# Patient Record
Sex: MALE | Race: BLACK OR AFRICAN AMERICAN | NOT HISPANIC OR LATINO | Employment: UNEMPLOYED | URBAN - METROPOLITAN AREA
[De-identification: names, ages, dates, MRNs, and addresses within clinical notes are randomized per-mention and may not be internally consistent; named-entity substitution may affect disease eponyms.]

---

## 2017-01-16 PROBLEM — C95.00 ACUTE LEUKEMIA: Status: ACTIVE | Noted: 2017-01-16

## 2017-01-17 ENCOUNTER — HOSPITAL ENCOUNTER (INPATIENT)
Facility: HOSPITAL | Age: 37
LOS: 38 days | Discharge: LEFT BEFORE DISCHARGE | DRG: 840 | End: 2017-02-24
Attending: INTERNAL MEDICINE | Admitting: INTERNAL MEDICINE
Payer: MEDICAID

## 2017-01-17 DIAGNOSIS — R50.9 FEVER, UNSPECIFIED FEVER CAUSE: ICD-10-CM

## 2017-01-17 DIAGNOSIS — Z72.0 TOBACCO ABUSE: ICD-10-CM

## 2017-01-17 DIAGNOSIS — R78.81 BACTEREMIA DUE TO ENTEROCOCCUS: ICD-10-CM

## 2017-01-17 DIAGNOSIS — B95.2 BACTEREMIA DUE TO ENTEROCOCCUS: ICD-10-CM

## 2017-01-17 DIAGNOSIS — B20 HIV (HUMAN IMMUNODEFICIENCY VIRUS INFECTION): ICD-10-CM

## 2017-01-17 DIAGNOSIS — C92.00 ACUTE MYELOID LEUKEMIA NOT HAVING ACHIEVED REMISSION: Primary | ICD-10-CM

## 2017-01-17 DIAGNOSIS — C95.00 ACUTE LEUKEMIA: ICD-10-CM

## 2017-01-17 DIAGNOSIS — E86.1 HYPOVOLEMIA: ICD-10-CM

## 2017-01-17 LAB
ALBUMIN SERPL BCP-MCNC: 2 G/DL
ALP SERPL-CCNC: 128 U/L
ALT SERPL W/O P-5'-P-CCNC: 16 U/L
AMORPH CRY UR QL COMP ASSIST: ABNORMAL
ANION GAP SERPL CALC-SCNC: 6 MMOL/L
ANISOCYTOSIS BLD QL SMEAR: SLIGHT
ANISOCYTOSIS BLD QL SMEAR: SLIGHT
APTT BLDCRRT: 24.6 SEC
AST SERPL-CCNC: 18 U/L
BACTERIA #/AREA URNS AUTO: ABNORMAL /HPF
BASOPHILS # BLD AUTO: ABNORMAL K/UL
BASOPHILS NFR BLD: 0 %
BASOPHILS NFR BLD: 0 %
BILIRUB SERPL-MCNC: 0.3 MG/DL
BILIRUB UR QL STRIP: NEGATIVE
BONE MARROW WRIGHT STAIN COMMENT: NORMAL
BUN SERPL-MCNC: 14 MG/DL
BURR CELLS BLD QL SMEAR: ABNORMAL
BURR CELLS BLD QL SMEAR: ABNORMAL
CA-I BLDV-SCNC: 1.11 MMOL/L
CALCIUM SERPL-MCNC: 8.7 MG/DL
CHLORIDE SERPL-SCNC: 98 MMOL/L
CLARITY UR REFRACT.AUTO: ABNORMAL
CO2 SERPL-SCNC: 22 MMOL/L
COLOR UR AUTO: YELLOW
CREAT SERPL-MCNC: 0.8 MG/DL
CREAT UR-MCNC: 53 MG/DL
CREAT UR-MCNC: 53 MG/DL
D DIMER PPP IA.FEU-MCNC: 5.18 MG/L FEU
D DIMER PPP IA.FEU-MCNC: 5.24 MG/L FEU
DACRYOCYTES BLD QL SMEAR: ABNORMAL
DACRYOCYTES BLD QL SMEAR: ABNORMAL
DIASTOLIC DYSFUNCTION: NO
DIFFERENTIAL METHOD: ABNORMAL
DIFFERENTIAL METHOD: ABNORMAL
EOSINOPHIL # BLD AUTO: ABNORMAL K/UL
EOSINOPHIL NFR BLD: 0 %
EOSINOPHIL NFR BLD: 0 %
ERYTHROCYTE [DISTWIDTH] IN BLOOD BY AUTOMATED COUNT: 16.2 %
ERYTHROCYTE [DISTWIDTH] IN BLOOD BY AUTOMATED COUNT: 16.2 %
EST. GFR  (AFRICAN AMERICAN): >60 ML/MIN/1.73 M^2
EST. GFR  (NON AFRICAN AMERICAN): >60 ML/MIN/1.73 M^2
FIBRINOGEN PPP-MCNC: 609 MG/DL
GGT SERPL-CCNC: 38 U/L
GLUCOSE SERPL-MCNC: 112 MG/DL
GLUCOSE UR QL STRIP: NEGATIVE
GRAM STN SPEC: NORMAL
GRAM STN SPEC: NORMAL
HAPTOGLOB SERPL-MCNC: 315 MG/DL
HAV IGM SERPL QL IA: NEGATIVE
HBV CORE IGM SERPL QL IA: NEGATIVE
HBV SURFACE AG SERPL QL IA: NEGATIVE
HCT VFR BLD AUTO: 25.2 %
HCT VFR BLD AUTO: 27.8 %
HCV AB SERPL QL IA: NEGATIVE
HGB BLD-MCNC: 8.6 G/DL
HGB BLD-MCNC: 9.5 G/DL
HGB UR QL STRIP: NEGATIVE
HYALINE CASTS UR QL AUTO: 0 /LPF
INR PPP: 1.2
KETONES UR QL STRIP: NEGATIVE
LACTATE SERPL-SCNC: 1.3 MMOL/L
LDH SERPL L TO P-CCNC: 1235 U/L
LEUKOCYTE ESTERASE UR QL STRIP: NEGATIVE
LYMPHOCYTES # BLD AUTO: ABNORMAL K/UL
LYMPHOCYTES NFR BLD: 10 %
LYMPHOCYTES NFR BLD: 4 %
MAGNESIUM SERPL-MCNC: 2.1 MG/DL
MAGNESIUM SERPL-MCNC: 2.2 MG/DL
MCH RBC QN AUTO: 31.5 PG
MCH RBC QN AUTO: 31.9 PG
MCHC RBC AUTO-ENTMCNC: 34.1 %
MCHC RBC AUTO-ENTMCNC: 34.2 %
MCV RBC AUTO: 92 FL
MCV RBC AUTO: 93 FL
MICROSCOPIC COMMENT: ABNORMAL
MONOCYTES # BLD AUTO: ABNORMAL K/UL
MONOCYTES NFR BLD: 0 %
MONOCYTES NFR BLD: 0 %
NEUTROPHILS NFR BLD: 0.5 %
NEUTROPHILS NFR BLD: 1 %
NITRITE UR QL STRIP: NEGATIVE
OSMOLALITY SERPL: 272 MOSM/KG
OSMOLALITY UR: 576 MOSM/KG
PATH REV BLD -IMP: NORMAL
PATH REV BLD -IMP: NORMAL
PH UR STRIP: 7 [PH] (ref 5–8)
PHOSPHATE SERPL-MCNC: 3.1 MG/DL
PHOSPHATE SERPL-MCNC: 4.3 MG/DL
PLATELET # BLD AUTO: 91 K/UL
PLATELET # BLD AUTO: 96 K/UL
PLATELET BLD QL SMEAR: ABNORMAL
PLATELET BLD QL SMEAR: ABNORMAL
PMV BLD AUTO: 10.3 FL
PMV BLD AUTO: 10.7 FL
POIKILOCYTOSIS BLD QL SMEAR: ABNORMAL
POIKILOCYTOSIS BLD QL SMEAR: SLIGHT
POLYCHROMASIA BLD QL SMEAR: ABNORMAL
POLYCHROMASIA BLD QL SMEAR: ABNORMAL
POTASSIUM SERPL-SCNC: 4.7 MMOL/L
POTASSIUM UR-SCNC: 43 MMOL/L
PROT SERPL-MCNC: 8.7 G/DL
PROT UR QL STRIP: ABNORMAL
PROT UR-MCNC: 37 MG/DL
PROT/CREAT RATIO, UR: 0.7
PROTHROMBIN TIME: 12.2 SEC
RBC # BLD AUTO: 2.7 M/UL
RBC # BLD AUTO: 3.02 M/UL
RBC #/AREA URNS AUTO: 1 /HPF (ref 0–4)
RETIRED EF AND QEF - SEE NOTES: 53 (ref 55–65)
SCHISTOCYTES BLD QL SMEAR: ABNORMAL
SODIUM SERPL-SCNC: 126 MMOL/L
SODIUM UR-SCNC: 88 MMOL/L
SP GR UR STRIP: 1.01 (ref 1–1.03)
SQUAMOUS #/AREA URNS AUTO: 1 /HPF
TSH SERPL DL<=0.005 MIU/L-ACNC: 1.85 UIU/ML
URATE SERPL-MCNC: 2.9 MG/DL
URN SPEC COLLECT METH UR: ABNORMAL
UROBILINOGEN UR STRIP-ACNC: 1 EU/DL
WBC # BLD AUTO: 34.79 K/UL
WBC # BLD AUTO: 44.38 K/UL
WBC #/AREA URNS AUTO: 2 /HPF (ref 0–5)
WBC OTHER NFR BLD MANUAL: 89 %
WBC OTHER NFR BLD MANUAL: 96 %

## 2017-01-17 PROCEDURE — 83010 ASSAY OF HAPTOGLOBIN QUANT: CPT

## 2017-01-17 PROCEDURE — 83605 ASSAY OF LACTIC ACID: CPT

## 2017-01-17 PROCEDURE — 88271 CYTOGENETICS DNA PROBE: CPT | Mod: 91

## 2017-01-17 PROCEDURE — 25000003 PHARM REV CODE 250: Performed by: STUDENT IN AN ORGANIZED HEALTH CARE EDUCATION/TRAINING PROGRAM

## 2017-01-17 PROCEDURE — 85730 THROMBOPLASTIN TIME PARTIAL: CPT

## 2017-01-17 PROCEDURE — 97802 MEDICAL NUTRITION INDIV IN: CPT

## 2017-01-17 PROCEDURE — 88313 SPECIAL STAINS GROUP 2: CPT | Mod: 26,,, | Performed by: PATHOLOGY

## 2017-01-17 PROCEDURE — 87086 URINE CULTURE/COLONY COUNT: CPT

## 2017-01-17 PROCEDURE — 81479 UNLISTED MOLECULAR PATHOLOGY: CPT

## 2017-01-17 PROCEDURE — 84100 ASSAY OF PHOSPHORUS: CPT

## 2017-01-17 PROCEDURE — 84133 ASSAY OF URINE POTASSIUM: CPT

## 2017-01-17 PROCEDURE — 85007 BL SMEAR W/DIFF WBC COUNT: CPT

## 2017-01-17 PROCEDURE — 83615 LACTATE (LD) (LDH) ENZYME: CPT

## 2017-01-17 PROCEDURE — 88313 SPECIAL STAINS GROUP 2: CPT

## 2017-01-17 PROCEDURE — 83935 ASSAY OF URINE OSMOLALITY: CPT

## 2017-01-17 PROCEDURE — 80074 ACUTE HEPATITIS PANEL: CPT

## 2017-01-17 PROCEDURE — 85384 FIBRINOGEN ACTIVITY: CPT

## 2017-01-17 PROCEDURE — 82955 ASSAY OF G6PD ENZYME: CPT

## 2017-01-17 PROCEDURE — 63600175 PHARM REV CODE 636 W HCPCS: Performed by: INTERNAL MEDICINE

## 2017-01-17 PROCEDURE — 88237 TISSUE CULTURE BONE MARROW: CPT

## 2017-01-17 PROCEDURE — 20600001 HC STEP DOWN PRIVATE ROOM

## 2017-01-17 PROCEDURE — 83735 ASSAY OF MAGNESIUM: CPT

## 2017-01-17 PROCEDURE — 88305 TISSUE EXAM BY PATHOLOGIST: CPT | Performed by: PATHOLOGY

## 2017-01-17 PROCEDURE — 07DR3ZX EXTRACTION OF ILIAC BONE MARROW, PERCUTANEOUS APPROACH, DIAGNOSTIC: ICD-10-PCS | Performed by: INTERNAL MEDICINE

## 2017-01-17 PROCEDURE — 82977 ASSAY OF GGT: CPT

## 2017-01-17 PROCEDURE — 80053 COMPREHEN METABOLIC PANEL: CPT

## 2017-01-17 PROCEDURE — 85379 FIBRIN DEGRADATION QUANT: CPT

## 2017-01-17 PROCEDURE — 85097 BONE MARROW INTERPRETATION: CPT | Mod: ,,, | Performed by: PATHOLOGY

## 2017-01-17 PROCEDURE — 25000003 PHARM REV CODE 250: Performed by: NURSE PRACTITIONER

## 2017-01-17 PROCEDURE — 88184 FLOWCYTOMETRY/ TC 1 MARKER: CPT | Performed by: PATHOLOGY

## 2017-01-17 PROCEDURE — 38221 DX BONE MARROW BIOPSIES: CPT

## 2017-01-17 PROCEDURE — 36415 COLL VENOUS BLD VENIPUNCTURE: CPT

## 2017-01-17 PROCEDURE — 88185 FLOWCYTOMETRY/TC ADD-ON: CPT | Mod: 59 | Performed by: PATHOLOGY

## 2017-01-17 PROCEDURE — 84300 ASSAY OF URINE SODIUM: CPT

## 2017-01-17 PROCEDURE — 93306 TTE W/DOPPLER COMPLETE: CPT | Mod: 26,,, | Performed by: INTERNAL MEDICINE

## 2017-01-17 PROCEDURE — 85027 COMPLETE CBC AUTOMATED: CPT | Mod: 91

## 2017-01-17 PROCEDURE — 84100 ASSAY OF PHOSPHORUS: CPT | Mod: 91

## 2017-01-17 PROCEDURE — 88264 CHROMOSOME ANALYSIS 20-25: CPT

## 2017-01-17 PROCEDURE — 93306 TTE W/DOPPLER COMPLETE: CPT

## 2017-01-17 PROCEDURE — 85610 PROTHROMBIN TIME: CPT

## 2017-01-17 PROCEDURE — 81001 URINALYSIS AUTO W/SCOPE: CPT

## 2017-01-17 PROCEDURE — 88275 CYTOGENETICS 100-300: CPT | Mod: 91

## 2017-01-17 PROCEDURE — 84550 ASSAY OF BLOOD/URIC ACID: CPT

## 2017-01-17 PROCEDURE — 81310 NPM1 GENE: CPT

## 2017-01-17 PROCEDURE — 82330 ASSAY OF CALCIUM: CPT

## 2017-01-17 PROCEDURE — 84443 ASSAY THYROID STIM HORMONE: CPT

## 2017-01-17 PROCEDURE — 88271 CYTOGENETICS DNA PROBE: CPT

## 2017-01-17 PROCEDURE — 88189 FLOWCYTOMETRY/READ 16 & >: CPT | Mod: ,,, | Performed by: PATHOLOGY

## 2017-01-17 PROCEDURE — 82570 ASSAY OF URINE CREATININE: CPT

## 2017-01-17 PROCEDURE — 87205 SMEAR GRAM STAIN: CPT

## 2017-01-17 PROCEDURE — 88311 DECALCIFY TISSUE: CPT | Mod: 26,,, | Performed by: PATHOLOGY

## 2017-01-17 PROCEDURE — 83930 ASSAY OF BLOOD OSMOLALITY: CPT

## 2017-01-17 PROCEDURE — 88291 CYTO/MOLECULAR REPORT: CPT | Mod: 91

## 2017-01-17 PROCEDURE — 81218 CEBPA GENE FULL SEQUENCE: CPT

## 2017-01-17 PROCEDURE — 85060 BLOOD SMEAR INTERPRETATION: CPT | Mod: ,,, | Performed by: PATHOLOGY

## 2017-01-17 PROCEDURE — 88275 CYTOGENETICS 100-300: CPT

## 2017-01-17 PROCEDURE — 83735 ASSAY OF MAGNESIUM: CPT | Mod: 91

## 2017-01-17 PROCEDURE — 88299 UNLISTED CYTOGENETIC STUDY: CPT

## 2017-01-17 PROCEDURE — 25000003 PHARM REV CODE 250: Performed by: INTERNAL MEDICINE

## 2017-01-17 PROCEDURE — 63600175 PHARM REV CODE 636 W HCPCS: Performed by: STUDENT IN AN ORGANIZED HEALTH CARE EDUCATION/TRAINING PROGRAM

## 2017-01-17 PROCEDURE — 87040 BLOOD CULTURE FOR BACTERIA: CPT | Mod: 59

## 2017-01-17 PROCEDURE — 85379 FIBRIN DEGRADATION QUANT: CPT | Mod: 91

## 2017-01-17 RX ORDER — MORPHINE SULFATE 2 MG/ML
1 INJECTION, SOLUTION INTRAMUSCULAR; INTRAVENOUS ONCE AS NEEDED
Status: ACTIVE | OUTPATIENT
Start: 2017-01-17 | End: 2017-01-17

## 2017-01-17 RX ORDER — ACETAMINOPHEN 325 MG/1
650 TABLET ORAL EVERY 6 HOURS PRN
Status: DISCONTINUED | OUTPATIENT
Start: 2017-01-17 | End: 2017-02-24 | Stop reason: HOSPADM

## 2017-01-17 RX ORDER — IBUPROFEN 200 MG
24 TABLET ORAL
Status: DISCONTINUED | OUTPATIENT
Start: 2017-01-17 | End: 2017-02-24 | Stop reason: HOSPADM

## 2017-01-17 RX ORDER — ALLOPURINOL 100 MG/1
300 TABLET ORAL DAILY
Status: DISCONTINUED | OUTPATIENT
Start: 2017-01-17 | End: 2017-01-31

## 2017-01-17 RX ORDER — GLUCAGON 1 MG
1 KIT INJECTION
Status: DISCONTINUED | OUTPATIENT
Start: 2017-01-17 | End: 2017-02-24 | Stop reason: HOSPADM

## 2017-01-17 RX ORDER — ONDANSETRON 2 MG/ML
4 INJECTION INTRAMUSCULAR; INTRAVENOUS ONCE AS NEEDED
Status: ACTIVE | OUTPATIENT
Start: 2017-01-17 | End: 2017-01-17

## 2017-01-17 RX ORDER — CEFEPIME HYDROCHLORIDE 2 G/50ML
2 INJECTION, SOLUTION INTRAVENOUS
Status: DISCONTINUED | OUTPATIENT
Start: 2017-01-17 | End: 2017-01-18

## 2017-01-17 RX ORDER — SODIUM CHLORIDE 9 MG/ML
INJECTION, SOLUTION INTRAVENOUS CONTINUOUS
Status: DISCONTINUED | OUTPATIENT
Start: 2017-01-17 | End: 2017-01-20

## 2017-01-17 RX ORDER — SODIUM CHLORIDE 9 MG/ML
INJECTION, SOLUTION INTRAVENOUS CONTINUOUS
Status: DISCONTINUED | OUTPATIENT
Start: 2017-01-17 | End: 2017-01-17

## 2017-01-17 RX ORDER — LIDOCAINE HYDROCHLORIDE 20 MG/ML
10 INJECTION, SOLUTION EPIDURAL; INFILTRATION; INTRACAUDAL; PERINEURAL ONCE
Status: COMPLETED | OUTPATIENT
Start: 2017-01-17 | End: 2017-01-17

## 2017-01-17 RX ORDER — LORAZEPAM 0.5 MG/1
0.5 TABLET ORAL ONCE
Status: COMPLETED | OUTPATIENT
Start: 2017-01-17 | End: 2017-01-17

## 2017-01-17 RX ORDER — IBUPROFEN 200 MG
16 TABLET ORAL
Status: DISCONTINUED | OUTPATIENT
Start: 2017-01-17 | End: 2017-02-24 | Stop reason: HOSPADM

## 2017-01-17 RX ORDER — OXYCODONE HYDROCHLORIDE 5 MG/1
5 TABLET ORAL EVERY 4 HOURS PRN
Status: DISCONTINUED | OUTPATIENT
Start: 2017-01-17 | End: 2017-02-24 | Stop reason: HOSPADM

## 2017-01-17 RX ORDER — ONDANSETRON 8 MG/1
8 TABLET, ORALLY DISINTEGRATING ORAL EVERY 8 HOURS PRN
Status: DISCONTINUED | OUTPATIENT
Start: 2017-01-17 | End: 2017-02-24 | Stop reason: HOSPADM

## 2017-01-17 RX ORDER — LORAZEPAM 0.5 MG/1
0.5 TABLET ORAL ONCE
Status: DISCONTINUED | OUTPATIENT
Start: 2017-01-17 | End: 2017-01-17

## 2017-01-17 RX ORDER — CEFEPIME HYDROCHLORIDE 2 G/50ML
2 INJECTION, SOLUTION INTRAVENOUS
Status: DISCONTINUED | OUTPATIENT
Start: 2017-01-17 | End: 2017-01-17

## 2017-01-17 RX ADMIN — VANCOMYCIN HYDROCHLORIDE 500 MG: 500 INJECTION, POWDER, LYOPHILIZED, FOR SOLUTION INTRAVENOUS at 07:01

## 2017-01-17 RX ADMIN — CEFEPIME HYDROCHLORIDE 2 G: 2 INJECTION, POWDER, FOR SOLUTION INTRAVENOUS at 03:01

## 2017-01-17 RX ADMIN — CEFEPIME HYDROCHLORIDE 2 G: 2 INJECTION, POWDER, FOR SOLUTION INTRAVENOUS at 08:01

## 2017-01-17 RX ADMIN — CEFEPIME HYDROCHLORIDE 2 G: 2 INJECTION, POWDER, FOR SOLUTION INTRAVENOUS at 12:01

## 2017-01-17 RX ADMIN — OXYCODONE HYDROCHLORIDE 5 MG: 5 TABLET ORAL at 11:01

## 2017-01-17 RX ADMIN — LIDOCAINE HYDROCHLORIDE 200 MG: 20 INJECTION, SOLUTION EPIDURAL; INFILTRATION; INTRACAUDAL; PERINEURAL at 01:01

## 2017-01-17 RX ADMIN — LORAZEPAM 0.5 MG: 0.5 TABLET ORAL at 01:01

## 2017-01-17 RX ADMIN — OXYCODONE HYDROCHLORIDE 5 MG: 5 TABLET ORAL at 09:01

## 2017-01-17 RX ADMIN — ALLOPURINOL 300 MG: 100 TABLET ORAL at 09:01

## 2017-01-17 RX ADMIN — OXYCODONE HYDROCHLORIDE 5 MG: 5 TABLET ORAL at 01:01

## 2017-01-17 RX ADMIN — SODIUM CHLORIDE: 0.9 INJECTION, SOLUTION INTRAVENOUS at 08:01

## 2017-01-17 NOTE — PLAN OF CARE
Problem: Patient Care Overview  Goal: Plan of Care Review  Recommendations     Recommendation/Intervention:   1. Change diet to high calorie/high protein diet.   2. Order Boost Plus - TID in vanilla.   3. Encourage optimal po intake >% to meet EEN/EPN. RD to monitor     Goals: Pt will meet >85% EEN with po intake + ONS  Nutrition Goal Status: new     Continuum of Care Plan     Referral to Outpatient Services: other (see comments) (Nutrition D/C Planning: high tiago/protein + ONS)     Chela Lee MS,RD,LDN  [id number]

## 2017-01-17 NOTE — PROCEDURES
PROCEDURE NOTE:  Bone Aspirate and Bone Marrow Biopsy  Indication: Leukocytosis, Suspected ALL  Consent: Informed consent was obtained from patient.  Timeout: Done and documented.  Position: Prone  Site: Right posterior illiac crest.  Prep: Betadine.  Needle used: 11 gauge Jamshidi needle.  Anesthetic: 2% lidocaine 5cc.  Biopsy: The biopsy needle was introduced into the marrow cavity and an aspirate was obtained with spicules noted and without complications. Core biopsy obtained and sent for routine histologic examination.  Complications: None.  Disposition: The patient was advised to lie on his back for 20 minutes. Will continue in-patient care.    Dionte Rizvi MD (PGY-4)

## 2017-01-17 NOTE — SIGNIFICANT EVENT
Patient's noted to be HIV positive on serologic studies. Discussed results with patient. Patient tearful and upset with news. Offered supportive care with psychology +/-  but patient declined at this time. Discussed with patient the plan to ask Infectious Disease physicians to come see him and discuss treatment options for his new HIV diagnosis. All questions answered. ID consult placed in EPIC, and case discussed with ID. Will order HIV viral load, CD4 count, syphilis, toxo IgG, chlamydia/gonorrhea per discussion.    Dionte Rizvi MD (PGY-4)

## 2017-01-17 NOTE — MEDICAL/APP STUDENT
"HISTORY & PHYSICAL  Hospital Medicine    Team: Networked reference to record PCT     PRESENTING HISTORY     Chief Complaint/Reason for Admission:  Bilateral UE and LE myalgias, acute leukemia/lymphoma work-up    History of Present Illness:  Mr. Marck Howard is a 36 y.o. male with no significant past medical or surgical history who was transferred from [facility] overnight for further work-up of suspected acute leukemia.  He presented to the OSH yesterday with a 4-week history of pain in his lower UEs and lower LEs bilaterally.  He reports the pain is constant 10/10 pain that is somewhat relived by advil.  He has been unable to work during this time due to the pain.  He has experienced significant weight loss over the past few weeks despite having a normal appetite.  His most recent weight to his knowledge was 120 lbs (currently 91 lbs), which he reported as being low for him.  A few days ago he noticed two painless lumps under his chin that have increased in size.  Patient denies any fevers, chills, night sweats, easy bruising or bleeding, cough, SOB, chest pain, abdominal pain, stool changes, urine changes.    At the OSH he was found to be febrile to 100.8 F and WBC count 49.95 with 69% lymphs with "many atypical lymphs present".  He was started on vancomycin and cefepime and transferred here for further work-up.     Review of Systems:  Constitutional: positive for weight loss, negative for fevers, malaise and night sweats  Eyes: no visual changes  ENT: no nasal congestion or sore throat  Respiratory: no cough or shortness of breath  Cardiovascular: no chest pain or palpitations  Gastrointestinal: no nausea or vomiting, no abdominal pain or change in bowel habits  Genitourinary: no hematuria or dysuria  Integument/Breast: no rash or pruritis  Hematologic/Lymphatic: positive for cervical lymphadenopathy, negative for bleeding and easy bruising  Musculoskeletal: positive for myalgias of " bilat UE from wrist to elbow and bilat LE from knees to ankles    PAST HISTORY:     History reviewed. No pertinent past medical history.    History reviewed. No pertinent past surgical history.    History reviewed. No pertinent family history of which patient is aware.  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, moving, working on cars, etc.)    MEDICATIONS & ALLERGIES:     Current Facility-Administered Medications on File Prior to Encounter   Medication Dose Route Frequency Provider Last Rate Last Dose    [COMPLETED] acetaminophen tablet 650 mg  650 mg Oral ED 1 Time Yousif Hameed MD   650 mg at 17 2143    [COMPLETED] piperacillin-tazobactam 4.5 g in dextrose 5 % 100 mL IVPB (ready to mix system)  4.5 g Intravenous ED 1 Time Yousif Hameed MD   Stopped at 17 2348    [COMPLETED] sodium chloride 0.9% bolus 1,000 mL  1,000 mL Intravenous ED 1 Time Yousif Hameed MD   Stopped at 17 2333    [COMPLETED] sodium chloride 0.9% bolus 1,000 mL  1,000 mL Intravenous ED 1 Time Yousif Hameed MD 1,000 mL/hr at 17 2349 1,000 mL at 17 2349    [COMPLETED] vancomycin 750 mg in dextrose 5 % 250 mL IVPB (ready to mix system)  15 mg/kg Intravenous ED 1 Time Yousif Hameed MD   750 mg at 17 2348     No current outpatient prescriptions on file prior to encounter.        Review of  patient's allergies indicates:  No Known Allergies    OBJECTIVE:     Vital Signs:  Temp:  [98.3 °F (36.8 °C)-100.8 °F (38.2 °C)] 98.3 °F (36.8 °C)  Pulse:  [113-150] 126  Resp:  [18] 18  SpO2:  [96 %-99 %] 98 %  BP: (109-127)/(70-84) 115/70  Body mass index is 15.74 kg/(m^2).     Physical Exam:  General: appears stated age, cachectic, no distress  HENT: Head:normocephalic, atraumatic. Ears:not examined. Nose: Nares normal. Septum midline. Mucosa normal. No drainage or sinus tenderness., no discharge. Throat: lips, mucosa, and tongue normal; teeth and gums normal and no throat erythema.  Eyes: conjunctivae/corneas clear. PERRL.   Neck: marked anterior cervical adenopathy  Lungs:  clear to auscultation bilaterally and normal respiratory effort  Cardiovascular: Heart: tachycardia, S1, S2 normal, no murmur, click, rub or gallop. Chest Wall: no tenderness. Extremities: warm to touch bilaterally, no cyanosis, edema, or clubbing. Pulses: 2+ and symmetric.  Abdomen/Rectal: Abdomen: soft, non-tender non-distented; bowel sounds normal; no masses,  no organomegaly.  Skin: Skin dry, skin color, texture normal. No rashes or lesions.  Lymph Nodes: Cervical adenopathy: two markedly enlarged R submandibular/submental LNs, one mildly enlarged L submandibular LN    Laboratory  Lab Results   Component Value Date    WBC 44.38 (H) 01/17/2017    HGB 8.6 (L) 01/17/2017    HCT 25.2 (L) 01/17/2017    MCV 93 01/17/2017    PLT 96 (L) 01/17/2017       Recent Labs  Lab 01/17/17  0230 01/17/17  0729   *  --    *  --    K 4.7  --    CL 98  --    CO2 22*  --    BUN 14  --    CREATININE 0.8  --    CALCIUM 8.7  --    MG 2.2 2.1     Lab Results   Component Value Date    INR 1.2 01/17/2017    INR 1.8 (H) 01/16/2017     No results found for: HGBA1C  No results for input(s): POCTGLUCOSE in the last 72 hours.    Diagnostic Results:    CXR (1/16/2017, 1/17/2017):  -- No acute radiographic findings.    Pathologist Review Peripheral Smear  (1/17/2017):  --Marked leukocytosis shows a predominant population of atypical   blastoid cells with a minimal amount of lightly basophilic cytoplasm   and large nuclei showing occasional nucleoli.  There are rare chunky   granules in the cytoplasm.  An acute leukemia is high in the   differential.  Recommend correlation with bone marrow studies with   flow cytometry and other ancillary studies for further   characterization of this process.   --Normochromic normocytic anemia shows mild anisocytosis and   scattered teardrop cells as well as rare red cell fragments and mild   polychromasia.   --Platelets are moderately decreased in number with no significant   platelet clumping seen on scanning.    ASSESSMENT & PLAN:     Current Problems List:  Active Hospital Problems    Diagnosis  POA    Acute leukemia [C95.00]  Yes      Resolved Hospital Problems    Diagnosis Date Resolved POA   No resolved problems to display.       Mr. Marck Howard is a 36 y.o. male with no significant past medical or surgical history who was transferred from [facility] for further work-up of suspected ALL.    Problem Assessment & Treatment Plan:  ALL/Lymphoma (suspected):  - today WBC 44.38 with Gran 0.5% and Lymph 4.0% (yesterday WBC 49.95 with Gran 22% and Lymph 69%)  - repeat CBC to establish consistency of lab values  - peripheral smear places acute leukemia high on differential  - bone marrow biopsy today  - will get peripheral flow cytometry  - consult IR for line placement    3/4 SIRS:  - patient febrile to 100.8 F and tachycardic to 150 with elevated WBC count  - started on vancomycin and cefepime  - CXR x 2 clear  - blood cx x 2 and urine cx in process, f/u results  - f/u HIV result from OSH  - Hepatitis panel  - discontinue vancomycin 2/2 negative ROS    Anemia, Thrombocytopenia:  - Hb 8.6, Plt 96  - likely due to suspected hematological malignancy  - currently no signs of bleeding  - consider transfusing if  Hb < 7.0 or patient becomes symptomatic  - monitor with daily CBC    Hyponatremia:  - likely due to volume depletion  - continue normal saline infusion  - monitor with daily BMP      Medical Student:  Claudine Morfin

## 2017-01-17 NOTE — H&P
"H&P  Heme/onc       Patient Name: Marck Howard MRN: [id number]  Date 1980 Age 36 y.o.  Location: [address] [place] [address]    Admit Date: 1/17/2017                     LOS: 0    SUBJECTIVE:       HPI  Marck Hwoard is a 36 y.o. years old male with no PMH who presented to [facility] ED complaining of 1 month long " muscle pain" in bilateral lower arms and bilateral lower legs. 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 His muscle pain is so severe that he usually tries not to change body position, denies any exacerbating or alleviating factors . He endorses lack of weight gain (report usually gains weight when he doesn't work ~ 1 month, however this was not the case this time) Pt otherwise denies any trauma, fever, chills, night sweats, lymphadenopathy, sore throate, cough, dysphagia, SOB, CP, bruising , rashes, skin changes, blood in urin, stool, melena, dysuria, frequency or urgency. Denies any sick person exposure. He smokes a pack/day of cigarettes and drinks alcohol. Denies any family history of any malignancies.    In OSH he was noted to have temp of 100.8, tachy in 150 with lactate of 2.5 and WBC of 49.95. He was started on vanc (23:48 pm) and zosyn (22:41 pm)  And 2 L IVF and [place] [facility] [place]    REVIEW OF SYSTEMS:  General: +, fatigue, no chills, nausea, or vomiting  HEENT: No change in vision  sore throat, dysphagia  Cardiac: No chest pain,  Pulmonary: No shortness of breath, dyspnea, cough,  GI: No abdominal distention, pain, constipation, or diarrhea  Urinary: No dysuria, frequency, difficult urination  Hematologic/Lymphatic: No night sweats, easy bruising, or LAD  Extremities: + arthralgias, or myalgias  Derm: No rash, ecchymosis   Neuro: No focal  numbness  Psych: No confusion,     No past medical history on file.    No past surgical history on " "file.    Current Facility-Administered Medications   Medication    acetaminophen tablet 650 mg    ceFEPIme in dextrose 5% 2 gram/50 mL IVPB 2 g    dextrose 50% injection 12.5 g    dextrose 50% injection 25 g    glucagon (human recombinant) injection 1 mg    glucose chewable tablet 16 g    glucose chewable tablet 24 g    ondansetron disintegrating tablet 8 mg    vancomycin (VANCOCIN) 15 mg/kg in dextrose 5 % 500 mL IVPB       Review of patient's allergies indicates:  No Known Allergies    No family history on file.    Social History   Substance Use Topics    Smoking status: Current Every Day Smoker     Packs/day: 1.00     Types: Cigarettes    Smokeless tobacco: Not on file    Alcohol use Yes       OBJECTIVE:     VITALS  Most Recent Range (Last 24H)   Visit Vitals    /78 (BP Location: Left arm, Patient Position: Lying, BP Method: Automatic)    Pulse (!) 113    Temp 98.8 °F (37.1 °C) (Oral)    Resp 18    Ht (P) 5' 4" (1.626 m)    SpO2 99%    BMI (P) 18.71 kg/m2      Temp:  [98.8 °F (37.1 °C)-100.8 °F (38.2 °C)]   Pulse:  [113-150]   Resp:  [18]   BP: (109-127)/(76-84)   SpO2:  [96 %-99 %]       PHYSICAL EXAM  General: cachetic male, oriented . Not in distress  Head: Normocephalic and atraumatic.    Eyes:  No scleral icterus.   Neck: Normal range of motion. Neck supple. No tracheal deviation present.  No JVD;  + anterior cervical and supraclavicular lymphadenopathy ;   Cardiac: Normal rate, regular rhythm, normal heart sounds and intact distal pulses. No murmur heard  Pulses: Regular; , DP/PT +2  Pulmonary: Effort normal and breath sounds normal. No respiratory distress. no wheezes. no rales no rhonchi  Abdominal: Bowel sounds are normal. exhibits no distension. There is no tenderness. There is no rebound and no guarding. No bruits or pulsatile masses  Extremities: No  edema  Skin: Warm and dry, No bruising, rash, ulceration, jaundice or ecchymoses   Neurological: No focal motor  defects, no " cranial nerve deficit, ROM upper and lower extremity limited due to pain,bilateral lower extremity tenderness and bilateral forearm  tenderness  Psychiatric: Normal mood and affect. behavior is normal. Thought content normal.      LABS  CBC BMP     Recent Labs  Lab 01/16/17 2129   WBC 49.95*   HGB 10.8*   HCT 32.1*   MCV 95   *   RDW 15.7*      Recent Labs  Lab 01/16/17 2129   *   K 4.8   CL 88*   CO2 27   BUN 16   CREATININE 0.83   *   CALCIUM 10.0          LFTs    Recent Labs  Lab 01/16/17 2129   PROT 9.8*   BILITOT 0.6   ALKPHOS 188*   AST 31   ALT 27      Urine    Recent Labs  Lab 01/16/17 2228   COLORU Yellow   SPECGRAV 1.025   PHUR 7.0   PROTEINUA 2+*   BACTERIA Occasional   NITRITE Negative   LEUKOCYTESUR Negative   UROBILINOGEN 2.0-3.0*   HYALINECASTS 0        Coag Labs    Recent Labs  Lab 01/16/17 2129   INR 1.8*    Mag & Phos  No results for input(s): MG, PHOS in the last 168 hours.     Lactic Acid    Recent Labs  Lab 01/16/17 2129   LACTATE 2.5*       Cardiac Enzyme    Recent Labs  Lab 01/16/17 2129   CPK 25*     Microbiology:  Microbiology Results (last 7 days)     Procedure Component Value Units Date/Time    Blood culture [[id number]] Collected:  01/17/17 0230    Order Status:  Sent Specimen:  Blood Updated:  01/17/17 0230    Narrative:       Blood cultures from 2 different sites. 4 bottles total.  Please draw before starting antibiotics.    Blood culture [[id number]] Collected:  01/17/17 0230    Order Status:  Sent Specimen:  Blood Updated:  01/17/17 0230    Narrative:       Blood cultures x 2 different sites. 4 bottles total. Please  draw cultures before administering antibiotics.    Urine culture [[id number]]     Order Status:  No result Specimen:  Urine from Urine     Gram stain [[id number]]     Order Status:  No result Specimen:  Urine     Culture, Respiratory [[id number]]     Order Status:  No result Specimen:  Respiratory            IMAGING  CXR- pending        ASSESSMENT/PLAN:     Current Problems List:  Active Hospital Problems    Diagnosis  POA    Acute leukemia [C95.00]  Yes      Resolved Hospital Problems    Diagnosis Date Resolved POA   No resolved problems to display.         ASSESSMENT: Marck Howard is a 36 y.o. years old male with no PMH presented to OSH complaining of one month all 4 extremity myalgia with generalized weakness and lack of weight gain    PLAN:  Leukocytosis  -AML vs infectious   -lymph 69% , total prot 9.8, and cervical and non tender supraclavicular lymphadenopathy noted on exam   - OSH 3/4 SIRS criteria, pt received 2 L IVF , vanc and zosyn  - influenza screen was negative, pt pancultured  -start vanc and cefepime  And adjust pending cx results  - HIC and CD4 T helper cell in process  - consider further imaging including CT     hyponatremia  - OSH Na 125  - likely hypovolemic hyponatremic per history and exam  - pt received 2 L IVF in OSH  - repeat Na pending   -  FU Na and correct accordingly, goal not correct more than 6-8 units in 24 hrs    Coagulopathy  - elevated INR and PTT  - FU fibrinogen, D dimer, LDH  - no petechia or ecchymoses on physical exam   - FU inr daily      Staff attestation/recommendation to follow    Signing Physician:  Betsy Graves MD     Internal Medicine, PGY2

## 2017-01-17 NOTE — PLAN OF CARE
MDR's with Dr Jones.  Patient transferred from [facility] for work-up and treatment of suspected acute leukemia.  Febrile and started on IV cefepime. Cultures are in progress.  BMB planned for today along with an echo and PICC placement.  Patient is private pay.  Medicaid office has been notified that the patient will need a screening.  Will continue to follow.

## 2017-01-17 NOTE — PLAN OF CARE
Problem: Patient Care Overview  Goal: Plan of Care Review  Outcome: Ongoing (interventions implemented as appropriate)    01/17/17 6946   Coping/Psychosocial   Plan Of Care Reviewed With patient   side rails up x2; call bell in place; bed in lowest, locked position; skid proof socks on; no evidence of skin breakdown; care plan explained to patient; no additional complaints at this time.

## 2017-01-17 NOTE — PROGRESS NOTES
[facility]  Adult Nutrition  Consult Note    SUMMARY     Recommendations    Recommendation/Intervention:   1. Change diet to high calorie/high protein diet.   2. Order Boost  Plus - TID in vanilla.   3. Encourage optimal po intake >% to meet EEN/EPN. RD to monitor    Goals: Pt will meet >85% EEN with po intake + ONS  Nutrition Goal Status: new     Continuum of Care Plan    Referral to Outpatient Services: other (see comments) (Nutrition D/C Planning: high tiago/protein + ONS)      Reason for Assessment    Reason for Assessment: other (see comments) (Per rounds discussion)  Diagnosis: cancer diagnosis/related complications (acute leukemia)  Relevent Medical History: No PMH   Interdisciplinary Rounds: attended     General Information Comments: Pt reports significant weight loss of 34 lb in ~2-3 months. Pt says his appetite is typically great and eats but lost weight regardless. No other complaints and denies N/V/D/C or abd pain. Will discuss smoking cessation and high calorie po intake. RD to monitor.    Nutrition Prescription Ordered    Current Diet Order: Regular       Nutrition Risk Screen     Nutrition Risk Screen: no indicators present    Nutrition/Diet History    Patient Reported Diet/Restrictions/Preferences: general  Typical Food/Fluid Intake: good po intake  Food Preferences:  (no cultural or Jewish needs identified)        Factors Affecting Nutritional Intake:  (-)     Labs/Tests/Procedures/Meds     Pertinent Labs Reviewed: reviewed  Pertinent Labs Comments: Na 126, K 4.7, Glu 112, plts 96, H&H 8.6/25.2  Pertinent Medications Reviewed: reviewed  Pertinent Medications Comments: allopurinol, cefepime, vancomycin     Physical Findings    Overall Physical Appearance: generalized wasting, loss of muscle mass, loss of subcutaneous fat, underweight  Tubes:  (-)  Oral/Mouth Cavity: WDL  Skin: intact    Anthropometrics     Height (inches): 64.02 in  Weight Method: Standard Scale  Weight  (kg): 41.6 kg  Ideal Body Weight (IBW), Male: 130.12 lb     % Ideal Body Weight, Male (lb): 70.48 lb     BMI (kg/m2): 15.73  BMI Grade: less than 16 protein-energy malnutrition grade III        % Weight Change: 15.4 kg (27% x2-3 months)  Weight Loss: unintentional     Estimated/Assessed Needs    Weight Used For Calorie Calculations: 41.6 kg (91 lb 11.4 oz)   Height (cm): 162.6 cm     Energy Need Method: Kcal/kg (9961-3207 kcal (45-50 kcal/kg))     RMR (Sherman-St. Jeor Equation): 1249.87        Weight Used For Protein Calculations: 41.6 kg (91 lb 11.4 oz)  Protein Requirements: 50-75 g (1.5-1.8 g/kg)    Fluid Need Method: RDA Method (or per MD)     Malnutrition (Undernutrition) Diagnosis    % Intake of Estimated Energy Needs: 75 - 100%  % Meal Intake: 100%  Malnutrition Reason: illness related, chronic      Severe Wt. Loss Chronic Illness: greater than 7.5% in 3 months    Nutrition Diagnosis    Nutrition Problem: Increased nutrient needs  Etiology/Related To: acute leukemia  Nutrition Diagnosis Signs/Symptoms As Evidenced By: increased EEN + EPN  Nutrition Diagnosis Status: New    Monitor and Evaluation    Food and Nutrient Intake: energy intake  Food and Nutrient Adminstration: diet order     Physical Activity and Function: nutrition-related ADLs and IADLs  Anthropometric Measurements: weight, weight change, body mass index  Biochemical Data, Medical Tests and Procedures: electrolyte and renal panel, glucose/endocrine profile, inflammatory profile  Nutrition-Focused Physical Findings: overall appearance    Nutrition Risk    Level of Risk: high    Nutrition Follow-Up    RD Follow-up?: Yes

## 2017-01-17 NOTE — PROGRESS NOTES
Pt arrived with EMS and was placed in room 811 without any complications. Pt is AAOx4. Pt oriented to nurse, room, and call light. Assessment performed. No skin breakdown noted. Vitals stable. All questions answered at this time. Will cont to samuel pt.

## 2017-01-17 NOTE — PROGRESS NOTES
Progress Note  Hematology / Bone Marrow Transplant                                                              Team: Networked reference to record PCT     Patient Name: Marck Howard  YOB: 1980    Admit Date: 1/17/2017                     LOS: 0    SUBJECTIVE:     Reason for Admission:  Acute leukemia  See H&P for detailed initial presentation history and ROS.      Interval history:   Patient seen and examined this AM. Patient continues with bilateral LE and UE pain. Patient denies chest pains, palpitations, SOB, n/v, abdo pain, constipation/diarrhea, dysuria. Tolerating diet. No other issues.    OBJECTIVE:     Vital Signs Range (Last 24H):  Temp:  [98.3 °F (36.8 °C)-100.8 °F (38.2 °C)]   Pulse:  [113-150]   Resp:  [18]   BP: (109-127)/(70-84)   SpO2:  [96 %-99 %] Body mass index is 15.74 kg/(m^2).    I & O (Last 24H):No intake or output data in the 24 hours ending 01/17/17 0947    Physical Exam:  General: well developed, cachectic, no distress  HENT: Head:normocephalic, atraumatic. Ears:right ear normal, left ear normal. Nose: Nares normal. Septum midline. Mucosa normal. No drainage or sinus tenderness., no discharge. Throat: lips, mucosa, and tongue normal; teeth and gums normal and no throat erythema.  Eyes: conjunctivae/corneas clear. PERRL.   Neck: supple, symmetrical, trachea midline, no JVD and thyroid not enlarged, symmetric, no tenderness/mass/nodules  Lungs:  clear to auscultation bilaterally and normal respiratory effort  Cardiovascular: Heart: regular rate and rhythm, S1, S2 normal, no murmur, click, rub or gallop. Chest Wall: no tenderness. Extremities: no cyanosis or edema, or clubbing. Pulses: 2+ and symmetric.  Abdomen/Rectal: Abdomen: soft, non-tender non-distented; bowel sounds normal. Rectal: not examined  Genitalia: deferred  Skin: Skin color, texture, turgor normal. No rashes or lesions  Musculoskeletal:no clubbing, cyanosis  Neurologic: Mental status: Alert, oriented,  thought content appropriate  Psych/Behavioral:  Normal.    Diagnostic Results:  Lab Results   Component Value Date    WBC 44.38 (H) 01/17/2017    HGB 8.6 (L) 01/17/2017    HCT 25.2 (L) 01/17/2017    MCV 93 01/17/2017    PLT 96 (L) 01/17/2017       Recent Labs  Lab 01/17/17  0230 01/17/17  0729   *  --    *  --    K 4.7  --    CL 98  --    CO2 22*  --    BUN 14  --    CREATININE 0.8  --    CALCIUM 8.7  --    MG 2.2 2.1     Lab Results   Component Value Date    INR 1.2 01/17/2017    INR 1.8 (H) 01/16/2017     No results found for: HGBA1C  Microbiology Results (last 7 days)     Procedure Component Value Units Date/Time    Blood culture [[id number]] Collected:  01/17/17 0230    Order Status:  Sent Specimen:  Blood Updated:  01/17/17 0243    Narrative:       Blood cultures from 2 different sites. 4 bottles total.  Please draw before starting antibiotics.    Blood culture [[id number]] Collected:  01/17/17 0230    Order Status:  Sent Specimen:  Blood Updated:  01/17/17 0243    Narrative:       Blood cultures x 2 different sites. 4 bottles total. Please  draw cultures before administering antibiotics.    Urine culture [[id number]]     Order Status:  No result Specimen:  Urine from Urine     Gram stain [[id number]]     Order Status:  No result Specimen:  Urine     Culture, Respiratory [[id number]]     Order Status:  No result Specimen:  Respiratory           Imaging Results:    CXR 1/17/17: Heart size normal.  Mild accentuation of interstitial markings.  No significant air space consolidation or pleural effusion    2D Echo (ordered)    ASSESSMENT/PLAN:     Current Problems List:  Active Hospital Problems    Diagnosis  POA    *Acute leukemia [C95.00]  Yes    Tobacco abuse [Z72.0]  Unknown      Resolved Hospital Problems    Diagnosis Date Resolved POA   No resolved problems to display.       Active Problems, Status & Treatment Plan Addressed Today:    # Leukocytosis  - suspected ALL vs infection  - currently no  evidence of DIC or tumor lysis  - infectious workup unrevealing thusfar  - cultures obtained, will follow  - check flow cytometry, HIV, hepatitis panel,  - continue broad spectrum antibiotics in the interim  - will obtain bone marrow biopsy today  - started on empiric allopurinol   - consider imaging pending workup    # Anemia  # Thrombocytopenia  - Hgb=8.6, PLT=96  - no over signs of bleeding  - likely 2/2 underlying hematologic malignancy  - continue to monitor and transfuse as indicated  - will transfuse for Hgb <7.0, PLT <10    # Tobacco Abuse  - 26 pack year history  - advise smoking cessation   - nicotine patch prn    # Alcohol Abuse  - drinks at least 2 beers per day  - no h/o DTs  - monitor for signs/symptoms withdrawal    # Hyponatremia  - likely 2/2 dehydration  - continue IVFs  - continue to monitor    DVT ppx: encourage ambulation  Diet: regular  Code Status: full    DISPO: pending work up for acute leukemia    Dionte Rizvi MD (PGY-4)  Hematology/Oncology Fellow  Will discuss with Dr. Jones (Hematology/Oncology Staff)

## 2017-01-18 PROBLEM — R50.9 FEVER: Status: ACTIVE | Noted: 2017-01-18

## 2017-01-18 LAB
ABO + RH BLD: NORMAL
ALBUMIN SERPL BCP-MCNC: 1.9 G/DL
ALP SERPL-CCNC: 141 U/L
ALT SERPL W/O P-5'-P-CCNC: 28 U/L
ANION GAP SERPL CALC-SCNC: 5 MMOL/L
ANISOCYTOSIS BLD QL SMEAR: SLIGHT
AST SERPL-CCNC: 37 U/L
BACTERIA UR CULT: NO GROWTH
BASOPHILS # BLD AUTO: ABNORMAL K/UL
BASOPHILS NFR BLD: 0 %
BILIRUB SERPL-MCNC: 0.2 MG/DL
BLD GP AB SCN CELLS X3 SERPL QL: NORMAL
BODY SITE - BONE MARROW: NORMAL
BONE MARROW IRON STAIN COMMENT: NORMAL
BUN SERPL-MCNC: 11 MG/DL
CALCIUM SERPL-MCNC: 8.5 MG/DL
CHLORIDE SERPL-SCNC: 98 MMOL/L
CLINICAL DIAGNOSIS - BONE MARROW: NORMAL
CO2 SERPL-SCNC: 25 MMOL/L
CREAT SERPL-MCNC: 0.7 MG/DL
DIFFERENTIAL METHOD: ABNORMAL
EOSINOPHIL # BLD AUTO: ABNORMAL K/UL
EOSINOPHIL NFR BLD: 0 %
ERYTHROCYTE [DISTWIDTH] IN BLOOD BY AUTOMATED COUNT: 16.4 %
EST. GFR  (AFRICAN AMERICAN): >60 ML/MIN/1.73 M^2
EST. GFR  (NON AFRICAN AMERICAN): >60 ML/MIN/1.73 M^2
FLOW CYTOMETRY ANTIBODIES ANALYZED - BONE MARROW: NORMAL
FLOW CYTOMETRY COMMENT - BONE MARROW: NORMAL
FLOW CYTOMETRY INTERPRETATION - BONE MARROW: NORMAL
G6PD RBC-CCNT: 7.2 U/G HGB (ref 7–20.5)
GLUCOSE SERPL-MCNC: 116 MG/DL
HCT VFR BLD AUTO: 21.6 %
HGB BLD-MCNC: 7.1 G/DL
LDH SERPL L TO P-CCNC: 913 U/L
LYMPHOCYTES # BLD AUTO: ABNORMAL K/UL
LYMPHOCYTES NFR BLD: 10 %
MAGNESIUM SERPL-MCNC: 1.8 MG/DL
MCH RBC QN AUTO: 31.7 PG
MCHC RBC AUTO-ENTMCNC: 32.9 %
MCV RBC AUTO: 96 FL
MONOCYTES # BLD AUTO: ABNORMAL K/UL
MONOCYTES NFR BLD: 1 %
NEUTROPHILS NFR BLD: 1 %
OVALOCYTES BLD QL SMEAR: ABNORMAL
PHOSPHATE SERPL-MCNC: 3 MG/DL
PLATELET # BLD AUTO: 94 K/UL
PMV BLD AUTO: 10.4 FL
POIKILOCYTOSIS BLD QL SMEAR: SLIGHT
POLYCHROMASIA BLD QL SMEAR: ABNORMAL
POTASSIUM SERPL-SCNC: 3.9 MMOL/L
PROT SERPL-MCNC: 8.1 G/DL
RBC # BLD AUTO: 2.24 M/UL
RPR SER QL: NORMAL
SMUDGE CELLS BLD QL SMEAR: PRESENT
SODIUM SERPL-SCNC: 128 MMOL/L
T GONDII IGG SER QL IA: NORMAL
T GONDII IGG SERPL IA-ACNC: <5 IU/ML
URATE SERPL-MCNC: 2.1 MG/DL
WBC # BLD AUTO: 43.89 K/UL
WBC OTHER NFR BLD MANUAL: 88 %

## 2017-01-18 PROCEDURE — 87040 BLOOD CULTURE FOR BACTERIA: CPT | Mod: 59

## 2017-01-18 PROCEDURE — 76937 US GUIDE VASCULAR ACCESS: CPT

## 2017-01-18 PROCEDURE — 63600175 PHARM REV CODE 636 W HCPCS: Performed by: STUDENT IN AN ORGANIZED HEALTH CARE EDUCATION/TRAINING PROGRAM

## 2017-01-18 PROCEDURE — 36415 COLL VENOUS BLD VENIPUNCTURE: CPT

## 2017-01-18 PROCEDURE — 87205 SMEAR GRAM STAIN: CPT

## 2017-01-18 PROCEDURE — 25000003 PHARM REV CODE 250: Performed by: INTERNAL MEDICINE

## 2017-01-18 PROCEDURE — 99232 SBSQ HOSP IP/OBS MODERATE 35: CPT | Mod: ,,, | Performed by: INTERNAL MEDICINE

## 2017-01-18 PROCEDURE — 63600175 PHARM REV CODE 636 W HCPCS: Performed by: INTERNAL MEDICINE

## 2017-01-18 PROCEDURE — 87591 N.GONORRHOEAE DNA AMP PROB: CPT

## 2017-01-18 PROCEDURE — 84550 ASSAY OF BLOOD/URIC ACID: CPT

## 2017-01-18 PROCEDURE — 86901 BLOOD TYPING SEROLOGIC RH(D): CPT

## 2017-01-18 PROCEDURE — 84100 ASSAY OF PHOSPHORUS: CPT

## 2017-01-18 PROCEDURE — 87086 URINE CULTURE/COLONY COUNT: CPT

## 2017-01-18 PROCEDURE — 83735 ASSAY OF MAGNESIUM: CPT

## 2017-01-18 PROCEDURE — 20600001 HC STEP DOWN PRIVATE ROOM

## 2017-01-18 PROCEDURE — 85007 BL SMEAR W/DIFF WBC COUNT: CPT

## 2017-01-18 PROCEDURE — 86777 TOXOPLASMA ANTIBODY: CPT

## 2017-01-18 PROCEDURE — 25000003 PHARM REV CODE 250: Performed by: NURSE PRACTITIONER

## 2017-01-18 PROCEDURE — 85027 COMPLETE CBC AUTOMATED: CPT

## 2017-01-18 PROCEDURE — 80053 COMPREHEN METABOLIC PANEL: CPT

## 2017-01-18 PROCEDURE — 99233 SBSQ HOSP IP/OBS HIGH 50: CPT | Mod: ,,, | Performed by: INTERNAL MEDICINE

## 2017-01-18 PROCEDURE — 86592 SYPHILIS TEST NON-TREP QUAL: CPT

## 2017-01-18 PROCEDURE — 86900 BLOOD TYPING SEROLOGIC ABO: CPT

## 2017-01-18 PROCEDURE — 83615 LACTATE (LD) (LDH) ENZYME: CPT

## 2017-01-18 PROCEDURE — 87536 HIV-1 QUANT&REVRSE TRNSCRPJ: CPT

## 2017-01-18 PROCEDURE — 25000003 PHARM REV CODE 250: Performed by: STUDENT IN AN ORGANIZED HEALTH CARE EDUCATION/TRAINING PROGRAM

## 2017-01-18 PROCEDURE — 86361 T CELL ABSOLUTE COUNT: CPT

## 2017-01-18 PROCEDURE — C1751 CATH, INF, PER/CENT/MIDLINE: HCPCS

## 2017-01-18 PROCEDURE — 02HV33Z INSERTION OF INFUSION DEVICE INTO SUPERIOR VENA CAVA, PERCUTANEOUS APPROACH: ICD-10-PCS | Performed by: INTERNAL MEDICINE

## 2017-01-18 PROCEDURE — 36569 INSJ PICC 5 YR+ W/O IMAGING: CPT

## 2017-01-18 PROCEDURE — 87901 NFCT AGT GNTYP ALYS HIV1 REV: CPT

## 2017-01-18 RX ORDER — CEFEPIME HYDROCHLORIDE 2 G/50ML
2 INJECTION, SOLUTION INTRAVENOUS
Status: DISCONTINUED | OUTPATIENT
Start: 2017-01-18 | End: 2017-01-23

## 2017-01-18 RX ORDER — ACYCLOVIR 200 MG/1
400 CAPSULE ORAL 2 TIMES DAILY
Status: DISCONTINUED | OUTPATIENT
Start: 2017-01-18 | End: 2017-02-24 | Stop reason: HOSPADM

## 2017-01-18 RX ORDER — SODIUM CHLORIDE 0.9 % (FLUSH) 0.9 %
10 SYRINGE (ML) INJECTION
Status: DISCONTINUED | OUTPATIENT
Start: 2017-01-18 | End: 2017-02-24 | Stop reason: HOSPADM

## 2017-01-18 RX ORDER — SODIUM CHLORIDE 0.9 % (FLUSH) 0.9 %
10 SYRINGE (ML) INJECTION EVERY 6 HOURS
Status: DISCONTINUED | OUTPATIENT
Start: 2017-01-18 | End: 2017-02-24 | Stop reason: HOSPADM

## 2017-01-18 RX ADMIN — OXYCODONE HYDROCHLORIDE 5 MG: 5 TABLET ORAL at 05:01

## 2017-01-18 RX ADMIN — CEFEPIME HYDROCHLORIDE 2 G: 2 INJECTION, POWDER, FOR SOLUTION INTRAVENOUS at 04:01

## 2017-01-18 RX ADMIN — OXYCODONE HYDROCHLORIDE 5 MG: 5 TABLET ORAL at 11:01

## 2017-01-18 RX ADMIN — ALLOPURINOL 300 MG: 100 TABLET ORAL at 08:01

## 2017-01-18 RX ADMIN — Medication 10 ML: at 12:01

## 2017-01-18 RX ADMIN — SODIUM CHLORIDE: 0.9 INJECTION, SOLUTION INTRAVENOUS at 05:01

## 2017-01-18 RX ADMIN — SODIUM CHLORIDE: 0.9 INJECTION, SOLUTION INTRAVENOUS at 12:01

## 2017-01-18 RX ADMIN — ACETAMINOPHEN 650 MG: 325 TABLET ORAL at 11:01

## 2017-01-18 RX ADMIN — ACYCLOVIR 400 MG: 200 CAPSULE ORAL at 09:01

## 2017-01-18 RX ADMIN — ACYCLOVIR 400 MG: 200 CAPSULE ORAL at 10:01

## 2017-01-18 RX ADMIN — DEXTROSE 15 MILLION UNITS: 5 SOLUTION INTRAVENOUS at 09:01

## 2017-01-18 RX ADMIN — CEFEPIME HYDROCHLORIDE 2 G: 2 INJECTION, SOLUTION INTRAVENOUS at 12:01

## 2017-01-18 RX ADMIN — CEFEPIME HYDROCHLORIDE 2 G: 2 INJECTION, SOLUTION INTRAVENOUS at 07:01

## 2017-01-18 RX ADMIN — OXYCODONE HYDROCHLORIDE 5 MG: 5 TABLET ORAL at 10:01

## 2017-01-18 NOTE — SUBJECTIVE & OBJECTIVE
History reviewed. No pertinent past medical history.    History reviewed. No pertinent past surgical history.    Review of patient's allergies indicates:  No Known Allergies    Medications:  No prescriptions prior to admission.     Antibiotics     Start     Stop Route Frequency Ordered    01/18/17 1145  ceFEPIme in dextrose 5% 2 gram/50 mL IVPB 2 g      -- IV Every 8 hours (non-standard times) 01/18/17 1107        Antifungals     None        Antivirals         Stop Route Frequency     acyclovir      -- Oral 2 times daily             There is no immunization history on file for this patient.    Family History     None        Social History     Social History    Marital status: Single     Spouse name: N/A    Number of children: N/A    Years of education: N/A     Social History Main Topics    Smoking status: Current Every Day Smoker     Packs/day: 1.00     Types: Cigarettes    Smokeless tobacco: None    Alcohol use Yes    Drug use: No    Sexual activity: Not Asked     Other Topics Concern    None     Social History Narrative     Travel History:   Has patient traveled outside of the [place]?  No  Has patient traveled outside of [place]? Yes      Review of Systems   Constitutional: Positive for unexpected weight change. Negative for chills and fever.   HENT: Negative for congestion, nosebleeds, rhinorrhea and sore throat.    Eyes: Negative for discharge and redness.   Respiratory: Negative for cough, shortness of breath and wheezing.    Cardiovascular: Negative for chest pain and palpitations.   Gastrointestinal: Negative for abdominal pain, diarrhea, nausea and vomiting.   Endocrine: Negative for polydipsia and polyuria.   Genitourinary: Negative for dysuria and hematuria.   Musculoskeletal: Negative for back pain and neck pain.   Skin: Negative for rash and wound.   Neurological: Negative for weakness and headaches.   Psychiatric/Behavioral: Negative for agitation. The patient is not nervous/anxious.       Objective:     Vital Signs (Most Recent):  Temp: 98.9 °F (37.2 °C) (01/18/17 1122)  Pulse: 108 (01/18/17 1122)  Resp: 18 (01/18/17 1122)  BP: 105/69 (01/18/17 1122)  SpO2: 97 % (01/18/17 1122) Vital Signs (24h Range):  Temp:  [97.9 °F (36.6 °C)-100.7 °F (38.2 °C)] 98.9 °F (37.2 °C)  Pulse:  [108-123] 108  Resp:  [16-19] 18  SpO2:  [95 %-98 %] 97 %  BP: (104-123)/(57-76) 105/69     Weight: 41.6 kg (91 lb 11.7 oz)  Body mass index is 15.75 kg/(m^2).    Estimated Creatinine Clearance: 84.2 mL/min (based on Cr of 0.7).    Physical Exam   Constitutional: He is cooperative. He is easily aroused.  Non-toxic appearance. He appears ill. No distress.   HENT:   Head: Normocephalic and atraumatic.   Eyes: EOM are normal. Pupils are equal, round, and reactive to light.   Neck: Normal range of motion. Neck supple.   Cardiovascular: Normal rate and regular rhythm.    No murmur heard.  Pulmonary/Chest: Effort normal. No respiratory distress. He has wheezes (trace end expiratory, diffuse in all lung fields). He has no rales.   Abdominal: Soft. He exhibits no distension. There is no tenderness.   Musculoskeletal: He exhibits no edema.   Lymphadenopathy:     He has cervical adenopathy.   Neurological: He is alert and easily aroused.   Skin: Skin is warm and dry. No rash noted.       Significant Labs:   CBC:   Recent Labs  Lab 01/17/17  0230 01/17/17  0729 01/18/17  0501   WBC 44.38* 34.79* 43.89*   HGB 9.5* 8.6* 7.1*   HCT 27.8* 25.2* 21.6*   PLT 91* 96* 94*     CMP:   Recent Labs  Lab 01/16/17  2129 01/17/17  0230 01/18/17  0500   * 126* 128*   K 4.8 4.7 3.9   CL 88* 98 98   CO2 27 22* 25   * 112* 116*   BUN 16 14 11   CREATININE 0.83 0.8 0.7   CALCIUM 10.0 8.7 8.5*   PROT 9.8* 8.7* 8.1   ALBUMIN 3.6 2.0* 1.9*   BILITOT 0.6 0.3 0.2   ALKPHOS 188* 128 141*   AST 31 18 37   ALT 27 16 28   ANIONGAP 10 6* 5*   EGFRNONAA >60.0 >60.0 >60.0       Significant Imaging: I have reviewed all pertinent imaging results/findings  within the past 24 hours.     01/17 CxR  No acute cardiopulmonary abnormality    Microbiology  BCx 01/16 NGTD  BCx 01/17 NGTD  UCx 01/17 NGTD  G/C Testing 01/18 pending

## 2017-01-18 NOTE — CONSULTS
[facility]  Infectious Disease  Consult Note    Patient Name: Marck Howard  MRN: [id number]  Admission Date: 1/17/2017  Hospital Length of Stay: 1 days  Attending Physician: Govind Jones MD  Primary Care Provider: Primary Doctor No     Isolation Status: No active isolations    Patient information was obtained from patient and past medical records.      Inpatient consult to Infectious Diseases  Consult performed by: MANUELITO QURESHI IV  Consult ordered by: REAGAN AMANDA        Assessment/Plan:     HIV (human immunodeficiency virus infection)  -New diagnosis, based on history most likely remote infection  -CD4, viral load pending  -Acute hep panel negative  -RPR, Toxo IgG, GC testing pending  -Recommend Hep B surface Ab testing  -Recommend Quant Gold  -Recommend AFB blood culture  -Recommend Histoplasma Ag, Crypto Ag  -Hold anti-retroviral therapy currently pending above    Fever  Unclear etiology, possibly malignancy related; however, is near neutropenic based on diff, hemodynamically stable  Continue cefepime currently  Followup cultures  Per [facility] as of 01/18 BCx and UCx are NGTD        Thank you for your consult. I will follow-up with patient. Please contact us if you have any additional questions.     Manuelito Qureshi MD  Internal Medicine, PGY2    Subjective:     Principal Problem: Acute leukemia    HPI: 35 y/o male no significant PMH who presents as a transfer for chief complaint of 1 month worsening diffuse myalgias and presumed acute leukemia. He reports 1 month diffuse myalgias in arms and legs RADHA which has been slowly progressive. States he presented when it was not getting better. Denies any fevers, chills, sweats, CP, cough, nausea, vomiting, diarrhea, new rashes. At OSH found to be febrile, tachycardic with a leukocytosis to about 50k, given 2L IVF Vanc/Zosyn and transferred. On initial workup noted to be HIV positive and ID consulted for recommendations.     Of  note he denies any history of IVDU or blood transfusions. Sexually active with women, intermittent condom use, last sexually active 12 years ago when his son was born per report. Has not traveled outside of [place]. Used to work as a villasenor.       History reviewed. No pertinent past medical history.    History reviewed. No pertinent past surgical history.    Review of patient's allergies indicates:  No Known Allergies    Medications:  No prescriptions prior to admission.     Antibiotics     Start     Stop Route Frequency Ordered    01/18/17 1145  ceFEPIme in dextrose 5% 2 gram/50 mL IVPB 2 g      -- IV Every 8 hours (non-standard times) 01/18/17 1107        Antifungals     None        Antivirals         Stop Route Frequency     acyclovir      -- Oral 2 times daily             There is no immunization history on file for this patient.    Family History     None        Social History     Social History    Marital status: Single     Spouse name: N/A    Number of children: N/A    Years of education: N/A     Social History Main Topics    Smoking status: Current Every Day Smoker     Packs/day: 1.00     Types: Cigarettes    Smokeless tobacco: None    Alcohol use Yes    Drug use: No    Sexual activity: Not Asked     Other Topics Concern    None     Social History Narrative     Travel History:   Has patient traveled outside of the [place]?  No  Has patient traveled outside of [place]? Yes      Review of Systems   Constitutional: Positive for unexpected weight change. Negative for chills and fever.   HENT: Negative for congestion, nosebleeds, rhinorrhea and sore throat.    Eyes: Negative for discharge and redness.   Respiratory: Negative for cough, shortness of breath and wheezing.    Cardiovascular: Negative for chest pain and palpitations.   Gastrointestinal: Negative for abdominal pain, diarrhea, nausea and vomiting.   Endocrine: Negative for polydipsia and polyuria.   Genitourinary: Negative for  dysuria and hematuria.   Musculoskeletal: Negative for back pain and neck pain.   Skin: Negative for rash and wound.   Neurological: Negative for weakness and headaches.   Psychiatric/Behavioral: Negative for agitation. The patient is not nervous/anxious.      Objective:     Vital Signs (Most Recent):  Temp: 98.9 °F (37.2 °C) (01/18/17 1122)  Pulse: 108 (01/18/17 1122)  Resp: 18 (01/18/17 1122)  BP: 105/69 (01/18/17 1122)  SpO2: 97 % (01/18/17 1122) Vital Signs (24h Range):  Temp:  [97.9 °F (36.6 °C)-100.7 °F (38.2 °C)] 98.9 °F (37.2 °C)  Pulse:  [108-123] 108  Resp:  [16-19] 18  SpO2:  [95 %-98 %] 97 %  BP: (104-123)/(57-76) 105/69     Weight: 41.6 kg (91 lb 11.7 oz)  Body mass index is 15.75 kg/(m^2).    Estimated Creatinine Clearance: 84.2 mL/min (based on Cr of 0.7).    Physical Exam   Constitutional: He is cooperative. He is easily aroused.  Non-toxic appearance. He appears ill. No distress.   HENT:   Head: Normocephalic and atraumatic.   Eyes: EOM are normal. Pupils are equal, round, and reactive to light.   Neck: Normal range of motion. Neck supple.   Cardiovascular: Normal rate and regular rhythm.    No murmur heard.  Pulmonary/Chest: Effort normal. No respiratory distress. He has wheezes (trace end expiratory, diffuse in all lung fields). He has no rales.   Abdominal: Soft. He exhibits no distension. There is no tenderness.   Musculoskeletal: He exhibits no edema.   Lymphadenopathy:     He has cervical adenopathy.   Neurological: He is alert and easily aroused.   Skin: Skin is warm and dry. No rash noted.       Significant Labs:   CBC:   Recent Labs  Lab 01/17/17  0230 01/17/17  0729 01/18/17  0501   WBC 44.38* 34.79* 43.89*   HGB 9.5* 8.6* 7.1*   HCT 27.8* 25.2* 21.6*   PLT 91* 96* 94*     CMP:   Recent Labs  Lab 01/16/17  2129 01/17/17  0230 01/18/17  0500   * 126* 128*   K 4.8 4.7 3.9   CL 88* 98 98   CO2 27 22* 25   * 112* 116*   BUN 16 14 11   CREATININE 0.83 0.8 0.7   CALCIUM 10.0 8.7  8.5*   PROT 9.8* 8.7* 8.1   ALBUMIN 3.6 2.0* 1.9*   BILITOT 0.6 0.3 0.2   ALKPHOS 188* 128 141*   AST 31 18 37   ALT 27 16 28   ANIONGAP 10 6* 5*   EGFRNONAA >60.0 >60.0 >60.0       Significant Imaging: I have reviewed all pertinent imaging results/findings within the past 24 hours.     01/17 CxR  No acute cardiopulmonary abnormality    Microbiology  BCx 01/16 NGTD  BCx 01/17 NGTD  UCx 01/17 NGTD  G/C Testing 01/18 pending        Kedar Qureshi IV, MD  Infectious Disease  [facility]

## 2017-01-18 NOTE — MEDICAL/APP STUDENT
Follow-up Consult Progress Note  Hospital Medicine    Patient Name: Marck Howard  YOB: 1980    Referring Physician: Govind Jones MD    Admit Date: 1/17/2017                     LOS: 1          SUBJECTIVE:     Reason for Admission:  Acute leukemia  See H&P for detailed initial presentation history and ROS.      Interval history: No acute events overnight.  Patient states he is doing fine.  He still has a good appetite and is ambulating.  Denies feeling feverish.  Denies any chest pain, SOB, abdo pain, bowel changes, urine changes.  Seems to be coping with new HIV diagnosis, but was reminded that if he'd like to speak to anyone to let us know.  He states his family is coming to visit today.    OBJECTIVE:     Vital Signs Range (Last 24H):  Temp:  [97.9 °F (36.6 °C)-100.7 °F (38.2 °C)]   Pulse:  [105-126]   Resp:  [16-19]   BP: (104-132)/(57-80)   SpO2:  [95 %-98 %] Body mass index is 15.75 kg/(m^2).    I & O (Last 24H):  Intake/Output Summary (Last 24 hours) at 01/18/17 0812  Last data filed at 01/18/17 0508   Gross per 24 hour   Intake          3226.67 ml   Output             1650 ml   Net          1576.67 ml       Physical Exam:  General: appears stated age, cachectic, no distress  HENT: Head:normocephalic, atraumatic. Ears:not examined. Nose: Nares normal. Septum midline. Mucosa normal. No drainage or sinus tenderness., no discharge. Throat: lips, mucosa, and tongue normal; teeth and gums normal and no throat erythema.  Eyes: conjunctivae/corneas clear. PERRL.   Neck: marked anterior cervical adenopathy  Lungs: clear to auscultation bilaterally and normal respiratory effort  Cardiovascular: Heart: tachycardia, S1, S2 normal, no murmur, click, rub or gallop. Chest Wall: no tenderness. Extremities: warm to touch bilaterally, no cyanosis, edema, or clubbing. Pulses: 2+ and symmetric.  Abdomen/Rectal: Abdomen: soft, non-tender non-distented; bowel sounds normal; no masses, no  organomegaly.  Skin: Skin dry, skin color, texture normal. No rashes or lesions.  Lymph Nodes: Cervical adenopathy: two markedly enlarged R submandibular/submental LNs, one mildly enlarged L submandibular LN    Diagnostic Results:  Lab Results   Component Value Date    WBC 43.89 (H) 01/18/2017    HGB 7.1 (L) 01/18/2017    HCT 21.6 (L) 01/18/2017    MCV 96 01/18/2017    PLT 94 (L) 01/18/2017       Recent Labs  Lab 01/18/17  0500   *   *   K 3.9   CL 98   CO2 25   BUN 11   CREATININE 0.7   CALCIUM 8.5*   MG 1.8     Lab Results   Component Value Date    INR 1.2 01/17/2017    INR 1.8 (H) 01/16/2017     No results found for: HGBA1C  No results for input(s): POCTGLUCOSE in the last 72 hours.    ASSESSMENT/PLAN:     List of  Current Problems:  Active Hospital Problems    Diagnosis  POA    *Acute leukemia [C95.00]  Yes    Tobacco abuse [Z72.0]  Yes    HIV (human immunodeficiency virus infection) [Z21]  Yes      Resolved Hospital Problems    Diagnosis Date Resolved POA   No resolved problems to display.       Mr. Marck Howard is a 36 y.o. male with no significant past medical or surgical history who was transferred from [facility] for further work-up of suspected acute leukemia.  Patient was diagnosed with HIV on 1/17/2017.     Problem Assessment & Treatment Plan:  ALL/Lymphoma (suspected):  - today WBC 44.38 with Gran 0.5% and Lymph 4.0% (yesterday WBC 49.95 with Gran 22% and Lymph 69%)  - repeat CBC to establish consistency of lab values  - peripheral smear places acute leukemia high on differential  - bone marrow biopsy today  - will get peripheral flow cytometry  - consult IR for line placement     3/4 SIRS:  - patient febrile to 100.8 F and tachycardic to 150 with elevated WBC count  - started on vancomycin and cefepime  - CXR x 2 clear  - blood cx x 2 and urine cx in process, f/u results  - f/u HIV result from OSH  - Hepatitis panel  - discontinue vancomycin 2/2 negative  ROS     HIV:  - new diagnosis of HIV on 1/17/2017  - ID consulted for recommendations    Anemia, Thrombocytopenia:  - Hb 7.1 today, down from 8.6 yesterday, Plt 94  - likely due to suspected hematological malignancy  - currently no signs of bleeding  - consider transfusing if Hb < 7.0 or patient becomes symptomatic  - monitor with daily CBC     Hyponatremia:  -   - likely due to volume depletion  - continue normal saline infusion  - monitor with daily BMP      Medical Student:  Claudine Morfin

## 2017-01-18 NOTE — CONSULTS
Double lumen PICC placed in brachial  vein. 33 cm in length with 0 cm exposed. Arm Circumference 17 cm.LOT#[id number]

## 2017-01-18 NOTE — PROGRESS NOTES
Progress Note  Hematology / Bone Marrow Transplant                                                              Team: Networked reference to record PCT     Patient Name: Marck Howard  YOB: 1980    Admit Date: 1/17/2017                     LOS: 1    SUBJECTIVE:     Reason for Admission:  Acute leukemia  See H&P for detailed initial presentation history and ROS.      Interval history:   Patient seen and examined this AM. Patient continues with bilateral LE and UE pain. States he tolerated the BMBx well.   T MAX of 100.7 overnight. Tolerating diet. No other issues.    Review of System:  Constitutional: Positive fever or chills, negative for anorexia, malaise, night sweats and weight loss  Eyes: no visual changes, negative for irritation and redness  ENT: no nasal congestion or sore throat, negative for epistaxis, sore mouth and sore throat  Respiratory: no cough or shortness of breath, negative for dyspnea on exertion and wheezing  Cardiovascular: no chest pain or palpitations, negative for dyspnea, fatigue and palpitations  Gastrointestinal: no nausea or vomiting, no abdominal pain or change in bowel habits, negative for constipation, diarrhea and melena  Hematologic/Lymphatic: no easy bruising or lymphadenopathy, negative for bleeding  Musculoskeletal: no arthralgias or myalgias  Neurological: no seizures or tremors, negative for headaches and weakness  Behavioral/Psych: negative for anxiety and depression    OBJECTIVE:     Vital Signs Range (Last 24H):  Temp:  [97.9 °F (36.6 °C)-100.7 °F (38.2 °C)]   Pulse:  [105-123]   Resp:  [16-19]   BP: (104-132)/(57-80)   SpO2:  [95 %-98 %] Body mass index is 15.75 kg/(m^2).    I & O (Last 24H):    Intake/Output Summary (Last 24 hours) at 01/18/17 1213  Last data filed at 01/18/17 1033   Gross per 24 hour   Intake             2510 ml   Output             1300 ml   Net             1210 ml       Physical Exam:  General: well developed, cachectic, no  distress  HENT: Head:normocephalic, atraumatic. Ears:right ear normal, left ear normal. Nose: Nares normal. Septum midline. Mucosa normal. No drainage or sinus tenderness., no discharge. Throat: lips, mucosa, and tongue normal; teeth and gums normal and no throat erythema.  Eyes: conjunctivae/corneas clear. PERRL.   Neck: supple, symmetrical, trachea midline  Lungs:  clear to auscultation bilaterally and normal respiratory effort  Cardiovascular: Heart: regular rate and rhythm, S1, S2 normal, no murmur, click, rub or gallop. Chest Wall: no tenderness. Extremities: no cyanosis or edema, or clubbing.   Abdomen: soft, non-tender non-distented; bowel sounds normal.   Skin: Skin color, texture, turgor normal. No rashes or lesions  Musculoskeletal:no clubbing, cyanosis  Neurologic: Mental status: Alert, oriented, thought content appropriate  Psych/Behavioral:  Normal.    Diagnostic Results:  Lab Results   Component Value Date    WBC 43.89 (H) 01/18/2017    HGB 7.1 (L) 01/18/2017    HCT 21.6 (L) 01/18/2017    MCV 96 01/18/2017    PLT 94 (L) 01/18/2017       Recent Labs  Lab 01/18/17  0500   *   *   K 3.9   CL 98   CO2 25   BUN 11   CREATININE 0.7   CALCIUM 8.5*   MG 1.8     Lab Results   Component Value Date    INR 1.2 01/17/2017    INR 1.8 (H) 01/16/2017     No results found for: HGBA1C  Microbiology Results (last 7 days)     Procedure Component Value Units Date/Time    C. trachomatis/N. gonorrhoeae by AMP DNA [[id number]] Collected:  01/18/17 0957    Order Status:  No result Updated:  01/18/17 0958    C. trachomatis/N. gonorrhoeae by AMP DNA Urine [[id number]]     Order Status:  Completed Specimen:  Genital     Blood culture [[id number]] Collected:  01/17/17 0230    Order Status:  Completed Specimen:  Blood Updated:  01/18/17 0613     Blood Culture, Routine No Growth to date     Blood Culture, Routine No Growth to date    Narrative:       Blood cultures from 2 different sites. 4 bottles total.  Please draw  before starting antibiotics.    Blood culture [[id number]] Collected:  01/17/17 0230    Order Status:  Completed Specimen:  Blood Updated:  01/18/17 0613     Blood Culture, Routine No Growth to date     Blood Culture, Routine No Growth to date    Narrative:       Blood cultures x 2 different sites. 4 bottles total. Please  draw cultures before administering antibiotics.    C. trachomatis/N. gonorrhoeae by AMP DNA Urine [[id number]]     Order Status:  Canceled Specimen:  Genital     Gram stain [[id number]] Collected:  01/17/17 1025    Order Status:  Completed Specimen:  Urine from Urine, Clean Catch Updated:  01/17/17 1221     Gram Stain Result No WBC's      No organisms seen    Urine culture [[id number]] Collected:  01/17/17 1025    Order Status:  Sent Specimen:  Urine from Urine, Clean Catch Updated:  01/17/17 1150    Narrative:       Before antibiotics    Culture, Respiratory [[id number]]     Order Status:  No result Specimen:  Respiratory           Imaging Results:    CXR 1/17/17: Heart size normal.  Mild accentuation of interstitial markings.  No significant air space consolidation or pleural effusion    2D Echo (ordered)    ASSESSMENT/PLAN:     Current Problems List:  Active Hospital Problems    Diagnosis  POA    *Acute leukemia [C95.00]  Yes    Tobacco abuse [Z72.0]  Yes    HIV (human immunodeficiency virus infection) [Z21]  Yes      Resolved Hospital Problems    Diagnosis Date Resolved POA   No resolved problems to display.       Active Problems, Status & Treatment Plan Addressed Today:    # Leukocytosis  - suspected ALL vs infection  - currently no evidence of DIC or tumor lysis  - infectious workup unrevealing thusfar  - cultures NGTD - continue on cefepime; vancomycin discontinued    - check flow cytometry, HIV positive (new diagnosis), hepatitis panel negative,  -  bone marrow biopsy done 1/17 - results pending   - continue on empiric allopurinol,     - FLU negative  - started on ppx acyclovir    HIV  positive  - new diagnosis on this admit  - toxoplasma gondii WNL  - HIV CD-4 pending; RPR, syphilis and gonorrhea pending   - ID consulted appreciate recs     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   # Thrombocytopenia due to leukemia   - Hgb=7.1 g/dL, PLT=94  - no over signs of bleeding  - likely 2/2 underlying hematologic malignancy  - continue to monitor and transfuse as indicated  - will transfuse for Hgb <7.0, PLT <10    # Tobacco Abuse  - 26 pack year history  - advise smoking cessation   - nicotine patch prn    # Alcohol Abuse  - drinks at least 2 beers per day  - no h/o DTs  - monitor for signs/symptoms withdrawal    # Hyponatremia  - likely 2/2 dehydration  - continue IVFs  - continue to monitor    DVT ppx: encourage ambulation  Diet: regular  Code Status: full    DISPO: pending work up for acute leukemia    Kell Subramanian NP  Hematology and BMT  Will discuss with Dr. Jones (Hematology/Oncology Staff)

## 2017-01-18 NOTE — PROGRESS NOTES
"Patient Consulted by CTTS:     The following was discussed by the Tobacco Treatment Specialist:  ? Relevance of Quitting  ? Risk to Health  ? Long Term Risk  ? Risk for Others  ? Rewards of Quitting  ? Motivation Intervention to Quit        Pt states that he has already "quit smoking for good"  "

## 2017-01-18 NOTE — ASSESSMENT & PLAN NOTE
Unclear etiology, possibly malignancy related; however, is near neutropenic based on diff, hemodynamically stable  Continue cefepime currently  Followup cultures  Per [facility] as of 01/18 BCx and UCx are NGTD

## 2017-01-18 NOTE — PROCEDURES
"Marck Howard is a 36 y.o. male patient.    Temp: 97.9 °F (36.6 °C) (01/18/17 0715)  Pulse: (!) 112 (01/18/17 0715)  Resp: 16 (01/18/17 0715)  BP: 109/76 (01/18/17 0715)  SpO2: 95 % (01/18/17 0715)  Weight: 41.6 kg (91 lb 11.7 oz) (01/18/17 0400)  Height: 5' 4" (162.6 cm) (01/17/17 0155)    PICC  Date/Time: 1/18/2017 9:32 AM  Performed by: TYRONE GIBSON  Consent Done: Yes  Time out: Immediately prior to procedure a time out was called to verify the correct patient, procedure, equipment, support staff and site/side marked as required  Indications: med administration and vascular access  Anesthesia: local infiltration  Local anesthetic: lidocaine 1% without epinephrine  Anesthetic Total (mL): 3  Preparation: skin prepped with ChloraPrep  Skin prep agent dried: skin prep agent completely dried prior to procedure  Sterile barriers: all five maximum sterile barriers used - cap, mask, sterile gown, sterile gloves, and large sterile sheet  Hand hygiene: hand hygiene performed prior to central venous catheter insertion  Location details: right brachial  Catheter type: double lumen  Catheter size: 5 Fr  Catheter Length: 33cm    Ultrasound guidance: yes  Vessel Caliber: medium and patent, compressibility normal  Vascular Doppler: not done  Needle advanced into vessel with real time Ultrasound guidance.  Guidewire confirmed in vessel.  Image recorded and saved.  Sterile sheath used.  no esophageal manometryNumber of attempts: 1  Post-procedure: blood return through all ports, chlorhexidine patch and sterile dressing applied  Technical procedures used: 3cg  Specimens: No  Implants: No  Assessment: placement verified by x-ray and tip termination  Complications: none        Cady Thompson  1/18/2017  "

## 2017-01-18 NOTE — PHYSICIAN QUERY
"PT Name: Marck Howard  MR #: [id number]  Physician Query Form - Nutrition Clarification   Reviewer  Ext   Cleopatra Hill RN - [email or web address]    This form is a permanent document in the medical record.     Query Date: January 18, 2017  By submitting this query, we are merely seeking further clarification of documentation.. Please utilize your independent clinical judgment when addressing the question(s) below.    The Medical record reflects the following:   Indicators     Supporting Clinical Findings Location in Medical Record   X Wt/ BMI/ Usual Body Weight BMI (P) 18.71     BMI (kg/m2): 15.73    Body mass index is 15.75  H & P    Nutrition Pn 01/17    Pn 01/18    Alb          TProt            Pre-Albumin     X Acute or Chronic illness new likely acute leukemia  Hyponatremia  Alcohol abuse  noted to be HIV positive on serologic studies.  H & P      Significant event 01/17    % of estimated energy intake over a time frame from p.o., TF or TPN     X Weight status over a time frame % Weight Change: 15.4 kg (27% x2-3 months)  Weight Loss: unintentional    Severe Wt. Loss Chronic Illness: greater than 7.5% in 3 months Nutrition Pn 01/17   X Subcutaneous fat and/or muscle loss Cachetic    generalized wasting, loss of muscle mass, loss of subcutaneous fat H  & P    Nutrition Pn 01/17    Reduced  strength      "Delayed wound healing:, "Failure to thrive"      "Fluid accumulation" or "Edema"      "Hypoalbuminemia"      Other:        AND / ASPEN Clinical Characteristics (October 2011)  A minimum of two characteristics is recommended for diagnosing either moderate or severe malnutrition    Mild Malnutrition Moderate Malnutrition Severe Malnutrition    Energy Intake from p.o., TF or TPN. < 75% intake of estimated energy needs for less than 7 days. < 75% intake of estimated energy needs for greater than 7 days. < 50% intake of estimated energy needs for > 5 days   Weight Loss 1-2% in 1 month  5% in 3 " months  7.5% in 6 months  10% in one year 1-2 % in 1 week  5% in 1 month  7.5% in 3 months  10% in 6 months  20% in 1 year > 2% in 1 week  > 5% in 1 month  >7.5% in 3 months  >10% in 6 months  >20% in 1 year   Physical Findings None *Mild subcutaneous fat and/or muscle loss  *Mild fluid accumulation  *Stage II decubitus  *Surgical wound or non-healing wound *Mod subcutaneous fat and/or muscle loss  *Mod/severe fluid accumulation  *Stage III or IV decubitus  *Non-healing surgical wound     Provider, please specify the diagnosis or diagnoses associated with above clinical findings.                                [ ] Mild Protein-Calorie Malnutrition  [ x] Moderate Protein-Calorie Malnutrition  [ ] Severe Protein-CalorieMalnutrition  [ ] Cachexia  [ ] Anorexia  [ ] Other Nutritional Diagnosis (Specify) ____________________________________  [ ] Clinically Undetermined    Please document in your progress notes daily for the duration of treatment, until resolved, and include in your discharge summary.

## 2017-01-18 NOTE — ASSESSMENT & PLAN NOTE
-New diagnosis, based on history most likely remote infection  -CD4, viral load pending  -Acute hep panel negative  -RPR, Toxo IgG, GC testing pending  -Recommend Hep B surface Ab testing  -Recommend Quant Gold  -Recommend AFB blood culture  -Recommend Histoplasma Ag, Crypto Ag  -Hold anti-retroviral therapy currently pending above

## 2017-01-19 PROBLEM — C92.00 ACUTE MYELOID LEUKEMIA NOT HAVING ACHIEVED REMISSION: Status: ACTIVE | Noted: 2017-01-19

## 2017-01-19 LAB
ALBUMIN SERPL BCP-MCNC: 1.7 G/DL
ALP SERPL-CCNC: 211 U/L
ALT SERPL W/O P-5'-P-CCNC: 74 U/L
ANION GAP SERPL CALC-SCNC: 5 MMOL/L
ANISOCYTOSIS BLD QL SMEAR: ABNORMAL
ANNOTATION COMMENT IMP: NORMAL
AST SERPL-CCNC: 84 U/L
BASOPHILS # BLD AUTO: ABNORMAL K/UL
BASOPHILS NFR BLD: 0 %
BILIRUB SERPL-MCNC: 0.2 MG/DL
BUN SERPL-MCNC: 8 MG/DL
C TRACH DNA SPEC QL NAA+PROBE: NEGATIVE
CALCIUM SERPL-MCNC: 8.4 MG/DL
CD3+CD4+ CELLS # BLD: 221 CELLS/UL (ref 300–1400)
CD3+CD4+ CELLS NFR BLD: 7.3 % (ref 28–57)
CHLORIDE SERPL-SCNC: 100 MMOL/L
CO2 SERPL-SCNC: 27 MMOL/L
CREAT SERPL-MCNC: 0.6 MG/DL
CRYPTOC AG SER QL LA: NEGATIVE
DIFFERENTIAL METHOD: ABNORMAL
EOSINOPHIL # BLD AUTO: ABNORMAL K/UL
EOSINOPHIL NFR BLD: 0 %
ERYTHROCYTE [DISTWIDTH] IN BLOOD BY AUTOMATED COUNT: 16.5 %
EST. GFR  (AFRICAN AMERICAN): >60 ML/MIN/1.73 M^2
EST. GFR  (NON AFRICAN AMERICAN): >60 ML/MIN/1.73 M^2
GLUCOSE SERPL-MCNC: 107 MG/DL
GRAM STN SPEC: NORMAL
GRAM STN SPEC: NORMAL
HCT VFR BLD AUTO: 22.4 %
HGB BLD-MCNC: 7.4 G/DL
HOLDF INTERPRETATION: NORMAL
HOLDF REASON FOR REFERRAL: NORMAL
HOLDF RELEASED BY: NORMAL
HOLDF REQUESTED FISH TEST: NORMAL
HOLDF SOURCE: NORMAL
LDH SERPL L TO P-CCNC: 794 U/L
LYMPHOCYTES # BLD AUTO: ABNORMAL K/UL
LYMPHOCYTES NFR BLD: 11 %
MAGNESIUM SERPL-MCNC: 1.7 MG/DL
MCH RBC QN AUTO: 32.3 PG
MCHC RBC AUTO-ENTMCNC: 33 %
MCV RBC AUTO: 98 FL
MOL DX INTERP BLD/T QL: NORMAL
MONOCYTES # BLD AUTO: ABNORMAL K/UL
MONOCYTES NFR BLD: 2 %
N GONORRHOEA DNA SPEC QL NAA+PROBE: NEGATIVE
NEUTROPHILS NFR BLD: 0 %
PHOSPHATE SERPL-MCNC: 3.4 MG/DL
PLATELET # BLD AUTO: 76 K/UL
PLATELET BLD QL SMEAR: ABNORMAL
PMV BLD AUTO: 10.2 FL
POIKILOCYTOSIS BLD QL SMEAR: SLIGHT
POTASSIUM SERPL-SCNC: 3.8 MMOL/L
PROT SERPL-MCNC: 7.5 G/DL
RBC # BLD AUTO: 2.29 M/UL
REF LAB TEST METHOD: NORMAL
SMUDGE CELLS BLD QL SMEAR: PRESENT
SODIUM SERPL-SCNC: 132 MMOL/L
SPECIMEN TYPE: NORMAL
URATE SERPL-MCNC: 1.9 MG/DL
WBC # BLD AUTO: 38.37 K/UL
WBC OTHER NFR BLD MANUAL: 87 %

## 2017-01-19 PROCEDURE — 83735 ASSAY OF MAGNESIUM: CPT

## 2017-01-19 PROCEDURE — 81376 HLA II TYPING 1 LOCUS LR: CPT | Mod: 91

## 2017-01-19 PROCEDURE — 25000003 PHARM REV CODE 250: Performed by: NURSE PRACTITIONER

## 2017-01-19 PROCEDURE — 81376 HLA II TYPING 1 LOCUS LR: CPT

## 2017-01-19 PROCEDURE — 87901 NFCT AGT GNTYP ALYS HIV1 REV: CPT

## 2017-01-19 PROCEDURE — 81373 HLA I TYPING 1 LOCUS LR: CPT

## 2017-01-19 PROCEDURE — 86403 PARTICLE AGGLUT ANTBDY SCRN: CPT

## 2017-01-19 PROCEDURE — 80053 COMPREHEN METABOLIC PANEL: CPT

## 2017-01-19 PROCEDURE — 85027 COMPLETE CBC AUTOMATED: CPT

## 2017-01-19 PROCEDURE — 63600175 PHARM REV CODE 636 W HCPCS: Performed by: STUDENT IN AN ORGANIZED HEALTH CARE EDUCATION/TRAINING PROGRAM

## 2017-01-19 PROCEDURE — 83615 LACTATE (LD) (LDH) ENZYME: CPT

## 2017-01-19 PROCEDURE — 20600001 HC STEP DOWN PRIVATE ROOM

## 2017-01-19 PROCEDURE — 85007 BL SMEAR W/DIFF WBC COUNT: CPT

## 2017-01-19 PROCEDURE — 81373 HLA I TYPING 1 LOCUS LR: CPT | Mod: 91

## 2017-01-19 PROCEDURE — 99232 SBSQ HOSP IP/OBS MODERATE 35: CPT | Mod: ,,, | Performed by: INTERNAL MEDICINE

## 2017-01-19 PROCEDURE — 81381 HLA I TYPING 1 ALLELE HR: CPT

## 2017-01-19 PROCEDURE — 84550 ASSAY OF BLOOD/URIC ACID: CPT

## 2017-01-19 PROCEDURE — 25500020 PHARM REV CODE 255: Performed by: STUDENT IN AN ORGANIZED HEALTH CARE EDUCATION/TRAINING PROGRAM

## 2017-01-19 PROCEDURE — 87385 HISTOPLASMA CAPSUL AG IA: CPT

## 2017-01-19 PROCEDURE — 25500020 PHARM REV CODE 255: Performed by: INTERNAL MEDICINE

## 2017-01-19 PROCEDURE — 25000003 PHARM REV CODE 250: Performed by: INTERNAL MEDICINE

## 2017-01-19 PROCEDURE — 99233 SBSQ HOSP IP/OBS HIGH 50: CPT | Mod: ,,, | Performed by: INTERNAL MEDICINE

## 2017-01-19 PROCEDURE — 36415 COLL VENOUS BLD VENIPUNCTURE: CPT

## 2017-01-19 PROCEDURE — 87116 MYCOBACTERIA CULTURE: CPT

## 2017-01-19 PROCEDURE — 25000003 PHARM REV CODE 250: Performed by: STUDENT IN AN ORGANIZED HEALTH CARE EDUCATION/TRAINING PROGRAM

## 2017-01-19 PROCEDURE — 84100 ASSAY OF PHOSPHORUS: CPT

## 2017-01-19 RX ADMIN — ACYCLOVIR 400 MG: 200 CAPSULE ORAL at 08:01

## 2017-01-19 RX ADMIN — CEFEPIME HYDROCHLORIDE 2 G: 2 INJECTION, SOLUTION INTRAVENOUS at 11:01

## 2017-01-19 RX ADMIN — Medication 10 ML: at 12:01

## 2017-01-19 RX ADMIN — Medication 10 ML: at 05:01

## 2017-01-19 RX ADMIN — ALLOPURINOL 300 MG: 100 TABLET ORAL at 08:01

## 2017-01-19 RX ADMIN — CEFEPIME HYDROCHLORIDE 2 G: 2 INJECTION, SOLUTION INTRAVENOUS at 07:01

## 2017-01-19 RX ADMIN — IOHEXOL 75 ML: 350 INJECTION, SOLUTION INTRAVENOUS at 06:01

## 2017-01-19 RX ADMIN — OXYCODONE HYDROCHLORIDE 5 MG: 5 TABLET ORAL at 05:01

## 2017-01-19 RX ADMIN — SODIUM CHLORIDE: 0.9 INJECTION, SOLUTION INTRAVENOUS at 09:01

## 2017-01-19 RX ADMIN — IOHEXOL 15 ML: 350 INJECTION, SOLUTION INTRAVENOUS at 05:01

## 2017-01-19 RX ADMIN — IOHEXOL 15 ML: 350 INJECTION, SOLUTION INTRAVENOUS at 04:01

## 2017-01-19 RX ADMIN — CEFEPIME HYDROCHLORIDE 2 G: 2 INJECTION, SOLUTION INTRAVENOUS at 05:01

## 2017-01-19 RX ADMIN — ACETAMINOPHEN 650 MG: 325 TABLET ORAL at 04:01

## 2017-01-19 NOTE — PROGRESS NOTES
Progress Note  Hematology / Bone Marrow Transplant                                                              Team: Networked reference to record PCT     Patient Name: Marck Howard  YOB: 1980    Admit Date: 1/17/2017                     LOS: 2    SUBJECTIVE:     Reason for Admission:  Acute leukemia  See H&P for detailed initial presentation history and ROS.      Interval history:   Patient seen and examined this AM. Patient continues with bilateral LE and UE pain. Patient with Vf=475.5 yesterday evening. Family upset and disruptive yesterday evening. Patient denies chest pains, palpitations, SOB, n/v, abdo pain, dysuria. Admits to constipation. No other issues.    Review of System:  Constitutional: Positive fever or chills, negative for anorexia, malaise, night sweats and weight loss  Eyes: no visual changes, negative for irritation and redness  ENT: no nasal congestion or sore throat, negative for epistaxis, sore mouth and sore throat  Respiratory: no cough or shortness of breath, negative for dyspnea on exertion and wheezing  Cardiovascular: no chest pain or palpitations, negative for dyspnea, fatigue and palpitations  Gastrointestinal: no nausea or vomiting, no abdominal pain or change in bowel habits, negative for constipation, diarrhea and melena  Hematologic/Lymphatic: no easy bruising or lymphadenopathy, negative for bleeding  Musculoskeletal: no arthralgias or myalgias  Neurological: no seizures or tremors, negative for headaches and weakness  Behavioral/Psych: negative for anxiety and depression    OBJECTIVE:     Vital Signs Range (Last 24H):  Temp:  [98.5 °F (36.9 °C)-102.5 °F (39.2 °C)]   Pulse:  [108-130]   Resp:  [18]   BP: (105-130)/(65-81)   SpO2:  [97 %-98 %] Body mass index is 15.75 kg/(m^2).    I & O (Last 24H):    Intake/Output Summary (Last 24 hours) at 01/19/17 0814  Last data filed at 01/19/17 0550   Gross per 24 hour   Intake           3446.5 ml   Output              3375 ml   Net             71.5 ml       Physical Exam:  General: well developed, cachectic, no distress  HENT: Head:normocephalic, atraumatic. Ears:right ear normal, left ear normal. Nose: Nares normal. Septum midline. Mucosa normal. No drainage or sinus tenderness., no discharge. Throat: lips, mucosa, and tongue normal; teeth and gums normal and no throat erythema.  Eyes: conjunctivae/corneas clear. PERRL.   Neck: supple, symmetrical, trachea midline  Lungs:  clear to auscultation bilaterally and normal respiratory effort  Cardiovascular: Heart: regular rate and rhythm, S1, S2 normal, no murmur, click, rub or gallop. Chest Wall: no tenderness. Extremities: no cyanosis or edema, or clubbing.   Abdomen: soft, non-tender non-distented; bowel sounds normal.   Skin: Skin color, texture, turgor normal. No rashes or lesions  Musculoskeletal:no clubbing, cyanosis  Neurologic: Mental status: Alert, oriented, thought content appropriate  Psych/Behavioral:  Normal.    Diagnostic Results:  Lab Results   Component Value Date    WBC 38.37 (H) 01/19/2017    HGB 7.4 (L) 01/19/2017    HCT 22.4 (L) 01/19/2017    MCV 98 01/19/2017    PLT 76 (L) 01/19/2017       Recent Labs  Lab 01/19/17 0538      *   K 3.8      CO2 27   BUN 8   CREATININE 0.6   CALCIUM 8.4*   MG 1.7     Lab Results   Component Value Date    INR 1.2 01/17/2017    INR 1.8 (H) 01/16/2017     No results found for: HGBA1C  Microbiology Results (last 7 days)     Procedure Component Value Units Date/Time    Cryptococcal antigen [[id number]] Collected:  01/19/17 0538    Order Status:  Sent Specimen:  Blood from Blood Updated:  01/19/17 0617    Blood culture [[id number]] Collected:  01/17/17 0230    Order Status:  Completed Specimen:  Blood Updated:  01/19/17 0613     Blood Culture, Routine No Growth to date     Blood Culture, Routine No Growth to date     Blood Culture, Routine No Growth to date    Narrative:       Blood cultures from 2 different sites. 4  bottles total.  Please draw before starting antibiotics.    Blood culture [[id number]] Collected:  01/17/17 0230    Order Status:  Completed Specimen:  Blood Updated:  01/19/17 0613     Blood Culture, Routine No Growth to date     Blood Culture, Routine No Growth to date     Blood Culture, Routine No Growth to date    Narrative:       Blood cultures x 2 different sites. 4 bottles total. Please  draw cultures before administering antibiotics.    AFB Culture & Smear [[id number]] Collected:  01/19/17 0538    Order Status:  Sent Specimen:  Blood from Blood Updated:  01/19/17 0605    Blood culture [[id number]] Collected:  01/18/17 2023    Order Status:  Completed Specimen:  Blood Updated:  01/19/17 0515     Blood Culture, Routine No Growth to date    Narrative:       Blood cultures from 2 different sites. 4 bottles total.  Please draw before starting antibiotics.    Blood culture [[id number]] Collected:  01/18/17 2023    Order Status:  Completed Specimen:  Blood Updated:  01/19/17 0515     Blood Culture, Routine No Growth to date    Narrative:       Blood cultures x 2 different sites. 4 bottles total. Please  draw cultures before administering antibiotics.    Gram stain [[id number]] Collected:  01/18/17 2115    Order Status:  Completed Specimen:  Urine from Urine, Clean Catch Updated:  01/19/17 0233     Gram Stain Result No WBC's      No organisms seen    Urine culture [[id number]] Collected:  01/18/17 2115    Order Status:  Sent Specimen:  Urine from Urine, Clean Catch Updated:  01/18/17 2306    Narrative:       Before antibiotics    Urine culture [[id number]] Collected:  01/18/17 2115    Order Status:  Sent Specimen:  Urine from Urine, Clean Catch Updated:  01/18/17 2116    Urine culture [[id number]] Collected:  01/17/17 1025    Order Status:  Completed Specimen:  Urine from Urine, Clean Catch Updated:  01/18/17 1350     Urine Culture, Routine No growth    Narrative:       Before antibiotics    C. trachomatis/N. gonorrhoeae by  AMP DNA [[id number]] Collected:  01/18/17 0957    Order Status:  No result Updated:  01/18/17 0958    C. trachomatis/N. gonorrhoeae by AMP DNA Urine [[id number]]     Order Status:  Completed Specimen:  Genital     C. trachomatis/N. gonorrhoeae by AMP DNA Urine [[id number]]     Order Status:  Canceled Specimen:  Genital     Gram stain [[id number]] Collected:  01/17/17 1025    Order Status:  Completed Specimen:  Urine from Urine, Clean Catch Updated:  01/17/17 1221     Gram Stain Result No WBC's      No organisms seen    Culture, Respiratory [[id number]]     Order Status:  No result Specimen:  Respiratory           Imaging Results:    CXR 1/17/17: Heart size normal.  Mild accentuation of interstitial markings.  No significant air space consolidation or pleural effusion    2D Echo 1/17/17:  CONCLUSIONS     1 - Low normal left ventricular systolic function (EF 50-55%).     2 - Normal right ventricular systolic function .     3 - Normal left ventricular diastolic function.     ASSESSMENT/PLAN:     Current Problems List:  Active Hospital Problems    Diagnosis  POA    *Acute leukemia [C95.00]  Yes    Fever [R50.9]  Yes    Tobacco abuse [Z72.0]  Yes    HIV (human immunodeficiency virus infection) [Z21]  Yes      Resolved Hospital Problems    Diagnosis Date Resolved POA   No resolved problems to display.       Active Problems, Status & Treatment Plan Addressed Today:    # AML  - leukocytosis likely 2/2 ALL and infection  - currently no evidence of DIC or tumor lysis  - initially started on vanc and cefepime; continued on cefepime  - noted to have new diagnosis of HIV (see below)  - hepatitis and flu negative  - s/p bone marrow biopsy (1/17/17) - results pending   - flow cytometry consistent with AML  - continue on empiric allopurinol,     - started on ppx acyclovir  - will discuss induction therapy    # Gram Positive Freddie Bacteremia  - BCx (1/16/17) shows Gram positive rods  - BCx (1/17/17) with ngtd  - likely  contaminant   - ID following appreciate recs  - continue cefepime  - follow up cultures    HIV positive  - new diagnosis on this admit  - toxoplasma gondii WNL  - CD-4 118  - HIV PCR, RPR, syphilis and gonorrhea pending   - ID following, appreciate recs     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   # Thrombocytopenia due to leukemia   - Hgb=7.4 g/dL, PLT=76  - no overt signs of bleeding  - likely 2/2 underlying hematologic malignancy  - continue to monitor and transfuse as indicated  - will transfuse for Hgb <7.0, PLT <10    # Tobacco Abuse  - 26 pack year history  - advise smoking cessation   - nicotine patch prn    # Alcohol Abuse  - drinks at least 2 beers per day  - no h/o DTs  - monitor for signs/symptoms withdrawal    # Hyponatremia, resolved  - likely 2/2 dehydration  - continue IVFs  - continue to monitor    DVT ppx: encourage ambulation  Diet: regular  Code Status: full    DISPO: pending work up for acute leukemia and infection    Dionte Rizvi MD (PGY-4)  Hematology/Oncology    Will discuss with Dr. Jones (Hematology/Oncology Staff)

## 2017-01-19 NOTE — SUBJECTIVE & OBJECTIVE
Interval History: No acute events. Mr. Howard had a fever and reported chills at the time but denies any new complaints and states he is feeling well overall.     HPI:  37 y/o male no significant PMH who presents as a transfer for chief complaint of 1 month worsening diffuse myalgias and presumed acute leukemia. He reports 1 month diffuse myalgias in arms and legs RADHA which has been slowly progressive. States he presented when it was not getting better. Denies any fevers, chills, sweats, CP, cough, nausea, vomiting, diarrhea, new rashes. At OSH found to be febrile, tachycardic with a leukocytosis to about 50k, given 2L IVF Vanc/Zosyn and transferred. On initial workup noted to be HIV positive and ID consulted for recommendations.     Of note he denies any history of IVDU or blood transfusions. Sexually active with women, intermittent condom use, last sexually active 12 years ago when his son was born per report. Has not traveled outside of [place]. Used to work as a villasenor.       Review of Systems   Constitutional: Positive for chills and fever.   HENT: Negative for congestion, nosebleeds, rhinorrhea and sore throat.    Eyes: Negative for discharge and redness.   Respiratory: Negative for cough, shortness of breath and wheezing.    Cardiovascular: Negative for chest pain and palpitations.   Gastrointestinal: Negative for abdominal pain, diarrhea, nausea and vomiting.   Genitourinary: Negative for dysuria and hematuria.   Musculoskeletal: Negative for back pain and neck pain.   Skin: Negative for rash and wound.   Neurological: Negative for weakness and headaches.   Psychiatric/Behavioral: Negative for agitation. The patient is not nervous/anxious.      Objective:     Vital Signs (Most Recent):  Temp: 98.3 °F (36.8 °C) (01/19/17 1136)  Pulse: (!) 124 (01/19/17 1136)  Resp: 20 (01/19/17 1136)  BP: 111/73 (01/19/17 1136)  SpO2: 97 % (01/19/17 1136) Vital Signs (24h Range):  Temp:  [98.3 °F (36.8 °C)-102.5  °F (39.2 °C)] 98.3 °F (36.8 °C)  Pulse:  [102-130] 124  Resp:  [16-20] 20  SpO2:  [97 %-99 %] 97 %  BP: (110-130)/(65-81) 111/73     Weight: 41.6 kg (91 lb 11.7 oz)  Body mass index is 15.75 kg/(m^2).    Estimated Creatinine Clearance: 98.2 mL/min (based on Cr of 0.6).    Physical Exam   Constitutional: He is cooperative. He is easily aroused.  Non-toxic appearance. He appears ill. No distress.   HENT:   Head: Normocephalic and atraumatic.   Eyes: EOM are normal. Pupils are equal, round, and reactive to light.   Neck: Normal range of motion. Neck supple.   Cardiovascular: Normal rate and regular rhythm.    No murmur heard.  Pulmonary/Chest: Effort normal. No respiratory distress. He has wheezes (trace end expiratory, diffuse in all lung fields). He has no rales.   Abdominal: Soft. He exhibits no distension. There is no tenderness.   Musculoskeletal: He exhibits no edema.   Neurological: He is alert and easily aroused.   Skin: Skin is warm and dry. No rash noted.     Significant Labs:   CBC:   Recent Labs  Lab 01/18/17  0501 01/19/17  0538   WBC 43.89* 38.37*   HGB 7.1* 7.4*   HCT 21.6* 22.4*   PLT 94* 76*     CMP:   Recent Labs  Lab 01/18/17  0500 01/19/17  0538   * 132*   K 3.9 3.8   CL 98 100   CO2 25 27   * 107   BUN 11 8   CREATININE 0.7 0.6   CALCIUM 8.5* 8.4*   PROT 8.1 7.5   ALBUMIN 1.9* 1.7*   BILITOT 0.2 0.2   ALKPHOS 141* 211*   AST 37 84*   ALT 28 74*   ANIONGAP 5* 5*   EGFRNONAA >60.0 >60.0       Significant Imaging: I have reviewed all pertinent imaging results/findings within the past 24 hours.     01/17 CxR  No acute cardiopulmonary abnormality     Microbiology  BCx 01/16 NGTD  BCx 01/17 NGTD  UCx 01/17 NGTD  G/C Testing 01/18 pending  Crypto Ag negative  BCx 01/18 NGTD

## 2017-01-19 NOTE — ASSESSMENT & PLAN NOTE
Unclear etiology, possibly malignancy related; however, is near neutropenic based on diff, hemodynamically stable  Continue cefepime currently  Followup cultures  Per [facility] as of 01/18 BCx and UCx are NGTD  -Recommend CT Chest, abdomen and pelvis with contrast if possible to evaluate for infectious source

## 2017-01-19 NOTE — PROGRESS NOTES
[facility]  Infectious Disease  Progress Note    Patient Name: Marck Howard  MRN: [id number]  Admission Date: 1/17/2017  Length of Stay: 2 days  Attending Physician: Govind oJnes MD  Primary Care Provider: Primary Doctor No    Isolation Status: No active isolations  Assessment/Plan:      HIV (human immunodeficiency virus infection)  -New diagnosis, based on history most likely remote infection  -CD4, viral load pending  -Acute hep panel negative  -Crypto Ag pending  -RPR, Toxo IgG, GC testing pending  -Recommend Hep B surface Ab testing  -Quant Gold pending  -AFB blood culture pending  -Histoplasma Ag pending  -Hold anti-retroviral therapy currently pending above      Fever  Unclear etiology, possibly malignancy related; however, is near neutropenic based on diff, hemodynamically stable  Continue cefepime currently  Followup cultures  Per [facility] as of 01/18 BCx and UCx are NGTD  -Recommend CT Chest, abdomen and pelvis with contrast if possible to evaluate for infectious source      Anticipated Disposition: pending    Thank you for your consult. I will follow-up with patient. Please contact us if you have any additional questions.     Kedar Qureshi MD  Internal Medicine, PGY2        Subjective:     Principal Problem:Acute leukemia    Interval History: No acute events. Mr. Howard had a fever and reported chills at the time but denies any new complaints and states he is feeling well overall.     HPI:  37 y/o male no significant PMH who presents as a transfer for chief complaint of 1 month worsening diffuse myalgias and presumed acute leukemia. He reports 1 month diffuse myalgias in arms and legs RADHA which has been slowly progressive. States he presented when it was not getting better. Denies any fevers, chills, sweats, CP, cough, nausea, vomiting, diarrhea, new rashes. At OSH found to be febrile, tachycardic with a leukocytosis to about 50k, given 2L IVF Vanc/Zosyn and  transferred. On initial workup noted to be HIV positive and ID consulted for recommendations.     Of note he denies any history of IVDU or blood transfusions. Sexually active with women, intermittent condom use, last sexually active 12 years ago when his son was born per report. Has not traveled outside of [place]. Used to work as a villasenor.       Review of Systems   Constitutional: Positive for chills and fever.   HENT: Negative for congestion, nosebleeds, rhinorrhea and sore throat.    Eyes: Negative for discharge and redness.   Respiratory: Negative for cough, shortness of breath and wheezing.    Cardiovascular: Negative for chest pain and palpitations.   Gastrointestinal: Negative for abdominal pain, diarrhea, nausea and vomiting.   Genitourinary: Negative for dysuria and hematuria.   Musculoskeletal: Negative for back pain and neck pain.   Skin: Negative for rash and wound.   Neurological: Negative for weakness and headaches.   Psychiatric/Behavioral: Negative for agitation. The patient is not nervous/anxious.      Objective:     Vital Signs (Most Recent):  Temp: 98.3 °F (36.8 °C) (01/19/17 1136)  Pulse: (!) 124 (01/19/17 1136)  Resp: 20 (01/19/17 1136)  BP: 111/73 (01/19/17 1136)  SpO2: 97 % (01/19/17 1136) Vital Signs (24h Range):  Temp:  [98.3 °F (36.8 °C)-102.5 °F (39.2 °C)] 98.3 °F (36.8 °C)  Pulse:  [102-130] 124  Resp:  [16-20] 20  SpO2:  [97 %-99 %] 97 %  BP: (110-130)/(65-81) 111/73     Weight: 41.6 kg (91 lb 11.7 oz)  Body mass index is 15.75 kg/(m^2).    Estimated Creatinine Clearance: 98.2 mL/min (based on Cr of 0.6).    Physical Exam   Constitutional: He is cooperative. He is easily aroused.  Non-toxic appearance. He appears ill. No distress.   HENT:   Head: Normocephalic and atraumatic.   Eyes: EOM are normal. Pupils are equal, round, and reactive to light.   Neck: Normal range of motion. Neck supple.   Cardiovascular: Normal rate and regular rhythm.    No murmur heard.  Pulmonary/Chest:  Effort normal. No respiratory distress. He has wheezes (trace end expiratory, diffuse in all lung fields). He has no rales.   Abdominal: Soft. He exhibits no distension. There is no tenderness.   Musculoskeletal: He exhibits no edema.   Neurological: He is alert and easily aroused.   Skin: Skin is warm and dry. No rash noted.     Significant Labs:   CBC:   Recent Labs  Lab 01/18/17  0501 01/19/17  0538   WBC 43.89* 38.37*   HGB 7.1* 7.4*   HCT 21.6* 22.4*   PLT 94* 76*     CMP:   Recent Labs  Lab 01/18/17  0500 01/19/17  0538   * 132*   K 3.9 3.8   CL 98 100   CO2 25 27   * 107   BUN 11 8   CREATININE 0.7 0.6   CALCIUM 8.5* 8.4*   PROT 8.1 7.5   ALBUMIN 1.9* 1.7*   BILITOT 0.2 0.2   ALKPHOS 141* 211*   AST 37 84*   ALT 28 74*   ANIONGAP 5* 5*   EGFRNONAA >60.0 >60.0       Significant Imaging: I have reviewed all pertinent imaging results/findings within the past 24 hours.     01/17 CxR  No acute cardiopulmonary abnormality     Microbiology  BCx 01/16 NGTD  BCx 01/17 NGTD  UCx 01/17 NGTD  G/C Testing 01/18 pending  Crypto Ag negative  BCx 01/18 NGTD        Kedar Qureshi IV, MD  Infectious Disease  [facility]

## 2017-01-19 NOTE — PROGRESS NOTES
01/19/17 1641   Vital Signs   Temp (!) 100.8 °F (38.2 °C)   Temp src Oral   Dr. Maddox notified of febrile episode. Will administer tylenol and continue to monitor.

## 2017-01-19 NOTE — PROGRESS NOTES
Received phone call from [facility], where patient had been seen prior to transfer here. ER charge nurse, Rodrick Piper, confirmed this patient by two patient identifiers and same MRN in EPIC system. He communicated that blood cultures they had drawn on patient at [facility] from 1/15/17 at approximately 22:43 came back positive for gram positive rods. Covering night resident called and notified.

## 2017-01-19 NOTE — PLAN OF CARE
Problem: Patient Care Overview  Goal: Plan of Care Review  Outcome: Ongoing (interventions implemented as appropriate)  Plan of care reviewed with pt at the beginning of shift. Verbalized understanding. Did not have any questions or concerns at this time.Pt remained free of falls today. Fall precautions maintained. Waiting on BmBx results prior to start of treatment. Acyclovir started today. PICC placed in preparation for potential chemotherapy. Good PO intake. Up with assistance out of bed. Pain remains 10/10 despite pain medication but patient refusing additional pain medicine or pain medicine change.

## 2017-01-19 NOTE — ASSESSMENT & PLAN NOTE
-New diagnosis, based on history most likely remote infection  -CD4, viral load pending  -Acute hep panel negative  -Crypto Ag pending  -RPR, Toxo IgG, GC testing pending  -Recommend Hep B surface Ab testing  -Quant Gold pending  -AFB blood culture pending  -Histoplasma Ag pending  -Hold anti-retroviral therapy currently pending above

## 2017-01-20 LAB
ALBUMIN SERPL BCP-MCNC: 1.7 G/DL
ALP SERPL-CCNC: 245 U/L
ALT SERPL W/O P-5'-P-CCNC: 89 U/L
ANION GAP SERPL CALC-SCNC: 7 MMOL/L
ANISOCYTOSIS BLD QL SMEAR: SLIGHT
AST SERPL-CCNC: 74 U/L
BACTERIA UR CULT: NO GROWTH
BASOPHILS # BLD AUTO: ABNORMAL K/UL
BASOPHILS NFR BLD: 0 %
BILIRUB SERPL-MCNC: 0.1 MG/DL
BUN SERPL-MCNC: 7 MG/DL
CALCIUM SERPL-MCNC: 8.3 MG/DL
CHLORIDE SERPL-SCNC: 99 MMOL/L
CO2 SERPL-SCNC: 25 MMOL/L
CREAT SERPL-MCNC: 0.6 MG/DL
DIFFERENTIAL METHOD: ABNORMAL
DNA/RNA EXTRACT AND HOLD RESULT: NORMAL
DNA/RNA EXTRACTION: NORMAL
EOSINOPHIL # BLD AUTO: ABNORMAL K/UL
EOSINOPHIL NFR BLD: 0 %
ERYTHROCYTE [DISTWIDTH] IN BLOOD BY AUTOMATED COUNT: 16.3 %
EST. GFR  (AFRICAN AMERICAN): >60 ML/MIN/1.73 M^2
EST. GFR  (NON AFRICAN AMERICAN): >60 ML/MIN/1.73 M^2
EXHR SPECIMEN TYPE: NORMAL
GLUCOSE SERPL-MCNC: 142 MG/DL
HAV IGM SERPL QL IA: NEGATIVE
HBV CORE IGM SERPL QL IA: NEGATIVE
HBV SURFACE AB SER-ACNC: NEGATIVE M[IU]/ML
HBV SURFACE AG SERPL QL IA: NEGATIVE
HCT VFR BLD AUTO: 21.6 %
HCV AB SERPL QL IA: NEGATIVE
HEPATITIS A ANTIBODY, IGG: POSITIVE
HGB BLD-MCNC: 7.2 G/DL
HIV UQ DATE RECEIVED: ABNORMAL
HIV UQ DATE REPORTED: ABNORMAL
HIV1 RNA # SERPL NAA+PROBE: ABNORMAL COPIES/ML
HIV1 RNA SERPL NAA+PROBE-LOG#: 4.34 LOG (10) COPIES/ML
HIV1 RNA SERPL QL NAA+PROBE: DETECTED
HYPOCHROMIA BLD QL SMEAR: ABNORMAL
LDH SERPL L TO P-CCNC: 717 U/L
LYMPHOCYTES # BLD AUTO: ABNORMAL K/UL
LYMPHOCYTES NFR BLD: 14 %
MAGNESIUM SERPL-MCNC: 1.7 MG/DL
MCH RBC QN AUTO: 31.7 PG
MCHC RBC AUTO-ENTMCNC: 33.3 %
MCV RBC AUTO: 95 FL
MONOCYTES # BLD AUTO: ABNORMAL K/UL
MONOCYTES NFR BLD: 0 %
NEUTROPHILS NFR BLD: 0 %
NRBC BLD-RTO: ABNORMAL /100 WBC
PHOSPHATE SERPL-MCNC: 2.9 MG/DL
PLATELET # BLD AUTO: 72 K/UL
PLATELET BLD QL SMEAR: ABNORMAL
PMV BLD AUTO: 10 FL
POLYCHROMASIA BLD QL SMEAR: ABNORMAL
POTASSIUM SERPL-SCNC: 3.7 MMOL/L
PROT SERPL-MCNC: 7.5 G/DL
RBC # BLD AUTO: 2.27 M/UL
SODIUM SERPL-SCNC: 131 MMOL/L
URATE SERPL-MCNC: 1.8 MG/DL
WBC # BLD AUTO: 34.75 K/UL
WBC OTHER NFR BLD MANUAL: 86 %

## 2017-01-20 PROCEDURE — 99233 SBSQ HOSP IP/OBS HIGH 50: CPT | Mod: ,,, | Performed by: INTERNAL MEDICINE

## 2017-01-20 PROCEDURE — 86790 VIRUS ANTIBODY NOS: CPT

## 2017-01-20 PROCEDURE — 81380 HLA I TYPING 1 LOCUS HR: CPT | Mod: 91

## 2017-01-20 PROCEDURE — 81382 HLA II TYPING 1 LOC HR: CPT | Mod: 91

## 2017-01-20 PROCEDURE — 83735 ASSAY OF MAGNESIUM: CPT

## 2017-01-20 PROCEDURE — 86706 HEP B SURFACE ANTIBODY: CPT

## 2017-01-20 PROCEDURE — 85007 BL SMEAR W/DIFF WBC COUNT: CPT

## 2017-01-20 PROCEDURE — 81380 HLA I TYPING 1 LOCUS HR: CPT

## 2017-01-20 PROCEDURE — 25000003 PHARM REV CODE 250: Performed by: NURSE PRACTITIONER

## 2017-01-20 PROCEDURE — 85027 COMPLETE CBC AUTOMATED: CPT

## 2017-01-20 PROCEDURE — 81382 HLA II TYPING 1 LOC HR: CPT

## 2017-01-20 PROCEDURE — 83615 LACTATE (LD) (LDH) ENZYME: CPT

## 2017-01-20 PROCEDURE — 84100 ASSAY OF PHOSPHORUS: CPT

## 2017-01-20 PROCEDURE — 63600175 PHARM REV CODE 636 W HCPCS: Performed by: INTERNAL MEDICINE

## 2017-01-20 PROCEDURE — 80074 ACUTE HEPATITIS PANEL: CPT

## 2017-01-20 PROCEDURE — 63600175 PHARM REV CODE 636 W HCPCS: Performed by: STUDENT IN AN ORGANIZED HEALTH CARE EDUCATION/TRAINING PROGRAM

## 2017-01-20 PROCEDURE — 80053 COMPREHEN METABOLIC PANEL: CPT

## 2017-01-20 PROCEDURE — 20600001 HC STEP DOWN PRIVATE ROOM

## 2017-01-20 PROCEDURE — 99232 SBSQ HOSP IP/OBS MODERATE 35: CPT | Mod: ,,, | Performed by: INTERNAL MEDICINE

## 2017-01-20 PROCEDURE — 84550 ASSAY OF BLOOD/URIC ACID: CPT

## 2017-01-20 PROCEDURE — 25000003 PHARM REV CODE 250: Performed by: INTERNAL MEDICINE

## 2017-01-20 PROCEDURE — 25000003 PHARM REV CODE 250: Performed by: STUDENT IN AN ORGANIZED HEALTH CARE EDUCATION/TRAINING PROGRAM

## 2017-01-20 RX ORDER — DEXAMETHASONE 0.5 MG/1
12 TABLET ORAL
Status: CANCELLED | OUTPATIENT
Start: 2017-01-20

## 2017-01-20 RX ORDER — SODIUM CHLORIDE 9 MG/ML
INJECTION, SOLUTION INTRAVENOUS CONTINUOUS
Status: CANCELLED | OUTPATIENT
Start: 2017-01-20

## 2017-01-20 RX ORDER — POLYETHYLENE GLYCOL 3350 17 G/17G
17 POWDER, FOR SOLUTION ORAL 2 TIMES DAILY
Status: DISCONTINUED | OUTPATIENT
Start: 2017-01-20 | End: 2017-01-27

## 2017-01-20 RX ORDER — DEXAMETHASONE 4 MG/1
12 TABLET ORAL
Status: COMPLETED | OUTPATIENT
Start: 2017-01-20 | End: 2017-01-22

## 2017-01-20 RX ORDER — HYDROMORPHONE HYDROCHLORIDE 1 MG/ML
0.5 INJECTION, SOLUTION INTRAMUSCULAR; INTRAVENOUS; SUBCUTANEOUS EVERY 6 HOURS PRN
Status: DISCONTINUED | OUTPATIENT
Start: 2017-01-20 | End: 2017-02-24 | Stop reason: HOSPADM

## 2017-01-20 RX ORDER — SODIUM CHLORIDE 9 MG/ML
INJECTION, SOLUTION INTRAVENOUS CONTINUOUS
Status: DISCONTINUED | OUTPATIENT
Start: 2017-01-20 | End: 2017-02-06

## 2017-01-20 RX ORDER — HEPARIN 100 UNIT/ML
300 SYRINGE INTRAVENOUS
Status: CANCELLED | OUTPATIENT
Start: 2017-01-20

## 2017-01-20 RX ORDER — SODIUM CHLORIDE 0.9 % (FLUSH) 0.9 %
10 SYRINGE (ML) INJECTION
Status: DISCONTINUED | OUTPATIENT
Start: 2017-01-20 | End: 2017-02-06

## 2017-01-20 RX ORDER — HEPARIN 100 UNIT/ML
300 SYRINGE INTRAVENOUS
Status: DISCONTINUED | OUTPATIENT
Start: 2017-01-20 | End: 2017-02-06

## 2017-01-20 RX ORDER — AMOXICILLIN 250 MG
1 CAPSULE ORAL 2 TIMES DAILY
Status: DISCONTINUED | OUTPATIENT
Start: 2017-01-20 | End: 2017-02-20

## 2017-01-20 RX ORDER — ONDANSETRON 8 MG/1
16 TABLET, ORALLY DISINTEGRATING ORAL
Status: COMPLETED | OUTPATIENT
Start: 2017-01-20 | End: 2017-01-22

## 2017-01-20 RX ORDER — SODIUM CHLORIDE 0.9 % (FLUSH) 0.9 %
10 SYRINGE (ML) INJECTION
Status: CANCELLED | OUTPATIENT
Start: 2017-01-20

## 2017-01-20 RX ADMIN — IDARUBICIN HYDROCHLORIDE 16 MG: 1 INJECTION, SOLUTION INTRAVENOUS at 05:01

## 2017-01-20 RX ADMIN — POLYETHYLENE GLYCOL 3350 17 G: 17 POWDER, FOR SOLUTION ORAL at 09:01

## 2017-01-20 RX ADMIN — CEFEPIME HYDROCHLORIDE 2 G: 2 INJECTION, SOLUTION INTRAVENOUS at 11:01

## 2017-01-20 RX ADMIN — ALLOPURINOL 300 MG: 100 TABLET ORAL at 09:01

## 2017-01-20 RX ADMIN — SODIUM CHLORIDE: 0.9 INJECTION, SOLUTION INTRAVENOUS at 04:01

## 2017-01-20 RX ADMIN — CEFEPIME HYDROCHLORIDE 2 G: 2 INJECTION, SOLUTION INTRAVENOUS at 07:01

## 2017-01-20 RX ADMIN — Medication 10 ML: at 06:01

## 2017-01-20 RX ADMIN — CYTARABINE 135 MG: 100 INJECTION, SOLUTION INTRATHECAL; INTRAVENOUS; SUBCUTANEOUS at 06:01

## 2017-01-20 RX ADMIN — ACYCLOVIR 400 MG: 200 CAPSULE ORAL at 09:01

## 2017-01-20 RX ADMIN — SODIUM CHLORIDE: 0.9 INJECTION, SOLUTION INTRAVENOUS at 09:01

## 2017-01-20 RX ADMIN — Medication 10 ML: at 12:01

## 2017-01-20 RX ADMIN — CEFEPIME HYDROCHLORIDE 2 G: 2 INJECTION, SOLUTION INTRAVENOUS at 03:01

## 2017-01-20 RX ADMIN — DEXAMETHASONE 12 MG: 4 TABLET ORAL at 04:01

## 2017-01-20 RX ADMIN — ONDANSETRON 16 MG: 8 TABLET, ORALLY DISINTEGRATING ORAL at 04:01

## 2017-01-20 RX ADMIN — STANDARDIZED SENNA CONCENTRATE AND DOCUSATE SODIUM 1 TABLET: 8.6; 5 TABLET, FILM COATED ORAL at 09:01

## 2017-01-20 NOTE — PROGRESS NOTES
Progress Note  Hematology / Bone Marrow Transplant                                                              Team: Networked reference to record PCT     Patient Name: Marck Howard  YOB: 1980    Admit Date: 1/17/2017                     LOS: 3    SUBJECTIVE:     Reason for Admission:  Acute leukemia  See H&P for detailed initial presentation history and ROS.      Interval history:   Patient seen and examined this AM. Patient continues with bilateral LE and UE pain. Patient with Aa=212.8 yesterday evening. Patient denies chest pains, palpitations, SOB, n/v, abdo pain, dysuria. Reports constipation. No other issues.    Review of System:  Constitutional: Positive fever or chills, negative for anorexia, malaise, night sweats and weight loss  Eyes: no visual changes, negative for irritation and redness  ENT: no nasal congestion or sore throat, negative for epistaxis, sore mouth and sore throat  Respiratory: no cough or shortness of breath, negative for dyspnea on exertion and wheezing  Cardiovascular: no chest pain or palpitations, negative for dyspnea, fatigue and palpitations  Gastrointestinal: no nausea or vomiting, no abdominal pain or change in bowel habits, negative for constipation, diarrhea and melena  Hematologic/Lymphatic: no easy bruising or lymphadenopathy, negative for bleeding  Musculoskeletal: no arthralgias or myalgias  Neurological: no seizures or tremors, negative for headaches and weakness  Behavioral/Psych: negative for anxiety and depression    OBJECTIVE:     Vital Signs Range (Last 24H):  Temp:  [98.3 °F (36.8 °C)-100.8 °F (38.2 °C)]   Pulse:  [102-124]   Resp:  [15-20]   BP: (105-127)/(63-83)   SpO2:  [94 %-100 %] Body mass index is 16.29 kg/(m^2).    I & O (Last 24H):    Intake/Output Summary (Last 24 hours) at 01/20/17 0716  Last data filed at 01/20/17 0400   Gross per 24 hour   Intake          3188.33 ml   Output             2475 ml   Net           713.33 ml        Physical Exam:  General: well developed, cachectic, no distress  HENT: Head:normocephalic, atraumatic. Ears:right ear normal, left ear normal. Nose: Nares normal. Septum midline. Mucosa normal. No drainage or sinus tenderness., no discharge. Throat: lips, mucosa, and tongue normal; teeth and gums normal and no throat erythema.  Eyes: conjunctivae/corneas clear. PERRL.   Neck: supple, symmetrical, trachea midline  Lungs:  clear to auscultation bilaterally and normal respiratory effort  Cardiovascular: Heart: regular rate and rhythm, S1, S2 normal, no murmur, click, rub or gallop. Chest Wall: no tenderness. Extremities: no cyanosis or edema, or clubbing.   Abdomen: soft, non-tender non-distented; bowel sounds normal.   Skin: Skin color, texture, turgor normal. No rashes or lesions  Musculoskeletal:no clubbing, cyanosis  Neurologic: Mental status: Alert, oriented, thought content appropriate  Psych/Behavioral:  Normal.    Diagnostic Results:  Lab Results   Component Value Date    WBC 34.75 (H) 01/20/2017    HGB 7.2 (L) 01/20/2017    HCT 21.6 (L) 01/20/2017    MCV 95 01/20/2017    PLT 72 (L) 01/20/2017       Recent Labs  Lab 01/20/17  0341   *   *   K 3.7   CL 99   CO2 25   BUN 7   CREATININE 0.6   CALCIUM 8.3*   MG 1.7     Lab Results   Component Value Date    INR 1.2 01/17/2017    INR 1.8 (H) 01/16/2017     No results found for: HGBA1C  Microbiology Results (last 7 days)     Procedure Component Value Units Date/Time    Blood culture [[id number]] Collected:  01/17/17 0230    Order Status:  Completed Specimen:  Blood Updated:  01/20/17 0612     Blood Culture, Routine No Growth to date     Blood Culture, Routine No Growth to date     Blood Culture, Routine No Growth to date     Blood Culture, Routine No Growth to date    Narrative:       Blood cultures x 2 different sites. 4 bottles total. Please  draw cultures before administering antibiotics.    Blood culture [[id number]] Collected:  01/17/17 0230     Order Status:  Completed Specimen:  Blood Updated:  01/20/17 0612     Blood Culture, Routine No Growth to date     Blood Culture, Routine No Growth to date     Blood Culture, Routine No Growth to date     Blood Culture, Routine No Growth to date    Narrative:       Blood cultures from 2 different sites. 4 bottles total.  Please draw before starting antibiotics.    Urine culture [[id number]] Collected:  01/18/17 2115    Order Status:  Completed Specimen:  Urine from Urine, Clean Catch Updated:  01/20/17 0237     Urine Culture, Routine No growth    Narrative:       Before antibiotics    Blood culture [[id number]] Collected:  01/18/17 2023    Order Status:  Completed Specimen:  Blood Updated:  01/19/17 2212     Blood Culture, Routine No Growth to date     Blood Culture, Routine No Growth to date    Narrative:       Blood cultures from 2 different sites. 4 bottles total.  Please draw before starting antibiotics.    Blood culture [[id number]] Collected:  01/18/17 2023    Order Status:  Completed Specimen:  Blood Updated:  01/19/17 2212     Blood Culture, Routine No Growth to date     Blood Culture, Routine No Growth to date    Narrative:       Blood cultures x 2 different sites. 4 bottles total. Please  draw cultures before administering antibiotics.    C. trachomatis/N. gonorrhoeae by AMP DNA [[id number]] Collected:  01/18/17 0957    Order Status:  Completed Updated:  01/19/17 1352     Chlamydia, Amplified DNA Negative     N gonorrhoeae, amplified DNA Negative    Cryptococcal antigen [[id number]] Collected:  01/19/17 0538    Order Status:  Completed Specimen:  Blood from Blood Updated:  01/19/17 1226     Cryptococcal Ag, Blood Negative    AFB Culture & Smear [[id number]] Collected:  01/19/17 0538    Order Status:  Sent Specimen:  Blood from Blood Updated:  01/19/17 0605    Gram stain [[id number]] Collected:  01/18/17 2115    Order Status:  Completed Specimen:  Urine from Urine, Clean Catch Updated:  01/19/17 0233     Gram  Stain Result No WBC's      No organisms seen    Urine culture [[id number]] Collected:  01/18/17 2115    Order Status:  Sent Specimen:  Urine from Urine, Clean Catch Updated:  01/18/17 2116    Urine culture [[id number]] Collected:  01/17/17 1025    Order Status:  Completed Specimen:  Urine from Urine, Clean Catch Updated:  01/18/17 1350     Urine Culture, Routine No growth    Narrative:       Before antibiotics    C. trachomatis/N. gonorrhoeae by AMP DNA Urine [[id number]]     Order Status:  Completed Specimen:  Genital     C. trachomatis/N. gonorrhoeae by AMP DNA Urine [[id number]]     Order Status:  Canceled Specimen:  Genital     Gram stain [[id number]] Collected:  01/17/17 1025    Order Status:  Completed Specimen:  Urine from Urine, Clean Catch Updated:  01/17/17 1221     Gram Stain Result No WBC's      No organisms seen    Culture, Respiratory [[id number]]     Order Status:  No result Specimen:  Respiratory           Imaging Results:    CT C/A/P 1/19/17:  1.  Focus of groundglass attenuation with bronchiectasis/developing air bronchograms within the right middle lobe, nonspecific, however could reflect developing infectious process or possibly sequela of aspiration, clinical correlation for pneumonia is advised.  2.  Anterior mediastinum mass, suggesting thymic hyperplasia, clinical correlation advised.  3.  Constipation.  4.  Several additional findings above.    CXR 1/17/17: Heart size normal.  Mild accentuation of interstitial markings.  No significant air space consolidation or pleural effusion    2D Echo 1/17/17:  CONCLUSIONS     1 - Low normal left ventricular systolic function (EF 50-55%).     2 - Normal right ventricular systolic function .     3 - Normal left ventricular diastolic function.     FINAL PATHOLOGIC DIAGNOSIS (1/17/17):  BONE MARROW, RIGHT ILIAC CREST, ASPIRATE, CLOT, AND CORE BIOPSY:  --Markedly hypercellular marrow, essentially 100%, nearly completely replaced by acute myeloid leukemia,  see  comment  --Rare background trilineage elements present  --No significant appreciable stainable iron  COMMENT: Concomitantly submitted flow cytometric analysis detects an increased population of blasts consistent  with acute myeloid leukemia, favor non-M3 subtype, although A CD34 positive/HLA-DR negative/equivocal  phenotype, and M3 APL subtype cannot be completely excluded. This increased population of myeloid blasts  shows expression of CD34, CD13, and CD33 (partial). This population also expresses CD 56 and CD7. It is  negative for  and other B and T lymphoid R crest tested including CD19 and cytoplasmic CD3, as well as  TdT. HLA-DR is equivocal.  The overall morphology of these blasts do not suggest acute promyelocytic leukemia. 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       Active Problems, Status & Treatment Plan Addressed Today:    # AML  - leukocytosis likely 2/2 ALL and infection  - currently no evidence of DIC or tumor lysis  - initially started on vanc and cefepime; continued on cefepime  - noted to have new diagnosis of HIV (see below)  - hepatitis and flu negative  - s/p bone marrow biopsy (1/17/17) showing AML  - flow cytometry consistent with AML  - continue on empiric allopurinol,   - continue ppx acyclovir  - plan for induction therapy with 7+3 today    # Gram Positive Freddie Bacteremia  - BCx (1/16/17) shows Gram positive rods  - BCx (1/17/17) with ngtd  - likely contaminant   - ID following appreciate recs  - continue cefepime  - follow up cultures    HIV positive  - new diagnosis on this admit  - toxoplasma  gondii WNL  - CD4: 118  - ID following, appreciate recs     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   # Thrombocytopenia due to leukemia   - Hgb=7.2 g/dL, PLT=72  - no overt signs of bleeding  - likely 2/2 underlying hematologic malignancy  - continue to monitor and transfuse as indicated  - will transfuse for Hgb <7.0, PLT <10    # Constipation  - likely 2/2 opiate use  - started on miralax and senna-docusate    # Tobacco Abuse  - 26 pack year history  - advise smoking cessation   - nicotine patch prn    # Alcohol Abuse  - drinks at least 2 beers per day  - no h/o DTs  - monitor for signs/symptoms withdrawal    # Hyponatremia, improved  - likely 2/2 dehydration  - continue IVFs  - continue to monitor    DVT ppx: encourage ambulation  Diet: regular  Code Status: full    DISPO: pending chemotherapy for AML    Dionte Rizvi MD (PGY-4)  Hematology/Oncology    Will discuss with Dr. Jones (Hematology/Oncology Staff)

## 2017-01-20 NOTE — PROGRESS NOTES
[facility]  Adult Nutrition  Consult Note    SUMMARY     Recommendations    Recommendation/Intervention:   1. Change diet to high calorie/high protein diet.   2. Order Boost  Plus - TID in vanilla.   3. Encourage optimal po intake >% to meet EEN/EPN. RD to monitor    Goals: Pt will meet >85% EEN with po intake + ONS  Nutrition Goal Status: progressing towards goal     Continuum of Care Plan    Referral to Outpatient Services: other (see comments) (Nutrition D/C Planning: high tiago/protein + ONS)    Reason for Assessment    Reason for Assessment: RD follow-up  Diagnosis: cancer diagnosis/related complications (acute leukemia)  Relevent Medical History: No PMH   Interdisciplinary Rounds: attended     General Information Comments: PICC placed yesterday. Pt continues to have an overall good appetite. Again denies any n/v.     Nutrition Prescription Ordered    Current Diet Order: Regular     Evaluation of Received Nutrients/Fluid Intake        IV Fluid (mL): 976       Nutrition Risk Screen     Nutrition Risk Screen: no indicators present    Nutrition/Diet History    Patient Reported Diet/Restrictions/Preferences: general  Typical Food/Fluid Intake: good po intake  Food Preferences:  (no cultural or Jehovah's witness needs identified)        Factors Affecting Nutritional Intake:  (-)     Labs/Tests/Procedures/Meds       Pertinent Labs Reviewed: reviewed  Pertinent Labs Comments: WBC 38.3, Plts 76, H&H 7.4/22.4, Na 132, Alk phos 211, AST/ALT 84/74  Pertinent Medications Reviewed: reviewed  Pertinent Medications Comments: allopurinol, cefepime, vancomycin     Physical Findings    Overall Physical Appearance: generalized wasting, loss of muscle mass, loss of subcutaneous fat, underweight  Tubes:  (-)  Oral/Mouth Cavity: WDL  Skin: intact    Anthropometrics       Height (inches): 64.02 in  Weight Method: Standard Scale  Weight (kg): 41.6 kg  Ideal Body Weight (IBW), Male: 130.12 lb     % Ideal Body Weight,  Male (lb): 70.48 lb     BMI (kg/m2): 15.73  BMI Grade: less than 16 protein-energy malnutrition grade III        % Weight Change: 15.4 kg (27% x2-3 months)  Weight Loss: unintentional     Estimated/Assessed Needs    Weight Used For Calorie Calculations: 41.6 kg (91 lb 11.4 oz)   Height (cm): 162.6 cm     Energy Need Method: Kcal/kg (6425-9400 kcal (45-50 kcal/kg))     RMR (Casey-St. Jeor Equation): 1249.87        Weight Used For Protein Calculations: 41.6 kg (91 lb 11.4 oz)  Protein Requirements: 50-75 g (1.5-1.8 g/kg)    Fluid Need Method: RDA Method (or per MD)      Malnutrition (Undernutrition) Diagnosis    % Intake of Estimated Energy Needs: 75 - 100%  % Meal Intake: 75%  Malnutrition Reason: illness related, chronic      Severe Wt. Loss Chronic Illness: greater than 7.5% in 3 months    Nutrition Diagnosis    Nutrition Problem: Increased nutrient needs  Etiology/Related To: acute leukemia  Nutrition Diagnosis Signs/Symptoms As Evidenced By: increased EEN + EPN  Nutrition Diagnosis Status: Continues    Monitor and Evaluation    Food and Nutrient Intake: energy intake  Food and Nutrient Adminstration: diet order     Physical Activity and Function: nutrition-related ADLs and IADLs  Anthropometric Measurements: weight, weight change, body mass index  Biochemical Data, Medical Tests and Procedures: electrolyte and renal panel, glucose/endocrine profile, inflammatory profile  Nutrition-Focused Physical Findings: overall appearance    Nutrition Risk    Level of Risk: moderate    Nutrition Follow-Up    RD Follow-up?: Yes

## 2017-01-20 NOTE — PLAN OF CARE
Spoke to patient regarding diagnosis of AML with recommendation to start induction chemotherapy with 7+3 (cytarabine and idarubicin). Offered to discuss with sister, but sister did not answer her phone. Patient said he would like to discuss his chemotherapy rather than wait for his sister. Patient was consented for chemotherapy today (1/20/17). An extensive discussion was had which included a thorough discussion of the risk and benefits of treatment and alternatives.  Risks, including but not limited to, possible hair loss, bone marrow damage (anemia, thrombocytopenia, immune suppression, neutropenia), damage to body organs (brain, heart, liver, kidney, lungs, nervous system, skin, and others), allergic reactions, sterility, nausea/vomiting, constipation/diarrhea, sores in the mouth, secondary cancers, local damage at possible injection sites, and rarely death were all discussed.  The patient agrees with the plan, and all questions have been answered to their satisfaction.  Consent form read verbatim to the patient as patient is illiterate. Consent was signed the patient, provider, and a third party witness.  Advised patient that his sister can reach out to us if she had any additional questions regarding his chemotherapy.       Dionte Rizvi MD (PGY-4)

## 2017-01-20 NOTE — PROGRESS NOTES
Received in hospital patient in  CT-Scan room, with double lumen power PICC-Line, right upper arm, assessed, blood return noted, flushed with 10ml normal saline, post CT-Scan procedure, flushed with 10ml normal saline, patient tolerated procedure well, patient transported back to unit via stretcher.

## 2017-01-20 NOTE — PLAN OF CARE
Problem: Patient Care Overview  Goal: Plan of Care Review  Outcome: Ongoing (interventions implemented as appropriate)  Patient is progressing and involved with plan of care. Frequent rounds made to assess pain and safety. Pt communicating needs throughout shift. Up with stand by assist. Tolerating diet, voiding without difficulty. No c/o pain. All vitals stable; no acute events this shift. Pt. NS @100ml/hr infusing. Cefepime given as ordered. HLA DNA typing collected and sent. Remaining free from falls or injury throughout shift; bed in lowest position; side rails up X2; call light within reach; pt instructed to call for assistance as needed - verbalized understanding. Q2h rounding on patient. Will continue to monitor.

## 2017-01-20 NOTE — PLAN OF CARE
MDR's with Dr Jones.  Patient has AML on BMB.  Plan to likely start 7+3 induction today.  ID for newly dx HIV and neutropenic fever.  Medicaid application has been submitted and is pending.  Will continue to follow for d/c needs.

## 2017-01-20 NOTE — PLAN OF CARE
Problem: HIV/AIDS (Adult)  Goal: Signs and Symptoms of Listed Potential Problems Will be Absent, Minimized or Managed (HIV/AIDS)  Signs and symptoms of listed potential problems will be absent, minimized or managed by discharge/transition of care (reference HIV/AIDS (Adult) CPG).   Outcome: Ongoing (interventions implemented as appropriate)  Plan of care reviewed with patient .  Fall precautions maintained, side rails up x2, call light in reach, bed in low position, nonskid socks on.  Iv fluids infusing without difficulty.  No complaints voiced.  Will start chemo today.

## 2017-01-20 NOTE — PLAN OF CARE
Problem: Patient Care Overview  Goal: Plan of Care Review  Outcome: Ongoing (interventions implemented as appropriate)  Fall and infection precautions continued. Patient encouraged with shower today. HLA lab work sent. Tmax 100.8 CT of the abdomen and pelvis ordered. Patient drinking contrast. Patient stable, will continue to monitor.

## 2017-01-21 LAB
ABO + RH BLD: NORMAL
ALBUMIN SERPL BCP-MCNC: 1.7 G/DL
ALP SERPL-CCNC: 214 U/L
ALT SERPL W/O P-5'-P-CCNC: 75 U/L
ANION GAP SERPL CALC-SCNC: 6 MMOL/L
ANISOCYTOSIS BLD QL SMEAR: SLIGHT
ANISOCYTOSIS BLD QL SMEAR: SLIGHT
AST SERPL-CCNC: 41 U/L
BASOPHILS # BLD AUTO: ABNORMAL K/UL
BASOPHILS # BLD AUTO: ABNORMAL K/UL
BASOPHILS NFR BLD: 0 %
BASOPHILS NFR BLD: 0 %
BILIRUB SERPL-MCNC: 0.1 MG/DL
BLASTS NFR BLD MANUAL: 96 %
BLASTS NFR BLD MANUAL: 97 %
BLD GP AB SCN CELLS X3 SERPL QL: NORMAL
BUN SERPL-MCNC: 14 MG/DL
CALCIUM SERPL-MCNC: 8.4 MG/DL
CHLORIDE SERPL-SCNC: 107 MMOL/L
CO2 SERPL-SCNC: 24 MMOL/L
CREAT SERPL-MCNC: 0.6 MG/DL
DACRYOCYTES BLD QL SMEAR: ABNORMAL
DIFFERENTIAL METHOD: ABNORMAL
DIFFERENTIAL METHOD: ABNORMAL
EOSINOPHIL # BLD AUTO: ABNORMAL K/UL
EOSINOPHIL # BLD AUTO: ABNORMAL K/UL
EOSINOPHIL NFR BLD: 0 %
EOSINOPHIL NFR BLD: 0 %
ERYTHROCYTE [DISTWIDTH] IN BLOOD BY AUTOMATED COUNT: 16.3 %
ERYTHROCYTE [DISTWIDTH] IN BLOOD BY AUTOMATED COUNT: 16.5 %
EST. GFR  (AFRICAN AMERICAN): >60 ML/MIN/1.73 M^2
EST. GFR  (NON AFRICAN AMERICAN): >60 ML/MIN/1.73 M^2
GLUCOSE SERPL-MCNC: 125 MG/DL
HCT VFR BLD AUTO: 20.6 %
HCT VFR BLD AUTO: 21.2 %
HGB BLD-MCNC: 6.8 G/DL
HGB BLD-MCNC: 7 G/DL
HISTOPLASMA AG VALUE: 0 NG/ML
HISTOPLASMA ANTIGEN URINE: NEGATIVE
HYPOCHROMIA BLD QL SMEAR: ABNORMAL
HYPOCHROMIA BLD QL SMEAR: ABNORMAL
LDH SERPL L TO P-CCNC: 644 U/L
LYMPHOCYTES # BLD AUTO: ABNORMAL K/UL
LYMPHOCYTES # BLD AUTO: ABNORMAL K/UL
LYMPHOCYTES NFR BLD: 3 %
LYMPHOCYTES NFR BLD: 4 %
MAGNESIUM SERPL-MCNC: 1.7 MG/DL
MAGNESIUM SERPL-MCNC: 1.8 MG/DL
MCH RBC QN AUTO: 31.5 PG
MCH RBC QN AUTO: 31.6 PG
MCHC RBC AUTO-ENTMCNC: 33 %
MCHC RBC AUTO-ENTMCNC: 33 %
MCV RBC AUTO: 96 FL
MCV RBC AUTO: 96 FL
MONOCYTES # BLD AUTO: ABNORMAL K/UL
MONOCYTES # BLD AUTO: ABNORMAL K/UL
MONOCYTES NFR BLD: 0 %
MONOCYTES NFR BLD: 0 %
NEUTROPHILS NFR BLD: 0 %
NEUTROPHILS NFR BLD: 0 %
NRBC BLD-RTO: ABNORMAL /100 WBC
NRBC BLD-RTO: ABNORMAL /100 WBC
PHOSPHATE SERPL-MCNC: 3.3 MG/DL
PHOSPHATE SERPL-MCNC: 3.5 MG/DL
PLATELET # BLD AUTO: 71 K/UL
PLATELET # BLD AUTO: 74 K/UL
PLATELET BLD QL SMEAR: ABNORMAL
PMV BLD AUTO: 10.2 FL
PMV BLD AUTO: 11 FL
POIKILOCYTOSIS BLD QL SMEAR: SLIGHT
POIKILOCYTOSIS BLD QL SMEAR: SLIGHT
POLYCHROMASIA BLD QL SMEAR: ABNORMAL
POLYCHROMASIA BLD QL SMEAR: ABNORMAL
POTASSIUM SERPL-SCNC: 4.1 MMOL/L
PROT SERPL-MCNC: 7.4 G/DL
RBC # BLD AUTO: 2.15 M/UL
RBC # BLD AUTO: 2.22 M/UL
SCHISTOCYTES BLD QL SMEAR: ABNORMAL
SODIUM SERPL-SCNC: 137 MMOL/L
URATE SERPL-MCNC: 2.2 MG/DL
WBC # BLD AUTO: 29.94 K/UL
WBC # BLD AUTO: 31.14 K/UL

## 2017-01-21 PROCEDURE — 63600175 PHARM REV CODE 636 W HCPCS: Performed by: STUDENT IN AN ORGANIZED HEALTH CARE EDUCATION/TRAINING PROGRAM

## 2017-01-21 PROCEDURE — 97803 MED NUTRITION INDIV SUBSEQ: CPT

## 2017-01-21 PROCEDURE — 63600175 PHARM REV CODE 636 W HCPCS: Performed by: INTERNAL MEDICINE

## 2017-01-21 PROCEDURE — 25000003 PHARM REV CODE 250: Performed by: NURSE PRACTITIONER

## 2017-01-21 PROCEDURE — 80053 COMPREHEN METABOLIC PANEL: CPT

## 2017-01-21 PROCEDURE — 83735 ASSAY OF MAGNESIUM: CPT | Mod: 91

## 2017-01-21 PROCEDURE — 25000003 PHARM REV CODE 250: Performed by: STUDENT IN AN ORGANIZED HEALTH CARE EDUCATION/TRAINING PROGRAM

## 2017-01-21 PROCEDURE — 86900 BLOOD TYPING SEROLOGIC ABO: CPT

## 2017-01-21 PROCEDURE — 85007 BL SMEAR W/DIFF WBC COUNT: CPT

## 2017-01-21 PROCEDURE — 86920 COMPATIBILITY TEST SPIN: CPT

## 2017-01-21 PROCEDURE — 84550 ASSAY OF BLOOD/URIC ACID: CPT

## 2017-01-21 PROCEDURE — 99233 SBSQ HOSP IP/OBS HIGH 50: CPT | Mod: ,,, | Performed by: INTERNAL MEDICINE

## 2017-01-21 PROCEDURE — 25000003 PHARM REV CODE 250: Performed by: INTERNAL MEDICINE

## 2017-01-21 PROCEDURE — 85027 COMPLETE CBC AUTOMATED: CPT

## 2017-01-21 PROCEDURE — 20600001 HC STEP DOWN PRIVATE ROOM

## 2017-01-21 PROCEDURE — 84100 ASSAY OF PHOSPHORUS: CPT | Mod: 91

## 2017-01-21 PROCEDURE — 84100 ASSAY OF PHOSPHORUS: CPT

## 2017-01-21 PROCEDURE — 86901 BLOOD TYPING SEROLOGIC RH(D): CPT

## 2017-01-21 PROCEDURE — 83735 ASSAY OF MAGNESIUM: CPT

## 2017-01-21 PROCEDURE — 36415 COLL VENOUS BLD VENIPUNCTURE: CPT

## 2017-01-21 PROCEDURE — 83615 LACTATE (LD) (LDH) ENZYME: CPT

## 2017-01-21 RX ADMIN — IDARUBICIN HYDROCHLORIDE 16 MG: 1 INJECTION, SOLUTION INTRAVENOUS at 05:01

## 2017-01-21 RX ADMIN — SODIUM CHLORIDE: 0.9 INJECTION, SOLUTION INTRAVENOUS at 11:01

## 2017-01-21 RX ADMIN — Medication 10 ML: at 12:01

## 2017-01-21 RX ADMIN — CEFEPIME HYDROCHLORIDE 2 G: 2 INJECTION, SOLUTION INTRAVENOUS at 08:01

## 2017-01-21 RX ADMIN — OXYCODONE HYDROCHLORIDE 5 MG: 5 TABLET ORAL at 04:01

## 2017-01-21 RX ADMIN — Medication 10 ML: at 05:01

## 2017-01-21 RX ADMIN — DEXAMETHASONE 12 MG: 4 TABLET ORAL at 05:01

## 2017-01-21 RX ADMIN — POLYETHYLENE GLYCOL 3350 17 G: 17 POWDER, FOR SOLUTION ORAL at 08:01

## 2017-01-21 RX ADMIN — ALLOPURINOL 300 MG: 100 TABLET ORAL at 08:01

## 2017-01-21 RX ADMIN — STANDARDIZED SENNA CONCENTRATE AND DOCUSATE SODIUM 1 TABLET: 8.6; 5 TABLET, FILM COATED ORAL at 08:01

## 2017-01-21 RX ADMIN — ACYCLOVIR 400 MG: 200 CAPSULE ORAL at 08:01

## 2017-01-21 RX ADMIN — ONDANSETRON 16 MG: 8 TABLET, ORALLY DISINTEGRATING ORAL at 05:01

## 2017-01-21 RX ADMIN — CYTARABINE 135 MG: 100 INJECTION, SOLUTION INTRATHECAL; INTRAVENOUS; SUBCUTANEOUS at 06:01

## 2017-01-21 RX ADMIN — CEFEPIME HYDROCHLORIDE 2 G: 2 INJECTION, SOLUTION INTRAVENOUS at 11:01

## 2017-01-21 RX ADMIN — CEFEPIME HYDROCHLORIDE 2 G: 2 INJECTION, SOLUTION INTRAVENOUS at 04:01

## 2017-01-21 NOTE — ASSESSMENT & PLAN NOTE
35yo previously healthy man who was admitted as a transfer from an OSH on 1/17/2017 with 3mos of weight loss, malaise, fevers, and more acute onset myalgias and was found to have new anemia, thrombocytopenia, and leukocytosis with circulating blasts consistent with AML and a new diagnosis of HIV. He is febrile without localizing symptoms but otherwise hemodynamically stable.      - OK to continue cefepime for now for neutropenic fevers while awaiting culture data and OI workup (will cover CAP/aspiration if present well given minimal CT chest findings)  - will start bactrim prophylaxis given CD4 percentage -- would recommend continuing this agent independent of his neutropenic prophylaxis regimens given a long-term risk of PCP due to likely poor immune reconstitution while on chemotherapy  - await ID of GPR (probable contaminant) - if declines, would start empiric vanc  - await pending remaining intake labs (viral load, genotype, [id number], and quantiferon gold)  - await remaining pending OI workup: urine histo antigen and AFB blood cx   - OK to proceed with induction chemotherapy since I think AML is the likely cause of his fevers

## 2017-01-21 NOTE — PROGRESS NOTES
[facility]  Infectious Disease  Progress Note    Patient Name: Marck Howard  MRN: [id number]  Admission Date: 1/17/2017  Length of Stay: 4 days  Attending Physician: Govind Jones MD  Primary Care Provider: Primary Doctor No    Isolation Status: No active isolations  Assessment/Plan:      Fever  35yo previously healthy man who was admitted as a transfer from an OSH on 1/17/2017 with 3mos of weight loss, malaise, fevers, and more acute onset myalgias and was found to have new anemia, thrombocytopenia, and leukocytosis with circulating blasts consistent with AML and a new diagnosis of HIV (NH9=219/7%, VL 22k, GT pending). He is improved since starting induction chemotherapy since AML was the likely cause of his fevers.      - if remains afebrile tomorrow, would transition cefepime to cipro standard neutropenic prophylaxis   - would continue bactrim prophylaxis given CD4 percentage -- would recommend continuing this agent independent of his neutropenic prophylaxis regimens given a long-term risk of PCP due to likely poor immune reconstitution while on chemotherapy  - timing for initiation of voriconazole and acyclovir neutropenic prophylaxis deferred to primary hematology team per protocol  - await ID of GPR (probable contaminant)  - await pending remaining intake labs (genotype, [id number], and quantiferon gold)  - await remaining pending OI workup: urine histo antigen and AFB blood cx         Anticipated Disposition: pending recovery from chemotherapy    Thank you for your consult. I will follow-up with patient. Please contact us if you have any additional questions.     Mae Mike MD  Transplant ID Attending  [id number]      Subjective:     Principal Problem:Acute leukemia    Interval History: States he feels much better since starting chemotherapy. Afebrile. No new culture data.    HPI:      Review of Systems   Constitutional: Negative for chills and fever.   HENT: Negative for  congestion, nosebleeds, rhinorrhea and sore throat.    Eyes: Negative for discharge and redness.   Respiratory: Negative for cough, shortness of breath and wheezing.    Cardiovascular: Negative for chest pain and palpitations.   Gastrointestinal: Negative for abdominal pain, diarrhea, nausea and vomiting.   Genitourinary: Negative for dysuria and hematuria.   Musculoskeletal: Negative for back pain and neck pain.   Skin: Negative for rash and wound.   Neurological: Negative for weakness and headaches.   Psychiatric/Behavioral: Negative for agitation. The patient is not nervous/anxious.      Objective:     Vital Signs (Most Recent):  Temp: 97.7 °F (36.5 °C) (01/21/17 0419)  Pulse: 92 (01/21/17 0419)  Resp: 16 (01/21/17 0419)  BP: 135/84 (01/21/17 0419)  SpO2: 99 % (01/21/17 0419) Vital Signs (24h Range):  Temp:  [97.4 °F (36.3 °C)-99.1 °F (37.3 °C)] 97.7 °F (36.5 °C)  Pulse:  [] 92  Resp:  [16-18] 16  SpO2:  [96 %-99 %] 99 %  BP: (102-135)/(66-84) 135/84     Weight: 42.9 kg (94 lb 11 oz)  Body mass index is 16.31 kg/(m^2).    Estimated Creatinine Clearance: 101.5 mL/min (based on Cr of 0.6).    Physical Exam   Constitutional: He appears cachectic. He is cooperative. He is easily aroused.  Non-toxic appearance. He does not appear ill. No distress.   HENT:   Head: Normocephalic and atraumatic.   Eyes: EOM are normal. Pupils are equal, round, and reactive to light.   Neck: Normal range of motion. Neck supple.   Cardiovascular: Normal rate and regular rhythm.    No murmur heard.  Pulmonary/Chest: Effort normal. No respiratory distress. He has no wheezes (trace end expiratory, diffuse in all lung fields). He has no rales.   Abdominal: Soft. He exhibits no distension. There is no tenderness.   Musculoskeletal: He exhibits no edema.   Neurological: He is alert and easily aroused.   Skin: Skin is warm and dry. No rash noted.     Significant Labs:   CBC:     Recent Labs  Lab 01/20/17  0341 01/21/17  0407 01/21/17  1215    WBC 34.75* 31.14* 29.94*   HGB 7.2* 7.0* 6.8*   HCT 21.6* 21.2* 20.6*   PLT 72* 74* 71*     CMP:     Recent Labs  Lab 01/20/17  0341 01/21/17  1215   * 137   K 3.7 4.1   CL 99 107   CO2 25 24   * 125*   BUN 7 14   CREATININE 0.6 0.6   CALCIUM 8.3* 8.4*   PROT 7.5 7.4   ALBUMIN 1.7* 1.7*   BILITOT 0.1 0.1   ALKPHOS 245* 214*   AST 74* 41*   ALT 89* 75*   ANIONGAP 7* 6*   EGFRNONAA >60.0 >60.0       Significant Imaging: I have reviewed all pertinent imaging results/findings within the past 24 hours.     01/17 CxR  No acute cardiopulmonary abnormality    CT CAP: scant GGO in RML, otherwise unremarkable     Microbiology  BCx 01/16 GPR 1/2  BCx 01/17 NGTD  UCx 01/17 NGTD  G/C Testing 01/18 negative  Crypto Ag negative  BCx 01/18 NGTD        Mae Mike MD  Infectious Disease  [facility]

## 2017-01-21 NOTE — PLAN OF CARE
Problem: Patient Care Overview  Goal: Plan of Care Review  Outcome: Ongoing (interventions implemented as appropriate)  Pt on continuous infusion of cytarabine. On day one of 7&3. ivfs infusing. Pt measuring urine in urinal. Discussed possible side effects from chemotherapy with pt. Denies pain. Afebrile.

## 2017-01-21 NOTE — SUBJECTIVE & OBJECTIVE
Interval History: No acute events. Low-grade temp. Induction to start today.    HPI:      Review of Systems   Constitutional: Positive for chills and fever.   HENT: Negative for congestion, nosebleeds, rhinorrhea and sore throat.    Eyes: Negative for discharge and redness.   Respiratory: Negative for cough, shortness of breath and wheezing.    Cardiovascular: Negative for chest pain and palpitations.   Gastrointestinal: Negative for abdominal pain, diarrhea, nausea and vomiting.   Genitourinary: Negative for dysuria and hematuria.   Musculoskeletal: Negative for back pain and neck pain.   Skin: Negative for rash and wound.   Neurological: Negative for weakness and headaches.   Psychiatric/Behavioral: Negative for agitation. The patient is not nervous/anxious.      Objective:     Vital Signs (Most Recent):  Temp: 99.1 °F (37.3 °C) (01/20/17 1509)  Pulse: (!) 116 (01/20/17 1509)  Resp: 18 (01/20/17 1509)  BP: 115/71 (01/20/17 1509)  SpO2: 96 % (01/20/17 1509) Vital Signs (24h Range):  Temp:  [98.1 °F (36.7 °C)-99.8 °F (37.7 °C)] 99.1 °F (37.3 °C)  Pulse:  [105-124] 116  Resp:  [16-18] 18  SpO2:  [96 %-100 %] 96 %  BP: (100-124)/(68-83) 115/71     Weight: 43.1 kg (95 lb 0.3 oz)  Body mass index is 16.36 kg/(m^2).    Estimated Creatinine Clearance: 101.8 mL/min (based on Cr of 0.6).    Physical Exam   Constitutional: He is cooperative. He is easily aroused.  Non-toxic appearance. He appears ill. No distress.   HENT:   Head: Normocephalic and atraumatic.   Eyes: EOM are normal. Pupils are equal, round, and reactive to light.   Neck: Normal range of motion. Neck supple.   Cardiovascular: Normal rate and regular rhythm.    No murmur heard.  Pulmonary/Chest: Effort normal. No respiratory distress. He has wheezes (trace end expiratory, diffuse in all lung fields). He has no rales.   Abdominal: Soft. He exhibits no distension. There is no tenderness.   Musculoskeletal: He exhibits no edema.   Neurological: He is alert and  easily aroused.   Skin: Skin is warm and dry. No rash noted.     Significant Labs:   CBC:     Recent Labs  Lab 01/19/17  0538 01/20/17  0341   WBC 38.37* 34.75*   HGB 7.4* 7.2*   HCT 22.4* 21.6*   PLT 76* 72*     CMP:     Recent Labs  Lab 01/19/17  0538 01/20/17  0341   * 131*   K 3.8 3.7    99   CO2 27 25    142*   BUN 8 7   CREATININE 0.6 0.6   CALCIUM 8.4* 8.3*   PROT 7.5 7.5   ALBUMIN 1.7* 1.7*   BILITOT 0.2 0.1   ALKPHOS 211* 245*   AST 84* 74*   ALT 74* 89*   ANIONGAP 5* 7*   EGFRNONAA >60.0 >60.0       Significant Imaging: I have reviewed all pertinent imaging results/findings within the past 24 hours.     01/17 CxR  No acute cardiopulmonary abnormality     Microbiology  BCx 01/16 GPR 1/2  BCx 01/17 NGTD  UCx 01/17 NGTD  G/C Testing 01/18 negative  Crypto Ag negative  BCx 01/18 NGTD

## 2017-01-21 NOTE — NURSING
Patient received idarubincin 16mg in sodium chloride 0.9% in 30ml ivpb over 15 minutes through a free flowing normal saline.  Premeds of zofran 16mg po and dexamethasone 12mg po prior to chemo.  No complaints voiced.

## 2017-01-21 NOTE — ASSESSMENT & PLAN NOTE
35yo previously healthy man who was admitted as a transfer from an OSH on 1/17/2017 with 3mos of weight loss, malaise, fevers, and more acute onset myalgias and was found to have new anemia, thrombocytopenia, and leukocytosis with circulating blasts consistent with AML and a new diagnosis of HIV (AS4=029/7%, VL 22k, GT pending). He is improved since starting induction chemotherapy since AML was the likely cause of his fevers.      - if remains afebrile tomorrow, would transition cefepime to cipro standard neutropenic prophylaxis   - would continue bactrim prophylaxis given CD4 percentage -- would recommend continuing this agent independent of his neutropenic prophylaxis regimens given a long-term risk of PCP due to likely poor immune reconstitution while on chemotherapy  - timing for initiation of voriconazole and acyclovir neutropenic prophylaxis deferred to primary hematology team per protocol  - await ID of GPR (probable contaminant)  - await pending remaining intake labs (genotype, [id number], and quantiferon gold)  - await remaining pending OI workup: urine histo antigen and AFB blood cx

## 2017-01-21 NOTE — CONSULTS
"RD received consult for "malnourished."  Please see RD note dated 1/19 for further details. Pt currently being followed by RD.    Recommendation/Intervention:  1. Change diet to high calorie/high protein diet.   2. Order Boost Plus - TID in vanilla.   3. Encourage optimal po intake >% to meet EEN/EPN. RD to monitor    Will f/u as previously scheduled.    Thanks,  ALYSA Langford, LDN  [id number]  "

## 2017-01-21 NOTE — PROGRESS NOTES
[facility]  Infectious Disease  Progress Note    Patient Name: Marck Howard  MRN: [id number]  Admission Date: 1/17/2017  Length of Stay: 3 days  Attending Physician: Govind Jones MD  Primary Care Provider: Primary Doctor No    Isolation Status: No active isolations  Assessment/Plan:      Fever  37yo previously healthy man who was admitted as a transfer from an OSH on 1/17/2017 with 3mos of weight loss, malaise, fevers, and more acute onset myalgias and was found to have new anemia, thrombocytopenia, and leukocytosis with circulating blasts consistent with AML and a new diagnosis of HIV. He is febrile without localizing symptoms but otherwise hemodynamically stable.      - OK to continue cefepime for now for neutropenic fevers while awaiting culture data and OI workup (will cover CAP/aspiration if present well given minimal CT chest findings)  - will start bactrim prophylaxis given CD4 percentage -- would recommend continuing this agent independent of his neutropenic prophylaxis regimens given a long-term risk of PCP due to likely poor immune reconstitution while on chemotherapy  - await ID of GPR (probable contaminant) - if declines, would start empiric vanc  - await pending remaining intake labs (viral load, genotype, [id number], and quantiferon gold)  - await remaining pending OI workup: urine histo antigen and AFB blood cx   - OK to proceed with induction chemotherapy since I think AML is the likely cause of his fevers        Anticipated Disposition: pending improvement    Thank you for your consult. I will follow-up with patient. Please contact us if you have any additional questions.     Mae Mike MD  Transplant ID Attending  [id number]      Subjective:     Principal Problem:Acute leukemia    Interval History: No acute events. Low-grade temp. Induction to start today.    HPI:      Review of Systems   Constitutional: Positive for chills and fever.   HENT: Negative for  congestion, nosebleeds, rhinorrhea and sore throat.    Eyes: Negative for discharge and redness.   Respiratory: Negative for cough, shortness of breath and wheezing.    Cardiovascular: Negative for chest pain and palpitations.   Gastrointestinal: Negative for abdominal pain, diarrhea, nausea and vomiting.   Genitourinary: Negative for dysuria and hematuria.   Musculoskeletal: Negative for back pain and neck pain.   Skin: Negative for rash and wound.   Neurological: Negative for weakness and headaches.   Psychiatric/Behavioral: Negative for agitation. The patient is not nervous/anxious.      Objective:     Vital Signs (Most Recent):  Temp: 99.1 °F (37.3 °C) (01/20/17 1509)  Pulse: (!) 116 (01/20/17 1509)  Resp: 18 (01/20/17 1509)  BP: 115/71 (01/20/17 1509)  SpO2: 96 % (01/20/17 1509) Vital Signs (24h Range):  Temp:  [98.1 °F (36.7 °C)-99.8 °F (37.7 °C)] 99.1 °F (37.3 °C)  Pulse:  [105-124] 116  Resp:  [16-18] 18  SpO2:  [96 %-100 %] 96 %  BP: (100-124)/(68-83) 115/71     Weight: 43.1 kg (95 lb 0.3 oz)  Body mass index is 16.36 kg/(m^2).    Estimated Creatinine Clearance: 101.8 mL/min (based on Cr of 0.6).    Physical Exam   Constitutional: He is cooperative. He is easily aroused.  Non-toxic appearance. He appears ill. No distress.   HENT:   Head: Normocephalic and atraumatic.   Eyes: EOM are normal. Pupils are equal, round, and reactive to light.   Neck: Normal range of motion. Neck supple.   Cardiovascular: Normal rate and regular rhythm.    No murmur heard.  Pulmonary/Chest: Effort normal. No respiratory distress. He has wheezes (trace end expiratory, diffuse in all lung fields). He has no rales.   Abdominal: Soft. He exhibits no distension. There is no tenderness.   Musculoskeletal: He exhibits no edema.   Neurological: He is alert and easily aroused.   Skin: Skin is warm and dry. No rash noted.     Significant Labs:   CBC:     Recent Labs  Lab 01/19/17  0538 01/20/17  0341   WBC 38.37* 34.75*   HGB 7.4* 7.2*    HCT 22.4* 21.6*   PLT 76* 72*     CMP:     Recent Labs  Lab 01/19/17  0538 01/20/17  0341   * 131*   K 3.8 3.7    99   CO2 27 25    142*   BUN 8 7   CREATININE 0.6 0.6   CALCIUM 8.4* 8.3*   PROT 7.5 7.5   ALBUMIN 1.7* 1.7*   BILITOT 0.2 0.1   ALKPHOS 211* 245*   AST 84* 74*   ALT 74* 89*   ANIONGAP 5* 7*   EGFRNONAA >60.0 >60.0       Significant Imaging: I have reviewed all pertinent imaging results/findings within the past 24 hours.     01/17 CxR  No acute cardiopulmonary abnormality     Microbiology  BCx 01/16 GPR 1/2  BCx 01/17 NGTD  UCx 01/17 NGTD  G/C Testing 01/18 negative  Crypto Ag negative  BCx 01/18 NGTD        Mae Mike MD  Infectious Disease  [facility]

## 2017-01-21 NOTE — PLAN OF CARE
Problem: Patient Care Overview  Goal: Plan of Care Review  Outcome: Ongoing (interventions implemented as appropriate)  Plan of care reviewed with patient.  Fall precautions maintained, side rails up x2, call light in reach, bed in low position, nonskid socks on.  Iv fluids infusing and cytarabine infusing without difficulty.  Requesting pain med during the shift , complaining of pain to arms and legs.  Voiding without difficulty.  Tolerating a regular diet without difficulty.

## 2017-01-21 NOTE — PROGRESS NOTES
Progress Note  Hematology / Bone Marrow Transplant                                                              Team: Networked reference to record PCT     Patient Name: Marck Howard  YOB: 1980    Admit Date: 1/17/2017                     LOS: 4    SUBJECTIVE:     Reason for Admission:  Acute leukemia  See H&P for detailed initial presentation history and ROS.      Interval history:   Patient seen and examined this AM. Patient denies chest pains, palpitations, SOB, n/v, abdo pain, dysuria. Reports constipation. No other issues.    Review of System:  Constitutional: Positive fever or chills, negative for anorexia, malaise, night sweats and weight loss  Eyes: no visual changes, negative for irritation and redness  ENT: no nasal congestion or sore throat, negative for epistaxis, sore mouth and sore throat  Respiratory: no cough or shortness of breath, negative for dyspnea on exertion and wheezing  Cardiovascular: no chest pain or palpitations, negative for dyspnea, fatigue and palpitations  Gastrointestinal: no nausea or vomiting, no abdominal pain or change in bowel habits, negative for constipation, diarrhea and melena  Hematologic/Lymphatic: no easy bruising or lymphadenopathy, negative for bleeding  Musculoskeletal: no arthralgias or myalgias  Neurological: no seizures or tremors, negative for headaches and weakness  Behavioral/Psych: negative for anxiety and depression    OBJECTIVE:     Vital Signs Range (Last 24H):  Temp:  [97.4 °F (36.3 °C)-99.1 °F (37.3 °C)]   Pulse:  []   Resp:  [16-18]   BP: (100-135)/(66-84)   SpO2:  [96 %-99 %] Body mass index is 16.31 kg/(m^2).    I & O (Last 24H):    Intake/Output Summary (Last 24 hours) at 01/21/17 1006  Last data filed at 01/21/17 0854   Gross per 24 hour   Intake           2028.7 ml   Output             2175 ml   Net           -146.3 ml       Physical Exam:  General: well developed, cachectic, no distress  HENT: Head:normocephalic,  atraumatic. Ears:right ear normal, left ear normal. Nose: Nares normal. Septum midline. Mucosa normal. No drainage or sinus tenderness., no discharge. Throat: lips, mucosa, and tongue normal; teeth and gums normal and no throat erythema.  Eyes: conjunctivae/corneas clear. PERRL.   Neck: supple, symmetrical, trachea midline  Lungs:  clear to auscultation bilaterally and normal respiratory effort  Cardiovascular: Heart: regular rate and rhythm, S1, S2 normal, no murmur, click, rub or gallop. Chest Wall: no tenderness. Extremities: no cyanosis or edema, or clubbing.   Abdomen: soft, non-tender non-distented; bowel sounds normal.   Skin: Skin color, texture, turgor normal. No rashes or lesions  Musculoskeletal:no clubbing, cyanosis  Neurologic: Mental status: Alert, oriented, thought content appropriate  Psych/Behavioral:  Normal.    Diagnostic Results:  Lab Results   Component Value Date    WBC 31.14 (H) 01/21/2017    HGB 7.0 (L) 01/21/2017    HCT 21.2 (L) 01/21/2017    MCV 96 01/21/2017    PLT 74 (L) 01/21/2017       Recent Labs  Lab 01/21/17  0407   MG 1.8     Lab Results   Component Value Date    INR 1.2 01/17/2017    INR 1.8 (H) 01/16/2017     No results found for: HGBA1C  Microbiology Results (last 7 days)     Procedure Component Value Units Date/Time    Blood culture [[id number]] Collected:  01/17/17 0230    Order Status:  Completed Specimen:  Blood Updated:  01/21/17 0612     Blood Culture, Routine No Growth to date     Blood Culture, Routine No Growth to date     Blood Culture, Routine No Growth to date     Blood Culture, Routine No Growth to date     Blood Culture, Routine No Growth to date    Narrative:       Blood cultures from 2 different sites. 4 bottles total.  Please draw before starting antibiotics.    Blood culture [[id number]] Collected:  01/17/17 0230    Order Status:  Completed Specimen:  Blood Updated:  01/21/17 0612     Blood Culture, Routine No Growth to date     Blood Culture, Routine No Growth to  date     Blood Culture, Routine No Growth to date     Blood Culture, Routine No Growth to date     Blood Culture, Routine No Growth to date    Narrative:       Blood cultures x 2 different sites. 4 bottles total. Please  draw cultures before administering antibiotics.    Blood culture [[id number]] Collected:  01/18/17 2023    Order Status:  Completed Specimen:  Blood Updated:  01/20/17 2212     Blood Culture, Routine No Growth to date     Blood Culture, Routine No Growth to date     Blood Culture, Routine No Growth to date    Narrative:       Blood cultures from 2 different sites. 4 bottles total.  Please draw before starting antibiotics.    Blood culture [[id number]] Collected:  01/18/17 2023    Order Status:  Completed Specimen:  Blood Updated:  01/20/17 2212     Blood Culture, Routine No Growth to date     Blood Culture, Routine No Growth to date     Blood Culture, Routine No Growth to date    Narrative:       Blood cultures x 2 different sites. 4 bottles total. Please  draw cultures before administering antibiotics.    AFB Culture & Smear [[id number]] Collected:  01/19/17 0538    Order Status:  Completed Specimen:  Blood from Blood Updated:  01/20/17 0927     AFB Culture & Smear Culture in progress    Urine culture [[id number]] Collected:  01/18/17 2115    Order Status:  Completed Specimen:  Urine from Urine, Clean Catch Updated:  01/20/17 0237     Urine Culture, Routine No growth    Narrative:       Before antibiotics    C. trachomatis/N. gonorrhoeae by AMP DNA [[id number]] Collected:  01/18/17 0957    Order Status:  Completed Updated:  01/19/17 1352     Chlamydia, Amplified DNA Negative     N gonorrhoeae, amplified DNA Negative    Cryptococcal antigen [[id number]] Collected:  01/19/17 0538    Order Status:  Completed Specimen:  Blood from Blood Updated:  01/19/17 1226     Cryptococcal Ag, Blood Negative    Gram stain [[id number]] Collected:  01/18/17 2115    Order Status:  Completed Specimen:  Urine from Urine, Clean  Catch Updated:  01/19/17 0233     Gram Stain Result No WBC's      No organisms seen    Urine culture [[id number]] Collected:  01/18/17 2115    Order Status:  Sent Specimen:  Urine from Urine, Clean Catch Updated:  01/18/17 2116    Urine culture [[id number]] Collected:  01/17/17 1025    Order Status:  Completed Specimen:  Urine from Urine, Clean Catch Updated:  01/18/17 1350     Urine Culture, Routine No growth    Narrative:       Before antibiotics    C. trachomatis/N. gonorrhoeae by AMP DNA Urine [[id number]]     Order Status:  Completed Specimen:  Genital     C. trachomatis/N. gonorrhoeae by AMP DNA Urine [[id number]]     Order Status:  Canceled Specimen:  Genital     Gram stain [[id number]] Collected:  01/17/17 1025    Order Status:  Completed Specimen:  Urine from Urine, Clean Catch Updated:  01/17/17 1221     Gram Stain Result No WBC's      No organisms seen    Culture, Respiratory [[id number]]     Order Status:  No result Specimen:  Respiratory           Imaging Results:    CT C/A/P 1/19/17:  1.  Focus of groundglass attenuation with bronchiectasis/developing air bronchograms within the right middle lobe, nonspecific, however could reflect developing infectious process or possibly sequela of aspiration, clinical correlation for pneumonia is advised.  2.  Anterior mediastinum mass, suggesting thymic hyperplasia, clinical correlation advised.  3.  Constipation.  4.  Several additional findings above.    CXR 1/17/17: Heart size normal.  Mild accentuation of interstitial markings.  No significant air space consolidation or pleural effusion    2D Echo 1/17/17:  CONCLUSIONS     1 - Low normal left ventricular systolic function (EF 50-55%).     2 - Normal right ventricular systolic function .     3 - Normal left ventricular diastolic function.     FINAL PATHOLOGIC DIAGNOSIS (1/17/17):  BONE MARROW, RIGHT ILIAC CREST, ASPIRATE, CLOT, AND CORE BIOPSY:  --Markedly hypercellular marrow, essentially 100%, nearly completely  replaced by acute myeloid leukemia, see  comment  --Rare background trilineage elements present  --No significant appreciable stainable iron  COMMENT: Concomitantly submitted flow cytometric analysis detects an increased population of blasts consistent  with acute myeloid leukemia, favor non-M3 subtype, although A CD34 positive/HLA-DR negative/equivocal  phenotype, and M3 APL subtype cannot be completely excluded. This increased population of myeloid blasts  shows expression of CD34, CD13, and CD33 (partial). This population also expresses CD 56 and CD7. It is  negative for  and other B and T lymphoid R crest tested including CD19 and cytoplasmic CD3, as well as  TdT. HLA-DR is equivocal.  The overall morphology of these blasts do not suggest acute promyelocytic leukemia. 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       Active Problems, Status & Treatment Plan Addressed Today:    # AML  - leukocytosis likely 2/2 AML and infection  - currently no evidence of DIC or tumor lysis  - initially started on vanc and cefepime; continued on cefepime  - noted to have new diagnosis of HIV (see below)  - hepatitis and flu negative  - s/p bone marrow biopsy (1/17/17) showing AML  - flow cytometry consistent with AML  - continue on empiric allopurinol  - continue ppx acyclovir  - 7+3 day 2    # Gram Positive Freddie Bacteremia  - BCx (1/16/17) shows Gram positive rods  - BCx (1/17/17) with ngtd  - likely contaminant   - ID following appreciate recs  - continue cefepime  - follow up cultures    HIV positive  - new diagnosis on this admit  - toxoplasma gondii  WNL  - CD4: 118  - ID following, appreciate recs     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   # Thrombocytopenia due to leukemia   - Hgb=7.0 g/dL, PLT=74  - no overt signs of bleeding  - likely 2/2 underlying hematologic malignancy  - continue to monitor and transfuse as indicated  - will transfuse for Hgb <7.0, PLT <10    # Constipation  - likely 2/2 opiate use  - started on miralax and senna-docusate    # Tobacco Abuse  - 26 pack year history  - advise smoking cessation   - nicotine patch prn    # Alcohol Abuse  - drinks at least 2 beers per day  - no h/o DTs  - monitor for signs/symptoms withdrawal    # Hyponatremia, improved  - likely 2/2 dehydration  - continue IVFs  - continue to monitor    # Hypoalbuminemia  - check 24 hour urine protein  -Dietary consulted  -encourage increased nutritional intake     DVT ppx: encourage ambulation  Diet: regular  Code Status: full    DISPO: pending chemotherapy for AML    Martine Gómez MD   Pager [phone]

## 2017-01-21 NOTE — SUBJECTIVE & OBJECTIVE
Interval History: States he feels much better since starting chemotherapy. Afebrile. No new culture data.    HPI:      Review of Systems   Constitutional: Negative for chills and fever.   HENT: Negative for congestion, nosebleeds, rhinorrhea and sore throat.    Eyes: Negative for discharge and redness.   Respiratory: Negative for cough, shortness of breath and wheezing.    Cardiovascular: Negative for chest pain and palpitations.   Gastrointestinal: Negative for abdominal pain, diarrhea, nausea and vomiting.   Genitourinary: Negative for dysuria and hematuria.   Musculoskeletal: Negative for back pain and neck pain.   Skin: Negative for rash and wound.   Neurological: Negative for weakness and headaches.   Psychiatric/Behavioral: Negative for agitation. The patient is not nervous/anxious.      Objective:     Vital Signs (Most Recent):  Temp: 97.7 °F (36.5 °C) (01/21/17 0419)  Pulse: 92 (01/21/17 0419)  Resp: 16 (01/21/17 0419)  BP: 135/84 (01/21/17 0419)  SpO2: 99 % (01/21/17 0419) Vital Signs (24h Range):  Temp:  [97.4 °F (36.3 °C)-99.1 °F (37.3 °C)] 97.7 °F (36.5 °C)  Pulse:  [] 92  Resp:  [16-18] 16  SpO2:  [96 %-99 %] 99 %  BP: (102-135)/(66-84) 135/84     Weight: 42.9 kg (94 lb 11 oz)  Body mass index is 16.31 kg/(m^2).    Estimated Creatinine Clearance: 101.5 mL/min (based on Cr of 0.6).    Physical Exam   Constitutional: He appears cachectic. He is cooperative. He is easily aroused.  Non-toxic appearance. He does not appear ill. No distress.   HENT:   Head: Normocephalic and atraumatic.   Eyes: EOM are normal. Pupils are equal, round, and reactive to light.   Neck: Normal range of motion. Neck supple.   Cardiovascular: Normal rate and regular rhythm.    No murmur heard.  Pulmonary/Chest: Effort normal. No respiratory distress. He has no wheezes (trace end expiratory, diffuse in all lung fields). He has no rales.   Abdominal: Soft. He exhibits no distension. There is no tenderness.   Musculoskeletal: He  exhibits no edema.   Neurological: He is alert and easily aroused.   Skin: Skin is warm and dry. No rash noted.     Significant Labs:   CBC:     Recent Labs  Lab 01/20/17  0341 01/21/17  0407 01/21/17  1215   WBC 34.75* 31.14* 29.94*   HGB 7.2* 7.0* 6.8*   HCT 21.6* 21.2* 20.6*   PLT 72* 74* 71*     CMP:     Recent Labs  Lab 01/20/17  0341 01/21/17  1215   * 137   K 3.7 4.1   CL 99 107   CO2 25 24   * 125*   BUN 7 14   CREATININE 0.6 0.6   CALCIUM 8.3* 8.4*   PROT 7.5 7.4   ALBUMIN 1.7* 1.7*   BILITOT 0.1 0.1   ALKPHOS 245* 214*   AST 74* 41*   ALT 89* 75*   ANIONGAP 7* 6*   EGFRNONAA >60.0 >60.0       Significant Imaging: I have reviewed all pertinent imaging results/findings within the past 24 hours.     01/17 CxR  No acute cardiopulmonary abnormality    CT CAP: scant GGO in RML, otherwise unremarkable     Microbiology  BCx 01/16 GPR 1/2  BCx 01/17 NGTD  UCx 01/17 NGTD  G/C Testing 01/18 negative  Crypto Ag negative  BCx 01/18 NGTD

## 2017-01-21 NOTE — NURSING
Patient received cytarabine 135mg in sodium chloride 0.9%in 1000ml to infuse over 24 hours at 41.7ccc/hr to rt arm picc.  Good blood return.  Chemo checked by two certified nurses.  Premeds given prior to idarubicin.  No complaints voiced. No signs/symptoms of nausea and vomiting.

## 2017-01-22 LAB
ALBUMIN SERPL BCP-MCNC: 1.7 G/DL
ALBUMIN SERPL BCP-MCNC: 1.7 G/DL
ALP SERPL-CCNC: 194 U/L
ALP SERPL-CCNC: 198 U/L
ALT SERPL W/O P-5'-P-CCNC: 72 U/L
ALT SERPL W/O P-5'-P-CCNC: 77 U/L
ANION GAP SERPL CALC-SCNC: 6 MMOL/L
ANION GAP SERPL CALC-SCNC: 7 MMOL/L
ANISOCYTOSIS BLD QL SMEAR: SLIGHT
ANISOCYTOSIS BLD QL SMEAR: SLIGHT
AST SERPL-CCNC: 33 U/L
AST SERPL-CCNC: 42 U/L
BACTERIA BLD CULT: NORMAL
BACTERIA BLD CULT: NORMAL
BASOPHILS # BLD AUTO: ABNORMAL K/UL
BASOPHILS NFR BLD: 0 %
BASOPHILS NFR BLD: 0 %
BILIRUB SERPL-MCNC: 0.1 MG/DL
BILIRUB SERPL-MCNC: 0.2 MG/DL
BLASTS NFR BLD MANUAL: 90 %
BLASTS NFR BLD MANUAL: 91 %
BUN SERPL-MCNC: 16 MG/DL
BUN SERPL-MCNC: 18 MG/DL
BURR CELLS BLD QL SMEAR: ABNORMAL
CALCIUM SERPL-MCNC: 8.4 MG/DL
CALCIUM SERPL-MCNC: 8.6 MG/DL
CHLORIDE SERPL-SCNC: 106 MMOL/L
CHLORIDE SERPL-SCNC: 109 MMOL/L
CO2 SERPL-SCNC: 22 MMOL/L
CO2 SERPL-SCNC: 26 MMOL/L
CREAT SERPL-MCNC: 0.6 MG/DL
CREAT SERPL-MCNC: 0.7 MG/DL
DACRYOCYTES BLD QL SMEAR: ABNORMAL
DIFFERENTIAL METHOD: ABNORMAL
DIFFERENTIAL METHOD: ABNORMAL
EOSINOPHIL # BLD AUTO: ABNORMAL K/UL
EOSINOPHIL NFR BLD: 0 %
EOSINOPHIL NFR BLD: 0 %
ERYTHROCYTE [DISTWIDTH] IN BLOOD BY AUTOMATED COUNT: 16.4 %
ERYTHROCYTE [DISTWIDTH] IN BLOOD BY AUTOMATED COUNT: 16.5 %
EST. GFR  (AFRICAN AMERICAN): >60 ML/MIN/1.73 M^2
EST. GFR  (AFRICAN AMERICAN): >60 ML/MIN/1.73 M^2
EST. GFR  (NON AFRICAN AMERICAN): >60 ML/MIN/1.73 M^2
EST. GFR  (NON AFRICAN AMERICAN): >60 ML/MIN/1.73 M^2
GLUCOSE SERPL-MCNC: 102 MG/DL
GLUCOSE SERPL-MCNC: 137 MG/DL
HCT VFR BLD AUTO: 21.8 %
HCT VFR BLD AUTO: 22.1 %
HGB BLD-MCNC: 7.2 G/DL
HGB BLD-MCNC: 7.3 G/DL
LDH SERPL L TO P-CCNC: 606 U/L
LYMPHOCYTES # BLD AUTO: ABNORMAL K/UL
LYMPHOCYTES NFR BLD: 6 %
LYMPHOCYTES NFR BLD: 9 %
MAGNESIUM SERPL-MCNC: 1.8 MG/DL
MAGNESIUM SERPL-MCNC: 1.9 MG/DL
MCH RBC QN AUTO: 32.4 PG
MCH RBC QN AUTO: 32.4 PG
MCHC RBC AUTO-ENTMCNC: 33 %
MCHC RBC AUTO-ENTMCNC: 33 %
MCV RBC AUTO: 98 FL
MCV RBC AUTO: 98 FL
METAMYELOCYTES NFR BLD MANUAL: 1 %
MONOCYTES # BLD AUTO: ABNORMAL K/UL
MONOCYTES NFR BLD: 0 %
MONOCYTES NFR BLD: 0 %
MYELOCYTES NFR BLD MANUAL: 1 %
NEUTROPHILS NFR BLD: 0 %
NEUTROPHILS NFR BLD: 2 %
NRBC BLD-RTO: ABNORMAL /100 WBC
PHOSPHATE SERPL-MCNC: 4.3 MG/DL
PHOSPHATE SERPL-MCNC: 4.4 MG/DL
PLATELET # BLD AUTO: 78 K/UL
PLATELET # BLD AUTO: 82 K/UL
PLATELET BLD QL SMEAR: ABNORMAL
PLATELET BLD QL SMEAR: ABNORMAL
PMV BLD AUTO: 10.1 FL
PMV BLD AUTO: 10.3 FL
POIKILOCYTOSIS BLD QL SMEAR: SLIGHT
POIKILOCYTOSIS BLD QL SMEAR: SLIGHT
POLYCHROMASIA BLD QL SMEAR: ABNORMAL
POLYCHROMASIA BLD QL SMEAR: ABNORMAL
POTASSIUM SERPL-SCNC: 4.2 MMOL/L
POTASSIUM SERPL-SCNC: 4.3 MMOL/L
PROT 24H UR-MRATE: 320 MG/SPEC
PROT SERPL-MCNC: 7.1 G/DL
PROT SERPL-MCNC: 7.2 G/DL
PROT UR-MCNC: 8 MG/DL
RBC # BLD AUTO: 2.22 M/UL
RBC # BLD AUTO: 2.25 M/UL
SODIUM SERPL-SCNC: 138 MMOL/L
SODIUM SERPL-SCNC: 138 MMOL/L
URATE SERPL-MCNC: 2.1 MG/DL
URINE COLLECTION DURATION: 24 HR
URINE VOLUME: 4000 ML
WBC # BLD AUTO: 23.87 K/UL
WBC # BLD AUTO: 29.09 K/UL

## 2017-01-22 PROCEDURE — 83735 ASSAY OF MAGNESIUM: CPT | Mod: 91

## 2017-01-22 PROCEDURE — 20600001 HC STEP DOWN PRIVATE ROOM

## 2017-01-22 PROCEDURE — 84550 ASSAY OF BLOOD/URIC ACID: CPT

## 2017-01-22 PROCEDURE — 80053 COMPREHEN METABOLIC PANEL: CPT

## 2017-01-22 PROCEDURE — 25000003 PHARM REV CODE 250: Performed by: NURSE PRACTITIONER

## 2017-01-22 PROCEDURE — 25000003 PHARM REV CODE 250: Performed by: INTERNAL MEDICINE

## 2017-01-22 PROCEDURE — 63600175 PHARM REV CODE 636 W HCPCS: Performed by: INTERNAL MEDICINE

## 2017-01-22 PROCEDURE — 85007 BL SMEAR W/DIFF WBC COUNT: CPT

## 2017-01-22 PROCEDURE — 84156 ASSAY OF PROTEIN URINE: CPT

## 2017-01-22 PROCEDURE — 99233 SBSQ HOSP IP/OBS HIGH 50: CPT | Mod: ,,, | Performed by: INTERNAL MEDICINE

## 2017-01-22 PROCEDURE — 85027 COMPLETE CBC AUTOMATED: CPT | Mod: 91

## 2017-01-22 PROCEDURE — 63600175 PHARM REV CODE 636 W HCPCS: Performed by: STUDENT IN AN ORGANIZED HEALTH CARE EDUCATION/TRAINING PROGRAM

## 2017-01-22 PROCEDURE — 80053 COMPREHEN METABOLIC PANEL: CPT | Mod: 91

## 2017-01-22 PROCEDURE — 25000003 PHARM REV CODE 250: Performed by: STUDENT IN AN ORGANIZED HEALTH CARE EDUCATION/TRAINING PROGRAM

## 2017-01-22 PROCEDURE — 83735 ASSAY OF MAGNESIUM: CPT

## 2017-01-22 PROCEDURE — 99232 SBSQ HOSP IP/OBS MODERATE 35: CPT | Mod: ,,, | Performed by: INTERNAL MEDICINE

## 2017-01-22 PROCEDURE — 84100 ASSAY OF PHOSPHORUS: CPT | Mod: 91

## 2017-01-22 PROCEDURE — 83615 LACTATE (LD) (LDH) ENZYME: CPT

## 2017-01-22 PROCEDURE — 84100 ASSAY OF PHOSPHORUS: CPT

## 2017-01-22 PROCEDURE — 27100132 HC NEBULIZER, FILTERED TREATMENT

## 2017-01-22 RX ORDER — VORICONAZOLE 200 MG/1
200 TABLET, FILM COATED ORAL 2 TIMES DAILY
Status: DISCONTINUED | OUTPATIENT
Start: 2017-01-22 | End: 2017-02-20

## 2017-01-22 RX ORDER — CIPROFLOXACIN 500 MG/1
500 TABLET ORAL EVERY 12 HOURS
Status: DISCONTINUED | OUTPATIENT
Start: 2017-01-22 | End: 2017-01-22

## 2017-01-22 RX ORDER — SULFAMETHOXAZOLE AND TRIMETHOPRIM 800; 160 MG/1; MG/1
1 TABLET ORAL
Status: DISCONTINUED | OUTPATIENT
Start: 2017-01-23 | End: 2017-01-22

## 2017-01-22 RX ORDER — PENTAMIDINE ISETHIONATE 300 MG/300MG
300 INHALANT RESPIRATORY (INHALATION) ONCE
Status: COMPLETED | OUTPATIENT
Start: 2017-01-22 | End: 2017-01-22

## 2017-01-22 RX ADMIN — CEFEPIME HYDROCHLORIDE 2 G: 2 INJECTION, SOLUTION INTRAVENOUS at 11:01

## 2017-01-22 RX ADMIN — CEFEPIME HYDROCHLORIDE 2 G: 2 INJECTION, SOLUTION INTRAVENOUS at 03:01

## 2017-01-22 RX ADMIN — Medication 10 ML: at 11:01

## 2017-01-22 RX ADMIN — POLYETHYLENE GLYCOL 3350 17 G: 17 POWDER, FOR SOLUTION ORAL at 08:01

## 2017-01-22 RX ADMIN — ACYCLOVIR 400 MG: 200 CAPSULE ORAL at 08:01

## 2017-01-22 RX ADMIN — ONDANSETRON 16 MG: 8 TABLET, ORALLY DISINTEGRATING ORAL at 05:01

## 2017-01-22 RX ADMIN — ALLOPURINOL 300 MG: 100 TABLET ORAL at 08:01

## 2017-01-22 RX ADMIN — CEFEPIME HYDROCHLORIDE 2 G: 2 INJECTION, SOLUTION INTRAVENOUS at 08:01

## 2017-01-22 RX ADMIN — DEXAMETHASONE 12 MG: 4 TABLET ORAL at 05:01

## 2017-01-22 RX ADMIN — PENTAMIDINE ISETHIONATE 300 MG: 300 INHALANT RESPIRATORY (INHALATION) at 06:01

## 2017-01-22 RX ADMIN — CYTARABINE 135 MG: 100 INJECTION, SOLUTION INTRATHECAL; INTRAVENOUS; SUBCUTANEOUS at 06:01

## 2017-01-22 RX ADMIN — VORICONAZOLE 200 MG: 200 TABLET, FILM COATED ORAL at 09:01

## 2017-01-22 RX ADMIN — STANDARDIZED SENNA CONCENTRATE AND DOCUSATE SODIUM 1 TABLET: 8.6; 5 TABLET, FILM COATED ORAL at 08:01

## 2017-01-22 RX ADMIN — VORICONAZOLE 200 MG: 200 TABLET, FILM COATED ORAL at 08:01

## 2017-01-22 RX ADMIN — ACYCLOVIR 400 MG: 200 CAPSULE ORAL at 09:01

## 2017-01-22 RX ADMIN — IDARUBICIN HYDROCHLORIDE 16 MG: 1 INJECTION, SOLUTION INTRAVENOUS at 05:01

## 2017-01-22 RX ADMIN — OXYCODONE HYDROCHLORIDE 5 MG: 5 TABLET ORAL at 04:01

## 2017-01-22 NOTE — ASSESSMENT & PLAN NOTE
35yo previously healthy man who was admitted as a transfer from an OSH on 1/17/2017 with 3mos of weight loss, malaise, fevers, and more acute onset myalgias and was found to have new anemia, thrombocytopenia, and leukocytosis with circulating blasts consistent with AML and a new diagnosis of HIV (OR8=395/7%, VL 22k, GT pending). He is improved since starting induction chemotherapy since AML was the likely cause of his fevers.      - would transition cefepime to cipro standard neutropenic prophylaxis   - would continue bactrim prophylaxis given CD4 percentage -- would recommend continuing this agent independent of his neutropenic prophylaxis regimens given a long-term risk of PCP due to likely poor immune reconstitution while on chemotherapy  - timing for initiation of voriconazole and acyclovir neutropenic prophylaxis deferred to primary hematology team per protocol  - await ID of GPR (probable contaminant)  - await pending remaining intake labs (genotype, [id number], and quantiferon gold) before initiation of ART  - await remaining pending OI workup: urine histo antigen and AFB blood cx

## 2017-01-22 NOTE — PROGRESS NOTES
[facility]  Infectious Disease  Progress Note    Patient Name: Marck Howard  MRN: [id number]  Admission Date: 1/17/2017  Length of Stay: 5 days  Attending Physician: Govind Jones MD  Primary Care Provider: Primary Doctor No    Isolation Status: No active isolations  Assessment/Plan:      Fever  37yo previously healthy man who was admitted as a transfer from an OSH on 1/17/2017 with 3mos of weight loss, malaise, fevers, and more acute onset myalgias and was found to have new anemia, thrombocytopenia, and leukocytosis with circulating blasts consistent with AML and a new diagnosis of HIV (KG4=993/7%, VL 22k, GT pending). He is improved since starting induction chemotherapy since AML was the likely cause of his fevers.      - would transition cefepime to cipro standard neutropenic prophylaxis   - OK to change bactrim prophylaxis for PCP to pantamidine given myelosuppressive effects while in house -- would recommend continuing prophylaxis independent of his neutropenic prophylaxis regimens given a long-term risk of PCP due to likely poor immune reconstitution while on chemotherapy; would prefer use of bactrim over pentamidine as an outpatient after count recovery  - timing for initiation of voriconazole and acyclovir neutropenic prophylaxis deferred to primary hematology team per protocol  - await ID of GPR (probable contaminant)  - await pending remaining intake labs (genotype, [id number], and quantiferon gold) before initiation of ART  - await remaining pending OI workup: urine histo antigen and AFB blood cx         Anticipated Disposition: per primary team after count recovery from chemo    Thank you for your consult. I will follow-up with patient. Please contact us if you have any additional questions.     Mae Mike MD  Transplant ID Attending  [phone]      Subjective:     Principal Problem:Acute leukemia    No new subjective & objective note has been filed under this  hospital service since the last note was generated.        aMe Mike MD  Infectious Disease  [facility]

## 2017-01-22 NOTE — PLAN OF CARE
Problem: Patient Care Overview  Goal: Plan of Care Review  Outcome: Ongoing (interventions implemented as appropriate)  Patient is progressing and involved with plan of care. Frequent rounds made to assess pain and safety. Pt communicating needs throughout shift. Up with stand by assist. Tolerating diet, voiding without difficulty. 24 hr urine collected, due to end at 0700. No c/o pain. All vitals stable; no acute events this shift. Pt. NS @50ml/hr and cytarabine @41.3 ml/hr infusing. Cefepime given as ordered. Labs Q12 collected and sent. Remaining free from falls or injury throughout shift; bed in lowest position; side rails up X2; call light within reach; pt instructed to call for assistance as needed - verbalized understanding. Q2h rounding on patient. Will continue to monitor.

## 2017-01-22 NOTE — PROGRESS NOTES
Progress Note  Hematology / Bone Marrow Transplant                                                              Team: Networked reference to record PCT     Patient Name: Marck Howard  YOB: 1980    Admit Date: 1/17/2017                     LOS: 5    SUBJECTIVE:     Reason for Admission:  Acute leukemia  See H&P for detailed initial presentation history and ROS.      Interval history:   Patient seen and examined this AM. Patient denies chest pains, palpitations, SOB, n/v, abdo pain, dysuria. Reports constipation. No other issues.    Review of System:  Constitutional: Positive fever or chills, negative for anorexia, malaise, night sweats and weight loss  Eyes: no visual changes, negative for irritation and redness  ENT: no nasal congestion or sore throat, negative for epistaxis, sore mouth and sore throat  Respiratory: no cough or shortness of breath, negative for dyspnea on exertion and wheezing  Cardiovascular: no chest pain or palpitations, negative for dyspnea, fatigue and palpitations  Gastrointestinal: no nausea or vomiting, no abdominal pain or change in bowel habits, negative for constipation, diarrhea and melena  Hematologic/Lymphatic: no easy bruising or lymphadenopathy, negative for bleeding  Musculoskeletal: no arthralgias or myalgias  Neurological: no seizures or tremors, negative for headaches and weakness  Behavioral/Psych: negative for anxiety and depression    OBJECTIVE:     Vital Signs Range (Last 24H):  Temp:  [97.7 °F (36.5 °C)-98.4 °F (36.9 °C)]   Pulse:  [73-96]   Resp:  [18]   BP: (101-114)/(55-74)   SpO2:  [95 %-99 %] Body mass index is 16.31 kg/(m^2).    I & O (Last 24H):    Intake/Output Summary (Last 24 hours) at 01/22/17 1204  Last data filed at 01/22/17 1147   Gross per 24 hour   Intake          1378.63 ml   Output             3420 ml   Net         -2041.37 ml       Physical Exam:  General: well developed, cachectic, no distress  HENT: Head:normocephalic,  atraumatic. Ears:right ear normal, left ear normal. Nose: Nares normal. Septum midline. Mucosa normal. No drainage or sinus tenderness., no discharge. Throat: lips, mucosa, and tongue normal; teeth and gums normal and no throat erythema.  Eyes: conjunctivae/corneas clear. PERRL.   Neck: supple, symmetrical, trachea midline  Lungs:  clear to auscultation bilaterally and normal respiratory effort  Cardiovascular: Heart: regular rate and rhythm, S1, S2 normal, no murmur, click, rub or gallop. Chest Wall: no tenderness. Extremities: no cyanosis or edema, or clubbing.   Abdomen: soft, non-tender non-distented; bowel sounds normal.   Skin: Skin color, texture, turgor normal. No rashes or lesions  Musculoskeletal:no clubbing, cyanosis  Neurologic: Mental status: Alert, oriented, thought content appropriate  Psych/Behavioral:  Normal.    Diagnostic Results:  Lab Results   Component Value Date    WBC 23.87 (H) 01/22/2017    HGB 7.2 (L) 01/22/2017    HCT 21.8 (L) 01/22/2017    MCV 98 01/22/2017    PLT 78 (L) 01/22/2017       Recent Labs  Lab 01/22/17  0750         K 4.2      CO2 22*   BUN 18   CREATININE 0.6   CALCIUM 8.4*   MG 1.9     Lab Results   Component Value Date    INR 1.2 01/17/2017    INR 1.8 (H) 01/16/2017     No results found for: HGBA1C  Microbiology Results (last 7 days)     Procedure Component Value Units Date/Time    Blood culture [[id number]] Collected:  01/17/17 0230    Order Status:  Completed Specimen:  Blood Updated:  01/22/17 0612     Blood Culture, Routine No growth after 5 days.    Narrative:       Blood cultures from 2 different sites. 4 bottles total.  Please draw before starting antibiotics.    Blood culture [[id number]] Collected:  01/17/17 0230    Order Status:  Completed Specimen:  Blood Updated:  01/22/17 0612     Blood Culture, Routine No growth after 5 days.    Narrative:       Blood cultures x 2 different sites. 4 bottles total. Please  draw cultures before administering  antibiotics.    Blood culture [[id number]] Collected:  01/18/17 2023    Order Status:  Completed Specimen:  Blood Updated:  01/21/17 2212     Blood Culture, Routine No Growth to date     Blood Culture, Routine No Growth to date     Blood Culture, Routine No Growth to date     Blood Culture, Routine No Growth to date    Narrative:       Blood cultures x 2 different sites. 4 bottles total. Please  draw cultures before administering antibiotics.    Blood culture [[id number]] Collected:  01/18/17 2023    Order Status:  Completed Specimen:  Blood Updated:  01/21/17 2212     Blood Culture, Routine No Growth to date     Blood Culture, Routine No Growth to date     Blood Culture, Routine No Growth to date     Blood Culture, Routine No Growth to date    Narrative:       Blood cultures from 2 different sites. 4 bottles total.  Please draw before starting antibiotics.    AFB Culture & Smear [[id number]] Collected:  01/19/17 0538    Order Status:  Completed Specimen:  Blood from Blood Updated:  01/20/17 0927     AFB Culture & Smear Culture in progress    Urine culture [[id number]] Collected:  01/18/17 2115    Order Status:  Completed Specimen:  Urine from Urine, Clean Catch Updated:  01/20/17 0237     Urine Culture, Routine No growth    Narrative:       Before antibiotics    C. trachomatis/N. gonorrhoeae by AMP DNA [[id number]] Collected:  01/18/17 0957    Order Status:  Completed Updated:  01/19/17 1352     Chlamydia, Amplified DNA Negative     N gonorrhoeae, amplified DNA Negative    Cryptococcal antigen [[id number]] Collected:  01/19/17 0538    Order Status:  Completed Specimen:  Blood from Blood Updated:  01/19/17 1226     Cryptococcal Ag, Blood Negative    Gram stain [[id number]] Collected:  01/18/17 2115    Order Status:  Completed Specimen:  Urine from Urine, Clean Catch Updated:  01/19/17 0233     Gram Stain Result No WBC's      No organisms seen    Urine culture [[id number]] Collected:  01/18/17 2115    Order Status:  Sent  Specimen:  Urine from Urine, Clean Catch Updated:  01/18/17 2116    Urine culture [[id number]] Collected:  01/17/17 1025    Order Status:  Completed Specimen:  Urine from Urine, Clean Catch Updated:  01/18/17 1350     Urine Culture, Routine No growth    Narrative:       Before antibiotics    C. trachomatis/N. gonorrhoeae by AMP DNA Urine [[id number]]     Order Status:  Completed Specimen:  Genital     C. trachomatis/N. gonorrhoeae by AMP DNA Urine [[id number]]     Order Status:  Canceled Specimen:  Genital     Gram stain [[id number]] Collected:  01/17/17 1025    Order Status:  Completed Specimen:  Urine from Urine, Clean Catch Updated:  01/17/17 1221     Gram Stain Result No WBC's      No organisms seen    Culture, Respiratory [[id number]]     Order Status:  No result Specimen:  Respiratory           Imaging Results:    CT C/A/P 1/19/17:  1.  Focus of groundglass attenuation with bronchiectasis/developing air bronchograms within the right middle lobe, nonspecific, however could reflect developing infectious process or possibly sequela of aspiration, clinical correlation for pneumonia is advised.  2.  Anterior mediastinum mass, suggesting thymic hyperplasia, clinical correlation advised.  3.  Constipation.  4.  Several additional findings above.    CXR 1/17/17: Heart size normal.  Mild accentuation of interstitial markings.  No significant air space consolidation or pleural effusion    2D Echo 1/17/17:  CONCLUSIONS     1 - Low normal left ventricular systolic function (EF 50-55%).     2 - Normal right ventricular systolic function .     3 - Normal left ventricular diastolic function.     FINAL PATHOLOGIC DIAGNOSIS (1/17/17):  BONE MARROW, RIGHT ILIAC CREST, ASPIRATE, CLOT, AND CORE BIOPSY:  --Markedly hypercellular marrow, essentially 100%, nearly completely replaced by acute myeloid leukemia, see  comment  --Rare background trilineage elements present  --No significant appreciable stainable iron  COMMENT: Concomitantly  submitted flow cytometric analysis detects an increased population of blasts consistent  with acute myeloid leukemia, favor non-M3 subtype, although A CD34 positive/HLA-DR negative/equivocal  phenotype, and M3 APL subtype cannot be completely excluded. This increased population of myeloid blasts  shows expression of CD34, CD13, and CD33 (partial). This population also expresses CD 56 and CD7. It is  negative for  and other B and T lymphoid R crest tested including CD19 and cytoplasmic CD3, as well as  TdT. HLA-DR is equivocal.  The overall morphology of these blasts do not suggest acute promyelocytic leukemia. 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       Active Problems, Status & Treatment Plan Addressed Today:    # AML  - leukocytosis likely 2/2 AML and infection  - currently no evidence of DIC or tumor lysis  - initially started on vanc and cefepime; continued on cefepime  - noted to have new diagnosis of HIV (see below)  - hepatitis and flu negative  - s/p bone marrow biopsy (1/17/17) showing AML  - flow cytometry consistent with AML  - continue on empiric allopurinol  - continue ppx acyclovir add voriconazole  - 7+3 day 3    # Gram Positive Freddie Bacteremia  - BCx (1/16/17) shows Gram positive rods  - BCx (1/17/17) with ngtd  - likely contaminant   - ID following appreciate recs  - continue cefepime if afebrile tomorrow change to cipro  - add ppx voriconazole and acyclovir    HIV positive  - new diagnosis on this admit  - toxoplasma gondii WNL  - CD4: 118  - ID following, appreciate recs   -ppx bactrim MWF    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    # Thrombocytopenia due to leukemia   - Hgb=7.2 g/dL, PLT=78  - no overt signs of bleeding  - likely 2/2 underlying hematologic malignancy  - continue to monitor and transfuse as indicated  - will transfuse for Hgb <7.0, PLT <10    # Constipation  - likely 2/2 opiate use  - started on miralax and senna-docusate    # Tobacco Abuse  - 26 pack year history  - advise smoking cessation   - nicotine patch prn    # Alcohol Abuse  - drinks at least 2 beers per day  - no h/o DTs  - monitor for signs/symptoms withdrawal    # Hyponatremia, improved  - likely 2/2 dehydration  - continue IVFs  - continue to monitor    # Hypoalbuminemia  - check 24 hour urine protein  -Dietary consulted  -encourage increased nutritional intake     DVT ppx: encourage ambulation  Diet: regular  Code Status: full    DISPO: pending chemotherapy for AML    Martine Gómez MD   Pager [phone]

## 2017-01-22 NOTE — PLAN OF CARE
Problem: Patient Care Overview  Goal: Plan of Care Review  Remained safe. Ambulated in hallway. Gait steady. Tolerated meals well. Continued to state right leg pain, but pain medication not requested by time of this documentation. 24 hour urine collection in progress. Cytarabine infused throughout shift. Idarubicin and cytarabine to be administered later this shift. Denied nausea. Afebrile. Vitals stable. Pentamidine inhalation treatment ordered. Fall precautions maintained and callbell and personal items kept within reach. No apparent distress.

## 2017-01-22 NOTE — SUBJECTIVE & OBJECTIVE
Interval History: Doing well today. Afebrile. No new culture data.    HPI:      Review of Systems   Constitutional: Negative for chills and fever.   HENT: Negative for congestion, nosebleeds, rhinorrhea and sore throat.    Eyes: Negative for discharge and redness.   Respiratory: Negative for cough, shortness of breath and wheezing.    Cardiovascular: Negative for chest pain and palpitations.   Gastrointestinal: Negative for abdominal pain, diarrhea, nausea and vomiting.   Genitourinary: Negative for dysuria and hematuria.   Musculoskeletal: Negative for back pain and neck pain.   Skin: Negative for rash and wound.   Neurological: Negative for weakness and headaches.   Psychiatric/Behavioral: Negative for agitation. The patient is not nervous/anxious.      Objective:     Vital Signs (Most Recent):  Temp: 97.8 °F (36.6 °C) (01/22/17 1135)  Pulse: 84 (01/22/17 1135)  Resp: 18 (01/22/17 1135)  BP: (!) 101/55 (01/22/17 1135)  SpO2: 99 % (01/22/17 1135) Vital Signs (24h Range):  Temp:  [97.7 °F (36.5 °C)-98.4 °F (36.9 °C)] 97.8 °F (36.6 °C)  Pulse:  [73-96] 84  Resp:  [18] 18  SpO2:  [95 %-99 %] 99 %  BP: (101-114)/(55-74) 101/55     Weight: 42.9 kg (94 lb 11 oz)  Body mass index is 16.31 kg/(m^2).    Estimated Creatinine Clearance: 101.5 mL/min (based on Cr of 0.6).    Physical Exam   Constitutional: He appears cachectic. He is cooperative. He is easily aroused.  Non-toxic appearance. He does not appear ill. No distress.   HENT:   Head: Normocephalic and atraumatic.   Eyes: EOM are normal. Pupils are equal, round, and reactive to light.   Neck: Normal range of motion. Neck supple.   Cardiovascular: Normal rate and regular rhythm.    No murmur heard.  Pulmonary/Chest: Effort normal. No respiratory distress. He has no wheezes (trace end expiratory, diffuse in all lung fields). He has no rales.   Abdominal: Soft. He exhibits no distension. There is no tenderness.   Musculoskeletal: He exhibits no edema.   Neurological: He  is alert and easily aroused.   Skin: Skin is warm and dry. No rash noted.     Significant Labs:   CBC:     Recent Labs  Lab 01/21/17  1215 01/22/17  0107 01/22/17  0750   WBC 29.94* 29.09* 23.87*   HGB 6.8* 7.3* 7.2*   HCT 20.6* 22.1* 21.8*   PLT 71* 82* 78*     CMP:     Recent Labs  Lab 01/21/17  1215 01/22/17  0107 01/22/17  0750    138 138   K 4.1 4.3 4.2    106 109   CO2 24 26 22*   * 137* 102   BUN 14 16 18   CREATININE 0.6 0.7 0.6   CALCIUM 8.4* 8.6* 8.4*   PROT 7.4 7.2 7.1   ALBUMIN 1.7* 1.7* 1.7*   BILITOT 0.1 0.1 0.2   ALKPHOS 214* 198* 194*   AST 41* 42* 33   ALT 75* 77* 72*   ANIONGAP 6* 6* 7*   EGFRNONAA >60.0 >60.0 >60.0       Significant Imaging: I have reviewed all pertinent imaging results/findings within the past 24 hours.     01/17 CxR  No acute cardiopulmonary abnormality    CT CAP: scant GGO in RML, otherwise unremarkable     Microbiology  BCx 01/16 GPR 1/2  BCx 01/17 NGTD  UCx 01/17 NGTD  G/C Testing 01/18 negative  Crypto Ag negative  BCx 01/18 NGTD

## 2017-01-22 NOTE — NURSING
Cytarabine 135mg in sodium chloride 0.9ml in 1000ml infusing at 41.7cc/hr without difficulty to rt picc.  Premeds given prior to idarubicin .  Chemo precautions maintained.  Chemo checked by two certified nurses.  Good blood return from the picc.  No complaints voiced.

## 2017-01-23 LAB
ABACAVIR ISLT GENOTYP: NORMAL
ALBUMIN SERPL BCP-MCNC: 1.9 G/DL
ALBUMIN SERPL BCP-MCNC: 1.9 G/DL
ALP SERPL-CCNC: 186 U/L
ALP SERPL-CCNC: 199 U/L
ALT SERPL W/O P-5'-P-CCNC: 60 U/L
ALT SERPL W/O P-5'-P-CCNC: 67 U/L
AML FISH ADDITIONAL INFORMATION (BM): NORMAL
AML FISH DISCLAIMER (BM): NORMAL
AML FISH REASON FOR REFERRAL (BM): NORMAL
AML FISH RELEASED BY (BM): NORMAL
AML FISH RESULT (BM): NORMAL
AML FISH RESULT SUMMARY (BM): NORMAL
AML FISH RESULT TABLE (BM): NORMAL
ANION GAP SERPL CALC-SCNC: 5 MMOL/L
ANION GAP SERPL CALC-SCNC: 6 MMOL/L
ANISOCYTOSIS BLD QL SMEAR: SLIGHT
ANISOCYTOSIS BLD QL SMEAR: SLIGHT
AST SERPL-CCNC: 21 U/L
AST SERPL-CCNC: 27 U/L
ATAZANAVIR+RITONAVIR ISLT GENOTYP: NORMAL
BACTERIA BLD CULT: NORMAL
BACTERIA BLD CULT: NORMAL
BASOPHILS # BLD AUTO: ABNORMAL K/UL
BASOPHILS # BLD AUTO: ABNORMAL K/UL
BASOPHILS NFR BLD: 0 %
BASOPHILS NFR BLD: 0 %
BILIRUB SERPL-MCNC: 0.1 MG/DL
BILIRUB SERPL-MCNC: 0.1 MG/DL
BLASTS NFR BLD MANUAL: 83 %
BLASTS NFR BLD MANUAL: 92 %
BLD PROD TYP BPU: NORMAL
BLOOD UNIT EXPIRATION DATE: NORMAL
BLOOD UNIT TYPE CODE: 5100
BLOOD UNIT TYPE: NORMAL
BUN SERPL-MCNC: 16 MG/DL
BUN SERPL-MCNC: 17 MG/DL
BURR CELLS BLD QL SMEAR: ABNORMAL
CALCIUM SERPL-MCNC: 8.4 MG/DL
CALCIUM SERPL-MCNC: 8.8 MG/DL
CHLORIDE SERPL-SCNC: 106 MMOL/L
CHLORIDE SERPL-SCNC: 106 MMOL/L
CHROM BANDING METHOD: NORMAL
CHROMOSOME ANALYSIS BM ADDITIONAL INFORMATION: NORMAL
CHROMOSOME ANALYSIS BM RELEASED BY: NORMAL
CHROMOSOME ANALYSIS BM RESULT SUMMARY: NORMAL
CLINICAL CYTOGENETICIST REVIEW: NORMAL
CLINICAL CYTOGENETICIST REVIEW: NORMAL
CO2 SERPL-SCNC: 27 MMOL/L
CO2 SERPL-SCNC: 29 MMOL/L
CODING SYSTEM: NORMAL
CREAT SERPL-MCNC: 0.6 MG/DL
CREAT SERPL-MCNC: 0.7 MG/DL
DACRYOCYTES BLD QL SMEAR: ABNORMAL
DARUNAVIR+RITONAVIR ISLT GENOTYP: NORMAL
DIDANOSINE ISLT GENOTYP: NORMAL
DIFFERENTIAL METHOD: ABNORMAL
DIFFERENTIAL METHOD: ABNORMAL
DISPENSE STATUS: NORMAL
EFAVIRENZ ISLT GENOTYP: NORMAL
EMTRICITABINE: NORMAL
EOSINOPHIL # BLD AUTO: ABNORMAL K/UL
EOSINOPHIL # BLD AUTO: ABNORMAL K/UL
EOSINOPHIL NFR BLD: 0 %
EOSINOPHIL NFR BLD: 0 %
ERYTHROCYTE [DISTWIDTH] IN BLOOD BY AUTOMATED COUNT: 16.6 %
ERYTHROCYTE [DISTWIDTH] IN BLOOD BY AUTOMATED COUNT: 18.3 %
EST. GFR  (AFRICAN AMERICAN): >60 ML/MIN/1.73 M^2
EST. GFR  (AFRICAN AMERICAN): >60 ML/MIN/1.73 M^2
EST. GFR  (NON AFRICAN AMERICAN): >60 ML/MIN/1.73 M^2
EST. GFR  (NON AFRICAN AMERICAN): >60 ML/MIN/1.73 M^2
ETRAVIRINE ISLT GENOTYP: NORMAL
FAMLB SPECIMEN: NORMAL
FLT3 RESULT: NORMAL
FOSAMPRENAVIR+RITONAVIR ISLT GENOTYP: NORMAL
GENETICIST REVIEW: NORMAL
GENETICIST REVIEW: NORMAL
GLUCOSE SERPL-MCNC: 129 MG/DL
GLUCOSE SERPL-MCNC: 135 MG/DL
HCT VFR BLD AUTO: 21.1 %
HCT VFR BLD AUTO: 25.6 %
HGB BLD-MCNC: 6.9 G/DL
HGB BLD-MCNC: 8.5 G/DL
HIV NNRTI GENE MUT DET ISLT: NORMAL
HIV PI GENE MUT DET ISLT: NORMAL
HIV RT GENE MUT DET ISLT: NORMAL
HIV RT GENE MUT DET ISLT: NORMAL
HLA-B*57:01 SPEC QL: NEGATIVE
HYPOCHROMIA BLD QL SMEAR: ABNORMAL
INDINAVIR+RITONAVIR ISLT GENOTYP: NORMAL
KARYOTYP MAR: NORMAL
LAMIVUDINE: NORMAL
LDH SERPL L TO P-CCNC: 630 U/L
LOPINAVIR+RITONAVIR ISLT GENOTYP: NORMAL
LYMPHOCYTES # BLD AUTO: ABNORMAL K/UL
LYMPHOCYTES # BLD AUTO: ABNORMAL K/UL
LYMPHOCYTES NFR BLD: 12 %
LYMPHOCYTES NFR BLD: 3 %
MAGNESIUM SERPL-MCNC: 1.9 MG/DL
MAGNESIUM SERPL-MCNC: 2 MG/DL
MCH RBC QN AUTO: 31.5 PG
MCH RBC QN AUTO: 31.6 PG
MCHC RBC AUTO-ENTMCNC: 32.7 %
MCHC RBC AUTO-ENTMCNC: 33.2 %
MCV RBC AUTO: 95 FL
MCV RBC AUTO: 96 FL
MONOCYTES # BLD AUTO: ABNORMAL K/UL
MONOCYTES # BLD AUTO: ABNORMAL K/UL
MONOCYTES NFR BLD: 0 %
MONOCYTES NFR BLD: 0 %
NELFINAVIR ISLT GENOTYP: NORMAL
NEUTROPHILS NFR BLD: 5 %
NEUTROPHILS NFR BLD: 5 %
NEVIRAPINE ISLT GENOTYP: NORMAL
NPM1 FINAL DIAGNOSIS (BONE MARROW): NORMAL
NPM1 SPECIMEN TYPE (BONE MARROW): NORMAL
NRBC BLD-RTO: ABNORMAL /100 WBC
PATH REPORT.FINAL DX SPEC: NORMAL
PHOSPHATE SERPL-MCNC: 3.7 MG/DL
PHOSPHATE SERPL-MCNC: 4.1 MG/DL
PLATELET # BLD AUTO: 74 K/UL
PLATELET # BLD AUTO: 76 K/UL
PLATELET BLD QL SMEAR: ABNORMAL
PLATELET BLD QL SMEAR: ABNORMAL
PMV BLD AUTO: 10.7 FL
PMV BLD AUTO: 10.7 FL
POIKILOCYTOSIS BLD QL SMEAR: SLIGHT
POIKILOCYTOSIS BLD QL SMEAR: SLIGHT
POLYCHROMASIA BLD QL SMEAR: ABNORMAL
POTASSIUM SERPL-SCNC: 4.2 MMOL/L
POTASSIUM SERPL-SCNC: 4.3 MMOL/L
PROT SERPL-MCNC: 7.2 G/DL
PROT SERPL-MCNC: 7.3 G/DL
RBC # BLD AUTO: 2.19 M/UL
RBC # BLD AUTO: 2.69 M/UL
REASON FOR REFERRAL (NARRATIVE): NORMAL
REF LAB TEST METHOD: NORMAL
REF LAB TEST METHOD: NORMAL
RILPIVIRINE ISLT GENOTYP: NORMAL
SAQUINAVIR+RITONAVIR ISLT GENOTYP: NORMAL
SODIUM SERPL-SCNC: 139 MMOL/L
SODIUM SERPL-SCNC: 140 MMOL/L
SPECIMEN SOURCE: NORMAL
SPECIMEN SOURCE: NORMAL
SPECIMEN TYPE: NORMAL
SPECIMEN: NORMAL
STAVUDINE ISLT GENOTYP: NORMAL
TENOFOVIR ISLT GENOTYP: NORMAL
TIPRANAVIR+RITONAVIR ISLT GENOTYP: NORMAL
TRANS ERYTHROCYTES VOL PATIENT: NORMAL ML
URATE SERPL-MCNC: 2.5 MG/DL
WBC # BLD AUTO: 10.33 K/UL
WBC # BLD AUTO: 18.03 K/UL
ZIDOVUDINE ISLT GENOTYP: NORMAL

## 2017-01-23 PROCEDURE — 99233 SBSQ HOSP IP/OBS HIGH 50: CPT | Mod: ,,, | Performed by: INTERNAL MEDICINE

## 2017-01-23 PROCEDURE — 25000003 PHARM REV CODE 250: Performed by: NURSE PRACTITIONER

## 2017-01-23 PROCEDURE — 36430 TRANSFUSION BLD/BLD COMPNT: CPT

## 2017-01-23 PROCEDURE — 85007 BL SMEAR W/DIFF WBC COUNT: CPT

## 2017-01-23 PROCEDURE — 83735 ASSAY OF MAGNESIUM: CPT

## 2017-01-23 PROCEDURE — 63600175 PHARM REV CODE 636 W HCPCS: Performed by: STUDENT IN AN ORGANIZED HEALTH CARE EDUCATION/TRAINING PROGRAM

## 2017-01-23 PROCEDURE — 63600175 PHARM REV CODE 636 W HCPCS: Performed by: INTERNAL MEDICINE

## 2017-01-23 PROCEDURE — 25000003 PHARM REV CODE 250: Performed by: INTERNAL MEDICINE

## 2017-01-23 PROCEDURE — 85027 COMPLETE CBC AUTOMATED: CPT

## 2017-01-23 PROCEDURE — P9021 RED BLOOD CELLS UNIT: HCPCS

## 2017-01-23 PROCEDURE — 20600001 HC STEP DOWN PRIVATE ROOM

## 2017-01-23 PROCEDURE — 99232 SBSQ HOSP IP/OBS MODERATE 35: CPT | Mod: ,,, | Performed by: INTERNAL MEDICINE

## 2017-01-23 PROCEDURE — 84550 ASSAY OF BLOOD/URIC ACID: CPT

## 2017-01-23 PROCEDURE — 80053 COMPREHEN METABOLIC PANEL: CPT | Mod: 91

## 2017-01-23 PROCEDURE — 25000003 PHARM REV CODE 250: Performed by: STUDENT IN AN ORGANIZED HEALTH CARE EDUCATION/TRAINING PROGRAM

## 2017-01-23 PROCEDURE — 83615 LACTATE (LD) (LDH) ENZYME: CPT

## 2017-01-23 PROCEDURE — 84100 ASSAY OF PHOSPHORUS: CPT | Mod: 91

## 2017-01-23 RX ORDER — CIPROFLOXACIN 500 MG/1
500 TABLET ORAL EVERY 12 HOURS
Status: DISCONTINUED | OUTPATIENT
Start: 2017-01-23 | End: 2017-01-31

## 2017-01-23 RX ORDER — DIPHENHYDRAMINE HCL 25 MG
25 CAPSULE ORAL
Status: COMPLETED | OUTPATIENT
Start: 2017-01-23 | End: 2017-01-23

## 2017-01-23 RX ORDER — ACETAMINOPHEN 325 MG/1
650 TABLET ORAL
Status: COMPLETED | OUTPATIENT
Start: 2017-01-23 | End: 2017-01-30

## 2017-01-23 RX ORDER — HYDROCODONE BITARTRATE AND ACETAMINOPHEN 500; 5 MG/1; MG/1
TABLET ORAL
Status: DISCONTINUED | OUTPATIENT
Start: 2017-01-23 | End: 2017-01-30

## 2017-01-23 RX ADMIN — DIPHENHYDRAMINE HYDROCHLORIDE 25 MG: 25 CAPSULE ORAL at 10:01

## 2017-01-23 RX ADMIN — VORICONAZOLE 200 MG: 200 TABLET, FILM COATED ORAL at 09:01

## 2017-01-23 RX ADMIN — ACYCLOVIR 400 MG: 200 CAPSULE ORAL at 08:01

## 2017-01-23 RX ADMIN — ACYCLOVIR 400 MG: 200 CAPSULE ORAL at 09:01

## 2017-01-23 RX ADMIN — CIPROFLOXACIN HYDROCHLORIDE 500 MG: 500 TABLET, FILM COATED ORAL at 10:01

## 2017-01-23 RX ADMIN — VORICONAZOLE 200 MG: 200 TABLET, FILM COATED ORAL at 08:01

## 2017-01-23 RX ADMIN — ALLOPURINOL 300 MG: 100 TABLET ORAL at 08:01

## 2017-01-23 RX ADMIN — ACETAMINOPHEN 650 MG: 325 TABLET ORAL at 10:01

## 2017-01-23 RX ADMIN — OXYCODONE HYDROCHLORIDE 5 MG: 5 TABLET ORAL at 07:01

## 2017-01-23 RX ADMIN — CIPROFLOXACIN HYDROCHLORIDE 500 MG: 500 TABLET, FILM COATED ORAL at 09:01

## 2017-01-23 RX ADMIN — CEFEPIME HYDROCHLORIDE 2 G: 2 INJECTION, SOLUTION INTRAVENOUS at 04:01

## 2017-01-23 RX ADMIN — OXYCODONE HYDROCHLORIDE 5 MG: 5 TABLET ORAL at 03:01

## 2017-01-23 RX ADMIN — CYTARABINE 135 MG: 100 INJECTION, SOLUTION INTRATHECAL; INTRAVENOUS; SUBCUTANEOUS at 06:01

## 2017-01-23 RX ADMIN — OXYCODONE HYDROCHLORIDE 5 MG: 5 TABLET ORAL at 11:01

## 2017-01-23 NOTE — PHYSICIAN QUERY
PT Name: Marck Howard  MR #: [id number]    Physician Query Form - HIV Clarification     Reviewer  Ext Cleopatra Hill RN - [email or web address]   This form is a permanent document in the medical record.     Query Date: January 23, 2017    Dear Provider,  By submitting this query, we are merely seeking further clarification of documentation. Please utilize your independent clinical judgment when addressing the question(s) below.     Indicators     Supporting Clinical Findings Location in Medical Record   X HIV documented Patient's noted to be HIV positive on serologic studies.    HIV (human immunodeficiency virus infection) Significant event 01/17    Pn 01/19   X CD4 count CD-4 118    a new diagnosis of HIV (DR4=989/7%, VL 22k, GT pending).  Pn 01/19    Infectious Diseases Pn 01/23    Medications     X Other HIV positive  - new diagnosis on this admit  - toxoplasma gondii WNL    - awaiting HIV genotype and quantiferon gold prior to starting ART thearpy  - ppx bactrim held starting yesterday given immunosuppression, received Pentamidine 1/22 Pn 01/19        Pn 01/23     Please clarify the patients HIV status    [  ] AIDS- Acquired immune deficiency syndrome/acquired immunodeficiency syndrome  [  ] AIDS-related complex (ARC)  [x  ] Human immunodeficiency virus (HIV) disease  [  ] HIV positive asymptomatic HIV infection status  [  ] Other (please specify) ________________________  [  ] Clinically undetermined

## 2017-01-23 NOTE — PROGRESS NOTES
[facility]  Infectious Disease  Progress Note    Patient Name: Marck Howard  MRN: [id number]  Admission Date: 1/17/2017  Length of Stay: 6 days  Attending Physician: Govind Jones MD  Primary Care Provider: Primary Doctor No    Isolation Status: No active isolations  Assessment/Plan:      Fever  37yo previously healthy man who was admitted as a transfer from an OSH on 1/17/2017 with 3mos of weight loss, malaise, and fevers and was found to have a new diagnosis of AML and a new diagnosis of HIV (ND5=152/7%, VL 22k, GT pending). He is improved since starting induction chemotherapy.      - would continue cipro as standard neutropenic bacterial prophylaxis  - timing for initiation of voriconazole and acyclovir neutropenic prophylaxis deferred to primary hematology team per protocol  - patient is s/p pentamidine PCP prophylaxis (chosen over bactrim given myelosuppressive effects while in house) -- would recommend continuing prophylaxis independent of his neutropenic prophylaxis regimens given a long-term risk of PCP due to likely poor immune reconstitution while on chemotherapy; would prefer use of bactrim over pentamidine as an outpatient after count recovery  - await ID of GPR (probable contaminant)  - await pending remaining intake labs (genotype, [id number], and quantiferon gold) before initiation of ART  - await remaining pending OI workup: urine histo antigen and AFB blood cx         Anticipated Disposition: pending improvement    Thank you for your consult. I will follow-up with patient. Please contact us if you have any additional questions.     Mae Mike MD  Transplant ID Attending  [phone]      Subjective:     Principal Problem:Acute leukemia    Interval History: Doing well today. Afebrile. No new culture data.    HPI:      Review of Systems   Constitutional: Negative for chills and fever.   HENT: Negative for congestion, nosebleeds, rhinorrhea and sore throat.    Eyes:  Negative for discharge and redness.   Respiratory: Negative for cough, shortness of breath and wheezing.    Cardiovascular: Negative for chest pain and palpitations.   Gastrointestinal: Negative for abdominal pain, diarrhea, nausea and vomiting.   Genitourinary: Negative for dysuria and hematuria.   Musculoskeletal: Negative for back pain and neck pain.   Skin: Negative for rash and wound.   Neurological: Negative for weakness and headaches.   Psychiatric/Behavioral: Negative for agitation. The patient is not nervous/anxious.      Objective:     Vital Signs (Most Recent):  Temp: 98.2 °F (36.8 °C) (01/23/17 1524)  Pulse: 77 (01/23/17 1524)  Resp: 16 (01/23/17 1524)  BP: 113/70 (01/23/17 1524)  SpO2: 99 % (01/23/17 1524) Vital Signs (24h Range):  Temp:  [97.8 °F (36.6 °C)-98.3 °F (36.8 °C)] 98.2 °F (36.8 °C)  Pulse:  [] 77  Resp:  [16] 16  SpO2:  [97 %-100 %] 99 %  BP: (103-116)/(57-73) 113/70     Weight: 44 kg (97 lb)  Body mass index is 16.7 kg/(m^2).    Estimated Creatinine Clearance: 103.9 mL/min (based on Cr of 0.6).    Physical Exam   Constitutional: He appears cachectic. He is cooperative. He is easily aroused.  Non-toxic appearance. He does not appear ill. No distress.   HENT:   Head: Normocephalic and atraumatic.   Eyes: EOM are normal. Pupils are equal, round, and reactive to light.   Neck: Normal range of motion. Neck supple.   Cardiovascular: Normal rate and regular rhythm.    No murmur heard.  Pulmonary/Chest: Effort normal. No respiratory distress. He has no wheezes (trace end expiratory, diffuse in all lung fields). He has no rales.   Abdominal: Soft. He exhibits no distension. There is no tenderness.   Musculoskeletal: He exhibits no edema.   Neurological: He is alert and easily aroused.   Skin: Skin is warm and dry. No rash noted.     Significant Labs:   CBC:     Recent Labs  Lab 01/22/17  0107 01/22/17  0750 01/23/17  0400 01/23/17  1538   WBC 29.09* 23.87* 18.03*  --    HGB 7.3* 7.2* 6.9* 8.5*    HCT 22.1* 21.8* 21.1* 25.6*   PLT 82* 78* 76* 74*     CMP:     Recent Labs  Lab 01/22/17  0107 01/22/17  0750 01/23/17  0400    138 139   K 4.3 4.2 4.3    109 106   CO2 26 22* 27   * 102 129*   BUN 16 18 17   CREATININE 0.7 0.6 0.6   CALCIUM 8.6* 8.4* 8.8   PROT 7.2 7.1 7.2   ALBUMIN 1.7* 1.7* 1.9*   BILITOT 0.1 0.2 0.1   ALKPHOS 198* 194* 199*   AST 42* 33 27   ALT 77* 72* 67*   ANIONGAP 6* 7* 6*   EGFRNONAA >60.0 >60.0 >60.0       Significant Imaging: I have reviewed all pertinent imaging results/findings within the past 24 hours.     01/17 CxR  No acute cardiopulmonary abnormality    CT CAP: scant GGO in RML, otherwise unremarkable     Microbiology  BCx 01/16 GPR 1/2  BCx 01/17 NGTD  UCx 01/17 NGTD  G/C Testing 01/18 negative  Crypto Ag negative  BCx 01/18 NGTD        Mae Mike MD  Infectious Disease  [facility]

## 2017-01-23 NOTE — PLAN OF CARE
Problem: Patient Care Overview  Goal: Plan of Care Review  Outcome: Ongoing (interventions implemented as appropriate)    01/23/17 0310   Coping/Psychosocial   Plan Of Care Reviewed With patient   side rails up x2; call bell in place; bed in lowest, locked position; skid proof socks on; no evidence of skin breakdown; care plan explained to patient; no additional complaints at this time.   Goal: Individualization & Mutuality  Outcome: Ongoing (interventions implemented as appropriate)    01/17/17 0200 01/18/17 1927   Right Care Assessment   Number of comorbid conditions (as recorded on the chart) --  AIDS: Acquired immune deficiency syndrome I   Individualization   Patient Specific Preferences --  Needs assistance with ordering meals   Patient Specific Goals --  Will able to perform ADLs independently   Mutuality/Individual Preferences   What Anxieties, Fears, Concerns, or Questions Do You Have About Your Care? none --    What Information Would Help Us Give You More Personalized Care? none --

## 2017-01-23 NOTE — PROGRESS NOTES
Idarubicin pushed and cytarabine 24 hour infusion administered this shift. Blood return, chemo consent, and chemo calculations confirmed. Checked by second chemo-certified RNAaron Tolerated well.

## 2017-01-23 NOTE — SUBJECTIVE & OBJECTIVE
Interval History: Doing well today. Afebrile. No new culture data.    HPI:      Review of Systems   Constitutional: Negative for chills and fever.   HENT: Negative for congestion, nosebleeds, rhinorrhea and sore throat.    Eyes: Negative for discharge and redness.   Respiratory: Negative for cough, shortness of breath and wheezing.    Cardiovascular: Negative for chest pain and palpitations.   Gastrointestinal: Negative for abdominal pain, diarrhea, nausea and vomiting.   Genitourinary: Negative for dysuria and hematuria.   Musculoskeletal: Negative for back pain and neck pain.   Skin: Negative for rash and wound.   Neurological: Negative for weakness and headaches.   Psychiatric/Behavioral: Negative for agitation. The patient is not nervous/anxious.      Objective:     Vital Signs (Most Recent):  Temp: 98.2 °F (36.8 °C) (01/23/17 1524)  Pulse: 77 (01/23/17 1524)  Resp: 16 (01/23/17 1524)  BP: 113/70 (01/23/17 1524)  SpO2: 99 % (01/23/17 1524) Vital Signs (24h Range):  Temp:  [97.8 °F (36.6 °C)-98.3 °F (36.8 °C)] 98.2 °F (36.8 °C)  Pulse:  [] 77  Resp:  [16] 16  SpO2:  [97 %-100 %] 99 %  BP: (103-116)/(57-73) 113/70     Weight: 44 kg (97 lb)  Body mass index is 16.7 kg/(m^2).    Estimated Creatinine Clearance: 103.9 mL/min (based on Cr of 0.6).    Physical Exam   Constitutional: He appears cachectic. He is cooperative. He is easily aroused.  Non-toxic appearance. He does not appear ill. No distress.   HENT:   Head: Normocephalic and atraumatic.   Eyes: EOM are normal. Pupils are equal, round, and reactive to light.   Neck: Normal range of motion. Neck supple.   Cardiovascular: Normal rate and regular rhythm.    No murmur heard.  Pulmonary/Chest: Effort normal. No respiratory distress. He has no wheezes (trace end expiratory, diffuse in all lung fields). He has no rales.   Abdominal: Soft. He exhibits no distension. There is no tenderness.   Musculoskeletal: He exhibits no edema.   Neurological: He is alert and  easily aroused.   Skin: Skin is warm and dry. No rash noted.     Significant Labs:   CBC:     Recent Labs  Lab 01/22/17  0107 01/22/17  0750 01/23/17  0400 01/23/17  1538   WBC 29.09* 23.87* 18.03*  --    HGB 7.3* 7.2* 6.9* 8.5*   HCT 22.1* 21.8* 21.1* 25.6*   PLT 82* 78* 76* 74*     CMP:     Recent Labs  Lab 01/22/17  0107 01/22/17  0750 01/23/17  0400    138 139   K 4.3 4.2 4.3    109 106   CO2 26 22* 27   * 102 129*   BUN 16 18 17   CREATININE 0.7 0.6 0.6   CALCIUM 8.6* 8.4* 8.8   PROT 7.2 7.1 7.2   ALBUMIN 1.7* 1.7* 1.9*   BILITOT 0.1 0.2 0.1   ALKPHOS 198* 194* 199*   AST 42* 33 27   ALT 77* 72* 67*   ANIONGAP 6* 7* 6*   EGFRNONAA >60.0 >60.0 >60.0       Significant Imaging: I have reviewed all pertinent imaging results/findings within the past 24 hours.     01/17 CxR  No acute cardiopulmonary abnormality    CT CAP: scant GGO in RML, otherwise unremarkable     Microbiology  BCx 01/16 GPR 1/2  BCx 01/17 NGTD  UCx 01/17 NGTD  G/C Testing 01/18 negative  Crypto Ag negative  BCx 01/18 NGTD

## 2017-01-23 NOTE — PROGRESS NOTES
Progress Note  Hematology / Bone Marrow Transplant                                                              Team: Networked reference to record PCT     Patient Name: Marck Howard  YOB: 1980    Admit Date: 1/17/2017                     LOS: 6    SUBJECTIVE:     Reason for Admission:  Acute leukemia  See H&P for detailed initial presentation history and ROS.      Interval history:   -day 4 of 7+3  -afebrile, VSS  -patient has no complaints this am  -NEON     Review of System:  Constitutional: Positive fever or chills, negative for anorexia, malaise, night sweats and weight loss  Eyes: no visual changes, negative for irritation and redness  ENT: no nasal congestion or sore throat, negative for epistaxis, sore mouth and sore throat  Respiratory: no cough or shortness of breath, negative for dyspnea on exertion and wheezing  Cardiovascular: no chest pain or palpitations, negative for dyspnea, fatigue and palpitations  Gastrointestinal: no nausea or vomiting, no abdominal pain or change in bowel habits, negative for constipation, diarrhea and melena  Hematologic/Lymphatic: no easy bruising or lymphadenopathy, negative for bleeding  Musculoskeletal: no arthralgias or myalgias  Neurological: no seizures or tremors, negative for headaches and weakness  Behavioral/Psych: negative for anxiety and depression    OBJECTIVE:     Vital Signs Range (Last 24H):  Temp:  [97.8 °F (36.6 °C)-98.3 °F (36.8 °C)]   Pulse:  []   Resp:  [16-18]   BP: (101-120)/(55-74)   SpO2:  [96 %-100 %] Body mass index is 16.7 kg/(m^2).    I & O (Last 24H):    Intake/Output Summary (Last 24 hours) at 01/23/17 0918  Last data filed at 01/23/17 0804   Gross per 24 hour   Intake          4080.48 ml   Output             3000 ml   Net          1080.48 ml       Physical Exam:  General: well developed, cachectic, no distress  HENT: Head:normocephalic, atraumatic. Ears:right ear normal, left ear normal. Nose: Nares normal.  Septum midline. Mucosa normal. No drainage or sinus tenderness., no discharge. Throat: lips, mucosa, and tongue normal; teeth and gums normal and no throat erythema.  Eyes: conjunctivae/corneas clear. PERRL.   Neck: supple, symmetrical, trachea midline  Lungs:  clear to auscultation bilaterally and normal respiratory effort  Cardiovascular: Heart: regular rate and rhythm, S1, S2 normal, no murmur, click, rub or gallop. Chest Wall: no tenderness. Extremities: no cyanosis or edema, or clubbing.   Abdomen: soft, non-tender non-distented; bowel sounds normal.   Skin: Skin color, texture, turgor normal. No rashes or lesions  Musculoskeletal:no clubbing, cyanosis  Neurologic: Mental status: Alert, oriented, thought content appropriate  Psych/Behavioral:  Normal.    Diagnostic Results:  Lab Results   Component Value Date    WBC 18.03 (H) 01/23/2017    HGB 6.9 (L) 01/23/2017    HCT 21.1 (L) 01/23/2017    MCV 96 01/23/2017    PLT 76 (L) 01/23/2017       Recent Labs  Lab 01/23/17  0400   *      K 4.3      CO2 27   BUN 17   CREATININE 0.6   CALCIUM 8.8   MG 2.0     Lab Results   Component Value Date    INR 1.2 01/17/2017    INR 1.8 (H) 01/16/2017     No results found for: HGBA1C  Microbiology Results (last 7 days)     Procedure Component Value Units Date/Time    Blood culture [[id number]] Collected:  01/18/17 2023    Order Status:  Completed Specimen:  Blood Updated:  01/22/17 2212     Blood Culture, Routine No Growth to date     Blood Culture, Routine No Growth to date     Blood Culture, Routine No Growth to date     Blood Culture, Routine No Growth to date     Blood Culture, Routine No Growth to date    Narrative:       Blood cultures from 2 different sites. 4 bottles total.  Please draw before starting antibiotics.    Blood culture [[id number]] Collected:  01/18/17 2023    Order Status:  Completed Specimen:  Blood Updated:  01/22/17 2212     Blood Culture, Routine No Growth to date     Blood Culture,  Routine No Growth to date     Blood Culture, Routine No Growth to date     Blood Culture, Routine No Growth to date     Blood Culture, Routine No Growth to date    Narrative:       Blood cultures x 2 different sites. 4 bottles total. Please  draw cultures before administering antibiotics.    Blood culture [[id number]] Collected:  01/17/17 0230    Order Status:  Completed Specimen:  Blood Updated:  01/22/17 0612     Blood Culture, Routine No growth after 5 days.    Narrative:       Blood cultures from 2 different sites. 4 bottles total.  Please draw before starting antibiotics.    Blood culture [[id number]] Collected:  01/17/17 0230    Order Status:  Completed Specimen:  Blood Updated:  01/22/17 0612     Blood Culture, Routine No growth after 5 days.    Narrative:       Blood cultures x 2 different sites. 4 bottles total. Please  draw cultures before administering antibiotics.    AFB Culture & Smear [[id number]] Collected:  01/19/17 0538    Order Status:  Completed Specimen:  Blood from Blood Updated:  01/20/17 0927     AFB Culture & Smear Culture in progress    Urine culture [[id number]] Collected:  01/18/17 2115    Order Status:  Completed Specimen:  Urine from Urine, Clean Catch Updated:  01/20/17 0237     Urine Culture, Routine No growth    Narrative:       Before antibiotics    C. trachomatis/N. gonorrhoeae by AMP DNA [[id number]] Collected:  01/18/17 0957    Order Status:  Completed Updated:  01/19/17 1352     Chlamydia, Amplified DNA Negative     N gonorrhoeae, amplified DNA Negative    Cryptococcal antigen [[id number]] Collected:  01/19/17 0538    Order Status:  Completed Specimen:  Blood from Blood Updated:  01/19/17 1226     Cryptococcal Ag, Blood Negative    Gram stain [[id number]] Collected:  01/18/17 2115    Order Status:  Completed Specimen:  Urine from Urine, Clean Catch Updated:  01/19/17 0233     Gram Stain Result No WBC's      No organisms seen    Urine culture [[id number]] Collected:  01/18/17 2115     Order Status:  Sent Specimen:  Urine from Urine, Clean Catch Updated:  01/18/17 2116    Urine culture [[id number]] Collected:  01/17/17 1025    Order Status:  Completed Specimen:  Urine from Urine, Clean Catch Updated:  01/18/17 1350     Urine Culture, Routine No growth    Narrative:       Before antibiotics    C. trachomatis/N. gonorrhoeae by AMP DNA Urine [[id number]]     Order Status:  Completed Specimen:  Genital     C. trachomatis/N. gonorrhoeae by AMP DNA Urine [[id number]]     Order Status:  Canceled Specimen:  Genital     Gram stain [[id number]] Collected:  01/17/17 1025    Order Status:  Completed Specimen:  Urine from Urine, Clean Catch Updated:  01/17/17 1221     Gram Stain Result No WBC's      No organisms seen    Culture, Respiratory [[id number]]     Order Status:  No result Specimen:  Respiratory         Imaging Results:    CT C/A/P 1/19/17:  1.  Focus of groundglass attenuation with bronchiectasis/developing air bronchograms within the right middle lobe, nonspecific, however could reflect developing infectious process or possibly sequela of aspiration, clinical correlation for pneumonia is advised.  2.  Anterior mediastinum mass, suggesting thymic hyperplasia, clinical correlation advised.  3.  Constipation.  4.  Several additional findings above.    CXR 1/17/17: Heart size normal.  Mild accentuation of interstitial markings.  No significant air space consolidation or pleural effusion    2D Echo 1/17/17:  CONCLUSIONS     1 - Low normal left ventricular systolic function (EF 50-55%).     2 - Normal right ventricular systolic function .     3 - Normal left ventricular diastolic function.     FINAL PATHOLOGIC DIAGNOSIS (1/17/17):  BONE MARROW, RIGHT ILIAC CREST, ASPIRATE, CLOT, AND CORE BIOPSY:  --Markedly hypercellular marrow, essentially 100%, nearly completely replaced by acute myeloid leukemia, see  comment  --Rare background trilineage elements present  --No significant appreciable stainable  iron  COMMENT: Concomitantly submitted flow cytometric analysis detects an increased population of blasts consistent  with acute myeloid leukemia, favor non-M3 subtype, although A CD34 positive/HLA-DR negative/equivocal  phenotype, and M3 APL subtype cannot be completely excluded. This increased population of myeloid blasts  shows expression of CD34, CD13, and CD33 (partial). This population also expresses CD 56 and CD7. It is  negative for  and other B and T lymphoid R crest tested including CD19 and cytoplasmic CD3, as well as  TdT. HLA-DR is equivocal.  The overall morphology of these blasts do not suggest acute promyelocytic leukemia. 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       Active Problems, Status & Treatment Plan Addressed Today:    # AML  - leukocytosis likely 2/2 AML and infection  - currently no evidence of DIC or tumor lysis  - noted to have new diagnosis of HIV (see below)  - hepatitis and flu negative  - s/p bone marrow biopsy (1/17/17) showing AML, cytogenetics and molecular markers done  - continue on empiric allopurinol  - 7+3 day 4    # Gram Positive Freddie Bacteremia  - BCx (1/16/17) shows Gram positive rods  - BCx (1/17/17) with ngtd  - likely contaminant   - ID following appreciate recs  - Cefepime stopped 1/22 and changed to cipro  - add ppx voriconazole and acyclovir    #HIV positive  - new diagnosis on this admit  - toxoplasma gondii WNL  - CD4: 118  - ID following, appreciate recs  - awaiting HIV genotype and quantiferon gold prior to starting ART thearpy  - ppx bactrim held starting yesterday given  immunosuppression, received Pentamidine 1/22    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   # Thrombocytopenia due to leukemia   - Hgb=6.9 g/dL, PLT=76  - will transfuse 1 unit of PRBC today  - no overt signs of bleeding  - likely 2/2 underlying hematologic malignancy  - continue to monitor and transfuse as indicated  - will transfuse for Hgb <7.0, PLT <10    # Constipation  - likely 2/2 opiate use  - started on miralax and senna-docusate    # Tobacco Abuse  - 26 pack year history  - advise smoking cessation   - nicotine patch prn    # Alcohol Abuse  - drinks at least 2 beers per day  - no h/o DTs  - monitor for signs/symptoms withdrawal    # Hyponatremia, resolved  - likely 2/2 dehydration  - continue IVFs  - continue to monitor    # Hypoalbuminemia  - check 24 hour urine protein  -Dietary consulted  -encourage increased nutritional intake     DVT ppx: encourage ambulation  Diet: regular  Code Status: full    DISPO: pending chemotherapy for AML    Marcella Neumann NP  Hematology/BMT

## 2017-01-24 LAB
ABO + RH BLD: NORMAL
ALBUMIN SERPL BCP-MCNC: 1.9 G/DL
ALBUMIN SERPL BCP-MCNC: 2 G/DL
ALP SERPL-CCNC: 166 U/L
ALP SERPL-CCNC: 178 U/L
ALT SERPL W/O P-5'-P-CCNC: 47 U/L
ALT SERPL W/O P-5'-P-CCNC: 50 U/L
ANION GAP SERPL CALC-SCNC: 4 MMOL/L
ANION GAP SERPL CALC-SCNC: 5 MMOL/L
ANISOCYTOSIS BLD QL SMEAR: SLIGHT
ANISOCYTOSIS BLD QL SMEAR: SLIGHT
AST SERPL-CCNC: 18 U/L
AST SERPL-CCNC: 20 U/L
BASOPHILS # BLD AUTO: ABNORMAL K/UL
BASOPHILS # BLD AUTO: ABNORMAL K/UL
BASOPHILS NFR BLD: 0 %
BASOPHILS NFR BLD: 0 %
BILIRUB SERPL-MCNC: 0.2 MG/DL
BILIRUB SERPL-MCNC: 0.2 MG/DL
BLASTS NFR BLD MANUAL: 91 %
BLASTS NFR BLD MANUAL: 93 %
BLD GP AB SCN CELLS X3 SERPL QL: NORMAL
BUN SERPL-MCNC: 13 MG/DL
BUN SERPL-MCNC: 16 MG/DL
CALCIUM SERPL-MCNC: 8 MG/DL
CALCIUM SERPL-MCNC: 8.4 MG/DL
CHLORIDE SERPL-SCNC: 104 MMOL/L
CHLORIDE SERPL-SCNC: 106 MMOL/L
CO2 SERPL-SCNC: 27 MMOL/L
CO2 SERPL-SCNC: 29 MMOL/L
CREAT SERPL-MCNC: 0.6 MG/DL
CREAT SERPL-MCNC: 0.7 MG/DL
DACRYOCYTES BLD QL SMEAR: ABNORMAL
DACRYOCYTES BLD QL SMEAR: ABNORMAL
DIFFERENTIAL METHOD: ABNORMAL
DIFFERENTIAL METHOD: ABNORMAL
EOSINOPHIL # BLD AUTO: ABNORMAL K/UL
EOSINOPHIL # BLD AUTO: ABNORMAL K/UL
EOSINOPHIL NFR BLD: 0 %
EOSINOPHIL NFR BLD: 0 %
ERYTHROCYTE [DISTWIDTH] IN BLOOD BY AUTOMATED COUNT: 17.9 %
ERYTHROCYTE [DISTWIDTH] IN BLOOD BY AUTOMATED COUNT: 18.2 %
EST. GFR  (AFRICAN AMERICAN): >60 ML/MIN/1.73 M^2
EST. GFR  (AFRICAN AMERICAN): >60 ML/MIN/1.73 M^2
EST. GFR  (NON AFRICAN AMERICAN): >60 ML/MIN/1.73 M^2
EST. GFR  (NON AFRICAN AMERICAN): >60 ML/MIN/1.73 M^2
GLUCOSE SERPL-MCNC: 105 MG/DL
GLUCOSE SERPL-MCNC: 99 MG/DL
HCT VFR BLD AUTO: 25.8 %
HCT VFR BLD AUTO: 26.5 %
HGB BLD-MCNC: 8.4 G/DL
HGB BLD-MCNC: 8.7 G/DL
HYPOCHROMIA BLD QL SMEAR: ABNORMAL
HYPOCHROMIA BLD QL SMEAR: ABNORMAL
LDH SERPL L TO P-CCNC: 561 U/L
LYMPHOCYTES # BLD AUTO: ABNORMAL K/UL
LYMPHOCYTES # BLD AUTO: ABNORMAL K/UL
LYMPHOCYTES NFR BLD: 6 %
LYMPHOCYTES NFR BLD: 7 %
MAGNESIUM SERPL-MCNC: 1.6 MG/DL
MAGNESIUM SERPL-MCNC: 1.9 MG/DL
MCH RBC QN AUTO: 31.1 PG
MCH RBC QN AUTO: 31.4 PG
MCHC RBC AUTO-ENTMCNC: 32.6 %
MCHC RBC AUTO-ENTMCNC: 32.8 %
MCV RBC AUTO: 96 FL
MCV RBC AUTO: 96 FL
MONOCYTES # BLD AUTO: ABNORMAL K/UL
MONOCYTES # BLD AUTO: ABNORMAL K/UL
MONOCYTES NFR BLD: 0 %
MONOCYTES NFR BLD: 0 %
NEUTROPHILS NFR BLD: 1 %
NEUTROPHILS NFR BLD: 2 %
NRBC BLD-RTO: ABNORMAL /100 WBC
OVALOCYTES BLD QL SMEAR: ABNORMAL
PHOSPHATE SERPL-MCNC: 3.7 MG/DL
PHOSPHATE SERPL-MCNC: 4 MG/DL
PLATELET # BLD AUTO: 68 K/UL
PLATELET # BLD AUTO: 71 K/UL
PLATELET BLD QL SMEAR: ABNORMAL
PMV BLD AUTO: 10.1 FL
PMV BLD AUTO: 9.7 FL
POIKILOCYTOSIS BLD QL SMEAR: SLIGHT
POIKILOCYTOSIS BLD QL SMEAR: SLIGHT
POLYCHROMASIA BLD QL SMEAR: ABNORMAL
POTASSIUM SERPL-SCNC: 4.1 MMOL/L
POTASSIUM SERPL-SCNC: 4.1 MMOL/L
PROT SERPL-MCNC: 7 G/DL
PROT SERPL-MCNC: 7.1 G/DL
RBC # BLD AUTO: 2.7 M/UL
RBC # BLD AUTO: 2.77 M/UL
SODIUM SERPL-SCNC: 137 MMOL/L
SODIUM SERPL-SCNC: 138 MMOL/L
URATE SERPL-MCNC: 2.4 MG/DL
WBC # BLD AUTO: 10.68 K/UL
WBC # BLD AUTO: 9.5 K/UL

## 2017-01-24 PROCEDURE — 20600001 HC STEP DOWN PRIVATE ROOM

## 2017-01-24 PROCEDURE — 25000003 PHARM REV CODE 250: Performed by: INTERNAL MEDICINE

## 2017-01-24 PROCEDURE — 86644 CMV ANTIBODY: CPT

## 2017-01-24 PROCEDURE — 86901 BLOOD TYPING SEROLOGIC RH(D): CPT

## 2017-01-24 PROCEDURE — 83615 LACTATE (LD) (LDH) ENZYME: CPT

## 2017-01-24 PROCEDURE — 84550 ASSAY OF BLOOD/URIC ACID: CPT

## 2017-01-24 PROCEDURE — 85007 BL SMEAR W/DIFF WBC COUNT: CPT

## 2017-01-24 PROCEDURE — 25000003 PHARM REV CODE 250: Performed by: NURSE PRACTITIONER

## 2017-01-24 PROCEDURE — 84100 ASSAY OF PHOSPHORUS: CPT

## 2017-01-24 PROCEDURE — 85027 COMPLETE CBC AUTOMATED: CPT

## 2017-01-24 PROCEDURE — 99232 SBSQ HOSP IP/OBS MODERATE 35: CPT | Mod: ,,, | Performed by: INTERNAL MEDICINE

## 2017-01-24 PROCEDURE — 80053 COMPREHEN METABOLIC PANEL: CPT

## 2017-01-24 PROCEDURE — 99233 SBSQ HOSP IP/OBS HIGH 50: CPT | Mod: ,,, | Performed by: INTERNAL MEDICINE

## 2017-01-24 PROCEDURE — 25000003 PHARM REV CODE 250: Performed by: STUDENT IN AN ORGANIZED HEALTH CARE EDUCATION/TRAINING PROGRAM

## 2017-01-24 PROCEDURE — 83735 ASSAY OF MAGNESIUM: CPT | Mod: 91

## 2017-01-24 PROCEDURE — 86900 BLOOD TYPING SEROLOGIC ABO: CPT

## 2017-01-24 PROCEDURE — 63600175 PHARM REV CODE 636 W HCPCS: Performed by: INTERNAL MEDICINE

## 2017-01-24 RX ADMIN — SODIUM CHLORIDE: 0.9 INJECTION, SOLUTION INTRAVENOUS at 04:01

## 2017-01-24 RX ADMIN — ACYCLOVIR 400 MG: 200 CAPSULE ORAL at 08:01

## 2017-01-24 RX ADMIN — ACYCLOVIR 400 MG: 200 CAPSULE ORAL at 09:01

## 2017-01-24 RX ADMIN — CIPROFLOXACIN HYDROCHLORIDE 500 MG: 500 TABLET, FILM COATED ORAL at 09:01

## 2017-01-24 RX ADMIN — OXYCODONE HYDROCHLORIDE 5 MG: 5 TABLET ORAL at 03:01

## 2017-01-24 RX ADMIN — ALLOPURINOL 300 MG: 100 TABLET ORAL at 08:01

## 2017-01-24 RX ADMIN — HYDROMORPHONE HYDROCHLORIDE 0.5 MG: 1 INJECTION, SOLUTION INTRAMUSCULAR; INTRAVENOUS; SUBCUTANEOUS at 03:01

## 2017-01-24 RX ADMIN — CIPROFLOXACIN HYDROCHLORIDE 500 MG: 500 TABLET, FILM COATED ORAL at 08:01

## 2017-01-24 RX ADMIN — OXYCODONE HYDROCHLORIDE 5 MG: 5 TABLET ORAL at 07:01

## 2017-01-24 RX ADMIN — HYDROMORPHONE HYDROCHLORIDE 0.5 MG: 1 INJECTION, SOLUTION INTRAMUSCULAR; INTRAVENOUS; SUBCUTANEOUS at 08:01

## 2017-01-24 RX ADMIN — OXYCODONE HYDROCHLORIDE 5 MG: 5 TABLET ORAL at 12:01

## 2017-01-24 RX ADMIN — OXYCODONE HYDROCHLORIDE 5 MG: 5 TABLET ORAL at 11:01

## 2017-01-24 RX ADMIN — VORICONAZOLE 200 MG: 200 TABLET, FILM COATED ORAL at 09:01

## 2017-01-24 RX ADMIN — POLYETHYLENE GLYCOL 3350 17 G: 17 POWDER, FOR SOLUTION ORAL at 09:01

## 2017-01-24 RX ADMIN — VORICONAZOLE 200 MG: 200 TABLET, FILM COATED ORAL at 08:01

## 2017-01-24 RX ADMIN — CYTARABINE 135 MG: 100 INJECTION, SOLUTION INTRATHECAL; INTRAVENOUS; SUBCUTANEOUS at 07:01

## 2017-01-24 RX ADMIN — STANDARDIZED SENNA CONCENTRATE AND DOCUSATE SODIUM 1 TABLET: 8.6; 5 TABLET, FILM COATED ORAL at 09:01

## 2017-01-24 RX ADMIN — Medication 10 ML: at 12:01

## 2017-01-24 RX ADMIN — Medication 10 ML: at 06:01

## 2017-01-24 RX ADMIN — HYDROMORPHONE HYDROCHLORIDE 0.5 MG: 1 INJECTION, SOLUTION INTRAMUSCULAR; INTRAVENOUS; SUBCUTANEOUS at 09:01

## 2017-01-24 NOTE — PLAN OF CARE
Problem: Patient Care Overview  Goal: Plan of Care Review  Outcome: Ongoing (interventions implemented as appropriate)  VSS.  Afebrile.  PRN Oxy IR given for c/o pain.  Pt in no acute distress at this time.  24 hour cytarabine infusing as ordered.  IVF infusing as ordered.  Pt encouraged to increase PO intake. Pt remains free from falls and injury during shift.  Bed locked in lowest position, side rails up x2, call light within reach.  Will continue to monitor pt.

## 2017-01-24 NOTE — PROGRESS NOTES
Admit Assessment    Patient Identification  Marck Howard   :  1980  Admit Date:  2017  Attending Provider:  Govind Jones MD              Referral:   Pt was admitted to  with a diagnosis of Acute leukemia, and was admitted this hospital stay due to Acute leukemia [C95.00]  Acute leukemia [C95.00].   is involved was referred to the Social Work Department via routine referral.  Patient presents as a 36 y.o. year old single male.    Persons interviewed: patient    Living Situation: pt. Currently resides at home with his sister (Colette [phone]). He states that he also lives with his 2 children (ages 12 &13) and 2 other brothers and a brother in law. Pt. States that he has full custody of his 2 children.        Resides at [address] [place] [address] [place] [address], phone: [phone] (home).           Current or Past Agencies and Description of Services/Supplies    DME  Agency Name: none  Agency Phone Number: n/a       Home Health  Agency Name: none  Agency Phone Number: n/a  Services: n/a    IV Infusion  Agency Name: none  Agency Phone Number: n/a    Nutrition: oral    Outpatient Pharmacy:   No Pharmacies Listed    Patient Preference of agencies include: none noted    Patient/Caregiver informed of right to choose providers or agencies.  Patient provides permission to release any necessary information to [facility] and to Non[facility] agencies as needed to facilitate patient care, treatment planning, and patient discharge planning.  Written and verbal resources provided.      Coping: pt. States that he is doing well. He seems to have a limited understanding of his illness. He reports not being able to read and states that he will usually have someone read to him so that he can understand what is going on. Pt. Reports limited support with family and appears to have a chaotic home situation.           Adjustment to Diagnosis and Treatment:  appropriate      Emotional/Behavioral/Cognitive Issues: none noted            History/Current Symptoms of Anxiety/Depression: No:   History/Current Substance Use:   Social History     Social History Main Topics    Smoking status: Current Every Day Smoker     Packs/day: 1.00     Years: 20.00     Types: Cigarettes    Smokeless tobacco: Not on file    Alcohol use Yes    Drug use: Yes     Special: Marijuana    Sexual activity: Not Currently     Partners: Female     Birth control/ protection: Condom      Comment: per report last in        Indications of Abuse/Neglect: No:   Abuse Screen  Do You Feel Unsafe at Home, Work or School?: no    Financial:  Payor/Plan Subscr  Sex Relation Sub. Ins. ID Effective Group Num   1. [place]* JULIA AKERS* 1980 Male  [id number]* 17                                    [address] [address]                            Other identified concerns/needs: pt. Will need to apply for social security as he is unable to work and has no income at this time.     Plan: to return home with help from family as they are able.     Interventions/Referrals: referral to [facility] for assistance with SS disability.   Patient/caregiver engaged in treatment planning process.     providing psychosocial and supportive counseling, resources, education, assistance and discharge planning as appropriate.  Patient/caregiver state understanding of  available resources,  following, remains available. Provided pt. With 'er phone # and encouraged him to call if needed. Will follow.

## 2017-01-24 NOTE — PROGRESS NOTES
[facility]  Adult Nutrition  Consult Note    SUMMARY     Recommendations    Recommendation/Intervention:   1. Continue diet high calorie/high protein with double portions.  2. Continue Boost  Plus - TID in vanilla.   3. Encourage optimal po intake >% to meet EEN/EPN. RD to monitor    Goals: Pt will meet >85% EEN with po intake + ONS  Nutrition Goal Status: goal met       Continuum of Care Plan    Referral to Outpatient Services: other (see comments) (Nutrition D/C Planning: high tiago/protein + ONS)    Reason for Assessment    Reason for Assessment: RD follow-up  Diagnosis: cancer diagnosis/related complications (acute leukemia)  Relevent Medical History: No PMH   Interdisciplinary Rounds: attended     General Information Comments: Chemo started. Appetite much improved and eating 100% double portions. No complaints today. Will monitor.    Nutrition Prescription Ordered    Current Diet Order: High protein - high calorie   Nutrition Order Comments: double portions           Oral Nutrition Supplement: Boost Plus     Evaluation of Received Nutrients/Fluid Intake        Oral Fluid (mL): 460            IV Fluid (mL): 676          Nutrition Risk Screen     Nutrition Risk Screen: no indicators present    Nutrition/Diet History    Patient Reported Diet/Restrictions/Preferences: general  Typical Food/Fluid Intake: good po intake  Food Preferences:  (no cultural or Tenriism needs identified)        Factors Affecting Nutritional Intake:  (-)                Labs/Tests/Procedures/Meds       Pertinent Labs Reviewed: reviewed  Pertinent Labs Comments: Na 126, K 4.7, Glu 112, Plts 96  Pertinent Medications Reviewed: reviewed  Pertinent Medications Comments: allopurinol, cefepime, vancomycin     Physical Findings    Overall Physical Appearance: generalized wasting, loss of muscle mass, loss of subcutaneous fat, underweight  Tubes:  (-)  Oral/Mouth Cavity: WDL  Skin: intact    Anthropometrics       Height  (inches): 64.02 in  Weight Method: Standard Scale  Weight (kg): 44.6 kg  Ideal Body Weight (IBW), Male: 130.12 lb     % Ideal Body Weight, Male (lb): 75.57 lb     BMI (kg/m2): 16.87  BMI Grade: 16 - 16.9 protein-energy malnutrition grade II        % Weight Change: 15.4 kg (27% x2-3 months)  Weight Loss: unintentional          Anthropometrics (Special Considerations)         Estimated/Assessed Needs    Weight Used For Calorie Calculations: 44.6 kg (98 lb 5.2 oz)   Height (cm): 162.6 cm     Energy Need Method: Kcal/kg (5436-9947 kcal (45-50 kcal/kg))       RMR ([place]-St. Jeor Equation): 1279.84        Weight Used For Protein Calculations: 44.6 kg (98 lb 5.2 oz)  Protein Requirements: 67-80 g  1.5 gm Protein (gm): 67.04 and 1.8 gm Protein (gm): 80.45  Fluid Need Method: RDA Method (or per MD)     Malnutrition (Undernutrition) Diagnosis    % Intake of Estimated Energy Needs: 75 - 100%  % Meal Intake: 100%  Malnutrition Reason: illness related, chronic      Severe Wt. Loss Chronic Illness: greater than 7.5% in 3 months    Nutrition Diagnosis    Nutrition Problem: Increased nutrient needs  Etiology/Related To: acute leukemia  Nutrition Diagnosis Signs/Symptoms As Evidenced By: increased EEN + EPN  Nutrition Diagnosis Status: Continues    Monitor and Evaluation    Food and Nutrient Intake: energy intake  Food and Nutrient Adminstration: diet order     Physical Activity and Function: nutrition-related ADLs and IADLs  Anthropometric Measurements: weight, weight change, body mass index  Biochemical Data, Medical Tests and Procedures: electrolyte and renal panel, glucose/endocrine profile, inflammatory profile  Nutrition-Focused Physical Findings: overall appearance    Nutrition Risk    Level of Risk: moderate    Nutrition Follow-Up    RD Follow-up?: Yes

## 2017-01-24 NOTE — PLAN OF CARE
MDR's with Dr Jones.  Patient is day 5 of 7+3 induction.  Patient is tolerating chemo without complication.  Next BMB planned for day 14.  Will continue to follow for d/c needs.

## 2017-01-24 NOTE — ASSESSMENT & PLAN NOTE
37yo previously healthy man who was admitted as a transfer from an OSH on 1/17/2017 with 3mos of weight loss, malaise, and fevers and was found to have a new diagnosis of AML and a new diagnosis of HIV (IT3=093/7%, VL 22k, GT pending). He is improved since starting induction chemotherapy.      - would continue cipro as standard neutropenic bacterial prophylaxis  - timing for initiation of voriconazole and acyclovir neutropenic prophylaxis deferred to primary hematology team per protocol  - patient is s/p pentamidine PCP prophylaxis (chosen over bactrim given myelosuppressive effects while in house) -- would recommend continuing prophylaxis independent of his neutropenic prophylaxis regimens given a long-term risk of PCP due to likely poor immune reconstitution while on chemotherapy; would prefer use of bactrim over pentamidine as an outpatient after count recovery  - GPR is a diphtheroid as expected -- contaminant, no further workup required  - await pending remaining intake labs (genotype, [id number], and quantiferon gold) before initiation of ART -- will likely defer initiation until after mucositis has resolved from chemotherapy  - await remaining pending OI workup: urine histo antigen and AFB blood cx

## 2017-01-24 NOTE — PROGRESS NOTES
[facility]  Infectious Disease  Progress Note    Patient Name: Marck Howard  MRN: [id number]  Admission Date: 1/17/2017  Length of Stay: 7 days  Attending Physician: Govind Jones MD  Primary Care Provider: Primary Doctor No    Isolation Status: No active isolations  Assessment/Plan:      HIV (human immunodeficiency virus infection)        Fever  35yo previously healthy man who was admitted as a transfer from an OSH on 1/17/2017 with 3mos of weight loss, malaise, and fevers and was found to have a new diagnosis of AML and a new diagnosis of HIV (BQ5=089/7%, VL 22k, GT pending). He is improved since starting induction chemotherapy.      - would continue cipro as standard neutropenic bacterial prophylaxis  - timing for initiation of voriconazole and acyclovir neutropenic prophylaxis deferred to primary hematology team per protocol  - patient is s/p pentamidine PCP prophylaxis (chosen over bactrim given myelosuppressive effects while in house) -- would recommend continuing prophylaxis independent of his neutropenic prophylaxis regimens given a long-term risk of PCP due to likely poor immune reconstitution while on chemotherapy; would prefer use of bactrim over pentamidine as an outpatient after count recovery  - GPR is a diphtheroid as expected -- contaminant, no further workup required  - await pending remaining intake labs (genotype, [id number], and quantiferon gold) before initiation of ART -- will likely defer initiation until after mucositis has resolved from chemotherapy  - await remaining pending OI workup: urine histo antigen and AFB blood cx         Anticipated Disposition: not in near future    Thank you for your consult. I will follow-up with patient. Please contact us if you have any additional questions.     Mae Mike MD  Transplant ID Attending  [phone]      Subjective:     Principal Problem:Acute leukemia    Interval History: Doing well today. Afebrile. Genotype  still pending.    HPI:      Review of Systems   Constitutional: Negative for chills and fever.   HENT: Negative for congestion, nosebleeds, rhinorrhea and sore throat.    Eyes: Negative for discharge and redness.   Respiratory: Negative for cough, shortness of breath and wheezing.    Cardiovascular: Negative for chest pain and palpitations.   Gastrointestinal: Negative for abdominal pain, diarrhea, nausea and vomiting.   Genitourinary: Negative for dysuria and hematuria.   Musculoskeletal: Negative for back pain and neck pain.   Skin: Negative for rash and wound.   Neurological: Negative for weakness and headaches.   Psychiatric/Behavioral: Negative for agitation. The patient is not nervous/anxious.      Objective:     Vital Signs (Most Recent):  Temp: 98.4 °F (36.9 °C) (01/24/17 1626)  Pulse: 105 (01/24/17 1626)  Resp: 16 (01/24/17 1626)  BP: 109/76 (01/24/17 1626)  SpO2: 98 % (01/24/17 1626) Vital Signs (24h Range):  Temp:  [97.6 °F (36.4 °C)-98.4 °F (36.9 °C)] 98.4 °F (36.9 °C)  Pulse:  [] 105  Resp:  [16-20] 16  SpO2:  [97 %-100 %] 98 %  BP: (107-123)/(61-81) 109/76     Weight: 44.6 kg (98 lb 3.4 oz)  Body mass index is 16.91 kg/(m^2).    Estimated Creatinine Clearance: 90.3 mL/min (based on Cr of 0.7).    Physical Exam   Constitutional: He appears cachectic. He is cooperative. He is easily aroused.  Non-toxic appearance. He does not appear ill. No distress.   HENT:   Head: Normocephalic and atraumatic.   Eyes: EOM are normal. Pupils are equal, round, and reactive to light.   Neck: Normal range of motion. Neck supple.   Cardiovascular: Normal rate and regular rhythm.    No murmur heard.  Pulmonary/Chest: Effort normal. No respiratory distress. He has no wheezes (trace end expiratory, diffuse in all lung fields). He has no rales.   Abdominal: Soft. He exhibits no distension. There is no tenderness.   Musculoskeletal: He exhibits no edema.   Neurological: He is alert and easily aroused.   Skin: Skin is warm  and dry. No rash noted.     Significant Labs:   CBC:     Recent Labs  Lab 01/23/17  1538 01/24/17  0417 01/24/17  1550   WBC 10.33 10.68 9.50   HGB 8.5* 8.4* 8.7*   HCT 25.6* 25.8* 26.5*   PLT 74* 68* 71*     CMP:     Recent Labs  Lab 01/23/17  1538 01/24/17  0417 01/24/17  1550    137 138   K 4.2 4.1 4.1    104 106   CO2 29 29 27   * 105 99   BUN 16 16 13   CREATININE 0.7 0.6 0.7   CALCIUM 8.4* 8.4* 8.0*   PROT 7.3 7.0 7.1   ALBUMIN 1.9* 1.9* 2.0*   BILITOT 0.1 0.2 0.2   ALKPHOS 186* 178* 166*   AST 21 20 18   ALT 60* 50* 47*   ANIONGAP 5* 4* 5*   EGFRNONAA >60.0 >60.0 >60.0       Significant Imaging: I have reviewed all pertinent imaging results/findings within the past 24 hours.     01/17 CxR  No acute cardiopulmonary abnormality    CT CAP: scant GGO in RML, otherwise unremarkable     Microbiology  BCx 01/16 GPR 1/2  BCx 01/17 NGTD  UCx 01/17 NGTD  G/C Testing 01/18 negative  Crypto Ag negative  BCx 01/18 NGTD        Mae Mike MD  Infectious Disease  [facility]

## 2017-01-24 NOTE — PROGRESS NOTES
Progress Note  Hematology / Bone Marrow Transplant                                                              Team: Networked reference to record PCT     Patient Name: Marck Howard  YOB: 1980    Admit Date: 1/17/2017                     LOS: 7    SUBJECTIVE:     Reason for Admission:  Acute leukemia  See H&P for detailed initial presentation history and ROS.      Interval history:   -day 5 of 7+3  -afebrile, VSS  -patient denies chest pains, palpitations, SOB, n/v, abdo pain, constipation/diarrhea, dysuria  -tolerating diet  -NAEON   -no other issues.    Review of System:  Constitutional: Positive fever or chills, negative for anorexia, malaise, night sweats and weight loss  Eyes: no visual changes, negative for irritation and redness  ENT: no nasal congestion or sore throat, negative for epistaxis, sore mouth and sore throat  Respiratory: no cough or shortness of breath, negative for dyspnea on exertion and wheezing  Cardiovascular: no chest pain or palpitations, negative for dyspnea, fatigue and palpitations  Gastrointestinal: no nausea or vomiting, no abdominal pain or change in bowel habits, negative for constipation, diarrhea and melena  Hematologic/Lymphatic: no easy bruising or lymphadenopathy, negative for bleeding  Musculoskeletal: no arthralgias or myalgias  Neurological: no seizures or tremors, negative for headaches and weakness  Behavioral/Psych: negative for anxiety and depression    OBJECTIVE:     Vital Signs Range (Last 24H):  Temp:  [97.8 °F (36.6 °C)-98.3 °F (36.8 °C)]   Pulse:  []   Resp:  [16-20]   BP: (105-116)/(61-75)   SpO2:  [98 %-100 %] Body mass index is 16.91 kg/(m^2).    I & O (Last 24H):    Intake/Output Summary (Last 24 hours) at 01/24/17 0777  Last data filed at 01/24/17 0416   Gross per 24 hour   Intake          4564.87 ml   Output             3600 ml   Net           964.87 ml       Physical Exam:  General: well developed, cachectic, no  distress  HENT: Head:normocephalic, atraumatic. Ears:right ear normal, left ear normal. Nose: Nares normal. Septum midline. Mucosa normal. No drainage or sinus tenderness., no discharge. Throat: lips, mucosa, and tongue normal; teeth and gums normal and no throat erythema.  Eyes: conjunctivae/corneas clear. PERRL.   Neck: supple, symmetrical, trachea midline  Lungs:  clear to auscultation bilaterally and normal respiratory effort  Cardiovascular: Heart: regular rate and rhythm, S1, S2 normal, no murmur, click, rub or gallop. Chest Wall: no tenderness. Extremities: no cyanosis or edema, or clubbing.   Abdomen: soft, non-tender non-distented; bowel sounds normal.   Skin: Skin color, texture, turgor normal. No rashes or lesions  Musculoskeletal:no clubbing, cyanosis  Neurologic: Mental status: Alert, oriented, thought content appropriate  Psych/Behavioral:  Normal.    Diagnostic Results:  Lab Results   Component Value Date    WBC 10.33 01/23/2017    HGB 8.4 (L) 01/24/2017    HCT 25.8 (L) 01/24/2017    MCV 96 01/24/2017    PLT 68 (L) 01/24/2017       Recent Labs  Lab 01/24/17  0417         K 4.1      CO2 29   BUN 16   CREATININE 0.6   CALCIUM 8.4*   MG 1.9     Lab Results   Component Value Date    INR 1.2 01/17/2017    INR 1.8 (H) 01/16/2017     No results found for: HGBA1C  Microbiology Results (last 7 days)     Procedure Component Value Units Date/Time    Blood culture [[id number]] Collected:  01/18/17 2023    Order Status:  Completed Specimen:  Blood Updated:  01/23/17 2212     Blood Culture, Routine No growth after 5 days.    Narrative:       Blood cultures x 2 different sites. 4 bottles total. Please  draw cultures before administering antibiotics.    Blood culture [[id number]] Collected:  01/18/17 2023    Order Status:  Completed Specimen:  Blood Updated:  01/23/17 2212     Blood Culture, Routine No growth after 5 days.    Narrative:       Blood cultures from 2 different sites. 4 bottles  total.  Please draw before starting antibiotics.    Blood culture [[id number]] Collected:  01/17/17 0230    Order Status:  Completed Specimen:  Blood Updated:  01/22/17 0612     Blood Culture, Routine No growth after 5 days.    Narrative:       Blood cultures from 2 different sites. 4 bottles total.  Please draw before starting antibiotics.    Blood culture [[id number]] Collected:  01/17/17 0230    Order Status:  Completed Specimen:  Blood Updated:  01/22/17 0612     Blood Culture, Routine No growth after 5 days.    Narrative:       Blood cultures x 2 different sites. 4 bottles total. Please  draw cultures before administering antibiotics.    AFB Culture & Smear [[id number]] Collected:  01/19/17 0538    Order Status:  Completed Specimen:  Blood from Blood Updated:  01/20/17 0927     AFB Culture & Smear Culture in progress    Urine culture [[id number]] Collected:  01/18/17 2115    Order Status:  Completed Specimen:  Urine from Urine, Clean Catch Updated:  01/20/17 0237     Urine Culture, Routine No growth    Narrative:       Before antibiotics    C. trachomatis/N. gonorrhoeae by AMP DNA [[id number]] Collected:  01/18/17 0957    Order Status:  Completed Updated:  01/19/17 1352     Chlamydia, Amplified DNA Negative     N gonorrhoeae, amplified DNA Negative    Cryptococcal antigen [[id number]] Collected:  01/19/17 0538    Order Status:  Completed Specimen:  Blood from Blood Updated:  01/19/17 1226     Cryptococcal Ag, Blood Negative    Gram stain [[id number]] Collected:  01/18/17 2115    Order Status:  Completed Specimen:  Urine from Urine, Clean Catch Updated:  01/19/17 0233     Gram Stain Result No WBC's      No organisms seen    Urine culture [[id number]] Collected:  01/18/17 2115    Order Status:  Sent Specimen:  Urine from Urine, Clean Catch Updated:  01/18/17 2116    Urine culture [[id number]] Collected:  01/17/17 1025    Order Status:  Completed Specimen:  Urine from Urine, Clean Catch Updated:  01/18/17 1350     Urine  Culture, Routine No growth    Narrative:       Before antibiotics    C. trachomatis/N. gonorrhoeae by AMP DNA Urine [[id number]]     Order Status:  Completed Specimen:  Genital     C. trachomatis/N. gonorrhoeae by AMP DNA Urine [[id number]]     Order Status:  Canceled Specimen:  Genital     Gram stain [[id number]] Collected:  01/17/17 1025    Order Status:  Completed Specimen:  Urine from Urine, Clean Catch Updated:  01/17/17 1221     Gram Stain Result No WBC's      No organisms seen        Imaging Results:    CT C/A/P 1/19/17:  1.  Focus of groundglass attenuation with bronchiectasis/developing air bronchograms within the right middle lobe, nonspecific, however could reflect developing infectious process or possibly sequela of aspiration, clinical correlation for pneumonia is advised.  2.  Anterior mediastinum mass, suggesting thymic hyperplasia, clinical correlation advised.  3.  Constipation.  4.  Several additional findings above.    CXR 1/17/17: Heart size normal.  Mild accentuation of interstitial markings.  No significant air space consolidation or pleural effusion    2D Echo 1/17/17:  CONCLUSIONS     1 - Low normal left ventricular systolic function (EF 50-55%).     2 - Normal right ventricular systolic function .     3 - Normal left ventricular diastolic function.     FINAL PATHOLOGIC DIAGNOSIS (1/17/17):  BONE MARROW, RIGHT ILIAC CREST, ASPIRATE, CLOT, AND CORE BIOPSY:  --Markedly hypercellular marrow, essentially 100%, nearly completely replaced by acute myeloid leukemia, see  comment  --Rare background trilineage elements present  --No significant appreciable stainable iron  COMMENT: Concomitantly submitted flow cytometric analysis detects an increased population of blasts consistent  with acute myeloid leukemia, favor non-M3 subtype, although A CD34 positive/HLA-DR negative/equivocal  phenotype, and M3 APL subtype cannot be completely excluded. This increased population of myeloid blasts  shows expression  of CD34, CD13, and CD33 (partial). This population also expresses CD 56 and CD7. It is  negative for  and other B and T lymphoid R crest tested including CD19 and cytoplasmic CD3, as well as  TdT. HLA-DR is equivocal.  The overall morphology of these blasts do not suggest acute promyelocytic leukemia. 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       Active Problems, Status & Treatment Plan Addressed Today:    # AML  - leukocytosis likely 2/2 AML and infection  - currently no evidence of DIC or tumor lysis  - noted to have new diagnosis of HIV (see below)  - hepatitis and flu negative  - s/p bone marrow biopsy (1/17/17) showing AML, cytogenetics and molecular markers done  - NPM1 and FLT3 negative  - continue on empiric allopurinol  - 7+3 started 1/20/17, day 5    # Gram Positive Freddie Bacteremia  - BCx (1/16/17) shows diptheroids, probable contaminant   - BCx (1/17/17) with ngtd  - ID consulted, appreciate recs  - Cefepime stopped 1/22/17 and changed to cipro   - continue ppx voriconazole and acyclovir    #HIV positive  - new diagnosis on this admit  - toxoplasma gondii WNL  - CD4: 118  - ID following, appreciate recs  - awaiting HIV genotype and quantiferon gold prior to starting ART thearpy  - ppx bactrim held starting yesterday given immunosuppression, received Pentamidine 1/22    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   # Thrombocytopenia due to leukemia   - Hgb=8.4 g/dL, PLT=68  - transfused 1 unit of PRBC 1/23/17  - no overt signs of bleeding  - likely 2/2 underlying hematologic malignancy  - continue to monitor and transfuse as indicated  - will  transfuse for Hgb <7.0, PLT <10    # Constipation  - likely 2/2 opiate use  - started on miralax and senna-docusate  - resolved    # Tobacco Abuse  - 26 pack year history  - advise smoking cessation   - nicotine patch prn    # Alcohol Abuse  - drinks at least 2 beers per day  - no h/o DTs  - monitor for signs/symptoms withdrawal    # Hyponatremia, resolved  - likely 2/2 dehydration  - continue IVFs  - continue to monitor    # Hypoalbuminemia  - check 24 hour urine protein  - Dietary consulted  - encourage increased nutritional intake     DVT ppx: encourage ambulation  Diet: regular  Code Status: full    DISPO: pending chemotherapy for AML    Dionte Rizvi MD (PGY-4)  Hematology/Oncology Fellow  Will discuss with Dr. Jones (Hematology/Oncology Staff)

## 2017-01-24 NOTE — SUBJECTIVE & OBJECTIVE
Interval History: Doing well today. Afebrile. Genotype still pending.    HPI:      Review of Systems   Constitutional: Negative for chills and fever.   HENT: Negative for congestion, nosebleeds, rhinorrhea and sore throat.    Eyes: Negative for discharge and redness.   Respiratory: Negative for cough, shortness of breath and wheezing.    Cardiovascular: Negative for chest pain and palpitations.   Gastrointestinal: Negative for abdominal pain, diarrhea, nausea and vomiting.   Genitourinary: Negative for dysuria and hematuria.   Musculoskeletal: Negative for back pain and neck pain.   Skin: Negative for rash and wound.   Neurological: Negative for weakness and headaches.   Psychiatric/Behavioral: Negative for agitation. The patient is not nervous/anxious.      Objective:     Vital Signs (Most Recent):  Temp: 98.4 °F (36.9 °C) (01/24/17 1626)  Pulse: 105 (01/24/17 1626)  Resp: 16 (01/24/17 1626)  BP: 109/76 (01/24/17 1626)  SpO2: 98 % (01/24/17 1626) Vital Signs (24h Range):  Temp:  [97.6 °F (36.4 °C)-98.4 °F (36.9 °C)] 98.4 °F (36.9 °C)  Pulse:  [] 105  Resp:  [16-20] 16  SpO2:  [97 %-100 %] 98 %  BP: (107-123)/(61-81) 109/76     Weight: 44.6 kg (98 lb 3.4 oz)  Body mass index is 16.91 kg/(m^2).    Estimated Creatinine Clearance: 90.3 mL/min (based on Cr of 0.7).    Physical Exam   Constitutional: He appears cachectic. He is cooperative. He is easily aroused.  Non-toxic appearance. He does not appear ill. No distress.   HENT:   Head: Normocephalic and atraumatic.   Eyes: EOM are normal. Pupils are equal, round, and reactive to light.   Neck: Normal range of motion. Neck supple.   Cardiovascular: Normal rate and regular rhythm.    No murmur heard.  Pulmonary/Chest: Effort normal. No respiratory distress. He has no wheezes (trace end expiratory, diffuse in all lung fields). He has no rales.   Abdominal: Soft. He exhibits no distension. There is no tenderness.   Musculoskeletal: He exhibits no edema.    Neurological: He is alert and easily aroused.   Skin: Skin is warm and dry. No rash noted.     Significant Labs:   CBC:     Recent Labs  Lab 01/23/17  1538 01/24/17  0417 01/24/17  1550   WBC 10.33 10.68 9.50   HGB 8.5* 8.4* 8.7*   HCT 25.6* 25.8* 26.5*   PLT 74* 68* 71*     CMP:     Recent Labs  Lab 01/23/17  1538 01/24/17  0417 01/24/17  1550    137 138   K 4.2 4.1 4.1    104 106   CO2 29 29 27   * 105 99   BUN 16 16 13   CREATININE 0.7 0.6 0.7   CALCIUM 8.4* 8.4* 8.0*   PROT 7.3 7.0 7.1   ALBUMIN 1.9* 1.9* 2.0*   BILITOT 0.1 0.2 0.2   ALKPHOS 186* 178* 166*   AST 21 20 18   ALT 60* 50* 47*   ANIONGAP 5* 4* 5*   EGFRNONAA >60.0 >60.0 >60.0       Significant Imaging: I have reviewed all pertinent imaging results/findings within the past 24 hours.     01/17 CxR  No acute cardiopulmonary abnormality    CT CAP: scant GGO in RML, otherwise unremarkable     Microbiology  BCx 01/16 GPR 1/2  BCx 01/17 NGTD  UCx 01/17 NGTD  G/C Testing 01/18 negative  Crypto Ag negative  BCx 01/18 NGTD

## 2017-01-25 LAB
ALBUMIN SERPL BCP-MCNC: 2 G/DL
ALBUMIN SERPL BCP-MCNC: 2.1 G/DL
ALP SERPL-CCNC: 163 U/L
ALP SERPL-CCNC: 169 U/L
ALT SERPL W/O P-5'-P-CCNC: 48 U/L
ALT SERPL W/O P-5'-P-CCNC: 51 U/L
ANION GAP SERPL CALC-SCNC: 6 MMOL/L
ANION GAP SERPL CALC-SCNC: 7 MMOL/L
ANISOCYTOSIS BLD QL SMEAR: SLIGHT
ANISOCYTOSIS BLD QL SMEAR: SLIGHT
AST SERPL-CCNC: 16 U/L
AST SERPL-CCNC: 24 U/L
BASOPHILS # BLD AUTO: ABNORMAL K/UL
BASOPHILS # BLD AUTO: ABNORMAL K/UL
BASOPHILS NFR BLD: 0 %
BASOPHILS NFR BLD: 0 %
BILIRUB SERPL-MCNC: 0.2 MG/DL
BILIRUB SERPL-MCNC: 0.2 MG/DL
BLASTS NFR BLD MANUAL: 90 %
BLASTS NFR BLD MANUAL: 94 %
BUN SERPL-MCNC: 18 MG/DL
BUN SERPL-MCNC: 19 MG/DL
CALCIUM SERPL-MCNC: 8.4 MG/DL
CALCIUM SERPL-MCNC: 8.7 MG/DL
CEBPA SEQUENCING (BM): NORMAL
CHLORIDE SERPL-SCNC: 101 MMOL/L
CHLORIDE SERPL-SCNC: 101 MMOL/L
CO2 SERPL-SCNC: 28 MMOL/L
CO2 SERPL-SCNC: 28 MMOL/L
CREAT SERPL-MCNC: 0.6 MG/DL
CREAT SERPL-MCNC: 0.7 MG/DL
DACRYOCYTES BLD QL SMEAR: ABNORMAL
DIFFERENTIAL METHOD: ABNORMAL
DIFFERENTIAL METHOD: ABNORMAL
EOSINOPHIL # BLD AUTO: ABNORMAL K/UL
EOSINOPHIL # BLD AUTO: ABNORMAL K/UL
EOSINOPHIL NFR BLD: 0 %
EOSINOPHIL NFR BLD: 0 %
ERYTHROCYTE [DISTWIDTH] IN BLOOD BY AUTOMATED COUNT: 17.7 %
ERYTHROCYTE [DISTWIDTH] IN BLOOD BY AUTOMATED COUNT: 17.7 %
EST. GFR  (AFRICAN AMERICAN): >60 ML/MIN/1.73 M^2
EST. GFR  (AFRICAN AMERICAN): >60 ML/MIN/1.73 M^2
EST. GFR  (NON AFRICAN AMERICAN): >60 ML/MIN/1.73 M^2
EST. GFR  (NON AFRICAN AMERICAN): >60 ML/MIN/1.73 M^2
GLUCOSE SERPL-MCNC: 116 MG/DL
GLUCOSE SERPL-MCNC: 87 MG/DL
HCT VFR BLD AUTO: 27.7 %
HCT VFR BLD AUTO: 27.7 %
HGB BLD-MCNC: 9.1 G/DL
HGB BLD-MCNC: 9.2 G/DL
HYPOCHROMIA BLD QL SMEAR: ABNORMAL
HYPOCHROMIA BLD QL SMEAR: ABNORMAL
LDH SERPL L TO P-CCNC: 492 U/L
LYMPHOCYTES # BLD AUTO: ABNORMAL K/UL
LYMPHOCYTES # BLD AUTO: ABNORMAL K/UL
LYMPHOCYTES NFR BLD: 6 %
LYMPHOCYTES NFR BLD: 8 %
MAGNESIUM SERPL-MCNC: 1.8 MG/DL
MAGNESIUM SERPL-MCNC: 1.8 MG/DL
MCH RBC QN AUTO: 31.3 PG
MCH RBC QN AUTO: 31.5 PG
MCHC RBC AUTO-ENTMCNC: 32.9 %
MCHC RBC AUTO-ENTMCNC: 33.2 %
MCV RBC AUTO: 94 FL
MCV RBC AUTO: 96 FL
MONOCYTES # BLD AUTO: ABNORMAL K/UL
MONOCYTES # BLD AUTO: ABNORMAL K/UL
MONOCYTES NFR BLD: 0 %
MONOCYTES NFR BLD: 0 %
NEUTROPHILS NFR BLD: 0 %
NEUTROPHILS NFR BLD: 2 %
OVALOCYTES BLD QL SMEAR: ABNORMAL
PHOSPHATE SERPL-MCNC: 4.3 MG/DL
PHOSPHATE SERPL-MCNC: 5 MG/DL
PLATELET # BLD AUTO: 63 K/UL
PLATELET # BLD AUTO: 66 K/UL
PLATELET BLD QL SMEAR: ABNORMAL
PMV BLD AUTO: 10.2 FL
PMV BLD AUTO: 10.5 FL
POIKILOCYTOSIS BLD QL SMEAR: SLIGHT
POLYCHROMASIA BLD QL SMEAR: ABNORMAL
POTASSIUM SERPL-SCNC: 4.1 MMOL/L
POTASSIUM SERPL-SCNC: 4.1 MMOL/L
PROT SERPL-MCNC: 7.2 G/DL
PROT SERPL-MCNC: 7.4 G/DL
RBC # BLD AUTO: 2.89 M/UL
RBC # BLD AUTO: 2.94 M/UL
SODIUM SERPL-SCNC: 135 MMOL/L
SODIUM SERPL-SCNC: 136 MMOL/L
SPECIMEN TYPE: NORMAL
SPHEROCYTES BLD QL SMEAR: ABNORMAL
URATE SERPL-MCNC: 2.7 MG/DL
WBC # BLD AUTO: 7.25 K/UL
WBC # BLD AUTO: 7.94 K/UL

## 2017-01-25 PROCEDURE — 25000003 PHARM REV CODE 250: Performed by: STUDENT IN AN ORGANIZED HEALTH CARE EDUCATION/TRAINING PROGRAM

## 2017-01-25 PROCEDURE — 25000003 PHARM REV CODE 250: Performed by: NURSE PRACTITIONER

## 2017-01-25 PROCEDURE — 84100 ASSAY OF PHOSPHORUS: CPT

## 2017-01-25 PROCEDURE — 25000003 PHARM REV CODE 250: Performed by: INTERNAL MEDICINE

## 2017-01-25 PROCEDURE — 99233 SBSQ HOSP IP/OBS HIGH 50: CPT | Mod: ,,, | Performed by: INTERNAL MEDICINE

## 2017-01-25 PROCEDURE — 83735 ASSAY OF MAGNESIUM: CPT | Mod: 91

## 2017-01-25 PROCEDURE — 85027 COMPLETE CBC AUTOMATED: CPT | Mod: 91

## 2017-01-25 PROCEDURE — 80053 COMPREHEN METABOLIC PANEL: CPT | Mod: 91

## 2017-01-25 PROCEDURE — 84550 ASSAY OF BLOOD/URIC ACID: CPT

## 2017-01-25 PROCEDURE — 83615 LACTATE (LD) (LDH) ENZYME: CPT

## 2017-01-25 PROCEDURE — 85007 BL SMEAR W/DIFF WBC COUNT: CPT | Mod: 91

## 2017-01-25 PROCEDURE — 20600001 HC STEP DOWN PRIVATE ROOM

## 2017-01-25 PROCEDURE — 63600175 PHARM REV CODE 636 W HCPCS: Performed by: INTERNAL MEDICINE

## 2017-01-25 PROCEDURE — 99232 SBSQ HOSP IP/OBS MODERATE 35: CPT | Mod: ,,, | Performed by: INTERNAL MEDICINE

## 2017-01-25 RX ADMIN — CYTARABINE 135 MG: 100 INJECTION, SOLUTION INTRATHECAL; INTRAVENOUS; SUBCUTANEOUS at 08:01

## 2017-01-25 RX ADMIN — OXYCODONE HYDROCHLORIDE 5 MG: 5 TABLET ORAL at 03:01

## 2017-01-25 RX ADMIN — ALLOPURINOL 300 MG: 100 TABLET ORAL at 09:01

## 2017-01-25 RX ADMIN — Medication 10 ML: at 12:01

## 2017-01-25 RX ADMIN — HYDROMORPHONE HYDROCHLORIDE 0.5 MG: 1 INJECTION, SOLUTION INTRAMUSCULAR; INTRAVENOUS; SUBCUTANEOUS at 05:01

## 2017-01-25 RX ADMIN — HYDROMORPHONE HYDROCHLORIDE 0.5 MG: 1 INJECTION, SOLUTION INTRAMUSCULAR; INTRAVENOUS; SUBCUTANEOUS at 04:01

## 2017-01-25 RX ADMIN — ACYCLOVIR 400 MG: 200 CAPSULE ORAL at 09:01

## 2017-01-25 RX ADMIN — Medication 10 ML: at 05:01

## 2017-01-25 RX ADMIN — SODIUM CHLORIDE 1000 ML: 0.9 INJECTION, SOLUTION INTRAVENOUS at 08:01

## 2017-01-25 RX ADMIN — OXYCODONE HYDROCHLORIDE 5 MG: 5 TABLET ORAL at 09:01

## 2017-01-25 RX ADMIN — CIPROFLOXACIN HYDROCHLORIDE 500 MG: 500 TABLET, FILM COATED ORAL at 09:01

## 2017-01-25 RX ADMIN — OXYCODONE HYDROCHLORIDE 5 MG: 5 TABLET ORAL at 08:01

## 2017-01-25 RX ADMIN — OXYCODONE HYDROCHLORIDE 5 MG: 5 TABLET ORAL at 02:01

## 2017-01-25 RX ADMIN — VORICONAZOLE 200 MG: 200 TABLET, FILM COATED ORAL at 09:01

## 2017-01-25 NOTE — PLAN OF CARE
Problem: Patient Care Overview  Goal: Plan of Care Review  Outcome: Ongoing (interventions implemented as appropriate)  Patient remained free from falls throughout shift, bed in lowest position, wheels locked and call bell within reach. Day 5 7+3 started last night. Patient tolerating well. Cyatarbine infusing to MASSIEL PICC line with excellent blood return. CAR and consent in chart. Pt dose and BSA checked by 2 chemo certified nurses. Education provided. Complained of pain to BUE and BLE, prn oxy and dilaudid given. Vitals stable, will continue to monitor.

## 2017-01-25 NOTE — PROGRESS NOTES
[facility]  Infectious Disease  Progress Note    Patient Name: Marck Howard  MRN: [id number]  Admission Date: 1/17/2017  Length of Stay: 8 days  Attending Physician: Govind Jones MD  Primary Care Provider: Primary Doctor No    Isolation Status: No active isolations  Assessment/Plan:      Fever  37yo previously healthy man who was admitted as a transfer from an OSH on 1/17/2017 with 3mos of weight loss, malaise, and fevers and was found to have a new diagnosis of AML and a new diagnosis of HIV (UI1=695/7%, VL 22k, GT pending). He is improved since starting induction chemotherapy.      - would continue cipro, voriconazole and acyclovir neutropenic prophylaxis per protocol  - patient is s/p pentamidine PCP prophylaxis (chosen over bactrim given myelosuppressive effects while in house) -- would recommend continuing prophylaxis independent of his neutropenic prophylaxis regimens given a long-term risk of PCP due to likely poor immune reconstitution while on chemotherapy; would prefer use of bactrim over pentamidine as an outpatient after count recovery  - await pending genotype before determining ART regimen (likely triumeq if wildtype) -- will likely defer initiation until after mucositis has resolved from chemotherapy to avoid resistance due to inadequate absorption of full dose daily  - await remaining pending OI workup: AFB blood cx (NGTD)        Anticipated Disposition: pending improvement    Thank you for your consult. I will follow-up with patient. Please contact us if you have any additional questions.     Mae Mike MD  Transplant ID Attending  [phone]      Subjective:     Principal Problem:Acute leukemia    Interval History: Doing well today. Afebrile. HLA- negative. Genotype still pending.    HPI:      Review of Systems   Constitutional: Negative for chills and fever.   HENT: Negative for congestion, nosebleeds, rhinorrhea and sore throat.    Eyes: Negative for  discharge and redness.   Respiratory: Negative for cough, shortness of breath and wheezing.    Cardiovascular: Negative for chest pain and palpitations.   Gastrointestinal: Negative for abdominal pain, diarrhea, nausea and vomiting.   Genitourinary: Negative for dysuria and hematuria.   Musculoskeletal: Negative for back pain and neck pain.   Skin: Negative for rash and wound.   Neurological: Negative for weakness and headaches.   Psychiatric/Behavioral: Negative for agitation. The patient is not nervous/anxious.      Objective:     Vital Signs (Most Recent):  Temp: 98.5 °F (36.9 °C) (01/25/17 1215)  Pulse: 110 (01/25/17 1215)  Resp: 18 (01/25/17 1215)  BP: 115/64 (01/25/17 1215)  SpO2: 96 % (01/25/17 1215) Vital Signs (24h Range):  Temp:  [98 °F (36.7 °C)-98.5 °F (36.9 °C)] 98.5 °F (36.9 °C)  Pulse:  [104-118] 110  Resp:  [16-18] 18  SpO2:  [96 %-98 %] 96 %  BP: (106-168)/(64-79) 115/64     Weight: 43.8 kg (96 lb 9 oz)  Body mass index is 16.63 kg/(m^2).    Estimated Creatinine Clearance: 103.4 mL/min (based on Cr of 0.6).    Physical Exam   Constitutional: He appears cachectic. He is cooperative. He is easily aroused.  Non-toxic appearance. He does not appear ill. No distress.   HENT:   Head: Normocephalic and atraumatic.   Eyes: EOM are normal. Pupils are equal, round, and reactive to light.   Neck: Normal range of motion. Neck supple.   Cardiovascular: Normal rate and regular rhythm.    No murmur heard.  Pulmonary/Chest: Effort normal and breath sounds normal. No respiratory distress. He has no decreased breath sounds. He has no wheezes (trace end expiratory, diffuse in all lung fields). He has no rales.   Abdominal: Soft. He exhibits no distension. There is no tenderness.   Musculoskeletal: He exhibits no edema.   Neurological: He is alert and easily aroused.   Skin: Skin is warm and dry. No rash noted.     Significant Labs:   CBC:     Recent Labs  Lab 01/24/17  0417 01/24/17  1550 01/25/17  0406   WBC 10.68  9.50 7.94   HGB 8.4* 8.7* 9.1*   HCT 25.8* 26.5* 27.7*   PLT 68* 71* 66*     CMP:     Recent Labs  Lab 01/24/17  0417 01/24/17  1550 01/25/17  0406    138 135*   K 4.1 4.1 4.1    106 101   CO2 29 27 28    99 87   BUN 16 13 18   CREATININE 0.6 0.7 0.6   CALCIUM 8.4* 8.0* 8.4*   PROT 7.0 7.1 7.2   ALBUMIN 1.9* 2.0* 2.0*   BILITOT 0.2 0.2 0.2   ALKPHOS 178* 166* 169*   AST 20 18 24   ALT 50* 47* 51*   ANIONGAP 4* 5* 6*   EGFRNONAA >60.0 >60.0 >60.0       Significant Imaging: I have reviewed all pertinent imaging results/findings within the past 24 hours.     01/17 CXR  No acute cardiopulmonary abnormality    CT CAP: scant GGO in RML, otherwise unremarkable     Microbiology  BCx 01/16 diphtheroids 1/2 (contaminant)  BCx 01/17 NGTD  UCx 01/17 NGTD  RPR and uGC Testing negative  HAV immune  HBV nonimmune   HCV negative  Crypto Ag negative  Urine histo ag negative  Quantiferon negative  AFB BCx NGTD  BCx 01/18 NGTD  HLA- negative  HIV GT pending        Mae Mike MD  Infectious Disease  [facility]

## 2017-01-25 NOTE — ASSESSMENT & PLAN NOTE
35yo previously healthy man who was admitted as a transfer from an OSH on 1/17/2017 with 3mos of weight loss, malaise, and fevers and was found to have a new diagnosis of AML and a new diagnosis of HIV (VY9=529/7%, VL 22k, GT pending). He is improved since starting induction chemotherapy.      - would continue cipro, voriconazole and acyclovir neutropenic prophylaxis per protocol  - patient is s/p pentamidine PCP prophylaxis (chosen over bactrim given myelosuppressive effects while in house) -- would recommend continuing prophylaxis independent of his neutropenic prophylaxis regimens given a long-term risk of PCP due to likely poor immune reconstitution while on chemotherapy; would prefer use of bactrim over pentamidine as an outpatient after count recovery  - await pending genotype before determining ART regimen (likely triumeq if wildtype) -- will likely defer initiation until after mucositis has resolved from chemotherapy to avoid resistance due to inadequate absorption of full dose daily  - await remaining pending OI workup: AFB blood cx (NGTD)

## 2017-01-25 NOTE — SUBJECTIVE & OBJECTIVE
Interval History: Doing well today. Afebrile. HLA- negative. Genotype still pending.    HPI:      Review of Systems   Constitutional: Negative for chills and fever.   HENT: Negative for congestion, nosebleeds, rhinorrhea and sore throat.    Eyes: Negative for discharge and redness.   Respiratory: Negative for cough, shortness of breath and wheezing.    Cardiovascular: Negative for chest pain and palpitations.   Gastrointestinal: Negative for abdominal pain, diarrhea, nausea and vomiting.   Genitourinary: Negative for dysuria and hematuria.   Musculoskeletal: Negative for back pain and neck pain.   Skin: Negative for rash and wound.   Neurological: Negative for weakness and headaches.   Psychiatric/Behavioral: Negative for agitation. The patient is not nervous/anxious.      Objective:     Vital Signs (Most Recent):  Temp: 98.5 °F (36.9 °C) (01/25/17 1215)  Pulse: 110 (01/25/17 1215)  Resp: 18 (01/25/17 1215)  BP: 115/64 (01/25/17 1215)  SpO2: 96 % (01/25/17 1215) Vital Signs (24h Range):  Temp:  [98 °F (36.7 °C)-98.5 °F (36.9 °C)] 98.5 °F (36.9 °C)  Pulse:  [104-118] 110  Resp:  [16-18] 18  SpO2:  [96 %-98 %] 96 %  BP: (106-168)/(64-79) 115/64     Weight: 43.8 kg (96 lb 9 oz)  Body mass index is 16.63 kg/(m^2).    Estimated Creatinine Clearance: 103.4 mL/min (based on Cr of 0.6).    Physical Exam   Constitutional: He appears cachectic. He is cooperative. He is easily aroused.  Non-toxic appearance. He does not appear ill. No distress.   HENT:   Head: Normocephalic and atraumatic.   Eyes: EOM are normal. Pupils are equal, round, and reactive to light.   Neck: Normal range of motion. Neck supple.   Cardiovascular: Normal rate and regular rhythm.    No murmur heard.  Pulmonary/Chest: Effort normal and breath sounds normal. No respiratory distress. He has no decreased breath sounds. He has no wheezes (trace end expiratory, diffuse in all lung fields). He has no rales.   Abdominal: Soft. He exhibits no distension.  There is no tenderness.   Musculoskeletal: He exhibits no edema.   Neurological: He is alert and easily aroused.   Skin: Skin is warm and dry. No rash noted.     Significant Labs:   CBC:     Recent Labs  Lab 01/24/17  0417 01/24/17  1550 01/25/17  0406   WBC 10.68 9.50 7.94   HGB 8.4* 8.7* 9.1*   HCT 25.8* 26.5* 27.7*   PLT 68* 71* 66*     CMP:     Recent Labs  Lab 01/24/17  0417 01/24/17  1550 01/25/17  0406    138 135*   K 4.1 4.1 4.1    106 101   CO2 29 27 28    99 87   BUN 16 13 18   CREATININE 0.6 0.7 0.6   CALCIUM 8.4* 8.0* 8.4*   PROT 7.0 7.1 7.2   ALBUMIN 1.9* 2.0* 2.0*   BILITOT 0.2 0.2 0.2   ALKPHOS 178* 166* 169*   AST 20 18 24   ALT 50* 47* 51*   ANIONGAP 4* 5* 6*   EGFRNONAA >60.0 >60.0 >60.0       Significant Imaging: I have reviewed all pertinent imaging results/findings within the past 24 hours.     01/17 CXR  No acute cardiopulmonary abnormality    CT CAP: scant GGO in RML, otherwise unremarkable     Microbiology  BCx 01/16 diphtheroids 1/2 (contaminant)  BCx 01/17 NGTD  UCx 01/17 NGTD  RPR and uGC Testing negative  HAV immune  HBV nonimmune   HCV negative  Crypto Ag negative  Urine histo ag negative  Quantiferon negative  AFB BCx NGTD  BCx 01/18 NGTD  HLA- negative  HIV GT pending

## 2017-01-25 NOTE — PROGRESS NOTES
Progress Note  Hematology / Bone Marrow Transplant                                                              Team: Networked reference to record PCT     Patient Name: Marck Howard  YOB: 1980    Admit Date: 1/17/2017                     LOS: 8    SUBJECTIVE:     Reason for Admission:  Acute leukemia  See H&P for detailed initial presentation history and ROS.      Interval history:   -day 6 of 7+3  -afebrile, VSS  -patient denies chest pains, palpitations, SOB, n/v, abdo pain, constipation/diarrhea, dysuria  -tolerating diet  -NAEON   -no other issues.    Review of System:  Constitutional: Positive fever or chills, negative for anorexia, malaise, night sweats and weight loss  Eyes: no visual changes, negative for irritation and redness  ENT: no nasal congestion or sore throat, negative for epistaxis, sore mouth and sore throat  Respiratory: no cough or shortness of breath, negative for dyspnea on exertion and wheezing  Cardiovascular: no chest pain or palpitations, negative for dyspnea, fatigue and palpitations  Gastrointestinal: no nausea or vomiting, no abdominal pain or change in bowel habits, negative for constipation, diarrhea and melena  Hematologic/Lymphatic: no easy bruising or lymphadenopathy, negative for bleeding  Musculoskeletal: no arthralgias or myalgias  Neurological: no seizures or tremors, negative for headaches and weakness  Behavioral/Psych: negative for anxiety and depression    OBJECTIVE:     Vital Signs Range (Last 24H):  Temp:  [97.6 °F (36.4 °C)-98.4 °F (36.9 °C)]   Pulse:  []   Resp:  [16-18]   BP: (109-168)/(66-81)   SpO2:  [96 %-99 %] Body mass index is 16.63 kg/(m^2).    I & O (Last 24H):    Intake/Output Summary (Last 24 hours) at 01/25/17 0814  Last data filed at 01/25/17 0751   Gross per 24 hour   Intake          4911.26 ml   Output             4800 ml   Net           111.26 ml       Physical Exam:  General: well developed, cachectic, no  distress  HENT: Head:normocephalic, atraumatic. Ears:right ear normal, left ear normal. Nose: Nares normal. Septum midline. Mucosa normal. No drainage or sinus tenderness., no discharge. Throat: lips, mucosa, and tongue normal; teeth and gums normal and no throat erythema.  Eyes: conjunctivae/corneas clear. PERRL.   Neck: supple, symmetrical, trachea midline  Lungs:  clear to auscultation bilaterally and normal respiratory effort  Cardiovascular: Heart: regular rate and rhythm, S1, S2 normal, no murmur, click, rub or gallop. Chest Wall: no tenderness. Extremities: no cyanosis or edema, or clubbing.   Abdomen: soft, non-tender non-distented; bowel sounds normal.   Skin: Skin color, texture, turgor normal. No rashes or lesions  Musculoskeletal:no clubbing, cyanosis  Neurologic: Mental status: Alert, oriented, thought content appropriate  Psych/Behavioral:  Normal.    Diagnostic Results:  Lab Results   Component Value Date    WBC 7.94 01/25/2017    HGB 9.1 (L) 01/25/2017    HCT 27.7 (L) 01/25/2017    MCV 96 01/25/2017    PLT 66 (L) 01/25/2017       Recent Labs  Lab 01/25/17  0406   GLU 87   *   K 4.1      CO2 28   BUN 18   CREATININE 0.6   CALCIUM 8.4*   MG 1.8     Lab Results   Component Value Date    INR 1.2 01/17/2017    INR 1.8 (H) 01/16/2017     No results found for: HGBA1C  Microbiology Results (last 7 days)     Procedure Component Value Units Date/Time    Blood culture [[id number]] Collected:  01/18/17 2023    Order Status:  Completed Specimen:  Blood Updated:  01/23/17 2212     Blood Culture, Routine No growth after 5 days.    Narrative:       Blood cultures x 2 different sites. 4 bottles total. Please  draw cultures before administering antibiotics.    Blood culture [[id number]] Collected:  01/18/17 2023    Order Status:  Completed Specimen:  Blood Updated:  01/23/17 2212     Blood Culture, Routine No growth after 5 days.    Narrative:       Blood cultures from 2 different sites. 4 bottles  total.  Please draw before starting antibiotics.    Blood culture [[id number]] Collected:  01/17/17 0230    Order Status:  Completed Specimen:  Blood Updated:  01/22/17 0612     Blood Culture, Routine No growth after 5 days.    Narrative:       Blood cultures from 2 different sites. 4 bottles total.  Please draw before starting antibiotics.    Blood culture [[id number]] Collected:  01/17/17 0230    Order Status:  Completed Specimen:  Blood Updated:  01/22/17 0612     Blood Culture, Routine No growth after 5 days.    Narrative:       Blood cultures x 2 different sites. 4 bottles total. Please  draw cultures before administering antibiotics.    AFB Culture & Smear [[id number]] Collected:  01/19/17 0538    Order Status:  Completed Specimen:  Blood from Blood Updated:  01/20/17 0927     AFB Culture & Smear Culture in progress    Urine culture [[id number]] Collected:  01/18/17 2115    Order Status:  Completed Specimen:  Urine from Urine, Clean Catch Updated:  01/20/17 0237     Urine Culture, Routine No growth    Narrative:       Before antibiotics    C. trachomatis/N. gonorrhoeae by AMP DNA [[id number]] Collected:  01/18/17 0957    Order Status:  Completed Updated:  01/19/17 1352     Chlamydia, Amplified DNA Negative     N gonorrhoeae, amplified DNA Negative    Cryptococcal antigen [[id number]] Collected:  01/19/17 0538    Order Status:  Completed Specimen:  Blood from Blood Updated:  01/19/17 1226     Cryptococcal Ag, Blood Negative    Gram stain [[id number]] Collected:  01/18/17 2115    Order Status:  Completed Specimen:  Urine from Urine, Clean Catch Updated:  01/19/17 0233     Gram Stain Result No WBC's      No organisms seen    Urine culture [[id number]] Collected:  01/18/17 2115    Order Status:  Sent Specimen:  Urine from Urine, Clean Catch Updated:  01/18/17 2116    Urine culture [[id number]] Collected:  01/17/17 1025    Order Status:  Completed Specimen:  Urine from Urine, Clean Catch Updated:  01/18/17 1350     Urine  Culture, Routine No growth    Narrative:       Before antibiotics        Imaging Results:    CT C/A/P 1/19/17:  1.  Focus of groundglass attenuation with bronchiectasis/developing air bronchograms within the right middle lobe, nonspecific, however could reflect developing infectious process or possibly sequela of aspiration, clinical correlation for pneumonia is advised.  2.  Anterior mediastinum mass, suggesting thymic hyperplasia, clinical correlation advised.  3.  Constipation.  4.  Several additional findings above.    CXR 1/17/17: Heart size normal.  Mild accentuation of interstitial markings.  No significant air space consolidation or pleural effusion    2D Echo 1/17/17:  CONCLUSIONS     1 - Low normal left ventricular systolic function (EF 50-55%).     2 - Normal right ventricular systolic function .     3 - Normal left ventricular diastolic function.     FINAL PATHOLOGIC DIAGNOSIS (1/17/17):  BONE MARROW, RIGHT ILIAC CREST, ASPIRATE, CLOT, AND CORE BIOPSY:  --Markedly hypercellular marrow, essentially 100%, nearly completely replaced by acute myeloid leukemia, see  comment  --Rare background trilineage elements present  --No significant appreciable stainable iron  COMMENT: Concomitantly submitted flow cytometric analysis detects an increased population of blasts consistent  with acute myeloid leukemia, favor non-M3 subtype, although A CD34 positive/HLA-DR negative/equivocal  phenotype, and M3 APL subtype cannot be completely excluded. This increased population of myeloid blasts  shows expression of CD34, CD13, and CD33 (partial). This population also expresses CD 56 and CD7. It is  negative for  and other B and T lymphoid R crest tested including CD19 and cytoplasmic CD3, as well as  TdT. HLA-DR is equivocal.  The overall morphology of these blasts do not suggest acute promyelocytic leukemia. Correlation    ASSESSMENT/PLAN:     Current Problems List:  Active Hospital Problems    Diagnosis  POA     *Acute leukemia [C95.00]  Yes    Acute myeloid leukemia not having achieved remission [C92.00]  Yes    Fever [R50.9]  Yes    Tobacco abuse [Z72.0]  Yes    HIV (human immunodeficiency virus infection) [Z21]  Yes      Resolved Hospital Problems    Diagnosis Date Resolved POA   No resolved problems to display.       Active Problems, Status & Treatment Plan Addressed Today:    # AML  - leukocytosis likely 2/2 AML and infection (resolving)  - currently no evidence of DIC or tumor lysis  - noted to have new diagnosis of HIV (see below)  - hepatitis and flu negative   - s/p bone marrow biopsy (1/17/17) showing AML, cytogenetics and molecular markers done  - NPM1 and FLT3 negative  - continue on empiric allopurinol  - 7+3 started 1/20/17, day 6    # Gram Positive Freddie Bacteremia  - BCx (1/16/17) shows diptheroids, probable contaminant   - BCx (1/17/17) with ngtd  - ID consulted, appreciate recs. Agree that diptheroids are contaminant; no treatment needed.   - Cefepime stopped 1/22/17 and changed to cipro   - continue ppx voriconazole and acyclovir    #HIV positive  - new diagnosis on this admit  - toxoplasma gondii WNL  - CD4: 118  - ID following, appreciate recs  - awaiting HIV genotype and quantiferon gold prior to starting ART thearpy  - ppx bactrim held starting yesterday given immunosuppression, received Pentamidine 1/22    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   # Thrombocytopenia due to leukemia   - Hgb=9.1 g/dL, PLT=66  - transfused 1 unit of PRBC 1/23/17  - no overt signs of bleeding  - likely 2/2 underlying hematologic malignancy  - continue to monitor and transfuse as indicated  - will transfuse for Hgb <7.0, PLT <10    # Constipation  - likely 2/2 opiate use  - started on miralax and senna-docusate  - resolved    # Tobacco Abuse  - 26 pack year history  - advise smoking cessation   - nicotine patch prn    # Alcohol Abuse  - drinks at least 2 beers per day  - no h/o DTs  - monitor for signs/symptoms withdrawal    #  Hyponatremia, resolved  - likely 2/2 dehydration  - continue IVFs  - continue to monitor    # Hypoalbuminemia  - check 24 hour urine protein  - Dietary consulted  - encourage increased nutritional intake     DVT ppx: encourage ambulation  Diet: regular  Code Status: full    DISPO: pending chemotherapy for AML

## 2017-01-26 LAB
ALBUMIN SERPL BCP-MCNC: 2 G/DL
ALBUMIN SERPL BCP-MCNC: 2.1 G/DL
ALP SERPL-CCNC: 167 U/L
ALP SERPL-CCNC: 170 U/L
ALT SERPL W/O P-5'-P-CCNC: 35 U/L
ALT SERPL W/O P-5'-P-CCNC: 42 U/L
ANION GAP SERPL CALC-SCNC: 5 MMOL/L
ANION GAP SERPL CALC-SCNC: 7 MMOL/L
ANISOCYTOSIS BLD QL SMEAR: SLIGHT
ANISOCYTOSIS BLD QL SMEAR: SLIGHT
AST SERPL-CCNC: 12 U/L
AST SERPL-CCNC: 15 U/L
BASOPHILS # BLD AUTO: ABNORMAL K/UL
BASOPHILS NFR BLD: 0 %
BASOPHILS NFR BLD: 0 %
BILIRUB SERPL-MCNC: 0.2 MG/DL
BILIRUB SERPL-MCNC: 0.2 MG/DL
BLASTS NFR BLD MANUAL: 77 %
BLASTS NFR BLD MANUAL: 82 %
BUN SERPL-MCNC: 13 MG/DL
BUN SERPL-MCNC: 19 MG/DL
CALCIUM SERPL-MCNC: 8.3 MG/DL
CALCIUM SERPL-MCNC: 9 MG/DL
CHLORIDE SERPL-SCNC: 101 MMOL/L
CHLORIDE SERPL-SCNC: 98 MMOL/L
CO2 SERPL-SCNC: 25 MMOL/L
CO2 SERPL-SCNC: 30 MMOL/L
CREAT SERPL-MCNC: 0.6 MG/DL
CREAT SERPL-MCNC: 0.7 MG/DL
DIFFERENTIAL METHOD: ABNORMAL
DIFFERENTIAL METHOD: ABNORMAL
EOSINOPHIL # BLD AUTO: ABNORMAL K/UL
EOSINOPHIL NFR BLD: 0 %
EOSINOPHIL NFR BLD: 0 %
ERYTHROCYTE [DISTWIDTH] IN BLOOD BY AUTOMATED COUNT: 17.5 %
ERYTHROCYTE [DISTWIDTH] IN BLOOD BY AUTOMATED COUNT: 17.5 %
EST. GFR  (AFRICAN AMERICAN): >60 ML/MIN/1.73 M^2
EST. GFR  (AFRICAN AMERICAN): >60 ML/MIN/1.73 M^2
EST. GFR  (NON AFRICAN AMERICAN): >60 ML/MIN/1.73 M^2
EST. GFR  (NON AFRICAN AMERICAN): >60 ML/MIN/1.73 M^2
GLUCOSE SERPL-MCNC: 136 MG/DL
GLUCOSE SERPL-MCNC: 93 MG/DL
HCT VFR BLD AUTO: 25.5 %
HCT VFR BLD AUTO: 28.8 %
HGB BLD-MCNC: 8.8 G/DL
HGB BLD-MCNC: 9.4 G/DL
HYPOCHROMIA BLD QL SMEAR: ABNORMAL
HYPOCHROMIA BLD QL SMEAR: ABNORMAL
LDH SERPL L TO P-CCNC: 450 U/L
LYMPHOCYTES # BLD AUTO: ABNORMAL K/UL
LYMPHOCYTES NFR BLD: 18 %
LYMPHOCYTES NFR BLD: 21 %
MAGNESIUM SERPL-MCNC: 1.5 MG/DL
MAGNESIUM SERPL-MCNC: 1.7 MG/DL
MCH RBC QN AUTO: 30.9 PG
MCH RBC QN AUTO: 31.9 PG
MCHC RBC AUTO-ENTMCNC: 32.6 %
MCHC RBC AUTO-ENTMCNC: 34.5 %
MCV RBC AUTO: 92 FL
MCV RBC AUTO: 95 FL
MONOCYTES # BLD AUTO: ABNORMAL K/UL
MONOCYTES NFR BLD: 0 %
MONOCYTES NFR BLD: 0 %
NEUTROPHILS NFR BLD: 0 %
NEUTROPHILS NFR BLD: 2 %
OVALOCYTES BLD QL SMEAR: ABNORMAL
PHOSPHATE SERPL-MCNC: 4.2 MG/DL
PHOSPHATE SERPL-MCNC: 4.9 MG/DL
PLATELET # BLD AUTO: 52 K/UL
PLATELET # BLD AUTO: 58 K/UL
PLATELET BLD QL SMEAR: ABNORMAL
PMV BLD AUTO: 11.4 FL
PMV BLD AUTO: 9.9 FL
POIKILOCYTOSIS BLD QL SMEAR: SLIGHT
POLYCHROMASIA BLD QL SMEAR: ABNORMAL
POTASSIUM SERPL-SCNC: 4 MMOL/L
POTASSIUM SERPL-SCNC: 4.6 MMOL/L
PROT SERPL-MCNC: 7.1 G/DL
PROT SERPL-MCNC: 7.6 G/DL
RBC # BLD AUTO: 2.76 M/UL
RBC # BLD AUTO: 3.04 M/UL
SCHISTOCYTES BLD QL SMEAR: ABNORMAL
SMUDGE CELLS BLD QL SMEAR: PRESENT
SODIUM SERPL-SCNC: 133 MMOL/L
SODIUM SERPL-SCNC: 133 MMOL/L
URATE SERPL-MCNC: 2.8 MG/DL
WBC # BLD AUTO: 4.69 K/UL
WBC # BLD AUTO: 5.44 K/UL

## 2017-01-26 PROCEDURE — 84100 ASSAY OF PHOSPHORUS: CPT

## 2017-01-26 PROCEDURE — 93010 ELECTROCARDIOGRAM REPORT: CPT | Mod: ,,, | Performed by: INTERNAL MEDICINE

## 2017-01-26 PROCEDURE — 25000003 PHARM REV CODE 250: Performed by: INTERNAL MEDICINE

## 2017-01-26 PROCEDURE — 85007 BL SMEAR W/DIFF WBC COUNT: CPT

## 2017-01-26 PROCEDURE — 80053 COMPREHEN METABOLIC PANEL: CPT

## 2017-01-26 PROCEDURE — 25000003 PHARM REV CODE 250: Performed by: NURSE PRACTITIONER

## 2017-01-26 PROCEDURE — 99233 SBSQ HOSP IP/OBS HIGH 50: CPT | Mod: ,,, | Performed by: INTERNAL MEDICINE

## 2017-01-26 PROCEDURE — 25000003 PHARM REV CODE 250: Performed by: HOSPITALIST

## 2017-01-26 PROCEDURE — 83735 ASSAY OF MAGNESIUM: CPT

## 2017-01-26 PROCEDURE — 85025 COMPLETE CBC W/AUTO DIFF WBC: CPT

## 2017-01-26 PROCEDURE — 25000003 PHARM REV CODE 250: Performed by: STUDENT IN AN ORGANIZED HEALTH CARE EDUCATION/TRAINING PROGRAM

## 2017-01-26 PROCEDURE — 83615 LACTATE (LD) (LDH) ENZYME: CPT

## 2017-01-26 PROCEDURE — 63600175 PHARM REV CODE 636 W HCPCS: Performed by: INTERNAL MEDICINE

## 2017-01-26 PROCEDURE — 85027 COMPLETE CBC AUTOMATED: CPT

## 2017-01-26 PROCEDURE — 20600001 HC STEP DOWN PRIVATE ROOM

## 2017-01-26 PROCEDURE — 84550 ASSAY OF BLOOD/URIC ACID: CPT

## 2017-01-26 PROCEDURE — 99232 SBSQ HOSP IP/OBS MODERATE 35: CPT | Mod: ,,, | Performed by: INTERNAL MEDICINE

## 2017-01-26 PROCEDURE — 93005 ELECTROCARDIOGRAM TRACING: CPT

## 2017-01-26 RX ADMIN — CYTARABINE 135 MG: 100 INJECTION, SOLUTION INTRATHECAL; INTRAVENOUS; SUBCUTANEOUS at 09:01

## 2017-01-26 RX ADMIN — VORICONAZOLE 200 MG: 200 TABLET, FILM COATED ORAL at 08:01

## 2017-01-26 RX ADMIN — ACYCLOVIR 400 MG: 200 CAPSULE ORAL at 08:01

## 2017-01-26 RX ADMIN — OXYCODONE HYDROCHLORIDE 5 MG: 5 TABLET ORAL at 12:01

## 2017-01-26 RX ADMIN — SODIUM CHLORIDE 1000 ML: 0.9 INJECTION, SOLUTION INTRAVENOUS at 05:01

## 2017-01-26 RX ADMIN — OXYCODONE HYDROCHLORIDE 5 MG: 5 TABLET ORAL at 03:01

## 2017-01-26 RX ADMIN — SODIUM CHLORIDE 500 ML: 0.9 INJECTION, SOLUTION INTRAVENOUS at 09:01

## 2017-01-26 RX ADMIN — CIPROFLOXACIN HYDROCHLORIDE 500 MG: 500 TABLET, FILM COATED ORAL at 08:01

## 2017-01-26 RX ADMIN — STANDARDIZED SENNA CONCENTRATE AND DOCUSATE SODIUM 1 TABLET: 8.6; 5 TABLET, FILM COATED ORAL at 08:01

## 2017-01-26 RX ADMIN — Medication 10 ML: at 05:01

## 2017-01-26 RX ADMIN — HYDROMORPHONE HYDROCHLORIDE 0.5 MG: 1 INJECTION, SOLUTION INTRAMUSCULAR; INTRAVENOUS; SUBCUTANEOUS at 03:01

## 2017-01-26 RX ADMIN — SODIUM CHLORIDE 500 ML: 0.9 INJECTION, SOLUTION INTRAVENOUS at 12:01

## 2017-01-26 RX ADMIN — ALLOPURINOL 300 MG: 100 TABLET ORAL at 08:01

## 2017-01-26 RX ADMIN — HYDROMORPHONE HYDROCHLORIDE 0.5 MG: 1 INJECTION, SOLUTION INTRAMUSCULAR; INTRAVENOUS; SUBCUTANEOUS at 01:01

## 2017-01-26 RX ADMIN — OXYCODONE HYDROCHLORIDE 5 MG: 5 TABLET ORAL at 08:01

## 2017-01-26 RX ADMIN — Medication 10 ML: at 12:01

## 2017-01-26 RX ADMIN — HYDROMORPHONE HYDROCHLORIDE 0.5 MG: 1 INJECTION, SOLUTION INTRAMUSCULAR; INTRAVENOUS; SUBCUTANEOUS at 11:01

## 2017-01-26 RX ADMIN — OXYCODONE HYDROCHLORIDE 5 MG: 5 TABLET ORAL at 07:01

## 2017-01-26 NOTE — PROGRESS NOTES
Dr Pollack notified pt with -125 , BP 97/66, pt is asymptomatic, tolerating po and voiding adequate amount. New orders for  cc bolus.

## 2017-01-26 NOTE — PROGRESS NOTES
Dr Pollack notified pt with elevated -136, pt with c/o muscle ache pain. Denies CP or other discomfort. New order for 12 lead EKG. Charge nurse Diana also aware.

## 2017-01-26 NOTE — PLAN OF CARE
Problem: Patient Care Overview  Goal: Plan of Care Review  Outcome: Ongoing (interventions implemented as appropriate)  Patient remained from injury throughout shift, call bell within reach. Day 6 7+3 started last night. Tolerating well. Complains of BUE and BLE, prn IV dilaudid and PO oxy given. Patient's 's in beginning of shift, 1000cc bolus given, HR decreased to 112-121 (which patient has been running.) vitals stable, will continue to monitor.

## 2017-01-26 NOTE — PROGRESS NOTES
[facility]  Infectious Disease  Progress Note    Patient Name: Marck Howard  MRN: [id number]  Admission Date: 1/17/2017  Length of Stay: 9 days  Attending Physician: Govind Jones MD  Primary Care Provider: Primary Doctor No    Isolation Status: No active isolations  Assessment/Plan:      Fever  35yo previously healthy man who was admitted as a transfer from an OSH on 1/17/2017 with 3mos of weight loss, malaise, and fevers and was found to have a new diagnosis of AML and a new diagnosis of HIV (IC2=913/7%, VL 22k, GT pending). He is improved clinically since starting induction chemotherapy.      - would continue cipro, voriconazole and acyclovir neutropenic prophylaxis per protocol  - patient is s/p pentamidine PCP prophylaxis (chosen over bactrim given myelosuppressive effects while in house) -- would recommend continuing prophylaxis independent of his neutropenic prophylaxis regimens given a long-term risk of PCP due to likely poor immune reconstitution while on chemotherapy; would prefer use of bactrim over pentamidine as an outpatient after count recovery  - await pending genotype before determining ART regimen (likely triumeq if wildtype) -- will likely defer initiation until after mucositis has resolved from chemotherapy to avoid resistance due to inadequate absorption of full dose daily  - await remaining pending OI workup: AFB blood cx (NGTD)        Anticipated Disposition: pending count recovery after chemo    Thank you for your consult. I will follow-up with patient. Please contact us if you have any additional questions.     Mae Mike MD  Transplant ID Attending  [id number]      Subjective:     Principal Problem:Acute leukemia    Interval History: Doing well. Awaiting HIV genotype still.    HPI:      Review of Systems   Constitutional: Negative for chills and fever.   HENT: Negative for congestion, nosebleeds, rhinorrhea and sore throat.    Eyes: Negative for  discharge and redness.   Respiratory: Negative for cough, shortness of breath and wheezing.    Cardiovascular: Negative for chest pain and palpitations.   Gastrointestinal: Negative for abdominal pain, diarrhea, nausea and vomiting.   Genitourinary: Negative for dysuria and hematuria.   Musculoskeletal: Negative for back pain and neck pain.   Skin: Negative for rash and wound.   Neurological: Negative for weakness and headaches.   Psychiatric/Behavioral: Negative for agitation. The patient is not nervous/anxious.      Objective:     Vital Signs (Most Recent):  Temp: 99.3 °F (37.4 °C) (01/26/17 1542)  Pulse: (!) 128 (01/26/17 1546)  Resp: 18 (01/26/17 1542)  BP: 106/68 (01/26/17 1542)  SpO2: 98 % (01/26/17 1542) Vital Signs (24h Range):  Temp:  [97.6 °F (36.4 °C)-99.3 °F (37.4 °C)] 99.3 °F (37.4 °C)  Pulse:  [112-136] 128  Resp:  [18] 18  SpO2:  [96 %-99 %] 98 %  BP: ()/(58-78) 106/68     Weight: 42.8 kg (94 lb 5.7 oz)  Body mass index is 16.25 kg/(m^2).    Estimated Creatinine Clearance: 101.1 mL/min (based on Cr of 0.6).    Physical Exam   Constitutional: He appears cachectic. He is cooperative. He is easily aroused.  Non-toxic appearance. He does not appear ill. No distress.   HENT:   Head: Normocephalic and atraumatic.   Eyes: EOM are normal. Pupils are equal, round, and reactive to light.   Neck: Normal range of motion. Neck supple.   Cardiovascular: Normal rate and regular rhythm.    No murmur heard.  Pulmonary/Chest: Effort normal and breath sounds normal. No respiratory distress. He has no decreased breath sounds. He has no wheezes (trace end expiratory, diffuse in all lung fields). He has no rales.   Abdominal: Soft. He exhibits no distension. There is no tenderness.   Musculoskeletal: He exhibits no edema.   Neurological: He is alert and easily aroused.   Skin: Skin is warm and dry. No rash noted.     Significant Labs:   CBC:     Recent Labs  Lab 01/25/17  0406 01/25/17  1545 01/26/17  0352   WBC 7.94  7.25 5.44   HGB 9.1* 9.2* 9.4*   HCT 27.7* 27.7* 28.8*   PLT 66* 63* 58*     CMP:     Recent Labs  Lab 01/25/17  0406 01/25/17  1545 01/26/17  0352   * 136 133*   K 4.1 4.1 4.6    101 98   CO2 28 28 30*   GLU 87 116* 93   BUN 18 19 13   CREATININE 0.6 0.7 0.6   CALCIUM 8.4* 8.7 9.0   PROT 7.2 7.4 7.6   ALBUMIN 2.0* 2.1* 2.1*   BILITOT 0.2 0.2 0.2   ALKPHOS 169* 163* 167*   AST 24 16 15   ALT 51* 48* 42   ANIONGAP 6* 7* 5*   EGFRNONAA >60.0 >60.0 >60.0       Significant Imaging: I have reviewed all pertinent imaging results/findings within the past 24 hours.     01/17 CXR  No acute cardiopulmonary abnormality    CT CAP: scant GGO in RML, otherwise unremarkable     Microbiology  BCx 01/16 diphtheroids 1/2 (contaminant)  BCx 01/17 NGTD  UCx 01/17 NGTD  RPR and uGC Testing negative  HAV immune  HBV nonimmune   HCV negative  Crypto Ag negative  Urine histo ag negative  Quantiferon negative  AFB BCx NGTD  BCx 01/18 NGTD  HLA- negative  HIV GT pending        Mae Mike MD  Infectious Disease  [facility]

## 2017-01-26 NOTE — PLAN OF CARE
Problem: Patient Care Overview  Goal: Plan of Care Review  Outcome: Ongoing (interventions implemented as appropriate)  Side rails up x2; call bell in place; bed in lowest, locked position; skid proof socks on; no evidence of skin breakdown; care plan explained to patient; pt remains free of injury. Pt tolerated po, voids, ambulates in room, oxy IR given x 2 and dilaudid iv x 1. Pt stated he had relief and denied any pain. Cyatabine day 5 of 7 infusing at 43ml/hr, site with good blood return.

## 2017-01-26 NOTE — PROGRESS NOTES
Pt with elevated HR pt denies pain or any other complaint of pain or discomfort. Three unsuccess attempts to reach BMT MD. On coming nurse aware.

## 2017-01-26 NOTE — PROGRESS NOTES
01/25/17 1955   Vital Signs   Temp 99.2 °F (37.3 °C)   Temp src Oral   Pulse (!) 130   Heart Rate Source Monitor   Resp 18   SpO2 99 %   O2 Device (Oxygen Therapy) room air   /72     Dr. Alonzo notified of increased HR, 1000 L bolus ordered. Will continue to monitor.

## 2017-01-26 NOTE — PROGRESS NOTES
Progress Note  Hematology / Bone Marrow Transplant                                                              Team: Networked reference to record PCT     Patient Name: Marck Howard  YOB: 1980    Admit Date: 1/17/2017                     LOS: 9    SUBJECTIVE:     Reason for Admission:  Acute leukemia  See H&P for detailed initial presentation history and ROS.      Interval history:   -day 7 of 7+3  -afebrile, VSS  -patient denies chest pains, palpitations, SOB, n/v, abdo pain, constipation/diarrhea, dysuria  -tolerating diet  -NAEON   -no other issues.    Review of System:  Constitutional: Positive fever or chills, negative for anorexia, malaise, night sweats and weight loss  Eyes: no visual changes, negative for irritation and redness  ENT: no nasal congestion or sore throat, negative for epistaxis, sore mouth and sore throat  Respiratory: no cough or shortness of breath, negative for dyspnea on exertion and wheezing  Cardiovascular: no chest pain or palpitations, negative for dyspnea, fatigue and palpitations  Gastrointestinal: no nausea or vomiting, no abdominal pain or change in bowel habits, negative for constipation, diarrhea and melena  Hematologic/Lymphatic: no easy bruising or lymphadenopathy, negative for bleeding  Musculoskeletal: no arthralgias or myalgias  Neurological: no seizures or tremors, negative for headaches and weakness  Behavioral/Psych: negative for anxiety and depression    OBJECTIVE:     Vital Signs Range (Last 24H):  Temp:  [98.1 °F (36.7 °C)-99.2 °F (37.3 °C)]   Pulse:  [108-130]   Resp:  [18]   BP: (103-119)/(58-78)   SpO2:  [96 %-99 %] Body mass index is 16.25 kg/(m^2).    I & O (Last 24H):    Intake/Output Summary (Last 24 hours) at 01/26/17 0732  Last data filed at 01/26/17 0600   Gross per 24 hour   Intake           5720.8 ml   Output             5950 ml   Net           -229.2 ml       Physical Exam:  General: well developed, cachectic, no distress  HENT:  Head:normocephalic, atraumatic. Ears:right ear normal, left ear normal. Nose: Nares normal. Septum midline. Mucosa normal. No drainage or sinus tenderness., no discharge. Throat: lips, mucosa, and tongue normal; teeth and gums normal and no throat erythema.  Eyes: conjunctivae/corneas clear. PERRL.   Neck: supple, symmetrical, trachea midline  Lungs:  clear to auscultation bilaterally and normal respiratory effort  Cardiovascular: Heart: regular rate and rhythm, S1, S2 normal, no murmur, click, rub or gallop. Chest Wall: no tenderness. Extremities: no cyanosis or edema, or clubbing.   Abdomen: soft, non-tender non-distented; bowel sounds normal.   Skin: Skin color, texture, turgor normal. No rashes or lesions  Musculoskeletal:no clubbing, cyanosis  Neurologic: Mental status: Alert, oriented, thought content appropriate  Psych/Behavioral:  Normal.    Diagnostic Results:  Lab Results   Component Value Date    WBC 5.44 01/26/2017    HGB 9.4 (L) 01/26/2017    HCT 28.8 (L) 01/26/2017    MCV 95 01/26/2017    PLT 58 (L) 01/26/2017       Recent Labs  Lab 01/26/17  0352   GLU 93   *   K 4.6   CL 98   CO2 30*   BUN 13   CREATININE 0.6   CALCIUM 9.0   MG 1.7     Lab Results   Component Value Date    INR 1.2 01/17/2017    INR 1.8 (H) 01/16/2017     No results found for: HGBA1C  Microbiology Results (last 7 days)     Procedure Component Value Units Date/Time    Blood culture [[id number]] Collected:  01/18/17 2023    Order Status:  Completed Specimen:  Blood Updated:  01/23/17 2212     Blood Culture, Routine No growth after 5 days.    Narrative:       Blood cultures x 2 different sites. 4 bottles total. Please  draw cultures before administering antibiotics.    Blood culture [[id number]] Collected:  01/18/17 2023    Order Status:  Completed Specimen:  Blood Updated:  01/23/17 2212     Blood Culture, Routine No growth after 5 days.    Narrative:       Blood cultures from 2 different sites. 4 bottles total.  Please draw before  starting antibiotics.    Blood culture [[id number]] Collected:  01/17/17 0230    Order Status:  Completed Specimen:  Blood Updated:  01/22/17 0612     Blood Culture, Routine No growth after 5 days.    Narrative:       Blood cultures from 2 different sites. 4 bottles total.  Please draw before starting antibiotics.    Blood culture [[id number]] Collected:  01/17/17 0230    Order Status:  Completed Specimen:  Blood Updated:  01/22/17 0612     Blood Culture, Routine No growth after 5 days.    Narrative:       Blood cultures x 2 different sites. 4 bottles total. Please  draw cultures before administering antibiotics.    AFB Culture & Smear [[id number]] Collected:  01/19/17 0538    Order Status:  Completed Specimen:  Blood from Blood Updated:  01/20/17 0927     AFB Culture & Smear Culture in progress    Urine culture [[id number]] Collected:  01/18/17 2115    Order Status:  Completed Specimen:  Urine from Urine, Clean Catch Updated:  01/20/17 0237     Urine Culture, Routine No growth    Narrative:       Before antibiotics    C. trachomatis/N. gonorrhoeae by AMP DNA [[id number]] Collected:  01/18/17 0957    Order Status:  Completed Updated:  01/19/17 1352     Chlamydia, Amplified DNA Negative     N gonorrhoeae, amplified DNA Negative    Cryptococcal antigen [[id number]] Collected:  01/19/17 0538    Order Status:  Completed Specimen:  Blood from Blood Updated:  01/19/17 1226     Cryptococcal Ag, Blood Negative        Imaging Results:    CT C/A/P 1/19/17:  1.  Focus of groundglass attenuation with bronchiectasis/developing air bronchograms within the right middle lobe, nonspecific, however could reflect developing infectious process or possibly sequela of aspiration, clinical correlation for pneumonia is advised.  2.  Anterior mediastinum mass, suggesting thymic hyperplasia, clinical correlation advised.  3.  Constipation.  4.  Several additional findings above.    CXR 1/17/17: Heart size normal.  Mild accentuation of interstitial  markings.  No significant air space consolidation or pleural effusion    2D Echo 1/17/17:  CONCLUSIONS     1 - Low normal left ventricular systolic function (EF 50-55%).     2 - Normal right ventricular systolic function .     3 - Normal left ventricular diastolic function.     FINAL PATHOLOGIC DIAGNOSIS (1/17/17):  BONE MARROW, RIGHT ILIAC CREST, ASPIRATE, CLOT, AND CORE BIOPSY:  --Markedly hypercellular marrow, essentially 100%, nearly completely replaced by acute myeloid leukemia, see  comment  --Rare background trilineage elements present  --No significant appreciable stainable iron  COMMENT: Concomitantly submitted flow cytometric analysis detects an increased population of blasts consistent  with acute myeloid leukemia, favor non-M3 subtype, although A CD34 positive/HLA-DR negative/equivocal  phenotype, and M3 APL subtype cannot be completely excluded. This increased population of myeloid blasts  shows expression of CD34, CD13, and CD33 (partial). This population also expresses CD 56 and CD7. It is  negative for  and other B and T lymphoid R crest tested including CD19 and cytoplasmic CD3, as well as  TdT. HLA-DR is equivocal.  The overall morphology of these blasts do not suggest acute promyelocytic leukemia. 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       Active Problems, Status & Treatment Plan Addressed Today:    # AML  - leukocytosis likely 2/2 AML and infection (resolving)  - currently no evidence of DIC or tumor lysis  - noted to have new diagnosis of HIV (see below)  - hepatitis and flu negative   - s/p bone marrow biopsy  (1/17/17) showing AML, cytogenetics and molecular markers done  - NPM1 and FLT3 negative  - will need transplant given high risk disease  - continue on empiric allopurinol  - 7+3 started 1/20/17, day 7 today    # Gram Positive Freddie Bacteremia  - BCx (1/16/17) shows diptheroids, probable contaminant   - BCx (1/17/17) with ngtd  - ID consulted, appreciate recs. Agree that diptheroids are contaminant; no treatment needed.   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   # Thrombocytopenia due to leukemia   - Hgb=9.4 g/dL, PLT=58  - transfused 1 unit of PRBC 1/23/17  - no overt signs of bleeding  - likely 2/2 underlying hematologic malignancy  - continue to monitor and transfuse as indicated  - will transfuse for Hgb <7.0, PLT <10    # Constipation  - likely 2/2 opiate use  - started on miralax and senna-docusate  - resolved, continue to monitor    # Tobacco Abuse  - 26 pack year history  - advise smoking cessation   - nicotine patch prn    # Alcohol Abuse  - drinks at least 2 beers per day  - no h/o DTs  - monitor for signs/symptoms withdrawal    # Hyponatremia, resolved  - likely 2/2 dehydration  - continue IVFs  - continue to monitor    # Hypoalbuminemia  - check 24 hour urine protein  - Dietary consulted  - encourage increased nutritional intake     DVT ppx: encourage ambulation  Diet: regular  Code Status: full    DISPO: pending chemotherapy for AML and bone marrow    Dionte Rizvi MD (PGY-4)  Hematology/Oncology Fellow  Will discuss with Dr. Jones (Hematology/Oncology Staff)

## 2017-01-26 NOTE — ASSESSMENT & PLAN NOTE
35yo previously healthy man who was admitted as a transfer from an OSH on 1/17/2017 with 3mos of weight loss, malaise, and fevers and was found to have a new diagnosis of AML and a new diagnosis of HIV (IX6=404/7%, VL 22k, GT pending). He is improved clinically since starting induction chemotherapy.      - would continue cipro, voriconazole and acyclovir neutropenic prophylaxis per protocol  - patient is s/p pentamidine PCP prophylaxis (chosen over bactrim given myelosuppressive effects while in house) -- would recommend continuing prophylaxis independent of his neutropenic prophylaxis regimens given a long-term risk of PCP due to likely poor immune reconstitution while on chemotherapy; would prefer use of bactrim over pentamidine as an outpatient after count recovery  - await pending genotype before determining ART regimen (likely triumeq if wildtype) -- will likely defer initiation until after mucositis has resolved from chemotherapy to avoid resistance due to inadequate absorption of full dose daily  - await remaining pending OI workup: AFB blood cx (NGTD)

## 2017-01-26 NOTE — SUBJECTIVE & OBJECTIVE
Interval History: Doing well. Awaiting HIV genotype still.    HPI:      Review of Systems   Constitutional: Negative for chills and fever.   HENT: Negative for congestion, nosebleeds, rhinorrhea and sore throat.    Eyes: Negative for discharge and redness.   Respiratory: Negative for cough, shortness of breath and wheezing.    Cardiovascular: Negative for chest pain and palpitations.   Gastrointestinal: Negative for abdominal pain, diarrhea, nausea and vomiting.   Genitourinary: Negative for dysuria and hematuria.   Musculoskeletal: Negative for back pain and neck pain.   Skin: Negative for rash and wound.   Neurological: Negative for weakness and headaches.   Psychiatric/Behavioral: Negative for agitation. The patient is not nervous/anxious.      Objective:     Vital Signs (Most Recent):  Temp: 99.3 °F (37.4 °C) (01/26/17 1542)  Pulse: (!) 128 (01/26/17 1546)  Resp: 18 (01/26/17 1542)  BP: 106/68 (01/26/17 1542)  SpO2: 98 % (01/26/17 1542) Vital Signs (24h Range):  Temp:  [97.6 °F (36.4 °C)-99.3 °F (37.4 °C)] 99.3 °F (37.4 °C)  Pulse:  [112-136] 128  Resp:  [18] 18  SpO2:  [96 %-99 %] 98 %  BP: ()/(58-78) 106/68     Weight: 42.8 kg (94 lb 5.7 oz)  Body mass index is 16.25 kg/(m^2).    Estimated Creatinine Clearance: 101.1 mL/min (based on Cr of 0.6).    Physical Exam   Constitutional: He appears cachectic. He is cooperative. He is easily aroused.  Non-toxic appearance. He does not appear ill. No distress.   HENT:   Head: Normocephalic and atraumatic.   Eyes: EOM are normal. Pupils are equal, round, and reactive to light.   Neck: Normal range of motion. Neck supple.   Cardiovascular: Normal rate and regular rhythm.    No murmur heard.  Pulmonary/Chest: Effort normal and breath sounds normal. No respiratory distress. He has no decreased breath sounds. He has no wheezes (trace end expiratory, diffuse in all lung fields). He has no rales.   Abdominal: Soft. He exhibits no distension. There is no tenderness.    Musculoskeletal: He exhibits no edema.   Neurological: He is alert and easily aroused.   Skin: Skin is warm and dry. No rash noted.     Significant Labs:   CBC:     Recent Labs  Lab 01/25/17  0406 01/25/17  1545 01/26/17  0352   WBC 7.94 7.25 5.44   HGB 9.1* 9.2* 9.4*   HCT 27.7* 27.7* 28.8*   PLT 66* 63* 58*     CMP:     Recent Labs  Lab 01/25/17  0406 01/25/17  1545 01/26/17  0352   * 136 133*   K 4.1 4.1 4.6    101 98   CO2 28 28 30*   GLU 87 116* 93   BUN 18 19 13   CREATININE 0.6 0.7 0.6   CALCIUM 8.4* 8.7 9.0   PROT 7.2 7.4 7.6   ALBUMIN 2.0* 2.1* 2.1*   BILITOT 0.2 0.2 0.2   ALKPHOS 169* 163* 167*   AST 24 16 15   ALT 51* 48* 42   ANIONGAP 6* 7* 5*   EGFRNONAA >60.0 >60.0 >60.0       Significant Imaging: I have reviewed all pertinent imaging results/findings within the past 24 hours.     01/17 CXR  No acute cardiopulmonary abnormality    CT CAP: scant GGO in RML, otherwise unremarkable     Microbiology  BCx 01/16 diphtheroids 1/2 (contaminant)  BCx 01/17 NGTD  UCx 01/17 NGTD  RPR and uGC Testing negative  HAV immune  HBV nonimmune   HCV negative  Crypto Ag negative  Urine histo ag negative  Quantiferon negative  AFB BCx NGTD  BCx 01/18 NGTD  HLA- negative  HIV GT pending

## 2017-01-27 LAB
ABO + RH BLD: NORMAL
ALBUMIN SERPL BCP-MCNC: 2.2 G/DL
ALP SERPL-CCNC: 178 U/L
ALT SERPL W/O P-5'-P-CCNC: 33 U/L
ANION GAP SERPL CALC-SCNC: 8 MMOL/L
ANISOCYTOSIS BLD QL SMEAR: SLIGHT
AST SERPL-CCNC: 13 U/L
BASOPHILS # BLD AUTO: ABNORMAL K/UL
BASOPHILS NFR BLD: 0 %
BILIRUB SERPL-MCNC: 0.2 MG/DL
BLASTS NFR BLD MANUAL: 73 %
BLD GP AB SCN CELLS X3 SERPL QL: NORMAL
BUN SERPL-MCNC: 13 MG/DL
CALCIUM SERPL-MCNC: 9.3 MG/DL
CHLORIDE SERPL-SCNC: 99 MMOL/L
CO2 SERPL-SCNC: 26 MMOL/L
CREAT SERPL-MCNC: 0.6 MG/DL
DIFFERENTIAL METHOD: ABNORMAL
EOSINOPHIL # BLD AUTO: ABNORMAL K/UL
EOSINOPHIL NFR BLD: 0 %
ERYTHROCYTE [DISTWIDTH] IN BLOOD BY AUTOMATED COUNT: 17.1 %
EST. GFR  (AFRICAN AMERICAN): >60 ML/MIN/1.73 M^2
EST. GFR  (NON AFRICAN AMERICAN): >60 ML/MIN/1.73 M^2
GLUCOSE SERPL-MCNC: 109 MG/DL
HCT VFR BLD AUTO: 27.9 %
HGB BLD-MCNC: 9.2 G/DL
HYPOCHROMIA BLD QL SMEAR: ABNORMAL
LDH SERPL L TO P-CCNC: 405 U/L
LYMPHOCYTES # BLD AUTO: ABNORMAL K/UL
LYMPHOCYTES NFR BLD: 26 %
MAGNESIUM SERPL-MCNC: 1.8 MG/DL
MCH RBC QN AUTO: 31.2 PG
MCHC RBC AUTO-ENTMCNC: 33 %
MCV RBC AUTO: 95 FL
MONOCYTES # BLD AUTO: ABNORMAL K/UL
MONOCYTES NFR BLD: 1 %
NEUTROPHILS NFR BLD: 0 %
OVALOCYTES BLD QL SMEAR: ABNORMAL
PHOSPHATE SERPL-MCNC: 4.4 MG/DL
PLATELET # BLD AUTO: 45 K/UL
PMV BLD AUTO: 10.2 FL
POIKILOCYTOSIS BLD QL SMEAR: SLIGHT
POLYCHROMASIA BLD QL SMEAR: ABNORMAL
POTASSIUM SERPL-SCNC: 4.5 MMOL/L
PROT SERPL-MCNC: 7.4 G/DL
RBC # BLD AUTO: 2.95 M/UL
SODIUM SERPL-SCNC: 133 MMOL/L
URATE SERPL-MCNC: 2.6 MG/DL
WBC # BLD AUTO: 3.86 K/UL

## 2017-01-27 PROCEDURE — 85027 COMPLETE CBC AUTOMATED: CPT

## 2017-01-27 PROCEDURE — 99233 SBSQ HOSP IP/OBS HIGH 50: CPT | Mod: ,,, | Performed by: INTERNAL MEDICINE

## 2017-01-27 PROCEDURE — 85007 BL SMEAR W/DIFF WBC COUNT: CPT

## 2017-01-27 PROCEDURE — 25000003 PHARM REV CODE 250: Performed by: NURSE PRACTITIONER

## 2017-01-27 PROCEDURE — 84550 ASSAY OF BLOOD/URIC ACID: CPT

## 2017-01-27 PROCEDURE — 25000003 PHARM REV CODE 250: Performed by: INTERNAL MEDICINE

## 2017-01-27 PROCEDURE — 86901 BLOOD TYPING SEROLOGIC RH(D): CPT

## 2017-01-27 PROCEDURE — 83735 ASSAY OF MAGNESIUM: CPT

## 2017-01-27 PROCEDURE — 80053 COMPREHEN METABOLIC PANEL: CPT

## 2017-01-27 PROCEDURE — 86900 BLOOD TYPING SEROLOGIC ABO: CPT

## 2017-01-27 PROCEDURE — 84100 ASSAY OF PHOSPHORUS: CPT

## 2017-01-27 PROCEDURE — 20600001 HC STEP DOWN PRIVATE ROOM

## 2017-01-27 PROCEDURE — 25000003 PHARM REV CODE 250: Performed by: STUDENT IN AN ORGANIZED HEALTH CARE EDUCATION/TRAINING PROGRAM

## 2017-01-27 PROCEDURE — 83615 LACTATE (LD) (LDH) ENZYME: CPT

## 2017-01-27 RX ORDER — POLYETHYLENE GLYCOL 3350 17 G/17G
17 POWDER, FOR SOLUTION ORAL DAILY
Status: DISCONTINUED | OUTPATIENT
Start: 2017-01-27 | End: 2017-01-27

## 2017-01-27 RX ORDER — POLYETHYLENE GLYCOL 3350 17 G/17G
17 POWDER, FOR SOLUTION ORAL DAILY
Status: DISCONTINUED | OUTPATIENT
Start: 2017-01-28 | End: 2017-02-20

## 2017-01-27 RX ADMIN — ACYCLOVIR 400 MG: 200 CAPSULE ORAL at 08:01

## 2017-01-27 RX ADMIN — CIPROFLOXACIN HYDROCHLORIDE 500 MG: 500 TABLET, FILM COATED ORAL at 09:01

## 2017-01-27 RX ADMIN — OXYCODONE HYDROCHLORIDE 5 MG: 5 TABLET ORAL at 03:01

## 2017-01-27 RX ADMIN — Medication 10 ML: at 02:01

## 2017-01-27 RX ADMIN — Medication 10 ML: at 05:01

## 2017-01-27 RX ADMIN — ACYCLOVIR 400 MG: 200 CAPSULE ORAL at 09:01

## 2017-01-27 RX ADMIN — ALLOPURINOL 300 MG: 100 TABLET ORAL at 09:01

## 2017-01-27 RX ADMIN — CIPROFLOXACIN HYDROCHLORIDE 500 MG: 500 TABLET, FILM COATED ORAL at 08:01

## 2017-01-27 RX ADMIN — HYDROMORPHONE HYDROCHLORIDE 0.5 MG: 1 INJECTION, SOLUTION INTRAMUSCULAR; INTRAVENOUS; SUBCUTANEOUS at 05:01

## 2017-01-27 RX ADMIN — OXYCODONE HYDROCHLORIDE 5 MG: 5 TABLET ORAL at 02:01

## 2017-01-27 RX ADMIN — OXYCODONE HYDROCHLORIDE 5 MG: 5 TABLET ORAL at 08:01

## 2017-01-27 RX ADMIN — VORICONAZOLE 200 MG: 200 TABLET, FILM COATED ORAL at 09:01

## 2017-01-27 RX ADMIN — VORICONAZOLE 200 MG: 200 TABLET, FILM COATED ORAL at 08:01

## 2017-01-27 RX ADMIN — STANDARDIZED SENNA CONCENTRATE AND DOCUSATE SODIUM 1 TABLET: 8.6; 5 TABLET, FILM COATED ORAL at 08:01

## 2017-01-27 NOTE — PROGRESS NOTES
[facility]  Infectious Disease  Progress Note    Patient Name: Marck Howard  MRN: [id number]  Admission Date: 1/17/2017  Length of Stay: 10 days  Attending Physician: Nohemy Boes MD  Primary Care Provider: Primary Doctor No    Isolation Status: No active isolations  Assessment/Plan:      Fever  35yo previously healthy man who was admitted as a transfer from an OSH on 1/17/2017 with 3mos of weight loss, malaise, and fevers and was found to have a new diagnosis of AML and a new diagnosis of HIV (LV8=785/7%, VL 22k, GT pending). He is improved clinically since starting induction chemotherapy.      - would continue cipro, voriconazole and acyclovir neutropenic prophylaxis per protocol  - patient is s/p pentamidine PCP prophylaxis (chosen over bactrim given myelosuppressive effects while in house) -- would recommend continuing after neutropenia for AIDS.  Would also consider atovaquone or dapsone if not using bactrim when ppx is again started  - await pending genotype before determining ART regimen (likely triumeq if wildtype) -- will likely defer initiation until after mucositis has resolved from chemotherapy to avoid resistance due to inadequate absorption of full dose daily  - await remaining pending OI workup: AFB blood cx (NGTD)        Anticipated Disposition: pending    Thank you for your consult. I will follow-up with patient. Please contact us if you have any additional questions.    Subjective:     Principal Problem:Acute leukemia    Interval History: feels well, afebrile, tolerating PO well    HPI:  35yo previously healthy man who was admitted as a transfer from an OSH on 1/17/2017 with 3mos of weight loss, malaise, and fevers and was found to have a new diagnosis of AML and a new diagnosis of HIV (ZA5=907/7%, VL 22k, GT pending). He is improved clinically since starting induction chemotherapy    Review of Systems   Constitutional: Positive for fatigue. Negative for activity change,  appetite change, chills and fever.   HENT: Negative for congestion, dental problem, mouth sores and sinus pressure.    Eyes: Negative for pain, redness and visual disturbance.   Respiratory: Negative for cough, shortness of breath and wheezing.    Cardiovascular: Negative for chest pain and leg swelling.   Gastrointestinal: Negative for abdominal distention, abdominal pain, diarrhea, nausea and vomiting.   Endocrine: Negative for polyuria.   Genitourinary: Negative for decreased urine volume, dysuria and frequency.   Musculoskeletal: Negative for joint swelling.   Skin: Negative for color change.   Allergic/Immunologic: Negative for food allergies.   Neurological: Negative for dizziness, weakness and headaches.   Hematological: Negative for adenopathy.   Psychiatric/Behavioral: Negative for agitation and confusion. The patient is not nervous/anxious.      Objective:     Vital Signs (Most Recent):  Temp: 97.8 °F (36.6 °C) (01/27/17 1640)  Pulse: (!) 120 (01/27/17 1640)  Resp: 18 (01/27/17 1640)  BP: 111/69 (01/27/17 1640)  SpO2: 95 % (01/27/17 1640) Vital Signs (24h Range):  Temp:  [97.7 °F (36.5 °C)-99.7 °F (37.6 °C)] 97.8 °F (36.6 °C)  Pulse:  [100-130] 120  Resp:  [18] 18  SpO2:  [95 %-100 %] 95 %  BP: ()/(66-73) 111/69     Weight: 42.7 kg (94 lb 0.4 oz)  Body mass index is 16.19 kg/(m^2).    Estimated Creatinine Clearance: 100.8 mL/min (based on Cr of 0.6).    Physical Exam   Constitutional: He is oriented to person, place, and time. He appears well-developed.   cachectic   HENT:   Head: Normocephalic and atraumatic.   Mouth/Throat: Oropharynx is clear and moist.   Eyes: Conjunctivae are normal.   Neck: Neck supple.   Cardiovascular: Normal rate, regular rhythm and normal heart sounds.    No murmur heard.  Pulmonary/Chest: Effort normal and breath sounds normal. No respiratory distress. He has no wheezes.   Abdominal: Soft. Bowel sounds are normal. He exhibits no distension. There is no tenderness.    Musculoskeletal: Normal range of motion. He exhibits no edema or tenderness.   Lymphadenopathy:     He has no cervical adenopathy.   Neurological: He is alert and oriented to person, place, and time. Coordination normal.   Skin: Skin is warm and dry. No rash noted.   Psychiatric: He has a normal mood and affect. His behavior is normal.       Significant Labs:   CBC:   Recent Labs  Lab 01/26/17  0352 01/26/17  1806 01/27/17  0346   WBC 5.44 4.69 3.86*   HGB 9.4* 8.8* 9.2*   HCT 28.8* 25.5* 27.9*   PLT 58* 52* 45*     CMP:   Recent Labs  Lab 01/26/17  0352 01/26/17  1806 01/27/17  0346   * 133* 133*   K 4.6 4.0 4.5   CL 98 101 99   CO2 30* 25 26   GLU 93 136* 109   BUN 13 19 13   CREATININE 0.6 0.7 0.6   CALCIUM 9.0 8.3* 9.3   PROT 7.6 7.1 7.4   ALBUMIN 2.1* 2.0* 2.2*   BILITOT 0.2 0.2 0.2   ALKPHOS 167* 170* 178*   AST 15 12 13   ALT 42 35 33   ANIONGAP 5* 7* 8   EGFRNONAA >60.0 >60.0 >60.0       Significant Imaging: I have reviewed all pertinent imaging results/findings within the past 24 hours.     01/17 CXR  No acute cardiopulmonary abnormality     CT CAP: scant GGO in RML, otherwise unremarkable      Microbiology  BCx 01/16 diphtheroids 1/2 (contaminant)  BCx 01/17 NGTD  UCx 01/17 NGTD  RPR and uGC Testing negative  HAV immune  HBV nonimmune   HCV negative  Crypto Ag negative  Urine histo ag negative  Quantiferon negative  AFB BCx NGTD  BCx 01/18 NGTD  HLA- negative  HIV GT pending  toxo IGG negative        Dionte Medrano MD  Infectious Disease  [facility]

## 2017-01-27 NOTE — PLAN OF CARE
Problem: Patient Care Overview  Goal: Plan of Care Review  Outcome: Ongoing (interventions implemented as appropriate)  Side rails up x2; call bell in place; bed in lowest, locked position; skid proof socks on; no evidence of skin breakdown; care plan explained to patient; pt remains free of injury. Pt tolerated po, voids, ambulates in room, oxy IR given x 2 and dilaudid iv x 1. Pt stated he had relief and denied any pain. Cyatabine day 6 of 7 infusing at 43 ml/hr, site with good blood return. Pt with elevated HR a 12 lead EKG done,  a 500 ml bolus and 1000 ml bolus given. HR decreased to 117-120.

## 2017-01-27 NOTE — PLAN OF CARE
Problem: Patient Care Overview  Goal: Plan of Care Review  Outcome: Ongoing (interventions implemented as appropriate)  Patient is AAOx4. Pt is calm and cooperative. Teaching and education performed. Patient remains free from falls and injury this shift. Bed in low, locked position, environment is free of clutter, with call bell in reach. Patient encouraged to call for assistance. Patient verbalized understanding. All belongings within reach. High protein / High calorie diet - great appetite. Pt had HR of 120-130. Total of 500 cc bolus given throughout shift. No N/V throughout shift. Afebrile.Pt C/O pain to upper and lower extremities. Administered Oxy IR x2 and Dilaudid x1 throughout shift. Will continue to monitor.

## 2017-01-27 NOTE — ASSESSMENT & PLAN NOTE
35yo previously healthy man who was admitted as a transfer from an OSH on 1/17/2017 with 3mos of weight loss, malaise, and fevers and was found to have a new diagnosis of AML and a new diagnosis of HIV (OR3=256/7%, VL 22k, GT pending). He is improved clinically since starting induction chemotherapy.      - would continue cipro, voriconazole and acyclovir neutropenic prophylaxis per protocol  - patient is s/p pentamidine PCP prophylaxis (chosen over bactrim given myelosuppressive effects while in house) -- would recommend continuing after neutropenia for AIDS.  Would also consider atovaquone or dapsone if not using bactrim when ppx is again started  - await pending genotype before determining ART regimen (likely triumeq if wildtype) -- will likely defer initiation until after mucositis has resolved from chemotherapy to avoid resistance due to inadequate absorption of full dose daily  - await remaining pending OI workup: AFB blood cx (NGTD)

## 2017-01-27 NOTE — SUBJECTIVE & OBJECTIVE
Interval History: feels well, afebrile, tolerating PO well    HPI:  35yo previously healthy man who was admitted as a transfer from an OSH on 1/17/2017 with 3mos of weight loss, malaise, and fevers and was found to have a new diagnosis of AML and a new diagnosis of HIV (XG6=943/7%, VL 22k, GT pending). He is improved clinically since starting induction chemotherapy    Review of Systems   Constitutional: Positive for fatigue. Negative for activity change, appetite change, chills and fever.   HENT: Negative for congestion, dental problem, mouth sores and sinus pressure.    Eyes: Negative for pain, redness and visual disturbance.   Respiratory: Negative for cough, shortness of breath and wheezing.    Cardiovascular: Negative for chest pain and leg swelling.   Gastrointestinal: Negative for abdominal distention, abdominal pain, diarrhea, nausea and vomiting.   Endocrine: Negative for polyuria.   Genitourinary: Negative for decreased urine volume, dysuria and frequency.   Musculoskeletal: Negative for joint swelling.   Skin: Negative for color change.   Allergic/Immunologic: Negative for food allergies.   Neurological: Negative for dizziness, weakness and headaches.   Hematological: Negative for adenopathy.   Psychiatric/Behavioral: Negative for agitation and confusion. The patient is not nervous/anxious.      Objective:     Vital Signs (Most Recent):  Temp: 97.8 °F (36.6 °C) (01/27/17 1640)  Pulse: (!) 120 (01/27/17 1640)  Resp: 18 (01/27/17 1640)  BP: 111/69 (01/27/17 1640)  SpO2: 95 % (01/27/17 1640) Vital Signs (24h Range):  Temp:  [97.7 °F (36.5 °C)-99.7 °F (37.6 °C)] 97.8 °F (36.6 °C)  Pulse:  [100-130] 120  Resp:  [18] 18  SpO2:  [95 %-100 %] 95 %  BP: ()/(66-73) 111/69     Weight: 42.7 kg (94 lb 0.4 oz)  Body mass index is 16.19 kg/(m^2).    Estimated Creatinine Clearance: 100.8 mL/min (based on Cr of 0.6).    Physical Exam   Constitutional: He is oriented to person, place, and time. He appears  well-developed.   cachectic   HENT:   Head: Normocephalic and atraumatic.   Mouth/Throat: Oropharynx is clear and moist.   Eyes: Conjunctivae are normal.   Neck: Neck supple.   Cardiovascular: Normal rate, regular rhythm and normal heart sounds.    No murmur heard.  Pulmonary/Chest: Effort normal and breath sounds normal. No respiratory distress. He has no wheezes.   Abdominal: Soft. Bowel sounds are normal. He exhibits no distension. There is no tenderness.   Musculoskeletal: Normal range of motion. He exhibits no edema or tenderness.   Lymphadenopathy:     He has no cervical adenopathy.   Neurological: He is alert and oriented to person, place, and time. Coordination normal.   Skin: Skin is warm and dry. No rash noted.   Psychiatric: He has a normal mood and affect. His behavior is normal.       Significant Labs:   CBC:   Recent Labs  Lab 01/26/17  0352 01/26/17  1806 01/27/17  0346   WBC 5.44 4.69 3.86*   HGB 9.4* 8.8* 9.2*   HCT 28.8* 25.5* 27.9*   PLT 58* 52* 45*     CMP:   Recent Labs  Lab 01/26/17  0352 01/26/17  1806 01/27/17  0346   * 133* 133*   K 4.6 4.0 4.5   CL 98 101 99   CO2 30* 25 26   GLU 93 136* 109   BUN 13 19 13   CREATININE 0.6 0.7 0.6   CALCIUM 9.0 8.3* 9.3   PROT 7.6 7.1 7.4   ALBUMIN 2.1* 2.0* 2.2*   BILITOT 0.2 0.2 0.2   ALKPHOS 167* 170* 178*   AST 15 12 13   ALT 42 35 33   ANIONGAP 5* 7* 8   EGFRNONAA >60.0 >60.0 >60.0       Significant Imaging: I have reviewed all pertinent imaging results/findings within the past 24 hours.     01/17 CXR  No acute cardiopulmonary abnormality     CT CAP: scant GGO in RML, otherwise unremarkable      Microbiology  BCx 01/16 diphtheroids 1/2 (contaminant)  BCx 01/17 NGTD  UCx 01/17 NGTD  RPR and uGC Testing negative  HAV immune  HBV nonimmune   HCV negative  Crypto Ag negative  Urine histo ag negative  Quantiferon negative  AFB BCx NGTD  BCx 01/18 NGTD  HLA- negative  HIV GT pending  toxo IGG negative

## 2017-01-27 NOTE — PROGRESS NOTES
Progress Note  Hematology / Bone Marrow Transplant                                                              Team: Networked reference to record PCT     Patient Name: Marck Howard  YOB: 1980    Admit Date: 1/17/2017                     LOS: 11    SUBJECTIVE:     Reason for Admission:  Acute leukemia  See H&P for detailed initial presentation history and ROS.      Interval history:   -day 9 of 7+3    -afebrile  -continues with sinus tach   -patient denies chest pains, palpitations, SOB, n/v, abdo pain, constipation/diarrhea, dysuria  -tolerating diet  -NAEON   -no other issues.    Review of System:  Constitutional: Positive fever or chills, negative for anorexia, malaise, night sweats and weight loss  Eyes: no visual changes, negative for irritation and redness  ENT: no nasal congestion or sore throat, negative for epistaxis, sore mouth and sore throat  Respiratory: no cough or shortness of breath, negative for dyspnea on exertion and wheezing  Cardiovascular: no chest pain or palpitations, negative for dyspnea, fatigue and palpitations  Gastrointestinal: no nausea or vomiting, no abdominal pain or change in bowel habits, negative for constipation, diarrhea and melena  Hematologic/Lymphatic: no easy bruising or lymphadenopathy, negative for bleeding  Musculoskeletal: no arthralgias or myalgias  Neurological: no seizures or tremors, negative for headaches and weakness  Behavioral/Psych: negative for anxiety and depression    OBJECTIVE:     Vital Signs Range (Last 24H):  Temp:  [97.7 °F (36.5 °C)-99.9 °F (37.7 °C)]   Pulse:  [100-121]   Resp:  [18]   BP: ()/(59-83)   SpO2:  [95 %-99 %] Body mass index is 15.72 kg/(m^2).    I & O (Last 24H):    Intake/Output Summary (Last 24 hours) at 01/28/17 0708  Last data filed at 01/28/17 0447   Gross per 24 hour   Intake          4979.29 ml   Output             5751 ml   Net          -771.71 ml       Physical Exam:  General: well developed,  cachectic, no distress  HENT: Head:normocephalic, atraumatic. Ears:right ear normal, left ear normal. Nose: Nares normal. Septum midline. Mucosa normal. No drainage or sinus tenderness., no discharge. Throat: lips, mucosa, and tongue normal; teeth and gums normal and no throat erythema.  Eyes: conjunctivae/corneas clear. PERRL.   Neck: supple, symmetrical, trachea midline  Lungs:  clear to auscultation bilaterally and normal respiratory effort  Cardiovascular: Heart: tachycardic but otherwise regular rhythm, S1, S2 normal, no murmur, click, rub or gallop. Chest Wall: no tenderness. Extremities: no cyanosis or edema, or clubbing.   Abdomen: soft, non-tender non-distented; bowel sounds normal.   Skin: Skin color, texture, turgor normal. No rashes or lesions  Musculoskeletal:no clubbing, cyanosis  Neurologic: Mental status: Alert, oriented, thought content appropriate  Psych/Behavioral:  Normal.    Diagnostic Results:  Lab Results   Component Value Date    WBC 3.24 (L) 01/28/2017    HGB 9.5 (L) 01/28/2017    HCT 28.3 (L) 01/28/2017    MCV 95 01/28/2017    PLT 45 (L) 01/27/2017       Recent Labs  Lab 01/28/17  0448      *   K 4.5   CL 98   CO2 27   BUN 16   CREATININE 0.7   CALCIUM 9.4   MG 1.7     Lab Results   Component Value Date    INR 1.2 01/17/2017    INR 1.8 (H) 01/16/2017     No results found for: HGBA1C  Microbiology Results (last 7 days)     Procedure Component Value Units Date/Time    Blood culture [[id number]] Collected:  01/18/17 2023    Order Status:  Completed Specimen:  Blood Updated:  01/23/17 2212     Blood Culture, Routine No growth after 5 days.    Narrative:       Blood cultures x 2 different sites. 4 bottles total. Please  draw cultures before administering antibiotics.    Blood culture [[id number]] Collected:  01/18/17 2023    Order Status:  Completed Specimen:  Blood Updated:  01/23/17 2212     Blood Culture, Routine No growth after 5 days.    Narrative:       Blood cultures from 2  different sites. 4 bottles total.  Please draw before starting antibiotics.    Blood culture [[id number]] Collected:  01/17/17 0230    Order Status:  Completed Specimen:  Blood Updated:  01/22/17 0612     Blood Culture, Routine No growth after 5 days.    Narrative:       Blood cultures from 2 different sites. 4 bottles total.  Please draw before starting antibiotics.    Blood culture [[id number]] Collected:  01/17/17 0230    Order Status:  Completed Specimen:  Blood Updated:  01/22/17 0612     Blood Culture, Routine No growth after 5 days.    Narrative:       Blood cultures x 2 different sites. 4 bottles total. Please  draw cultures before administering antibiotics.        Imaging Results:    CT C/A/P 1/19/17:  1.  Focus of groundglass attenuation with bronchiectasis/developing air bronchograms within the right middle lobe, nonspecific, however could reflect developing infectious process or possibly sequela of aspiration, clinical correlation for pneumonia is advised.  2.  Anterior mediastinum mass, suggesting thymic hyperplasia, clinical correlation advised.  3.  Constipation.  4.  Several additional findings above.    CXR 1/17/17: Heart size normal.  Mild accentuation of interstitial markings.  No significant air space consolidation or pleural effusion    2D Echo 1/17/17:  CONCLUSIONS     1 - Low normal left ventricular systolic function (EF 50-55%).     2 - Normal right ventricular systolic function .     3 - Normal left ventricular diastolic function.     FINAL PATHOLOGIC DIAGNOSIS (1/17/17):  BONE MARROW, RIGHT ILIAC CREST, ASPIRATE, CLOT, AND CORE BIOPSY:  --Markedly hypercellular marrow, essentially 100%, nearly completely replaced by acute myeloid leukemia, see  comment  --Rare background trilineage elements present  --No significant appreciable stainable iron  COMMENT: Concomitantly submitted flow cytometric analysis detects an increased population of blasts consistent  with acute myeloid leukemia, favor  non-M3 subtype, although A CD34 positive/HLA-DR negative/equivocal  phenotype, and M3 APL subtype cannot be completely excluded. This increased population of myeloid blasts  shows expression of CD34, CD13, and CD33 (partial). This population also expresses CD 56 and CD7. It is  negative for  and other B and T lymphoid R crest tested including CD19 and cytoplasmic CD3, as well as  TdT. HLA-DR is equivocal.  The overall morphology of these blasts do not suggest acute promyelocytic leukemia. 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       Active Problems, Status & Treatment Plan Addressed Today:    # AML  - leukocytosis likely 2/2 AML and infection (resolving)  - currently no evidence of DIC or tumor lysis  - noted to have new diagnosis of HIV (see below)  - hepatitis and flu negative   - s/p bone marrow biopsy (1/17/17) showing AML, cytogenetics and molecular markers done  - NPM1 and FLT3 negative  - will need transplant given high risk disease  - continue on empiric allopurinol  - 7+3 started 1/20/17, day 9 today  - will need repeat bone marrow next week    # Gram Positive Freddie Bacteremia  - BCx (1/16/17) shows diptheroids, probable contaminant   - BCx (1/17/17) with ngtd  - ID consulted, appreciate recs. Agree that diptheroids are contaminant; no treatment needed.   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   # Thrombocytopenia due to leukemia   - Hgb= 9.5  g/dL, PLT= 30   - transfused 1 unit of PRBC 1/23/17  - no overt signs of bleeding  - likely 2/2 underlying hematologic malignancy  - continue to monitor and transfuse as indicated  - will transfuse for Hgb <7.0, PLT <10    # Constipation, resolved  - likely 2/2 opiate use  - continue on miralax and senna-docusate  - resolved, continue to monitor    # Tobacco Abuse  - 26 pack year history  - advise smoking cessation   - nicotine patch prn    # H/O Alcohol Abuse  - drank at least 2 beers per day prior to admission  - no h/o DTs and now out of period for withdrawal    # Hyponatremia, resolved  - likely 2/2 dehydration  - continue IVFs  - continue to monitor    # Hypoalbuminemia  - check 24 hour urine protein  - Dietary consulted  - encourage increased nutritional intake     DVT ppx: encourage ambulation  Diet: regular  Code Status: full    DISPO: pending chemotherapy for AML and repeat bone marrow     Dionte Rizvi MD (PGY-4)  Hematology/Oncology Fellow  Will discuss with Dr. Bose (Hematology/Oncology Staff)

## 2017-01-27 NOTE — PROGRESS NOTES
Pt. Seen for follow up visit. Pt. States that he is doing well. He will complete chemo this evening. Pt. In good spirits and express no concerns at this time. Pt. States that he has met with social  and has begun the application for disability. Support provided. Will follow.

## 2017-01-27 NOTE — PROGRESS NOTES
Progress Note  Hematology / Bone Marrow Transplant                                                              Team: Networked reference to record PCT     Patient Name: Marck Howard  YOB: 1980    Admit Date: 1/17/2017                     LOS: 10    SUBJECTIVE:     Reason for Admission:  Acute leukemia  See H&P for detailed initial presentation history and ROS.      Interval history:   -day 8 of 7+3 (completed yesterday)  -afebrile  -sinus tachycardia noted and given 500cc NS  -patient denies chest pains, palpitations, SOB, n/v, abdo pain, constipation/diarrhea, dysuria  -tolerating diet  -NAEON   -no other issues.    Review of System:  Constitutional: Positive fever or chills, negative for anorexia, malaise, night sweats and weight loss  Eyes: no visual changes, negative for irritation and redness  ENT: no nasal congestion or sore throat, negative for epistaxis, sore mouth and sore throat  Respiratory: no cough or shortness of breath, negative for dyspnea on exertion and wheezing  Cardiovascular: no chest pain or palpitations, negative for dyspnea, fatigue and palpitations  Gastrointestinal: no nausea or vomiting, no abdominal pain or change in bowel habits, negative for constipation, diarrhea and melena  Hematologic/Lymphatic: no easy bruising or lymphadenopathy, negative for bleeding  Musculoskeletal: no arthralgias or myalgias  Neurological: no seizures or tremors, negative for headaches and weakness  Behavioral/Psych: negative for anxiety and depression    OBJECTIVE:     Vital Signs Range (Last 24H):  Temp:  [97.6 °F (36.4 °C)-99.7 °F (37.6 °C)]   Pulse:  [112-136]   Resp:  [18]   BP: ()/(66-73)   SpO2:  [96 %-100 %] Body mass index is 16.19 kg/(m^2).    I & O (Last 24H):    Intake/Output Summary (Last 24 hours) at 01/27/17 0712  Last data filed at 01/27/17 0337   Gross per 24 hour   Intake          7142.26 ml   Output             5825 ml   Net          1317.26 ml       Physical  Exam:  General: well developed, cachectic, no distress  HENT: Head:normocephalic, atraumatic. Ears:right ear normal, left ear normal. Nose: Nares normal. Septum midline. Mucosa normal. No drainage or sinus tenderness., no discharge. Throat: lips, mucosa, and tongue normal; teeth and gums normal and no throat erythema.  Eyes: conjunctivae/corneas clear. PERRL.   Neck: supple, symmetrical, trachea midline  Lungs:  clear to auscultation bilaterally and normal respiratory effort  Cardiovascular: Heart: regular rate and rhythm, S1, S2 normal, no murmur, click, rub or gallop. Chest Wall: no tenderness. Extremities: no cyanosis or edema, or clubbing.   Abdomen: soft, non-tender non-distented; bowel sounds normal.   Skin: Skin color, texture, turgor normal. No rashes or lesions  Musculoskeletal:no clubbing, cyanosis  Neurologic: Mental status: Alert, oriented, thought content appropriate  Psych/Behavioral:  Normal.    Diagnostic Results:  Lab Results   Component Value Date    WBC 3.86 (L) 01/27/2017    HGB 9.2 (L) 01/27/2017    HCT 27.9 (L) 01/27/2017    MCV 95 01/27/2017    PLT 45 (L) 01/27/2017       Recent Labs  Lab 01/27/17  0346      *   K 4.5   CL 99   CO2 26   BUN 13   CREATININE 0.6   CALCIUM 9.3   MG 1.8     Lab Results   Component Value Date    INR 1.2 01/17/2017    INR 1.8 (H) 01/16/2017     No results found for: HGBA1C  Microbiology Results (last 7 days)     Procedure Component Value Units Date/Time    Blood culture [[id number]] Collected:  01/18/17 2023    Order Status:  Completed Specimen:  Blood Updated:  01/23/17 2212     Blood Culture, Routine No growth after 5 days.    Narrative:       Blood cultures x 2 different sites. 4 bottles total. Please  draw cultures before administering antibiotics.    Blood culture [[id number]] Collected:  01/18/17 2023    Order Status:  Completed Specimen:  Blood Updated:  01/23/17 2212     Blood Culture, Routine No growth after 5 days.    Narrative:       Blood  cultures from 2 different sites. 4 bottles total.  Please draw before starting antibiotics.    Blood culture [[id number]] Collected:  01/17/17 0230    Order Status:  Completed Specimen:  Blood Updated:  01/22/17 0612     Blood Culture, Routine No growth after 5 days.    Narrative:       Blood cultures from 2 different sites. 4 bottles total.  Please draw before starting antibiotics.    Blood culture [[id number]] Collected:  01/17/17 0230    Order Status:  Completed Specimen:  Blood Updated:  01/22/17 0612     Blood Culture, Routine No growth after 5 days.    Narrative:       Blood cultures x 2 different sites. 4 bottles total. Please  draw cultures before administering antibiotics.    AFB Culture & Smear [[id number]] Collected:  01/19/17 0538    Order Status:  Completed Specimen:  Blood from Blood Updated:  01/20/17 0927     AFB Culture & Smear Culture in progress        Imaging Results:    CT C/A/P 1/19/17:  1.  Focus of groundglass attenuation with bronchiectasis/developing air bronchograms within the right middle lobe, nonspecific, however could reflect developing infectious process or possibly sequela of aspiration, clinical correlation for pneumonia is advised.  2.  Anterior mediastinum mass, suggesting thymic hyperplasia, clinical correlation advised.  3.  Constipation.  4.  Several additional findings above.    CXR 1/17/17: Heart size normal.  Mild accentuation of interstitial markings.  No significant air space consolidation or pleural effusion    2D Echo 1/17/17:  CONCLUSIONS     1 - Low normal left ventricular systolic function (EF 50-55%).     2 - Normal right ventricular systolic function .     3 - Normal left ventricular diastolic function.     FINAL PATHOLOGIC DIAGNOSIS (1/17/17):  BONE MARROW, RIGHT ILIAC CREST, ASPIRATE, CLOT, AND CORE BIOPSY:  --Markedly hypercellular marrow, essentially 100%, nearly completely replaced by acute myeloid leukemia, see  comment  --Rare background trilineage elements  present  --No significant appreciable stainable iron  COMMENT: Concomitantly submitted flow cytometric analysis detects an increased population of blasts consistent  with acute myeloid leukemia, favor non-M3 subtype, although A CD34 positive/HLA-DR negative/equivocal  phenotype, and M3 APL subtype cannot be completely excluded. This increased population of myeloid blasts  shows expression of CD34, CD13, and CD33 (partial). This population also expresses CD 56 and CD7. It is  negative for  and other B and T lymphoid R crest tested including CD19 and cytoplasmic CD3, as well as  TdT. HLA-DR is equivocal.  The overall morphology of these blasts do not suggest acute promyelocytic leukemia. 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       Active Problems, Status & Treatment Plan Addressed Today:    # AML  - leukocytosis likely 2/2 AML and infection (resolving)  - currently no evidence of DIC or tumor lysis  - noted to have new diagnosis of HIV (see below)  - hepatitis and flu negative   - s/p bone marrow biopsy (1/17/17) showing AML, cytogenetics and molecular markers done  - NPM1 and FLT3 negative  - will need transplant given high risk disease  - continue on empiric allopurinol  - 7+3 started 1/20/17, day 8 today  - will need repeat bone marrow next week    # Gram Positive Freddie Bacteremia  - BCx (1/16/17) shows diptheroids, probable contaminant   - BCx (1/17/17) with ngtd  - ID consulted, appreciate recs. Agree that diptheroids are contaminant; no treatment needed.   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   # Thrombocytopenia due to leukemia   - Hgb=9.2 g/dL, PLT=45  - transfused 1 unit of PRBC 1/23/17  - no overt signs of bleeding  - likely 2/2 underlying hematologic malignancy  - continue to monitor and transfuse as indicated  - will transfuse for Hgb <7.0, PLT <10    # Constipation, resolved  - likely 2/2 opiate use  - started on miralax and senna-docusate  - resolved, continue to monitor    # Tobacco Abuse  - 26 pack year history  - advise smoking cessation   - nicotine patch prn    # Alcohol Abuse  - drinks at least 2 beers per day  - no h/o DTs  - monitor for signs/symptoms withdrawal    # Hyponatremia, resolved  - likely 2/2 dehydration  - continue IVFs  - continue to monitor    # Hypoalbuminemia  - check 24 hour urine protein  - Dietary consulted  - encourage increased nutritional intake     DVT ppx: encourage ambulation  Diet: regular  Code Status: full    DISPO: pending chemotherapy for AML and repeat bone marrow     Dionte Rizvi MD (PGY-4)  Hematology/Oncology Fellow  Will discuss with Dr. Bose (Hematology/Oncology Staff)

## 2017-01-27 NOTE — PROGRESS NOTES
01/26/17 2025   Vital Signs   Temp 99.7 °F (37.6 °C)   Temp src Oral   Pulse (!) 130   Heart Rate Source Monitor   Resp 18   SpO2 98 %   Pulse Oximetry Type Intermittent   O2 Device (Oxygen Therapy) room air   /69   MAP (mmHg) 88   BP Location Left arm   BP Method Automatic   Patient Position Sitting     Notified Dr. Alonzo. Pt is asymptomatic. No complaints of chest pain. Doctor ordered 500 cc bolus of NS. Auscultated lung sounds. Bilateral lungs have some coarseness. Will continue to monitor.

## 2017-01-28 LAB
ALBUMIN SERPL BCP-MCNC: 2.2 G/DL
ALP SERPL-CCNC: 184 U/L
ALT SERPL W/O P-5'-P-CCNC: 28 U/L
ANION GAP SERPL CALC-SCNC: 8 MMOL/L
AST SERPL-CCNC: 13 U/L
BASOPHILS # BLD AUTO: 0.01 K/UL
BASOPHILS NFR BLD: 0.3 %
BILIRUB SERPL-MCNC: 0.2 MG/DL
BUN SERPL-MCNC: 16 MG/DL
CALCIUM SERPL-MCNC: 9.4 MG/DL
CHLORIDE SERPL-SCNC: 98 MMOL/L
CO2 SERPL-SCNC: 27 MMOL/L
CREAT SERPL-MCNC: 0.7 MG/DL
DIFFERENTIAL METHOD: ABNORMAL
EOSINOPHIL # BLD AUTO: 0 K/UL
EOSINOPHIL NFR BLD: 0 %
ERYTHROCYTE [DISTWIDTH] IN BLOOD BY AUTOMATED COUNT: 17.1 %
EST. GFR  (AFRICAN AMERICAN): >60 ML/MIN/1.73 M^2
EST. GFR  (NON AFRICAN AMERICAN): >60 ML/MIN/1.73 M^2
GLUCOSE SERPL-MCNC: 102 MG/DL
HCT VFR BLD AUTO: 28.3 %
HGB BLD-MCNC: 9.5 G/DL
LYMPHOCYTES # BLD AUTO: 3.1 K/UL
LYMPHOCYTES NFR BLD: 95.4 %
MAGNESIUM SERPL-MCNC: 1.7 MG/DL
MCH RBC QN AUTO: 31.8 PG
MCHC RBC AUTO-ENTMCNC: 33.6 %
MCV RBC AUTO: 95 FL
MONOCYTES # BLD AUTO: 0.1 K/UL
MONOCYTES NFR BLD: 2.8 %
NEUTROPHILS # BLD AUTO: 0.1 K/UL
NEUTROPHILS NFR BLD: 1.5 %
PHOSPHATE SERPL-MCNC: 4.6 MG/DL
PLATELET # BLD AUTO: 30 K/UL
PLATELET BLD QL SMEAR: ABNORMAL
PMV BLD AUTO: 10.2 FL
POTASSIUM SERPL-SCNC: 4.5 MMOL/L
PROT SERPL-MCNC: 7.8 G/DL
RBC # BLD AUTO: 2.99 M/UL
SODIUM SERPL-SCNC: 133 MMOL/L
WBC # BLD AUTO: 3.24 K/UL

## 2017-01-28 PROCEDURE — 84100 ASSAY OF PHOSPHORUS: CPT

## 2017-01-28 PROCEDURE — 25000003 PHARM REV CODE 250: Performed by: INTERNAL MEDICINE

## 2017-01-28 PROCEDURE — 85025 COMPLETE CBC W/AUTO DIFF WBC: CPT

## 2017-01-28 PROCEDURE — 99232 SBSQ HOSP IP/OBS MODERATE 35: CPT | Mod: ,,, | Performed by: INTERNAL MEDICINE

## 2017-01-28 PROCEDURE — 25000003 PHARM REV CODE 250: Performed by: HOSPITALIST

## 2017-01-28 PROCEDURE — 25000003 PHARM REV CODE 250: Performed by: NURSE PRACTITIONER

## 2017-01-28 PROCEDURE — 25000003 PHARM REV CODE 250: Performed by: STUDENT IN AN ORGANIZED HEALTH CARE EDUCATION/TRAINING PROGRAM

## 2017-01-28 PROCEDURE — 80053 COMPREHEN METABOLIC PANEL: CPT

## 2017-01-28 PROCEDURE — 83735 ASSAY OF MAGNESIUM: CPT

## 2017-01-28 PROCEDURE — 20600001 HC STEP DOWN PRIVATE ROOM

## 2017-01-28 PROCEDURE — 93005 ELECTROCARDIOGRAM TRACING: CPT

## 2017-01-28 PROCEDURE — 93010 ELECTROCARDIOGRAM REPORT: CPT | Mod: ,,, | Performed by: INTERNAL MEDICINE

## 2017-01-28 RX ADMIN — STANDARDIZED SENNA CONCENTRATE AND DOCUSATE SODIUM 1 TABLET: 8.6; 5 TABLET, FILM COATED ORAL at 09:01

## 2017-01-28 RX ADMIN — SODIUM CHLORIDE 500 ML: 0.9 INJECTION, SOLUTION INTRAVENOUS at 04:01

## 2017-01-28 RX ADMIN — ALLOPURINOL 300 MG: 100 TABLET ORAL at 09:01

## 2017-01-28 RX ADMIN — CIPROFLOXACIN HYDROCHLORIDE 500 MG: 500 TABLET, FILM COATED ORAL at 09:01

## 2017-01-28 RX ADMIN — OXYCODONE HYDROCHLORIDE 5 MG: 5 TABLET ORAL at 09:01

## 2017-01-28 RX ADMIN — ACYCLOVIR 400 MG: 200 CAPSULE ORAL at 09:01

## 2017-01-28 RX ADMIN — Medication 10 ML: at 12:01

## 2017-01-28 RX ADMIN — SODIUM CHLORIDE 500 ML: 0.9 INJECTION, SOLUTION INTRAVENOUS at 11:01

## 2017-01-28 RX ADMIN — SODIUM CHLORIDE: 0.9 INJECTION, SOLUTION INTRAVENOUS at 11:01

## 2017-01-28 RX ADMIN — VORICONAZOLE 200 MG: 200 TABLET, FILM COATED ORAL at 09:01

## 2017-01-28 RX ADMIN — OXYCODONE HYDROCHLORIDE 5 MG: 5 TABLET ORAL at 04:01

## 2017-01-28 RX ADMIN — Medication 10 ML: at 05:01

## 2017-01-28 NOTE — PROGRESS NOTES
Pt with -140, pt sleeping easy to arouse oriented x 4, PS 0/10. Dr Pollack notified state she would order a bolus.

## 2017-01-28 NOTE — PROGRESS NOTES
Dr Rizvi and chg nurse Juan M notified of pt's vs after bolus given. Dr Rizvi and g aware pt is not on telemetry for monitoring, no new orders received.

## 2017-01-28 NOTE — PROGRESS NOTES
Spoke with Dr Pollack concerning pt's elevated HR,I suggested to put pt on telemetry. MD stated no, just to continue to monitor.

## 2017-01-28 NOTE — PROGRESS NOTES
Apical , 500 cc bolus completed. Dr Pollack notified no new orders at this time, will continue to monitor.

## 2017-01-28 NOTE — PROGRESS NOTES
Pt with elevated , pt with no complaint of CP, pt is with c/o muscle ache and oxy IR given. BP, 02 sats, RR and temp wnl. Pt with good appetite, and ambulates. Dr Rizvi notified stated he would write for NS bolus and continue to monitor.

## 2017-01-28 NOTE — PLAN OF CARE
Problem: Patient Care Overview  Goal: Plan of Care Review  Outcome: Ongoing (interventions implemented as appropriate)  Side rails up x2; call bell in place; bed in lowest, locked position; skid proof socks on; no evidence of skin breakdown; care plan explained to patient; pt remains free of injury. Pt ambulates without difficulty, tolerating po and with good appetite, voids, BM x 3, pt stated stool is formed, pt with c/o muscle ache pain, oxy IR x2 given pt stated he had relief.  Pt with -140 Dr Pollack and Dr Rizvi notified, 500 ml bolus x 2 given pt remains with elevated HR. Pt with no complaint of CP or any other discomfort. Will continue to monitor. Pt instructed to call with any complaint of pain or discomfort or any other needs.

## 2017-01-28 NOTE — PROGRESS NOTES
Progress Note  Hematology / Bone Marrow Transplant                                                              Team: Networked reference to record PCT     Patient Name: Marck Howard  YOB: 1980    Admit Date: 1/17/2017                     LOS: 12    SUBJECTIVE:     Reason for Admission:  Acute leukemia  See H&P for detailed initial presentation history and ROS.      Interval history:   -day 10 of 7+3    -afebrile  -continues with sinus tach, given additional 500cc NS over the past 24h  -patient denies chest pains, palpitations, SOB, n/v, abdo pain, constipation/diarrhea, dysuria  -tolerating diet  -NAEON   -no other issues.    Review of System:  Constitutional: Positive fever or chills, negative for anorexia, malaise, night sweats and weight loss  Eyes: no visual changes, negative for irritation and redness  ENT: no nasal congestion or sore throat, negative for epistaxis, sore mouth and sore throat  Respiratory: no cough or shortness of breath, negative for dyspnea on exertion and wheezing  Cardiovascular: no chest pain or palpitations, negative for dyspnea, fatigue and palpitations  Gastrointestinal: no nausea or vomiting, no abdominal pain or change in bowel habits, negative for constipation, diarrhea and melena  Hematologic/Lymphatic: no easy bruising or lymphadenopathy, negative for bleeding  Musculoskeletal: no arthralgias or myalgias  Neurological: no seizures or tremors, negative for headaches and weakness  Behavioral/Psych: negative for anxiety and depression    OBJECTIVE:     Vital Signs Range (Last 24H):  Temp:  [98 °F (36.7 °C)-99.3 °F (37.4 °C)]   Pulse:  [118-136]   Resp:  [15-18]   BP: (100-114)/(66-77)   SpO2:  [95 %-98 %] Body mass index is 15.72 kg/(m^2).    I & O (Last 24H):    Intake/Output Summary (Last 24 hours) at 01/29/17 1028  Last data filed at 01/29/17 0800   Gross per 24 hour   Intake          4985.83 ml   Output             4040 ml   Net           945.83 ml        Physical Exam:  General: well developed, cachectic, no distress  HENT: Head:normocephalic, atraumatic. Ears:right ear normal, left ear normal. Nose: Nares normal. Septum midline. Mucosa normal. No drainage or sinus tenderness., no discharge. Throat: lips, mucosa, and tongue normal; teeth and gums normal and no throat erythema.  Eyes: conjunctivae/corneas clear. PERRL.   Neck: supple, symmetrical, trachea midline  Lungs:  clear to auscultation bilaterally and normal respiratory effort  Cardiovascular: Heart: tachycardic but otherwise regular rhythm, S1, S2 normal, no murmur, click, rub or gallop. Chest Wall: no tenderness. Extremities: no cyanosis or edema, or clubbing.   Abdomen: soft, non-tender non-distented; bowel sounds normal.   Skin: Skin color, texture, turgor normal. No rashes or lesions  Musculoskeletal:no clubbing, cyanosis  Neurologic: Mental status: Alert, oriented, thought content appropriate  Psych/Behavioral:  Normal.    Diagnostic Results:  Lab Results   Component Value Date    WBC 2.77 (L) 01/29/2017    HGB 9.0 (L) 01/29/2017    HCT 26.9 (L) 01/29/2017    MCV 95 01/29/2017    PLT 19 (LL) 01/29/2017       Recent Labs  Lab 01/29/17  0459   GLU 94   *   K 4.0   CL 99   CO2 22*   BUN 17   CREATININE 0.6   CALCIUM 9.0   MG 1.6     Lab Results   Component Value Date    INR 1.2 01/17/2017    INR 1.8 (H) 01/16/2017     No results found for: HGBA1C  Microbiology Results (last 7 days)     Procedure Component Value Units Date/Time    Blood culture [[id number]] Collected:  01/18/17 2023    Order Status:  Completed Specimen:  Blood Updated:  01/23/17 2212     Blood Culture, Routine No growth after 5 days.    Narrative:       Blood cultures x 2 different sites. 4 bottles total. Please  draw cultures before administering antibiotics.    Blood culture [[id number]] Collected:  01/18/17 2023    Order Status:  Completed Specimen:  Blood Updated:  01/23/17 2212     Blood Culture, Routine No growth after 5  days.    Narrative:       Blood cultures from 2 different sites. 4 bottles total.  Please draw before starting antibiotics.        Imaging Results:    CT C/A/P 1/19/17:  1.  Focus of groundglass attenuation with bronchiectasis/developing air bronchograms within the right middle lobe, nonspecific, however could reflect developing infectious process or possibly sequela of aspiration, clinical correlation for pneumonia is advised.  2.  Anterior mediastinum mass, suggesting thymic hyperplasia, clinical correlation advised.  3.  Constipation.  4.  Several additional findings above.    CXR 1/17/17: Heart size normal.  Mild accentuation of interstitial markings.  No significant air space consolidation or pleural effusion    2D Echo 1/17/17:  CONCLUSIONS     1 - Low normal left ventricular systolic function (EF 50-55%).     2 - Normal right ventricular systolic function .     3 - Normal left ventricular diastolic function.     FINAL PATHOLOGIC DIAGNOSIS (1/17/17):  BONE MARROW, RIGHT ILIAC CREST, ASPIRATE, CLOT, AND CORE BIOPSY:  --Markedly hypercellular marrow, essentially 100%, nearly completely replaced by acute myeloid leukemia, see  comment  --Rare background trilineage elements present  --No significant appreciable stainable iron  COMMENT: Concomitantly submitted flow cytometric analysis detects an increased population of blasts consistent  with acute myeloid leukemia, favor non-M3 subtype, although A CD34 positive/HLA-DR negative/equivocal  phenotype, and M3 APL subtype cannot be completely excluded. This increased population of myeloid blasts  shows expression of CD34, CD13, and CD33 (partial). This population also expresses CD 56 and CD7. It is  negative for  and other B and T lymphoid R crest tested including CD19 and cytoplasmic CD3, as well as  TdT. HLA-DR is equivocal.  The overall morphology of these blasts do not suggest acute promyelocytic leukemia. Correlation    ASSESSMENT/PLAN:     Current Problems  List:  Active Hospital Problems    Diagnosis  POA    *Acute leukemia [C95.00]  Yes    Acute myeloid leukemia not having achieved remission [C92.00]  Yes    Fever [R50.9]  Yes    Tobacco abuse [Z72.0]  Yes    HIV (human immunodeficiency virus infection) [Z21]  Yes      Resolved Hospital Problems    Diagnosis Date Resolved POA   No resolved problems to display.       Active Problems, Status & Treatment Plan Addressed Today:    # AML  - leukocytosis likely 2/2 AML and infection (resolving)  - currently no evidence of DIC or tumor lysis  - noted to have new diagnosis of HIV (see below)  - hepatitis and flu negative   - s/p bone marrow biopsy (1/17/17) showing AML, cytogenetics and molecular markers done  - NPM1 and FLT3 negative  - will need transplant given high risk disease  - continue on empiric allopurinol  - 7+3 started 1/20/17, day 10 today  - will need repeat bone marrow next week    # Gram Positive Freddie Bacteremia  - BCx (1/16/17) shows diptheroids, probable contaminant   - BCx (1/17/17) with ngtd  - ID consulted, appreciate recs. Agree that diptheroids are contaminant; no treatment needed.   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   # Thrombocytopenia due to leukemia   - Hgb= 9.0 g/dL, PLT=  19  - transfused 1 unit of PRBC 1/23/17  - no overt signs of bleeding  - likely 2/2 underlying hematologic malignancy  - continue to monitor and transfuse as indicated  - will transfuse for Hgb <7.0, PLT <10    # Constipation, resolved  - likely 2/2 opiate use  - continue on miralax and  senna-docusate  - resolved, continue to monitor    # Tobacco Abuse  - 26 pack year history  - advise smoking cessation   - nicotine patch prn    # H/O Alcohol Abuse  - drank at least 2 beers per day prior to admission  - no h/o DTs and now out of period for withdrawal    # Hyponatremia, resolved  - likely 2/2 dehydration  - continue IVFs  - continue to monitor    # Hypoalbuminemia  - check 24 hour urine protein  - Dietary consulted  - encourage increased nutritional intake     DVT ppx: encourage ambulation  Diet: regular  Code Status: full    DISPO: pending chemotherapy for AML and repeat bone marrow     Dionte Rizvi MD (PGY-4)  Hematology/Oncology Fellow  Will discuss with Dr. Bose (Hematology/Oncology Staff)

## 2017-01-28 NOTE — PROGRESS NOTES
Charge nurse and Dr Rizvi notified of vs, new order for NS 500cc bolus, will recheck when complete. Pt remains asymptomatic.

## 2017-01-28 NOTE — PLAN OF CARE
Problem: Patient Care Overview  Goal: Plan of Care Review  Outcome: Ongoing (interventions implemented as appropriate)  Side rails up x2; call bell in place; bed in lowest, locked position; skid proof socks on; no evidence of skin breakdown; care plan explained to patient; pt remains free of injury. Pt tolerating po, voids, BM x 2, pt with c/o muscle ache pain,  oxy IR and dilaudid iv given pt stated he had relief. Cytarabine and ivf infusing without incident. Pt with -128 Dr Pollack no new orders received. Pt with no complaint of CP or any other discomfort.

## 2017-01-28 NOTE — PLAN OF CARE
Problem: Patient Care Overview  Goal: Plan of Care Review  Outcome: Ongoing (interventions implemented as appropriate)  Patient is AAOx4. Pt is calm and cooperative. Teaching and education performed. Patient remains free from falls and injury this shift. Bed in low, locked position, environment is free of clutter, with call bell in reach. Patient encouraged to call for assistance. Patient verbalized understanding. All belongings within reach. High protein / High calorie diet - great appetite. Pt had HR of 115-120. No N/V throughout shift. Afebrile. Pt C/O pain to upper and lower extremities. Administered Oxy IR once throughout shift. Will continue to monitor.

## 2017-01-29 LAB
ALBUMIN SERPL BCP-MCNC: 2.1 G/DL
ALP SERPL-CCNC: 179 U/L
ALT SERPL W/O P-5'-P-CCNC: 25 U/L
ANION GAP SERPL CALC-SCNC: 9 MMOL/L
ANISOCYTOSIS BLD QL SMEAR: SLIGHT
AST SERPL-CCNC: 13 U/L
BASOPHILS # BLD AUTO: ABNORMAL K/UL
BASOPHILS NFR BLD: 0 %
BILIRUB SERPL-MCNC: 0.3 MG/DL
BLASTS NFR BLD MANUAL: 54 %
BUN SERPL-MCNC: 17 MG/DL
CALCIUM SERPL-MCNC: 9 MG/DL
CHLORIDE SERPL-SCNC: 99 MMOL/L
CO2 SERPL-SCNC: 22 MMOL/L
CREAT SERPL-MCNC: 0.6 MG/DL
DIFFERENTIAL METHOD: ABNORMAL
EOSINOPHIL # BLD AUTO: ABNORMAL K/UL
EOSINOPHIL NFR BLD: 0 %
ERYTHROCYTE [DISTWIDTH] IN BLOOD BY AUTOMATED COUNT: 16.9 %
EST. GFR  (AFRICAN AMERICAN): >60 ML/MIN/1.73 M^2
EST. GFR  (NON AFRICAN AMERICAN): >60 ML/MIN/1.73 M^2
GLUCOSE SERPL-MCNC: 94 MG/DL
HCT VFR BLD AUTO: 26.9 %
HGB BLD-MCNC: 9 G/DL
LYMPHOCYTES # BLD AUTO: ABNORMAL K/UL
LYMPHOCYTES NFR BLD: 43 %
MAGNESIUM SERPL-MCNC: 1.6 MG/DL
MCH RBC QN AUTO: 31.7 PG
MCHC RBC AUTO-ENTMCNC: 33.5 %
MCV RBC AUTO: 95 FL
MONOCYTES # BLD AUTO: ABNORMAL K/UL
MONOCYTES NFR BLD: 3 %
NEUTROPHILS NFR BLD: 0 %
PHOSPHATE SERPL-MCNC: 4.3 MG/DL
PLATELET # BLD AUTO: 19 K/UL
PLATELET BLD QL SMEAR: ABNORMAL
PMV BLD AUTO: ABNORMAL FL
POTASSIUM SERPL-SCNC: 4 MMOL/L
PROT SERPL-MCNC: 7.3 G/DL
RBC # BLD AUTO: 2.84 M/UL
SODIUM SERPL-SCNC: 130 MMOL/L
WBC # BLD AUTO: 2.77 K/UL

## 2017-01-29 PROCEDURE — 25000003 PHARM REV CODE 250: Performed by: STUDENT IN AN ORGANIZED HEALTH CARE EDUCATION/TRAINING PROGRAM

## 2017-01-29 PROCEDURE — 85007 BL SMEAR W/DIFF WBC COUNT: CPT

## 2017-01-29 PROCEDURE — 25000003 PHARM REV CODE 250: Performed by: NURSE PRACTITIONER

## 2017-01-29 PROCEDURE — 80053 COMPREHEN METABOLIC PANEL: CPT

## 2017-01-29 PROCEDURE — 25000003 PHARM REV CODE 250: Performed by: INTERNAL MEDICINE

## 2017-01-29 PROCEDURE — 84100 ASSAY OF PHOSPHORUS: CPT

## 2017-01-29 PROCEDURE — 20600001 HC STEP DOWN PRIVATE ROOM

## 2017-01-29 PROCEDURE — 83735 ASSAY OF MAGNESIUM: CPT

## 2017-01-29 PROCEDURE — 99232 SBSQ HOSP IP/OBS MODERATE 35: CPT | Mod: ,,, | Performed by: INTERNAL MEDICINE

## 2017-01-29 PROCEDURE — 85027 COMPLETE CBC AUTOMATED: CPT

## 2017-01-29 PROCEDURE — 63600175 PHARM REV CODE 636 W HCPCS: Performed by: STUDENT IN AN ORGANIZED HEALTH CARE EDUCATION/TRAINING PROGRAM

## 2017-01-29 RX ORDER — MAGNESIUM SULFATE HEPTAHYDRATE 40 MG/ML
2 INJECTION, SOLUTION INTRAVENOUS ONCE
Status: COMPLETED | OUTPATIENT
Start: 2017-01-29 | End: 2017-01-29

## 2017-01-29 RX ADMIN — CIPROFLOXACIN HYDROCHLORIDE 500 MG: 500 TABLET, FILM COATED ORAL at 08:01

## 2017-01-29 RX ADMIN — MAGNESIUM SULFATE IN WATER 2 G: 40 INJECTION, SOLUTION INTRAVENOUS at 11:01

## 2017-01-29 RX ADMIN — ACYCLOVIR 400 MG: 200 CAPSULE ORAL at 08:01

## 2017-01-29 RX ADMIN — OXYCODONE HYDROCHLORIDE 5 MG: 5 TABLET ORAL at 07:01

## 2017-01-29 RX ADMIN — SODIUM CHLORIDE: 0.9 INJECTION, SOLUTION INTRAVENOUS at 07:01

## 2017-01-29 RX ADMIN — VORICONAZOLE 200 MG: 200 TABLET, FILM COATED ORAL at 08:01

## 2017-01-29 RX ADMIN — ALLOPURINOL 300 MG: 100 TABLET ORAL at 08:01

## 2017-01-29 NOTE — PLAN OF CARE
Pt is AAOx4; afebrile; vital signs stable. Heart rate has been in the 120's-130's. Pt denied any pain today. NS continues at 50/hr. He is eating % of meals. He is up independently. Mg replaced IV.

## 2017-01-29 NOTE — PROGRESS NOTES
01/28/17 1924   Vital Signs   Temp 99.2 °F (37.3 °C)   Temp src Oral   Pulse (!) 135   Heart Rate Source Monitor   Resp 18   SpO2 96 %   Pulse Oximetry Type Intermittent   O2 Device (Oxygen Therapy) room air   /77   MAP (mmHg) 82   BP Location Left arm   BP Method Automatic   Patient Position Lying     Performed manual radial pulse of 131. Notified Dr. Dao. Doctor has no further orders at this time. Pt is not complaining of any chest pain. Will continue to monitor.

## 2017-01-29 NOTE — PROGRESS NOTES
BMT on call Dr Dao notified 12 lead EKG was completed. MD stated he would be up to assess pt and review EKG.

## 2017-01-29 NOTE — PLAN OF CARE
Problem: Patient Care Overview  Goal: Plan of Care Review  Outcome: Ongoing (interventions implemented as appropriate)  Patient is AAOx4. Pt is calm and cooperative. Teaching and education performed. Patient remains free from falls and injury this shift. Bed in low, locked position, environment is free of clutter, with call bell in reach. Patient encouraged to call for assistance. Patient verbalized understanding. All belongings within reach. High protein / High calorie diet - great appetite. Elevated HR overnight of 135. Physician notified. No N/V throughout shift. Afebrile. Pt C/O muscular pain to upper and lower extremities. No pain medications given throughout shift. Will continue to monitor.

## 2017-01-30 LAB
ABO + RH BLD: NORMAL
ALBUMIN SERPL BCP-MCNC: 2 G/DL
ALP SERPL-CCNC: 165 U/L
ALT SERPL W/O P-5'-P-CCNC: 23 U/L
ANION GAP SERPL CALC-SCNC: 7 MMOL/L
ANISOCYTOSIS BLD QL SMEAR: SLIGHT
AST SERPL-CCNC: 12 U/L
BASOPHILS # BLD AUTO: ABNORMAL K/UL
BASOPHILS NFR BLD: 0 %
BILIRUB SERPL-MCNC: 0.2 MG/DL
BLASTS NFR BLD MANUAL: 30 %
BLD GP AB SCN CELLS X3 SERPL QL: NORMAL
BLD PROD TYP BPU: NORMAL
BLOOD UNIT EXPIRATION DATE: NORMAL
BLOOD UNIT TYPE CODE: 5100
BLOOD UNIT TYPE: NORMAL
BUN SERPL-MCNC: 23 MG/DL
CALCIUM SERPL-MCNC: 8.6 MG/DL
CHLORIDE SERPL-SCNC: 104 MMOL/L
CO2 SERPL-SCNC: 23 MMOL/L
CODING SYSTEM: NORMAL
CREAT SERPL-MCNC: 0.7 MG/DL
DIFFERENTIAL METHOD: ABNORMAL
DISPENSE STATUS: NORMAL
EOSINOPHIL # BLD AUTO: ABNORMAL K/UL
EOSINOPHIL NFR BLD: 0 %
ERYTHROCYTE [DISTWIDTH] IN BLOOD BY AUTOMATED COUNT: 16.8 %
EST. GFR  (AFRICAN AMERICAN): >60 ML/MIN/1.73 M^2
EST. GFR  (NON AFRICAN AMERICAN): >60 ML/MIN/1.73 M^2
GLUCOSE SERPL-MCNC: 97 MG/DL
HCT VFR BLD AUTO: 23.7 %
HGB BLD-MCNC: 8.1 G/DL
LDH SERPL L TO P-CCNC: 301 U/L
LYMPHOCYTES # BLD AUTO: ABNORMAL K/UL
LYMPHOCYTES NFR BLD: 69 %
MAGNESIUM SERPL-MCNC: 1.7 MG/DL
MCH RBC QN AUTO: 30.8 PG
MCHC RBC AUTO-ENTMCNC: 34.2 %
MCV RBC AUTO: 94 FL
MONOCYTES # BLD AUTO: ABNORMAL K/UL
MONOCYTES NFR BLD: 0 %
NEUTROPHILS NFR BLD: 1 %
NUM UNITS TRANS WBC-POOR PLATPHERESIS: NORMAL
PHOSPHATE SERPL-MCNC: 4.1 MG/DL
PLATELET # BLD AUTO: 7 K/UL
PLATELET BLD QL SMEAR: ABNORMAL
PMV BLD AUTO: ABNORMAL FL
POTASSIUM SERPL-SCNC: 4.1 MMOL/L
PROT SERPL-MCNC: 6.9 G/DL
RBC # BLD AUTO: 2.63 M/UL
SODIUM SERPL-SCNC: 134 MMOL/L
URATE SERPL-MCNC: 2.4 MG/DL
WBC # BLD AUTO: 2.14 K/UL

## 2017-01-30 PROCEDURE — 86900 BLOOD TYPING SEROLOGIC ABO: CPT

## 2017-01-30 PROCEDURE — 99232 SBSQ HOSP IP/OBS MODERATE 35: CPT | Mod: ,,, | Performed by: INTERNAL MEDICINE

## 2017-01-30 PROCEDURE — 86920 COMPATIBILITY TEST SPIN: CPT

## 2017-01-30 PROCEDURE — 20600001 HC STEP DOWN PRIVATE ROOM

## 2017-01-30 PROCEDURE — 36430 TRANSFUSION BLD/BLD COMPNT: CPT

## 2017-01-30 PROCEDURE — P9037 PLATE PHERES LEUKOREDU IRRAD: HCPCS

## 2017-01-30 PROCEDURE — 86901 BLOOD TYPING SEROLOGIC RH(D): CPT

## 2017-01-30 PROCEDURE — 85027 COMPLETE CBC AUTOMATED: CPT

## 2017-01-30 PROCEDURE — 80053 COMPREHEN METABOLIC PANEL: CPT

## 2017-01-30 PROCEDURE — 84100 ASSAY OF PHOSPHORUS: CPT

## 2017-01-30 PROCEDURE — 25000003 PHARM REV CODE 250: Performed by: INTERNAL MEDICINE

## 2017-01-30 PROCEDURE — 83615 LACTATE (LD) (LDH) ENZYME: CPT

## 2017-01-30 PROCEDURE — 63600175 PHARM REV CODE 636 W HCPCS: Performed by: HOSPITALIST

## 2017-01-30 PROCEDURE — 85007 BL SMEAR W/DIFF WBC COUNT: CPT

## 2017-01-30 PROCEDURE — 83735 ASSAY OF MAGNESIUM: CPT

## 2017-01-30 PROCEDURE — 25000003 PHARM REV CODE 250: Performed by: NURSE PRACTITIONER

## 2017-01-30 PROCEDURE — 25000003 PHARM REV CODE 250: Performed by: STUDENT IN AN ORGANIZED HEALTH CARE EDUCATION/TRAINING PROGRAM

## 2017-01-30 PROCEDURE — 84550 ASSAY OF BLOOD/URIC ACID: CPT

## 2017-01-30 RX ORDER — HYDROCODONE BITARTRATE AND ACETAMINOPHEN 500; 5 MG/1; MG/1
TABLET ORAL
Status: DISCONTINUED | OUTPATIENT
Start: 2017-01-30 | End: 2017-01-31

## 2017-01-30 RX ORDER — DIPHENHYDRAMINE HYDROCHLORIDE 50 MG/ML
25 INJECTION INTRAMUSCULAR; INTRAVENOUS
Status: DISCONTINUED | OUTPATIENT
Start: 2017-01-30 | End: 2017-01-30

## 2017-01-30 RX ORDER — DIPHENHYDRAMINE HYDROCHLORIDE 50 MG/ML
25 INJECTION INTRAMUSCULAR; INTRAVENOUS
Status: COMPLETED | OUTPATIENT
Start: 2017-01-30 | End: 2017-01-30

## 2017-01-30 RX ADMIN — VORICONAZOLE 200 MG: 200 TABLET, FILM COATED ORAL at 08:01

## 2017-01-30 RX ADMIN — CIPROFLOXACIN HYDROCHLORIDE 500 MG: 500 TABLET, FILM COATED ORAL at 08:01

## 2017-01-30 RX ADMIN — POLYETHYLENE GLYCOL 3350 17 G: 17 POWDER, FOR SOLUTION ORAL at 08:01

## 2017-01-30 RX ADMIN — VORICONAZOLE 200 MG: 200 TABLET, FILM COATED ORAL at 09:01

## 2017-01-30 RX ADMIN — OXYCODONE HYDROCHLORIDE 5 MG: 5 TABLET ORAL at 03:01

## 2017-01-30 RX ADMIN — STANDARDIZED SENNA CONCENTRATE AND DOCUSATE SODIUM 1 TABLET: 8.6; 5 TABLET, FILM COATED ORAL at 08:01

## 2017-01-30 RX ADMIN — SODIUM CHLORIDE: 0.9 INJECTION, SOLUTION INTRAVENOUS at 03:01

## 2017-01-30 RX ADMIN — ACYCLOVIR 400 MG: 200 CAPSULE ORAL at 09:01

## 2017-01-30 RX ADMIN — DIPHENHYDRAMINE HYDROCHLORIDE 25 MG: 50 INJECTION, SOLUTION INTRAMUSCULAR; INTRAVENOUS at 09:01

## 2017-01-30 RX ADMIN — STANDARDIZED SENNA CONCENTRATE AND DOCUSATE SODIUM 1 TABLET: 8.6; 5 TABLET, FILM COATED ORAL at 09:01

## 2017-01-30 RX ADMIN — OXYCODONE HYDROCHLORIDE 5 MG: 5 TABLET ORAL at 09:01

## 2017-01-30 RX ADMIN — ACETAMINOPHEN 650 MG: 325 TABLET ORAL at 09:01

## 2017-01-30 RX ADMIN — ALLOPURINOL 300 MG: 100 TABLET ORAL at 08:01

## 2017-01-30 RX ADMIN — CIPROFLOXACIN HYDROCHLORIDE 500 MG: 500 TABLET, FILM COATED ORAL at 09:01

## 2017-01-30 RX ADMIN — ACYCLOVIR 400 MG: 200 CAPSULE ORAL at 08:01

## 2017-01-30 RX ADMIN — Medication 10 ML: at 11:01

## 2017-01-30 NOTE — NURSING
Patient received 1 unit of platelets at this time, premeds of tylenol 650mg po and benadryl 25 mg iv given prior to platelets.  No signs/symptoms of reactions noted.

## 2017-01-30 NOTE — PLAN OF CARE
Problem: Patient Care Overview  Goal: Plan of Care Review  Outcome: Ongoing (interventions implemented as appropriate)  Plan of care reviewed with patient .  FAll precautions maintained, side rails up x2, call light in reach, bed in low position, nonkid socks on.  Iv fluids infusing without difficulty.  Afebrile.  Ambulating , voiding , and tolerating a diet without difficulty.  Complains of pain in his legs and requesting pain medication.  Patient enjoy eating out with his kids and watching movies.

## 2017-01-30 NOTE — PROGRESS NOTES
[facility]  Adult Nutrition  Consult Note    SUMMARY     Recommendations    Recommendation/Intervention: 1. Continue high protein/high calorie diet with double portions. Continue ONS and promite po intake >75%. RD will continue to monitor.   Goals: Pt will meet >85% EEN with po intake + ONS  Nutrition Goal Status: goal met       Continuum of Care Plan    Referral to Outpatient Services: other (see comments) (Nutrition D/C Planning: high tiago/protein + ONS)    Reason for Assessment    Reason for Assessment: RD follow-up  Diagnosis: cancer diagnosis/related complications (acute leukemia)  Relevent Medical History: No PMH   Interdisciplinary Rounds: attended     General Information Comments: Pt continues with a good appetite and no nutrition related complaints. Denies N/V.    Nutrition Prescription Ordered    Current Diet Order: High protein - high calorie   Nutrition Order Comments: double portions           Oral Nutrition Supplement: Boost Plus     Evaluation of Received Nutrients/Fluid Intake     Oral Fluid (mL): 1097   IV Fluid (mL): 1500       Nutrition Risk Screen     Nutrition Risk Screen: no indicators present    Nutrition/Diet History    Patient Reported Diet/Restrictions/Preferences: general  Typical Food/Fluid Intake: good po intake  Food Preferences:  (no cultural or Druze needs identified)        Factors Affecting Nutritional Intake:  (-)     Labs/Tests/Procedures/Meds       Pertinent Labs Reviewed: reviewed  Pertinent Labs Comments: Na 134, K 4.1, BUN 23, Cr 0.7  Pertinent Medications Reviewed: reviewed  Pertinent Medications Comments: allopurinol, cefepime, vancomycin     Physical Findings    Overall Physical Appearance: generalized wasting, loss of muscle mass, loss of subcutaneous fat, underweight  Tubes:  (-)  Oral/Mouth Cavity: WDL  Skin: intact    Anthropometrics       Height (inches): 64.02 in  Weight Method: Bed Scale  Weight (kg): 41.9 kg  Ideal Body Weight (IBW), Male:  130.12 lb     % Ideal Body Weight, Male (lb): 70.99 lb     BMI (kg/m2): 15.85  BMI Grade: less than 16 protein-energy malnutrition grade III        % Weight Change: 15.4 kg (27% x2-3 months)  Weight Loss: unintentional        Estimated/Assessed Needs    Weight Used For Calorie Calculations: 44.6 kg (98 lb 5.2 oz)   Height (cm): 162.6 cm     Energy Need Method: Kcal/kg (1989-0221 kcal (45-50 kcal/kg))     RMR ([place]-St. Jeor Equation): 1252.87        Weight Used For Protein Calculations: 44.6 kg (98 lb 5.2 oz)  Protein Requirements: 67-80 g    Fluid Need Method: RDA Method (or per MD)     Malnutrition (Undernutrition) Diagnosis    % Intake of Estimated Energy Needs: 75 - 100%  % Meal Intake: 100%  Malnutrition Reason: illness related, chronic      Severe Wt. Loss Chronic Illness: greater than 7.5% in 3 months    Nutrition Diagnosis    Nutrition Problem: Increased nutrient needs  Etiology/Related To: acute leukemia  Nutrition Diagnosis Signs/Symptoms As Evidenced By: increased EEN + EPN  Nutrition Diagnosis Status: Improving    Monitor and Evaluation    Food and Nutrient Intake: energy intake  Food and Nutrient Adminstration: diet order     Physical Activity and Function: nutrition-related ADLs and IADLs  Anthropometric Measurements: weight, weight change, body mass index  Biochemical Data, Medical Tests and Procedures: electrolyte and renal panel, glucose/endocrine profile, inflammatory profile  Nutrition-Focused Physical Findings: overall appearance    Nutrition Risk    Level of Risk: moderate    Nutrition Follow-Up    RD Follow-up?: Yes

## 2017-01-30 NOTE — PROGRESS NOTES
Progress Note  Hematology / Bone Marrow Transplant                                                              Team: Networked reference to record PCT     Patient Name: Marck Howard  YOB: 1980    Admit Date: 1/17/2017                     LOS: 13    SUBJECTIVE:     Reason for Admission:  Acute leukemia  See H&P for detailed initial presentation history and ROS.      Interval history:   -day 11 of 7+3    -afebrile  -continues with sinus tach, -130s  -patient denies chest pains, palpitations, SOB, n/v, abdo pain, constipation/diarrhea, dysuria  -tolerating diet  -NAEON   -no other issues.    Review of System:  Constitutional: Positive fever or chills, negative for anorexia, malaise, night sweats and weight loss  Eyes: no visual changes, negative for irritation and redness  ENT: no nasal congestion or sore throat, negative for epistaxis, sore mouth and sore throat  Respiratory: no cough or shortness of breath, negative for dyspnea on exertion and wheezing  Cardiovascular: no chest pain or palpitations, negative for dyspnea, fatigue and palpitations  Gastrointestinal: no nausea or vomiting, no abdominal pain or change in bowel habits, negative for constipation, diarrhea and melena  Hematologic/Lymphatic: no easy bruising or lymphadenopathy, negative for bleeding  Musculoskeletal: no arthralgias or myalgias  Neurological: no seizures or tremors, negative for headaches and weakness  Behavioral/Psych: negative for anxiety and depression    OBJECTIVE:     Vital Signs Range (Last 24H):  Temp:  [98 °F (36.7 °C)-99.6 °F (37.6 °C)]   Pulse:  [125-138]   Resp:  [16-18]   BP: (101-108)/(59-75)   SpO2:  [97 %-99 %] Body mass index is 15.91 kg/(m^2).    I & O (Last 24H):    Intake/Output Summary (Last 24 hours) at 01/30/17 0633  Last data filed at 01/30/17 0329   Gross per 24 hour   Intake          2709.16 ml   Output             2800 ml   Net           -90.84 ml       Physical Exam:  General: well  developed, cachectic, no distress  HENT: Head:normocephalic, atraumatic. Ears:right ear normal, left ear normal. Nose: Nares normal. Septum midline. Mucosa normal. No drainage or sinus tenderness., no discharge. Throat: lips, mucosa, and tongue normal; teeth and gums normal and no throat erythema.  Eyes: conjunctivae/corneas clear. PERRL.   Neck: supple, symmetrical, trachea midline  Lungs:  clear to auscultation bilaterally and normal respiratory effort  Cardiovascular: Heart: tachycardic but otherwise regular rhythm, S1, S2 normal, no murmur, click, rub or gallop. Chest Wall: no tenderness. Extremities: no cyanosis or edema, or clubbing.   Abdomen: soft, non-tender non-distented; bowel sounds normal.   Skin: Skin color, texture, turgor normal. No rashes or lesions  Musculoskeletal:no clubbing, cyanosis  Neurologic: Mental status: Alert, oriented, thought content appropriate  Psych/Behavioral:  Normal.    Diagnostic Results:  Lab Results   Component Value Date    WBC 2.14 (L) 01/30/2017    HGB 8.1 (L) 01/30/2017    HCT 23.7 (L) 01/30/2017    MCV 94 01/30/2017    PLT 7 (LL) 01/30/2017       Recent Labs  Lab 01/30/17  0336   GLU 97   *   K 4.1      CO2 23   BUN 23*   CREATININE 0.7   CALCIUM 8.6*   MG 1.7     Lab Results   Component Value Date    INR 1.2 01/17/2017    INR 1.8 (H) 01/16/2017     No results found for: HGBA1C  Microbiology Results (last 7 days)     Procedure Component Value Units Date/Time    Blood culture [[id number]] Collected:  01/18/17 2023    Order Status:  Completed Specimen:  Blood Updated:  01/23/17 2212     Blood Culture, Routine No growth after 5 days.    Narrative:       Blood cultures x 2 different sites. 4 bottles total. Please  draw cultures before administering antibiotics.    Blood culture [[id number]] Collected:  01/18/17 2023    Order Status:  Completed Specimen:  Blood Updated:  01/23/17 2212     Blood Culture, Routine No growth after 5 days.    Narrative:       Blood  cultures from 2 different sites. 4 bottles total.  Please draw before starting antibiotics.        Imaging Results:    CT C/A/P 1/19/17:  1.  Focus of groundglass attenuation with bronchiectasis/developing air bronchograms within the right middle lobe, nonspecific, however could reflect developing infectious process or possibly sequela of aspiration, clinical correlation for pneumonia is advised.  2.  Anterior mediastinum mass, suggesting thymic hyperplasia, clinical correlation advised.  3.  Constipation.  4.  Several additional findings above.    CXR 1/17/17: Heart size normal.  Mild accentuation of interstitial markings.  No significant air space consolidation or pleural effusion    2D Echo 1/17/17:  CONCLUSIONS     1 - Low normal left ventricular systolic function (EF 50-55%).     2 - Normal right ventricular systolic function .     3 - Normal left ventricular diastolic function.     FINAL PATHOLOGIC DIAGNOSIS (1/17/17):  BONE MARROW, RIGHT ILIAC CREST, ASPIRATE, CLOT, AND CORE BIOPSY:  --Markedly hypercellular marrow, essentially 100%, nearly completely replaced by acute myeloid leukemia, see  comment  --Rare background trilineage elements present  --No significant appreciable stainable iron  COMMENT: Concomitantly submitted flow cytometric analysis detects an increased population of blasts consistent  with acute myeloid leukemia, favor non-M3 subtype, although A CD34 positive/HLA-DR negative/equivocal  phenotype, and M3 APL subtype cannot be completely excluded. This increased population of myeloid blasts  shows expression of CD34, CD13, and CD33 (partial). This population also expresses CD 56 and CD7. It is  negative for  and other B and T lymphoid R crest tested including CD19 and cytoplasmic CD3, as well as  TdT. HLA-DR is equivocal.  The overall morphology of these blasts do not suggest acute promyelocytic leukemia. Correlation    ASSESSMENT/PLAN:     Current Problems List:  Active Hospital Problems     Diagnosis  POA    *Acute leukemia [C95.00]  Yes    Acute myeloid leukemia not having achieved remission [C92.00]  Yes    Fever [R50.9]  Yes    Tobacco abuse [Z72.0]  Yes    HIV (human immunodeficiency virus infection) [Z21]  Yes      Resolved Hospital Problems    Diagnosis Date Resolved POA   No resolved problems to display.       Active Problems, Status & Treatment Plan Addressed Today:    # AML  - leukocytosis likely 2/2 AML and infection (resolving)  - currently no evidence of DIC or tumor lysis  - noted to have new diagnosis of HIV (see below)  - hepatitis and flu negative   - s/p bone marrow biopsy (1/17/17) showing AML, cytogenetics and molecular markers done  - NPM1 and FLT3 negative  - will need transplant given high risk disease  - continue on empiric allopurinol  - 7+3 started 1/20/17, day 11 today  - will need repeat bone marrow day 14 (2/2/2017)    #Tachycardia  - side effect of chemo?  - not floridly septic (?source, afebrile, Cx -ve)  - EKG x2 showed NSR.  - Patient is asymptomatic   - CTM     # Gram Positive Freddie Bacteremia  - BCx (1/16/17) shows diptheroids, probable contaminant   - BCx (1/17/17) with ngtd  - ID consulted, appreciate recs. Agree that diptheroids are contaminant; no treatment needed.   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   # Thrombocytopenia due to leukemia   - Hgb= 8.1 g/dL, PLT=  7  - transfused 1 unit of PRBC 1/23/17, 1 unit of PLTs 1/30  - no overt signs of bleeding  - likely 2/2 underlying hematologic malignancy  - continue to monitor and transfuse as indicated for  Hgb <7.0, PLT <10    # Constipation (resolved)  - likely 2/2 opiate use  - continue on miralax and senna-docusate  - resolved, continue to monitor     # Tobacco Abuse  - 26 pack year history  - advise smoking cessation   - nicotine patch prn    # H/O Alcohol Abuse  - drank at least 2 beers per day prior to admission  - no h/o DTs and now out of period for withdrawal    # Hyponatremia (resolving)  - likely 2/2 dehydration  - continue IVFs  - continue to monitor    # Hypoalbuminemia  - check 24 hour urine protein  - Dietary consulted  - encourage increased nutritional intake     DVT ppx: encourage ambulation  Diet: regular  Code Status: full    DISPO: pending chemotherapy for AML and repeat bone marrow     Criss Pollack MD  PGY-3  [phone]

## 2017-01-31 LAB
ALBUMIN SERPL BCP-MCNC: 2.1 G/DL
ALBUMIN SERPL BCP-MCNC: 2.2 G/DL
ALP SERPL-CCNC: 164 U/L
ALP SERPL-CCNC: 180 U/L
ALT SERPL W/O P-5'-P-CCNC: 22 U/L
ALT SERPL W/O P-5'-P-CCNC: 25 U/L
ANION GAP SERPL CALC-SCNC: 5 MMOL/L
ANION GAP SERPL CALC-SCNC: 9 MMOL/L
ANISOCYTOSIS BLD QL SMEAR: SLIGHT
ANISOCYTOSIS BLD QL SMEAR: SLIGHT
AST SERPL-CCNC: 12 U/L
AST SERPL-CCNC: 18 U/L
BASOPHILS # BLD AUTO: ABNORMAL K/UL
BASOPHILS # BLD AUTO: ABNORMAL K/UL
BASOPHILS NFR BLD: 0 %
BASOPHILS NFR BLD: 0 %
BILIRUB SERPL-MCNC: 0.2 MG/DL
BILIRUB SERPL-MCNC: 0.2 MG/DL
BLASTS NFR BLD MANUAL: 40.7 %
BLASTS NFR BLD MANUAL: 47.4 %
BLD PROD TYP BPU: NORMAL
BLOOD UNIT EXPIRATION DATE: NORMAL
BLOOD UNIT TYPE CODE: 5100
BLOOD UNIT TYPE: NORMAL
BUN SERPL-MCNC: 14 MG/DL
BUN SERPL-MCNC: 16 MG/DL
CALCIUM SERPL-MCNC: 8.6 MG/DL
CALCIUM SERPL-MCNC: 8.8 MG/DL
CHLORIDE SERPL-SCNC: 98 MMOL/L
CHLORIDE SERPL-SCNC: 99 MMOL/L
CO2 SERPL-SCNC: 23 MMOL/L
CO2 SERPL-SCNC: 26 MMOL/L
CODING SYSTEM: NORMAL
CREAT SERPL-MCNC: 0.6 MG/DL
CREAT SERPL-MCNC: 0.7 MG/DL
DACRYOCYTES BLD QL SMEAR: ABNORMAL
DIFFERENTIAL METHOD: ABNORMAL
DIFFERENTIAL METHOD: ABNORMAL
DISPENSE STATUS: NORMAL
EOSINOPHIL # BLD AUTO: ABNORMAL K/UL
EOSINOPHIL # BLD AUTO: ABNORMAL K/UL
EOSINOPHIL NFR BLD: 0 %
EOSINOPHIL NFR BLD: 0 %
ERYTHROCYTE [DISTWIDTH] IN BLOOD BY AUTOMATED COUNT: 16.5 %
ERYTHROCYTE [DISTWIDTH] IN BLOOD BY AUTOMATED COUNT: 16.7 %
EST. GFR  (AFRICAN AMERICAN): >60 ML/MIN/1.73 M^2
EST. GFR  (AFRICAN AMERICAN): >60 ML/MIN/1.73 M^2
EST. GFR  (NON AFRICAN AMERICAN): >60 ML/MIN/1.73 M^2
EST. GFR  (NON AFRICAN AMERICAN): >60 ML/MIN/1.73 M^2
GLUCOSE SERPL-MCNC: 97 MG/DL
GLUCOSE SERPL-MCNC: 98 MG/DL
HCT VFR BLD AUTO: 19.6 %
HCT VFR BLD AUTO: 23.1 %
HGB BLD-MCNC: 6.7 G/DL
HGB BLD-MCNC: 7.9 G/DL
HIV RT+PR MUT TESTED ISLT: NORMAL
HYPOCHROMIA BLD QL SMEAR: ABNORMAL
HYPOCHROMIA BLD QL SMEAR: ABNORMAL
LYMPHOCYTES # BLD AUTO: ABNORMAL K/UL
LYMPHOCYTES # BLD AUTO: ABNORMAL K/UL
LYMPHOCYTES NFR BLD: 52.6 %
LYMPHOCYTES NFR BLD: 57.6 %
MAGNESIUM SERPL-MCNC: 1.4 MG/DL
MCH RBC QN AUTO: 31.6 PG
MCH RBC QN AUTO: 31.6 PG
MCHC RBC AUTO-ENTMCNC: 34.2 %
MCHC RBC AUTO-ENTMCNC: 34.2 %
MCV RBC AUTO: 92 FL
MCV RBC AUTO: 93 FL
MONOCYTES # BLD AUTO: ABNORMAL K/UL
MONOCYTES # BLD AUTO: ABNORMAL K/UL
MONOCYTES NFR BLD: 0 %
MONOCYTES NFR BLD: 1.7 %
NEUTROPHILS NFR BLD: 0 %
NEUTROPHILS NFR BLD: 0 %
NUM UNITS TRANS PACKED RBC: NORMAL
OVALOCYTES BLD QL SMEAR: ABNORMAL
PHOSPHATE SERPL-MCNC: 4 MG/DL
PLATELET # BLD AUTO: 41 K/UL
PLATELET # BLD AUTO: 48 K/UL
PLATELET BLD QL SMEAR: ABNORMAL
PMV BLD AUTO: 11 FL
PMV BLD AUTO: 11.4 FL
POIKILOCYTOSIS BLD QL SMEAR: SLIGHT
POLYCHROMASIA BLD QL SMEAR: ABNORMAL
POLYCHROMASIA BLD QL SMEAR: ABNORMAL
POTASSIUM SERPL-SCNC: 3.9 MMOL/L
POTASSIUM SERPL-SCNC: 4 MMOL/L
PROT SERPL-MCNC: 7 G/DL
PROT SERPL-MCNC: 7.1 G/DL
RBC # BLD AUTO: 2.12 M/UL
RBC # BLD AUTO: 2.5 M/UL
SODIUM SERPL-SCNC: 130 MMOL/L
SODIUM SERPL-SCNC: 130 MMOL/L
WBC # BLD AUTO: 1.47 K/UL
WBC # BLD AUTO: 1.52 K/UL

## 2017-01-31 PROCEDURE — 84100 ASSAY OF PHOSPHORUS: CPT

## 2017-01-31 PROCEDURE — 87205 SMEAR GRAM STAIN: CPT

## 2017-01-31 PROCEDURE — 25000003 PHARM REV CODE 250: Performed by: INTERNAL MEDICINE

## 2017-01-31 PROCEDURE — 25000003 PHARM REV CODE 250: Performed by: NURSE PRACTITIONER

## 2017-01-31 PROCEDURE — 85025 COMPLETE CBC W/AUTO DIFF WBC: CPT

## 2017-01-31 PROCEDURE — 83735 ASSAY OF MAGNESIUM: CPT

## 2017-01-31 PROCEDURE — 81003 URINALYSIS AUTO W/O SCOPE: CPT

## 2017-01-31 PROCEDURE — 85027 COMPLETE CBC AUTOMATED: CPT

## 2017-01-31 PROCEDURE — 93005 ELECTROCARDIOGRAM TRACING: CPT

## 2017-01-31 PROCEDURE — 80053 COMPREHEN METABOLIC PANEL: CPT

## 2017-01-31 PROCEDURE — 63600175 PHARM REV CODE 636 W HCPCS: Performed by: STUDENT IN AN ORGANIZED HEALTH CARE EDUCATION/TRAINING PROGRAM

## 2017-01-31 PROCEDURE — 93010 ELECTROCARDIOGRAM REPORT: CPT | Mod: ,,, | Performed by: INTERNAL MEDICINE

## 2017-01-31 PROCEDURE — 99232 SBSQ HOSP IP/OBS MODERATE 35: CPT | Mod: ,,, | Performed by: INTERNAL MEDICINE

## 2017-01-31 PROCEDURE — 85007 BL SMEAR W/DIFF WBC COUNT: CPT

## 2017-01-31 PROCEDURE — 36415 COLL VENOUS BLD VENIPUNCTURE: CPT

## 2017-01-31 PROCEDURE — 87040 BLOOD CULTURE FOR BACTERIA: CPT

## 2017-01-31 PROCEDURE — P9040 RBC LEUKOREDUCED IRRADIATED: HCPCS

## 2017-01-31 PROCEDURE — 80053 COMPREHEN METABOLIC PANEL: CPT | Mod: 91

## 2017-01-31 PROCEDURE — 20600001 HC STEP DOWN PRIVATE ROOM

## 2017-01-31 PROCEDURE — 25000003 PHARM REV CODE 250: Performed by: STUDENT IN AN ORGANIZED HEALTH CARE EDUCATION/TRAINING PROGRAM

## 2017-01-31 PROCEDURE — 87086 URINE CULTURE/COLONY COUNT: CPT

## 2017-01-31 PROCEDURE — 63600175 PHARM REV CODE 636 W HCPCS: Performed by: INTERNAL MEDICINE

## 2017-01-31 RX ORDER — SODIUM,POTASSIUM PHOSPHATES 280-250MG
1 POWDER IN PACKET (EA) ORAL EVERY 4 HOURS PRN
Status: DISCONTINUED | OUTPATIENT
Start: 2017-01-31 | End: 2017-02-24

## 2017-01-31 RX ORDER — SODIUM,POTASSIUM PHOSPHATES 280-250MG
2 POWDER IN PACKET (EA) ORAL EVERY 4 HOURS PRN
Status: DISCONTINUED | OUTPATIENT
Start: 2017-01-31 | End: 2017-02-24

## 2017-01-31 RX ORDER — LANOLIN ALCOHOL/MO/W.PET/CERES
400 CREAM (GRAM) TOPICAL EVERY 4 HOURS PRN
Status: DISCONTINUED | OUTPATIENT
Start: 2017-01-31 | End: 2017-02-23

## 2017-01-31 RX ORDER — CEFEPIME HYDROCHLORIDE 2 G/50ML
2 INJECTION, SOLUTION INTRAVENOUS
Status: DISCONTINUED | OUTPATIENT
Start: 2017-01-31 | End: 2017-02-06

## 2017-01-31 RX ORDER — LANOLIN ALCOHOL/MO/W.PET/CERES
400 CREAM (GRAM) TOPICAL ONCE
Status: COMPLETED | OUTPATIENT
Start: 2017-01-31 | End: 2017-01-31

## 2017-01-31 RX ORDER — POTASSIUM CHLORIDE 750 MG/1
20 CAPSULE, EXTENDED RELEASE ORAL
Status: DISCONTINUED | OUTPATIENT
Start: 2017-01-31 | End: 2017-02-24

## 2017-01-31 RX ORDER — DIPHENHYDRAMINE HCL 25 MG
25 CAPSULE ORAL
Status: COMPLETED | OUTPATIENT
Start: 2017-01-31 | End: 2017-01-31

## 2017-01-31 RX ORDER — HYDROCODONE BITARTRATE AND ACETAMINOPHEN 500; 5 MG/1; MG/1
TABLET ORAL
Status: DISCONTINUED | OUTPATIENT
Start: 2017-01-31 | End: 2017-02-06

## 2017-01-31 RX ORDER — ACETAMINOPHEN 325 MG/1
650 TABLET ORAL
Status: COMPLETED | OUTPATIENT
Start: 2017-01-31 | End: 2017-01-31

## 2017-01-31 RX ORDER — LANOLIN ALCOHOL/MO/W.PET/CERES
800 CREAM (GRAM) TOPICAL EVERY 4 HOURS PRN
Status: DISCONTINUED | OUTPATIENT
Start: 2017-01-31 | End: 2017-02-23

## 2017-01-31 RX ORDER — MAGNESIUM SULFATE HEPTAHYDRATE 40 MG/ML
2 INJECTION, SOLUTION INTRAVENOUS ONCE
Status: COMPLETED | OUTPATIENT
Start: 2017-01-31 | End: 2017-01-31

## 2017-01-31 RX ADMIN — SODIUM CHLORIDE 1250 ML: 0.9 INJECTION, SOLUTION INTRAVENOUS at 09:01

## 2017-01-31 RX ADMIN — OXYCODONE HYDROCHLORIDE 5 MG: 5 TABLET ORAL at 03:01

## 2017-01-31 RX ADMIN — VORICONAZOLE 200 MG: 200 TABLET, FILM COATED ORAL at 08:01

## 2017-01-31 RX ADMIN — ACETAMINOPHEN 650 MG: 325 TABLET ORAL at 11:01

## 2017-01-31 RX ADMIN — CIPROFLOXACIN HYDROCHLORIDE 500 MG: 500 TABLET, FILM COATED ORAL at 08:01

## 2017-01-31 RX ADMIN — MAGNESIUM OXIDE TAB 400 MG (241.3 MG ELEMENTAL MG) 400 MG: 400 (241.3 MG) TAB at 08:01

## 2017-01-31 RX ADMIN — STANDARDIZED SENNA CONCENTRATE AND DOCUSATE SODIUM 1 TABLET: 8.6; 5 TABLET, FILM COATED ORAL at 08:01

## 2017-01-31 RX ADMIN — Medication 10 ML: at 11:01

## 2017-01-31 RX ADMIN — CEFEPIME HYDROCHLORIDE 2 G: 2 INJECTION, SOLUTION INTRAVENOUS at 10:01

## 2017-01-31 RX ADMIN — MAGNESIUM SULFATE IN WATER 2 G: 40 INJECTION, SOLUTION INTRAVENOUS at 07:01

## 2017-01-31 RX ADMIN — Medication 10 ML: at 06:01

## 2017-01-31 RX ADMIN — VANCOMYCIN HYDROCHLORIDE 750 MG: 1 INJECTION, POWDER, LYOPHILIZED, FOR SOLUTION INTRAVENOUS at 11:01

## 2017-01-31 RX ADMIN — ACYCLOVIR 400 MG: 200 CAPSULE ORAL at 08:01

## 2017-01-31 RX ADMIN — DIPHENHYDRAMINE HYDROCHLORIDE 25 MG: 25 CAPSULE ORAL at 11:01

## 2017-01-31 RX ADMIN — SODIUM CHLORIDE: 0.9 INJECTION, SOLUTION INTRAVENOUS at 08:01

## 2017-01-31 RX ADMIN — OXYCODONE HYDROCHLORIDE 5 MG: 5 TABLET ORAL at 08:01

## 2017-01-31 NOTE — PLAN OF CARE
MDR's with Dr Bose.  Patient is day 12 of 7+3 induction.  Next BMB planned for Friday.  D/c pending bx results and count recovery.  Will continue to follow.

## 2017-01-31 NOTE — PROGRESS NOTES
Patient's PICC line dressing changed using sterile technique, antimicrobial disk in place and dressing signed and dated.

## 2017-01-31 NOTE — PROGRESS NOTES
Progress Note  Hematology / Bone Marrow Transplant                                                              Team: Networked reference to record PCT     Patient Name: Marck Howard  YOB: 1980    Admit Date: 1/17/2017                     LOS: 14    SUBJECTIVE:     Reason for Admission:  Acute leukemia  See H&P for detailed initial presentation history and ROS.      Interval history:   -day 12 of 7+3    -afebrile  -continues with sinus tach, -142  -patient denies chest pains, palpitations, SOB, n/v, abdo pain, constipation/diarrhea, dysuria  -tolerating diet  -NAEON   -no other issues.    Review of System:  Constitutional: Positive fever or chills, negative for anorexia, malaise, night sweats and weight loss  Eyes: no visual changes, negative for irritation and redness  ENT: no nasal congestion or sore throat, negative for epistaxis, sore mouth and sore throat  Respiratory: no cough or shortness of breath, negative for dyspnea on exertion and wheezing  Cardiovascular: no chest pain or palpitations, negative for dyspnea, fatigue and palpitations  Gastrointestinal: no nausea or vomiting, no abdominal pain or change in bowel habits, negative for constipation, diarrhea and melena  Hematologic/Lymphatic: no easy bruising or lymphadenopathy, negative for bleeding  Musculoskeletal: no arthralgias or myalgias  Neurological: no seizures or tremors, negative for headaches and weakness  Behavioral/Psych: negative for anxiety and depression    OBJECTIVE:     Vital Signs Range (Last 24H):  Temp:  [97.3 °F (36.3 °C)-99.8 °F (37.7 °C)]   Pulse:  [115-142]   Resp:  [16-18]   BP: ()/(64-79)   SpO2:  [97 %-100 %] Body mass index is 15.91 kg/(m^2).    I & O (Last 24H):    Intake/Output Summary (Last 24 hours) at 01/31/17 0650  Last data filed at 01/31/17 0600   Gross per 24 hour   Intake             1472 ml   Output             3500 ml   Net            -2028 ml       Physical Exam:  General: well  developed, cachectic, no distress  HENT: Head:normocephalic, atraumatic. Ears:right ear normal, left ear normal. Nose: Nares normal. Septum midline. Mucosa normal. No drainage or sinus tenderness., no discharge. Throat: lips, mucosa, and tongue normal; teeth and gums normal and no throat erythema.  Eyes: conjunctivae/corneas clear. PERRL.   Neck: supple, symmetrical, trachea midline  Lungs:  clear to auscultation bilaterally and normal respiratory effort  Cardiovascular: Heart: tachycardic but otherwise regular rhythm, S1, S2 normal, no murmur, click, rub or gallop. Chest Wall: no tenderness. Extremities: no cyanosis or edema, or clubbing.   Abdomen: soft, non-tender non-distented; bowel sounds normal.   Skin: Skin color, texture, turgor normal. No rashes or lesions  Musculoskeletal:no clubbing, cyanosis  Neurologic: Mental status: Alert, oriented, thought content appropriate  Psych/Behavioral:  Normal.    Diagnostic Results:  Lab Results   Component Value Date    WBC 1.47 (LL) 01/31/2017    HGB 7.9 (L) 01/31/2017    HCT 23.1 (L) 01/31/2017    MCV 92 01/31/2017    PLT 48 (L) 01/31/2017       Recent Labs  Lab 01/31/17  0426   GLU 97   *   K 4.0   CL 99   CO2 26   BUN 14   CREATININE 0.6   CALCIUM 8.8   MG 1.4*     Lab Results   Component Value Date    INR 1.2 01/17/2017    INR 1.8 (H) 01/16/2017     No results found for: HGBA1C  Microbiology Results (last 7 days)     ** No results found for the last 168 hours. **        Imaging Results:    CT C/A/P 1/19/17:  1.  Focus of groundglass attenuation with bronchiectasis/developing air bronchograms within the right middle lobe, nonspecific, however could reflect developing infectious process or possibly sequela of aspiration, clinical correlation for pneumonia is advised.  2.  Anterior mediastinum mass, suggesting thymic hyperplasia, clinical correlation advised.  3.  Constipation.  4.  Several additional findings above.    CXR 1/17/17: Heart size normal.  Mild  accentuation of interstitial markings.  No significant air space consolidation or pleural effusion    2D Echo 1/17/17:  CONCLUSIONS     1 - Low normal left ventricular systolic function (EF 50-55%).     2 - Normal right ventricular systolic function .     3 - Normal left ventricular diastolic function.     FINAL PATHOLOGIC DIAGNOSIS (1/17/17):  BONE MARROW, RIGHT ILIAC CREST, ASPIRATE, CLOT, AND CORE BIOPSY:  --Markedly hypercellular marrow, essentially 100%, nearly completely replaced by acute myeloid leukemia, see  comment  --Rare background trilineage elements present  --No significant appreciable stainable iron  COMMENT: Concomitantly submitted flow cytometric analysis detects an increased population of blasts consistent  with acute myeloid leukemia, favor non-M3 subtype, although A CD34 positive/HLA-DR negative/equivocal  phenotype, and M3 APL subtype cannot be completely excluded. This increased population of myeloid blasts  shows expression of CD34, CD13, and CD33 (partial). This population also expresses CD 56 and CD7. It is  negative for  and other B and T lymphoid R crest tested including CD19 and cytoplasmic CD3, as well as  TdT. HLA-DR is equivocal.  The overall morphology of these blasts do not suggest acute promyelocytic leukemia. 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       Active Problems, Status & Treatment Plan Addressed Today:    # AML  - leukocytosis likely 2/2 AML and infection (resolving)  - currently no evidence of DIC or tumor lysis  - noted to have new diagnosis of HIV (see below)  - hepatitis and flu negative    - s/p bone marrow biopsy (1/17/17) showing AML, cytogenetics and molecular markers done  - NPM1 and FLT3 negative  - will need transplant given high risk disease  - continue on empiric allopurinol  - 7+3 started 1/20/17, day 12 today  - will need repeat bone marrow day 14 (2/2/2017 but will plan for 2/3/17)    # Tachycardia  - side effect of chemo?  - not floridly septic (?source, afebrile, Cx -ve)  - EKG x2 showed NSR.  - Patient is asymptomatic   - continue to monitor    # Gram Positive Freddie Bacteremia  - BCx (1/16/17) shows diptheroids, probable contaminant   - BCx (1/17/17) with ngtd  - ID consulted, appreciate recs. Agree that diptheroids are contaminant; no treatment needed.   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   # Thrombocytopenia due to leukemia   - Hgb= 7.9 g/dL, PLT= 48  - transfused 1 unit of PRBC 1/23/17, 1 unit of PLTs 1/30/17  - no overt signs of bleeding  - likely 2/2 underlying hematologic malignancy  - continue to monitor and transfuse as indicated for Hgb <7.0, PLT <10    # Constipation (resolved)  - likely 2/2 opiate use  - continue on miralax and senna-docusate  - resolved, continue to monitor     # Tobacco Abuse  - 26 pack year history  - advise smoking cessation   - nicotine patch prn    # H/O Alcohol Abuse  - drank at least 2 beers per day prior to admission  - no h/o DTs and now out of period for withdrawal    # Hyponatremia (resolving)  - likely 2/2 dehydration  - continue IVFs  - continue to monitor    # Hypoalbuminemia  - check 24 hour urine protein  - Dietary consulted  -  encourage increased nutritional intake     DVT ppx: encourage ambulation  Diet: regular  Code Status: full    DISPO: pending chemotherapy for AML and repeat bone marrow     Dionte Rizvi MD (PGY-4)  Hematology/Oncology Fellow  Will discuss with Dr. Bose (Hematology/Oncology Staff)

## 2017-01-31 NOTE — PLAN OF CARE
Problem: Patient Care Overview  Goal: Plan of Care Review  Outcome: Ongoing (interventions implemented as appropriate)  Patient's pain scale was assessed throughout shift and any complaints of pain were treated with pain meds per orders. Patient remained free of injury for entire shift, no fall occurences.Patient's skin  warm, dry, and intact with no new tears or abrasions. Patient remained afebrile throughout shift, vital signs stable.

## 2017-01-31 NOTE — PLAN OF CARE
Problem: Patient Care Overview  Goal: Plan of Care Review  Outcome: Ongoing (interventions implemented as appropriate)  Patient has remained free of fall and injury. Patient educated about fall precautions and verbalized understanding. Bed is low and locked with side rails up x 3. Call light is in reach and patient has non-skid socks on. Patient remains tachycardic at rest, vital signs otherwise stable. Patient complains of mild to moderate aching pain to bilateral lower extremities, requiring oxycodone 5m PO x 1 today for relief. Patient waiting for day 14 marrow biopsy and count recovery.

## 2017-02-01 LAB
ALBUMIN SERPL BCP-MCNC: 2 G/DL
ALLENS TEST: ABNORMAL
ALP SERPL-CCNC: 156 U/L
ALT SERPL W/O P-5'-P-CCNC: 20 U/L
ANION GAP SERPL CALC-SCNC: 6 MMOL/L
ANISOCYTOSIS BLD QL SMEAR: SLIGHT
AST SERPL-CCNC: 12 U/L
BASOPHILS # BLD AUTO: ABNORMAL K/UL
BASOPHILS NFR BLD: 0 %
BILIRUB SERPL-MCNC: 0.5 MG/DL
BILIRUB UR QL STRIP: NEGATIVE
BLASTS NFR BLD MANUAL: 63 %
BUN SERPL-MCNC: 14 MG/DL
CALCIUM SERPL-MCNC: 8.5 MG/DL
CHLORIDE SERPL-SCNC: 105 MMOL/L
CLARITY UR REFRACT.AUTO: CLEAR
CO2 SERPL-SCNC: 24 MMOL/L
COLOR UR AUTO: YELLOW
CREAT SERPL-MCNC: 0.6 MG/DL
DIFFERENTIAL METHOD: ABNORMAL
EOSINOPHIL # BLD AUTO: ABNORMAL K/UL
EOSINOPHIL NFR BLD: 0 %
ERYTHROCYTE [DISTWIDTH] IN BLOOD BY AUTOMATED COUNT: 17.3 %
EST. GFR  (AFRICAN AMERICAN): >60 ML/MIN/1.73 M^2
EST. GFR  (NON AFRICAN AMERICAN): >60 ML/MIN/1.73 M^2
GLUCOSE SERPL-MCNC: 97 MG/DL
GLUCOSE UR QL STRIP: NEGATIVE
GRAM STN SPEC: NORMAL
GRAM STN SPEC: NORMAL
HCO3 UR-SCNC: 25.3 MMOL/L (ref 24–28)
HCT VFR BLD AUTO: 24.9 %
HGB BLD-MCNC: 8.5 G/DL
HGB UR QL STRIP: NEGATIVE
HLATY INTERPRETATION: NORMAL
KETONES UR QL STRIP: NEGATIVE
LACTATE SERPL-SCNC: 0.8 MMOL/L
LEUKOCYTE ESTERASE UR QL STRIP: NEGATIVE
LYMPHOCYTES # BLD AUTO: ABNORMAL K/UL
LYMPHOCYTES NFR BLD: 37 %
MAGNESIUM SERPL-MCNC: 1.5 MG/DL
MCH RBC QN AUTO: 30.4 PG
MCHC RBC AUTO-ENTMCNC: 34.1 %
MCV RBC AUTO: 89 FL
MONOCYTES # BLD AUTO: ABNORMAL K/UL
MONOCYTES NFR BLD: 0 %
NEUTROPHILS NFR BLD: 0 %
NITRITE UR QL STRIP: NEGATIVE
OVALOCYTES BLD QL SMEAR: ABNORMAL
PCO2 BLDA: 40.5 MMHG (ref 35–45)
PH SMN: 7.4 [PH] (ref 7.35–7.45)
PH UR STRIP: 7 [PH] (ref 5–8)
PHOSPHATE SERPL-MCNC: 4 MG/DL
PLATELET # BLD AUTO: 31 K/UL
PLATELET BLD QL SMEAR: ABNORMAL
PMV BLD AUTO: 12 FL
PO2 BLDA: 38 MMHG (ref 40–60)
POC BE: 1 MMOL/L
POC SATURATED O2: 72 % (ref 95–100)
POC TCO2: 27 MMOL/L (ref 24–29)
POIKILOCYTOSIS BLD QL SMEAR: SLIGHT
POLYCHROMASIA BLD QL SMEAR: ABNORMAL
POTASSIUM SERPL-SCNC: 3.7 MMOL/L
PROT SERPL-MCNC: 6.5 G/DL
PROT UR QL STRIP: NEGATIVE
RBC # BLD AUTO: 2.8 M/UL
SAMPLE: ABNORMAL
SITE: ABNORMAL
SMUDGE CELLS BLD QL SMEAR: PRESENT
SODIUM SERPL-SCNC: 135 MMOL/L
SP GR UR STRIP: 1.01 (ref 1–1.03)
URN SPEC COLLECT METH UR: NORMAL
UROBILINOGEN UR STRIP-ACNC: NEGATIVE EU/DL
WBC # BLD AUTO: 1.58 K/UL

## 2017-02-01 PROCEDURE — 82803 BLOOD GASES ANY COMBINATION: CPT

## 2017-02-01 PROCEDURE — 83735 ASSAY OF MAGNESIUM: CPT

## 2017-02-01 PROCEDURE — 85007 BL SMEAR W/DIFF WBC COUNT: CPT

## 2017-02-01 PROCEDURE — 25000003 PHARM REV CODE 250: Performed by: INTERNAL MEDICINE

## 2017-02-01 PROCEDURE — 99233 SBSQ HOSP IP/OBS HIGH 50: CPT | Mod: ,,, | Performed by: INTERNAL MEDICINE

## 2017-02-01 PROCEDURE — 93005 ELECTROCARDIOGRAM TRACING: CPT

## 2017-02-01 PROCEDURE — 83605 ASSAY OF LACTIC ACID: CPT

## 2017-02-01 PROCEDURE — 80053 COMPREHEN METABOLIC PANEL: CPT

## 2017-02-01 PROCEDURE — 25500020 PHARM REV CODE 255: Performed by: INTERNAL MEDICINE

## 2017-02-01 PROCEDURE — 84100 ASSAY OF PHOSPHORUS: CPT

## 2017-02-01 PROCEDURE — 63600175 PHARM REV CODE 636 W HCPCS: Performed by: INTERNAL MEDICINE

## 2017-02-01 PROCEDURE — 20600001 HC STEP DOWN PRIVATE ROOM

## 2017-02-01 PROCEDURE — 25000003 PHARM REV CODE 250: Performed by: STUDENT IN AN ORGANIZED HEALTH CARE EDUCATION/TRAINING PROGRAM

## 2017-02-01 PROCEDURE — 25000003 PHARM REV CODE 250: Performed by: NURSE PRACTITIONER

## 2017-02-01 PROCEDURE — 36430 TRANSFUSION BLD/BLD COMPNT: CPT

## 2017-02-01 PROCEDURE — 85027 COMPLETE CBC AUTOMATED: CPT

## 2017-02-01 RX ORDER — LAMIVUDINE 100 MG/1
300 TABLET, FILM COATED ORAL DAILY
Status: DISCONTINUED | OUTPATIENT
Start: 2017-02-02 | End: 2017-02-24 | Stop reason: HOSPADM

## 2017-02-01 RX ORDER — ABACAVIR 300 MG/1
600 TABLET ORAL DAILY
Status: DISCONTINUED | OUTPATIENT
Start: 2017-02-02 | End: 2017-02-24 | Stop reason: HOSPADM

## 2017-02-01 RX ADMIN — IOHEXOL 75 ML: 350 INJECTION, SOLUTION INTRAVENOUS at 01:02

## 2017-02-01 RX ADMIN — STANDARDIZED SENNA CONCENTRATE AND DOCUSATE SODIUM 1 TABLET: 8.6; 5 TABLET, FILM COATED ORAL at 09:02

## 2017-02-01 RX ADMIN — SODIUM CHLORIDE 500 ML: 0.9 INJECTION, SOLUTION INTRAVENOUS at 01:02

## 2017-02-01 RX ADMIN — OXYCODONE HYDROCHLORIDE 5 MG: 5 TABLET ORAL at 03:02

## 2017-02-01 RX ADMIN — MAGNESIUM OXIDE TAB 400 MG (241.3 MG ELEMENTAL MG) 400 MG: 400 (241.3 MG) TAB at 09:02

## 2017-02-01 RX ADMIN — VORICONAZOLE 200 MG: 200 TABLET, FILM COATED ORAL at 09:02

## 2017-02-01 RX ADMIN — VANCOMYCIN HYDROCHLORIDE 750 MG: 1 INJECTION, POWDER, LYOPHILIZED, FOR SOLUTION INTRAVENOUS at 11:02

## 2017-02-01 RX ADMIN — Medication 10 ML: at 11:02

## 2017-02-01 RX ADMIN — ACYCLOVIR 400 MG: 200 CAPSULE ORAL at 09:02

## 2017-02-01 RX ADMIN — POTASSIUM CHLORIDE 20 MEQ: 750 CAPSULE, EXTENDED RELEASE ORAL at 05:02

## 2017-02-01 RX ADMIN — CEFEPIME HYDROCHLORIDE 2 G: 2 INJECTION, SOLUTION INTRAVENOUS at 02:02

## 2017-02-01 RX ADMIN — SODIUM CHLORIDE: 0.9 INJECTION, SOLUTION INTRAVENOUS at 04:02

## 2017-02-01 RX ADMIN — CEFEPIME HYDROCHLORIDE 2 G: 2 INJECTION, SOLUTION INTRAVENOUS at 10:02

## 2017-02-01 RX ADMIN — MAGNESIUM OXIDE TAB 400 MG (241.3 MG ELEMENTAL MG) 400 MG: 400 (241.3 MG) TAB at 02:02

## 2017-02-01 RX ADMIN — ACETAMINOPHEN 650 MG: 325 TABLET ORAL at 05:02

## 2017-02-01 RX ADMIN — CEFEPIME HYDROCHLORIDE 2 G: 2 INJECTION, SOLUTION INTRAVENOUS at 05:02

## 2017-02-01 RX ADMIN — MAGNESIUM OXIDE TAB 400 MG (241.3 MG ELEMENTAL MG) 400 MG: 400 (241.3 MG) TAB at 05:02

## 2017-02-01 RX ADMIN — Medication 10 ML: at 06:02

## 2017-02-01 RX ADMIN — OXYCODONE HYDROCHLORIDE 5 MG: 5 TABLET ORAL at 07:02

## 2017-02-01 NOTE — PLAN OF CARE
Problem: Patient Care Overview  Goal: Plan of Care Review  Outcome: Ongoing (interventions implemented as appropriate)  Patient reports no discomfort. CT of head completed this afternoon.  Afebrile, no falls, no skin breakdown at this time. Will cont to monitor. Day 14 of 7&3.

## 2017-02-01 NOTE — ASSESSMENT & PLAN NOTE
35yo previously healthy man who was admitted as a transfer from an OSH on 1/17/2017 with 3mos of weight loss, malaise, and fevers and was found to have a new diagnosis of AML and a new diagnosis of HIV (LQ9=775/7%, VL 22k, GT pan sensitive). He is improved clinically since starting induction chemotherapy now with neutropenic fevers      - would continue cefepime, voriconazole and acyclovir neutropenic prophylaxis per protocol, stop vancomycin as no indication at this time  - patient is s/p pentamidine PCP prophylaxis (chosen over bactrim given myelosuppressive effects while in house) -- would recommend continuing ppx after neutropenia for AIDS.  Would also consider atovaquone or dapsone if not using bactrim when ppx is again started  - start triumeq (components in AM)

## 2017-02-01 NOTE — PROGRESS NOTES
"Progress Note  Hematology / Bone Marrow Transplant                                                              Team: [facility] HEMATOLOGY BMT    Patient Name: Marck Howard  YOB: 1980    Admit Date: 1/17/2017                     LOS: 15    SUBJECTIVE:     Reason for Admission:  Acute leukemia  See H&P for detailed initial presentation history and ROS.      Interval history:  Day 13 of 7+3. Febrile o/n w/ Tm 102°. -140s. Subjectively, "I feel fine". Denies dysuria, cough, SOB, abdo pain, N/V, diarrhea.     Review of System:  Constitutional: Positive fever, -ve chills, negative for anorexia, malaise, night sweats and weight loss  Eyes: no visual changes, negative for irritation and redness  ENT: no nasal congestion or sore throat, negative for epistaxis, sore mouth and sore throat  Respiratory: no cough or shortness of breath, negative for dyspnea on exertion and wheezing  Cardiovascular: no chest pain or palpitations, negative for dyspnea, fatigue and palpitations  Gastrointestinal: no nausea or vomiting, no abdominal pain or change in bowel habits, negative for constipation, diarrhea and melena  Hematologic/Lymphatic: no easy bruising or lymphadenopathy, negative for bleeding  Musculoskeletal: no arthralgias or myalgias  Neurological: no seizures or tremors, negative for headaches and weakness  Behavioral/Psych: negative for anxiety and depression    OBJECTIVE:     Vital Signs Range (Last 24H):  Temp:  [98.5 °F (36.9 °C)-102 °F (38.9 °C)]   Pulse:  [117-146]   Resp:  [16-18]   BP: ()/(52-73)   SpO2:  [94 %-99 %] Body mass index is 16.27 kg/(m^2).    I & O (Last 24H):    Intake/Output Summary (Last 24 hours) at 02/01/17 0715  Last data filed at 02/01/17 0522   Gross per 24 hour   Intake          4674.17 ml   Output             3680 ml   Net           994.17 ml       Physical Exam:  General: well developed, cachectic, no distress  HENT: Head:normocephalic, atraumatic. Ears:right ear " normal, left ear normal. Nose: Nares normal. Septum midline. Mucosa normal. No drainage or sinus tenderness., no discharge. Throat: lips, mucosa, and tongue normal; teeth and gums normal and no throat erythema.  Eyes: conjunctivae/corneas clear. PERRL.   Neck: supple, symmetrical, trachea midline  Lungs:  clear to auscultation bilaterally and normal respiratory effort  Cardiovascular: Heart: tachycardic but otherwise regular rhythm, S1, S2 normal, no murmur, click, rub or gallop. Chest Wall: no tenderness. Extremities: no cyanosis or edema, or clubbing.   Abdomen: soft, non-tender non-distented; bowel sounds normal.   Skin: Skin color, texture, turgor normal. No rashes or lesions  Musculoskeletal:no clubbing, cyanosis  Neurologic: Mental status: Alert, oriented, thought content appropriate  Psych/Behavioral:  Normal.    Diagnostic Results:  Lab Results   Component Value Date    WBC 1.58 (LL) 02/01/2017    HGB 8.5 (L) 02/01/2017    HCT 24.9 (L) 02/01/2017    MCV 89 02/01/2017    PLT 41 (L) 01/31/2017       Recent Labs  Lab 02/01/17  0354   GLU 97   *   K 3.7      CO2 24   BUN 14   CREATININE 0.6   CALCIUM 8.5*   MG 1.5*     Lab Results   Component Value Date    INR 1.2 01/17/2017    INR 1.8 (H) 01/16/2017     No results found for: HGBA1C  Microbiology Results (last 7 days)     Procedure Component Value Units Date/Time    Blood culture [[id number]] Collected:  01/31/17 2132    Order Status:  Completed Specimen:  Blood Updated:  02/01/17 0515     Blood Culture, Routine No Growth to date    Blood culture [[id number]] Collected:  01/31/17 2133    Order Status:  Completed Specimen:  Blood Updated:  02/01/17 0515     Blood Culture, Routine No Growth to date    Gram stain [[id number]] Collected:  01/31/17 2255    Order Status:  Completed Specimen:  Urine from Urine, Clean Catch Updated:  02/01/17 0442     Gram Stain Result No WBC's      No organisms seen    Urine culture [[id number]] Collected:  01/31/17 2255     Order Status:  Sent Specimen:  Urine from Urine, Clean Catch Updated:  02/01/17 0023    Blood culture [[id number]]     Order Status:  Canceled Specimen:  Blood     Blood culture [[id number]]     Order Status:  Canceled Specimen:  Blood         Imaging Results:    CT C/A/P 1/19/17:  1.  Focus of groundglass attenuation with bronchiectasis/developing air bronchograms within the right middle lobe, nonspecific, however could reflect developing infectious process or possibly sequela of aspiration, clinical correlation for pneumonia is advised.  2.  Anterior mediastinum mass, suggesting thymic hyperplasia, clinical correlation advised.  3.  Constipation.  4.  Several additional findings above.    CXR 1/17/17: Heart size normal.  Mild accentuation of interstitial markings.  No significant air space consolidation or pleural effusion    2D Echo 1/17/17:  CONCLUSIONS     1 - Low normal left ventricular systolic function (EF 50-55%).     2 - Normal right ventricular systolic function .     3 - Normal left ventricular diastolic function.     FINAL PATHOLOGIC DIAGNOSIS (1/17/17):  BONE MARROW, RIGHT ILIAC CREST, ASPIRATE, CLOT, AND CORE BIOPSY:  --Markedly hypercellular marrow, essentially 100%, nearly completely replaced by acute myeloid leukemia, see  comment  --Rare background trilineage elements present  --No significant appreciable stainable iron  COMMENT: Concomitantly submitted flow cytometric analysis detects an increased population of blasts consistent  with acute myeloid leukemia, favor non-M3 subtype, although A CD34 positive/HLA-DR negative/equivocal  phenotype, and M3 APL subtype cannot be completely excluded. This increased population of myeloid blasts  shows expression of CD34, CD13, and CD33 (partial). This population also expresses CD 56 and CD7. It is  negative for  and other B and T lymphoid R crest tested including CD19 and cytoplasmic CD3, as well as  TdT. HLA-DR is equivocal.  The overall morphology of  these blasts do not suggest acute promyelocytic leukemia. 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       Active Problems, Status & Treatment Plan Addressed Today:    # AML  - leukocytosis likely 2/2 AML and infection (resolving)  - currently no evidence of DIC or tumor lysis  - noted to have new diagnosis of HIV (see below)  - hepatitis and flu negative   - s/p bone marrow biopsy (1/17/17) showing AML, cytogenetics and molecular markers done  - NPM1 and FLT3 negative  - will need transplant given high risk disease  - continue on empiric allopurinol  - 7+3 started 1/20/17, day 13 today  - will need repeat bone marrow day 14 (2/2/2017 but will plan for 2/3/17)    # Tachycardia/Sepsis  - side effect of chemo?  - not initially floridly septic, but now with fever o/n. Pan-cultured. LA -ve. UA clean. CXR unchanged. On cefepime and vancomycin.   - EKG x2 showed NSR.  - Patient is asymptomatic   - continue to monitor    # Gram Positive Freddie Bacteremia  - BCx (1/16/17) shows diptheroids, probable contaminant   - BCx (1/17/17) with ngtd  - ID consulted, appreciate recs. Agree that diptheroids are contaminant; no treatment needed.   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 1 unit of PLTs 1/30/17  - no overt signs of bleeding  - likely 2/2 underlying hematologic malignancy  - continue to monitor and transfuse as indicated for Hgb <7.0, PLT <10    # Constipation (resolved)  - likely 2/2 opiate use  - continue on miralax and senna-docusate  - resolved, continue to monitor     # Tobacco Abuse  - 26 pack year history  - advise smoking cessation   - nicotine patch prn    # H/O Alcohol Abuse  - drank at least 2 beers per day prior to admission  - no h/o DTs and now out of period for withdrawal    # Hyponatremia (resolving)  - likely 2/2 dehydration  - continue IVFs  - continue to monitor    # Hypoalbuminemia  - checked 24 hour urine protein  - Dietary consulted  - encourage increased nutritional intake     DVT ppx: encourage ambulation  Diet: regular  Code Status: full    DISPO: pending chemotherapy for AML and repeat bone marrow

## 2017-02-01 NOTE — PROGRESS NOTES
02/01/17 0146   Vital Signs   Temp 98.5 °F (36.9 °C)   Temp src Oral   Pulse (!) 117   Resp 18   SpO2 99 %   BP 90/68   BP Location Left arm   BP Method Manual   Dr. Salmeron notified of pt's bp and HR. Inquired if increased pulse was due to PICC migration, MD stated that was not the cause of tachycardia.

## 2017-02-01 NOTE — PROGRESS NOTES
[facility]  Infectious Disease  Progress Note    Patient Name: Marck Howard  MRN: [id number]  Admission Date: 1/17/2017  Length of Stay: 15 days  Attending Physician: Nohemy Bose MD  Primary Care Provider: Primary Doctor No    Isolation Status: No active isolations  Assessment/Plan:      Fever  35yo previously healthy man who was admitted as a transfer from an OSH on 1/17/2017 with 3mos of weight loss, malaise, and fevers and was found to have a new diagnosis of AML and a new diagnosis of HIV (SR4=764/7%, VL 22k, GT pan sensitive). He is improved clinically since starting induction chemotherapy now with neutropenic fevers      - would continue cefepime, voriconazole and acyclovir neutropenic prophylaxis per protocol, stop vancomycin as no indication at this time  - patient is s/p pentamidine PCP prophylaxis (chosen over bactrim given myelosuppressive effects while in house) -- would recommend continuing ppx after neutropenia for AIDS.  Would also consider atovaquone or dapsone if not using bactrim when ppx is again started  - start triumeq (components in AM)        Anticipated Disposition: pending    Thank you for your consult. I will follow-up with patient. Please contact us if you have any additional questions.    Subjective:     Principal Problem:Acute leukemia    Interval History: feels well, with fevers    HPI:  35yo previously healthy man who was admitted as a transfer from an OSH on 1/17/2017 with 3mos of weight loss, malaise, and fevers and was found to have a new diagnosis of AML and a new diagnosis of HIV (TE8=892/7%, VL 22k, GT pending). He is improved clinically since starting induction chemotherapy now with neutropenic fevers    Review of Systems   Constitutional: Negative for activity change, appetite change, chills, fatigue and fever.   HENT: Negative for congestion, dental problem, mouth sores and sinus pressure.    Eyes: Negative for pain, redness and visual disturbance.    Respiratory: Negative for cough, shortness of breath and wheezing.    Cardiovascular: Negative for chest pain and leg swelling.   Gastrointestinal: Negative for abdominal distention, abdominal pain, diarrhea, nausea and vomiting.   Endocrine: Negative for polyuria.   Genitourinary: Negative for decreased urine volume, dysuria and frequency.   Musculoskeletal: Negative for joint swelling.   Skin: Negative for color change.   Allergic/Immunologic: Negative for food allergies.   Neurological: Negative for dizziness, weakness and headaches.   Hematological: Negative for adenopathy.   Psychiatric/Behavioral: Negative for agitation and confusion. The patient is not nervous/anxious.      Objective:     Vital Signs (Most Recent):  Temp: 99.1 °F (37.3 °C) (02/01/17 1128)  Pulse: (!) 125 (02/01/17 1128)  Resp: 18 (02/01/17 1128)  BP: 111/74 (02/01/17 1128)  SpO2: 100 % (02/01/17 1128) Vital Signs (24h Range):  Temp:  [98.5 °F (36.9 °C)-102 °F (38.9 °C)] 99.1 °F (37.3 °C)  Pulse:  [117-146] 125  Resp:  [16-18] 18  SpO2:  [94 %-100 %] 100 %  BP: ()/(52-74) 111/74     Weight: 42.8 kg (94 lb 7.5 oz)  Body mass index is 16.27 kg/(m^2).    Estimated Creatinine Clearance: 101.3 mL/min (based on Cr of 0.6).    Physical Exam   Constitutional: He is oriented to person, place, and time. He appears well-developed and well-nourished.   HENT:   Head: Normocephalic and atraumatic.   Mouth/Throat: Oropharynx is clear and moist.   Eyes: Conjunctivae are normal.   Neck: Neck supple.   Cardiovascular: Normal rate, regular rhythm and normal heart sounds.    No murmur heard.  Pulmonary/Chest: Effort normal and breath sounds normal. No respiratory distress. He has no wheezes.   Abdominal: Soft. Bowel sounds are normal. He exhibits no distension. There is no tenderness.   Musculoskeletal: Normal range of motion. He exhibits no edema or tenderness.   Lymphadenopathy:     He has no cervical adenopathy.   Neurological: He is alert and oriented  to person, place, and time. Coordination normal.   Skin: Skin is warm and dry. No rash noted.   Psychiatric: He has a normal mood and affect. His behavior is normal.       Significant Labs:   CBC:   Recent Labs  Lab 01/31/17 0426 01/31/17 2133 02/01/17  0354   WBC 1.47* 1.52* 1.58*   HGB 7.9* 6.7* 8.5*   HCT 23.1* 19.6* 24.9*   PLT 48* 41* 31*     CMP:   Recent Labs  Lab 01/31/17 0426 01/31/17 2133 02/01/17  0354   * 130* 135*   K 4.0 3.9 3.7   CL 99 98 105   CO2 26 23 24   GLU 97 98 97   BUN 14 16 14   CREATININE 0.6 0.7 0.6   CALCIUM 8.8 8.6* 8.5*   PROT 7.0 7.1 6.5   ALBUMIN 2.1* 2.2* 2.0*   BILITOT 0.2 0.2 0.5   ALKPHOS 180* 164* 156*   AST 18 12 12   ALT 25 22 20   ANIONGAP 5* 9 6*   EGFRNONAA >60.0 >60.0 >60.0     Microbiology  BCx 01/16 diphtheroids 1/2 (contaminant)  BCx 01/17 NGTD  UCx 01/17 NGTD  RPR and uGC Testing negative  HAV immune  HBV nonimmune   HCV negative  Crypto Ag negative  Urine histo ag negative  Quantiferon negative  AFB BCx NGTD  BCx 01/18 NGTD  HLA- negative  HIV GT pending  toxo IGG negative  GT and phenotype with pan sensitive virus    Significant Imaging: I have reviewed all pertinent imaging results/findings within the past 24 hours.     CT head with no mass lesions        Dionte Medrano MD  Infectious Disease  [facility]

## 2017-02-01 NOTE — PROGRESS NOTES
01/31/17 2333   Vitals   Transfusion Vitals Start   BP (!) 92/52   Temp (!) 100.6 °F (38.1 °C)   Temp src Oral   Pulse (!) 126   Resp 18   SpO2 (!) 94 %   Dr. Salmeron ok's to start blood with tep of 100.6. Will continue to monitor

## 2017-02-01 NOTE — PROGRESS NOTES
02/01/17 0354   Vital Signs   Temp (!) 100.5 °F (38.1 °C)   Pulse (!) 128   Resp 18   SpO2 99 %   BP (!) 91/55   BP Location Left arm   BP Method Automatic   Patient Position Lying   Dr. Salmeron notified of vitals. MD to add lactic to morning labs

## 2017-02-01 NOTE — PROGRESS NOTES
01/31/17 2348   Vital Signs   Temp 99.7 °F (37.6 °C)   Temp src Oral   Pulse (!) 128   Resp 18   SpO2 (!) 94 %   BP (!) 91/58   BP Location Left arm   BP Method Manual   Dr. Salmeron notified of vitals. MD to place. New orders will continue to monitor

## 2017-02-01 NOTE — PROGRESS NOTES
02/01/17 0556   Vital Signs   Temp (!) 102 °F (38.9 °C)   Height and Weight   Weight 42.8 kg (94 lb 7.5 oz)   Weight Method Standard Scale   Dr. Salmeron notified of temp. PRN tylenol provided. Will continue to monitor

## 2017-02-01 NOTE — PROGRESS NOTES
Dr. Salmeron notified that xray showed PICC line coiled right jugular/subclavian confluence. PICC was positive for blood return at the beginning of shift. MD ok'd to using PICC line

## 2017-02-01 NOTE — PROGRESS NOTES
01/31/17 2103   Vital Signs   Temp (!) 100.5 °F (38.1 °C)   Md notified of increased temp for one hour and increased heart rate. MD to place new order. Will continue to monitor

## 2017-02-01 NOTE — PROGRESS NOTES
Received in hospital patient in  CT-Scan room, with double lumen power PICC-Line, right upper arm, assessed,  flushed with 10ml normal saline, post CT-Scan procedure, flushed with 10ml normal saline, patient tolerated procedure well, patient transported back to unit via stretcher.

## 2017-02-01 NOTE — SUBJECTIVE & OBJECTIVE
Interval History: feels well, with fevers    HPI:  37yo previously healthy man who was admitted as a transfer from an OSH on 1/17/2017 with 3mos of weight loss, malaise, and fevers and was found to have a new diagnosis of AML and a new diagnosis of HIV (NX0=165/7%, VL 22k, GT pending). He is improved clinically since starting induction chemotherapy now with neutropenic fevers    Review of Systems   Constitutional: Negative for activity change, appetite change, chills, fatigue and fever.   HENT: Negative for congestion, dental problem, mouth sores and sinus pressure.    Eyes: Negative for pain, redness and visual disturbance.   Respiratory: Negative for cough, shortness of breath and wheezing.    Cardiovascular: Negative for chest pain and leg swelling.   Gastrointestinal: Negative for abdominal distention, abdominal pain, diarrhea, nausea and vomiting.   Endocrine: Negative for polyuria.   Genitourinary: Negative for decreased urine volume, dysuria and frequency.   Musculoskeletal: Negative for joint swelling.   Skin: Negative for color change.   Allergic/Immunologic: Negative for food allergies.   Neurological: Negative for dizziness, weakness and headaches.   Hematological: Negative for adenopathy.   Psychiatric/Behavioral: Negative for agitation and confusion. The patient is not nervous/anxious.      Objective:     Vital Signs (Most Recent):  Temp: 99.1 °F (37.3 °C) (02/01/17 1128)  Pulse: (!) 125 (02/01/17 1128)  Resp: 18 (02/01/17 1128)  BP: 111/74 (02/01/17 1128)  SpO2: 100 % (02/01/17 1128) Vital Signs (24h Range):  Temp:  [98.5 °F (36.9 °C)-102 °F (38.9 °C)] 99.1 °F (37.3 °C)  Pulse:  [117-146] 125  Resp:  [16-18] 18  SpO2:  [94 %-100 %] 100 %  BP: ()/(52-74) 111/74     Weight: 42.8 kg (94 lb 7.5 oz)  Body mass index is 16.27 kg/(m^2).    Estimated Creatinine Clearance: 101.3 mL/min (based on Cr of 0.6).    Physical Exam   Constitutional: He is oriented to person, place, and time. He appears  well-developed and well-nourished.   HENT:   Head: Normocephalic and atraumatic.   Mouth/Throat: Oropharynx is clear and moist.   Eyes: Conjunctivae are normal.   Neck: Neck supple.   Cardiovascular: Normal rate, regular rhythm and normal heart sounds.    No murmur heard.  Pulmonary/Chest: Effort normal and breath sounds normal. No respiratory distress. He has no wheezes.   Abdominal: Soft. Bowel sounds are normal. He exhibits no distension. There is no tenderness.   Musculoskeletal: Normal range of motion. He exhibits no edema or tenderness.   Lymphadenopathy:     He has no cervical adenopathy.   Neurological: He is alert and oriented to person, place, and time. Coordination normal.   Skin: Skin is warm and dry. No rash noted.   Psychiatric: He has a normal mood and affect. His behavior is normal.       Significant Labs:   CBC:   Recent Labs  Lab 01/31/17 0426 01/31/17 2133 02/01/17  0354   WBC 1.47* 1.52* 1.58*   HGB 7.9* 6.7* 8.5*   HCT 23.1* 19.6* 24.9*   PLT 48* 41* 31*     CMP:   Recent Labs  Lab 01/31/17 0426 01/31/17 2133 02/01/17  0354   * 130* 135*   K 4.0 3.9 3.7   CL 99 98 105   CO2 26 23 24   GLU 97 98 97   BUN 14 16 14   CREATININE 0.6 0.7 0.6   CALCIUM 8.8 8.6* 8.5*   PROT 7.0 7.1 6.5   ALBUMIN 2.1* 2.2* 2.0*   BILITOT 0.2 0.2 0.5   ALKPHOS 180* 164* 156*   AST 18 12 12   ALT 25 22 20   ANIONGAP 5* 9 6*   EGFRNONAA >60.0 >60.0 >60.0     Microbiology  BCx 01/16 diphtheroids 1/2 (contaminant)  BCx 01/17 NGTD  UCx 01/17 NGTD  RPR and uGC Testing negative  HAV immune  HBV nonimmune   HCV negative  Crypto Ag negative  Urine histo ag negative  Quantiferon negative  AFB BCx NGTD  BCx 01/18 NGTD  HLA- negative  HIV GT pending  toxo IGG negative  GT and phenotype with pan sensitive virus    Significant Imaging: I have reviewed all pertinent imaging results/findings within the past 24 hours.     CT head with no mass lesions

## 2017-02-01 NOTE — PLAN OF CARE
Problem: Patient Care Overview  Goal: Plan of Care Review  Outcome: Ongoing (interventions implemented as appropriate)  Pt remains free from injury. Pt up with assistance. Pt febrile; Tmax 100.8; pt was pan cultures and EKG completed. Pt outstanding for head CT. Pt febrile, and tachycardiac throughout night, 1.75L bolus given;lactic drawn in AM. Pain relieved with PRN pain medication; no nausea reported. Will continue to monitor

## 2017-02-02 LAB
ABO + RH BLD: NORMAL
ALBUMIN SERPL BCP-MCNC: 2.2 G/DL
ALP SERPL-CCNC: 182 U/L
ALT SERPL W/O P-5'-P-CCNC: 23 U/L
ANION GAP SERPL CALC-SCNC: 10 MMOL/L
ANISOCYTOSIS BLD QL SMEAR: SLIGHT
AST SERPL-CCNC: 15 U/L
BACTERIA UR CULT: NO GROWTH
BASOPHILS # BLD AUTO: ABNORMAL K/UL
BASOPHILS NFR BLD: 0 %
BILIRUB SERPL-MCNC: 0.2 MG/DL
BLASTS NFR BLD MANUAL: 62 %
BLD GP AB SCN CELLS X3 SERPL QL: NORMAL
BUN SERPL-MCNC: 14 MG/DL
CALCIUM SERPL-MCNC: 8.8 MG/DL
CHLORIDE SERPL-SCNC: 101 MMOL/L
CO2 SERPL-SCNC: 22 MMOL/L
CREAT SERPL-MCNC: 0.6 MG/DL
DIFFERENTIAL METHOD: ABNORMAL
EOSINOPHIL # BLD AUTO: ABNORMAL K/UL
EOSINOPHIL NFR BLD: 0 %
ERYTHROCYTE [DISTWIDTH] IN BLOOD BY AUTOMATED COUNT: 17.9 %
EST. GFR  (AFRICAN AMERICAN): >60 ML/MIN/1.73 M^2
EST. GFR  (NON AFRICAN AMERICAN): >60 ML/MIN/1.73 M^2
GLUCOSE SERPL-MCNC: 104 MG/DL
HCT VFR BLD AUTO: 25.2 %
HGB BLD-MCNC: 8.6 G/DL
HYPOCHROMIA BLD QL SMEAR: ABNORMAL
LDH SERPL L TO P-CCNC: 215 U/L
LYMPHOCYTES # BLD AUTO: ABNORMAL K/UL
LYMPHOCYTES NFR BLD: 38 %
MAGNESIUM SERPL-MCNC: 1.7 MG/DL
MCH RBC QN AUTO: 30.4 PG
MCHC RBC AUTO-ENTMCNC: 34.1 %
MCV RBC AUTO: 89 FL
MONOCYTES # BLD AUTO: ABNORMAL K/UL
MONOCYTES NFR BLD: 0 %
NEUTROPHILS NFR BLD: 0 %
OVALOCYTES BLD QL SMEAR: ABNORMAL
PHOSPHATE SERPL-MCNC: 3.5 MG/DL
PLATELET # BLD AUTO: 25 K/UL
PLATELET BLD QL SMEAR: ABNORMAL
PMV BLD AUTO: ABNORMAL FL
POIKILOCYTOSIS BLD QL SMEAR: SLIGHT
POLYCHROMASIA BLD QL SMEAR: ABNORMAL
POTASSIUM SERPL-SCNC: 3.6 MMOL/L
PROT SERPL-MCNC: 6.9 G/DL
RBC # BLD AUTO: 2.83 M/UL
SODIUM SERPL-SCNC: 133 MMOL/L
URATE SERPL-MCNC: 2.9 MG/DL
VANCOMYCIN TROUGH SERPL-MCNC: 2.2 UG/ML
WBC # BLD AUTO: 1.7 K/UL

## 2017-02-02 PROCEDURE — 25000003 PHARM REV CODE 250: Performed by: NURSE PRACTITIONER

## 2017-02-02 PROCEDURE — 83735 ASSAY OF MAGNESIUM: CPT

## 2017-02-02 PROCEDURE — 80053 COMPREHEN METABOLIC PANEL: CPT

## 2017-02-02 PROCEDURE — 83615 LACTATE (LD) (LDH) ENZYME: CPT

## 2017-02-02 PROCEDURE — 84100 ASSAY OF PHOSPHORUS: CPT

## 2017-02-02 PROCEDURE — 86850 RBC ANTIBODY SCREEN: CPT

## 2017-02-02 PROCEDURE — 20600001 HC STEP DOWN PRIVATE ROOM

## 2017-02-02 PROCEDURE — 25000003 PHARM REV CODE 250: Performed by: INTERNAL MEDICINE

## 2017-02-02 PROCEDURE — 63600175 PHARM REV CODE 636 W HCPCS: Performed by: INTERNAL MEDICINE

## 2017-02-02 PROCEDURE — 84550 ASSAY OF BLOOD/URIC ACID: CPT

## 2017-02-02 PROCEDURE — 85007 BL SMEAR W/DIFF WBC COUNT: CPT

## 2017-02-02 PROCEDURE — 80202 ASSAY OF VANCOMYCIN: CPT

## 2017-02-02 PROCEDURE — 99233 SBSQ HOSP IP/OBS HIGH 50: CPT | Mod: ,,, | Performed by: INTERNAL MEDICINE

## 2017-02-02 PROCEDURE — 99900031 HC PATIENT EDUCATION (STAT)

## 2017-02-02 PROCEDURE — 86900 BLOOD TYPING SEROLOGIC ABO: CPT

## 2017-02-02 PROCEDURE — 25000003 PHARM REV CODE 250: Performed by: STUDENT IN AN ORGANIZED HEALTH CARE EDUCATION/TRAINING PROGRAM

## 2017-02-02 PROCEDURE — 85027 COMPLETE CBC AUTOMATED: CPT

## 2017-02-02 RX ADMIN — OXYCODONE HYDROCHLORIDE 5 MG: 5 TABLET ORAL at 09:02

## 2017-02-02 RX ADMIN — DOLUTEGRAVIR SODIUM 50 MG: 50 TABLET, FILM COATED ORAL at 08:02

## 2017-02-02 RX ADMIN — VANCOMYCIN HYDROCHLORIDE 750 MG: 1 INJECTION, POWDER, LYOPHILIZED, FOR SOLUTION INTRAVENOUS at 11:02

## 2017-02-02 RX ADMIN — STANDARDIZED SENNA CONCENTRATE AND DOCUSATE SODIUM 1 TABLET: 8.6; 5 TABLET, FILM COATED ORAL at 08:02

## 2017-02-02 RX ADMIN — CEFEPIME HYDROCHLORIDE 2 G: 2 INJECTION, SOLUTION INTRAVENOUS at 06:02

## 2017-02-02 RX ADMIN — Medication 10 ML: at 06:02

## 2017-02-02 RX ADMIN — ABACAVIR 600 MG: 300 TABLET, FILM COATED ORAL at 08:02

## 2017-02-02 RX ADMIN — LAMIVUDINE 300 MG: 100 TABLET, FILM COATED ORAL at 08:02

## 2017-02-02 RX ADMIN — VORICONAZOLE 200 MG: 200 TABLET, FILM COATED ORAL at 09:02

## 2017-02-02 RX ADMIN — OXYCODONE HYDROCHLORIDE 5 MG: 5 TABLET ORAL at 03:02

## 2017-02-02 RX ADMIN — CEFEPIME HYDROCHLORIDE 2 G: 2 INJECTION, SOLUTION INTRAVENOUS at 09:02

## 2017-02-02 RX ADMIN — ACYCLOVIR 400 MG: 200 CAPSULE ORAL at 09:02

## 2017-02-02 RX ADMIN — STANDARDIZED SENNA CONCENTRATE AND DOCUSATE SODIUM 1 TABLET: 8.6; 5 TABLET, FILM COATED ORAL at 09:02

## 2017-02-02 RX ADMIN — CEFEPIME HYDROCHLORIDE 2 G: 2 INJECTION, SOLUTION INTRAVENOUS at 02:02

## 2017-02-02 RX ADMIN — ACYCLOVIR 400 MG: 200 CAPSULE ORAL at 08:02

## 2017-02-02 RX ADMIN — VANCOMYCIN HYDROCHLORIDE 750 MG: 1 INJECTION, POWDER, LYOPHILIZED, FOR SOLUTION INTRAVENOUS at 12:02

## 2017-02-02 RX ADMIN — VORICONAZOLE 200 MG: 200 TABLET, FILM COATED ORAL at 08:02

## 2017-02-02 RX ADMIN — SODIUM CHLORIDE: 0.9 INJECTION, SOLUTION INTRAVENOUS at 02:02

## 2017-02-02 RX ADMIN — Medication 10 ML: at 12:02

## 2017-02-02 NOTE — PROGRESS NOTES
Patient Consulted by CTTS:     The following was discussed by the Tobacco Treatment Specialist:  ? Relevance of Quitting  ? Risk to Health  ? Long Term Risk  ? Risk for Others  ? Rewards of Quitting  ? Motivation Intervention to Quit          Pt was given pamphlets on the program. He stated he's not interested at this time.

## 2017-02-02 NOTE — PROGRESS NOTES
"Progress Note  Hematology / Bone Marrow Transplant                                                              Team: [facility] [place]    Patient Name: Marck Howard  YOB: 1980    Admit Date: 1/17/2017                     LOS: 16    SUBJECTIVE:     Reason for Admission:  Acute leukemia  See H&P for detailed initial presentation history and ROS.      Interval history:  Day 14 of 7+3. Febrile o/n w/ Tm 100.7°. -120s. Subjectively, "I've been fine the whole time I've been here". Denies dysuria, cough, SOB, abdo pain, N/V, diarrhea. HAART therapy starting this AM.     Review of System:  Constitutional: Positive fever, -ve chills, negative for anorexia, malaise, night sweats and weight loss  Eyes: no visual changes, negative for irritation and redness  ENT: no nasal congestion or sore throat, negative for epistaxis, sore mouth and sore throat  Respiratory: no cough or shortness of breath, negative for dyspnea on exertion and wheezing  Cardiovascular: no chest pain or palpitations, negative for dyspnea, fatigue and palpitations  Gastrointestinal: no nausea or vomiting, no abdominal pain or change in bowel habits, negative for constipation, diarrhea and melena  Hematologic/Lymphatic: no easy bruising or lymphadenopathy, negative for bleeding  Musculoskeletal: no arthralgias or myalgias  Neurological: no seizures or tremors, negative for headaches and weakness  Behavioral/Psych: negative for anxiety and depression    OBJECTIVE:     Vital Signs Range (Last 24H):  Temp:  [98.5 °F (36.9 °C)-102 °F (38.9 °C)]   Pulse:  [115-133]   Resp:  [12-18]   BP: ()/(54-74)   SpO2:  [98 %-100 %] Body mass index is 16.4 kg/(m^2).    I & O (Last 24H):    Intake/Output Summary (Last 24 hours) at 02/02/17 0506  Last data filed at 02/02/17 0450   Gross per 24 hour   Intake          4171.66 ml   Output             3200 ml   Net           971.66 ml       Physical Exam:  General: well developed, cachectic, " no distress  HENT: Head:normocephalic, atraumatic. Ears:right ear normal, left ear normal. Nose: Nares normal. Septum midline. Mucosa normal. No drainage or sinus tenderness., no discharge. Throat: lips, mucosa, and tongue normal; teeth and gums normal and no throat erythema.  Eyes: conjunctivae/corneas clear. PERRL.   Neck: supple, symmetrical, trachea midline  Lungs:  clear to auscultation bilaterally and normal respiratory effort  Cardiovascular: Heart: tachycardic but otherwise regular rhythm, S1, S2 normal, no murmur, click, rub or gallop. Chest Wall: no tenderness. Extremities: no cyanosis or edema, or clubbing.   Abdomen: soft, non-tender non-distented; bowel sounds normal.   Skin: Skin color, texture, turgor normal. No rashes or lesions  Musculoskeletal:no clubbing, cyanosis  Neurologic: Mental status: Alert, oriented, thought content appropriate  Psych/Behavioral:  Normal.    Diagnostic Results:  Lab Results   Component Value Date    WBC 1.58 (LL) 02/01/2017    HGB 8.5 (L) 02/01/2017    HCT 24.9 (L) 02/01/2017    MCV 89 02/01/2017    PLT 31 (LL) 02/01/2017     No results for input(s): GLU, NA, K, CL, CO2, BUN, CREATININE, CALCIUM, MG in the last 24 hours.  Lab Results   Component Value Date    INR 1.2 01/17/2017    INR 1.8 (H) 01/16/2017     No results found for: HGBA1C  Microbiology Results (last 7 days)     Procedure Component Value Units Date/Time    Urine culture [[id number]] Collected:  01/31/17 2255    Order Status:  Completed Specimen:  Urine from Urine, Clean Catch Updated:  02/02/17 0548     Urine Culture, Routine No growth    Blood culture [[id number]] Collected:  01/31/17 2132    Order Status:  Completed Specimen:  Blood Updated:  02/01/17 2312     Blood Culture, Routine No Growth to date     Blood Culture, Routine No Growth to date    Blood culture [[id number]] Collected:  01/31/17 2133    Order Status:  Completed Specimen:  Blood Updated:  02/01/17 2312     Blood Culture, Routine No Growth to date      Blood Culture, Routine No Growth to date    Gram stain [[id number]] Collected:  01/31/17 4928    Order Status:  Completed Specimen:  Urine from Urine, Clean Catch Updated:  02/01/17 0442     Gram Stain Result No WBC's      No organisms seen    Blood culture [[id number]]     Order Status:  Canceled Specimen:  Blood     Blood culture [[id number]]     Order Status:  Canceled Specimen:  Blood         Imaging Results:    CT C/A/P 1/19/17:  1.  Focus of groundglass attenuation with bronchiectasis/developing air bronchograms within the right middle lobe, nonspecific, however could reflect developing infectious process or possibly sequela of aspiration, clinical correlation for pneumonia is advised.  2.  Anterior mediastinum mass, suggesting thymic hyperplasia, clinical correlation advised.  3.  Constipation.  4.  Several additional findings above.    CXR 1/17/17: Heart size normal.  Mild accentuation of interstitial markings.  No significant air space consolidation or pleural effusion    2D Echo 1/17/17:  CONCLUSIONS     1 - Low normal left ventricular systolic function (EF 50-55%).     2 - Normal right ventricular systolic function .     3 - Normal left ventricular diastolic function.     FINAL PATHOLOGIC DIAGNOSIS (1/17/17):  BONE MARROW, RIGHT ILIAC CREST, ASPIRATE, CLOT, AND CORE BIOPSY:  --Markedly hypercellular marrow, essentially 100%, nearly completely replaced by acute myeloid leukemia, see  comment  --Rare background trilineage elements present  --No significant appreciable stainable iron  COMMENT: Concomitantly submitted flow cytometric analysis detects an increased population of blasts consistent  with acute myeloid leukemia, favor non-M3 subtype, although A CD34 positive/HLA-DR negative/equivocal  phenotype, and M3 APL subtype cannot be completely excluded. This increased population of myeloid blasts  shows expression of CD34, CD13, and CD33 (partial). This population also expresses CD 56 and CD7. It  is  negative for  and other B and T lymphoid R crest tested including CD19 and cytoplasmic CD3, as well as  TdT. HLA-DR is equivocal.  The overall morphology of these blasts do not suggest acute promyelocytic leukemia. 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       Active Problems, Status & Treatment Plan Addressed Today:    # AML  - leukocytosis likely 2/2 AML and infection (resolving)  - currently no evidence of DIC or tumor lysis  - noted to have new diagnosis of HIV (see below)  - hepatitis and flu negative   - s/p bone marrow biopsy (1/17/17) showing AML, cytogenetics and molecular markers done  - NPM1 and FLT3 negative  - will need transplant given high risk disease  - continue on empiric allopurinol  - 7+3 started 1/20/17, day 14 today  - rpt bone marrow biopsy tomorrow.     # Tachycardia/Sepsis  - side effect of chemo?  - not initially floridly septic, but now with fever x48h. Pan-cultured. LA -ve. UA clean. CXR unchanged. Initially on cefepime and vancomycin; ID following. BCx -ve. Will discontinue Vanc and continue Cef.   - EKG x2 showed NSR.  - Patient is asymptomatic   - continue to monitor    #HIV positive  - new diagnosis on this admit  - toxoplasma gondii WNL  - CD4: 118  - ID following, appreciate recs  - Starting Triumeq today.  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 1 unit of PLTs 1/30/17  - no overt signs of bleeding  - likely 2/2 underlying  hematologic malignancy  - continue to monitor and transfuse as indicated for Hgb <7.0, PLT <10    # Constipation (resolved)  - likely 2/2 opiate use  - continue on miralax and senna-docusate  - resolved, continue to monitor     # Tobacco Abuse  - 26 pack year history  - advise smoking cessation   - nicotine patch prn    # H/O Alcohol Abuse  - drank at least 2 beers per day prior to admission  - no h/o DTs and now out of period for withdrawal    # Hyponatremia (resolving)  - likely 2/2 dehydration  - continue IVFs  - continue to monitor    # Hypoalbuminemia  - checked 24 hour urine protein  - Dietary consulted  - encourage increased nutritional intake     DVT ppx: encourage ambulation  Diet: regular  Code Status: full    DISPO: pending chemotherapy for AML and repeat bone marrow

## 2017-02-02 NOTE — PLAN OF CARE
Problem: Patient Care Overview  Goal: Plan of Care Review  Outcome: Ongoing (interventions implemented as appropriate)  Patient is AAOx4. Pt is calm and cooperative. Teaching and education performed. Patient remains free from falls and injury this shift. Bed in low, locked position, environment is free of clutter, with call bell in reach. Patient encouraged to call for assistance. Patient verbalized understanding. All belongings within reach. High protein / High calorie diet - solely ate protein for dinner (25%). Max HR of 120 over night. PICC line still able to provide adequate blood return. No N/V throughout shift. T max 100.7 - pt able to resolve fever on their own. Pt C/O muscular pain to upper and lower extremities. Oxycodone IR given once throughout shift. Will continue to monitor.

## 2017-02-02 NOTE — PLAN OF CARE
Problem: Patient Care Overview  Goal: Plan of Care Review  Outcome: Ongoing (interventions implemented as appropriate)  Patient reports no discomfort. States he wants to go home. Explained to patient that he is neutropenic and at very high risk of getting an infection. Patient is scheduled to have a bone marrow biopsy on Friday. Patient states understanding. Will cont to monitor. No falls this shift.

## 2017-02-03 LAB
ALBUMIN SERPL BCP-MCNC: 1.8 G/DL
ALP SERPL-CCNC: 155 U/L
ALT SERPL W/O P-5'-P-CCNC: 23 U/L
ANION GAP SERPL CALC-SCNC: 8 MMOL/L
ANISOCYTOSIS BLD QL SMEAR: SLIGHT
AST SERPL-CCNC: 14 U/L
BASOPHILS # BLD AUTO: ABNORMAL K/UL
BASOPHILS NFR BLD: 0 %
BILIRUB SERPL-MCNC: 0.1 MG/DL
BLASTS NFR BLD MANUAL: 59 %
BONE MARROW WRIGHT STAIN COMMENT: NORMAL
BUN SERPL-MCNC: 11 MG/DL
BURR CELLS BLD QL SMEAR: ABNORMAL
CALCIUM SERPL-MCNC: 7.8 MG/DL
CHLORIDE SERPL-SCNC: 107 MMOL/L
CO2 SERPL-SCNC: 21 MMOL/L
CREAT SERPL-MCNC: 0.6 MG/DL
DIASTOLIC DYSFUNCTION: NO
DIFFERENTIAL METHOD: ABNORMAL
EOSINOPHIL # BLD AUTO: ABNORMAL K/UL
EOSINOPHIL NFR BLD: 0 %
ERYTHROCYTE [DISTWIDTH] IN BLOOD BY AUTOMATED COUNT: 17.6 %
EST. GFR  (AFRICAN AMERICAN): >60 ML/MIN/1.73 M^2
EST. GFR  (NON AFRICAN AMERICAN): >60 ML/MIN/1.73 M^2
GLUCOSE SERPL-MCNC: 93 MG/DL
HCT VFR BLD AUTO: 22.7 %
HGB BLD-MCNC: 7.6 G/DL
HYPOCHROMIA BLD QL SMEAR: ABNORMAL
LYMPHOCYTES # BLD AUTO: ABNORMAL K/UL
LYMPHOCYTES NFR BLD: 41 %
MAGNESIUM SERPL-MCNC: 1.3 MG/DL
MCH RBC QN AUTO: 30.3 PG
MCHC RBC AUTO-ENTMCNC: 33.5 %
MCV RBC AUTO: 90 FL
MITRAL VALVE MOBILITY: NORMAL
MONOCYTES # BLD AUTO: ABNORMAL K/UL
MONOCYTES NFR BLD: 0 %
NEUTROPHILS NFR BLD: 0 %
OVALOCYTES BLD QL SMEAR: ABNORMAL
PHOSPHATE SERPL-MCNC: 3 MG/DL
PLATELET # BLD AUTO: 15 K/UL
PMV BLD AUTO: ABNORMAL FL
POIKILOCYTOSIS BLD QL SMEAR: SLIGHT
POLYCHROMASIA BLD QL SMEAR: ABNORMAL
POTASSIUM SERPL-SCNC: 3.6 MMOL/L
PROT SERPL-MCNC: 6 G/DL
RBC # BLD AUTO: 2.51 M/UL
RETIRED EF AND QEF - SEE NOTES: 50 (ref 55–65)
SODIUM SERPL-SCNC: 136 MMOL/L
TRICUSPID VALVE REGURGITATION: NORMAL
WBC # BLD AUTO: 2.25 K/UL

## 2017-02-03 PROCEDURE — 87040 BLOOD CULTURE FOR BACTERIA: CPT

## 2017-02-03 PROCEDURE — 83735 ASSAY OF MAGNESIUM: CPT

## 2017-02-03 PROCEDURE — 63600175 PHARM REV CODE 636 W HCPCS: Performed by: INTERNAL MEDICINE

## 2017-02-03 PROCEDURE — 88305 TISSUE EXAM BY PATHOLOGIST: CPT | Performed by: PATHOLOGY

## 2017-02-03 PROCEDURE — 25000003 PHARM REV CODE 250: Performed by: NURSE PRACTITIONER

## 2017-02-03 PROCEDURE — 85097 BONE MARROW INTERPRETATION: CPT | Mod: ,,, | Performed by: PATHOLOGY

## 2017-02-03 PROCEDURE — 20600001 HC STEP DOWN PRIVATE ROOM

## 2017-02-03 PROCEDURE — 88341 IMHCHEM/IMCYTCHM EA ADD ANTB: CPT | Mod: 26,59,, | Performed by: PATHOLOGY

## 2017-02-03 PROCEDURE — 25000003 PHARM REV CODE 250: Performed by: HOSPITALIST

## 2017-02-03 PROCEDURE — 25000003 PHARM REV CODE 250: Performed by: INTERNAL MEDICINE

## 2017-02-03 PROCEDURE — 88341 IMHCHEM/IMCYTCHM EA ADD ANTB: CPT | Mod: 26,,, | Performed by: PATHOLOGY

## 2017-02-03 PROCEDURE — 63600175 PHARM REV CODE 636 W HCPCS: Performed by: NURSE PRACTITIONER

## 2017-02-03 PROCEDURE — 84100 ASSAY OF PHOSPHORUS: CPT

## 2017-02-03 PROCEDURE — 88313 SPECIAL STAINS GROUP 2: CPT | Mod: 26,,, | Performed by: PATHOLOGY

## 2017-02-03 PROCEDURE — 88313 SPECIAL STAINS GROUP 2: CPT

## 2017-02-03 PROCEDURE — 88264 CHROMOSOME ANALYSIS 20-25: CPT

## 2017-02-03 PROCEDURE — 93306 TTE W/DOPPLER COMPLETE: CPT | Mod: 26,,, | Performed by: INTERNAL MEDICINE

## 2017-02-03 PROCEDURE — 99233 SBSQ HOSP IP/OBS HIGH 50: CPT | Mod: ,,, | Performed by: INTERNAL MEDICINE

## 2017-02-03 PROCEDURE — 88184 FLOWCYTOMETRY/ TC 1 MARKER: CPT | Performed by: PATHOLOGY

## 2017-02-03 PROCEDURE — 25000003 PHARM REV CODE 250: Performed by: STUDENT IN AN ORGANIZED HEALTH CARE EDUCATION/TRAINING PROGRAM

## 2017-02-03 PROCEDURE — 80053 COMPREHEN METABOLIC PANEL: CPT

## 2017-02-03 PROCEDURE — 93306 TTE W/DOPPLER COMPLETE: CPT

## 2017-02-03 PROCEDURE — 07DR3ZX EXTRACTION OF ILIAC BONE MARROW, PERCUTANEOUS APPROACH, DIAGNOSTIC: ICD-10-PCS | Performed by: INTERNAL MEDICINE

## 2017-02-03 PROCEDURE — 88237 TISSUE CULTURE BONE MARROW: CPT

## 2017-02-03 PROCEDURE — 88342 IMHCHEM/IMCYTCHM 1ST ANTB: CPT | Mod: 26,59,, | Performed by: PATHOLOGY

## 2017-02-03 PROCEDURE — 85027 COMPLETE CBC AUTOMATED: CPT

## 2017-02-03 PROCEDURE — 88311 DECALCIFY TISSUE: CPT | Mod: 26,,, | Performed by: PATHOLOGY

## 2017-02-03 PROCEDURE — 85007 BL SMEAR W/DIFF WBC COUNT: CPT

## 2017-02-03 PROCEDURE — 88305 TISSUE EXAM BY PATHOLOGIST: CPT | Mod: 26,,, | Performed by: PATHOLOGY

## 2017-02-03 PROCEDURE — 88185 FLOWCYTOMETRY/TC ADD-ON: CPT | Mod: 59 | Performed by: PATHOLOGY

## 2017-02-03 PROCEDURE — 88189 FLOWCYTOMETRY/READ 16 & >: CPT | Mod: ,,, | Performed by: PATHOLOGY

## 2017-02-03 RX ORDER — LANOLIN ALCOHOL/MO/W.PET/CERES
400 CREAM (GRAM) TOPICAL DAILY
Status: DISCONTINUED | OUTPATIENT
Start: 2017-02-03 | End: 2017-02-03

## 2017-02-03 RX ORDER — LORAZEPAM 2 MG/ML
0.5 INJECTION INTRAMUSCULAR ONCE
Status: COMPLETED | OUTPATIENT
Start: 2017-02-03 | End: 2017-02-03

## 2017-02-03 RX ORDER — LIDOCAINE HYDROCHLORIDE 20 MG/ML
10 INJECTION, SOLUTION INFILTRATION; PERINEURAL ONCE
Status: COMPLETED | OUTPATIENT
Start: 2017-02-03 | End: 2017-02-03

## 2017-02-03 RX ORDER — LANOLIN ALCOHOL/MO/W.PET/CERES
400 CREAM (GRAM) TOPICAL DAILY
Status: DISCONTINUED | OUTPATIENT
Start: 2017-02-04 | End: 2017-02-23

## 2017-02-03 RX ORDER — LANOLIN ALCOHOL/MO/W.PET/CERES
400 CREAM (GRAM) TOPICAL DAILY
Status: DISCONTINUED | OUTPATIENT
Start: 2017-02-04 | End: 2017-02-03

## 2017-02-03 RX ADMIN — OXYCODONE HYDROCHLORIDE 5 MG: 5 TABLET ORAL at 11:02

## 2017-02-03 RX ADMIN — Medication 10 ML: at 06:02

## 2017-02-03 RX ADMIN — DOLUTEGRAVIR SODIUM 50 MG: 50 TABLET, FILM COATED ORAL at 08:02

## 2017-02-03 RX ADMIN — ABACAVIR 600 MG: 300 TABLET, FILM COATED ORAL at 08:02

## 2017-02-03 RX ADMIN — CEFEPIME HYDROCHLORIDE 2 G: 2 INJECTION, SOLUTION INTRAVENOUS at 09:02

## 2017-02-03 RX ADMIN — MAGNESIUM OXIDE TAB 400 MG (241.3 MG ELEMENTAL MG) 400 MG: 400 (241.3 MG) TAB at 08:02

## 2017-02-03 RX ADMIN — VORICONAZOLE 200 MG: 200 TABLET, FILM COATED ORAL at 09:02

## 2017-02-03 RX ADMIN — MAGNESIUM OXIDE TAB 400 MG (241.3 MG ELEMENTAL MG) 800 MG: 400 (241.3 MG) TAB at 10:02

## 2017-02-03 RX ADMIN — SODIUM CHLORIDE 2000 ML: 0.9 INJECTION, SOLUTION INTRAVENOUS at 11:02

## 2017-02-03 RX ADMIN — LAMIVUDINE 300 MG: 100 TABLET, FILM COATED ORAL at 08:02

## 2017-02-03 RX ADMIN — STANDARDIZED SENNA CONCENTRATE AND DOCUSATE SODIUM 1 TABLET: 8.6; 5 TABLET, FILM COATED ORAL at 08:02

## 2017-02-03 RX ADMIN — ACYCLOVIR 400 MG: 200 CAPSULE ORAL at 09:02

## 2017-02-03 RX ADMIN — MAGNESIUM OXIDE TAB 400 MG (241.3 MG ELEMENTAL MG) 800 MG: 400 (241.3 MG) TAB at 06:02

## 2017-02-03 RX ADMIN — VORICONAZOLE 200 MG: 200 TABLET, FILM COATED ORAL at 08:02

## 2017-02-03 RX ADMIN — OXYCODONE HYDROCHLORIDE 5 MG: 5 TABLET ORAL at 04:02

## 2017-02-03 RX ADMIN — Medication 10 ML: at 11:02

## 2017-02-03 RX ADMIN — LORAZEPAM 0.5 MG: 2 INJECTION INTRAMUSCULAR; INTRAVENOUS at 11:02

## 2017-02-03 RX ADMIN — STANDARDIZED SENNA CONCENTRATE AND DOCUSATE SODIUM 1 TABLET: 8.6; 5 TABLET, FILM COATED ORAL at 09:02

## 2017-02-03 RX ADMIN — ACETAMINOPHEN 650 MG: 325 TABLET ORAL at 10:02

## 2017-02-03 RX ADMIN — MAGNESIUM OXIDE TAB 400 MG (241.3 MG ELEMENTAL MG) 800 MG: 400 (241.3 MG) TAB at 02:02

## 2017-02-03 RX ADMIN — ACYCLOVIR 400 MG: 200 CAPSULE ORAL at 08:02

## 2017-02-03 RX ADMIN — CEFEPIME HYDROCHLORIDE 2 G: 2 INJECTION, SOLUTION INTRAVENOUS at 05:02

## 2017-02-03 RX ADMIN — Medication 10 ML: at 05:02

## 2017-02-03 RX ADMIN — CEFEPIME HYDROCHLORIDE 2 G: 2 INJECTION, SOLUTION INTRAVENOUS at 02:02

## 2017-02-03 RX ADMIN — LIDOCAINE HYDROCHLORIDE 10 ML: 20 INJECTION, SOLUTION INFILTRATION; PERINEURAL at 11:02

## 2017-02-03 NOTE — SUBJECTIVE & OBJECTIVE
Interval History: feels well, afebrile, vernon ARV well    HPI:  35yo previously healthy man who was admitted as a transfer from an OSH on 1/17/2017 with 3mos of weight loss, malaise, and fevers and was found to have a new diagnosis of AML and a new diagnosis of HIV (VE8=807/7%, VL 22k, GT pending). He is improved clinically since starting induction chemotherapy c/b neutropenic fevers that have now resolved and no issues with ARVs    Review of Systems   Constitutional: Negative for activity change, appetite change, chills, fatigue and fever.   HENT: Negative for congestion, dental problem, mouth sores and sinus pressure.    Eyes: Negative for pain, redness and visual disturbance.   Respiratory: Negative for cough, shortness of breath and wheezing.    Cardiovascular: Negative for chest pain and leg swelling.   Gastrointestinal: Negative for abdominal distention, abdominal pain, diarrhea, nausea and vomiting.   Endocrine: Negative for polyuria.   Genitourinary: Negative for decreased urine volume, dysuria and frequency.   Musculoskeletal: Negative for joint swelling.   Skin: Negative for color change.   Allergic/Immunologic: Negative for food allergies.   Neurological: Negative for dizziness, weakness and headaches.   Hematological: Negative for adenopathy.   Psychiatric/Behavioral: Negative for agitation and confusion. The patient is not nervous/anxious.      Objective:     Vital Signs (Most Recent):  Temp: 98.1 °F (36.7 °C) (02/03/17 1203)  Pulse: (!) 112 (02/03/17 1203)  Resp: 16 (02/03/17 1203)  BP: 98/64 (02/03/17 1203)  SpO2: 98 % (02/03/17 1203) Vital Signs (24h Range):  Temp:  [98.1 °F (36.7 °C)-99.6 °F (37.6 °C)] 98.1 °F (36.7 °C)  Pulse:  [105-121] 112  Resp:  [12-16] 16  SpO2:  [95 %-99 %] 98 %  BP: ()/(62-80) 98/64     Weight: 42.6 kg (93 lb 14.7 oz)  Body mass index is 16.17 kg/(m^2).    Estimated Creatinine Clearance: 100.6 mL/min (based on Cr of 0.6).    Physical Exam   Constitutional: He is oriented  to person, place, and time. He appears well-developed and well-nourished.   HENT:   Head: Normocephalic and atraumatic.   Mouth/Throat: Oropharynx is clear and moist.   Eyes: Conjunctivae are normal.   Neck: Neck supple.   Cardiovascular: Normal rate, regular rhythm and normal heart sounds.    No murmur heard.  Pulmonary/Chest: Effort normal and breath sounds normal. No respiratory distress. He has no wheezes.   Abdominal: Soft. Bowel sounds are normal. He exhibits no distension. There is no tenderness.   Musculoskeletal: Normal range of motion. He exhibits no edema or tenderness.   Lymphadenopathy:     He has no cervical adenopathy.   Neurological: He is alert and oriented to person, place, and time. Coordination normal.   Skin: Skin is warm and dry. No rash noted.   Psychiatric: He has a normal mood and affect. His behavior is normal.       Significant Labs:   CBC:     Recent Labs  Lab 02/02/17  0455 02/03/17  0447   WBC 1.70* 2.25*   HGB 8.6* 7.6*   HCT 25.2* 22.7*   PLT 25* 15*     CMP:     Recent Labs  Lab 02/02/17  0455 02/03/17  0447   * 136   K 3.6 3.6    107   CO2 22* 21*    93   BUN 14 11   CREATININE 0.6 0.6   CALCIUM 8.8 7.8*   PROT 6.9 6.0   ALBUMIN 2.2* 1.8*   BILITOT 0.2 0.1   ALKPHOS 182* 155*   AST 15 14   ALT 23 23   ANIONGAP 10 8   EGFRNONAA >60.0 >60.0     Microbiology  BCx 01/16 diphtheroids 1/2 (contaminant)  BCx 01/17 NGTD  UCx 01/17 NGTD  RPR and uGC Testing negative  HAV immune  HBV nonimmune   HCV negative  Crypto Ag negative  Urine histo ag negative  Quantiferon negative  AFB BCx NGTD  BCx 01/18 NGTD  HLA- negative  HIV GT pending  toxo IGG negative  GT and phenotype with pan sensitive virus    Significant Imaging: I have reviewed all pertinent imaging results/findings within the past 24 hours.     CT head with no mass lesions

## 2017-02-03 NOTE — PROGRESS NOTES
[facility]  Infectious Disease  Progress Note    Patient Name: Marck Howard  MRN: [id number]  Admission Date: 1/17/2017  Length of Stay: 17 days  Attending Physician: Nohemy Bose MD  Primary Care Provider: Primary Doctor No    Isolation Status: No active isolations  Assessment/Plan:      Fever  35yo previously healthy man who was admitted as a transfer from an OSH on 1/17/2017 with 3mos of weight loss, malaise, and fevers and was found to have a new diagnosis of AML and a new diagnosis of HIV (MM0=392/7%, VL 22k, GT pan sensitive). He is improved clinically since starting induction chemotherapy c/b neutropenic fevers now resolved      - would continue cefepime for now but if remains afebrile would transition back to quinolone ppx, voriconazole and acyclovir neutropenic prophylaxis per protocol  - patient is s/p pentamidine PCP prophylaxis (chosen over bactrim given myelosuppressive effects while in house) -- would recommend continuing ppx after neutropenia for AIDS.  However would instead consider atovaquone or dapsone if not using bactrim when ppx is again started  - continue triumeq components  - patient should be discharged with triumeq 1 pill once daily.  He should be set up with specialty pharmacy here to have med guidance and home delivery  - please have patient f/u with me in clinic within 2 weeks of discharge        Anticipated Disposition: pending    Thank you for your consult. I will sign off. Please contact us if you have any additional questions.    Dionte Medrano MD  Infectious Disease  [facility]    Subjective:     Principal Problem:Acute leukemia    Interval History: feels well, afebrile, vernon ARV well    HPI:  35yo previously healthy man who was admitted as a transfer from an OSH on 1/17/2017 with 3mos of weight loss, malaise, and fevers and was found to have a new diagnosis of AML and a new diagnosis of HIV (TP6=492/7%, VL 22k, GT pending). He is  improved clinically since starting induction chemotherapy c/b neutropenic fevers that have now resolved and no issues with ARVs    Review of Systems   Constitutional: Negative for activity change, appetite change, chills, fatigue and fever.   HENT: Negative for congestion, dental problem, mouth sores and sinus pressure.    Eyes: Negative for pain, redness and visual disturbance.   Respiratory: Negative for cough, shortness of breath and wheezing.    Cardiovascular: Negative for chest pain and leg swelling.   Gastrointestinal: Negative for abdominal distention, abdominal pain, diarrhea, nausea and vomiting.   Endocrine: Negative for polyuria.   Genitourinary: Negative for decreased urine volume, dysuria and frequency.   Musculoskeletal: Negative for joint swelling.   Skin: Negative for color change.   Allergic/Immunologic: Negative for food allergies.   Neurological: Negative for dizziness, weakness and headaches.   Hematological: Negative for adenopathy.   Psychiatric/Behavioral: Negative for agitation and confusion. The patient is not nervous/anxious.      Objective:     Vital Signs (Most Recent):  Temp: 98.1 °F (36.7 °C) (02/03/17 1203)  Pulse: (!) 112 (02/03/17 1203)  Resp: 16 (02/03/17 1203)  BP: 98/64 (02/03/17 1203)  SpO2: 98 % (02/03/17 1203) Vital Signs (24h Range):  Temp:  [98.1 °F (36.7 °C)-99.6 °F (37.6 °C)] 98.1 °F (36.7 °C)  Pulse:  [105-121] 112  Resp:  [12-16] 16  SpO2:  [95 %-99 %] 98 %  BP: ()/(62-80) 98/64     Weight: 42.6 kg (93 lb 14.7 oz)  Body mass index is 16.17 kg/(m^2).    Estimated Creatinine Clearance: 100.6 mL/min (based on Cr of 0.6).    Physical Exam   Constitutional: He is oriented to person, place, and time. He appears well-developed and well-nourished.   HENT:   Head: Normocephalic and atraumatic.   Mouth/Throat: Oropharynx is clear and moist.   Eyes: Conjunctivae are normal.   Neck: Neck supple.   Cardiovascular: Normal rate, regular rhythm and normal heart sounds.    No  murmur heard.  Pulmonary/Chest: Effort normal and breath sounds normal. No respiratory distress. He has no wheezes.   Abdominal: Soft. Bowel sounds are normal. He exhibits no distension. There is no tenderness.   Musculoskeletal: Normal range of motion. He exhibits no edema or tenderness.   Lymphadenopathy:     He has no cervical adenopathy.   Neurological: He is alert and oriented to person, place, and time. Coordination normal.   Skin: Skin is warm and dry. No rash noted.   Psychiatric: He has a normal mood and affect. His behavior is normal.       Significant Labs:   CBC:     Recent Labs  Lab 02/02/17 0455 02/03/17  0447   WBC 1.70* 2.25*   HGB 8.6* 7.6*   HCT 25.2* 22.7*   PLT 25* 15*     CMP:     Recent Labs  Lab 02/02/17 0455 02/03/17  0447   * 136   K 3.6 3.6    107   CO2 22* 21*    93   BUN 14 11   CREATININE 0.6 0.6   CALCIUM 8.8 7.8*   PROT 6.9 6.0   ALBUMIN 2.2* 1.8*   BILITOT 0.2 0.1   ALKPHOS 182* 155*   AST 15 14   ALT 23 23   ANIONGAP 10 8   EGFRNONAA >60.0 >60.0     Microbiology  BCx 01/16 diphtheroids 1/2 (contaminant)  BCx 01/17 NGTD  UCx 01/17 NGTD  RPR and uGC Testing negative  HAV immune  HBV nonimmune   HCV negative  Crypto Ag negative  Urine histo ag negative  Quantiferon negative  AFB BCx NGTD  BCx 01/18 NGTD  HLA- negative  HIV GT pending  toxo IGG negative  GT and phenotype with pan sensitive virus    Significant Imaging: I have reviewed all pertinent imaging results/findings within the past 24 hours.     CT head with no mass lesions

## 2017-02-03 NOTE — PROCEDURES
PROCEDURE NOTE:  Date of Procedure: 2/3/2017  Bone Marrow Biopsy and Aspiration  Indication: AML  Consent: Informed consent was obtained from patient.  Timeout: Done and documented.  Position: Prone  Site: Left posterior illiac crest.  Prep: Betadine.  Needle used: 11 gauge Jamshidi needle.  Anesthetic: 2% lidocaine 5 cc.  Biopsy: The biopsy needle was introduced into the marrow cavity and an aspirate was obtained without complications and sent for flow cytometry and cytogenetics. The biopsy needle was then introduced into the marrow cavity to obtain a core biopsy which was not obtained the first time. The biopsy needle was then reintroduced into the marrow cavity and a core biopsy was obtained without difficulty and sent for routine histologic examination.  Complications: None.  Disposition: The patient was instructed to lay on his back for 15-20 minutes. RN was in pt's room.  Blood loss: Minimal.     Camelia Benson DNP, NP  Hematology/Oncology

## 2017-02-03 NOTE — PROGRESS NOTES
"Progress Note  Hematology / Bone Marrow Transplant                                                              Team: [facility] [place] HEMATOLOGY BMT    Patient Name: Marck Howard  YOB: 1980    Admit Date: 1/17/2017                     LOS: 17    SUBJECTIVE:     Reason for Admission:  Acute leukemia  See H&P for detailed initial presentation history and ROS.      Interval history:  Day 15 of 7+3; bone marrow biopsy on schedule today. Afebrile o/n w/ Tm 99.6°. -120s. Subjectively, "Feel fine". "My hair is falling out". Denies dysuria, cough, SOB, abdo pain, N/V, diarrhea. HAART therapy starting this AM.     Review of System:  Constitutional: -ve fever, -ve chills, negative for anorexia, malaise, night sweats and weight loss  Eyes: no visual changes, negative for irritation and redness  ENT: no nasal congestion or sore throat, negative for epistaxis, sore mouth and sore throat  Respiratory: no cough or shortness of breath, negative for dyspnea on exertion and wheezing  Cardiovascular: no chest pain or palpitations, negative for dyspnea, fatigue and palpitations  Gastrointestinal: no nausea or vomiting, no abdominal pain or change in bowel habits, negative for constipation, diarrhea and melena  Hematologic/Lymphatic: no easy bruising or lymphadenopathy, negative for bleeding  Musculoskeletal: no arthralgias or myalgias  Neurological: no seizures or tremors, negative for headaches and weakness  Behavioral/Psych: negative for anxiety and depression    OBJECTIVE:     Vital Signs Range (Last 24H):  Temp:  [98.6 °F (37 °C)-99.6 °F (37.6 °C)]   Pulse:  [105-121]   Resp:  [12-16]   BP: ()/(63-80)   SpO2:  [95 %-100 %] Body mass index is 16.17 kg/(m^2).    I & O (Last 24H):    Intake/Output Summary (Last 24 hours) at 02/03/17 0730  Last data filed at 02/03/17 0434   Gross per 24 hour   Intake          4145.84 ml   Output             1900 ml   Net          2245.84 ml       Physical Exam:  General: well " developed, cachectic, no distress  HENT: Head:normocephalic, atraumatic. Ears:right ear normal, left ear normal. Nose: Nares normal. Septum midline. Mucosa normal. No drainage or sinus tenderness., no discharge. Throat: lips, mucosa, and tongue normal; teeth and gums normal and no throat erythema.  Eyes: conjunctivae/corneas clear. PERRL.   Neck: supple, symmetrical, trachea midline  Lungs:  clear to auscultation bilaterally and normal respiratory effort  Cardiovascular: Heart: tachycardic but otherwise regular rhythm, S1, S2 normal, no murmur, click, rub or gallop. Chest Wall: no tenderness. Extremities: no cyanosis or edema, or clubbing.   Abdomen: soft, non-tender non-distented; bowel sounds normal.   Skin: Skin color, texture, turgor normal. No rashes or lesions  Musculoskeletal:no clubbing, cyanosis  Neurologic: Mental status: Alert, oriented, thought content appropriate  Psych/Behavioral:  Normal.    Diagnostic Results:  Lab Results   Component Value Date    WBC 2.25 (L) 02/03/2017    HGB 7.6 (L) 02/03/2017    HCT 22.7 (L) 02/03/2017    MCV 90 02/03/2017    PLT 25 (LL) 02/02/2017       Recent Labs  Lab 02/03/17  0447   GLU 93      K 3.6      CO2 21*   BUN 11   CREATININE 0.6   CALCIUM 7.8*   MG 1.3*     Lab Results   Component Value Date    INR 1.2 01/17/2017    INR 1.8 (H) 01/16/2017     No results found for: HGBA1C  Microbiology Results (last 7 days)     Procedure Component Value Units Date/Time    Blood culture [[id number]] Collected:  01/31/17 2133    Order Status:  Completed Specimen:  Blood Updated:  02/02/17 2312     Blood Culture, Routine No Growth to date     Blood Culture, Routine No Growth to date     Blood Culture, Routine No Growth to date    Blood culture [[id number]] Collected:  01/31/17 2132    Order Status:  Completed Specimen:  Blood Updated:  02/02/17 2312     Blood Culture, Routine No Growth to date     Blood Culture, Routine No Growth to date     Blood Culture, Routine No Growth  to date    Urine culture [[id number]] Collected:  01/31/17 2255    Order Status:  Completed Specimen:  Urine from Urine, Clean Catch Updated:  02/02/17 0548     Urine Culture, Routine No growth    Gram stain [[id number]] Collected:  01/31/17 2255    Order Status:  Completed Specimen:  Urine from Urine, Clean Catch Updated:  02/01/17 0442     Gram Stain Result No WBC's      No organisms seen    Blood culture [[id number]]     Order Status:  Canceled Specimen:  Blood     Blood culture [[id number]]     Order Status:  Canceled Specimen:  Blood         Imaging Results:    CT C/A/P 1/19/17:  1.  Focus of groundglass attenuation with bronchiectasis/developing air bronchograms within the right middle lobe, nonspecific, however could reflect developing infectious process or possibly sequela of aspiration, clinical correlation for pneumonia is advised.  2.  Anterior mediastinum mass, suggesting thymic hyperplasia, clinical correlation advised.  3.  Constipation.  4.  Several additional findings above.    CXR 1/17/17: Heart size normal.  Mild accentuation of interstitial markings.  No significant air space consolidation or pleural effusion    2D Echo 1/17/17:  CONCLUSIONS     1 - Low normal left ventricular systolic function (EF 50-55%).     2 - Normal right ventricular systolic function .     3 - Normal left ventricular diastolic function.     FINAL PATHOLOGIC DIAGNOSIS (1/17/17):  BONE MARROW, RIGHT ILIAC CREST, ASPIRATE, CLOT, AND CORE BIOPSY:  --Markedly hypercellular marrow, essentially 100%, nearly completely replaced by acute myeloid leukemia, see  comment  --Rare background trilineage elements present  --No significant appreciable stainable iron  COMMENT: Concomitantly submitted flow cytometric analysis detects an increased population of blasts consistent  with acute myeloid leukemia, favor non-M3 subtype, although A CD34 positive/HLA-DR negative/equivocal  phenotype, and M3 APL subtype cannot be completely excluded. This  increased population of myeloid blasts  shows expression of CD34, CD13, and CD33 (partial). This population also expresses CD 56 and CD7. It is  negative for  and other B and T lymphoid R crest tested including CD19 and cytoplasmic CD3, as well as  TdT. HLA-DR is equivocal.  The overall morphology of these blasts do not suggest acute promyelocytic leukemia. 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       Active Problems, Status & Treatment Plan Addressed Today:    # AML  - leukocytosis likely 2/2 AML and infection (resolving)  - currently no evidence of DIC or tumor lysis  - noted to have new diagnosis of HIV (see below)  - hepatitis and flu negative   - s/p bone marrow biopsy (1/17/17) showing AML, cytogenetics and molecular markers done  - NPM1 and FLT3 negative  - will need transplant given high risk disease  - continue on empiric allopurinol  - 7+3 started 1/20/17, day 15 today  - rpt bone marrow biopsy today    # Tachycardia/Sepsis  - side effect of chemo?  - not initially floridly septic, but subsequently spiked fevers x48h 2/1-2/2. Pan-cultured (NGTD). LA -ve. UA clean. CXR unchanged. Initially on cefepime and vancomycin; ID following. BCx -ve. Will discontinue Vanc and continue Cef.   - EKG x2 showed NSR.  - Patient is asymptomatic   - continue to monitor    #HIV positive  - new diagnosis on this admit  - toxoplasma gondii WNL  - CD4: 118  - ID following, appreciate recs  - Starting Triumeq 2/2.  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 1 unit of PLTs 1/30/17  - no overt signs of bleeding  - likely 2/2 underlying hematologic malignancy  - continue to monitor and transfuse as indicated for Hgb <7.0, PLT <10    # Constipation (resolved)  - likely 2/2 opiate use  - continue on miralax and senna-docusate  - resolved, continue to monitor     # Tobacco Abuse  - 26 pack year history  - advise smoking cessation   - nicotine patch prn    # H/O Alcohol Abuse  - drank at least 2 beers per day prior to admission  - no h/o DTs and now out of period for withdrawal    # Hyponatremia (resolving)  - likely 2/2 dehydration  - continue IVFs  - continue to monitor    #Hypomagnesemia  -replete PRN    # Hypoalbuminemia  - checked 24 hour urine protein  - Dietary consulted  - encourage increased nutritional intake     DVT ppx: encourage ambulation  Diet: regular  Code Status: full    DISPO: pending chemotherapy for AML and repeat bone marrow

## 2017-02-03 NOTE — ASSESSMENT & PLAN NOTE
37yo previously healthy man who was admitted as a transfer from an OSH on 1/17/2017 with 3mos of weight loss, malaise, and fevers and was found to have a new diagnosis of AML and a new diagnosis of HIV (AZ4=829/7%, VL 22k, GT pan sensitive). He is improved clinically since starting induction chemotherapy c/b neutropenic fevers now resolved      - would continue cefepime for now but if remains afebrile would transition back to quinolone ppx, voriconazole and acyclovir neutropenic prophylaxis per protocol  - patient is s/p pentamidine PCP prophylaxis (chosen over bactrim given myelosuppressive effects while in house) -- would recommend continuing ppx after neutropenia for AIDS.  However would instead consider atovaquone or dapsone if not using bactrim when ppx is again started  - continue triumeq components  - patient should be discharged with triumeq 1 pill once daily.  He should be set up with specialty pharmacy here to have med guidance and home delivery  - please have patient f/u with me in clinic within 2 weeks of discharge

## 2017-02-03 NOTE — PLAN OF CARE
Problem: Patient Care Overview  Goal: Plan of Care Review  Remained safe. Mag 1.3. PRN replacements administered. BMB this shift. Pt tolerated well. Off floor at time of this documentation for echo. Calorie count in progress. Good appetite. Neutropenic and thrombocytopenic precautions maintained. Vitals stable. Slightly tachycardic. Complained of pain to BLE. Stated relief with oxycodone administration. Fall precautions maintained and callbell and personal items kept within reach. Ambulated in room with steady gait. No apparent distres.

## 2017-02-03 NOTE — PHYSICIAN QUERY
PT Name: Marck Howard  MR #: [id number]     Physician Query Form - Diagnosis Clarification    Reviewer  Ext Cleopatra Hill, BESSIE - [email or web address]    This form is a permanent document in the medical record.     Query Date: February 3, 2017    By submitting this query, we are merely seeking further clarification of documentation.  Please utilize your independent clinical judgment when addressing the question(s) below.     (The Medical record reflects the following:)      Findings Supporting Clinical Information Location in Medical Record   not floridly septic               Gram positive ilir bacteremia                                          Sepsis     Tachycardia  - side effect of chemo?  - not floridly septic (?source, afebrile, Cx -ve)  - EKG x2 showed NSR.  - Patient is asymptomatic   - CTM     - BCx (1/16/17) shows diptheroids, probable contaminant   - BCx (1/17/17) with ngtd  - ID consulted, appreciate recs. Agree that diptheroids are contaminant; no treatment needed.   - cefepime stopped 1/22/17 and changed to cipro   - continue ppx cipro, voriconazole and acyclovir  - remains afebrile, continue to monitor    Febrile. ID evaluation currently negative. No complaints or focal symptoms. Continue supportive care.     Fever  He is improved clinically since starting induction chemotherapy now with neutropenic fevers    - would continue cefepime, voriconazole and acyclovir neutropenic prophylaxis per protocol, stop vancomycin as no indication at this time  - patient is s/p pentamidine PCP prophylaxis (chosen over bactrim given myelosuppressive effects while in house) -- would recommend continuing ppx after neutropenia for AIDS    Tachycardia/Sepsis  - side effect of chemo?  - not initially floridly septic, but subsequently spiked fevers x48h 2/1-2/2. Pan-cultured (NGTD). LA -ve. UA clean. CXR unchanged. Initially on cefepime and vancomycin; ID following. BCx -ve. Will discontinue Vanc and continue  Cef.   - EKG x2 showed NSR.  - Patient is asymptomatic   - continue to monitor Hematology and Oncology Pn 01/30              Hematology and Oncology Pn 01/30                Hematology and Oncology Pn 02/01      Infectious Diseases Pn 02/01                    Hematology and Oncology Pn 02/03     Please clarify if the sepsis diagnosis has been:                 [   ]   Ruled In               [   ]   Ruled In, Now Resolved               [ X  ]   Resolved Prior to My Assessment               [   ]   Ruled Out               [   ]   Clinically insignificant               [   ]   Clinically undetermined               [   ]   Other/Clarification of findings:_____________________________________       Please document in your progress notes daily for the duration of treatment, until resolved, and include in your discharge summary.

## 2017-02-03 NOTE — PLAN OF CARE
MDR's with Dr Bose.  Patient is day 15 of 7+3 and still with circulating blasts.  Planning for a BMB today and will likely start MEC early next week.  Plan to repeat echo today.  APRIL 4 week.  Will continue to follow.

## 2017-02-03 NOTE — PLAN OF CARE
Problem: Patient Care Overview  Goal: Plan of Care Review  Outcome: Ongoing (interventions implemented as appropriate)  Patient is AAOx4. Pt is calm and cooperative. Teaching and education performed. Patient remains free from falls and injury this shift. Bed in low, locked position, environment is free of clutter, with call bell in reach. Patient encouraged to call for assistance. Patient verbalized understanding. All belongings within reach. High protein / High calorie diet - solely ate protein for dinner (25%). Max HR of 121 over night. PICC line still able to provide adequate blood return. No N/V throughout shift. Afebrile. Pt C/O muscular pain to upper and lower extremities. Oxycodone IR given once throughout shift. Will continue to monitor.

## 2017-02-04 LAB
ALBUMIN SERPL BCP-MCNC: 2 G/DL
ALP SERPL-CCNC: 166 U/L
ALT SERPL W/O P-5'-P-CCNC: 30 U/L
ANION GAP SERPL CALC-SCNC: 4 MMOL/L
AST SERPL-CCNC: 17 U/L
BASOPHILS # BLD AUTO: ABNORMAL K/UL
BASOPHILS NFR BLD: 0 %
BILIRUB SERPL-MCNC: 0.1 MG/DL
BILIRUB UR QL STRIP: NEGATIVE
BLASTS NFR BLD MANUAL: 46 %
BUN SERPL-MCNC: 11 MG/DL
CALCIUM SERPL-MCNC: 8.2 MG/DL
CHLORIDE SERPL-SCNC: 106 MMOL/L
CLARITY UR REFRACT.AUTO: CLEAR
CO2 SERPL-SCNC: 28 MMOL/L
COLOR UR AUTO: NORMAL
CREAT SERPL-MCNC: 0.6 MG/DL
DIFFERENTIAL METHOD: ABNORMAL
EOSINOPHIL # BLD AUTO: ABNORMAL K/UL
EOSINOPHIL NFR BLD: 0 %
ERYTHROCYTE [DISTWIDTH] IN BLOOD BY AUTOMATED COUNT: 17.2 %
EST. GFR  (AFRICAN AMERICAN): >60 ML/MIN/1.73 M^2
EST. GFR  (NON AFRICAN AMERICAN): >60 ML/MIN/1.73 M^2
GLUCOSE SERPL-MCNC: 114 MG/DL
GLUCOSE UR QL STRIP: NEGATIVE
HCT VFR BLD AUTO: 21.8 %
HGB BLD-MCNC: 7.5 G/DL
HGB UR QL STRIP: NEGATIVE
KETONES UR QL STRIP: NEGATIVE
LEUKOCYTE ESTERASE UR QL STRIP: NEGATIVE
LYMPHOCYTES # BLD AUTO: ABNORMAL K/UL
LYMPHOCYTES NFR BLD: 50 %
MAGNESIUM SERPL-MCNC: 1.9 MG/DL
MCH RBC QN AUTO: 31.3 PG
MCHC RBC AUTO-ENTMCNC: 34.4 %
MCV RBC AUTO: 91 FL
MONOCYTES # BLD AUTO: ABNORMAL K/UL
MONOCYTES NFR BLD: 4 %
NEUTROPHILS NFR BLD: 0 %
NITRITE UR QL STRIP: NEGATIVE
PH UR STRIP: 7 [PH] (ref 5–8)
PHOSPHATE SERPL-MCNC: 3.4 MG/DL
PLATELET # BLD AUTO: 10 K/UL
PLATELET BLD QL SMEAR: ABNORMAL
PMV BLD AUTO: ABNORMAL FL
POTASSIUM SERPL-SCNC: 3.9 MMOL/L
PROT SERPL-MCNC: 6.1 G/DL
PROT UR QL STRIP: NEGATIVE
RBC # BLD AUTO: 2.4 M/UL
SODIUM SERPL-SCNC: 138 MMOL/L
SP GR UR STRIP: 1 (ref 1–1.03)
URN SPEC COLLECT METH UR: NORMAL
UROBILINOGEN UR STRIP-ACNC: NEGATIVE EU/DL
WBC # BLD AUTO: 2.89 K/UL

## 2017-02-04 PROCEDURE — 81003 URINALYSIS AUTO W/O SCOPE: CPT

## 2017-02-04 PROCEDURE — 87040 BLOOD CULTURE FOR BACTERIA: CPT

## 2017-02-04 PROCEDURE — 25000003 PHARM REV CODE 250: Performed by: STUDENT IN AN ORGANIZED HEALTH CARE EDUCATION/TRAINING PROGRAM

## 2017-02-04 PROCEDURE — 99232 SBSQ HOSP IP/OBS MODERATE 35: CPT | Mod: ,,, | Performed by: INTERNAL MEDICINE

## 2017-02-04 PROCEDURE — 25000003 PHARM REV CODE 250: Performed by: INTERNAL MEDICINE

## 2017-02-04 PROCEDURE — 25000003 PHARM REV CODE 250: Performed by: HOSPITALIST

## 2017-02-04 PROCEDURE — 20600001 HC STEP DOWN PRIVATE ROOM

## 2017-02-04 PROCEDURE — 83735 ASSAY OF MAGNESIUM: CPT

## 2017-02-04 PROCEDURE — 36415 COLL VENOUS BLD VENIPUNCTURE: CPT

## 2017-02-04 PROCEDURE — 80053 COMPREHEN METABOLIC PANEL: CPT

## 2017-02-04 PROCEDURE — 85007 BL SMEAR W/DIFF WBC COUNT: CPT

## 2017-02-04 PROCEDURE — 63600175 PHARM REV CODE 636 W HCPCS: Performed by: INTERNAL MEDICINE

## 2017-02-04 PROCEDURE — 85027 COMPLETE CBC AUTOMATED: CPT

## 2017-02-04 PROCEDURE — 63600175 PHARM REV CODE 636 W HCPCS: Performed by: HOSPITALIST

## 2017-02-04 PROCEDURE — 25000003 PHARM REV CODE 250: Performed by: NURSE PRACTITIONER

## 2017-02-04 PROCEDURE — 84100 ASSAY OF PHOSPHORUS: CPT

## 2017-02-04 RX ADMIN — CEFEPIME HYDROCHLORIDE 2 G: 2 INJECTION, SOLUTION INTRAVENOUS at 02:02

## 2017-02-04 RX ADMIN — Medication 10 ML: at 11:02

## 2017-02-04 RX ADMIN — VANCOMYCIN HYDROCHLORIDE 750 MG: 750 INJECTION, POWDER, LYOPHILIZED, FOR SOLUTION INTRAVENOUS at 01:02

## 2017-02-04 RX ADMIN — CEFEPIME HYDROCHLORIDE 2 G: 2 INJECTION, SOLUTION INTRAVENOUS at 09:02

## 2017-02-04 RX ADMIN — VORICONAZOLE 200 MG: 200 TABLET, FILM COATED ORAL at 09:02

## 2017-02-04 RX ADMIN — ABACAVIR 600 MG: 300 TABLET, FILM COATED ORAL at 09:02

## 2017-02-04 RX ADMIN — STANDARDIZED SENNA CONCENTRATE AND DOCUSATE SODIUM 1 TABLET: 8.6; 5 TABLET, FILM COATED ORAL at 08:02

## 2017-02-04 RX ADMIN — DOLUTEGRAVIR SODIUM 50 MG: 50 TABLET, FILM COATED ORAL at 09:02

## 2017-02-04 RX ADMIN — LAMIVUDINE 300 MG: 100 TABLET, FILM COATED ORAL at 09:02

## 2017-02-04 RX ADMIN — OXYCODONE HYDROCHLORIDE 5 MG: 5 TABLET ORAL at 05:02

## 2017-02-04 RX ADMIN — SODIUM CHLORIDE: 0.9 INJECTION, SOLUTION INTRAVENOUS at 02:02

## 2017-02-04 RX ADMIN — OXYCODONE HYDROCHLORIDE 5 MG: 5 TABLET ORAL at 09:02

## 2017-02-04 RX ADMIN — MAGNESIUM OXIDE TAB 400 MG (241.3 MG ELEMENTAL MG) 400 MG: 400 (241.3 MG) TAB at 06:02

## 2017-02-04 RX ADMIN — ACYCLOVIR 400 MG: 200 CAPSULE ORAL at 08:02

## 2017-02-04 RX ADMIN — STANDARDIZED SENNA CONCENTRATE AND DOCUSATE SODIUM 1 TABLET: 8.6; 5 TABLET, FILM COATED ORAL at 09:02

## 2017-02-04 RX ADMIN — ACYCLOVIR 400 MG: 200 CAPSULE ORAL at 09:02

## 2017-02-04 RX ADMIN — VANCOMYCIN HYDROCHLORIDE 750 MG: 1 INJECTION, POWDER, LYOPHILIZED, FOR SOLUTION INTRAVENOUS at 01:02

## 2017-02-04 RX ADMIN — MAGNESIUM OXIDE TAB 400 MG (241.3 MG ELEMENTAL MG) 400 MG: 400 (241.3 MG) TAB at 08:02

## 2017-02-04 RX ADMIN — VORICONAZOLE 200 MG: 200 TABLET, FILM COATED ORAL at 08:02

## 2017-02-04 RX ADMIN — CEFEPIME HYDROCHLORIDE 2 G: 2 INJECTION, SOLUTION INTRAVENOUS at 07:02

## 2017-02-04 RX ADMIN — MAGNESIUM OXIDE TAB 400 MG (241.3 MG ELEMENTAL MG) 400 MG: 400 (241.3 MG) TAB at 09:02

## 2017-02-04 RX ADMIN — Medication 10 ML: at 05:02

## 2017-02-04 NOTE — PLAN OF CARE
Problem: Patient Care Overview  Goal: Plan of Care Review  Outcome: Ongoing (interventions implemented as appropriate)  Patient is AAOx4. Pt is calm and cooperative. Teaching and education performed. Patient remains free from falls and injury this shift. Bed in low, locked position, environment is free of clutter, with call bell in reach. Patient encouraged to call for assistance. Patient verbalized understanding. All belongings within reach. High protein / High calorie diet. Max HR of 128 over night. PICC line still able to provide adequate blood return. No N/V throughout shift. Tmax 102 - obtained blood cultures x2 (one from port and one peripheral), chest xray, urinalysis, and vancomycin. Temp resolved itself after administration of tylenol. Pt C/O muscular pain to upper and lower extremities. No pain medications given throughout shift.  Will continue to monitor.

## 2017-02-04 NOTE — PLAN OF CARE
Problem: Patient Care Overview  Goal: Plan of Care Review  Remained safe. No measured elevated temps but pt was found sweating late this morning. Mag x 2 doses to be administered per PRN orders. Pt off floor at time of this documentation for chest x-ray. Will administer second dose of mag once patient returns to unit. vanc ordered and administered. Appetite fair this shift. Voiding adequately. Thrombocytopenic precautions maintained. No sign of pressure-related skin breakdown noted. Able to repositions self in bed. Fall precautions maintained and callbell and personal items kept within reach. No apparent distress.

## 2017-02-04 NOTE — PROGRESS NOTES
"Progress Note  Hematology / Bone Marrow Transplant                                                              [place] [facility] [place] [facility]    Patient Name: Marck Howard  YOB: 1980    Admit Date: 1/17/2017                     LOS: 18    SUBJECTIVE:     Reason for Admission:  Acute leukemia  See H&P for detailed initial presentation history and ROS.      Interval history:  Day 16 of 7+3; bone marrow biopsy on schedule today. Afebrile o/n w/ Tm 102°. -120s. Hypotensive 87/56, responsive to fluids. Subjectively, "Feel fine". "My hair is falling out". Denies dysuria, cough, SOB, abdo pain, N/V, diarrhea. HAART therapy starting this AM.     Review of System:  Constitutional: +ve fever, -ve chills, negative for anorexia, malaise, night sweats and weight loss  Eyes: no visual changes, negative for irritation and redness  ENT: no nasal congestion or sore throat, negative for epistaxis, sore mouth and sore throat  Respiratory: no cough or shortness of breath, negative for dyspnea on exertion and wheezing  Cardiovascular: no chest pain or palpitations, negative for dyspnea, fatigue and palpitations  Gastrointestinal: no nausea or vomiting, no abdominal pain or change in bowel habits, negative for constipation, diarrhea and melena  Hematologic/Lymphatic: no easy bruising or lymphadenopathy, negative for bleeding  Musculoskeletal: no arthralgias or myalgias  Neurological: no seizures or tremors, negative for headaches and weakness  Behavioral/Psych: negative for anxiety and depression    OBJECTIVE:     Vital Signs Range (Last 24H):  Temp:  [98.1 °F (36.7 °C)-102 °F (38.9 °C)]   Pulse:  [105-128]   Resp:  [16-18]   BP: ()/(56-76)   SpO2:  [93 %-98 %] Body mass index is 17.08 kg/(m^2).    I & O (Last 24H):    Intake/Output Summary (Last 24 hours) at 02/04/17 0536  Last data filed at 02/04/17 0354   Gross per 24 hour   Intake          5056.67 ml   Output             4425 ml   Net           " 631.67 ml       Physical Exam:  General: well developed, cachectic, no distress  HENT: Head:normocephalic, atraumatic. Ears:right ear normal, left ear normal. Nose: Nares normal. Septum midline. Mucosa normal. No drainage or sinus tenderness., no discharge. Throat: lips, mucosa, and tongue normal; teeth and gums normal and no throat erythema.  Eyes: conjunctivae/corneas clear. PERRL.   Neck: supple, symmetrical, trachea midline  Lungs:  clear to auscultation bilaterally and normal respiratory effort  Cardiovascular: Heart: tachycardic but otherwise regular rhythm, S1, S2 normal, no murmur, click, rub or gallop. Chest Wall: no tenderness. Extremities: no cyanosis or edema, or clubbing.   Abdomen: soft, non-tender non-distented; bowel sounds normal.   Skin: Skin color, texture, turgor normal. No rashes or lesions  Musculoskeletal:no clubbing, cyanosis  Neurologic: Mental status: Alert, oriented, thought content appropriate  Psych/Behavioral:  Normal.    Diagnostic Results:  Lab Results   Component Value Date    WBC 2.89 (L) 02/04/2017    HGB 7.5 (L) 02/04/2017    HCT 21.8 (L) 02/04/2017    MCV 91 02/04/2017    PLT 10 (LL) 02/04/2017       Recent Labs  Lab 02/04/17  0357   *      K 3.9      CO2 28   BUN 11   CREATININE 0.6   CALCIUM 8.2*   MG 1.9     Lab Results   Component Value Date    INR 1.2 01/17/2017    INR 1.8 (H) 01/16/2017     No results found for: HGBA1C  Microbiology Results (last 7 days)     Procedure Component Value Units Date/Time    Blood culture [[id number]] Collected:  02/03/17 2330    Order Status:  Sent Specimen:  Blood from Line, PICC Right Brachial Updated:  02/04/17 0101    Blood culture [[id number]] Collected:  02/04/17 0008    Order Status:  Sent Specimen:  Blood Updated:  02/04/17 0100    Blood culture [[id number]] Collected:  01/31/17 2132    Order Status:  Completed Specimen:  Blood Updated:  02/03/17 2312     Blood Culture, Routine No Growth to date     Blood Culture, Routine  No Growth to date     Blood Culture, Routine No Growth to date     Blood Culture, Routine No Growth to date    Blood culture [[id number]] Collected:  01/31/17 2133    Order Status:  Completed Specimen:  Blood Updated:  02/03/17 2312     Blood Culture, Routine No Growth to date     Blood Culture, Routine No Growth to date     Blood Culture, Routine No Growth to date     Blood Culture, Routine No Growth to date    Blood culture [[id number]]     Order Status:  Canceled Specimen:  Blood     AFB Culture & Smear [[id number]] Collected:  01/19/17 0538    Order Status:  Completed Specimen:  Blood from Blood Updated:  02/03/17 1156     AFB Culture & Smear Culture in progress    Urine culture [[id number]] Collected:  01/31/17 2255    Order Status:  Completed Specimen:  Urine from Urine, Clean Catch Updated:  02/02/17 0548     Urine Culture, Routine No growth    Gram stain [[id number]] Collected:  01/31/17 2255    Order Status:  Completed Specimen:  Urine from Urine, Clean Catch Updated:  02/01/17 0442     Gram Stain Result No WBC's      No organisms seen    Blood culture [[id number]]     Order Status:  Canceled Specimen:  Blood     Blood culture [[id number]]     Order Status:  Canceled Specimen:  Blood         Imaging Results:    CT C/A/P 1/19/17:  1.  Focus of groundglass attenuation with bronchiectasis/developing air bronchograms within the right middle lobe, nonspecific, however could reflect developing infectious process or possibly sequela of aspiration, clinical correlation for pneumonia is advised.  2.  Anterior mediastinum mass, suggesting thymic hyperplasia, clinical correlation advised.  3.  Constipation.  4.  Several additional findings above.    CXR 1/17/17: Heart size normal.  Mild accentuation of interstitial markings.  No significant air space consolidation or pleural effusion    2D Echo 1/17/17:  CONCLUSIONS     1 - Low normal left ventricular systolic function (EF 50-55%).     2 - Normal right ventricular systolic  function .     3 - Normal left ventricular diastolic function.     FINAL PATHOLOGIC DIAGNOSIS (1/17/17):  BONE MARROW, RIGHT ILIAC CREST, ASPIRATE, CLOT, AND CORE BIOPSY:  --Markedly hypercellular marrow, essentially 100%, nearly completely replaced by acute myeloid leukemia, see  comment  --Rare background trilineage elements present  --No significant appreciable stainable iron  COMMENT: Concomitantly submitted flow cytometric analysis detects an increased population of blasts consistent  with acute myeloid leukemia, favor non-M3 subtype, although A CD34 positive/HLA-DR negative/equivocal  phenotype, and M3 APL subtype cannot be completely excluded. This increased population of myeloid blasts  shows expression of CD34, CD13, and CD33 (partial). This population also expresses CD 56 and CD7. It is  negative for  and other B and T lymphoid R crest tested including CD19 and cytoplasmic CD3, as well as  TdT. HLA-DR is equivocal.  The overall morphology of these blasts do not suggest acute promyelocytic leukemia. 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       Active Problems, Status & Treatment Plan Addressed Today:    # AML  - leukocytosis likely 2/2 AML and infection. ANC 0.  - currently no evidence of DIC or tumor lysis. Daily TLS labs.   - noted to have new diagnosis of HIV (see below)  - hepatitis and flu negative   - s/p bone marrow biopsy (1/17/17) showing AML, cytogenetics and molecular markers done  - NPM1 and FLT3 negative  - will need transplant given high risk disease  - continue on empiric allopurinol  - 7+3 started 1/20/17, day 16  today  - rpt bone marrow biopsy 2/3, with circulating blasts in peripheral blood.    # Tachycardia/Sepsis  - side effect of chemo?  - not initially floridly septic, but subsequently spiked fevers x48h 2/1-2/2, and again 2/4. Pan-cultured (NGTD). LA -ve. UA clean. CXR unchanged. Initially on cefepime and vancomycin; ID following. BCx -ve. Discontinued Vanc (2/2) and continued Cef, however Vanco restarted o/n d/t temps. Recultured 2/3.  -Will recheck CXR PA/Lat. Continue antibiotics.   - EKG x2 showed NSR.  - Patient is asymptomatic   - continue to monitor    #HIV positive  - new diagnosis on this admit  - toxoplasma gondii WNL  - CD4: 118  - ID following, appreciate recs  - Started Triumeq 2/2.  - ppx bactrim held given immunosuppression, received Pentamidine 1/22. Plan to restart bactrim once counts recovered.     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 1 unit of PLTs 1/30/17  - no overt signs of bleeding  - likely 2/2 underlying hematologic malignancy  - continue to monitor and transfuse as indicated for Hgb <7.0, PLT <10    # Constipation (resolved)  - likely 2/2 opiate use  - continue on miralax and senna-docusate  - resolved, continue to monitor     # Tobacco Abuse  - 26 pack year history  - advise smoking cessation   - nicotine patch prn    # H/O Alcohol Abuse  - drank at least 2 beers per day prior to admission  - no h/o DTs and now out of period for withdrawal    # Hyponatremia (resolved)  - likely 2/2 dehydration  - continue IVFs  - continue to monitor    #Hypomagnesemia (resolved)  -replete PRN    # Hypoalbuminemia  - checked 24 hour urine protein  - Dietary consulted  - encourage increased nutritional intake     DVT ppx: encourage ambulation  Diet: regular  Code Status: full    DISPO: pending chemotherapy for AML and repeat bone marrow

## 2017-02-04 NOTE — PROGRESS NOTES
02/03/17 2216   Vital Signs   Temp (!) 102 °F (38.9 °C)   Temp src Oral   Pulse (!) 128   Heart Rate Source Monitor   Resp 18   SpO2 (!) 93 %   Pulse Oximetry Type Intermittent   O2 Device (Oxygen Therapy) room air   BP (!) 87/56   BP Location Left arm   BP Method Automatic   Patient Position Sitting     Notified Dr. Salmeron. Doctored ordered 2 L of NS bolus. Will continue to monitor.

## 2017-02-05 LAB
ABO + RH BLD: NORMAL
ALBUMIN SERPL BCP-MCNC: 2 G/DL
ALP SERPL-CCNC: 150 U/L
ALT SERPL W/O P-5'-P-CCNC: 29 U/L
ANION GAP SERPL CALC-SCNC: 3 MMOL/L
ANISOCYTOSIS BLD QL SMEAR: SLIGHT
AST SERPL-CCNC: 14 U/L
BACTERIA BLD CULT: NORMAL
BACTERIA BLD CULT: NORMAL
BASOPHILS # BLD AUTO: ABNORMAL K/UL
BASOPHILS NFR BLD: 0 %
BILIRUB SERPL-MCNC: 0.1 MG/DL
BLASTS NFR BLD MANUAL: 66 %
BLD GP AB SCN CELLS X3 SERPL QL: NORMAL
BUN SERPL-MCNC: 14 MG/DL
CALCIUM SERPL-MCNC: 8.5 MG/DL
CHLORIDE SERPL-SCNC: 105 MMOL/L
CO2 SERPL-SCNC: 27 MMOL/L
CREAT SERPL-MCNC: 0.6 MG/DL
DIFFERENTIAL METHOD: ABNORMAL
EOSINOPHIL # BLD AUTO: ABNORMAL K/UL
EOSINOPHIL NFR BLD: 0 %
ERYTHROCYTE [DISTWIDTH] IN BLOOD BY AUTOMATED COUNT: 17.4 %
EST. GFR  (AFRICAN AMERICAN): >60 ML/MIN/1.73 M^2
EST. GFR  (NON AFRICAN AMERICAN): >60 ML/MIN/1.73 M^2
GLUCOSE SERPL-MCNC: 102 MG/DL
HCT VFR BLD AUTO: 21.8 %
HGB BLD-MCNC: 7.2 G/DL
INR PPP: 1
LDH SERPL L TO P-CCNC: 235 U/L
LYMPHOCYTES # BLD AUTO: ABNORMAL K/UL
LYMPHOCYTES NFR BLD: 33 %
MAGNESIUM SERPL-MCNC: 1.8 MG/DL
MCH RBC QN AUTO: 30.3 PG
MCHC RBC AUTO-ENTMCNC: 33 %
MCV RBC AUTO: 92 FL
MONOCYTES # BLD AUTO: ABNORMAL K/UL
MONOCYTES NFR BLD: 1 %
NEUTROPHILS NFR BLD: 0 %
PHOSPHATE SERPL-MCNC: 3.6 MG/DL
PLATELET # BLD AUTO: 10 K/UL
PLATELET BLD QL SMEAR: ABNORMAL
PMV BLD AUTO: ABNORMAL FL
POTASSIUM SERPL-SCNC: 3.8 MMOL/L
PROT SERPL-MCNC: 6.3 G/DL
PROTHROMBIN TIME: 10.7 SEC
RBC # BLD AUTO: 2.38 M/UL
SODIUM SERPL-SCNC: 135 MMOL/L
URATE SERPL-MCNC: 2.5 MG/DL
WBC # BLD AUTO: 4.13 K/UL

## 2017-02-05 PROCEDURE — 25000003 PHARM REV CODE 250: Performed by: HOSPITALIST

## 2017-02-05 PROCEDURE — 86901 BLOOD TYPING SEROLOGIC RH(D): CPT

## 2017-02-05 PROCEDURE — 63600175 PHARM REV CODE 636 W HCPCS: Performed by: INTERNAL MEDICINE

## 2017-02-05 PROCEDURE — 25000003 PHARM REV CODE 250: Performed by: INTERNAL MEDICINE

## 2017-02-05 PROCEDURE — 84550 ASSAY OF BLOOD/URIC ACID: CPT

## 2017-02-05 PROCEDURE — 83615 LACTATE (LD) (LDH) ENZYME: CPT

## 2017-02-05 PROCEDURE — 80053 COMPREHEN METABOLIC PANEL: CPT

## 2017-02-05 PROCEDURE — 99232 SBSQ HOSP IP/OBS MODERATE 35: CPT | Mod: ,,, | Performed by: INTERNAL MEDICINE

## 2017-02-05 PROCEDURE — 63600175 PHARM REV CODE 636 W HCPCS: Performed by: HOSPITALIST

## 2017-02-05 PROCEDURE — 85007 BL SMEAR W/DIFF WBC COUNT: CPT

## 2017-02-05 PROCEDURE — 85610 PROTHROMBIN TIME: CPT

## 2017-02-05 PROCEDURE — 85027 COMPLETE CBC AUTOMATED: CPT

## 2017-02-05 PROCEDURE — 84100 ASSAY OF PHOSPHORUS: CPT

## 2017-02-05 PROCEDURE — 86900 BLOOD TYPING SEROLOGIC ABO: CPT

## 2017-02-05 PROCEDURE — 20600001 HC STEP DOWN PRIVATE ROOM

## 2017-02-05 PROCEDURE — 25000003 PHARM REV CODE 250: Performed by: STUDENT IN AN ORGANIZED HEALTH CARE EDUCATION/TRAINING PROGRAM

## 2017-02-05 PROCEDURE — 25000003 PHARM REV CODE 250: Performed by: NURSE PRACTITIONER

## 2017-02-05 PROCEDURE — 83735 ASSAY OF MAGNESIUM: CPT

## 2017-02-05 RX ADMIN — VORICONAZOLE 200 MG: 200 TABLET, FILM COATED ORAL at 08:02

## 2017-02-05 RX ADMIN — OXYCODONE HYDROCHLORIDE 5 MG: 5 TABLET ORAL at 03:02

## 2017-02-05 RX ADMIN — OXYCODONE HYDROCHLORIDE 5 MG: 5 TABLET ORAL at 08:02

## 2017-02-05 RX ADMIN — CEFEPIME HYDROCHLORIDE 2 G: 2 INJECTION, SOLUTION INTRAVENOUS at 10:02

## 2017-02-05 RX ADMIN — VANCOMYCIN HYDROCHLORIDE 750 MG: 1 INJECTION, POWDER, LYOPHILIZED, FOR SOLUTION INTRAVENOUS at 01:02

## 2017-02-05 RX ADMIN — ABACAVIR 600 MG: 300 TABLET, FILM COATED ORAL at 08:02

## 2017-02-05 RX ADMIN — Medication 10 ML: at 11:02

## 2017-02-05 RX ADMIN — VANCOMYCIN HYDROCHLORIDE 750 MG: 1 INJECTION, POWDER, LYOPHILIZED, FOR SOLUTION INTRAVENOUS at 02:02

## 2017-02-05 RX ADMIN — LAMIVUDINE 300 MG: 100 TABLET, FILM COATED ORAL at 08:02

## 2017-02-05 RX ADMIN — MAGNESIUM OXIDE TAB 400 MG (241.3 MG ELEMENTAL MG) 400 MG: 400 (241.3 MG) TAB at 08:02

## 2017-02-05 RX ADMIN — ACYCLOVIR 400 MG: 200 CAPSULE ORAL at 08:02

## 2017-02-05 RX ADMIN — OXYCODONE HYDROCHLORIDE 5 MG: 5 TABLET ORAL at 02:02

## 2017-02-05 RX ADMIN — MAGNESIUM OXIDE TAB 400 MG (241.3 MG ELEMENTAL MG) 400 MG: 400 (241.3 MG) TAB at 10:02

## 2017-02-05 RX ADMIN — STANDARDIZED SENNA CONCENTRATE AND DOCUSATE SODIUM 1 TABLET: 8.6; 5 TABLET, FILM COATED ORAL at 08:02

## 2017-02-05 RX ADMIN — CEFEPIME HYDROCHLORIDE 2 G: 2 INJECTION, SOLUTION INTRAVENOUS at 02:02

## 2017-02-05 RX ADMIN — Medication 10 ML: at 05:02

## 2017-02-05 RX ADMIN — CEFEPIME HYDROCHLORIDE 2 G: 2 INJECTION, SOLUTION INTRAVENOUS at 05:02

## 2017-02-05 RX ADMIN — DOLUTEGRAVIR SODIUM 50 MG: 50 TABLET, FILM COATED ORAL at 08:02

## 2017-02-05 RX ADMIN — POTASSIUM CHLORIDE 20 MEQ: 750 CAPSULE, EXTENDED RELEASE ORAL at 10:02

## 2017-02-05 NOTE — PLAN OF CARE
Problem: Patient Care Overview  Goal: Plan of Care Review  Remained safe. Afebrile by time of this documentation. Thrombocytopenic precautions maintained. K+/mag replaced. SBP in 94 at time of am vitals. Patient asymptomatic. Dr. Pollack notified. No orders. Dr. Pollack stated that we would continue to monitor. SBP in 90s throughout shift. Pt continued to be asymptomatic. Afebrile. Tolerated meals well. Leg pain well-controlled with ordered pain medication. Fall precautions maintained and callbell and personal items kept within reach. No apparent distress.

## 2017-02-05 NOTE — PROGRESS NOTES
"Progress Note  Hematology / Bone Marrow Transplant                                                              [place] [facility] [place] [place]    Patient Name: Marck Howard  YOB: 1980    Admit Date: 1/17/2017                     LOS: 19    SUBJECTIVE:     Reason for Admission:  Acute leukemia  See H&P for detailed initial presentation history and ROS.      Interval history:  Day 17 of 7+3; bone marrow biopsy on schedule today. Afebrile o/n w/ last fever of Tm 102° 2/3. -120s. BP stable. Subjectively, "Everything is good". Denies dysuria, cough, SOB, abdo pain, N/V, diarrhea. HAART therapy started 2/2.     Review of System:  Constitutional: -ve fever, -ve chills, negative for anorexia, malaise, night sweats and weight loss  Eyes: no visual changes, negative for irritation and redness  ENT: no nasal congestion or sore throat, negative for epistaxis, sore mouth and sore throat  Respiratory: no cough or shortness of breath, negative for dyspnea on exertion and wheezing  Cardiovascular: no chest pain or palpitations, negative for dyspnea, fatigue and palpitations  Gastrointestinal: no nausea or vomiting, no abdominal pain or change in bowel habits, negative for constipation, diarrhea and melena  Hematologic/Lymphatic: no easy bruising or lymphadenopathy, negative for bleeding  Musculoskeletal: no arthralgias or myalgias  Neurological: no seizures or tremors, negative for headaches and weakness  Behavioral/Psych: negative for anxiety and depression    OBJECTIVE:     Vital Signs Range (Last 24H):  Temp:  [98.4 °F (36.9 °C)-99.1 °F (37.3 °C)]   Pulse:  [107-121]   Resp:  [18]   BP: ()/(56-73)   SpO2:  [97 %-100 %] Body mass index is 16.7 kg/(m^2).    I & O (Last 24H):    Intake/Output Summary (Last 24 hours) at 02/05/17 0952  Last data filed at 02/05/17 0855   Gross per 24 hour   Intake          2934.17 ml   Output             1925 ml   Net          1009.17 ml       Physical Exam:  General: " well developed, cachectic, no distress  HENT: Head:normocephalic, atraumatic. Ears:right ear normal, left ear normal. Nose: Nares normal. Septum midline. Mucosa normal. No drainage or sinus tenderness., no discharge. Throat: lips, mucosa, and tongue normal; teeth and gums normal and no throat erythema.  Eyes: conjunctivae/corneas clear. PERRL.   Neck: supple, symmetrical, trachea midline  Lungs:  clear to auscultation bilaterally and normal respiratory effort  Cardiovascular: Heart: tachycardic but otherwise regular rhythm, S1, S2 normal, no murmur, click, rub or gallop. Chest Wall: no tenderness. Extremities: no cyanosis or edema, or clubbing.   Abdomen: soft, non-tender non-distented; bowel sounds normal.   Skin: Skin color, texture, turgor normal. No rashes or lesions  Musculoskeletal:no clubbing, cyanosis  Neurologic: Mental status: Alert, oriented, thought content appropriate  Psych/Behavioral:  Normal.    Diagnostic Results:  Lab Results   Component Value Date    WBC 4.13 02/05/2017    HGB 7.2 (L) 02/05/2017    HCT 21.8 (L) 02/05/2017    MCV 92 02/05/2017    PLT 10 (LL) 02/05/2017       Recent Labs  Lab 02/05/17  0406      *   K 3.8      CO2 27   BUN 14   CREATININE 0.6   CALCIUM 8.5*   MG 1.8     Lab Results   Component Value Date    INR 1.0 02/05/2017    INR 1.2 01/17/2017    INR 1.8 (H) 01/16/2017     No results found for: HGBA1C  Microbiology Results (last 7 days)     Procedure Component Value Units Date/Time    Blood culture [[id number]] Collected:  02/04/17 0008    Order Status:  Completed Specimen:  Blood Updated:  02/05/17 0613     Blood Culture, Routine No Growth to date     Blood Culture, Routine No Growth to date    Blood culture [[id number]] Collected:  02/03/17 2330    Order Status:  Completed Specimen:  Blood from Line, PICC Right Brachial Updated:  02/05/17 0613     Blood Culture, Routine No Growth to date     Blood Culture, Routine No Growth to date    Blood culture [[id number]]  Collected:  01/31/17 2133    Order Status:  Completed Specimen:  Blood Updated:  02/04/17 2312     Blood Culture, Routine No Growth to date     Blood Culture, Routine No Growth to date     Blood Culture, Routine No Growth to date     Blood Culture, Routine No Growth to date     Blood Culture, Routine No Growth to date    Blood culture [[id number]] Collected:  01/31/17 2132    Order Status:  Completed Specimen:  Blood Updated:  02/04/17 2312     Blood Culture, Routine No Growth to date     Blood Culture, Routine No Growth to date     Blood Culture, Routine No Growth to date     Blood Culture, Routine No Growth to date     Blood Culture, Routine No Growth to date    Blood culture [[id number]]     Order Status:  Canceled Specimen:  Blood     AFB Culture & Smear [[id number]] Collected:  01/19/17 0538    Order Status:  Completed Specimen:  Blood from Blood Updated:  02/03/17 1156     AFB Culture & Smear Culture in progress    Urine culture [[id number]] Collected:  01/31/17 2255    Order Status:  Completed Specimen:  Urine from Urine, Clean Catch Updated:  02/02/17 0548     Urine Culture, Routine No growth    Gram stain [[id number]] Collected:  01/31/17 2255    Order Status:  Completed Specimen:  Urine from Urine, Clean Catch Updated:  02/01/17 0442     Gram Stain Result No WBC's      No organisms seen    Blood culture [[id number]]     Order Status:  Canceled Specimen:  Blood     Blood culture [[id number]]     Order Status:  Canceled Specimen:  Blood         Imaging Results:    CT C/A/P 1/19/17:  1.  Focus of groundglass attenuation with bronchiectasis/developing air bronchograms within the right middle lobe, nonspecific, however could reflect developing infectious process or possibly sequela of aspiration, clinical correlation for pneumonia is advised.  2.  Anterior mediastinum mass, suggesting thymic hyperplasia, clinical correlation advised.  3.  Constipation.  4.  Several additional findings above.    CXR 1/17/17: Heart size  normal.  Mild accentuation of interstitial markings.  No significant air space consolidation or pleural effusion    2D Echo 1/17/17:  CONCLUSIONS     1 - Low normal left ventricular systolic function (EF 50-55%).     2 - Normal right ventricular systolic function .     3 - Normal left ventricular diastolic function.     FINAL PATHOLOGIC DIAGNOSIS (1/17/17):  BONE MARROW, RIGHT ILIAC CREST, ASPIRATE, CLOT, AND CORE BIOPSY:  --Markedly hypercellular marrow, essentially 100%, nearly completely replaced by acute myeloid leukemia, see  comment  --Rare background trilineage elements present  --No significant appreciable stainable iron  COMMENT: Concomitantly submitted flow cytometric analysis detects an increased population of blasts consistent  with acute myeloid leukemia, favor non-M3 subtype, although A CD34 positive/HLA-DR negative/equivocal  phenotype, and M3 APL subtype cannot be completely excluded. This increased population of myeloid blasts  shows expression of CD34, CD13, and CD33 (partial). This population also expresses CD 56 and CD7. It is  negative for  and other B and T lymphoid R crest tested including CD19 and cytoplasmic CD3, as well as  TdT. HLA-DR is equivocal.  The overall morphology of these blasts do not suggest acute promyelocytic leukemia. 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       Active Problems, Status & Treatment Plan Addressed Today:    # AML  - leukocytosis likely 2/2 AML and infection. ANC 0.  - currently no evidence of DIC or tumor lysis. Daily TLS labs.   - noted to have new diagnosis of HIV (see below)  -  hepatitis and flu negative   - s/p bone marrow biopsy (1/17/17) showing AML, cytogenetics and molecular markers done  - NPM1 and FLT3 negative  - will need transplant given high risk disease  - continue on empiric allopurinol  - 7+3 started 1/20/17, day 17 today  - rpt bone marrow biopsy 2/3, with circulating blasts in peripheral blood, path pending.    # Tachycardia/Sepsis  - side effect of chemo?  - not initially floridly septic, but subsequently spiked fevers x48h 2/1-2/2, and again 2/4. Pan-cultured (NGTD). LA -ve. UA clean. CXR unchanged. Initially on cefepime and vancomycin; ID following. BCx -ve. Discontinued Vanc (2/2) and continued Cef, however Vanco restarted 2/3 d/t persistent temps. Recultured 2/3 (NGTD)  -Rpt CXR PA/Lat was unchanged. Continue antibiotics.   - EKG x2 showed NSR.  - Patient is asymptomatic   - continue to monitor    #HIV positive  - new diagnosis on this admit  - toxoplasma gondii WNL  - CD4: 118  - ID following, appreciate recs  - Started Triumeq 2/2.  - ppx bactrim held given immunosuppression, received Pentamidine 1/22. Plan to restart bactrim once counts recovered.     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 1 unit of PLTs 1/30/17  - no overt signs of bleeding  - likely 2/2 underlying hematologic malignancy  - continue to monitor and transfuse as indicated for Hgb <7.0, PLT <10    # Constipation (resolved)  - likely 2/2 opiate use  - continue on miralax and senna-docusate  - resolved, continue to monitor     # Tobacco Abuse  - 26 pack year history  - advise smoking cessation   - nicotine patch prn    # H/O Alcohol Abuse  - drank at least 2 beers per day prior to admission  - no h/o DTs and now out of period for withdrawal    # Hyponatremia (resolved)  - likely 2/2 dehydration  - continue IVFs  - continue to monitor    #Hypomagnesemia (resolved)  -replete PRN    # Hypoalbuminemia  -  checked 24 hour urine protein  - Dietary consulted  - encourage increased nutritional intake     DVT ppx: encourage ambulation  Diet: regular  Code Status: full    DISPO: pending chemotherapy for AML and repeat bone marrow

## 2017-02-06 LAB
ALBUMIN SERPL BCP-MCNC: 2.2 G/DL
ALP SERPL-CCNC: 137 U/L
ALT SERPL W/O P-5'-P-CCNC: 32 U/L
ANION GAP SERPL CALC-SCNC: 8 MMOL/L
ANISOCYTOSIS BLD QL SMEAR: SLIGHT
AST SERPL-CCNC: 15 U/L
BASOPHILS # BLD AUTO: ABNORMAL K/UL
BASOPHILS NFR BLD: 0 %
BILIRUB SERPL-MCNC: 0.2 MG/DL
BLASTS NFR BLD MANUAL: 70 %
BLD PROD TYP BPU: NORMAL
BLOOD UNIT EXPIRATION DATE: NORMAL
BLOOD UNIT TYPE CODE: 6200
BLOOD UNIT TYPE: NORMAL
BODY SITE - BONE MARROW: NORMAL
BONE MARROW IRON STAIN COMMENT: NORMAL
BUN SERPL-MCNC: 12 MG/DL
CALCIUM SERPL-MCNC: 9.1 MG/DL
CHLORIDE SERPL-SCNC: 100 MMOL/L
CLINICAL DIAGNOSIS - BONE MARROW: NORMAL
CO2 SERPL-SCNC: 25 MMOL/L
CODING SYSTEM: NORMAL
CREAT SERPL-MCNC: 0.7 MG/DL
DIFFERENTIAL METHOD: ABNORMAL
DISPENSE STATUS: NORMAL
EOSINOPHIL # BLD AUTO: ABNORMAL K/UL
EOSINOPHIL NFR BLD: 0 %
ERYTHROCYTE [DISTWIDTH] IN BLOOD BY AUTOMATED COUNT: 17.2 %
EST. GFR  (AFRICAN AMERICAN): >60 ML/MIN/1.73 M^2
EST. GFR  (NON AFRICAN AMERICAN): >60 ML/MIN/1.73 M^2
FLOW CYTOMETRY ANTIBODIES ANALYZED - BONE MARROW: NORMAL
FLOW CYTOMETRY COMMENT - BONE MARROW: NORMAL
FLOW CYTOMETRY INTERPRETATION - BONE MARROW: NORMAL
GLUCOSE SERPL-MCNC: 90 MG/DL
HCT VFR BLD AUTO: 22.3 %
HGB BLD-MCNC: 7.5 G/DL
HLA DRB4 1: NORMAL
HLA HI RES SEQUNCE BASED TYPING TEST DATE: NORMAL
HLA-A 1 SERO. EQUIV: 68
HLA-A 1: NORMAL
HLA-A 2 SERO. EQUIV: 74
HLA-A 2: NORMAL
HLA-A1 HI RES: NORMAL
HLA-A2 HI RES: NORMAL
HLA-B 1 SERO. EQUIV: 52
HLA-B 1: NORMAL
HLA-B 2 SERO. EQUIV: NORMAL
HLA-B 2: NORMAL
HLA-B1 HI RES: NORMAL
HLA-B2 HI RES: NORMAL
HLA-BW 1 SERO. EQUIV: 4
HLA-BW 2 SERO. EQUIV: NORMAL
HLA-C 1: NORMAL
HLA-C 2: NORMAL
HLA-C1 HI RES: NORMAL
HLA-C2 HI RES: NORMAL
HLA-CW 1 SERO. EQUIV: 16
HLA-CW 2 SERO. EQUIV: NORMAL
HLA-DQ 1 SERO. EQUIV: 2
HLA-DQ 2 SERO. EQUIV: 7
HLA-DQB1 1 HI RES: NORMAL
HLA-DQB1 1: NORMAL
HLA-DQB1 2 HI RES: NORMAL
HLA-DQB1 2: NORMAL
HLA-DRB1 1 HI RES: NORMAL
HLA-DRB1 1 SERO. EQUIV: 17
HLA-DRB1 1: NORMAL
HLA-DRB1 2 HI RES: NORMAL
HLA-DRB1 2 SERO. EQUIV: 11
HLA-DRB1 2: NORMAL
HLA-DRB3 1 HI RES: NORMAL
HLA-DRB3 1: NORMAL
HLA-DRB3 2 HI RES: NORMAL
HLA-DRB3 2: NORMAL
HLA-DRB345 1 SERO. EQUIV: 52
HLA-DRB345 2 SERO. EQUIV: NORMAL
HLA-DRB4 1 HI RES: NORMAL
HLA-DRB4 2 HI RES: NORMAL
HLA-DRB4 2: NORMAL
HLA-DRB5 1 HI RES: NORMAL
HLA-DRB5 1: NORMAL
HLA-DRB5 2 HI RES: NORMAL
HLA-DRB5 2: NORMAL
INR PPP: 1
LDH SERPL L TO P-CCNC: 269 U/L
LYMPHOCYTES # BLD AUTO: ABNORMAL K/UL
LYMPHOCYTES NFR BLD: 27 %
MAGNESIUM SERPL-MCNC: 1.9 MG/DL
MCH RBC QN AUTO: 30.5 PG
MCHC RBC AUTO-ENTMCNC: 33.6 %
MCV RBC AUTO: 91 FL
MONOCYTES # BLD AUTO: ABNORMAL K/UL
MONOCYTES NFR BLD: 3 %
NEUTROPHILS NFR BLD: 0 %
NUM UNITS TRANS WBC-POOR PLATPHERESIS: NORMAL
PHOSPHATE SERPL-MCNC: 4.3 MG/DL
PLATELET # BLD AUTO: 9 K/UL
PLATELET BLD QL SMEAR: ABNORMAL
PMV BLD AUTO: ABNORMAL FL
POTASSIUM SERPL-SCNC: 4 MMOL/L
PROT SERPL-MCNC: 7 G/DL
PROTHROMBIN TIME: 10.8 SEC
RBC # BLD AUTO: 2.46 M/UL
SODIUM SERPL-SCNC: 133 MMOL/L
SSDQB TESTING DATE: NORMAL
SSDRB TESTING DATE: NORMAL
SSOA TESTING DATE: NORMAL
SSOB TESTING DATE: NORMAL
SSOC TESTING DATE: NORMAL
SSODR TESTING DATE: NORMAL
TYSSO TESTING DATE: NORMAL
VANCOMYCIN TROUGH SERPL-MCNC: <1.1 UG/ML
WBC # BLD AUTO: 4.92 K/UL

## 2017-02-06 PROCEDURE — 36430 TRANSFUSION BLD/BLD COMPNT: CPT

## 2017-02-06 PROCEDURE — 83735 ASSAY OF MAGNESIUM: CPT

## 2017-02-06 PROCEDURE — 63600175 PHARM REV CODE 636 W HCPCS: Performed by: STUDENT IN AN ORGANIZED HEALTH CARE EDUCATION/TRAINING PROGRAM

## 2017-02-06 PROCEDURE — 80053 COMPREHEN METABOLIC PANEL: CPT

## 2017-02-06 PROCEDURE — 80202 ASSAY OF VANCOMYCIN: CPT

## 2017-02-06 PROCEDURE — 25000003 PHARM REV CODE 250: Performed by: INTERNAL MEDICINE

## 2017-02-06 PROCEDURE — 85007 BL SMEAR W/DIFF WBC COUNT: CPT

## 2017-02-06 PROCEDURE — 84100 ASSAY OF PHOSPHORUS: CPT

## 2017-02-06 PROCEDURE — 25000003 PHARM REV CODE 250: Performed by: STUDENT IN AN ORGANIZED HEALTH CARE EDUCATION/TRAINING PROGRAM

## 2017-02-06 PROCEDURE — 63600175 PHARM REV CODE 636 W HCPCS: Performed by: INTERNAL MEDICINE

## 2017-02-06 PROCEDURE — 85610 PROTHROMBIN TIME: CPT

## 2017-02-06 PROCEDURE — 25000003 PHARM REV CODE 250: Performed by: NURSE PRACTITIONER

## 2017-02-06 PROCEDURE — 20600001 HC STEP DOWN PRIVATE ROOM

## 2017-02-06 PROCEDURE — P9037 PLATE PHERES LEUKOREDU IRRAD: HCPCS

## 2017-02-06 PROCEDURE — 99233 SBSQ HOSP IP/OBS HIGH 50: CPT | Mod: ,,, | Performed by: INTERNAL MEDICINE

## 2017-02-06 PROCEDURE — 83615 LACTATE (LD) (LDH) ENZYME: CPT

## 2017-02-06 PROCEDURE — 85027 COMPLETE CBC AUTOMATED: CPT

## 2017-02-06 RX ORDER — DIPHENHYDRAMINE HCL 25 MG
25 CAPSULE ORAL ONCE AS NEEDED
Status: COMPLETED | OUTPATIENT
Start: 2017-02-06 | End: 2017-02-06

## 2017-02-06 RX ORDER — CIPROFLOXACIN 500 MG/1
500 TABLET ORAL EVERY 12 HOURS
Status: DISCONTINUED | OUTPATIENT
Start: 2017-02-06 | End: 2017-02-07

## 2017-02-06 RX ORDER — ACETAMINOPHEN 325 MG/1
650 TABLET ORAL ONCE AS NEEDED
Status: COMPLETED | OUTPATIENT
Start: 2017-02-06 | End: 2017-02-06

## 2017-02-06 RX ORDER — HYDROCODONE BITARTRATE AND ACETAMINOPHEN 500; 5 MG/1; MG/1
TABLET ORAL
Status: DISCONTINUED | OUTPATIENT
Start: 2017-02-06 | End: 2017-02-07

## 2017-02-06 RX ADMIN — OXYCODONE HYDROCHLORIDE 5 MG: 5 TABLET ORAL at 09:02

## 2017-02-06 RX ADMIN — LAMIVUDINE 300 MG: 100 TABLET, FILM COATED ORAL at 08:02

## 2017-02-06 RX ADMIN — SODIUM CHLORIDE: 0.9 INJECTION, SOLUTION INTRAVENOUS at 02:02

## 2017-02-06 RX ADMIN — ABACAVIR 600 MG: 300 TABLET, FILM COATED ORAL at 08:02

## 2017-02-06 RX ADMIN — DOLUTEGRAVIR SODIUM 50 MG: 50 TABLET, FILM COATED ORAL at 08:02

## 2017-02-06 RX ADMIN — MAGNESIUM OXIDE TAB 400 MG (241.3 MG ELEMENTAL MG) 400 MG: 400 (241.3 MG) TAB at 07:02

## 2017-02-06 RX ADMIN — STANDARDIZED SENNA CONCENTRATE AND DOCUSATE SODIUM 1 TABLET: 8.6; 5 TABLET, FILM COATED ORAL at 09:02

## 2017-02-06 RX ADMIN — STANDARDIZED SENNA CONCENTRATE AND DOCUSATE SODIUM 1 TABLET: 8.6; 5 TABLET, FILM COATED ORAL at 08:02

## 2017-02-06 RX ADMIN — Medication 10 ML: at 11:02

## 2017-02-06 RX ADMIN — ACETAMINOPHEN 650 MG: 325 TABLET ORAL at 11:02

## 2017-02-06 RX ADMIN — MAGNESIUM OXIDE TAB 400 MG (241.3 MG ELEMENTAL MG) 400 MG: 400 (241.3 MG) TAB at 12:02

## 2017-02-06 RX ADMIN — ACYCLOVIR 400 MG: 200 CAPSULE ORAL at 09:02

## 2017-02-06 RX ADMIN — OXYCODONE HYDROCHLORIDE 5 MG: 5 TABLET ORAL at 07:02

## 2017-02-06 RX ADMIN — VANCOMYCIN HYDROCHLORIDE 750 MG: 750 INJECTION, POWDER, LYOPHILIZED, FOR SOLUTION INTRAVENOUS at 10:02

## 2017-02-06 RX ADMIN — Medication 10 ML: at 06:02

## 2017-02-06 RX ADMIN — VORICONAZOLE 200 MG: 200 TABLET, FILM COATED ORAL at 08:02

## 2017-02-06 RX ADMIN — VANCOMYCIN HYDROCHLORIDE 1000 MG: 1 INJECTION, POWDER, LYOPHILIZED, FOR SOLUTION INTRAVENOUS at 03:02

## 2017-02-06 RX ADMIN — MAGNESIUM OXIDE TAB 400 MG (241.3 MG ELEMENTAL MG) 400 MG: 400 (241.3 MG) TAB at 09:02

## 2017-02-06 RX ADMIN — CIPROFLOXACIN HYDROCHLORIDE 500 MG: 500 TABLET, FILM COATED ORAL at 09:02

## 2017-02-06 RX ADMIN — CEFEPIME HYDROCHLORIDE 2 G: 2 INJECTION, SOLUTION INTRAVENOUS at 06:02

## 2017-02-06 RX ADMIN — VORICONAZOLE 200 MG: 200 TABLET, FILM COATED ORAL at 09:02

## 2017-02-06 RX ADMIN — ACETAMINOPHEN 650 MG: 325 TABLET ORAL at 09:02

## 2017-02-06 RX ADMIN — DIPHENHYDRAMINE HYDROCHLORIDE 25 MG: 25 CAPSULE ORAL at 11:02

## 2017-02-06 RX ADMIN — ACYCLOVIR 400 MG: 200 CAPSULE ORAL at 08:02

## 2017-02-06 RX ADMIN — OXYCODONE HYDROCHLORIDE 5 MG: 5 TABLET ORAL at 03:02

## 2017-02-06 RX ADMIN — CEFEPIME HYDROCHLORIDE 2 G: 2 INJECTION, SOLUTION INTRAVENOUS at 02:02

## 2017-02-06 RX ADMIN — Medication 10 ML: at 05:02

## 2017-02-06 NOTE — PLAN OF CARE
Problem: Patient Care Overview  Goal: Plan of Care Review  Outcome: Ongoing (interventions implemented as appropriate)  Patient is AAOx4. Pt is calm and cooperative. Teaching and education performed. Patient remains free from falls and injury this shift. Bed in low, locked position, environment is free of clutter, with call bell in reach. Patient encouraged to call for assistance. Patient verbalized understanding. All belongings within reach. High protein / High calorie diet. Max HR of 124 over night. PICC line still able to provide adequate blood return. No N/V throughout shift. Afebrile (TMax 100.2 but resolved on its own) on vancomycin and cefe. Pt C/O muscular pain to lower extremities. Administered oxy IR once throughout shift. Will continue to monitor.

## 2017-02-06 NOTE — PROGRESS NOTES
[facility]  Adult Nutrition  Consult Note    SUMMARY     Recommendations    Recommendation/Intervention:   1. Continue high protein/high calorie diet with double portions. Continue ONS and promite po intake >75%. RD will continue to monitor.     Goals: Pt will meet >85% EEN with po intake + ONS  Nutrition Goal Status: goal met       Continuum of Care Plan    Referral to Outpatient Services: other (see comments) (Nutrition D/C Planning: high tiago/protein + ONS)    Reason for Assessment    Reason for Assessment: RD follow-up  Diagnosis: cancer diagnosis/related complications (acute leukemia)  Relevent Medical History: No PMH   Interdisciplinary Rounds: attended     General Information Comments: Pt gaining weight (~8-9 lbs) since admit. Appetite continues with good po intake. No complaints today. May start chemo induction today. RD to follow-up.     Nutrition Prescription Ordered    Current Diet Order: High protein - high calorie   Nutrition Order Comments: double portions      Oral Nutrition Supplement: Boost Plus     Evaluation of Received Nutrients/Fluid Intake     Oral Fluid (mL): 1097      IV Fluid (mL): 1500       Nutrition Risk Screen     Nutrition Risk Screen: no indicators present    Nutrition/Diet History    Patient Reported Diet/Restrictions/Preferences: general  Typical Food/Fluid Intake: %  Food Preferences:  (no cultural or Religion needs identified)        Factors Affecting Nutritional Intake:  (-)     Labs/Tests/Procedures/Meds       Pertinent Labs Reviewed: reviewed  Pertinent Labs Comments: Na 133, K 4.0, BUN 12, Cr 2.7, Glu 90, Hgb 7.5  Pertinent Medications Reviewed: reviewed  Pertinent Medications Comments: allopurinol, cefepime, vancomycin     Physical Findings    Overall Physical Appearance: generalized wasting, loss of muscle mass, loss of subcutaneous fat, underweight  Tubes:  (-)  Oral/Mouth Cavity: WDL  Skin: intact    Anthropometrics     Height (inches): 64.02  in  Weight Method: Bed Scale  Weight (kg): 44.6 kg  Ideal Body Weight (IBW), Male: 130.12 lb     % Ideal Body Weight, Male (lb): 75.57 lb     BMI (kg/m2): 16.87  BMI Grade: 16 - 16.9 protein-energy malnutrition grade II        % Weight Change: 15.4 kg (27% x2-3 months)  Weight Loss: unintentional     Estimated/Assessed Needs    Weight Used For Calorie Calculations: 44.6 kg (98 lb 5.2 oz)   Height (cm): 162.6 cm     Energy Need Method: Kcal/kg (7952-0370 kcal (45-50 kcal/kg))     RMR (Camden-St. Jeor Equation): 1279.84        Weight Used For Protein Calculations: 44.6 kg (98 lb 5.2 oz)  Protein Requirements: 67-80 g    Fluid Need Method: RDA Method (or per MD)     Monitor and Evaluation    Food and Nutrient Intake: energy intake  Food and Nutrient Adminstration: diet order     Physical Activity and Function: nutrition-related ADLs and IADLs  Anthropometric Measurements: weight, weight change, body mass index  Biochemical Data, Medical Tests and Procedures: electrolyte and renal panel, glucose/endocrine profile, inflammatory profile  Nutrition-Focused Physical Findings: overall appearance    Nutrition Risk    Level of Risk: moderate    Nutrition Follow-Up    RD Follow-up?: Yes    Assessment and Plan  Increased nutrient needs related cancer dx+HIV s/p BMB AEB increased EPN and EEN    No new Assessment & Plan notes have been filed under this hospital service since the last note was generated.  Service: Nutrition

## 2017-02-06 NOTE — PLAN OF CARE
Problem: Patient Care Overview  Goal: Plan of Care Review  Outcome: Ongoing (interventions implemented as appropriate)  Patient remains afebrile on neutropenic precautions.  Tmax 99.4* Tachycardic. Patient complains of bilateral leg pain. PRN oxycodone given x2 with relief. No complaints of nausea. No mucositis noted. PRN magnesium replacements given.  IVF at 50ml/hr. Patient received 1 unit of platelets today without complications. CXR done. Ambulates independently in room. Patient remains free from falls.  Fall precautions in place.  Bed in low position, wheels locked, side rails up x2, nonskid socks on, and call light within reach.  Patient encouraged to call for assistance with ambulation if needed. Purposeful rounding done hourly. Will continue to monitor. Vicki De La Torre 2/6/2017 3:50 PM

## 2017-02-06 NOTE — PROGRESS NOTES
Progress Note  Hematology / Bone Marrow Transplant                                                              Team: [facility]EMATOLOGY BMT    Patient Name: Marck Howard  YOB: 1980    Admit Date: 1/17/2017                     LOS: 20    SUBJECTIVE:     Reason for Admission:  Acute leukemia  See H&P for detailed initial presentation history and ROS.      Interval history:  Day 18 of 7+3; bone marrow biopsy on schedule today.  No acute events overnight. Patient slept well and offers no complaints this morning.      Review of System:  Constitutional: -ve fever, -ve chills, negative for anorexia, malaise, night sweats and weight loss  Eyes: no visual changes, negative for irritation and redness  ENT: no nasal congestion or sore throat, negative for epistaxis, sore mouth and sore throat  Respiratory: no cough or shortness of breath, negative for dyspnea on exertion and wheezing  Cardiovascular: no chest pain or palpitations, negative for dyspnea, fatigue and palpitations  Gastrointestinal: no nausea or vomiting, no abdominal pain or change in bowel habits, negative for constipation, diarrhea and melena  Hematologic/Lymphatic: no easy bruising or lymphadenopathy, negative for bleeding  Musculoskeletal: no arthralgias or myalgias  Neurological: no seizures or tremors, negative for headaches and weakness  Behavioral/Psych: negative for anxiety and depression    OBJECTIVE:     Vital Signs Range (Last 24H):  Temp:  [96.7 °F (35.9 °C)-100.2 °F (37.9 °C)]   Pulse:  [112-124]   Resp:  [16-18]   BP: ()/(57-70)   SpO2:  [96 %-100 %] Body mass index is 16.93 kg/(m^2).    I & O (Last 24H):    Intake/Output Summary (Last 24 hours) at 02/06/17 1008  Last data filed at 02/06/17 0900   Gross per 24 hour   Intake             2785 ml   Output             3775 ml   Net             -990 ml       Physical Exam:  General: well developed, cachectic, no distress  HENT: Head:normocephalic, atraumatic. Ears:right ear  normal, left ear normal. Nose: Nares normal. Septum midline. Mucosa normal. No drainage or sinus tenderness., no discharge. Throat: lips, mucosa, and tongue normal; teeth and gums normal and no throat erythema.  Eyes: conjunctivae/corneas clear. PERRL.   Neck: supple, symmetrical, trachea midline  Lungs:  clear to auscultation bilaterally and normal respiratory effort  Cardiovascular: Heart: tachycardic but otherwise regular rhythm, S1, S2 normal, no murmur, click, rub or gallop. Chest Wall: no tenderness. Extremities: no cyanosis or edema, or clubbing.   Abdomen: soft, non-tender non-distented; bowel sounds normal.   Skin: Skin color, texture, turgor normal. No rashes or lesions  Musculoskeletal:no clubbing, cyanosis  Neurologic: Mental status: Alert, oriented, thought content appropriate  Psych/Behavioral:  Normal.    Diagnostic Results:  Lab Results   Component Value Date    WBC 4.92 02/06/2017    HGB 7.5 (L) 02/06/2017    HCT 22.3 (L) 02/06/2017    MCV 91 02/06/2017    PLT 9 (LL) 02/06/2017       Recent Labs  Lab 02/06/17  0429   GLU 90   *   K 4.0      CO2 25   BUN 12   CREATININE 0.7   CALCIUM 9.1   MG 1.9     Lab Results   Component Value Date    INR 1.0 02/06/2017    INR 1.0 02/05/2017    INR 1.2 01/17/2017     No results found for: HGBA1C  Microbiology Results (last 7 days)     Procedure Component Value Units Date/Time    Blood culture [[id number]] Collected:  02/03/17 2330    Order Status:  Completed Specimen:  Blood from Line, PICC Right Brachial Updated:  02/06/17 0612     Blood Culture, Routine No Growth to date     Blood Culture, Routine No Growth to date     Blood Culture, Routine No Growth to date    Blood culture [[id number]] Collected:  02/04/17 0008    Order Status:  Completed Specimen:  Blood Updated:  02/06/17 0612     Blood Culture, Routine No Growth to date     Blood Culture, Routine No Growth to date     Blood Culture, Routine No Growth to date    Blood culture [[id number]]  Collected:  01/31/17 2132    Order Status:  Completed Specimen:  Blood Updated:  02/05/17 2312     Blood Culture, Routine No growth after 5 days.    Blood culture [[id number]] Collected:  01/31/17 2133    Order Status:  Completed Specimen:  Blood Updated:  02/05/17 2312     Blood Culture, Routine No growth after 5 days.    Blood culture [[id number]]     Order Status:  Canceled Specimen:  Blood     AFB Culture & Smear [[id number]] Collected:  01/19/17 0538    Order Status:  Completed Specimen:  Blood from Blood Updated:  02/03/17 1156     AFB Culture & Smear Culture in progress    Urine culture [[id number]] Collected:  01/31/17 2255    Order Status:  Completed Specimen:  Urine from Urine, Clean Catch Updated:  02/02/17 0548     Urine Culture, Routine No growth    Gram stain [[id number]] Collected:  01/31/17 2255    Order Status:  Completed Specimen:  Urine from Urine, Clean Catch Updated:  02/01/17 0442     Gram Stain Result No WBC's      No organisms seen    Blood culture [[id number]]     Order Status:  Canceled Specimen:  Blood     Blood culture [[id number]]     Order Status:  Canceled Specimen:  Blood         Imaging Results:    CT C/A/P 1/19/17:  1.  Focus of groundglass attenuation with bronchiectasis/developing air bronchograms within the right middle lobe, nonspecific, however could reflect developing infectious process or possibly sequela of aspiration, clinical correlation for pneumonia is advised.  2.  Anterior mediastinum mass, suggesting thymic hyperplasia, clinical correlation advised.  3.  Constipation.  4.  Several additional findings above.    CXR 1/17/17: Heart size normal.  Mild accentuation of interstitial markings.  No significant air space consolidation or pleural effusion    2D Echo 1/17/17:  CONCLUSIONS     1 - Low normal left ventricular systolic function (EF 50-55%).     2 - Normal right ventricular systolic function .     3 - Normal left ventricular diastolic function.     FINAL PATHOLOGIC  DIAGNOSIS (1/17/17):  BONE MARROW, RIGHT ILIAC CREST, ASPIRATE, CLOT, AND CORE BIOPSY:  --Markedly hypercellular marrow, essentially 100%, nearly completely replaced by acute myeloid leukemia, see  comment  --Rare background trilineage elements present  --No significant appreciable stainable iron  COMMENT: Concomitantly submitted flow cytometric analysis detects an increased population of blasts consistent  with acute myeloid leukemia, favor non-M3 subtype, although A CD34 positive/HLA-DR negative/equivocal  phenotype, and M3 APL subtype cannot be completely excluded. This increased population of myeloid blasts  shows expression of CD34, CD13, and CD33 (partial). This population also expresses CD 56 and CD7. It is  negative for  and other B and T lymphoid R crest tested including CD19 and cytoplasmic CD3, as well as  TdT. HLA-DR is equivocal.  The overall morphology of these blasts do not suggest acute promyelocytic leukemia. 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       Active Problems, Status & Treatment Plan Addressed Today:    # AML  - currently no evidence of DIC or tumor lysis. Daily TLS labs.   - noted to have new diagnosis of HIV (see below)   - s/p bone marrow biopsy (1/17/17) showing AML, cytogenetics and molecular markers done  - NPM1 and FLT3 negative  - will need transplant given high risk disease  - continue on empiric allopurinol  - 7+3 started 1/20/17, day 17 today  - rpt bone marrow biopsy 2/3, with circulating blasts in peripheral blood, path pending.    # Tachycardia/Sepsis  - side effect of chemo?  - Currently on  Cefepime but will likely transition back to Cipro ppx as he as been afebrile  - EKG x2 showed NSR.  - Patient is asymptomatic   - continue to monitor    #HIV positive  - new diagnosis on this admit  - toxoplasma gondii WNL  - CD4: 118  - ID following, appreciate recs  - Started Triumeq 2/2.  - ppx bactrim held given immunosuppression, received Pentamidine 1/22. Plan to restart bactrim once counts recovered.     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   # Thrombocytopenia due to leukemia   - Hgb= 7.5 g/dL, PLT= 9 today  - no overt signs of bleeding  - likely 2/2 underlying hematologic malignancy  - continue to monitor and transfuse as indicated for Hgb <7.0, PLT <10    # Constipation (resolved)  - likely 2/2 opiate use  - continue on miralax and senna-docusate  - resolved, continue to monitor     # Tobacco Abuse  - 26 pack year history  - advise smoking cessation   - nicotine patch prn    # H/O Alcohol Abuse  - drank at least 2 beers per day prior to admission  - no h/o DTs and now out of period for withdrawal    # Hyponatremia (resolved)  - likely 2/2 dehydration  - continue IVFs  - continue to monitor    #Hypomagnesemia (resolved)  -replete PRN    # Hypoalbuminemia  - checked 24 hour urine protein  - Dietary consulted  - encourage increased nutritional intake     DVT ppx: encourage ambulation  Diet: regular  Code Status: full    DISPO: pending chemotherapy for AML and repeat bone marrow       Alexis Alonzo MD PGY3  Internal Medicine  [phone]

## 2017-02-07 ENCOUNTER — TELEPHONE (OUTPATIENT)
Dept: HEMATOLOGY/ONCOLOGY | Facility: CLINIC | Age: 37
End: 2017-02-07

## 2017-02-07 LAB
ALBUMIN SERPL BCP-MCNC: 2.4 G/DL
ALP SERPL-CCNC: 141 U/L
ALT SERPL W/O P-5'-P-CCNC: 31 U/L
ANION GAP SERPL CALC-SCNC: 6 MMOL/L
ANISOCYTOSIS BLD QL SMEAR: SLIGHT
AST SERPL-CCNC: 15 U/L
BASOPHILS # BLD AUTO: ABNORMAL K/UL
BASOPHILS NFR BLD: 0 %
BILIRUB SERPL-MCNC: 0.2 MG/DL
BLASTS NFR BLD MANUAL: 76 %
BUN SERPL-MCNC: 15 MG/DL
CALCIUM SERPL-MCNC: 9.2 MG/DL
CHLORIDE SERPL-SCNC: 100 MMOL/L
CO2 SERPL-SCNC: 30 MMOL/L
CREAT SERPL-MCNC: 0.7 MG/DL
DIFFERENTIAL METHOD: ABNORMAL
EOSINOPHIL # BLD AUTO: ABNORMAL K/UL
EOSINOPHIL NFR BLD: 0 %
ERYTHROCYTE [DISTWIDTH] IN BLOOD BY AUTOMATED COUNT: 17 %
EST. GFR  (AFRICAN AMERICAN): >60 ML/MIN/1.73 M^2
EST. GFR  (NON AFRICAN AMERICAN): >60 ML/MIN/1.73 M^2
GIANT PLATELETS BLD QL SMEAR: ABNORMAL
GLUCOSE SERPL-MCNC: 107 MG/DL
HCT VFR BLD AUTO: 21.9 %
HGB BLD-MCNC: 7.3 G/DL
INR PPP: 1.1
LDH SERPL L TO P-CCNC: 274 U/L
LYMPHOCYTES # BLD AUTO: ABNORMAL K/UL
LYMPHOCYTES NFR BLD: 23 %
MAGNESIUM SERPL-MCNC: 2 MG/DL
MCH RBC QN AUTO: 30.3 PG
MCHC RBC AUTO-ENTMCNC: 33.3 %
MCV RBC AUTO: 91 FL
MONOCYTES # BLD AUTO: ABNORMAL K/UL
MONOCYTES NFR BLD: 1 %
NEUTROPHILS NFR BLD: 0 %
PHOSPHATE SERPL-MCNC: 4.7 MG/DL
PLATELET # BLD AUTO: 37 K/UL
PLATELET BLD QL SMEAR: ABNORMAL
PMV BLD AUTO: 11 FL
POTASSIUM SERPL-SCNC: 4 MMOL/L
PROT SERPL-MCNC: 7.2 G/DL
PROTHROMBIN TIME: 11.3 SEC
RBC # BLD AUTO: 2.41 M/UL
SMUDGE CELLS BLD QL SMEAR: PRESENT
SODIUM SERPL-SCNC: 136 MMOL/L
WBC # BLD AUTO: 7.37 K/UL

## 2017-02-07 PROCEDURE — 84100 ASSAY OF PHOSPHORUS: CPT

## 2017-02-07 PROCEDURE — 83615 LACTATE (LD) (LDH) ENZYME: CPT

## 2017-02-07 PROCEDURE — 87040 BLOOD CULTURE FOR BACTERIA: CPT | Mod: 59

## 2017-02-07 PROCEDURE — 83735 ASSAY OF MAGNESIUM: CPT

## 2017-02-07 PROCEDURE — 25000003 PHARM REV CODE 250: Performed by: INTERNAL MEDICINE

## 2017-02-07 PROCEDURE — 36415 COLL VENOUS BLD VENIPUNCTURE: CPT

## 2017-02-07 PROCEDURE — 85027 COMPLETE CBC AUTOMATED: CPT

## 2017-02-07 PROCEDURE — 25000003 PHARM REV CODE 250: Performed by: NURSE PRACTITIONER

## 2017-02-07 PROCEDURE — 85007 BL SMEAR W/DIFF WBC COUNT: CPT

## 2017-02-07 PROCEDURE — 63600175 PHARM REV CODE 636 W HCPCS: Performed by: INTERNAL MEDICINE

## 2017-02-07 PROCEDURE — 63600175 PHARM REV CODE 636 W HCPCS: Performed by: STUDENT IN AN ORGANIZED HEALTH CARE EDUCATION/TRAINING PROGRAM

## 2017-02-07 PROCEDURE — 80053 COMPREHEN METABOLIC PANEL: CPT

## 2017-02-07 PROCEDURE — 25000003 PHARM REV CODE 250: Performed by: STUDENT IN AN ORGANIZED HEALTH CARE EDUCATION/TRAINING PROGRAM

## 2017-02-07 PROCEDURE — 87040 BLOOD CULTURE FOR BACTERIA: CPT

## 2017-02-07 PROCEDURE — 85610 PROTHROMBIN TIME: CPT

## 2017-02-07 PROCEDURE — 3E03305 INTRODUCTION OF OTHER ANTINEOPLASTIC INTO PERIPHERAL VEIN, PERCUTANEOUS APPROACH: ICD-10-PCS | Performed by: INTERNAL MEDICINE

## 2017-02-07 PROCEDURE — 99233 SBSQ HOSP IP/OBS HIGH 50: CPT | Mod: ,,, | Performed by: INTERNAL MEDICINE

## 2017-02-07 PROCEDURE — 20600001 HC STEP DOWN PRIVATE ROOM

## 2017-02-07 RX ORDER — CEFEPIME HYDROCHLORIDE 1 G/50ML
1 INJECTION, SOLUTION INTRAVENOUS
Status: DISCONTINUED | OUTPATIENT
Start: 2017-02-07 | End: 2017-02-07

## 2017-02-07 RX ORDER — ONDANSETRON 8 MG/1
16 TABLET, ORALLY DISINTEGRATING ORAL
Status: COMPLETED | OUTPATIENT
Start: 2017-02-07 | End: 2017-02-14

## 2017-02-07 RX ORDER — SODIUM CHLORIDE 9 MG/ML
INJECTION, SOLUTION INTRAVENOUS CONTINUOUS
Status: DISCONTINUED | OUTPATIENT
Start: 2017-02-07 | End: 2017-02-16

## 2017-02-07 RX ORDER — DEXAMETHASONE SODIUM PHOSPHATE 1 MG/ML
1 SOLUTION/ DROPS OPHTHALMIC EVERY 6 HOURS
Status: COMPLETED | OUTPATIENT
Start: 2017-02-07 | End: 2017-02-16

## 2017-02-07 RX ORDER — SODIUM CHLORIDE 0.9 % (FLUSH) 0.9 %
10 SYRINGE (ML) INJECTION
Status: DISCONTINUED | OUTPATIENT
Start: 2017-02-07 | End: 2017-02-24 | Stop reason: HOSPADM

## 2017-02-07 RX ORDER — CEFEPIME HYDROCHLORIDE 2 G/50ML
2 INJECTION, SOLUTION INTRAVENOUS
Status: DISCONTINUED | OUTPATIENT
Start: 2017-02-07 | End: 2017-02-10

## 2017-02-07 RX ORDER — PROMETHAZINE HYDROCHLORIDE 25 MG/ML
12.5 INJECTION, SOLUTION INTRAMUSCULAR; INTRAVENOUS EVERY 6 HOURS PRN
Status: DISCONTINUED | OUTPATIENT
Start: 2017-02-07 | End: 2017-02-07 | Stop reason: ALTCHOICE

## 2017-02-07 RX ORDER — DEXAMETHASONE 4 MG/1
12 TABLET ORAL
Status: COMPLETED | OUTPATIENT
Start: 2017-02-07 | End: 2017-02-12

## 2017-02-07 RX ORDER — HEPARIN 100 UNIT/ML
300 SYRINGE INTRAVENOUS
Status: DISCONTINUED | OUTPATIENT
Start: 2017-02-07 | End: 2017-02-24 | Stop reason: HOSPADM

## 2017-02-07 RX ADMIN — DEXAMETHASONE 1 DROP: 1 SUSPENSION OPHTHALMIC at 05:02

## 2017-02-07 RX ADMIN — VANCOMYCIN HYDROCHLORIDE 750 MG: 750 INJECTION, POWDER, LYOPHILIZED, FOR SOLUTION INTRAVENOUS at 09:02

## 2017-02-07 RX ADMIN — OXYCODONE HYDROCHLORIDE 5 MG: 5 TABLET ORAL at 09:02

## 2017-02-07 RX ADMIN — DEXAMETHASONE 1 DROP: 1 SUSPENSION OPHTHALMIC at 02:02

## 2017-02-07 RX ADMIN — SODIUM CHLORIDE: 0.9 INJECTION, SOLUTION INTRAVENOUS at 12:02

## 2017-02-07 RX ADMIN — OXYCODONE HYDROCHLORIDE 5 MG: 5 TABLET ORAL at 11:02

## 2017-02-07 RX ADMIN — STANDARDIZED SENNA CONCENTRATE AND DOCUSATE SODIUM 1 TABLET: 8.6; 5 TABLET, FILM COATED ORAL at 09:02

## 2017-02-07 RX ADMIN — DEXAMETHASONE 12 MG: 4 TABLET ORAL at 01:02

## 2017-02-07 RX ADMIN — MITOXANTRONE HYDROCHLORIDE 8 MG: 2 INJECTION, SOLUTION INTRAVENOUS at 11:02

## 2017-02-07 RX ADMIN — OXYCODONE HYDROCHLORIDE 5 MG: 5 TABLET ORAL at 04:02

## 2017-02-07 RX ADMIN — HYDROMORPHONE HYDROCHLORIDE 0.5 MG: 1 INJECTION, SOLUTION INTRAMUSCULAR; INTRAVENOUS; SUBCUTANEOUS at 03:02

## 2017-02-07 RX ADMIN — DEXAMETHASONE 1 DROP: 1 SUSPENSION OPHTHALMIC at 11:02

## 2017-02-07 RX ADMIN — HYDROMORPHONE HYDROCHLORIDE 0.5 MG: 1 INJECTION, SOLUTION INTRAMUSCULAR; INTRAVENOUS; SUBCUTANEOUS at 08:02

## 2017-02-07 RX ADMIN — ACYCLOVIR 400 MG: 200 CAPSULE ORAL at 08:02

## 2017-02-07 RX ADMIN — MAGNESIUM OXIDE TAB 400 MG (241.3 MG ELEMENTAL MG) 400 MG: 400 (241.3 MG) TAB at 09:02

## 2017-02-07 RX ADMIN — Medication 10 ML: at 05:02

## 2017-02-07 RX ADMIN — VORICONAZOLE 200 MG: 200 TABLET, FILM COATED ORAL at 09:02

## 2017-02-07 RX ADMIN — CYTARABINE 1420 MG: 100 INJECTION, SOLUTION INTRATHECAL; INTRAVENOUS; SUBCUTANEOUS at 03:02

## 2017-02-07 RX ADMIN — ACYCLOVIR 400 MG: 200 CAPSULE ORAL at 09:02

## 2017-02-07 RX ADMIN — ETOPOSIDE 114 MG: 20 INJECTION, SOLUTION, CONCENTRATE INTRAVENOUS at 02:02

## 2017-02-07 RX ADMIN — VORICONAZOLE 200 MG: 200 TABLET, FILM COATED ORAL at 08:02

## 2017-02-07 RX ADMIN — Medication 10 ML: at 11:02

## 2017-02-07 RX ADMIN — LAMIVUDINE 300 MG: 100 TABLET, FILM COATED ORAL at 08:02

## 2017-02-07 RX ADMIN — CEFEPIME HYDROCHLORIDE 2 G: 2 INJECTION, SOLUTION INTRAVENOUS at 08:02

## 2017-02-07 RX ADMIN — CEFEPIME HYDROCHLORIDE 2 G: 2 INJECTION, SOLUTION INTRAVENOUS at 05:02

## 2017-02-07 RX ADMIN — ONDANSETRON 16 MG: 8 TABLET, ORALLY DISINTEGRATING ORAL at 01:02

## 2017-02-07 RX ADMIN — STANDARDIZED SENNA CONCENTRATE AND DOCUSATE SODIUM 1 TABLET: 8.6; 5 TABLET, FILM COATED ORAL at 08:02

## 2017-02-07 RX ADMIN — DOLUTEGRAVIR SODIUM 50 MG: 50 TABLET, FILM COATED ORAL at 08:02

## 2017-02-07 RX ADMIN — ABACAVIR 600 MG: 300 TABLET, FILM COATED ORAL at 08:02

## 2017-02-07 RX ADMIN — SODIUM CHLORIDE: 0.9 INJECTION, SOLUTION INTRAVENOUS at 09:02

## 2017-02-07 NOTE — PROGRESS NOTES
Progress Note  Hematology / Bone Marrow Transplant                                                              Team: [facility] [place] HEMATOLOGY BMT    Patient Name: Marck Howard  YOB: 1980    Admit Date: 1/17/2017                     LOS: 21    SUBJECTIVE:     Reason for Admission:  Acute leukemia  See H&P for detailed initial presentation history and ROS.      Interval history:  Patient has no complains overnight. He did have a fever of 102.2. His Vancomycin was restarted.     Review of System:  Constitutional: -ve fever, -ve chills, negative for anorexia, malaise, night sweats and weight loss  Eyes: no visual changes, negative for irritation and redness  ENT: no nasal congestion or sore throat, negative for epistaxis, sore mouth and sore throat  Respiratory: no cough or shortness of breath, negative for dyspnea on exertion and wheezing  Cardiovascular: no chest pain or palpitations, negative for dyspnea, fatigue and palpitations  Gastrointestinal: no nausea or vomiting, no abdominal pain or change in bowel habits, negative for constipation, diarrhea and melena  Hematologic/Lymphatic: no easy bruising or lymphadenopathy, negative for bleeding  Musculoskeletal: no arthralgias or myalgias  Neurological: no seizures or tremors, negative for headaches and weakness  Behavioral/Psych: negative for anxiety and depression    OBJECTIVE:     Vital Signs Range (Last 24H):  Temp:  [96.7 °F (35.9 °C)-102.2 °F (39 °C)]   Pulse:  []   Resp:  [16-20]   BP: ()/(53-69)   SpO2:  [93 %-100 %] Body mass index is 16.12 kg/(m^2).    I & O (Last 24H):    Intake/Output Summary (Last 24 hours) at 02/07/17 0732  Last data filed at 02/07/17 0446   Gross per 24 hour   Intake           2112.5 ml   Output             3450 ml   Net          -1337.5 ml       Physical Exam:  General: well developed, cachectic, no distress  HENT: Head:normocephalic, atraumatic. Ears:right ear normal, left ear normal. Nose: Nares normal.  Septum midline. Mucosa normal. No drainage or sinus tenderness., no discharge. Throat: lips, mucosa, and tongue normal; teeth and gums normal and no throat erythema.  Eyes: conjunctivae/corneas clear. PERRL.   Neck: supple, symmetrical, trachea midline  Lungs:  clear to auscultation bilaterally and normal respiratory effort  Cardiovascular: Heart: tachycardic but otherwise regular rhythm, S1, S2 normal, no murmur, click, rub or gallop. Chest Wall: no tenderness. Extremities: no cyanosis or edema, or clubbing.   Abdomen: soft, non-tender non-distented; bowel sounds normal.   Skin: Skin color, texture, turgor normal. No rashes or lesions  Musculoskeletal:no clubbing, cyanosis  Neurologic: Mental status: Alert, oriented, thought content appropriate  Psych/Behavioral:  Normal.    Diagnostic Results:  Lab Results   Component Value Date    WBC 7.37 02/07/2017    HGB 7.3 (L) 02/07/2017    HCT 21.9 (L) 02/07/2017    MCV 91 02/07/2017    PLT 9 (LL) 02/06/2017       Recent Labs  Lab 02/07/17  0405         K 4.0      CO2 30*   BUN 15   CREATININE 0.7   CALCIUM 9.2   MG 2.0     Lab Results   Component Value Date    INR 1.1 02/07/2017    INR 1.0 02/06/2017    INR 1.0 02/05/2017     No results found for: HGBA1C  Microbiology Results (last 7 days)     Procedure Component Value Units Date/Time    Blood culture [[id number]] Collected:  02/04/17 0008    Order Status:  Completed Specimen:  Blood Updated:  02/07/17 0612     Blood Culture, Routine No Growth to date     Blood Culture, Routine No Growth to date     Blood Culture, Routine No Growth to date     Blood Culture, Routine No Growth to date    Blood culture [[id number]] Collected:  02/03/17 2330    Order Status:  Completed Specimen:  Blood from Line, PICC Right Brachial Updated:  02/07/17 0612     Blood Culture, Routine No Growth to date     Blood Culture, Routine No Growth to date     Blood Culture, Routine No Growth to date     Blood Culture, Routine No Growth  to date    Blood culture [[id number]] Collected:  01/31/17 2132    Order Status:  Completed Specimen:  Blood Updated:  02/05/17 2312     Blood Culture, Routine No growth after 5 days.    Blood culture [[id number]] Collected:  01/31/17 2133    Order Status:  Completed Specimen:  Blood Updated:  02/05/17 2312     Blood Culture, Routine No growth after 5 days.    Blood culture [[id number]]     Order Status:  Canceled Specimen:  Blood     AFB Culture & Smear [[id number]] Collected:  01/19/17 0538    Order Status:  Completed Specimen:  Blood from Blood Updated:  02/03/17 1156     AFB Culture & Smear Culture in progress    Urine culture [[id number]] Collected:  01/31/17 2255    Order Status:  Completed Specimen:  Urine from Urine, Clean Catch Updated:  02/02/17 0548     Urine Culture, Routine No growth    Gram stain [[id number]] Collected:  01/31/17 2255    Order Status:  Completed Specimen:  Urine from Urine, Clean Catch Updated:  02/01/17 0442     Gram Stain Result No WBC's      No organisms seen    Blood culture [[id number]]     Order Status:  Canceled Specimen:  Blood     Blood culture [[id number]]     Order Status:  Canceled Specimen:  Blood         Imaging Results:    CT C/A/P 1/19/17:  1.  Focus of groundglass attenuation with bronchiectasis/developing air bronchograms within the right middle lobe, nonspecific, however could reflect developing infectious process or possibly sequela of aspiration, clinical correlation for pneumonia is advised.  2.  Anterior mediastinum mass, suggesting thymic hyperplasia, clinical correlation advised.  3.  Constipation.  4.  Several additional findings above.    CXR 1/17/17: Heart size normal.  Mild accentuation of interstitial markings.  No significant air space consolidation or pleural effusion    2D Echo 1/17/17:  CONCLUSIONS     1 - Low normal left ventricular systolic function (EF 50-55%).     2 - Normal right ventricular systolic function .     3 - Normal left ventricular diastolic  function.     FINAL PATHOLOGIC DIAGNOSIS (1/17/17):  BONE MARROW, RIGHT ILIAC CREST, ASPIRATE, CLOT, AND CORE BIOPSY:  --Markedly hypercellular marrow, essentially 100%, nearly completely replaced by acute myeloid leukemia, see  comment  --Rare background trilineage elements present  --No significant appreciable stainable iron  COMMENT: Concomitantly submitted flow cytometric analysis detects an increased population of blasts consistent  with acute myeloid leukemia, favor non-M3 subtype, although A CD34 positive/HLA-DR negative/equivocal  phenotype, and M3 APL subtype cannot be completely excluded. This increased population of myeloid blasts  shows expression of CD34, CD13, and CD33 (partial). This population also expresses CD 56 and CD7. It is  negative for  and other B and T lymphoid R crest tested including CD19 and cytoplasmic CD3, as well as  TdT. HLA-DR is equivocal.  The overall morphology of these blasts do not suggest acute promyelocytic leukemia. 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       Active Problems, Status & Treatment Plan Addressed Today:    # AML  - currently no evidence of DIC or tumor lysis. Daily TLS labs.   - noted to have new diagnosis of HIV (see below)   - s/p bone marrow biopsy (1/17/17) showing AML, cytogenetics and molecular markers done  - NPM1 and FLT3 negative  - will need transplant given high risk disease  - continue on empiric allopurinol  - 7+3 started 1/20/17, day 18 today  - rpt bone marrow biopsy 2/3, with circulating blasts in peripheral blood, path pending.  -Plan to do MEC reintroduction  today    # Tachycardia/Sepsis  - side effect of chemo?  - Fever overnight, will restart vanc and cefepime   - Reorder Blood Cx  - Patient is asymptomatic   - continue to monitor    #HIV positive  - new diagnosis on this admit  - toxoplasma gondii WNL  - CD4: 118  - ID following, appreciate recs  - Started Triumeq 2/2.  - ppx bactrim held given myelosuppressive effects, received Pentamidine 1/22.   -Plan to restart bactrim once counts recovered.     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   # Thrombocytopenia due to leukemia   - Hgb= 7.3 g/dL, PLT= pending today  - no overt signs of bleeding  - likely 2/2 underlying hematologic malignancy  - continue to monitor and transfuse as indicated for Hgb <7.0, PLT <10    # Constipation (resolved)  - likely 2/2 opiate use  - continue on miralax and senna-docusate  - resolved, continue to monitor     # Tobacco Abuse  - 26 pack year history  - advise smoking cessation   - nicotine patch prn    # H/O Alcohol Abuse  - drank at least 2 beers per day prior to admission  - no h/o DTs and now out of period for withdrawal    # Hyponatremia (resolved)  - likely 2/2 dehydration  - continue IVFs  - continue to monitor    #Hypomagnesemia (resolved)  -replete PRN    # Hypoalbuminemia  - checked 24 hour urine protein  - Dietary consulted  - encourage increased nutritional intake     DVT ppx: encourage ambulation  Diet: regular  Code Status: full    DISPO: pending chemotherapy for AML and repeat bone marrow       Alexis Alonzo MD PGY3  Internal Medicine  [phone]

## 2017-02-07 NOTE — TELEPHONE ENCOUNTER
----- Message from Nohemy Bose MD sent at 2/7/2017 10:38 AM CST -----  Inpatient. The patient has been admitted to the hospital for the past month.  ----- Message -----     From: Shena Ya RN     Sent: 2/7/2017   8:09 AM       To: Nohemy Bose MD        ----- Message -----     From: Ama Jha     Sent: 2/7/2017   8:04 AM       To: [facility] Staff    Please confirm if the chemotherapy infusion will be done Inpatient or Outpatient. The treatment plan on file is for Inpatient.    Thanks in advance

## 2017-02-07 NOTE — PLAN OF CARE
Problem: Patient Care Overview  Goal: Plan of Care Review  Outcome: Ongoing (interventions implemented as appropriate)  tmax 102.2. Orders placed to restrat vancomycin. C/o pain to legs, oxycodone available. Updated to plan of care.

## 2017-02-07 NOTE — PLAN OF CARE
Problem: Patient Care Overview  Goal: Plan of Care Review  Outcome: Ongoing (interventions implemented as appropriate)    02/07/17 1632   Coping/Psychosocial   Plan Of Care Reviewed With patient      Pt resting comfortably and involved in plan of care. Oxy IR and dilaudid given prn for leg pain. [facility] started this afternoon. Tolerating without side effect thus far. IVF infusing. Up ad rk in room. No other complaints at this time. Pt has remained free from injury this shift. Bed in low locked position. Call light and personal belongings within reach. Side rails up x2. Wearing nonskid socks. VSS. All questions answered. Will continue to monitor.

## 2017-02-07 NOTE — PLAN OF CARE
MDR's with Dr Bose.  Patient is day 18 of 7+3 induction.  Plan to start MEC induction today.  Patient continues to have neutropenic fever with no infectious source identified.  D/c pending MEC induction and count recovery.  Patient aware of ELOS of 4 weeks.  Will continue to follow.

## 2017-02-08 LAB
ABO + RH BLD: NORMAL
ALBUMIN SERPL BCP-MCNC: 2.3 G/DL
ALP SERPL-CCNC: 142 U/L
ALT SERPL W/O P-5'-P-CCNC: 30 U/L
ANION GAP SERPL CALC-SCNC: 9 MMOL/L
ANISOCYTOSIS BLD QL SMEAR: SLIGHT
AST SERPL-CCNC: 15 U/L
BASOPHILS # BLD AUTO: ABNORMAL K/UL
BASOPHILS NFR BLD: 0 %
BILIRUB SERPL-MCNC: 0.1 MG/DL
BLASTS NFR BLD MANUAL: 84 %
BLD GP AB SCN CELLS X3 SERPL QL: NORMAL
BLD PROD TYP BPU: NORMAL
BLOOD UNIT EXPIRATION DATE: NORMAL
BLOOD UNIT TYPE CODE: 5100
BLOOD UNIT TYPE: NORMAL
BUN SERPL-MCNC: 18 MG/DL
BURR CELLS BLD QL SMEAR: ABNORMAL
CALCIUM SERPL-MCNC: 9 MG/DL
CHLORIDE SERPL-SCNC: 104 MMOL/L
CO2 SERPL-SCNC: 24 MMOL/L
CODING SYSTEM: NORMAL
CREAT SERPL-MCNC: 0.6 MG/DL
DACRYOCYTES BLD QL SMEAR: ABNORMAL
DIFFERENTIAL METHOD: ABNORMAL
DISPENSE STATUS: NORMAL
EOSINOPHIL # BLD AUTO: ABNORMAL K/UL
EOSINOPHIL NFR BLD: 0 %
ERYTHROCYTE [DISTWIDTH] IN BLOOD BY AUTOMATED COUNT: 17 %
EST. GFR  (AFRICAN AMERICAN): >60 ML/MIN/1.73 M^2
EST. GFR  (NON AFRICAN AMERICAN): >60 ML/MIN/1.73 M^2
GLUCOSE SERPL-MCNC: 165 MG/DL
HCT VFR BLD AUTO: 20.5 %
HGB BLD-MCNC: 6.9 G/DL
HYPOCHROMIA BLD QL SMEAR: ABNORMAL
INR PPP: 1
LDH SERPL L TO P-CCNC: 304 U/L
LYMPHOCYTES # BLD AUTO: ABNORMAL K/UL
LYMPHOCYTES NFR BLD: 16 %
MAGNESIUM SERPL-MCNC: 2 MG/DL
MCH RBC QN AUTO: 30.8 PG
MCHC RBC AUTO-ENTMCNC: 33.7 %
MCV RBC AUTO: 92 FL
MONOCYTES # BLD AUTO: ABNORMAL K/UL
MONOCYTES NFR BLD: 0 %
NEUTROPHILS NFR BLD: 0 %
NUM UNITS TRANS PACKED RBC: NORMAL
OVALOCYTES BLD QL SMEAR: ABNORMAL
PHOSPHATE SERPL-MCNC: 3.7 MG/DL
PLATELET # BLD AUTO: 29 K/UL
PMV BLD AUTO: 11.8 FL
POIKILOCYTOSIS BLD QL SMEAR: SLIGHT
POLYCHROMASIA BLD QL SMEAR: ABNORMAL
POTASSIUM SERPL-SCNC: 4.4 MMOL/L
PROT SERPL-MCNC: 7 G/DL
PROTHROMBIN TIME: 11 SEC
RBC # BLD AUTO: 2.24 M/UL
SODIUM SERPL-SCNC: 137 MMOL/L
WBC # BLD AUTO: 6.25 K/UL

## 2017-02-08 PROCEDURE — 99406 BEHAV CHNG SMOKING 3-10 MIN: CPT

## 2017-02-08 PROCEDURE — 86900 BLOOD TYPING SEROLOGIC ABO: CPT

## 2017-02-08 PROCEDURE — 25000003 PHARM REV CODE 250: Performed by: INTERNAL MEDICINE

## 2017-02-08 PROCEDURE — P9038 RBC IRRADIATED: HCPCS

## 2017-02-08 PROCEDURE — 25000003 PHARM REV CODE 250: Performed by: NURSE PRACTITIONER

## 2017-02-08 PROCEDURE — 63600175 PHARM REV CODE 636 W HCPCS: Performed by: INTERNAL MEDICINE

## 2017-02-08 PROCEDURE — 20600001 HC STEP DOWN PRIVATE ROOM

## 2017-02-08 PROCEDURE — 27201040 HC RC 50 FILTER

## 2017-02-08 PROCEDURE — 86850 RBC ANTIBODY SCREEN: CPT

## 2017-02-08 PROCEDURE — 83615 LACTATE (LD) (LDH) ENZYME: CPT

## 2017-02-08 PROCEDURE — 85027 COMPLETE CBC AUTOMATED: CPT

## 2017-02-08 PROCEDURE — 83735 ASSAY OF MAGNESIUM: CPT

## 2017-02-08 PROCEDURE — 86920 COMPATIBILITY TEST SPIN: CPT

## 2017-02-08 PROCEDURE — 85007 BL SMEAR W/DIFF WBC COUNT: CPT

## 2017-02-08 PROCEDURE — 99233 SBSQ HOSP IP/OBS HIGH 50: CPT | Mod: ,,, | Performed by: INTERNAL MEDICINE

## 2017-02-08 PROCEDURE — 84100 ASSAY OF PHOSPHORUS: CPT

## 2017-02-08 PROCEDURE — 25000003 PHARM REV CODE 250: Performed by: STUDENT IN AN ORGANIZED HEALTH CARE EDUCATION/TRAINING PROGRAM

## 2017-02-08 PROCEDURE — 80053 COMPREHEN METABOLIC PANEL: CPT

## 2017-02-08 PROCEDURE — 85610 PROTHROMBIN TIME: CPT

## 2017-02-08 RX ORDER — HYDROCODONE BITARTRATE AND ACETAMINOPHEN 500; 5 MG/1; MG/1
TABLET ORAL
Status: DISCONTINUED | OUTPATIENT
Start: 2017-02-08 | End: 2017-02-15

## 2017-02-08 RX ORDER — DIPHENHYDRAMINE HCL 25 MG
25 CAPSULE ORAL
Status: COMPLETED | OUTPATIENT
Start: 2017-02-08 | End: 2017-02-13

## 2017-02-08 RX ADMIN — ETOPOSIDE 114 MG: 20 INJECTION, SOLUTION, CONCENTRATE INTRAVENOUS at 02:02

## 2017-02-08 RX ADMIN — DEXAMETHASONE 12 MG: 4 TABLET ORAL at 01:02

## 2017-02-08 RX ADMIN — ABACAVIR 600 MG: 300 TABLET, FILM COATED ORAL at 08:02

## 2017-02-08 RX ADMIN — Medication 10 ML: at 05:02

## 2017-02-08 RX ADMIN — VORICONAZOLE 200 MG: 200 TABLET, FILM COATED ORAL at 08:02

## 2017-02-08 RX ADMIN — OXYCODONE HYDROCHLORIDE 5 MG: 5 TABLET ORAL at 08:02

## 2017-02-08 RX ADMIN — CEFEPIME HYDROCHLORIDE 2 G: 2 INJECTION, SOLUTION INTRAVENOUS at 08:02

## 2017-02-08 RX ADMIN — MITOXANTRONE HYDROCHLORIDE 8 MG: 2 INJECTION, SOLUTION INTRAVENOUS at 11:02

## 2017-02-08 RX ADMIN — CEFEPIME HYDROCHLORIDE 2 G: 2 INJECTION, SOLUTION INTRAVENOUS at 01:02

## 2017-02-08 RX ADMIN — LAMIVUDINE 300 MG: 100 TABLET, FILM COATED ORAL at 08:02

## 2017-02-08 RX ADMIN — STANDARDIZED SENNA CONCENTRATE AND DOCUSATE SODIUM 1 TABLET: 8.6; 5 TABLET, FILM COATED ORAL at 08:02

## 2017-02-08 RX ADMIN — Medication 10 ML: at 06:02

## 2017-02-08 RX ADMIN — ACYCLOVIR 400 MG: 200 CAPSULE ORAL at 08:02

## 2017-02-08 RX ADMIN — MAGNESIUM OXIDE TAB 400 MG (241.3 MG ELEMENTAL MG) 400 MG: 400 (241.3 MG) TAB at 08:02

## 2017-02-08 RX ADMIN — DIPHENHYDRAMINE HYDROCHLORIDE 25 MG: 25 CAPSULE ORAL at 12:02

## 2017-02-08 RX ADMIN — ONDANSETRON 16 MG: 8 TABLET, ORALLY DISINTEGRATING ORAL at 01:02

## 2017-02-08 RX ADMIN — DEXAMETHASONE 1 DROP: 1 SUSPENSION OPHTHALMIC at 05:02

## 2017-02-08 RX ADMIN — ACETAMINOPHEN 650 MG: 325 TABLET ORAL at 12:02

## 2017-02-08 RX ADMIN — CEFEPIME HYDROCHLORIDE 2 G: 2 INJECTION, SOLUTION INTRAVENOUS at 05:02

## 2017-02-08 RX ADMIN — OXYCODONE HYDROCHLORIDE 5 MG: 5 TABLET ORAL at 12:02

## 2017-02-08 RX ADMIN — DEXAMETHASONE 1 DROP: 1 SUSPENSION OPHTHALMIC at 11:02

## 2017-02-08 RX ADMIN — DOLUTEGRAVIR SODIUM 50 MG: 50 TABLET, FILM COATED ORAL at 08:02

## 2017-02-08 RX ADMIN — CYTARABINE 1420 MG: 100 INJECTION, SOLUTION INTRATHECAL; INTRAVENOUS; SUBCUTANEOUS at 04:02

## 2017-02-08 NOTE — PROGRESS NOTES
Progress Note  Hematology / Bone Marrow Transplant                                                              Team: [facility]    Patient Name: Marck Howard  YOB: 1980    Admit Date: 1/17/2017                     LOS: 22    SUBJECTIVE:     Reason for Admission:  Acute leukemia  See H&P for detailed initial presentation history and ROS.      Interval history:  Patient has no complains overnight. Afebrile overnight. His Vancomycin was restarted.     Review of System:  Constitutional: -ve fever, -ve chills, negative for anorexia, malaise, night sweats and weight loss  Eyes: no visual changes, negative for irritation and redness  ENT: no nasal congestion or sore throat, negative for epistaxis, sore mouth and sore throat  Respiratory: no cough or shortness of breath, negative for dyspnea on exertion and wheezing  Cardiovascular: no chest pain or palpitations, negative for dyspnea, fatigue and palpitations  Gastrointestinal: no nausea or vomiting, no abdominal pain or change in bowel habits, negative for constipation, diarrhea and melena  Hematologic/Lymphatic: no easy bruising or lymphadenopathy, negative for bleeding  Musculoskeletal: no arthralgias or myalgias  Neurological: no seizures or tremors, negative for headaches and weakness  Behavioral/Psych: negative for anxiety and depression    OBJECTIVE:     Vital Signs Range (Last 24H):  Temp:  [97.5 °F (36.4 °C)-100 °F (37.8 °C)]   Pulse:  []   Resp:  [15-18]   BP: ()/(59-69)   SpO2:  [95 %-98 %] Body mass index is 16.46 kg/(m^2).    I & O (Last 24H):    Intake/Output Summary (Last 24 hours) at 02/08/17 0654  Last data filed at 02/08/17 0458   Gross per 24 hour   Intake             3160 ml   Output             3275 ml   Net             -115 ml       Physical Exam:  General: well developed, cachectic, no distress  HENT: Head:normocephalic, atraumatic. Ears:right ear normal, left ear normal. Nose: Nares normal. Septum  midline. Mucosa normal. No drainage or sinus tenderness., no discharge. Throat: lips, mucosa, and tongue normal; teeth and gums normal and no throat erythema.  Eyes: conjunctivae/corneas clear. PERRL.   Neck: supple, symmetrical, trachea midline  Lungs:  clear to auscultation bilaterally and normal respiratory effort  Cardiovascular: Heart: tachycardic but otherwise regular rhythm, S1, S2 normal, no murmur, click, rub or gallop. Chest Wall: no tenderness. Extremities: no cyanosis or edema, or clubbing.   Abdomen: soft, non-tender non-distented; bowel sounds normal.   Skin: Skin color, texture, turgor normal. No rashes or lesions  Musculoskeletal:no clubbing, cyanosis  Neurologic: Mental status: Alert, oriented, thought content appropriate  Psych/Behavioral:  Normal.    Diagnostic Results:  Lab Results   Component Value Date    WBC 7.37 02/07/2017    HGB 7.3 (L) 02/07/2017    HCT 21.9 (L) 02/07/2017    MCV 91 02/07/2017    PLT 37 (LL) 02/07/2017     No results for input(s): GLU, NA, K, CL, CO2, BUN, CREATININE, CALCIUM, MG in the last 24 hours.  Lab Results   Component Value Date    INR 1.0 02/08/2017    INR 1.1 02/07/2017    INR 1.0 02/06/2017     No results found for: HGBA1C  Microbiology Results (last 7 days)     Procedure Component Value Units Date/Time    Blood culture [[id number]] Collected:  02/04/17 0008    Order Status:  Completed Specimen:  Blood Updated:  02/08/17 0612     Blood Culture, Routine No Growth to date     Blood Culture, Routine No Growth to date     Blood Culture, Routine No Growth to date     Blood Culture, Routine No Growth to date     Blood Culture, Routine No Growth to date    Blood culture [[id number]] Collected:  02/03/17 2330    Order Status:  Completed Specimen:  Blood from Line, PICC Right Brachial Updated:  02/08/17 0612     Blood Culture, Routine No Growth to date     Blood Culture, Routine No Growth to date     Blood Culture, Routine No Growth to date     Blood Culture, Routine No  Growth to date     Blood Culture, Routine No Growth to date    Blood culture [[id number]] Collected:  02/07/17 0825    Order Status:  Completed Specimen:  Blood from Line, PICC Right Brachial Updated:  02/07/17 1745     Blood Culture, Routine No Growth to date    Blood culture [[id number]] Collected:  02/07/17 0943    Order Status:  Completed Specimen:  Blood Updated:  02/07/17 1745     Blood Culture, Routine No Growth to date    Blood culture [[id number]]     Order Status:  Canceled Specimen:  Blood     Blood culture [[id number]] Collected:  01/31/17 2132    Order Status:  Completed Specimen:  Blood Updated:  02/05/17 2312     Blood Culture, Routine No growth after 5 days.    Blood culture [[id number]] Collected:  01/31/17 2133    Order Status:  Completed Specimen:  Blood Updated:  02/05/17 2312     Blood Culture, Routine No growth after 5 days.    Blood culture [[id number]]     Order Status:  Canceled Specimen:  Blood     AFB Culture & Smear [[id number]] Collected:  01/19/17 0538    Order Status:  Completed Specimen:  Blood from Blood Updated:  02/03/17 1156     AFB Culture & Smear Culture in progress    Urine culture [[id number]] Collected:  01/31/17 2255    Order Status:  Completed Specimen:  Urine from Urine, Clean Catch Updated:  02/02/17 0548     Urine Culture, Routine No growth        Imaging Results:    CT C/A/P 1/19/17:  1.  Focus of groundglass attenuation with bronchiectasis/developing air bronchograms within the right middle lobe, nonspecific, however could reflect developing infectious process or possibly sequela of aspiration, clinical correlation for pneumonia is advised.  2.  Anterior mediastinum mass, suggesting thymic hyperplasia, clinical correlation advised.  3.  Constipation.  4.  Several additional findings above.    CXR 1/17/17: Heart size normal.  Mild accentuation of interstitial markings.  No significant air space consolidation or pleural effusion    2D Echo 1/17/17:  CONCLUSIONS     1 - Low  normal left ventricular systolic function (EF 50-55%).     2 - Normal right ventricular systolic function .     3 - Normal left ventricular diastolic function.     FINAL PATHOLOGIC DIAGNOSIS (1/17/17):  BONE MARROW, RIGHT ILIAC CREST, ASPIRATE, CLOT, AND CORE BIOPSY:  --Markedly hypercellular marrow, essentially 100%, nearly completely replaced by acute myeloid leukemia, see  comment  --Rare background trilineage elements present  --No significant appreciable stainable iron  COMMENT: Concomitantly submitted flow cytometric analysis detects an increased population of blasts consistent  with acute myeloid leukemia, favor non-M3 subtype, although A CD34 positive/HLA-DR negative/equivocal  phenotype, and M3 APL subtype cannot be completely excluded. This increased population of myeloid blasts  shows expression of CD34, CD13, and CD33 (partial). This population also expresses CD 56 and CD7. It is  negative for  and other B and T lymphoid R crest tested including CD19 and cytoplasmic CD3, as well as  TdT. HLA-DR is equivocal.  The overall morphology of these blasts do not suggest acute promyelocytic leukemia. 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       Active Problems, Status & Treatment Plan Addressed Today:    # AML  - currently no evidence of DIC or tumor lysis. Daily TLS labs.   - noted to have new diagnosis of HIV (see below)   - s/p bone marrow biopsy (1/17/17) showing AML, cytogenetics and molecular markers done  - NPM1 and FLT3 negative  - will need transplant given high risk disease  - continue on empiric allopurinol  - 7+3 started  1/20/17, day 19 today  - rpt bone marrow biopsy 2/3, with circulating blasts in peripheral blood, path pending.  -MEC Day 2 today    # Tachycardia/Sepsis  - side effect of chemo?  - afebrile overnight, vanc and cefepime continued    - Blood Cx ngtd  - Patient is asymptomatic   - continue to monitor    #HIV positive  - new diagnosis on this admit  - toxoplasma gondii WNL  - CD4: 118  - ID following, appreciate recs  - Started Triumeq 2/2.  - ppx bactrim held given myelosuppressive effects, received Pentamidine 1/22.   -Plan to restart bactrim once counts recovered.     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   # Thrombocytopenia due to leukemia   - Hgb= 7.3 g/dL, PLT= pending today  - no overt signs of bleeding  - likely 2/2 underlying hematologic malignancy  - continue to monitor and transfuse as indicated for Hgb <7.0, PLT <10    # Constipation (resolved)  - likely 2/2 opiate use  - continue on miralax and senna-docusate  - resolved, continue to monitor     # Tobacco Abuse  - 26 pack year history  - advise smoking cessation   - nicotine patch prn    # H/O Alcohol Abuse  - drank at least 2 beers per day prior to admission  - no h/o DTs and now out of period for withdrawal    # Hyponatremia (resolved)  - likely 2/2 dehydration  - continue IVFs  - continue to monitor    #Hypomagnesemia (resolved)  -replete PRN    # Hypoalbuminemia  - checked 24 hour urine protein  - Dietary consulted  - encourage increased nutritional intake     DVT ppx: encourage ambulation  Diet: regular  Code Status: full    DISPO: pending chemotherapy for AML and repeat bone marrow       Harmeet Dao MD  [employer] [facility]  [facility]  02/08/2017, 6:55 AM

## 2017-02-08 NOTE — PLAN OF CARE
Problem: Patient Care Overview  Goal: Plan of Care Review  Outcome: Ongoing (interventions implemented as appropriate)  Updated pt to plan of care. Cytarabine infusion completed at 2030. Mitoxantrone infusion hung at 2330. Chemotherapy checked by 2 RNs. Consent verfied. Pt continued on vancomycin and cefepime. Afebrile.

## 2017-02-08 NOTE — PROGRESS NOTES
Patient Consulted by CTTS:     The following was discussed by the Tobacco Treatment Specialist:  ? Relevance of Quitting  ? Risk to Health  ? Long Term Risk  ? Risk for Others  ? Rewards of Quitting  ? Motivation Intervention to Quit        Information given to patient concerning the 1800quitnow line.

## 2017-02-09 LAB
ALBUMIN SERPL BCP-MCNC: 2.2 G/DL
ALP SERPL-CCNC: 152 U/L
ALT SERPL W/O P-5'-P-CCNC: 60 U/L
ANION GAP SERPL CALC-SCNC: 6 MMOL/L
ANISOCYTOSIS BLD QL SMEAR: SLIGHT
AST SERPL-CCNC: 45 U/L
BACTERIA BLD CULT: NORMAL
BACTERIA BLD CULT: NORMAL
BASOPHILS # BLD AUTO: ABNORMAL K/UL
BASOPHILS NFR BLD: 0 %
BILIRUB SERPL-MCNC: 0.1 MG/DL
BLASTS NFR BLD MANUAL: 94 %
BUN SERPL-MCNC: 20 MG/DL
CALCIUM SERPL-MCNC: 8.5 MG/DL
CHLORIDE SERPL-SCNC: 109 MMOL/L
CO2 SERPL-SCNC: 23 MMOL/L
CREAT SERPL-MCNC: 0.6 MG/DL
DIFFERENTIAL METHOD: ABNORMAL
EOSINOPHIL # BLD AUTO: ABNORMAL K/UL
EOSINOPHIL NFR BLD: 0 %
ERYTHROCYTE [DISTWIDTH] IN BLOOD BY AUTOMATED COUNT: 16.5 %
EST. GFR  (AFRICAN AMERICAN): >60 ML/MIN/1.73 M^2
EST. GFR  (NON AFRICAN AMERICAN): >60 ML/MIN/1.73 M^2
GLUCOSE SERPL-MCNC: 137 MG/DL
HCT VFR BLD AUTO: 24.7 %
HGB BLD-MCNC: 8.1 G/DL
HYPOCHROMIA BLD QL SMEAR: ABNORMAL
INR PPP: 1
LDH SERPL L TO P-CCNC: 549 U/L
LYMPHOCYTES # BLD AUTO: ABNORMAL K/UL
LYMPHOCYTES NFR BLD: 6 %
MAGNESIUM SERPL-MCNC: 1.9 MG/DL
MCH RBC QN AUTO: 30.6 PG
MCHC RBC AUTO-ENTMCNC: 32.8 %
MCV RBC AUTO: 93 FL
MONOCYTES # BLD AUTO: ABNORMAL K/UL
MONOCYTES NFR BLD: 0 %
NEUTROPHILS NFR BLD: 0 %
OVALOCYTES BLD QL SMEAR: ABNORMAL
PHOSPHATE SERPL-MCNC: 2.9 MG/DL
PLATELET # BLD AUTO: 20 K/UL
PLATELET BLD QL SMEAR: ABNORMAL
PMV BLD AUTO: ABNORMAL FL
POIKILOCYTOSIS BLD QL SMEAR: SLIGHT
POLYCHROMASIA BLD QL SMEAR: ABNORMAL
POTASSIUM SERPL-SCNC: 4.3 MMOL/L
PROT SERPL-MCNC: 6.6 G/DL
PROTHROMBIN TIME: 10.8 SEC
RBC # BLD AUTO: 2.65 M/UL
SODIUM SERPL-SCNC: 138 MMOL/L
WBC # BLD AUTO: 5.37 K/UL

## 2017-02-09 PROCEDURE — 86644 CMV ANTIBODY: CPT

## 2017-02-09 PROCEDURE — 84100 ASSAY OF PHOSPHORUS: CPT

## 2017-02-09 PROCEDURE — 99233 SBSQ HOSP IP/OBS HIGH 50: CPT | Mod: ,,, | Performed by: INTERNAL MEDICINE

## 2017-02-09 PROCEDURE — 20600001 HC STEP DOWN PRIVATE ROOM

## 2017-02-09 PROCEDURE — 63600175 PHARM REV CODE 636 W HCPCS: Performed by: INTERNAL MEDICINE

## 2017-02-09 PROCEDURE — 85027 COMPLETE CBC AUTOMATED: CPT

## 2017-02-09 PROCEDURE — 85610 PROTHROMBIN TIME: CPT

## 2017-02-09 PROCEDURE — 25000003 PHARM REV CODE 250: Performed by: NURSE PRACTITIONER

## 2017-02-09 PROCEDURE — 85007 BL SMEAR W/DIFF WBC COUNT: CPT

## 2017-02-09 PROCEDURE — 83615 LACTATE (LD) (LDH) ENZYME: CPT

## 2017-02-09 PROCEDURE — 83735 ASSAY OF MAGNESIUM: CPT

## 2017-02-09 PROCEDURE — 25000003 PHARM REV CODE 250: Performed by: INTERNAL MEDICINE

## 2017-02-09 PROCEDURE — 25000003 PHARM REV CODE 250: Performed by: STUDENT IN AN ORGANIZED HEALTH CARE EDUCATION/TRAINING PROGRAM

## 2017-02-09 PROCEDURE — 80053 COMPREHEN METABOLIC PANEL: CPT

## 2017-02-09 RX ADMIN — STANDARDIZED SENNA CONCENTRATE AND DOCUSATE SODIUM 1 TABLET: 8.6; 5 TABLET, FILM COATED ORAL at 09:02

## 2017-02-09 RX ADMIN — VORICONAZOLE 200 MG: 200 TABLET, FILM COATED ORAL at 09:02

## 2017-02-09 RX ADMIN — ACYCLOVIR 400 MG: 200 CAPSULE ORAL at 09:02

## 2017-02-09 RX ADMIN — DEXAMETHASONE 1 DROP: 1 SUSPENSION OPHTHALMIC at 05:02

## 2017-02-09 RX ADMIN — ETOPOSIDE 114 MG: 20 INJECTION, SOLUTION, CONCENTRATE INTRAVENOUS at 01:02

## 2017-02-09 RX ADMIN — MITOXANTRONE HYDROCHLORIDE 8 MG: 2 INJECTION, SOLUTION INTRAVENOUS at 11:02

## 2017-02-09 RX ADMIN — MAGNESIUM OXIDE TAB 400 MG (241.3 MG ELEMENTAL MG) 400 MG: 400 (241.3 MG) TAB at 09:02

## 2017-02-09 RX ADMIN — ONDANSETRON 16 MG: 8 TABLET, ORALLY DISINTEGRATING ORAL at 01:02

## 2017-02-09 RX ADMIN — Medication 10 ML: at 12:02

## 2017-02-09 RX ADMIN — VORICONAZOLE 200 MG: 200 TABLET, FILM COATED ORAL at 08:02

## 2017-02-09 RX ADMIN — CEFEPIME HYDROCHLORIDE 2 G: 2 INJECTION, SOLUTION INTRAVENOUS at 05:02

## 2017-02-09 RX ADMIN — SODIUM CHLORIDE: 0.9 INJECTION, SOLUTION INTRAVENOUS at 11:02

## 2017-02-09 RX ADMIN — Medication 10 ML: at 11:02

## 2017-02-09 RX ADMIN — DEXAMETHASONE 1 DROP: 1 SUSPENSION OPHTHALMIC at 12:02

## 2017-02-09 RX ADMIN — CYTARABINE 1420 MG: 100 INJECTION, SOLUTION INTRATHECAL; INTRAVENOUS; SUBCUTANEOUS at 03:02

## 2017-02-09 RX ADMIN — DOLUTEGRAVIR SODIUM 50 MG: 50 TABLET, FILM COATED ORAL at 08:02

## 2017-02-09 RX ADMIN — CEFEPIME HYDROCHLORIDE 2 G: 2 INJECTION, SOLUTION INTRAVENOUS at 09:02

## 2017-02-09 RX ADMIN — Medication 10 ML: at 06:02

## 2017-02-09 RX ADMIN — LAMIVUDINE 300 MG: 100 TABLET, FILM COATED ORAL at 08:02

## 2017-02-09 RX ADMIN — OXYCODONE HYDROCHLORIDE 5 MG: 5 TABLET ORAL at 05:02

## 2017-02-09 RX ADMIN — DEXAMETHASONE 1 DROP: 1 SUSPENSION OPHTHALMIC at 11:02

## 2017-02-09 RX ADMIN — STANDARDIZED SENNA CONCENTRATE AND DOCUSATE SODIUM 1 TABLET: 8.6; 5 TABLET, FILM COATED ORAL at 08:02

## 2017-02-09 RX ADMIN — ABACAVIR 600 MG: 300 TABLET, FILM COATED ORAL at 08:02

## 2017-02-09 RX ADMIN — CEFEPIME HYDROCHLORIDE 2 G: 2 INJECTION, SOLUTION INTRAVENOUS at 01:02

## 2017-02-09 RX ADMIN — OXYCODONE HYDROCHLORIDE 5 MG: 5 TABLET ORAL at 11:02

## 2017-02-09 RX ADMIN — ACYCLOVIR 400 MG: 200 CAPSULE ORAL at 08:02

## 2017-02-09 RX ADMIN — DEXAMETHASONE 12 MG: 4 TABLET ORAL at 01:02

## 2017-02-09 NOTE — PLAN OF CARE
MDR's with Dr Bose.  Patient is day 3 of MEC induction.  He is tolerating without complication and maintaining PO.  D/c pending completion of chemo, D 21 BMB and count recovery.  Will continue to follow.

## 2017-02-09 NOTE — PLAN OF CARE
Problem: Patient Care Overview  Goal: Plan of Care Review  Pt on day 2 of MEC regimen, mitaoxantrone received today for my shift. Consent verified. Positive blood return noted.

## 2017-02-09 NOTE — PROGRESS NOTES
Cytarabine started through WILSON PICC. Line noted for having positive blood return and flushed without difficulty. Chemotherapy dosage and BSA checked by two chemotherapy certified nurses and premedications administered prior to starting infusion. Chemotherapy education done prior to hanging of chemotherapy. Chemotherapeutic precautions in place throughout therapy. Will continue to monitor.

## 2017-02-09 NOTE — PLAN OF CARE
Problem: Patient Care Overview  Goal: Plan of Care Review  Outcome: Ongoing (interventions implemented as appropriate)  Pt has remained free from injury. Fall precautions maintained. IVF continued. Pt received one unit of RBC's on shift. Pt tolerated well. Chemo precautions maintained. Pt received etoposide and cytarabine on shift. Pain medication given as needed, VSS afebrile vanc dc/d pt continued on cefepime. POC reviewed with pt. Will continue to monitor

## 2017-02-09 NOTE — PROGRESS NOTES
Etoposide started through WILSON PICC. Line noted for having positive blood return and flushed without difficulty.  Chemotherapy dosage and BSA checked by two chemotherapy certified nurses and premedications administered prior to starting infusion.  Chemotherapy education done prior to hanging of chemotherapy.  Chemotherapeutic precautions in place throughout therapy.  Will continue to monitor.

## 2017-02-09 NOTE — PROGRESS NOTES
Progress Note  Hematology / Bone Marrow Transplant                                                              Team: [facility] [place] [facility]    Patient Name: Marck Howard  YOB: 1980    Admit Date: 1/17/2017                     LOS: 23    SUBJECTIVE:     Reason for Admission:  Acute leukemia  See H&P for detailed initial presentation history and ROS.      Interval history:  Patient resting comfortably this morning, no acute events overnight.     Review of System:  Constitutional: -ve fever, -ve chills, negative for anorexia, malaise, night sweats and weight loss  Eyes: no visual changes, negative for irritation and redness  ENT: no nasal congestion or sore throat, negative for epistaxis, sore mouth and sore throat  Respiratory: no cough or shortness of breath, negative for dyspnea on exertion and wheezing  Cardiovascular: no chest pain or palpitations, negative for dyspnea, fatigue and palpitations  Gastrointestinal: no nausea or vomiting, no abdominal pain or change in bowel habits, negative for constipation, diarrhea and melena  Hematologic/Lymphatic: no easy bruising or lymphadenopathy, negative for bleeding  Musculoskeletal: no arthralgias or myalgias  Neurological: no seizures or tremors, negative for headaches and weakness  Behavioral/Psych: negative for anxiety and depression    OBJECTIVE:     Vital Signs Range (Last 24H):  Temp:  [97.4 °F (36.3 °C)-97.9 °F (36.6 °C)]   Pulse:  []   Resp:  [12-20]   BP: ()/(57-65)   SpO2:  [95 %-98 %] Body mass index is 17.35 kg/(m^2).    I & O (Last 24H):    Intake/Output Summary (Last 24 hours) at 02/09/17 0732  Last data filed at 02/09/17 0509   Gross per 24 hour   Intake          2644.16 ml   Output             2750 ml   Net          -105.84 ml       Physical Exam:  General: well developed, cachectic, no distress  HENT: Head:normocephalic, atraumatic. Ears:right ear normal, left ear normal. Nose: Nares normal. Septum midline. Mucosa  normal. No drainage or sinus tenderness., no discharge. Throat: lips, mucosa, and tongue normal; teeth and gums normal and no throat erythema.  Eyes: conjunctivae/corneas clear. PERRL.   Neck: supple, symmetrical, trachea midline  Lungs:  clear to auscultation bilaterally and normal respiratory effort  Cardiovascular: Heart: tachycardic but otherwise regular rhythm, S1, S2 normal, no murmur, click, rub or gallop. Chest Wall: no tenderness. Extremities: no cyanosis or edema, or clubbing.   Abdomen: soft, non-tender non-distented; bowel sounds normal.   Skin: Skin color, texture, turgor normal. No rashes or lesions  Musculoskeletal:no clubbing, cyanosis  Neurologic: Mental status: Alert, oriented, thought content appropriate  Psych/Behavioral:  Normal.    Diagnostic Results:  Lab Results   Component Value Date    WBC 5.37 02/09/2017    HGB 8.1 (L) 02/09/2017    HCT 24.7 (L) 02/09/2017    MCV 93 02/09/2017    PLT 29 (LL) 02/08/2017       Recent Labs  Lab 02/09/17  0515   *      K 4.3      CO2 23   BUN 20   CREATININE 0.6   CALCIUM 8.5*   MG 1.9     Lab Results   Component Value Date    INR 1.0 02/09/2017    INR 1.0 02/08/2017    INR 1.1 02/07/2017     No results found for: HGBA1C  Microbiology Results (last 7 days)     Procedure Component Value Units Date/Time    Blood culture [[id number]] Collected:  02/04/17 0008    Order Status:  Completed Specimen:  Blood Updated:  02/09/17 0612     Blood Culture, Routine No growth after 5 days.    Blood culture [[id number]] Collected:  02/03/17 2330    Order Status:  Completed Specimen:  Blood from Line, PICC Right Brachial Updated:  02/09/17 0612     Blood Culture, Routine No growth after 5 days.    Blood culture [[id number]] Collected:  02/07/17 0825    Order Status:  Completed Specimen:  Blood from Line, PICC Right Brachial Updated:  02/08/17 1212     Blood Culture, Routine No Growth to date     Blood Culture, Routine No Growth to date    Blood culture  [[id number]] Collected:  02/07/17 0943    Order Status:  Completed Specimen:  Blood Updated:  02/08/17 1212     Blood Culture, Routine No Growth to date     Blood Culture, Routine No Growth to date    Blood culture [[id number]]     Order Status:  Canceled Specimen:  Blood     Blood culture [[id number]] Collected:  01/31/17 2132    Order Status:  Completed Specimen:  Blood Updated:  02/05/17 2312     Blood Culture, Routine No growth after 5 days.    Blood culture [[id number]] Collected:  01/31/17 2133    Order Status:  Completed Specimen:  Blood Updated:  02/05/17 2312     Blood Culture, Routine No growth after 5 days.    Blood culture [[id number]]     Order Status:  Canceled Specimen:  Blood     AFB Culture & Smear [[id number]] Collected:  01/19/17 0538    Order Status:  Completed Specimen:  Blood from Blood Updated:  02/03/17 1156     AFB Culture & Smear Culture in progress        Imaging Results:    CT C/A/P 1/19/17:  1.  Focus of groundglass attenuation with bronchiectasis/developing air bronchograms within the right middle lobe, nonspecific, however could reflect developing infectious process or possibly sequela of aspiration, clinical correlation for pneumonia is advised.  2.  Anterior mediastinum mass, suggesting thymic hyperplasia, clinical correlation advised.  3.  Constipation.  4.  Several additional findings above.    CXR 1/17/17: Heart size normal.  Mild accentuation of interstitial markings.  No significant air space consolidation or pleural effusion    2D Echo 1/17/17:  CONCLUSIONS     1 - Low normal left ventricular systolic function (EF 50-55%).     2 - Normal right ventricular systolic function .     3 - Normal left ventricular diastolic function.     FINAL PATHOLOGIC DIAGNOSIS (1/17/17):  BONE MARROW, RIGHT ILIAC CREST, ASPIRATE, CLOT, AND CORE BIOPSY:  --Markedly hypercellular marrow, essentially 100%, nearly completely replaced by acute myeloid leukemia, see  comment  --Rare background trilineage  elements present  --No significant appreciable stainable iron  COMMENT: Concomitantly submitted flow cytometric analysis detects an increased population of blasts consistent  with acute myeloid leukemia, favor non-M3 subtype, although A CD34 positive/HLA-DR negative/equivocal  phenotype, and M3 APL subtype cannot be completely excluded. This increased population of myeloid blasts  shows expression of CD34, CD13, and CD33 (partial). This population also expresses CD 56 and CD7. It is  negative for  and other B and T lymphoid R crest tested including CD19 and cytoplasmic CD3, as well as  TdT. HLA-DR is equivocal.  The overall morphology of these blasts do not suggest acute promyelocytic leukemia. 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       Active Problems, Status & Treatment Plan Addressed Today:    # AML  - currently no evidence of DIC or tumor lysis. Daily TLS labs.   - noted to have new diagnosis of HIV (see below)   - s/p bone marrow biopsy (1/17/17) showing AML, cytogenetics and molecular markers done  - NPM1 and FLT3 negative  - will need transplant given high risk disease  - continue on empiric allopurinol  - 7+3 started 1/20/17  - rpt bone marrow biopsy 2/3, with circulating blasts in peripheral blood, path pending.  -Continue MEC reintroduction today    # Tachycardia/Sepsis  - side effect of chemo?  - Fever overnight, will restart vanc and cefepime   - Reorder Blood Cx  - Patient is asymptomatic   - continue to monitor    #HIV positive  - new diagnosis on this admit  - toxoplasma gondii WNL  - CD4: 118  - ID following, appreciate  recs  - Started Triumeq 2/2.  - ppx bactrim held given myelosuppressive effects, received Pentamidine 1/22.   -Plan to restart bactrim once counts recovered.     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   # Thrombocytopenia due to leukemia   - Hgb= 8.1 g/dL, PLT= pending today  - no overt signs of bleeding  - likely 2/2 underlying hematologic malignancy  - continue to monitor and transfuse as indicated for Hgb <7.0, PLT <10    # Constipation (resolved)  - likely 2/2 opiate use  - continue on miralax and senna-docusate  - resolved, continue to monitor     # Tobacco Abuse  - 26 pack year history  - advise smoking cessation   - nicotine patch prn    # H/O Alcohol Abuse  - drank at least 2 beers per day prior to admission  - no h/o DTs and now out of period for withdrawal    # Hyponatremia (resolved)  - likely 2/2 dehydration  - continue IVFs  - continue to monitor    #Hypomagnesemia (resolved)  -replete PRN    # Hypoalbuminemia  - checked 24 hour urine protein  - Dietary consulted  - encourage increased nutritional intake     DVT ppx: encourage ambulation  Diet: regular  Code Status: full    DISPO: pending chemotherapy for AML and repeat bone marrow       Alexis Alonzo MD PGY3  Internal Medicine  [phone]

## 2017-02-09 NOTE — PLAN OF CARE
Problem: Patient Care Overview  Goal: Plan of Care Review  Outcome: Ongoing (interventions implemented as appropriate)  Pt has remained free from injury. Fall precautions maintained. IVF continued.  Chemo precautions maintained. Pt received etoposide and cytarabine on shift. Pain medication given as needed, VSS afebrile vanc dc/d pt continued on cefepime. POC reviewed with pt. Will continue to monitor

## 2017-02-10 LAB
ALBUMIN SERPL BCP-MCNC: 2.2 G/DL
ALP SERPL-CCNC: 136 U/L
ALT SERPL W/O P-5'-P-CCNC: 73 U/L
ANION GAP SERPL CALC-SCNC: 8 MMOL/L
ANISOCYTOSIS BLD QL SMEAR: SLIGHT
AST SERPL-CCNC: 32 U/L
BASOPHILS # BLD AUTO: ABNORMAL K/UL
BASOPHILS NFR BLD: 0 %
BILIRUB SERPL-MCNC: 0.1 MG/DL
BLASTS NFR BLD MANUAL: 87 %
BUN SERPL-MCNC: 18 MG/DL
CALCIUM SERPL-MCNC: 8.5 MG/DL
CHLORIDE SERPL-SCNC: 109 MMOL/L
CO2 SERPL-SCNC: 23 MMOL/L
CREAT SERPL-MCNC: 0.6 MG/DL
DIFFERENTIAL METHOD: ABNORMAL
EOSINOPHIL # BLD AUTO: ABNORMAL K/UL
EOSINOPHIL NFR BLD: 0 %
ERYTHROCYTE [DISTWIDTH] IN BLOOD BY AUTOMATED COUNT: 16.4 %
EST. GFR  (AFRICAN AMERICAN): >60 ML/MIN/1.73 M^2
EST. GFR  (NON AFRICAN AMERICAN): >60 ML/MIN/1.73 M^2
GLUCOSE SERPL-MCNC: 130 MG/DL
HCT VFR BLD AUTO: 24.2 %
HGB BLD-MCNC: 7.9 G/DL
INR PPP: 1
LDH SERPL L TO P-CCNC: 664 U/L
LYMPHOCYTES # BLD AUTO: ABNORMAL K/UL
LYMPHOCYTES NFR BLD: 13 %
MAGNESIUM SERPL-MCNC: 2 MG/DL
MCH RBC QN AUTO: 30.4 PG
MCHC RBC AUTO-ENTMCNC: 32.6 %
MCV RBC AUTO: 93 FL
MONOCYTES # BLD AUTO: ABNORMAL K/UL
MONOCYTES NFR BLD: 0 %
NEUTROPHILS NFR BLD: 0 %
OVALOCYTES BLD QL SMEAR: ABNORMAL
PHOSPHATE SERPL-MCNC: 3.3 MG/DL
PLATELET # BLD AUTO: 21 K/UL
PLATELET BLD QL SMEAR: ABNORMAL
PMV BLD AUTO: ABNORMAL FL
POIKILOCYTOSIS BLD QL SMEAR: SLIGHT
POTASSIUM SERPL-SCNC: 4.1 MMOL/L
PROT SERPL-MCNC: 6.5 G/DL
PROTHROMBIN TIME: 10.7 SEC
RBC # BLD AUTO: 2.6 M/UL
SMUDGE CELLS BLD QL SMEAR: PRESENT
SODIUM SERPL-SCNC: 140 MMOL/L
WBC # BLD AUTO: 3.6 K/UL

## 2017-02-10 PROCEDURE — 25000003 PHARM REV CODE 250: Performed by: INTERNAL MEDICINE

## 2017-02-10 PROCEDURE — 85027 COMPLETE CBC AUTOMATED: CPT

## 2017-02-10 PROCEDURE — 63600175 PHARM REV CODE 636 W HCPCS: Performed by: INTERNAL MEDICINE

## 2017-02-10 PROCEDURE — 84100 ASSAY OF PHOSPHORUS: CPT

## 2017-02-10 PROCEDURE — 25000003 PHARM REV CODE 250: Performed by: STUDENT IN AN ORGANIZED HEALTH CARE EDUCATION/TRAINING PROGRAM

## 2017-02-10 PROCEDURE — 85007 BL SMEAR W/DIFF WBC COUNT: CPT

## 2017-02-10 PROCEDURE — 99233 SBSQ HOSP IP/OBS HIGH 50: CPT | Mod: ,,, | Performed by: INTERNAL MEDICINE

## 2017-02-10 PROCEDURE — 25000003 PHARM REV CODE 250: Performed by: NURSE PRACTITIONER

## 2017-02-10 PROCEDURE — 80053 COMPREHEN METABOLIC PANEL: CPT

## 2017-02-10 PROCEDURE — 83735 ASSAY OF MAGNESIUM: CPT

## 2017-02-10 PROCEDURE — 85610 PROTHROMBIN TIME: CPT

## 2017-02-10 PROCEDURE — 20600001 HC STEP DOWN PRIVATE ROOM

## 2017-02-10 PROCEDURE — 83615 LACTATE (LD) (LDH) ENZYME: CPT

## 2017-02-10 RX ORDER — CIPROFLOXACIN 500 MG/1
500 TABLET ORAL EVERY 12 HOURS
Status: DISCONTINUED | OUTPATIENT
Start: 2017-02-10 | End: 2017-02-15

## 2017-02-10 RX ADMIN — VORICONAZOLE 200 MG: 200 TABLET, FILM COATED ORAL at 08:02

## 2017-02-10 RX ADMIN — ONDANSETRON 16 MG: 8 TABLET, ORALLY DISINTEGRATING ORAL at 01:02

## 2017-02-10 RX ADMIN — ACYCLOVIR 400 MG: 200 CAPSULE ORAL at 08:02

## 2017-02-10 RX ADMIN — OXYCODONE HYDROCHLORIDE 5 MG: 5 TABLET ORAL at 04:02

## 2017-02-10 RX ADMIN — Medication 10 ML: at 11:02

## 2017-02-10 RX ADMIN — ETOPOSIDE 114 MG: 20 INJECTION, SOLUTION, CONCENTRATE INTRAVENOUS at 02:02

## 2017-02-10 RX ADMIN — MAGNESIUM OXIDE TAB 400 MG (241.3 MG ELEMENTAL MG) 400 MG: 400 (241.3 MG) TAB at 09:02

## 2017-02-10 RX ADMIN — ABACAVIR 600 MG: 300 TABLET, FILM COATED ORAL at 08:02

## 2017-02-10 RX ADMIN — CIPROFLOXACIN HYDROCHLORIDE 500 MG: 500 TABLET, FILM COATED ORAL at 09:02

## 2017-02-10 RX ADMIN — Medication 10 ML: at 06:02

## 2017-02-10 RX ADMIN — STANDARDIZED SENNA CONCENTRATE AND DOCUSATE SODIUM 1 TABLET: 8.6; 5 TABLET, FILM COATED ORAL at 09:02

## 2017-02-10 RX ADMIN — CYTARABINE 1420 MG: 100 INJECTION, SOLUTION INTRATHECAL; INTRAVENOUS; SUBCUTANEOUS at 03:02

## 2017-02-10 RX ADMIN — STANDARDIZED SENNA CONCENTRATE AND DOCUSATE SODIUM 1 TABLET: 8.6; 5 TABLET, FILM COATED ORAL at 08:02

## 2017-02-10 RX ADMIN — DEXAMETHASONE 1 DROP: 1 SUSPENSION OPHTHALMIC at 11:02

## 2017-02-10 RX ADMIN — CIPROFLOXACIN HYDROCHLORIDE 500 MG: 500 TABLET, FILM COATED ORAL at 08:02

## 2017-02-10 RX ADMIN — ACYCLOVIR 400 MG: 200 CAPSULE ORAL at 09:02

## 2017-02-10 RX ADMIN — DEXAMETHASONE 12 MG: 4 TABLET ORAL at 01:02

## 2017-02-10 RX ADMIN — LAMIVUDINE 300 MG: 100 TABLET, FILM COATED ORAL at 08:02

## 2017-02-10 RX ADMIN — MITOXANTRONE HYDROCHLORIDE 8 MG: 2 INJECTION, SOLUTION INTRAVENOUS at 11:02

## 2017-02-10 RX ADMIN — CEFEPIME HYDROCHLORIDE 2 G: 2 INJECTION, SOLUTION INTRAVENOUS at 01:02

## 2017-02-10 RX ADMIN — DOLUTEGRAVIR SODIUM 50 MG: 50 TABLET, FILM COATED ORAL at 08:02

## 2017-02-10 RX ADMIN — VORICONAZOLE 200 MG: 200 TABLET, FILM COATED ORAL at 09:02

## 2017-02-10 RX ADMIN — SODIUM CHLORIDE: 0.9 INJECTION, SOLUTION INTRAVENOUS at 09:02

## 2017-02-10 RX ADMIN — DEXAMETHASONE 1 DROP: 1 SUSPENSION OPHTHALMIC at 06:02

## 2017-02-10 NOTE — PROGRESS NOTES
[facility]  Adult Nutrition  Progress Note    SUMMARY     Recommendations    1. Continue on high calorie/high protein diet   2. Ordered Boost Plus TID - in vanilla   3. Encourage optimal PO intake of >% to meet EEN/EPN   4. RD to monitor and follow up    Goals: Pt will meet >85% EEN with po intake + ONS  Nutrition Goal Status: progressing towards goal  Communication of RD Recs: reviewed with RN    Continuum of Care Plan    Referral to Outpatient Services: other (see comments) (Nutrition D/C Planning: high tiago/protein + ONS)    Reason for Assessment    Reason for Assessment: RD follow-up  Diagnosis: cancer diagnosis/related complications (acute leukemia) +HIV  Relevent Medical History: No PMH   Interdisciplinary Rounds: attended     General Information Comments: Pt continues with good appetite and PO intake; continues to gain weight; looking forward to release; understands high protein/high energy diet at home    Nutrition Prescription Ordered    Current Diet Order: Regular + Double portions  Nutrition Order Comments: 100% intake  Oral Nutrition Supplement: Boost Plus     Evaluation of Received Nutrients/Fluid Intake     Oral Fluid (mL): 900     IV Fluid (mL): 1200     Nutrition Risk Screen     Nutrition Risk Screen: no indicators present    Nutrition/Diet History    Patient Reported Diet/Restrictions/Preferences: general  Typical Food/Fluid Intake: %  Food Preferences:  (no cultural or Mormon needs identified)     Factors Affecting Nutritional Intake:  (-)       Labs/Tests/Procedures/Meds  Pertinent Labs Reviewed: reviewed, pertinent  Pertinent Labs Comments: glc 130, Na 140, K 4.1, BUN 18, Cr 0.6, H&H 7.9/24.2, plt 21  Pertinent Medications Reviewed: reviewed  Pertinent Medications Comments: cipro, acyclovir, senna dosucate, heparin, cytarabine, ondasetron    Physical Findings    Overall Physical Appearance: underweight  Tubes:  (-)  Oral/Mouth Cavity: other (see comments)  (dry/chapped lips)  Skin: intact    Anthropometrics    Height (inches): 63.9 in  Weight Method: Standard Scale  Weight (kg): 45.8 kg  Ideal Body Weight (IBW), Male: 129.4 lb     % Ideal Body Weight, Male (lb): 78.03 lb     BMI (kg/m2): 17.39  BMI Grade: 17 - 18.4 protein-energy malnutrition grade I        % Weight Change: 15.4 kg (27% x2-3 months)  Weight Loss: unintentional    Estimated/Assessed Needs    Weight Used For Calorie Calculations: 44.6 kg (98 lb 5.2 oz)   Height (cm): 162.3 cm     Energy Need Method: Kcal/kg (8335-7201 kcal (45-50 kcal/kg))    RMR ([place]-St. Jeor Equation): 1289.96        Weight Used For Protein Calculations: 44.6 kg (98 lb 5.2 oz)  Protein Requirements: 67-80 g  Fluid Need Method: RDA Method (or per MD)     Monitor and Evaluation    Food and Nutrient Intake: food and beverage intake, energy intake  Food and Nutrient Adminstration: diet order     Physical Activity and Function: nutrition-related ADLs and IADLs  Anthropometric Measurements: weight, weight change, body mass index  Biochemical Data, Medical Tests and Procedures: electrolyte and renal panel, glucose/endocrine profile, gastrointestinal profile, inflammatory profile, lipid profile  Nutrition-Focused Physical Findings: overall appearance    Nutrition Risk    Level of Risk: moderate    Nutrition Follow-Up    RD Follow-up?: Yes    Assessment and Plan    Increased nutrient needs related to cancer and HIV Dx & s/p BMB as evidenced by increased EEN and EPN    I Chela Lee, MS,RD,LDN certify that I directed the dietetic intern in service delivery and guided them using my skilled judgment. As the cosigning dietitian, I have reviewed the dietetic interns documentation and am responsible for the treatment, assessment, and plan.    Phoebe Denny, Dietetic Intern

## 2017-02-10 NOTE — PROGRESS NOTES
Progress Note  Hematology / Bone Marrow Transplant                                                              Team: [facility]    Patient Name: Marck Howard  YOB: 1980    Admit Date: 1/17/2017                     LOS: 24    SUBJECTIVE:     Reason for Admission:  Acute leukemia  See H&P for detailed initial presentation history and ROS.      Interval history:  Patient resting comfortably this morning, no acute events overnight.     Review of System:  Constitutional: -ve fever, -ve chills, negative for anorexia, malaise, night sweats and weight loss  Eyes: no visual changes, negative for irritation and redness  ENT: no nasal congestion or sore throat, negative for epistaxis, sore mouth and sore throat  Respiratory: no cough or shortness of breath, negative for dyspnea on exertion and wheezing  Cardiovascular: no chest pain or palpitations, negative for dyspnea, fatigue and palpitations  Gastrointestinal: no nausea or vomiting, no abdominal pain or change in bowel habits, negative for constipation, diarrhea and melena  Hematologic/Lymphatic: no easy bruising or lymphadenopathy, negative for bleeding  Musculoskeletal: no arthralgias or myalgias  Neurological: no seizures or tremors, negative for headaches and weakness  Behavioral/Psych: negative for anxiety and depression    OBJECTIVE:     Vital Signs Range (Last 24H):  Temp:  [97.8 °F (36.6 °C)-98.2 °F (36.8 °C)]   Pulse:  []   Resp:  [16-18]   BP: ()/(52-67)   SpO2:  [96 %-100 %] Body mass index is 17.39 kg/(m^2).    I & O (Last 24H):    Intake/Output Summary (Last 24 hours) at 02/10/17 0840  Last data filed at 02/10/17 0608   Gross per 24 hour   Intake          3759.17 ml   Output             5450 ml   Net         -1690.83 ml       Physical Exam:  General: well developed, cachectic, no distress  HENT: Head:normocephalic, atraumatic. Ears:right ear normal, left ear normal. Nose: Nares normal. Septum midline. Mucosa  normal. No drainage or sinus tenderness., no discharge. Throat: lips, mucosa, and tongue normal; teeth and gums normal and no throat erythema.  Eyes: conjunctivae/corneas clear. PERRL.   Neck: supple, symmetrical, trachea midline  Lungs:  clear to auscultation bilaterally and normal respiratory effort  Cardiovascular: Heart: tachycardic but otherwise regular rhythm, S1, S2 normal, no murmur, click, rub or gallop. Chest Wall: no tenderness. Extremities: no cyanosis or edema, or clubbing.   Abdomen: soft, non-tender non-distented; bowel sounds normal.   Skin: Skin color, texture, turgor normal. No rashes or lesions  Musculoskeletal:no clubbing, cyanosis  Neurologic: Mental status: Alert, oriented, thought content appropriate  Psych/Behavioral:  Normal.    Diagnostic Results:  Lab Results   Component Value Date    WBC 3.60 (L) 02/10/2017    HGB 7.9 (L) 02/10/2017    HCT 24.2 (L) 02/10/2017    MCV 93 02/10/2017    PLT 21 (LL) 02/10/2017       Recent Labs  Lab 02/10/17  0400   *      K 4.1      CO2 23   BUN 18   CREATININE 0.6   CALCIUM 8.5*   MG 2.0     Lab Results   Component Value Date    INR 1.0 02/10/2017    INR 1.0 02/09/2017    INR 1.0 02/08/2017     No results found for: HGBA1C  Microbiology Results (last 7 days)     Procedure Component Value Units Date/Time    Blood culture [[id number]] Collected:  02/07/17 0825    Order Status:  Completed Specimen:  Blood from Line, PICC Right Brachial Updated:  02/09/17 1212     Blood Culture, Routine No Growth to date     Blood Culture, Routine No Growth to date     Blood Culture, Routine No Growth to date    Blood culture [[id number]] Collected:  02/07/17 0943    Order Status:  Completed Specimen:  Blood Updated:  02/09/17 1212     Blood Culture, Routine No Growth to date     Blood Culture, Routine No Growth to date     Blood Culture, Routine No Growth to date    Blood culture [[id number]] Collected:  02/04/17 0008    Order Status:  Completed Specimen:  Blood  Updated:  02/09/17 0612     Blood Culture, Routine No growth after 5 days.    Blood culture [[id number]] Collected:  02/03/17 2330    Order Status:  Completed Specimen:  Blood from Line, PICC Right Brachial Updated:  02/09/17 0612     Blood Culture, Routine No growth after 5 days.    Blood culture [[id number]]     Order Status:  Canceled Specimen:  Blood     Blood culture [[id number]] Collected:  01/31/17 2132    Order Status:  Completed Specimen:  Blood Updated:  02/05/17 2312     Blood Culture, Routine No growth after 5 days.    Blood culture [[id number]] Collected:  01/31/17 2133    Order Status:  Completed Specimen:  Blood Updated:  02/05/17 2312     Blood Culture, Routine No growth after 5 days.    Blood culture [[id number]]     Order Status:  Canceled Specimen:  Blood     AFB Culture & Smear [[id number]] Collected:  01/19/17 0538    Order Status:  Completed Specimen:  Blood from Blood Updated:  02/03/17 1156     AFB Culture & Smear Culture in progress        Imaging Results:    CT C/A/P 1/19/17:  1.  Focus of groundglass attenuation with bronchiectasis/developing air bronchograms within the right middle lobe, nonspecific, however could reflect developing infectious process or possibly sequela of aspiration, clinical correlation for pneumonia is advised.  2.  Anterior mediastinum mass, suggesting thymic hyperplasia, clinical correlation advised.  3.  Constipation.  4.  Several additional findings above.    CXR 1/17/17: Heart size normal.  Mild accentuation of interstitial markings.  No significant air space consolidation or pleural effusion    2D Echo 1/17/17:  CONCLUSIONS     1 - Low normal left ventricular systolic function (EF 50-55%).     2 - Normal right ventricular systolic function .     3 - Normal left ventricular diastolic function.     FINAL PATHOLOGIC DIAGNOSIS (1/17/17):  BONE MARROW, RIGHT ILIAC CREST, ASPIRATE, CLOT, AND CORE BIOPSY:  --Markedly hypercellular marrow, essentially 100%, nearly completely  replaced by acute myeloid leukemia, see  comment  --Rare background trilineage elements present  --No significant appreciable stainable iron  COMMENT: Concomitantly submitted flow cytometric analysis detects an increased population of blasts consistent  with acute myeloid leukemia, favor non-M3 subtype, although A CD34 positive/HLA-DR negative/equivocal  phenotype, and M3 APL subtype cannot be completely excluded. This increased population of myeloid blasts  shows expression of CD34, CD13, and CD33 (partial). This population also expresses CD 56 and CD7. It is  negative for  and other B and T lymphoid R crest tested including CD19 and cytoplasmic CD3, as well as  TdT. HLA-DR is equivocal.  The overall morphology of these blasts do not suggest acute promyelocytic leukemia. 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       Active Problems, Status & Treatment Plan Addressed Today:    # AML  - currently no evidence of DIC or tumor lysis. Daily TLS labs.   - noted to have new diagnosis of HIV (see below)   - s/p bone marrow biopsy (1/17/17) showing AML, cytogenetics and molecular markers done  - NPM1 and FLT3 negative  - will need transplant given high risk disease  - continue on empiric allopurinol  - 7+3 started 1/20/17  - rpt bone marrow biopsy 2/3, with circulating blasts in peripheral blood, 70% blasts, 100% cellularity, CD4, CD 13, CD34  -Continue MEC reintroduction today    # Tachycardia/Sepsis  - side effect of chemo?  -No fever last 48 hours  - Blood Cx neg  - Patient is asymptomatic   - continue to monitor     #HIV positive  - new  diagnosis on this admit  - toxoplasma gondii WNL  - CD4: 118  - ID following, appreciate recs  - Started Triumeq 2/2.  - ppx bactrim held given myelosuppressive effects, received Pentamidine 1/22.   -Plan to restart bactrim once counts recovered.     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   # Thrombocytopenia due to leukemia   - Hgb= 7.9 g/dL, PLT= 21K  - no overt signs of bleeding  - likely 2/2 underlying hematologic malignancy  - continue to monitor and transfuse as indicated for Hgb <7.0, PLT <10    # Constipation (resolved)  - likely 2/2 opiate use  - continue on miralax and senna-docusate  - resolved, continue to monitor     # Tobacco Abuse  - 26 pack year history  - advise smoking cessation   - nicotine patch prn    # H/O Alcohol Abuse  - drank at least 2 beers per day prior to admission  - no h/o DTs and now out of period for withdrawal    # Hyponatremia (resolved)  - likely 2/2 dehydration  - continue IVFs  - continue to monitor    #Hypomagnesemia (resolved)  -replete PRN    # Hypoalbuminemia  - checked 24 hour urine protein  - Dietary consulted  - encourage increased nutritional intake     DVT ppx: encourage ambulation  Diet: regular  Code Status: full    DISPO: pending chemotherapy for AML and repeat bone marrow       Alexis Alonzo MD PGY3  Internal Medicine  [phone]

## 2017-02-10 NOTE — PLAN OF CARE
Problem: Patient Care Overview  Goal: Plan of Care Review  Remained safe. Rested in bed most of shift. Neutropenic and thrombocytopenic precautions maintained. No complaints of pain by time of this documentation. Afebrile. Vitals stable. Appetite good. No complaints of nausea. Cefepime d/c'd and PO cipro ordered and administered. No PRN electrolyte replacements required this shift. No sign of pressure-related skin breakdown noted. Able to reposition self in bed. Fall precautions maintained and callbell and personal items kept within reach. Ambulated in room with steady gait. No apparent distress.

## 2017-02-10 NOTE — PROGRESS NOTES
Chemo note: mitotraxene 8mg in 50cc normal saline up and infusing to right arm picc. Prior to beginning infusion line was checked for patency and excellent blood return noted. Chemo consent on chart. Chemo precautions maintained. All lines taped and secured. Checked with Thea rolon RN OC. This is 15 minute infusion at 200cc/hr.

## 2017-02-10 NOTE — PLAN OF CARE
Problem: Patient Care Overview  Goal: Individualization & Mutuality  Outcome: Ongoing (interventions implemented as appropriate)  1. Cluster care for more rest

## 2017-02-11 LAB
ABO + RH BLD: NORMAL
ALBUMIN SERPL BCP-MCNC: 2.3 G/DL
ALP SERPL-CCNC: 130 U/L
ALT SERPL W/O P-5'-P-CCNC: 73 U/L
ANION GAP SERPL CALC-SCNC: 8 MMOL/L
ANISOCYTOSIS BLD QL SMEAR: SLIGHT
AST SERPL-CCNC: 23 U/L
BASOPHILS # BLD AUTO: ABNORMAL K/UL
BASOPHILS NFR BLD: 0 %
BILIRUB SERPL-MCNC: 0.2 MG/DL
BLASTS NFR BLD MANUAL: 60.5 %
BLD GP AB SCN CELLS X3 SERPL QL: NORMAL
BUN SERPL-MCNC: 20 MG/DL
CALCIUM SERPL-MCNC: 8.6 MG/DL
CHLORIDE SERPL-SCNC: 105 MMOL/L
CO2 SERPL-SCNC: 25 MMOL/L
CREAT SERPL-MCNC: 0.6 MG/DL
DACRYOCYTES BLD QL SMEAR: ABNORMAL
DIFFERENTIAL METHOD: ABNORMAL
EOSINOPHIL # BLD AUTO: ABNORMAL K/UL
EOSINOPHIL NFR BLD: 0 %
ERYTHROCYTE [DISTWIDTH] IN BLOOD BY AUTOMATED COUNT: 16.1 %
EST. GFR  (AFRICAN AMERICAN): >60 ML/MIN/1.73 M^2
EST. GFR  (NON AFRICAN AMERICAN): >60 ML/MIN/1.73 M^2
GLUCOSE SERPL-MCNC: 115 MG/DL
HCT VFR BLD AUTO: 23.4 %
HGB BLD-MCNC: 7.8 G/DL
HYPOCHROMIA BLD QL SMEAR: ABNORMAL
INR PPP: 1
LDH SERPL L TO P-CCNC: 542 U/L
LYMPHOCYTES # BLD AUTO: ABNORMAL K/UL
LYMPHOCYTES NFR BLD: 34.2 %
MAGNESIUM SERPL-MCNC: 1.8 MG/DL
MCH RBC QN AUTO: 30.6 PG
MCHC RBC AUTO-ENTMCNC: 33.3 %
MCV RBC AUTO: 92 FL
MONOCYTES # BLD AUTO: ABNORMAL K/UL
MONOCYTES NFR BLD: 5.3 %
NEUTROPHILS NFR BLD: 0 %
PHOSPHATE SERPL-MCNC: 3.7 MG/DL
PLATELET # BLD AUTO: 15 K/UL
PLATELET BLD QL SMEAR: ABNORMAL
PMV BLD AUTO: ABNORMAL FL
POIKILOCYTOSIS BLD QL SMEAR: SLIGHT
POTASSIUM SERPL-SCNC: 4.2 MMOL/L
PROT SERPL-MCNC: 6.6 G/DL
PROTHROMBIN TIME: 10.6 SEC
RBC # BLD AUTO: 2.55 M/UL
SODIUM SERPL-SCNC: 138 MMOL/L
WBC # BLD AUTO: 1.35 K/UL

## 2017-02-11 PROCEDURE — 25000003 PHARM REV CODE 250: Performed by: INTERNAL MEDICINE

## 2017-02-11 PROCEDURE — 84100 ASSAY OF PHOSPHORUS: CPT

## 2017-02-11 PROCEDURE — 85007 BL SMEAR W/DIFF WBC COUNT: CPT

## 2017-02-11 PROCEDURE — 63600175 PHARM REV CODE 636 W HCPCS: Performed by: INTERNAL MEDICINE

## 2017-02-11 PROCEDURE — 86900 BLOOD TYPING SEROLOGIC ABO: CPT

## 2017-02-11 PROCEDURE — 86901 BLOOD TYPING SEROLOGIC RH(D): CPT

## 2017-02-11 PROCEDURE — 83735 ASSAY OF MAGNESIUM: CPT

## 2017-02-11 PROCEDURE — 85027 COMPLETE CBC AUTOMATED: CPT

## 2017-02-11 PROCEDURE — 80053 COMPREHEN METABOLIC PANEL: CPT

## 2017-02-11 PROCEDURE — 83615 LACTATE (LD) (LDH) ENZYME: CPT

## 2017-02-11 PROCEDURE — 25000003 PHARM REV CODE 250: Performed by: STUDENT IN AN ORGANIZED HEALTH CARE EDUCATION/TRAINING PROGRAM

## 2017-02-11 PROCEDURE — 99233 SBSQ HOSP IP/OBS HIGH 50: CPT | Mod: ,,, | Performed by: INTERNAL MEDICINE

## 2017-02-11 PROCEDURE — 20600001 HC STEP DOWN PRIVATE ROOM

## 2017-02-11 PROCEDURE — 25000003 PHARM REV CODE 250: Performed by: NURSE PRACTITIONER

## 2017-02-11 PROCEDURE — 85610 PROTHROMBIN TIME: CPT

## 2017-02-11 RX ADMIN — ACYCLOVIR 400 MG: 200 CAPSULE ORAL at 09:02

## 2017-02-11 RX ADMIN — MAGNESIUM OXIDE TAB 400 MG (241.3 MG ELEMENTAL MG) 400 MG: 400 (241.3 MG) TAB at 09:02

## 2017-02-11 RX ADMIN — Medication 10 ML: at 11:02

## 2017-02-11 RX ADMIN — VORICONAZOLE 200 MG: 200 TABLET, FILM COATED ORAL at 09:02

## 2017-02-11 RX ADMIN — MAGNESIUM OXIDE TAB 400 MG (241.3 MG ELEMENTAL MG) 400 MG: 400 (241.3 MG) TAB at 11:02

## 2017-02-11 RX ADMIN — ABACAVIR 600 MG: 300 TABLET, FILM COATED ORAL at 09:02

## 2017-02-11 RX ADMIN — DEXAMETHASONE 1 DROP: 1 SUSPENSION OPHTHALMIC at 06:02

## 2017-02-11 RX ADMIN — ETOPOSIDE 114 MG: 20 INJECTION, SOLUTION, CONCENTRATE INTRAVENOUS at 02:02

## 2017-02-11 RX ADMIN — DEXAMETHASONE 12 MG: 4 TABLET ORAL at 01:02

## 2017-02-11 RX ADMIN — STANDARDIZED SENNA CONCENTRATE AND DOCUSATE SODIUM 1 TABLET: 8.6; 5 TABLET, FILM COATED ORAL at 09:02

## 2017-02-11 RX ADMIN — OXYCODONE HYDROCHLORIDE 5 MG: 5 TABLET ORAL at 08:02

## 2017-02-11 RX ADMIN — CIPROFLOXACIN HYDROCHLORIDE 500 MG: 500 TABLET, FILM COATED ORAL at 09:02

## 2017-02-11 RX ADMIN — ONDANSETRON 16 MG: 8 TABLET, ORALLY DISINTEGRATING ORAL at 01:02

## 2017-02-11 RX ADMIN — OXYCODONE HYDROCHLORIDE 5 MG: 5 TABLET ORAL at 04:02

## 2017-02-11 RX ADMIN — Medication 10 ML: at 06:02

## 2017-02-11 RX ADMIN — MITOXANTRONE HYDROCHLORIDE 8 MG: 2 INJECTION, SOLUTION INTRAVENOUS at 11:02

## 2017-02-11 RX ADMIN — CYTARABINE 1420 MG: 100 INJECTION, SOLUTION INTRATHECAL; INTRAVENOUS; SUBCUTANEOUS at 03:02

## 2017-02-11 RX ADMIN — MAGNESIUM OXIDE TAB 400 MG (241.3 MG ELEMENTAL MG) 400 MG: 400 (241.3 MG) TAB at 04:02

## 2017-02-11 RX ADMIN — LAMIVUDINE 300 MG: 100 TABLET, FILM COATED ORAL at 09:02

## 2017-02-11 RX ADMIN — DEXAMETHASONE 1 DROP: 1 SUSPENSION OPHTHALMIC at 11:02

## 2017-02-11 RX ADMIN — DOLUTEGRAVIR SODIUM 50 MG: 50 TABLET, FILM COATED ORAL at 09:02

## 2017-02-11 NOTE — PLAN OF CARE
Problem: Patient Care Overview  Goal: Plan of Care Review  Outcome: Ongoing (interventions implemented as appropriate)  POC reviewed with pt. No falls or trauma this shift. Pt c/o pain, prn oxy given x2, full relief obtained. Pt denies nausea. IVF@ 50 mL/hr. Etoposide and cytarabine given, positive blood return noted. CAR checked by two chemotherapy certified nurses. Consent verified. Chemotherapy precautions maintained. Pt afebrile this shift, VSS. Low bed, call light within reach, will continue to monitor.

## 2017-02-11 NOTE — PROGRESS NOTES
Chemo note: etoposide 114 mg and cytarabine 1420 mg administered this shift. Etoposide tolerated well. Cytarabine infusing at time of this documentation. Tolerating well. Blood return, chemo consent, and chemo calculations confirmed. Checked by second chemo-certified RN Shena Leary.

## 2017-02-11 NOTE — PLAN OF CARE
Problem: Patient Care Overview  Goal: Plan of Care Review  Outcome: Ongoing (interventions implemented as appropriate)  Remained safe. Rested in bed most of shift. Neutropenic and thrombocytopenic precautions maintained. No complaints of pain by time of this documentation. Afebrile. Vitals stable. Appetite good. No complaints of nausea. Started on PO cipro.  Received chemo this shift with not events. No sign of pressure-related skin breakdown noted. Able to reposition self in bed. Fall precautions maintained and callbell and personal items kept within reach. Ambulated in room with steady gait. No apparent distress.

## 2017-02-11 NOTE — PROGRESS NOTES
Chemo note: mitaxtrone 8mg in 50cc normal saline up and infusing to right arm picc. Prior to beginning infusion line was checked for patency and excellent blood return noted. Chemo consent on chart. All lines taped and secured. Chemo precautions maintained. Checked with Galo Banuelos RN. This is a 15 min infusion at 200cc/hr. Will continue to monitor.

## 2017-02-11 NOTE — PROGRESS NOTES
Progress Note  Hematology / Bone Marrow Transplant                                                              [place] [facility] [place]    Patient Name: Marck Howard  YOB: 1980    Admit Date: 1/17/2017                     LOS: 25    SUBJECTIVE:     Reason for Admission:  Acute leukemia  See H&P for detailed initial presentation history and ROS.      Interval history:  Patient seen and examined this AM. Patient reports NAEON. Patient denies chest pains, palpitations, SOB, n/v, abdo pain, constipation/diarrhea, dysuria. No other issues.    Review of System:  Constitutional: -ve fever, -ve chills, negative for anorexia, malaise, night sweats and weight loss  Eyes: no visual changes, negative for irritation and redness  ENT: no nasal congestion or sore throat, negative for epistaxis, sore mouth and sore throat  Respiratory: no cough or shortness of breath, negative for dyspnea on exertion and wheezing  Cardiovascular: no chest pain or palpitations, negative for dyspnea, fatigue and palpitations  Gastrointestinal: no nausea or vomiting, no abdominal pain or change in bowel habits, negative for constipation, diarrhea and melena  Hematologic/Lymphatic: no easy bruising or lymphadenopathy, negative for bleeding  Musculoskeletal: no arthralgias or myalgias  Neurological: no seizures or tremors, negative for headaches and weakness  Behavioral/Psych: negative for anxiety and depression    OBJECTIVE:     Vital Signs Range (Last 24H):  Temp:  [97.8 °F (36.6 °C)-98.4 °F (36.9 °C)]   Pulse:  [61-98]   Resp:  [18]   BP: ()/(52-66)   SpO2:  [96 %-99 %] Body mass index is 17.39 kg/(m^2).    I & O (Last 24H):    Intake/Output Summary (Last 24 hours) at 02/11/17 0732  Last data filed at 02/11/17 0600   Gross per 24 hour   Intake          5936.66 ml   Output             5625 ml   Net           311.66 ml       Physical Exam:  General: well developed, cachectic, no distress  HENT: Head:normocephalic,  atraumatic. Ears:right ear normal, left ear normal. Nose: Nares normal. Septum midline. Mucosa normal. No drainage or sinus tenderness., no discharge. Throat: lips, mucosa, and tongue normal; teeth and gums normal and no throat erythema.  Eyes: conjunctivae/corneas clear. PERRL.   Neck: supple, symmetrical, trachea midline  Lungs:  clear to auscultation bilaterally and normal respiratory effort  Cardiovascular: Heart: tachycardic but otherwise regular rhythm, S1, S2 normal, no murmur, click, rub or gallop. Chest Wall: no tenderness. Extremities: no cyanosis or edema, or clubbing.   Abdomen: soft, non-tender non-distented; bowel sounds normal.   Skin: Skin color, texture, turgor normal. No rashes or lesions  Musculoskeletal:no clubbing, cyanosis  Neurologic: Mental status: Alert, oriented, thought content appropriate  Psych/Behavioral:  Normal.    Diagnostic Results:  Lab Results   Component Value Date    WBC 1.35 (LL) 02/11/2017    HGB 7.8 (L) 02/11/2017    HCT 23.4 (L) 02/11/2017    MCV 92 02/11/2017    PLT 15 (LL) 02/11/2017       Recent Labs  Lab 02/11/17  0400   *      K 4.2      CO2 25   BUN 20   CREATININE 0.6   CALCIUM 8.6*   MG 1.8     Lab Results   Component Value Date    INR 1.0 02/11/2017    INR 1.0 02/10/2017    INR 1.0 02/09/2017     No results found for: HGBA1C  Microbiology Results (last 7 days)     Procedure Component Value Units Date/Time    Blood culture [[id number]] Collected:  02/07/17 0825    Order Status:  Completed Specimen:  Blood from Line, PICC Right Brachial Updated:  02/10/17 1212     Blood Culture, Routine No Growth to date     Blood Culture, Routine No Growth to date     Blood Culture, Routine No Growth to date     Blood Culture, Routine No Growth to date    Blood culture [[id number]] Collected:  02/07/17 0943    Order Status:  Completed Specimen:  Blood Updated:  02/10/17 1212     Blood Culture, Routine No Growth to date     Blood Culture, Routine No Growth to date      Blood Culture, Routine No Growth to date     Blood Culture, Routine No Growth to date    Blood culture [[id number]] Collected:  02/04/17 0008    Order Status:  Completed Specimen:  Blood Updated:  02/09/17 0612     Blood Culture, Routine No growth after 5 days.    Blood culture [[id number]] Collected:  02/03/17 2330    Order Status:  Completed Specimen:  Blood from Line, PICC Right Brachial Updated:  02/09/17 0612     Blood Culture, Routine No growth after 5 days.    Blood culture [[id number]]     Order Status:  Canceled Specimen:  Blood     Blood culture [[id number]] Collected:  01/31/17 2132    Order Status:  Completed Specimen:  Blood Updated:  02/05/17 2312     Blood Culture, Routine No growth after 5 days.    Blood culture [[id number]] Collected:  01/31/17 2133    Order Status:  Completed Specimen:  Blood Updated:  02/05/17 2312     Blood Culture, Routine No growth after 5 days.        Imaging Results:    CT C/A/P 1/19/17:  1.  Focus of groundglass attenuation with bronchiectasis/developing air bronchograms within the right middle lobe, nonspecific, however could reflect developing infectious process or possibly sequela of aspiration, clinical correlation for pneumonia is advised.  2.  Anterior mediastinum mass, suggesting thymic hyperplasia, clinical correlation advised.  3.  Constipation.  4.  Several additional findings above.    CXR 1/17/17: Heart size normal.  Mild accentuation of interstitial markings.  No significant air space consolidation or pleural effusion    2D Echo 1/17/17:  CONCLUSIONS     1 - Low normal left ventricular systolic function (EF 50-55%).     2 - Normal right ventricular systolic function .     3 - Normal left ventricular diastolic function.     FINAL PATHOLOGIC DIAGNOSIS (1/17/17):  BONE MARROW, RIGHT ILIAC CREST, ASPIRATE, CLOT, AND CORE BIOPSY:  --Markedly hypercellular marrow, essentially 100%, nearly completely replaced by acute myeloid leukemia, see  comment  --Rare background  trilineage elements present  --No significant appreciable stainable iron  COMMENT: Concomitantly submitted flow cytometric analysis detects an increased population of blasts consistent  with acute myeloid leukemia, favor non-M3 subtype, although A CD34 positive/HLA-DR negative/equivocal  phenotype, and M3 APL subtype cannot be completely excluded. This increased population of myeloid blasts  shows expression of CD34, CD13, and CD33 (partial). This population also expresses CD 56 and CD7. It is  negative for  and other B and T lymphoid R crest tested including CD19 and cytoplasmic CD3, as well as  TdT. HLA-DR is equivocal.  The overall morphology of these blasts do not suggest acute promyelocytic leukemia. 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       Active Problems, Status & Treatment Plan Addressed Today:    # AML  - currently no evidence of DIC or tumor lysis. Daily TLS labs.   - noted to have new diagnosis of HIV (see below)   - s/p bone marrow biopsy (1/17/17) showing AML, cytogenetics and molecular markers done  - NPM1 and FLT3 negative  - will need transplant given high risk disease  - continue on empiric allopurinol  - 7+3 started 1/20/17  - rpt bone marrow biopsy 2/3, with circulating blasts in peripheral blood, 70% blasts, 100% cellularity, CD4, CD 13, CD34  -Continue MEC, started 2/9/17 (Day 3)    # Tachycardia/Sepsis  - side effect of chemo?  - No fever last 48 hours  - Blood Cx neg  - Patient is asymptomatic   - continue cipro, acyclovir ppx  - continue to monitor     #HIV positive  - new diagnosis on this admit  - toxoplasma  gondii WNL  - CD4: 118  - ID following, appreciate recs  - Started Triumeq 2/2/17  - ppx bactrim held given myelosuppressive effects, received Pentamidine 1/22/17  - plan to restart bactrim once counts recovered.     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   # Thrombocytopenia due to leukemia   - Hgb= 7.8 g/dL, PLT= 15K  - no overt signs of bleeding  - likely 2/2 underlying hematologic malignancy and chemotherapy  - continue to monitor and transfuse as indicated for Hgb <7.0, PLT <10    # Constipation (resolved)  - likely 2/2 opiate use  - continue on miralax and senna-docusate  - resolved, continue to monitor     # Tobacco Abuse  - 26 pack year history  - advise smoking cessation   - nicotine patch prn    # H/O Alcohol Abuse  - drank at least 2 beers per day prior to admission  - no h/o DTs and now out of period for withdrawal    # Hyponatremia (resolved)  - likely 2/2 dehydration  - continue IVFs  - continue to monitor    #Hypomagnesemia (resolved)  -replete PRN    # Hypoalbuminemia  - checked 24 hour urine protein  - Dietary consulted  - encourage increased nutritional intake     DVT ppx: encourage ambulation  Diet: regular  Code Status: full    DISPO: pending chemotherapy for AML and repeat bone marrow     Dionte Rizvi MD (PGY-4)  Hematology/Oncology Fellow  Will discuss with Dr. Sierra (Hematology/Oncology Staff)

## 2017-02-12 LAB
ALBUMIN SERPL BCP-MCNC: 2.3 G/DL
ALP SERPL-CCNC: 128 U/L
ALT SERPL W/O P-5'-P-CCNC: 119 U/L
ANION GAP SERPL CALC-SCNC: 7 MMOL/L
ANISOCYTOSIS BLD QL SMEAR: SLIGHT
AST SERPL-CCNC: 39 U/L
BACTERIA BLD CULT: NORMAL
BACTERIA BLD CULT: NORMAL
BASOPHILS # BLD AUTO: ABNORMAL K/UL
BASOPHILS NFR BLD: 0 %
BILIRUB SERPL-MCNC: 0.2 MG/DL
BLASTS NFR BLD MANUAL: 9.5 %
BUN SERPL-MCNC: 20 MG/DL
CALCIUM SERPL-MCNC: 8.5 MG/DL
CHLORIDE SERPL-SCNC: 106 MMOL/L
CO2 SERPL-SCNC: 26 MMOL/L
CREAT SERPL-MCNC: 0.6 MG/DL
DACRYOCYTES BLD QL SMEAR: ABNORMAL
DIFFERENTIAL METHOD: ABNORMAL
EOSINOPHIL # BLD AUTO: ABNORMAL K/UL
EOSINOPHIL NFR BLD: 0 %
ERYTHROCYTE [DISTWIDTH] IN BLOOD BY AUTOMATED COUNT: 16.1 %
EST. GFR  (AFRICAN AMERICAN): >60 ML/MIN/1.73 M^2
EST. GFR  (NON AFRICAN AMERICAN): >60 ML/MIN/1.73 M^2
GLUCOSE SERPL-MCNC: 128 MG/DL
HCT VFR BLD AUTO: 22.5 %
HGB BLD-MCNC: 7.7 G/DL
HYPOCHROMIA BLD QL SMEAR: ABNORMAL
INR PPP: 1
LDH SERPL L TO P-CCNC: 469 U/L
LYMPHOCYTES # BLD AUTO: ABNORMAL K/UL
LYMPHOCYTES NFR BLD: 85.7 %
MAGNESIUM SERPL-MCNC: 2 MG/DL
MCH RBC QN AUTO: 31.3 PG
MCHC RBC AUTO-ENTMCNC: 34.2 %
MCV RBC AUTO: 92 FL
MONOCYTES # BLD AUTO: ABNORMAL K/UL
MONOCYTES NFR BLD: 4.8 %
NEUTROPHILS NFR BLD: 0 %
PHOSPHATE SERPL-MCNC: 3.7 MG/DL
PLATELET # BLD AUTO: 13 K/UL
PLATELET BLD QL SMEAR: ABNORMAL
PMV BLD AUTO: ABNORMAL FL
POIKILOCYTOSIS BLD QL SMEAR: SLIGHT
POLYCHROMASIA BLD QL SMEAR: ABNORMAL
POTASSIUM SERPL-SCNC: 4.2 MMOL/L
PROT SERPL-MCNC: 6.3 G/DL
PROTHROMBIN TIME: 10.3 SEC
RBC # BLD AUTO: 2.46 M/UL
SODIUM SERPL-SCNC: 139 MMOL/L
WBC # BLD AUTO: 0.63 K/UL

## 2017-02-12 PROCEDURE — 20600001 HC STEP DOWN PRIVATE ROOM

## 2017-02-12 PROCEDURE — 83615 LACTATE (LD) (LDH) ENZYME: CPT

## 2017-02-12 PROCEDURE — 25000003 PHARM REV CODE 250: Performed by: INTERNAL MEDICINE

## 2017-02-12 PROCEDURE — 84100 ASSAY OF PHOSPHORUS: CPT

## 2017-02-12 PROCEDURE — 85027 COMPLETE CBC AUTOMATED: CPT

## 2017-02-12 PROCEDURE — 85007 BL SMEAR W/DIFF WBC COUNT: CPT

## 2017-02-12 PROCEDURE — 25000003 PHARM REV CODE 250: Performed by: STUDENT IN AN ORGANIZED HEALTH CARE EDUCATION/TRAINING PROGRAM

## 2017-02-12 PROCEDURE — 99233 SBSQ HOSP IP/OBS HIGH 50: CPT | Mod: ,,, | Performed by: INTERNAL MEDICINE

## 2017-02-12 PROCEDURE — 83735 ASSAY OF MAGNESIUM: CPT

## 2017-02-12 PROCEDURE — 63600175 PHARM REV CODE 636 W HCPCS: Performed by: INTERNAL MEDICINE

## 2017-02-12 PROCEDURE — 80053 COMPREHEN METABOLIC PANEL: CPT

## 2017-02-12 PROCEDURE — 85610 PROTHROMBIN TIME: CPT

## 2017-02-12 PROCEDURE — 25000003 PHARM REV CODE 250: Performed by: NURSE PRACTITIONER

## 2017-02-12 RX ADMIN — Medication 10 ML: at 07:02

## 2017-02-12 RX ADMIN — STANDARDIZED SENNA CONCENTRATE AND DOCUSATE SODIUM 1 TABLET: 8.6; 5 TABLET, FILM COATED ORAL at 08:02

## 2017-02-12 RX ADMIN — ACYCLOVIR 400 MG: 200 CAPSULE ORAL at 08:02

## 2017-02-12 RX ADMIN — SODIUM CHLORIDE: 0.9 INJECTION, SOLUTION INTRAVENOUS at 11:02

## 2017-02-12 RX ADMIN — MAGNESIUM OXIDE TAB 400 MG (241.3 MG ELEMENTAL MG) 400 MG: 400 (241.3 MG) TAB at 08:02

## 2017-02-12 RX ADMIN — CIPROFLOXACIN HYDROCHLORIDE 500 MG: 500 TABLET, FILM COATED ORAL at 08:02

## 2017-02-12 RX ADMIN — ABACAVIR 600 MG: 300 TABLET, FILM COATED ORAL at 08:02

## 2017-02-12 RX ADMIN — CYTARABINE 1420 MG: 100 INJECTION, SOLUTION INTRATHECAL; INTRAVENOUS; SUBCUTANEOUS at 03:02

## 2017-02-12 RX ADMIN — DEXAMETHASONE 1 DROP: 1 SUSPENSION OPHTHALMIC at 11:02

## 2017-02-12 RX ADMIN — ONDANSETRON 16 MG: 8 TABLET, ORALLY DISINTEGRATING ORAL at 01:02

## 2017-02-12 RX ADMIN — DOLUTEGRAVIR SODIUM 50 MG: 50 TABLET, FILM COATED ORAL at 08:02

## 2017-02-12 RX ADMIN — DEXAMETHASONE 1 DROP: 1 SUSPENSION OPHTHALMIC at 07:02

## 2017-02-12 RX ADMIN — DEXAMETHASONE 1 DROP: 1 SUSPENSION OPHTHALMIC at 06:02

## 2017-02-12 RX ADMIN — Medication 10 ML: at 12:02

## 2017-02-12 RX ADMIN — Medication 10 ML: at 06:02

## 2017-02-12 RX ADMIN — OXYCODONE HYDROCHLORIDE 5 MG: 5 TABLET ORAL at 03:02

## 2017-02-12 RX ADMIN — LAMIVUDINE 300 MG: 100 TABLET, FILM COATED ORAL at 08:02

## 2017-02-12 RX ADMIN — ETOPOSIDE 114 MG: 20 INJECTION, SOLUTION, CONCENTRATE INTRAVENOUS at 01:02

## 2017-02-12 RX ADMIN — VORICONAZOLE 200 MG: 200 TABLET, FILM COATED ORAL at 08:02

## 2017-02-12 RX ADMIN — DEXAMETHASONE 12 MG: 4 TABLET ORAL at 01:02

## 2017-02-12 RX ADMIN — OXYCODONE HYDROCHLORIDE 5 MG: 5 TABLET ORAL at 06:02

## 2017-02-12 NOTE — PLAN OF CARE
Problem: Patient Care Overview  Goal: Plan of Care Review  Outcome: Ongoing (interventions implemented as appropriate)  POC reviewed with pt. No falls or trauma this shift. Pt denies nausea. IVF@ 50 mL/hr. Pt day 6 of MEC. Etoposide and cytarabine given, positive blood return noted. CAR checked by two chemotherapy certified nurses. Consent verified. Chemotherapy precautions maintained. Pt afebrile this shift, VSS. Low bed, call light within reach, will continue to monitor.

## 2017-02-12 NOTE — PROGRESS NOTES
Progress Note  Hematology / Bone Marrow Transplant                                                              Team: [facility] [place] [facility]EMATOLOGY [facility]MT    Patient Name: Marck Howard  YOB: 1980    Admit Date: 1/17/2017                     LOS: 26    SUBJECTIVE:     Reason for Admission:  Acute leukemia  See H&P for detailed initial presentation history and ROS.      Interval history:  Patient seen and examined this AM. Patient reports NAEON. Patient denies chest pains, palpitations, SOB, n/v, abdo pain, constipation/diarrhea, dysuria. Day 3 of [facility] today. No other issues.    Review of System:  Constitutional: -ve fever, -ve chills, negative for anorexia, malaise, night sweats and weight loss  Eyes: no visual changes, negative for irritation and redness  ENT: no nasal congestion or sore throat, negative for epistaxis, sore mouth and sore throat  Respiratory: no cough or shortness of breath, negative for dyspnea on exertion and wheezing  Cardiovascular: no chest pain or palpitations, negative for dyspnea, fatigue and palpitations  Gastrointestinal: no nausea or vomiting, no abdominal pain or change in bowel habits, negative for constipation, diarrhea and melena  Hematologic/Lymphatic: no easy bruising or lymphadenopathy, negative for bleeding  Musculoskeletal: no arthralgias or myalgias  Neurological: no seizures or tremors, negative for headaches and weakness  Behavioral/Psych: negative for anxiety and depression    OBJECTIVE:     Vital Signs Range (Last 24H):  Temp:  [97.7 °F (36.5 °C)-98.8 °F (37.1 °C)]   Pulse:  [69-82]   Resp:  [16-18]   BP: ()/(54-71)   SpO2:  [99 %-100 %] Body mass index is 17.39 kg/(m^2).    I & O (Last 24H):    Intake/Output Summary (Last 24 hours) at 02/12/17 0704  Last data filed at 02/12/17 0348   Gross per 24 hour   Intake          2354.17 ml   Output             2450 ml   Net           -95.83 ml       Physical Exam:  General: well developed, cachectic, no distress  HENT:  Head:normocephalic, atraumatic. Ears:right ear normal, left ear normal. Nose: Nares normal. Septum midline. Mucosa normal. No drainage or sinus tenderness., no discharge. Throat: lips, mucosa, and tongue normal; teeth and gums normal and no throat erythema.  Eyes: conjunctivae/corneas clear. PERRL.   Neck: supple, symmetrical, trachea midline  Lungs:  clear to auscultation bilaterally and normal respiratory effort  Cardiovascular: Heart: tachycardic but otherwise regular rhythm, S1, S2 normal, no murmur, click, rub or gallop. Chest Wall: no tenderness. Extremities: no cyanosis or edema, or clubbing.   Abdomen: soft, non-tender non-distented; bowel sounds normal.   Skin: Skin color, texture, turgor normal. No rashes or lesions  Musculoskeletal:no clubbing, cyanosis  Neurologic: Mental status: Alert, oriented, thought content appropriate  Psych/Behavioral:  Normal.    Diagnostic Results:  Lab Results   Component Value Date    WBC 0.63 (LL) 02/12/2017    HGB 7.7 (L) 02/12/2017    HCT 22.5 (L) 02/12/2017    MCV 92 02/12/2017    PLT 13 (LL) 02/12/2017       Recent Labs  Lab 02/12/17  0400   *      K 4.2      CO2 26   BUN 20   CREATININE 0.6   CALCIUM 8.5*   MG 2.0     Lab Results   Component Value Date    INR 1.0 02/12/2017    INR 1.0 02/11/2017    INR 1.0 02/10/2017     No results found for: HGBA1C  Microbiology Results (last 7 days)     Procedure Component Value Units Date/Time    Blood culture [[id number]] Collected:  02/07/17 0825    Order Status:  Completed Specimen:  Blood from Line, PICC Right Brachial Updated:  02/11/17 1212     Blood Culture, Routine No Growth to date     Blood Culture, Routine No Growth to date     Blood Culture, Routine No Growth to date     Blood Culture, Routine No Growth to date     Blood Culture, Routine No Growth to date    Blood culture [[id number]] Collected:  02/07/17 0943    Order Status:  Completed Specimen:  Blood Updated:  02/11/17 1212     Blood Culture, Routine  No Growth to date     Blood Culture, Routine No Growth to date     Blood Culture, Routine No Growth to date     Blood Culture, Routine No Growth to date     Blood Culture, Routine No Growth to date    Blood culture [[id number]] Collected:  02/04/17 0008    Order Status:  Completed Specimen:  Blood Updated:  02/09/17 0612     Blood Culture, Routine No growth after 5 days.    Blood culture [[id number]] Collected:  02/03/17 2330    Order Status:  Completed Specimen:  Blood from Line, PICC Right Brachial Updated:  02/09/17 0612     Blood Culture, Routine No growth after 5 days.    Blood culture [[id number]]     Order Status:  Canceled Specimen:  Blood     Blood culture [[id number]] Collected:  01/31/17 2132    Order Status:  Completed Specimen:  Blood Updated:  02/05/17 2312     Blood Culture, Routine No growth after 5 days.    Blood culture [[id number]] Collected:  01/31/17 2133    Order Status:  Completed Specimen:  Blood Updated:  02/05/17 2312     Blood Culture, Routine No growth after 5 days.        Imaging Results:    CT C/A/P 1/19/17:  1.  Focus of groundglass attenuation with bronchiectasis/developing air bronchograms within the right middle lobe, nonspecific, however could reflect developing infectious process or possibly sequela of aspiration, clinical correlation for pneumonia is advised.  2.  Anterior mediastinum mass, suggesting thymic hyperplasia, clinical correlation advised.  3.  Constipation.  4.  Several additional findings above.    CXR 1/17/17: Heart size normal.  Mild accentuation of interstitial markings.  No significant air space consolidation or pleural effusion    2D Echo 1/17/17:  CONCLUSIONS     1 - Low normal left ventricular systolic function (EF 50-55%).     2 - Normal right ventricular systolic function .     3 - Normal left ventricular diastolic function.     FINAL PATHOLOGIC DIAGNOSIS (1/17/17):  BONE MARROW, RIGHT ILIAC CREST, ASPIRATE, CLOT, AND CORE BIOPSY:  --Markedly hypercellular  marrow, essentially 100%, nearly completely replaced by acute myeloid leukemia, see  comment  --Rare background trilineage elements present  --No significant appreciable stainable iron  COMMENT: Concomitantly submitted flow cytometric analysis detects an increased population of blasts consistent  with acute myeloid leukemia, favor non-M3 subtype, although A CD34 positive/HLA-DR negative/equivocal  phenotype, and M3 APL subtype cannot be completely excluded. This increased population of myeloid blasts  shows expression of CD34, CD13, and CD33 (partial). This population also expresses CD 56 and CD7. It is  negative for  and other B and T lymphoid R crest tested including CD19 and cytoplasmic CD3, as well as  TdT. HLA-DR is equivocal.  The overall morphology of these blasts do not suggest acute promyelocytic leukemia. 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       Active Problems, Status & Treatment Plan Addressed Today:    # AML  - currently no evidence of DIC or tumor lysis. Daily TLS labs.   - noted to have new diagnosis of HIV (see below)   - s/p bone marrow biopsy (1/17/17) showing AML, cytogenetics and molecular markers done  - NPM1 and FLT3 negative  - will need transplant given high risk disease  - continue on empiric allopurinol  - 7+3 started 1/20/17  - rpt bone marrow biopsy 2/3, with circulating blasts in peripheral blood, 70% blasts, 100% cellularity, CD4, CD 13, CD34  -Continue MEC, started 2/9/17 (Day 4)    # Tachycardia/Sepsis  - side effect of chemo?  - No fever last 48 hours  - Blood Cx neg  - Patient is asymptomatic   -  continue cipro, acyclovir ppx  - continue to monitor     #HIV positive  - new diagnosis on this admit  - toxoplasma gondii WNL  - CD4: 118  - ID following, appreciate recs  - Started Triumeq 2/2/17  - ppx bactrim held given myelosuppressive effects, received Pentamidine 1/22/17  - plan to restart bactrim once counts recovered.     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   # Thrombocytopenia due to leukemia   - Hgb= 7.7 g/dL, PLT= 13K  - no overt signs of bleeding  - likely 2/2 underlying hematologic malignancy and chemotherapy  - continue to monitor and transfuse as indicated for Hgb <7.0, PLT <10    # Constipation (resolved)  - likely 2/2 opiate use  - continue on miralax and senna-docusate  - resolved, continue to monitor     # Tobacco Abuse  - 26 pack year history  - advise smoking cessation   - nicotine patch prn    # H/O Alcohol Abuse  - drank at least 2 beers per day prior to admission  - no h/o DTs and now out of period for withdrawal    # Hyponatremia (resolved)  - likely 2/2 dehydration  - continue IVFs  - continue to monitor    #Hypomagnesemia (resolved)  -replete PRN    # Hypoalbuminemia  - checked 24 hour urine protein  - Dietary consulted  - encourage increased nutritional intake     DVT ppx: encourage ambulation  Diet: regular  Code Status: full    DISPO: pending chemotherapy for AML and repeat bone marrow     Dionte Rizvi MD (PGY-4)  Hematology/Oncology Fellow  Will discuss with Dr. Sierra (Hematology/Oncology Staff)

## 2017-02-12 NOTE — PLAN OF CARE
Problem: Patient Care Overview  Goal: Plan of Care Review  Outcome: Ongoing (interventions implemented as appropriate)  POC reviewed with pt. No falls or trauma this shift. Pt c/o pain, prn oxy given x1, full relief obtained. IVF@ 50 mL/hr. Mitotraxone given this shift.  positive blood return noted. CAR checked by two chemotherapy certified nurses. Consent verified. Chemotherapy precautions maintained. Pt afebrile this shift, VSS. Low bed, call light within reach, will continue to monitor.

## 2017-02-13 LAB
ALBUMIN SERPL BCP-MCNC: 2.4 G/DL
ALP SERPL-CCNC: 132 U/L
ALT SERPL W/O P-5'-P-CCNC: 132 U/L
ANION GAP SERPL CALC-SCNC: 7 MMOL/L
ANISOCYTOSIS BLD QL SMEAR: SLIGHT
AST SERPL-CCNC: 34 U/L
BASOPHILS # BLD AUTO: ABNORMAL K/UL
BASOPHILS NFR BLD: 0 %
BILIRUB SERPL-MCNC: 0.2 MG/DL
BLASTS NFR BLD MANUAL: 33.3 %
BLD PROD TYP BPU: NORMAL
BLOOD UNIT EXPIRATION DATE: NORMAL
BLOOD UNIT TYPE CODE: 9500
BLOOD UNIT TYPE: NORMAL
BUN SERPL-MCNC: 21 MG/DL
CALCIUM SERPL-MCNC: 8.5 MG/DL
CHLORIDE SERPL-SCNC: 106 MMOL/L
CHROM BANDING METHOD: NORMAL
CHROMOSOME ANALYSIS BM ADDITIONAL INFORMATION: NORMAL
CHROMOSOME ANALYSIS BM RELEASED BY: NORMAL
CHROMOSOME ANALYSIS BM RESULT SUMMARY: NORMAL
CLINICAL CYTOGENETICIST REVIEW: NORMAL
CO2 SERPL-SCNC: 27 MMOL/L
CODING SYSTEM: NORMAL
CREAT SERPL-MCNC: 0.7 MG/DL
DIFFERENTIAL METHOD: ABNORMAL
DISPENSE STATUS: NORMAL
EOSINOPHIL # BLD AUTO: ABNORMAL K/UL
EOSINOPHIL NFR BLD: 0 %
ERYTHROCYTE [DISTWIDTH] IN BLOOD BY AUTOMATED COUNT: 15.8 %
EST. GFR  (AFRICAN AMERICAN): >60 ML/MIN/1.73 M^2
EST. GFR  (NON AFRICAN AMERICAN): >60 ML/MIN/1.73 M^2
GLUCOSE SERPL-MCNC: 134 MG/DL
HCT VFR BLD AUTO: 22.2 %
HGB BLD-MCNC: 7.5 G/DL
HYPOCHROMIA BLD QL SMEAR: ABNORMAL
INR PPP: 1
KARYOTYP MAR: NORMAL
LDH SERPL L TO P-CCNC: 406 U/L
LYMPHOCYTES # BLD AUTO: ABNORMAL K/UL
LYMPHOCYTES NFR BLD: 61.1 %
MAGNESIUM SERPL-MCNC: 1.9 MG/DL
MCH RBC QN AUTO: 30.7 PG
MCHC RBC AUTO-ENTMCNC: 33.8 %
MCV RBC AUTO: 91 FL
MONOCYTES # BLD AUTO: ABNORMAL K/UL
MONOCYTES NFR BLD: 0 %
NEUTROPHILS NFR BLD: 5.6 %
NUM UNITS TRANS WBC-POOR PLATPHERESIS: NORMAL
OVALOCYTES BLD QL SMEAR: ABNORMAL
PHOSPHATE SERPL-MCNC: 3.6 MG/DL
PLATELET # BLD AUTO: 8 K/UL
PMV BLD AUTO: ABNORMAL FL
POIKILOCYTOSIS BLD QL SMEAR: SLIGHT
POLYCHROMASIA BLD QL SMEAR: ABNORMAL
POTASSIUM SERPL-SCNC: 4.2 MMOL/L
PROT SERPL-MCNC: 6.3 G/DL
PROTHROMBIN TIME: 10.3 SEC
RBC # BLD AUTO: 2.44 M/UL
REASON FOR REFERRAL (NARRATIVE): NORMAL
REF LAB TEST METHOD: NORMAL
SODIUM SERPL-SCNC: 140 MMOL/L
SPECIMEN SOURCE: NORMAL
SPECIMEN: NORMAL
WBC # BLD AUTO: 0.46 K/UL

## 2017-02-13 PROCEDURE — 25000003 PHARM REV CODE 250: Performed by: INTERNAL MEDICINE

## 2017-02-13 PROCEDURE — 20600001 HC STEP DOWN PRIVATE ROOM

## 2017-02-13 PROCEDURE — 85007 BL SMEAR W/DIFF WBC COUNT: CPT

## 2017-02-13 PROCEDURE — 25000003 PHARM REV CODE 250: Performed by: STUDENT IN AN ORGANIZED HEALTH CARE EDUCATION/TRAINING PROGRAM

## 2017-02-13 PROCEDURE — 25000003 PHARM REV CODE 250: Performed by: NURSE PRACTITIONER

## 2017-02-13 PROCEDURE — 85610 PROTHROMBIN TIME: CPT

## 2017-02-13 PROCEDURE — 84100 ASSAY OF PHOSPHORUS: CPT

## 2017-02-13 PROCEDURE — 99406 BEHAV CHNG SMOKING 3-10 MIN: CPT

## 2017-02-13 PROCEDURE — 83615 LACTATE (LD) (LDH) ENZYME: CPT

## 2017-02-13 PROCEDURE — 83735 ASSAY OF MAGNESIUM: CPT

## 2017-02-13 PROCEDURE — 85027 COMPLETE CBC AUTOMATED: CPT

## 2017-02-13 PROCEDURE — 63600175 PHARM REV CODE 636 W HCPCS: Performed by: INTERNAL MEDICINE

## 2017-02-13 PROCEDURE — P9037 PLATE PHERES LEUKOREDU IRRAD: HCPCS

## 2017-02-13 PROCEDURE — 99233 SBSQ HOSP IP/OBS HIGH 50: CPT | Mod: ,,, | Performed by: INTERNAL MEDICINE

## 2017-02-13 PROCEDURE — 80053 COMPREHEN METABOLIC PANEL: CPT

## 2017-02-13 PROCEDURE — 36430 TRANSFUSION BLD/BLD COMPNT: CPT

## 2017-02-13 RX ORDER — HYDROCODONE BITARTRATE AND ACETAMINOPHEN 500; 5 MG/1; MG/1
TABLET ORAL
Status: DISCONTINUED | OUTPATIENT
Start: 2017-02-13 | End: 2017-02-20 | Stop reason: SDUPTHER

## 2017-02-13 RX ADMIN — ACETAMINOPHEN 650 MG: 325 TABLET ORAL at 08:02

## 2017-02-13 RX ADMIN — DEXAMETHASONE 1 DROP: 1 SUSPENSION OPHTHALMIC at 11:02

## 2017-02-13 RX ADMIN — DIPHENHYDRAMINE HYDROCHLORIDE 25 MG: 25 CAPSULE ORAL at 08:02

## 2017-02-13 RX ADMIN — MAGNESIUM OXIDE TAB 400 MG (241.3 MG ELEMENTAL MG) 400 MG: 400 (241.3 MG) TAB at 09:02

## 2017-02-13 RX ADMIN — STANDARDIZED SENNA CONCENTRATE AND DOCUSATE SODIUM 1 TABLET: 8.6; 5 TABLET, FILM COATED ORAL at 09:02

## 2017-02-13 RX ADMIN — LAMIVUDINE 300 MG: 100 TABLET, FILM COATED ORAL at 08:02

## 2017-02-13 RX ADMIN — ACYCLOVIR 400 MG: 200 CAPSULE ORAL at 09:02

## 2017-02-13 RX ADMIN — Medication 10 ML: at 11:02

## 2017-02-13 RX ADMIN — Medication 10 ML: at 12:02

## 2017-02-13 RX ADMIN — DEXAMETHASONE 1 DROP: 1 SUSPENSION OPHTHALMIC at 06:02

## 2017-02-13 RX ADMIN — MITOXANTRONE HYDROCHLORIDE 8 MG: 2 INJECTION, SOLUTION INTRAVENOUS at 12:02

## 2017-02-13 RX ADMIN — Medication 10 ML: at 06:02

## 2017-02-13 RX ADMIN — STANDARDIZED SENNA CONCENTRATE AND DOCUSATE SODIUM 1 TABLET: 8.6; 5 TABLET, FILM COATED ORAL at 08:02

## 2017-02-13 RX ADMIN — OXYCODONE HYDROCHLORIDE 5 MG: 5 TABLET ORAL at 07:02

## 2017-02-13 RX ADMIN — DEXAMETHASONE 1 DROP: 1 SUSPENSION OPHTHALMIC at 12:02

## 2017-02-13 RX ADMIN — VORICONAZOLE 200 MG: 200 TABLET, FILM COATED ORAL at 09:02

## 2017-02-13 RX ADMIN — CIPROFLOXACIN HYDROCHLORIDE 500 MG: 500 TABLET, FILM COATED ORAL at 09:02

## 2017-02-13 RX ADMIN — CIPROFLOXACIN HYDROCHLORIDE 500 MG: 500 TABLET, FILM COATED ORAL at 08:02

## 2017-02-13 RX ADMIN — DOLUTEGRAVIR SODIUM 50 MG: 50 TABLET, FILM COATED ORAL at 08:02

## 2017-02-13 RX ADMIN — ONDANSETRON 16 MG: 8 TABLET, ORALLY DISINTEGRATING ORAL at 02:02

## 2017-02-13 RX ADMIN — ACYCLOVIR 400 MG: 200 CAPSULE ORAL at 08:02

## 2017-02-13 RX ADMIN — OXYCODONE HYDROCHLORIDE 5 MG: 5 TABLET ORAL at 06:02

## 2017-02-13 RX ADMIN — ABACAVIR 600 MG: 300 TABLET, FILM COATED ORAL at 08:02

## 2017-02-13 RX ADMIN — VORICONAZOLE 200 MG: 200 TABLET, FILM COATED ORAL at 08:02

## 2017-02-13 NOTE — PROGRESS NOTES
Patient Consulted by CTTS:     The following was discussed by the Tobacco Treatment Specialist:  ? Relevance of Quitting  ? Risk to Health  ? Long Term Risk  ? Risk for Others  ? Rewards of Quitting  ? Motivation Intervention to Quit          Pt was given verbal information and pamphlets on the smoking cessation program. Pt stated he will contact clinic if and when ready.

## 2017-02-13 NOTE — PROGRESS NOTES
Chemo note: mitatroxne 8mg in 50cc normal saline up and infusing to right arm picc. Prior to beginning infusion line was checked for patency and excellent blood return noted. Chemo consent on chart. Chemo precaution maintained. All lines taped and secured. This is a 15 min infusion at 200cc/hr. Checked with Thea Gonzalez RN. Will continue to monitor.

## 2017-02-13 NOTE — PROGRESS NOTES
Progress Note  Hematology / Bone Marrow Transplant                                                              Team: [facility]    Patient Name: Marck Howard  YOB: 1980    Admit Date: 1/17/2017                     LOS: 27    SUBJECTIVE:     Reason for Admission:  Acute leukemia  See H&P for detailed initial presentation history and ROS.      Interval history:  Patient seen and examined this AM. Patient reports NAEON. Patient denies chest pains, palpitations, SOB, n/v, abdo pain, constipation/diarrhea, dysuria. Day 7 of [facility] today. No other issues.    Review of System:  Constitutional: -ve fever, -ve chills, negative for anorexia, malaise, night sweats and weight loss  Eyes: no visual changes, negative for irritation and redness  ENT: no nasal congestion or sore throat, negative for epistaxis, sore mouth and sore throat  Respiratory: no cough or shortness of breath, negative for dyspnea on exertion and wheezing  Cardiovascular: no chest pain or palpitations, negative for dyspnea, fatigue and palpitations  Gastrointestinal: no nausea or vomiting, no abdominal pain or change in bowel habits, negative for constipation, diarrhea and melena  Hematologic/Lymphatic: no easy bruising or lymphadenopathy, negative for bleeding  Musculoskeletal: no arthralgias or myalgias  Neurological: no seizures or tremors, negative for headaches and weakness  Behavioral/Psych: negative for anxiety and depression    OBJECTIVE:     Vital Signs Range (Last 24H):  Temp:  [97.6 °F (36.4 °C)-98.4 °F (36.9 °C)]   Pulse:  [75-90]   Resp:  [16-18]   BP: ()/(45-70)   SpO2:  [99 %-100 %] Body mass index is 17.35 kg/(m^2).    I & O (Last 24H):    Intake/Output Summary (Last 24 hours) at 02/13/17 1047  Last data filed at 02/13/17 0800   Gross per 24 hour   Intake          3370.83 ml   Output             5225 ml   Net         -1854.17 ml       Physical Exam:  General: well developed, cachectic, no distress  HENT:  Head:normocephalic, atraumatic. Ears:right ear normal, left ear normal. Nose: Nares normal. Septum midline. Mucosa normal. No drainage or sinus tenderness., no discharge. Throat: lips, mucosa, and tongue normal; teeth and gums normal and no throat erythema.  Eyes: conjunctivae/corneas clear. PERRL.   Neck: supple, symmetrical, trachea midline  Lungs:  clear to auscultation bilaterally and normal respiratory effort  Cardiovascular: Heart: tachycardic but otherwise regular rhythm, S1, S2 normal, no murmur, click, rub or gallop. Chest Wall: no tenderness. Extremities: no cyanosis or edema, or clubbing.   Abdomen: soft, non-tender non-distented; bowel sounds normal.   Skin: Skin color, texture, turgor normal. No rashes or lesions  Musculoskeletal:no clubbing, cyanosis  Neurologic: Mental status: Alert, oriented, thought content appropriate  Psych/Behavioral:  Normal.    Diagnostic Results:  Lab Results   Component Value Date    WBC 0.46 (LL) 02/13/2017    HGB 7.5 (L) 02/13/2017    HCT 22.2 (L) 02/13/2017    MCV 91 02/13/2017    PLT 8 (LL) 02/13/2017       Recent Labs  Lab 02/13/17  0400   *      K 4.2      CO2 27   BUN 21*   CREATININE 0.7   CALCIUM 8.5*   MG 1.9     Lab Results   Component Value Date    INR 1.0 02/13/2017    INR 1.0 02/12/2017    INR 1.0 02/11/2017     No results found for: HGBA1C  Microbiology Results (last 7 days)     Procedure Component Value Units Date/Time    Blood culture [[id number]] Collected:  02/07/17 0943    Order Status:  Completed Specimen:  Blood Updated:  02/12/17 1212     Blood Culture, Routine No growth after 5 days.    Blood culture [[id number]] Collected:  02/07/17 0825    Order Status:  Completed Specimen:  Blood from Line, PICC Right Brachial Updated:  02/12/17 1212     Blood Culture, Routine No growth after 5 days.    Blood culture [[id number]] Collected:  02/04/17 0008    Order Status:  Completed Specimen:  Blood Updated:  02/09/17 0612     Blood Culture, Routine  No growth after 5 days.    Blood culture [[id number]] Collected:  02/03/17 2330    Order Status:  Completed Specimen:  Blood from Line, PICC Right Brachial Updated:  02/09/17 0612     Blood Culture, Routine No growth after 5 days.    Blood culture [[id number]]     Order Status:  Canceled Specimen:  Blood         Imaging Results:    CT C/A/P 1/19/17:  1.  Focus of groundglass attenuation with bronchiectasis/developing air bronchograms within the right middle lobe, nonspecific, however could reflect developing infectious process or possibly sequela of aspiration, clinical correlation for pneumonia is advised.  2.  Anterior mediastinum mass, suggesting thymic hyperplasia, clinical correlation advised.  3.  Constipation.  4.  Several additional findings above.    CXR 1/17/17: Heart size normal.  Mild accentuation of interstitial markings.  No significant air space consolidation or pleural effusion    2D Echo 1/17/17:  CONCLUSIONS     1 - Low normal left ventricular systolic function (EF 50-55%).     2 - Normal right ventricular systolic function .     3 - Normal left ventricular diastolic function.     FINAL PATHOLOGIC DIAGNOSIS (1/17/17):  BONE MARROW, RIGHT ILIAC CREST, ASPIRATE, CLOT, AND CORE BIOPSY:  --Markedly hypercellular marrow, essentially 100%, nearly completely replaced by acute myeloid leukemia, see  comment  --Rare background trilineage elements present  --No significant appreciable stainable iron  COMMENT: Concomitantly submitted flow cytometric analysis detects an increased population of blasts consistent  with acute myeloid leukemia, favor non-M3 subtype, although A CD34 positive/HLA-DR negative/equivocal  phenotype, and M3 APL subtype cannot be completely excluded. This increased population of myeloid blasts  shows expression of CD34, CD13, and CD33 (partial). This population also expresses CD 56 and CD7. It is  negative for  and other B and T lymphoid R crest tested including CD19 and cytoplasmic  CD3, as well as  TdT. HLA-DR is equivocal.  The overall morphology of these blasts do not suggest acute promyelocytic leukemia. 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       Active Problems, Status & Treatment Plan Addressed Today:    # AML  - currently no evidence of DIC or tumor lysis. Daily TLS labs.   - noted to have new diagnosis of HIV (see below)   - s/p bone marrow biopsy (1/17/17) showing AML, cytogenetics and molecular markers done  - NPM1 and FLT3 negative  - will need transplant given high risk disease  - continue on empiric allopurinol  - 7+3 started 1/20/17  - rpt bone marrow biopsy 2/3, with circulating blasts in peripheral blood, 70% blasts, 100% cellularity, CD4, CD 13, CD34  -Continue MEC, started 2/9/17 (Day 7)    # Tachycardia/Sepsis  - side effect of chemo?  - No fever last 48 hours  - Blood Cx neg  - Patient is asymptomatic   - continue cipro, acyclovir ppx  - continue to monitor     #HIV positive  - new diagnosis on this admit  - toxoplasma gondii WNL  - CD4: 118  - ID following, appreciate recs  - Started Triumeq 2/2/17  - ppx bactrim held given myelosuppressive effects, received Pentamidine 1/22/17  - plan to restart bactrim once counts recovered.     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   # Thrombocytopenia due to leukemia   - no overt signs of bleeding  - likely 2/2 underlying hematologic malignancy and chemotherapy  - continue to monitor and transfuse as indicated for Hgb <7.0, PLT <10    # Constipation (resolved)  - likely 2/2 opiate use  - continue on miralax and senna-docusate  - resolved, continue to monitor     # Tobacco  Abuse  - 26 pack year history  - advise smoking cessation   - nicotine patch prn    # H/O Alcohol Abuse  - drank at least 2 beers per day prior to admission  - no h/o DTs and now out of period for withdrawal    # Hyponatremia (resolved)  - likely 2/2 dehydration  - continue IVFs  - continue to monitor    #Hypomagnesemia (resolved)  -replete PRN    # Hypoalbuminemia  - checked 24 hour urine protein  - Dietary consulted  - encourage increased nutritional intake     DVT ppx: encourage ambulation  Diet: regular  Code Status: full    DISPO: pending chemotherapy for AML and repeat bone marrow     Naresh Addison M.D.  PGY-3, Internal Medicine  [phone]

## 2017-02-13 NOTE — PLAN OF CARE
Problem: Patient Care Overview  Goal: Plan of Care Review  Outcome: Ongoing (interventions implemented as appropriate)  Pt AAOx4; independent. BP low but stable, other VSS and pt remained afebrile throughout shift. Received PO oxycodone x1 during shift.  Pt pre-medicated for platelets today; one unit of plts administered. Pt continues to receive IV fluids. Eye drops administered every 6 hours during shift.  Pt remained free from falls during shift.  Bed lowest position and locked.  Call light in reach.  Pt has no further needs at this time; will continue to monitor.

## 2017-02-13 NOTE — PLAN OF CARE
Problem: Patient Care Overview  Goal: Plan of Care Review  Outcome: Ongoing (interventions implemented as appropriate)  POC reviewed with pt. No falls or trauma this shift. Pt c/o pain, prn oxy given x1, full relief obtained. IVF@ 50 mL/hr. Mitotraxone given this shift. positive blood return noted. CAR checked by two chemotherapy certified nurses. Consent verified. Chemotherapy precautions maintained. Patient has completed his chemo. Eye gtts q6hr.  Pt afebrile this shift, VSS. Low bed, call light within reach, will continue to monitor.

## 2017-02-14 LAB
ABO + RH BLD: NORMAL
ALBUMIN SERPL BCP-MCNC: 2.6 G/DL
ALP SERPL-CCNC: 124 U/L
ALT SERPL W/O P-5'-P-CCNC: 131 U/L
ANION GAP SERPL CALC-SCNC: 7 MMOL/L
ANISOCYTOSIS BLD QL SMEAR: SLIGHT
AST SERPL-CCNC: 26 U/L
BASOPHILS # BLD AUTO: ABNORMAL K/UL
BASOPHILS NFR BLD: 0 %
BILIRUB SERPL-MCNC: 0.4 MG/DL
BLASTS NFR BLD MANUAL: 52 %
BLD GP AB SCN CELLS X3 SERPL QL: NORMAL
BUN SERPL-MCNC: 19 MG/DL
CALCIUM SERPL-MCNC: 8.7 MG/DL
CHLORIDE SERPL-SCNC: 97 MMOL/L
CO2 SERPL-SCNC: 28 MMOL/L
CREAT SERPL-MCNC: 0.6 MG/DL
DIFFERENTIAL METHOD: ABNORMAL
EOSINOPHIL # BLD AUTO: ABNORMAL K/UL
EOSINOPHIL NFR BLD: 0 %
ERYTHROCYTE [DISTWIDTH] IN BLOOD BY AUTOMATED COUNT: 15.7 %
EST. GFR  (AFRICAN AMERICAN): >60 ML/MIN/1.73 M^2
EST. GFR  (NON AFRICAN AMERICAN): >60 ML/MIN/1.73 M^2
GLUCOSE SERPL-MCNC: 100 MG/DL
HCT VFR BLD AUTO: 22.9 %
HGB BLD-MCNC: 7.7 G/DL
INR PPP: 0.9
LDH SERPL L TO P-CCNC: 399 U/L
LYMPHOCYTES # BLD AUTO: ABNORMAL K/UL
LYMPHOCYTES NFR BLD: 46 %
MAGNESIUM SERPL-MCNC: 1.9 MG/DL
MCH RBC QN AUTO: 30.3 PG
MCHC RBC AUTO-ENTMCNC: 33.6 %
MCV RBC AUTO: 90 FL
METAMYELOCYTES NFR BLD MANUAL: 1 %
MONOCYTES # BLD AUTO: ABNORMAL K/UL
MONOCYTES NFR BLD: 1 %
NEUTROPHILS NFR BLD: 0 %
OVALOCYTES BLD QL SMEAR: ABNORMAL
PHOSPHATE SERPL-MCNC: 3.8 MG/DL
PLATELET # BLD AUTO: 34 K/UL
PLATELET BLD QL SMEAR: ABNORMAL
PMV BLD AUTO: 10.2 FL
POIKILOCYTOSIS BLD QL SMEAR: SLIGHT
POLYCHROMASIA BLD QL SMEAR: ABNORMAL
POTASSIUM SERPL-SCNC: 4 MMOL/L
PROT SERPL-MCNC: 6.6 G/DL
PROTHROMBIN TIME: 10.2 SEC
RBC # BLD AUTO: 2.54 M/UL
SODIUM SERPL-SCNC: 132 MMOL/L
WBC # BLD AUTO: 0.45 K/UL

## 2017-02-14 PROCEDURE — 85610 PROTHROMBIN TIME: CPT

## 2017-02-14 PROCEDURE — 84100 ASSAY OF PHOSPHORUS: CPT

## 2017-02-14 PROCEDURE — 85007 BL SMEAR W/DIFF WBC COUNT: CPT

## 2017-02-14 PROCEDURE — 93005 ELECTROCARDIOGRAM TRACING: CPT

## 2017-02-14 PROCEDURE — 25000003 PHARM REV CODE 250: Performed by: INTERNAL MEDICINE

## 2017-02-14 PROCEDURE — 93010 ELECTROCARDIOGRAM REPORT: CPT | Mod: ,,, | Performed by: INTERNAL MEDICINE

## 2017-02-14 PROCEDURE — 25000003 PHARM REV CODE 250: Performed by: NURSE PRACTITIONER

## 2017-02-14 PROCEDURE — 86900 BLOOD TYPING SEROLOGIC ABO: CPT

## 2017-02-14 PROCEDURE — 63600175 PHARM REV CODE 636 W HCPCS: Performed by: INTERNAL MEDICINE

## 2017-02-14 PROCEDURE — 83735 ASSAY OF MAGNESIUM: CPT

## 2017-02-14 PROCEDURE — 85027 COMPLETE CBC AUTOMATED: CPT

## 2017-02-14 PROCEDURE — 80053 COMPREHEN METABOLIC PANEL: CPT

## 2017-02-14 PROCEDURE — 83615 LACTATE (LD) (LDH) ENZYME: CPT

## 2017-02-14 PROCEDURE — 86850 RBC ANTIBODY SCREEN: CPT

## 2017-02-14 PROCEDURE — 20600001 HC STEP DOWN PRIVATE ROOM

## 2017-02-14 PROCEDURE — 25000003 PHARM REV CODE 250: Performed by: STUDENT IN AN ORGANIZED HEALTH CARE EDUCATION/TRAINING PROGRAM

## 2017-02-14 PROCEDURE — 99233 SBSQ HOSP IP/OBS HIGH 50: CPT | Mod: ,,, | Performed by: INTERNAL MEDICINE

## 2017-02-14 RX ORDER — CEFEPIME HYDROCHLORIDE 2 G/50ML
2 INJECTION, SOLUTION INTRAVENOUS
Status: DISCONTINUED | OUTPATIENT
Start: 2017-02-15 | End: 2017-02-15

## 2017-02-14 RX ADMIN — DOLUTEGRAVIR SODIUM 50 MG: 50 TABLET, FILM COATED ORAL at 08:02

## 2017-02-14 RX ADMIN — MAGNESIUM OXIDE TAB 400 MG (241.3 MG ELEMENTAL MG) 400 MG: 400 (241.3 MG) TAB at 01:02

## 2017-02-14 RX ADMIN — CIPROFLOXACIN HYDROCHLORIDE 500 MG: 500 TABLET, FILM COATED ORAL at 08:02

## 2017-02-14 RX ADMIN — LAMIVUDINE 300 MG: 100 TABLET, FILM COATED ORAL at 08:02

## 2017-02-14 RX ADMIN — VORICONAZOLE 200 MG: 200 TABLET, FILM COATED ORAL at 08:02

## 2017-02-14 RX ADMIN — DEXAMETHASONE 1 DROP: 1 SUSPENSION OPHTHALMIC at 06:02

## 2017-02-14 RX ADMIN — ABACAVIR 600 MG: 300 TABLET, FILM COATED ORAL at 08:02

## 2017-02-14 RX ADMIN — DEXAMETHASONE 1 DROP: 1 SUSPENSION OPHTHALMIC at 11:02

## 2017-02-14 RX ADMIN — MAGNESIUM OXIDE TAB 400 MG (241.3 MG ELEMENTAL MG) 400 MG: 400 (241.3 MG) TAB at 06:02

## 2017-02-14 RX ADMIN — Medication 10 ML: at 06:02

## 2017-02-14 RX ADMIN — Medication 10 ML: at 11:02

## 2017-02-14 RX ADMIN — STANDARDIZED SENNA CONCENTRATE AND DOCUSATE SODIUM 1 TABLET: 8.6; 5 TABLET, FILM COATED ORAL at 09:02

## 2017-02-14 RX ADMIN — VORICONAZOLE 200 MG: 200 TABLET, FILM COATED ORAL at 09:02

## 2017-02-14 RX ADMIN — STANDARDIZED SENNA CONCENTRATE AND DOCUSATE SODIUM 1 TABLET: 8.6; 5 TABLET, FILM COATED ORAL at 08:02

## 2017-02-14 RX ADMIN — ACETAMINOPHEN 650 MG: 325 TABLET ORAL at 11:02

## 2017-02-14 RX ADMIN — PROMETHAZINE HYDROCHLORIDE 12.5 MG: 25 INJECTION INTRAMUSCULAR; INTRAVENOUS at 09:02

## 2017-02-14 RX ADMIN — ONDANSETRON 16 MG: 8 TABLET, ORALLY DISINTEGRATING ORAL at 01:02

## 2017-02-14 RX ADMIN — MAGNESIUM OXIDE TAB 400 MG (241.3 MG ELEMENTAL MG) 400 MG: 400 (241.3 MG) TAB at 09:02

## 2017-02-14 RX ADMIN — CIPROFLOXACIN HYDROCHLORIDE 500 MG: 500 TABLET, FILM COATED ORAL at 09:02

## 2017-02-14 RX ADMIN — ONDANSETRON 8 MG: 8 TABLET, ORALLY DISINTEGRATING ORAL at 11:02

## 2017-02-14 RX ADMIN — ACYCLOVIR 400 MG: 200 CAPSULE ORAL at 09:02

## 2017-02-14 RX ADMIN — ACYCLOVIR 400 MG: 200 CAPSULE ORAL at 08:02

## 2017-02-14 RX ADMIN — SODIUM CHLORIDE: 0.9 INJECTION, SOLUTION INTRAVENOUS at 06:02

## 2017-02-14 NOTE — PLAN OF CARE
Problem: Patient Care Overview  Goal: Plan of Care Review  Outcome: Ongoing (interventions implemented as appropriate)  Pt AAOx4; independent. HR elevated during afternoon vitals; MD aware and Stat EKG ordered. Pt given scheduled and prn zofran during shift in addition to prn IV phenergan for nausea.  Pt had moderate relief of nausea, no episodes of emesis during shift. Pt continues to receive IV fluids. Eye drops administered every 6 hours during shift. Pt remained free from falls during shift. Bed lowest position and locked. Call light in reach. Pt has no further needs at this time; will continue to monitor.

## 2017-02-14 NOTE — PROGRESS NOTES
Dr. Altamirano notified of pt's HR and vitals; Stat EKG ordered.  Pt currently sitting up in chair.  Will continue to monitor.         02/14/17 1606   Vital Signs   Temp 100.2 °F (37.9 °C)   Temp src Oral   Pulse (!) 128   Heart Rate Source Manual   Resp 16   SpO2 97 %   Pulse Oximetry Type Intermittent   O2 Device (Oxygen Therapy) room air   /62   BP Location Left arm   BP Method Automatic   Patient Position Sitting

## 2017-02-14 NOTE — NURSING
Upon assessing, pt stated that he had one episode of emesis last night and that his stomach isn't feeling well this morning.  Pt was offered anti-emetic but refused stating he wanted some rest.  Will continue to monitor pt.

## 2017-02-14 NOTE — PROGRESS NOTES
Progress Note  Hematology / Bone Marrow Transplant                                                              Team: [facility]    Patient Name: Marck Howard  YOB: 1980    Admit Date: 1/17/2017                     LOS: 28    SUBJECTIVE:     Reason for Admission:  Acute leukemia  See H&P for detailed initial presentation history and ROS.      Interval history:  NAEON, VSS. Pt complains of ongoing fatigue, lethargy and nausea/vomiting. Pt denies any fevers, chills, chest pain or SOB.       Review of System:  Constitutional: -ve fever, -ve chills, negative for anorexia, malaise, night sweats and weight loss  Eyes: no visual changes, negative for irritation and redness  ENT: no nasal congestion or sore throat, negative for epistaxis, sore mouth and sore throat  Respiratory: no cough or shortness of breath, negative for dyspnea on exertion and wheezing  Cardiovascular: no chest pain or palpitations, negative for dyspnea, fatigue and palpitations  Gastrointestinal: no nausea or vomiting, no abdominal pain or change in bowel habits, negative for constipation, diarrhea and melena  Hematologic/Lymphatic: no easy bruising or lymphadenopathy, negative for bleeding  Musculoskeletal: no arthralgias or myalgias  Neurological: no seizures or tremors, negative for headaches and weakness  Behavioral/Psych: negative for anxiety and depression    OBJECTIVE:     Vital Signs Range (Last 24H):  Temp:  [98.3 °F (36.8 °C)-99.8 °F (37.7 °C)]   Pulse:  []   Resp:  [16-18]   BP: (101-108)/(45-68)   SpO2:  [98 %-100 %] Body mass index is 17.31 kg/(m^2).    I & O (Last 24H):    Intake/Output Summary (Last 24 hours) at 02/14/17 1134  Last data filed at 02/14/17 0800   Gross per 24 hour   Intake           2797.5 ml   Output             3150 ml   Net           -352.5 ml       Physical Exam:  General: well developed, cachectic, no distress  HENT: Head:normocephalic, atraumatic. Ears:right ear normal, left  ear normal. Nose: Nares normal. Septum midline. Mucosa normal. No drainage or sinus tenderness., no discharge. Throat: lips, mucosa, and tongue normal; teeth and gums normal and no throat erythema.  Eyes: conjunctivae/corneas clear. PERRL.   Neck: supple, symmetrical, trachea midline  Lungs:  clear to auscultation bilaterally and normal respiratory effort  Cardiovascular: Heart: tachycardic but otherwise regular rhythm, S1, S2 normal, no murmur, click, rub or gallop. Chest Wall: no tenderness. Extremities: no cyanosis or edema, or clubbing.   Abdomen: soft, non-tender non-distented; bowel sounds normal.   Skin: Skin color, texture, turgor normal. No rashes or lesions  Musculoskeletal:no clubbing, cyanosis  Neurologic: Mental status: Alert, oriented, thought content appropriate  Psych/Behavioral:  Normal.    Diagnostic Results:  Lab Results   Component Value Date    WBC 0.45 (LL) 02/14/2017    HGB 7.7 (L) 02/14/2017    HCT 22.9 (L) 02/14/2017    MCV 90 02/14/2017    PLT 34 (LL) 02/14/2017       Recent Labs  Lab 02/14/17  0313      *   K 4.0   CL 97   CO2 28   BUN 19   CREATININE 0.6   CALCIUM 8.7   MG 1.9     Lab Results   Component Value Date    INR 0.9 02/14/2017    INR 1.0 02/13/2017    INR 1.0 02/12/2017     No results found for: HGBA1C  Microbiology Results (last 7 days)     Procedure Component Value Units Date/Time    Blood culture [[id number]] Collected:  02/07/17 0943    Order Status:  Completed Specimen:  Blood Updated:  02/12/17 1212     Blood Culture, Routine No growth after 5 days.    Blood culture [[id number]] Collected:  02/07/17 0825    Order Status:  Completed Specimen:  Blood from Line, PICC Right Brachial Updated:  02/12/17 1212     Blood Culture, Routine No growth after 5 days.    Blood culture [[id number]] Collected:  02/04/17 0008    Order Status:  Completed Specimen:  Blood Updated:  02/09/17 0612     Blood Culture, Routine No growth after 5 days.    Blood culture [[id number]]  Collected:  02/03/17 0120    Order Status:  Completed Specimen:  Blood from Line, PICC Right Brachial Updated:  02/09/17 0612     Blood Culture, Routine No growth after 5 days.        Imaging Results:    CT C/A/P 1/19/17:  1.  Focus of groundglass attenuation with bronchiectasis/developing air bronchograms within the right middle lobe, nonspecific, however could reflect developing infectious process or possibly sequela of aspiration, clinical correlation for pneumonia is advised.  2.  Anterior mediastinum mass, suggesting thymic hyperplasia, clinical correlation advised.  3.  Constipation.  4.  Several additional findings above.    CXR 1/17/17: Heart size normal.  Mild accentuation of interstitial markings.  No significant air space consolidation or pleural effusion    2D Echo 1/17/17:  CONCLUSIONS     1 - Low normal left ventricular systolic function (EF 50-55%).     2 - Normal right ventricular systolic function .     3 - Normal left ventricular diastolic function.     FINAL PATHOLOGIC DIAGNOSIS (1/17/17):  BONE MARROW, RIGHT ILIAC CREST, ASPIRATE, CLOT, AND CORE BIOPSY:  --Markedly hypercellular marrow, essentially 100%, nearly completely replaced by acute myeloid leukemia, see  comment  --Rare background trilineage elements present  --No significant appreciable stainable iron  COMMENT: Concomitantly submitted flow cytometric analysis detects an increased population of blasts consistent  with acute myeloid leukemia, favor non-M3 subtype, although A CD34 positive/HLA-DR negative/equivocal  phenotype, and M3 APL subtype cannot be completely excluded. This increased population of myeloid blasts  shows expression of CD34, CD13, and CD33 (partial). This population also expresses CD 56 and CD7. It is  negative for  and other B and T lymphoid R crest tested including CD19 and cytoplasmic CD3, as well as  TdT. HLA-DR is equivocal.  The overall morphology of these blasts do not suggest acute promyelocytic leukemia.  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       Active Problems, Status & Treatment Plan Addressed Today:    # AML  - currently no evidence of DIC or tumor lysis. Daily TLS labs.   - noted to have new diagnosis of HIV (see below)   - s/p bone marrow biopsy (1/17/17) showing AML, cytogenetics and molecular markers done  - NPM1 and FLT3 negative  - will need transplant given high risk disease  - continue on empiric allopurinol  - 7+3 started 1/20/17  - rpt bone marrow biopsy 2/3, with circulating blasts in peripheral blood, 70% blasts, 100% cellularity, CD4, CD 13, CD34  -Continue MEC, started 2/9/17 (Day 8)     #HIV positive  - new diagnosis on this admit  - toxoplasma gondii WNL  - CD4: 118  - ID following, appreciate recs  - Started Triumeq 2/2/17  - ppx bactrim held given myelosuppressive effects, received Pentamidine 1/22/17  - plan to restart bactrim once counts recovered.     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   # Thrombocytopenia due to leukemia   - no overt signs of bleeding  - likely 2/2 underlying hematologic malignancy and chemotherapy  - continue to monitor and transfuse as indicated for Hgb <7.0, PLT <10       # Tobacco Abuse  - 26 pack year history  - advise smoking cessation   - nicotine patch prn    # H/O Alcohol Abuse  - drank at least 2 beers per day prior to admission  - no h/o DTs and now out of period for withdrawal    # Hypoalbuminemia  - checked 24 hour urine protein  - Dietary consulted  - encourage increased nutritional intake     DVT ppx: encourage ambulation  Diet: regular  Code Status: full    DISPO: pending chemotherapy for AML  and repeat bone marrow     Naresh Addison M.D.  PGY-3, Internal Medicine  [phone]

## 2017-02-14 NOTE — PLAN OF CARE
Problem: Patient Care Overview  Goal: Plan of Care Review  Outcome: Ongoing (interventions implemented as appropriate)  Pt AAOx4; independent. BP low but stable, temp has been staying in 99.0 range, patient noted to be sleeping more.  Received PO oxycodone x1 during shift. Remains on IVF's.  Noted fluid on floor this morning and patient stated he had vomited, offered zofran but denied it stated he felt better once he vomited. Magnesium replacements this morning x2 doses.  Eye drops administered every 6 hours during shift. Pt remained free from falls during shift. Bed lowest position and locked. Call light in reach. Pt has no further needs at this time; will continue to monitor.

## 2017-02-14 NOTE — PLAN OF CARE
MDR's with Dr Sierra.  Patient is day 8 of [facility]EC.  He complains of nausea and vomiting.  Next bone marrow biopsy planned for day 21.  Will continue to follow.

## 2017-02-15 LAB
ALBUMIN SERPL BCP-MCNC: 2.6 G/DL
ALP SERPL-CCNC: 128 U/L
ALT SERPL W/O P-5'-P-CCNC: 102 U/L
ANION GAP SERPL CALC-SCNC: 10 MMOL/L
ANISOCYTOSIS BLD QL SMEAR: SLIGHT
AST SERPL-CCNC: 12 U/L
BASOPHILS NFR BLD: 0 %
BILIRUB SERPL-MCNC: 0.6 MG/DL
BILIRUB UR QL STRIP: NEGATIVE
BLASTS NFR BLD MANUAL: 38.6 %
BUN SERPL-MCNC: 15 MG/DL
CALCIUM SERPL-MCNC: 9.6 MG/DL
CHLORIDE SERPL-SCNC: 99 MMOL/L
CLARITY UR REFRACT.AUTO: CLEAR
CO2 SERPL-SCNC: 26 MMOL/L
COLOR UR AUTO: YELLOW
CREAT SERPL-MCNC: 0.7 MG/DL
DIFFERENTIAL METHOD: ABNORMAL
EOSINOPHIL NFR BLD: 0 %
ERYTHROCYTE [DISTWIDTH] IN BLOOD BY AUTOMATED COUNT: 15.9 %
EST. GFR  (AFRICAN AMERICAN): >60 ML/MIN/1.73 M^2
EST. GFR  (NON AFRICAN AMERICAN): >60 ML/MIN/1.73 M^2
GLUCOSE SERPL-MCNC: 101 MG/DL
GLUCOSE UR QL STRIP: NEGATIVE
HCT VFR BLD AUTO: 25.2 %
HGB BLD-MCNC: 8.5 G/DL
HGB UR QL STRIP: NEGATIVE
INR PPP: 1.1
KETONES UR QL STRIP: NEGATIVE
LDH SERPL L TO P-CCNC: 347 U/L
LEUKOCYTE ESTERASE UR QL STRIP: NEGATIVE
LYMPHOCYTES NFR BLD: 61.4 %
MAGNESIUM SERPL-MCNC: 2.2 MG/DL
MCH RBC QN AUTO: 30.2 PG
MCHC RBC AUTO-ENTMCNC: 33.7 %
MCV RBC AUTO: 90 FL
MONOCYTES NFR BLD: 0 %
NEUTROPHILS NFR BLD: 0 %
NITRITE UR QL STRIP: NEGATIVE
OVALOCYTES BLD QL SMEAR: ABNORMAL
PH UR STRIP: 7 [PH] (ref 5–8)
PHOSPHATE SERPL-MCNC: 4.9 MG/DL
PLATELET # BLD AUTO: 33 K/UL
PLATELET BLD QL SMEAR: ABNORMAL
PMV BLD AUTO: 11.2 FL
POIKILOCYTOSIS BLD QL SMEAR: SLIGHT
POLYCHROMASIA BLD QL SMEAR: ABNORMAL
POTASSIUM SERPL-SCNC: 4.3 MMOL/L
PROT SERPL-MCNC: 7.4 G/DL
PROT UR QL STRIP: ABNORMAL
PROTHROMBIN TIME: 11.4 SEC
RBC # BLD AUTO: 2.81 M/UL
SMUDGE CELLS BLD QL SMEAR: PRESENT
SODIUM SERPL-SCNC: 135 MMOL/L
SP GR UR STRIP: 1.01 (ref 1–1.03)
URN SPEC COLLECT METH UR: ABNORMAL
UROBILINOGEN UR STRIP-ACNC: NEGATIVE EU/DL
WBC # BLD AUTO: 0.41 K/UL

## 2017-02-15 PROCEDURE — 25000003 PHARM REV CODE 250: Performed by: INTERNAL MEDICINE

## 2017-02-15 PROCEDURE — 85027 COMPLETE CBC AUTOMATED: CPT

## 2017-02-15 PROCEDURE — 85007 BL SMEAR W/DIFF WBC COUNT: CPT

## 2017-02-15 PROCEDURE — 80053 COMPREHEN METABOLIC PANEL: CPT

## 2017-02-15 PROCEDURE — 83615 LACTATE (LD) (LDH) ENZYME: CPT

## 2017-02-15 PROCEDURE — 81003 URINALYSIS AUTO W/O SCOPE: CPT

## 2017-02-15 PROCEDURE — 25000003 PHARM REV CODE 250: Performed by: NURSE PRACTITIONER

## 2017-02-15 PROCEDURE — 84100 ASSAY OF PHOSPHORUS: CPT

## 2017-02-15 PROCEDURE — 87077 CULTURE AEROBIC IDENTIFY: CPT

## 2017-02-15 PROCEDURE — 87040 BLOOD CULTURE FOR BACTERIA: CPT

## 2017-02-15 PROCEDURE — 63600175 PHARM REV CODE 636 W HCPCS: Performed by: INTERNAL MEDICINE

## 2017-02-15 PROCEDURE — 87186 SC STD MICRODIL/AGAR DIL: CPT

## 2017-02-15 PROCEDURE — 25000003 PHARM REV CODE 250: Performed by: STUDENT IN AN ORGANIZED HEALTH CARE EDUCATION/TRAINING PROGRAM

## 2017-02-15 PROCEDURE — 20600001 HC STEP DOWN PRIVATE ROOM

## 2017-02-15 PROCEDURE — 99233 SBSQ HOSP IP/OBS HIGH 50: CPT | Mod: ,,, | Performed by: INTERNAL MEDICINE

## 2017-02-15 PROCEDURE — 85610 PROTHROMBIN TIME: CPT

## 2017-02-15 PROCEDURE — 83735 ASSAY OF MAGNESIUM: CPT

## 2017-02-15 PROCEDURE — 87086 URINE CULTURE/COLONY COUNT: CPT

## 2017-02-15 RX ORDER — SODIUM CHLORIDE 9 MG/ML
INJECTION, SOLUTION INTRAVENOUS ONCE
Status: COMPLETED | OUTPATIENT
Start: 2017-02-15 | End: 2017-02-15

## 2017-02-15 RX ORDER — CEFEPIME HYDROCHLORIDE 2 G/50ML
2 INJECTION, SOLUTION INTRAVENOUS
Status: DISCONTINUED | OUTPATIENT
Start: 2017-02-15 | End: 2017-02-19

## 2017-02-15 RX ORDER — SODIUM CHLORIDE 9 MG/ML
INJECTION, SOLUTION INTRAVENOUS CONTINUOUS
Status: DISCONTINUED | OUTPATIENT
Start: 2017-02-15 | End: 2017-02-24 | Stop reason: HOSPADM

## 2017-02-15 RX ADMIN — VORICONAZOLE 200 MG: 200 TABLET, FILM COATED ORAL at 09:02

## 2017-02-15 RX ADMIN — SODIUM CHLORIDE: 0.9 INJECTION, SOLUTION INTRAVENOUS at 12:02

## 2017-02-15 RX ADMIN — DEXAMETHASONE 1 DROP: 1 SUSPENSION OPHTHALMIC at 06:02

## 2017-02-15 RX ADMIN — CEFEPIME HYDROCHLORIDE 2 G: 2 INJECTION, SOLUTION INTRAVENOUS at 12:02

## 2017-02-15 RX ADMIN — CEFEPIME HYDROCHLORIDE 2 G: 2 INJECTION, SOLUTION INTRAVENOUS at 08:02

## 2017-02-15 RX ADMIN — MAGNESIUM OXIDE TAB 400 MG (241.3 MG ELEMENTAL MG) 400 MG: 400 (241.3 MG) TAB at 09:02

## 2017-02-15 RX ADMIN — SODIUM CHLORIDE: 0.9 INJECTION, SOLUTION INTRAVENOUS at 05:02

## 2017-02-15 RX ADMIN — DEXAMETHASONE 1 DROP: 1 SUSPENSION OPHTHALMIC at 11:02

## 2017-02-15 RX ADMIN — DOLUTEGRAVIR SODIUM 50 MG: 50 TABLET, FILM COATED ORAL at 09:02

## 2017-02-15 RX ADMIN — OXYCODONE HYDROCHLORIDE 5 MG: 5 TABLET ORAL at 05:02

## 2017-02-15 RX ADMIN — ACYCLOVIR 400 MG: 200 CAPSULE ORAL at 09:02

## 2017-02-15 RX ADMIN — Medication 10 ML: at 12:02

## 2017-02-15 RX ADMIN — STANDARDIZED SENNA CONCENTRATE AND DOCUSATE SODIUM 1 TABLET: 8.6; 5 TABLET, FILM COATED ORAL at 09:02

## 2017-02-15 RX ADMIN — Medication 10 ML: at 06:02

## 2017-02-15 RX ADMIN — CIPROFLOXACIN HYDROCHLORIDE 500 MG: 500 TABLET, FILM COATED ORAL at 09:02

## 2017-02-15 RX ADMIN — SODIUM CHLORIDE 500 ML: 0.9 INJECTION, SOLUTION INTRAVENOUS at 04:02

## 2017-02-15 RX ADMIN — ABACAVIR 600 MG: 300 TABLET, FILM COATED ORAL at 09:02

## 2017-02-15 RX ADMIN — VANCOMYCIN HYDROCHLORIDE 750 MG: 1 INJECTION, POWDER, LYOPHILIZED, FOR SOLUTION INTRAVENOUS at 01:02

## 2017-02-15 RX ADMIN — LAMIVUDINE 300 MG: 100 TABLET, FILM COATED ORAL at 09:02

## 2017-02-15 RX ADMIN — VANCOMYCIN HYDROCHLORIDE 750 MG: 1 INJECTION, POWDER, LYOPHILIZED, FOR SOLUTION INTRAVENOUS at 09:02

## 2017-02-15 NOTE — PROGRESS NOTES
BMT team notified of BP and HR; 500 cc bolus ordered.  Will continue to monitor.  Pt currently awake, alert, and resting in bed with no complaints.       02/15/17 1119   Vital Signs   Temp 99 °F (37.2 °C)   Temp src Oral   Pulse (!) 120   Heart Rate Source Monitor   Resp 18   SpO2 98 %   Pulse Oximetry Type Intermittent   O2 Device (Oxygen Therapy) room air   BP (!) 91/54   BP Location Left arm   BP Method Automatic   Patient Position Lying

## 2017-02-15 NOTE — PROGRESS NOTES
BMT team notified of pt's vitals including elevated HR at 130.  No new orders at this time, will continue to monitor.  Pt currently resting in bed.       02/15/17 0754   Vital Signs   Temp 99.3 °F (37.4 °C)   Temp src Oral   Pulse (!) 130   Heart Rate Source Monitor   Resp 16   SpO2 97 %   Pulse Oximetry Type Intermittent   O2 Device (Oxygen Therapy) room air   BP 98/66   MAP (mmHg) 71   BP Location Left arm   BP Method Automatic   Patient Position Lying

## 2017-02-15 NOTE — PLAN OF CARE
Problem: Patient Care Overview  Goal: Plan of Care Review  Outcome: Ongoing (interventions implemented as appropriate)  Pt AAOx4; independent. HR remain elevated in 110's MD aware  EKG was done yesterday to evaluate. No reports of nausea/vomiting or pain this shift. Patient does appear to more fatigued. Did not any of dinner tray. Pt continues to receive IV fluids. Eye drops administered every 6 hours during shift. Tmax this shift 101.0, pan cultures done, chest x-ray done and started on cefepime. Gave tylenol for fever and he responded well. Last temp check he was 98.3.  Pt remained free from falls during shift. Bed lowest position and locked. Call light in reach. Pt has no further needs at this time; will continue to monitor.

## 2017-02-15 NOTE — PROGRESS NOTES
margoth Loza patients temp 101.0, orders placed for blood cultures, u/a, urine culture, c-xray and cefepime. Tylenol given. Awaiting c-xray and blood cx's will start antibiotics once cultures are done.

## 2017-02-15 NOTE — PROGRESS NOTES
"Progress Note  Hematology / Bone Marrow Transplant                                                              [place] [facility] [place] [facility]    Patient Name: Marck Howard  YOB: 1980    Admit Date: 1/17/2017                     LOS: 29    SUBJECTIVE:     Patient had temperature of 101 overnight, however feels "fine." Work-up ordered. Blood Culture and urine culture pending. CXR revealed, "Increasing airspace opacity in the right midlung suspicious for pneumonia, increased since prior study."    OBJECTIVE:     Vital Signs Range (Last 24H):  Temp:  [98.3 °F (36.8 °C)-101 °F (38.3 °C)]   Pulse:  [112-139]   Resp:  [16]   BP: ()/(51-66)   SpO2:  [97 %-99 %] Body mass index is 17.31 kg/(m^2).    I & O (Last 24H):    Intake/Output Summary (Last 24 hours) at 02/15/17 1102  Last data filed at 02/15/17 0628   Gross per 24 hour   Intake          1745.01 ml   Output             2700 ml   Net          -954.99 ml       Physical Exam:  General: well developed, cachectic, no distress  HENT: Head: NCAT. Mucosa dry   Eyes: conjunctivae/corneas clear. EOMI.   Neck: supple, symmetrical, trachea midline  Lungs:  Diffuse coarse breath sounds.  CV: Heart: tachycardic but otherwise regular rhythm, no murmur appreciated  Extremities: no cyanosis, edema, or clubbing.   Abdomen: soft, non-tender non-distented; bowel sounds normal.   Skin: Skin color, texture, turgor normal. No rashes or lesions  Musculoskeletal: no clubbing, cyanosis  Neurologic: Mental status: Alert, oriented, awake x3. No focal deficits.   Psych/Behavioral:  Normal mood and affect    Diagnostic Results:  Lab Results   Component Value Date    WBC 0.41 (LL) 02/15/2017    HGB 8.5 (L) 02/15/2017    HCT 25.2 (L) 02/15/2017    MCV 90 02/15/2017    PLT 33 (LL) 02/15/2017       Recent Labs  Lab 02/15/17  0400      *   K 4.3   CL 99   CO2 26   BUN 15   CREATININE 0.7   CALCIUM 9.6   MG 2.2     Lab Results   Component Value Date    INR 1.1 " "02/15/2017    INR 0.9 02/14/2017    INR 1.0 02/13/2017     No results found for: HGBA1C  Microbiology Results (last 7 days)     Procedure Component Value Units Date/Time    Blood culture [[id number]] Collected:  02/15/17 0031    Order Status:  Completed Specimen:  Blood Updated:  02/15/17 0945     Blood Culture, Routine No Growth to date    Blood culture [[id number]] Collected:  02/15/17 0024    Order Status:  Completed Specimen:  Blood Updated:  02/15/17 0945     Blood Culture, Routine No Growth to date    Urine culture [[id number]] Collected:  02/15/17 0025    Order Status:  Sent Specimen:  Urine from Urine Updated:  02/15/17 0234    Blood culture [[id number]] Collected:  02/07/17 0943    Order Status:  Completed Specimen:  Blood Updated:  02/12/17 1212     Blood Culture, Routine No growth after 5 days.    Blood culture [[id number]] Collected:  02/07/17 0825    Order Status:  Completed Specimen:  Blood from Line, PICC Right Brachial Updated:  02/12/17 1212     Blood Culture, Routine No growth after 5 days.    Blood culture [[id number]] Collected:  02/04/17 0008    Order Status:  Completed Specimen:  Blood Updated:  02/09/17 0612     Blood Culture, Routine No growth after 5 days.    Blood culture [[id number]] Collected:  02/03/17 2330    Order Status:  Completed Specimen:  Blood from Line, PICC Right Brachial Updated:  02/09/17 0612     Blood Culture, Routine No growth after 5 days.        ASSESSMENT/PLAN:   Active Problems, Status & Treatment Plan Addressed Today:    # Neutropenic Fever  - 101 overnight, work-up ordered. Blood Culture and urine culture pending. CXR (2/15) revealed, "Increasing airspace opacity in the right midlung suspicious for pneumonia, increased since prior study."  - Started on Cefepime overnight. Will add Vancomycin today.     # AML  - s/p bone marrow biopsy (1/17/17) showing AML, cytogenetics and molecular markers reveal : NPM1 and FLT3 negative. Will need transplant given high risk disease  - 7+3 " started 1/20/17  - Repeat Bone marrow biopsy 2/3, with circulating blasts in peripheral blood, 70% blasts, 100% cellularity.   - Started on MEC on 2/9/17- today is Day 9     #HIV positive (CD4: 118)  - new diagnosis on this admit  - toxoplasma gondii WNL  - ID following, appreciate recs, started Triumeq 2/2/17  - ppx bactrim held given myelosuppressive effects, received Pentamidine 1/22/17  - plan to restart bactrim once counts recovered.     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   # Thrombocytopenia due to leukemia   - no overt signs of bleeding  - likely 2/2 underlying hematologic malignancy and chemotherapy  - continue to monitor and transfuse as indicated for Hgb <7.0, PLT <10     # Tobacco Abuse  - 26 pack year history  - advise smoking cessation   - nicotine patch prn    # H/O Alcohol Abuse  - drank at least 2 beers per day prior to admission  - no h/o DTs and now out of period for withdrawal    # Hypoalbuminemia  - checked 24 hour urine protein  - Dietary consulted  - encourage increased nutritional intake     DVT ppx: encourage ambulation  Diet: regular  Code Status: full    DISPO: pending chemotherapy for AML and repeat bone marrow     Case seen and discussed with staff attending, Dr. Sierra.    Deborah Reilly MD  [employer] [facility] Fellow PGY5

## 2017-02-15 NOTE — PROGRESS NOTES
Notified dr. Gill of patient temp 100.7 , patient with no complaints. No new orders at this time just monitor.

## 2017-02-16 PROBLEM — R78.81 BACTEREMIA DUE TO ENTEROCOCCUS: Status: ACTIVE | Noted: 2017-02-16

## 2017-02-16 PROBLEM — B95.2 BACTEREMIA DUE TO ENTEROCOCCUS: Status: ACTIVE | Noted: 2017-02-16

## 2017-02-16 LAB
ALBUMIN SERPL BCP-MCNC: 2.1 G/DL
ALP SERPL-CCNC: 162 U/L
ALT SERPL W/O P-5'-P-CCNC: 82 U/L
ANION GAP SERPL CALC-SCNC: 7 MMOL/L
ANISOCYTOSIS BLD QL SMEAR: SLIGHT
AST SERPL-CCNC: 24 U/L
BACTERIA UR CULT: NO GROWTH
BASOPHILS # BLD AUTO: 0 K/UL
BASOPHILS NFR BLD: 0 %
BILIRUB SERPL-MCNC: 0.4 MG/DL
BUN SERPL-MCNC: 13 MG/DL
CALCIUM SERPL-MCNC: 8.7 MG/DL
CHLORIDE SERPL-SCNC: 101 MMOL/L
CK SERPL-CCNC: 9 U/L
CO2 SERPL-SCNC: 24 MMOL/L
CREAT SERPL-MCNC: 0.6 MG/DL
DIFFERENTIAL METHOD: ABNORMAL
EOSINOPHIL # BLD AUTO: 0 K/UL
EOSINOPHIL NFR BLD: 0 %
ERYTHROCYTE [DISTWIDTH] IN BLOOD BY AUTOMATED COUNT: 16.1 %
EST. GFR  (AFRICAN AMERICAN): >60 ML/MIN/1.73 M^2
EST. GFR  (NON AFRICAN AMERICAN): >60 ML/MIN/1.73 M^2
GLUCOSE SERPL-MCNC: 108 MG/DL
HCT VFR BLD AUTO: 20.9 %
HGB BLD-MCNC: 7.1 G/DL
HYPOCHROMIA BLD QL SMEAR: ABNORMAL
INR PPP: 1.1
LACTATE SERPL-SCNC: 1.4 MMOL/L
LDH SERPL L TO P-CCNC: 245 U/L
LYMPHOCYTES # BLD AUTO: 0.4 K/UL
LYMPHOCYTES NFR BLD: 85.7 %
MAGNESIUM SERPL-MCNC: 1.8 MG/DL
MCH RBC QN AUTO: 30.2 PG
MCHC RBC AUTO-ENTMCNC: 34 %
MCV RBC AUTO: 89 FL
MONOCYTES # BLD AUTO: 0.1 K/UL
MONOCYTES NFR BLD: 11.9 %
NEUTROPHILS # BLD AUTO: 0 K/UL
NEUTROPHILS NFR BLD: 2.4 %
PHOSPHATE SERPL-MCNC: 2.5 MG/DL
PLATELET # BLD AUTO: 17 K/UL
PLATELET BLD QL SMEAR: ABNORMAL
PMV BLD AUTO: 9.9 FL
POTASSIUM SERPL-SCNC: 4 MMOL/L
PROT SERPL-MCNC: 6.5 G/DL
PROTHROMBIN TIME: 11.2 SEC
RBC # BLD AUTO: 2.35 M/UL
SODIUM SERPL-SCNC: 132 MMOL/L
VANCOMYCIN TROUGH SERPL-MCNC: 7.2 UG/ML
WBC # BLD AUTO: 0.42 K/UL

## 2017-02-16 PROCEDURE — 83615 LACTATE (LD) (LDH) ENZYME: CPT

## 2017-02-16 PROCEDURE — 99233 SBSQ HOSP IP/OBS HIGH 50: CPT | Mod: ,,, | Performed by: INTERNAL MEDICINE

## 2017-02-16 PROCEDURE — 93010 ELECTROCARDIOGRAM REPORT: CPT | Mod: ,,, | Performed by: INTERNAL MEDICINE

## 2017-02-16 PROCEDURE — 85025 COMPLETE CBC W/AUTO DIFF WBC: CPT

## 2017-02-16 PROCEDURE — 25000003 PHARM REV CODE 250: Performed by: INTERNAL MEDICINE

## 2017-02-16 PROCEDURE — 82550 ASSAY OF CK (CPK): CPT

## 2017-02-16 PROCEDURE — 25000003 PHARM REV CODE 250: Performed by: STUDENT IN AN ORGANIZED HEALTH CARE EDUCATION/TRAINING PROGRAM

## 2017-02-16 PROCEDURE — 63600175 PHARM REV CODE 636 W HCPCS: Performed by: INTERNAL MEDICINE

## 2017-02-16 PROCEDURE — 63600175 PHARM REV CODE 636 W HCPCS: Performed by: NURSE PRACTITIONER

## 2017-02-16 PROCEDURE — 80202 ASSAY OF VANCOMYCIN: CPT

## 2017-02-16 PROCEDURE — 85610 PROTHROMBIN TIME: CPT

## 2017-02-16 PROCEDURE — 99232 SBSQ HOSP IP/OBS MODERATE 35: CPT | Mod: ,,, | Performed by: INTERNAL MEDICINE

## 2017-02-16 PROCEDURE — 25000003 PHARM REV CODE 250: Performed by: NURSE PRACTITIONER

## 2017-02-16 PROCEDURE — 83605 ASSAY OF LACTIC ACID: CPT

## 2017-02-16 PROCEDURE — 80053 COMPREHEN METABOLIC PANEL: CPT

## 2017-02-16 PROCEDURE — 20600001 HC STEP DOWN PRIVATE ROOM

## 2017-02-16 PROCEDURE — 93005 ELECTROCARDIOGRAM TRACING: CPT

## 2017-02-16 PROCEDURE — 84100 ASSAY OF PHOSPHORUS: CPT

## 2017-02-16 PROCEDURE — 83735 ASSAY OF MAGNESIUM: CPT

## 2017-02-16 RX ADMIN — Medication 10 ML: at 06:02

## 2017-02-16 RX ADMIN — DEXAMETHASONE 1 DROP: 1 SUSPENSION OPHTHALMIC at 04:02

## 2017-02-16 RX ADMIN — POTASSIUM & SODIUM PHOSPHATES POWDER PACK 280-160-250 MG 1 PACKET: 280-160-250 PACK at 12:02

## 2017-02-16 RX ADMIN — VORICONAZOLE 200 MG: 200 TABLET, FILM COATED ORAL at 09:02

## 2017-02-16 RX ADMIN — CEFEPIME HYDROCHLORIDE 2 G: 2 INJECTION, SOLUTION INTRAVENOUS at 11:02

## 2017-02-16 RX ADMIN — ACYCLOVIR 400 MG: 200 CAPSULE ORAL at 08:02

## 2017-02-16 RX ADMIN — MAGNESIUM OXIDE TAB 400 MG (241.3 MG ELEMENTAL MG) 400 MG: 400 (241.3 MG) TAB at 09:02

## 2017-02-16 RX ADMIN — VORICONAZOLE 200 MG: 200 TABLET, FILM COATED ORAL at 08:02

## 2017-02-16 RX ADMIN — CEFEPIME HYDROCHLORIDE 2 G: 2 INJECTION, SOLUTION INTRAVENOUS at 04:02

## 2017-02-16 RX ADMIN — POTASSIUM & SODIUM PHOSPHATES POWDER PACK 280-160-250 MG 1 PACKET: 280-160-250 PACK at 06:02

## 2017-02-16 RX ADMIN — ACYCLOVIR 400 MG: 200 CAPSULE ORAL at 09:02

## 2017-02-16 RX ADMIN — OXYCODONE HYDROCHLORIDE 5 MG: 5 TABLET ORAL at 03:02

## 2017-02-16 RX ADMIN — POTASSIUM & SODIUM PHOSPHATES POWDER PACK 280-160-250 MG 1 PACKET: 280-160-250 PACK at 09:02

## 2017-02-16 RX ADMIN — MAGNESIUM OXIDE TAB 400 MG (241.3 MG ELEMENTAL MG) 400 MG: 400 (241.3 MG) TAB at 06:02

## 2017-02-16 RX ADMIN — ACETAMINOPHEN 650 MG: 325 TABLET ORAL at 05:02

## 2017-02-16 RX ADMIN — POTASSIUM & SODIUM PHOSPHATES POWDER PACK 280-160-250 MG 1 PACKET: 280-160-250 PACK at 08:02

## 2017-02-16 RX ADMIN — MAGNESIUM OXIDE TAB 400 MG (241.3 MG ELEMENTAL MG) 400 MG: 400 (241.3 MG) TAB at 08:02

## 2017-02-16 RX ADMIN — SODIUM CHLORIDE: 0.9 INJECTION, SOLUTION INTRAVENOUS at 11:02

## 2017-02-16 RX ADMIN — SODIUM CHLORIDE: 0.9 INJECTION, SOLUTION INTRAVENOUS at 06:02

## 2017-02-16 RX ADMIN — DAPTOMYCIN 415 MG: 500 INJECTION, POWDER, LYOPHILIZED, FOR SOLUTION INTRAVENOUS at 04:02

## 2017-02-16 RX ADMIN — ABACAVIR 600 MG: 300 TABLET, FILM COATED ORAL at 08:02

## 2017-02-16 RX ADMIN — CEFEPIME HYDROCHLORIDE 2 G: 2 INJECTION, SOLUTION INTRAVENOUS at 09:02

## 2017-02-16 RX ADMIN — OXYCODONE HYDROCHLORIDE 5 MG: 5 TABLET ORAL at 10:02

## 2017-02-16 RX ADMIN — DOLUTEGRAVIR SODIUM 50 MG: 50 TABLET, FILM COATED ORAL at 08:02

## 2017-02-16 RX ADMIN — SODIUM CHLORIDE 1000 ML: 0.9 INJECTION, SOLUTION INTRAVENOUS at 03:02

## 2017-02-16 RX ADMIN — DEXAMETHASONE 1 DROP: 1 SUSPENSION OPHTHALMIC at 01:02

## 2017-02-16 RX ADMIN — STANDARDIZED SENNA CONCENTRATE AND DOCUSATE SODIUM 1 TABLET: 8.6; 5 TABLET, FILM COATED ORAL at 09:02

## 2017-02-16 RX ADMIN — LAMIVUDINE 300 MG: 100 TABLET, FILM COATED ORAL at 08:02

## 2017-02-16 RX ADMIN — SODIUM CHLORIDE: 0.9 INJECTION, SOLUTION INTRAVENOUS at 05:02

## 2017-02-16 RX ADMIN — VANCOMYCIN HYDROCHLORIDE 750 MG: 1 INJECTION, POWDER, LYOPHILIZED, FOR SOLUTION INTRAVENOUS at 06:02

## 2017-02-16 NOTE — PLAN OF CARE
Problem: Patient Care Overview  Goal: Plan of Care Review  Outcome: Ongoing (interventions implemented as appropriate)  Pt AAOX. Pt is free from falls. Denied pain or any acute issues. Pt midnight blood pressure was 87/52. Notified Md Nesbitt. New orders given. 1 liter bolus was given to pt stat and stat labs were draw and spent. Critical value reported by lab WBC 4.2, platelets 17,000. Notified MD no new orders given. Upon completion of 1 liter bolus. Pt's blood pressure was recheck 95/85. Pt spiked temp at 4:45 am, Acetaminophen 650 was given per orders. Pt is on continuous fluid Normal saline at 150ml/hr. Bed is on lowest postion, bed is locked and pt's call bell is within reach. Will recheck pt's temp

## 2017-02-16 NOTE — PROGRESS NOTES
Progress Note  Hematology / Bone Marrow Transplant                                                              Team: [facility] [place] [facility]    Patient Name: Marck Howard  YOB: 1980    Admit Date: 1/17/2017                     LOS: 30    SUBJECTIVE:     Febrile/tachycardic overnight, however, ablated with prn medications. Pt denies any fevers, chills this AM as well as any SOB, chest pain, abdominal pain, urinary symptoms or respiratory symptoms.     OBJECTIVE:     Vital Signs Range (Last 24H):  Temp:  [98.2 °F (36.8 °C)-100.2 °F (37.9 °C)]   Pulse:  [112-123]   Resp:  [16-18]   BP: (87-98)/(45-59)   SpO2:  [94 %-100 %] Body mass index is 17.31 kg/(m^2).    I & O (Last 24H):    Intake/Output Summary (Last 24 hours) at 02/16/17 1129  Last data filed at 02/16/17 0800   Gross per 24 hour   Intake          2756.67 ml   Output             2975 ml   Net          -218.33 ml       Physical Exam:  General: well developed, cachectic, no distress  HENT: Head: NCAT. Mucosa dry   Eyes: conjunctivae/corneas clear. EOMI.   Neck: supple, symmetrical, trachea midline  Lungs:  Diffuse coarse breath sounds.  CV: Heart: tachycardic but otherwise regular rhythm, no murmur appreciated  Extremities: no cyanosis, edema, or clubbing.   Abdomen: soft, non-tender non-distented; bowel sounds normal.   Skin: Skin color, texture, turgor normal. No rashes or lesions  Musculoskeletal: no clubbing, cyanosis  Neurologic: Mental status: Alert, oriented, awake x3. No focal deficits.   Psych/Behavioral:  Normal mood and affect    Diagnostic Results:  Lab Results   Component Value Date    WBC 0.42 (LL) 02/16/2017    HGB 7.1 (L) 02/16/2017    HCT 20.9 (L) 02/16/2017    MCV 89 02/16/2017    PLT 17 (LL) 02/16/2017       Recent Labs  Lab 02/16/17  0314      *   K 4.0      CO2 24   BUN 13   CREATININE 0.6   CALCIUM 8.7   MG 1.8     Lab Results   Component Value Date    INR 1.1 02/16/2017    INR 1.1 02/15/2017    INR  0.9 02/14/2017     No results found for: HGBA1C  Microbiology Results (last 7 days)     Procedure Component Value Units Date/Time    Urine culture [[id number]] Collected:  02/15/17 0025    Order Status:  Completed Specimen:  Urine from Urine Updated:  02/16/17 0813     Urine Culture, Routine No growth    Blood culture [[id number]] Collected:  02/15/17 0024    Order Status:  Completed Specimen:  Blood Updated:  02/15/17 1830     Blood Culture, Routine Gram stain aer bottle: Gram positive cocci in chains resembling Strep      Blood Culture, Routine Gram stain dianne bottle: Gram positive cocci in chains resembling Strep      Blood Culture, Routine Results called to and read back by: Nohelia Peterson RN  02/15/2017  18:30    Blood culture [[id number]] Collected:  02/15/17 0031    Order Status:  Completed Specimen:  Blood Updated:  02/15/17 1830     Blood Culture, Routine Gram stain aer bottle: Gram positive cocci in chains resembling Strep      Blood Culture, Routine Gram stain dianne bottle: Gram positive cocci in chains resembling Strep      Blood Culture, Routine Results called to and read back by: Nohelia Peterson RN  02/15/2017  18:30    Blood culture [[id number]] Collected:  02/07/17 0943    Order Status:  Completed Specimen:  Blood Updated:  02/12/17 1212     Blood Culture, Routine No growth after 5 days.    Blood culture [[id number]] Collected:  02/07/17 0825    Order Status:  Completed Specimen:  Blood from Line, PICC Right Brachial Updated:  02/12/17 1212     Blood Culture, Routine No growth after 5 days.        ASSESSMENT/PLAN:   Active Problems, Status & Treatment Plan Addressed Today:    # Neutropenic Fever  Febrile again overnight  CXR concerning for mild PNA RLL, however, more likely source is gram positive rods in blood   --will cont vanc/cefepime   --will pancx if pt cont to be febrile     # AML  - s/p bone marrow biopsy (1/17/17) showing AML, cytogenetics and molecular markers reveal : NPM1 and FLT3 negative. Will need  transplant given high risk disease  - 7+3 started 1/20/17  - Repeat Bone marrow biopsy 2/3, with circulating blasts in peripheral blood, 70% blasts, 100% cellularity.   - Started on MEC on 2/9/17- today is Day 10      #HIV positive (CD4: 118)  - new diagnosis on this admit  - toxoplasma gondii WNL  - ID following, appreciate recs, started Triumeq 2/2/17  - ppx bactrim held given myelosuppressive effects, received Pentamidine 1/22/17  - plan to restart bactrim once counts recovered.     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   # Thrombocytopenia due to leukemia   - no overt signs of bleeding  - likely 2/2 underlying hematologic malignancy and chemotherapy  - continue to monitor and transfuse as indicated for Hgb <7.0, PLT <10     # Tobacco Abuse  - 26 pack year history  - advise smoking cessation   - nicotine patch prn    # H/O Alcohol Abuse  - drank at least 2 beers per day prior to admission  - no h/o DTs and now out of period for withdrawal    # Hypoalbuminemia  - checked 24 hour urine protein  - Dietary consulted  - encourage increased nutritional intake     DVT ppx: encourage ambulation  Diet: regular  Code Status: full    DISPO: pending chemotherapy for AML and repeat bone marrow     Case seen and discussed with staff attending, Dr. Sierra.    Naresh Addison M.D.  PGY-3, Internal Medicine  [phone]

## 2017-02-16 NOTE — PROGRESS NOTES
02/16/17 0848   Vital Signs   Temp 98.8 °F (37.1 °C)   Temp src Oral   Pulse (!) 112   Heart Rate Source Monitor   Resp 16   SpO2 98 %   O2 Device (Oxygen Therapy) room air   BP (!) 94/50   BP Location Left arm   BP Method Manual   Patient Position Lying   Dr. Altamirano notified of BP. No new orders at this time. Will continue to monitor.

## 2017-02-17 PROBLEM — R78.81 BACTEREMIA DUE TO ENTEROCOCCUS: Status: ACTIVE | Noted: 2017-02-17

## 2017-02-17 PROBLEM — B95.2 BACTEREMIA DUE TO ENTEROCOCCUS: Status: ACTIVE | Noted: 2017-02-17

## 2017-02-17 LAB
ABO + RH BLD: NORMAL
ALBUMIN SERPL BCP-MCNC: 2 G/DL
ALBUMIN SERPL BCP-MCNC: 2 G/DL
ALP SERPL-CCNC: 187 U/L
ALP SERPL-CCNC: 187 U/L
ALT SERPL W/O P-5'-P-CCNC: 79 U/L
ALT SERPL W/O P-5'-P-CCNC: 79 U/L
ANION GAP SERPL CALC-SCNC: 6 MMOL/L
ANION GAP SERPL CALC-SCNC: 6 MMOL/L
ANISOCYTOSIS BLD QL SMEAR: SLIGHT
ANISOCYTOSIS BLD QL SMEAR: SLIGHT
AST SERPL-CCNC: 27 U/L
AST SERPL-CCNC: 27 U/L
BACTERIA BLD CULT: NORMAL
BASOPHILS NFR BLD: 0 %
BASOPHILS NFR BLD: 0 %
BILIRUB SERPL-MCNC: 0.4 MG/DL
BILIRUB SERPL-MCNC: 0.4 MG/DL
BLASTS NFR BLD MANUAL: 5 %
BLASTS NFR BLD MANUAL: 5 %
BLD GP AB SCN CELLS X3 SERPL QL: NORMAL
BLD PROD TYP BPU: NORMAL
BLD PROD TYP BPU: NORMAL
BLOOD UNIT EXPIRATION DATE: NORMAL
BLOOD UNIT EXPIRATION DATE: NORMAL
BLOOD UNIT TYPE CODE: 5100
BLOOD UNIT TYPE CODE: 5100
BLOOD UNIT TYPE: NORMAL
BLOOD UNIT TYPE: NORMAL
BUN SERPL-MCNC: 10 MG/DL
BUN SERPL-MCNC: 10 MG/DL
CALCIUM SERPL-MCNC: 8.6 MG/DL
CALCIUM SERPL-MCNC: 8.6 MG/DL
CHLORIDE SERPL-SCNC: 102 MMOL/L
CHLORIDE SERPL-SCNC: 102 MMOL/L
CO2 SERPL-SCNC: 23 MMOL/L
CO2 SERPL-SCNC: 23 MMOL/L
CODING SYSTEM: NORMAL
CODING SYSTEM: NORMAL
CREAT SERPL-MCNC: 0.6 MG/DL
CREAT SERPL-MCNC: 0.6 MG/DL
DACRYOCYTES BLD QL SMEAR: ABNORMAL
DACRYOCYTES BLD QL SMEAR: ABNORMAL
DIFFERENTIAL METHOD: ABNORMAL
DIFFERENTIAL METHOD: ABNORMAL
DISPENSE STATUS: NORMAL
DISPENSE STATUS: NORMAL
EOSINOPHIL NFR BLD: 0 %
EOSINOPHIL NFR BLD: 0 %
ERYTHROCYTE [DISTWIDTH] IN BLOOD BY AUTOMATED COUNT: 15.9 %
ERYTHROCYTE [DISTWIDTH] IN BLOOD BY AUTOMATED COUNT: 15.9 %
EST. GFR  (AFRICAN AMERICAN): >60 ML/MIN/1.73 M^2
EST. GFR  (AFRICAN AMERICAN): >60 ML/MIN/1.73 M^2
EST. GFR  (NON AFRICAN AMERICAN): >60 ML/MIN/1.73 M^2
EST. GFR  (NON AFRICAN AMERICAN): >60 ML/MIN/1.73 M^2
GLUCOSE SERPL-MCNC: 87 MG/DL
GLUCOSE SERPL-MCNC: 87 MG/DL
HCT VFR BLD AUTO: 18.5 %
HCT VFR BLD AUTO: 18.5 %
HGB BLD-MCNC: 6.5 G/DL
HGB BLD-MCNC: 6.5 G/DL
INR PPP: 1
LDH SERPL L TO P-CCNC: 213 U/L
LYMPHOCYTES NFR BLD: 90 %
LYMPHOCYTES NFR BLD: 90 %
MAGNESIUM SERPL-MCNC: 1.8 MG/DL
MCH RBC QN AUTO: 30.5 PG
MCH RBC QN AUTO: 30.5 PG
MCHC RBC AUTO-ENTMCNC: 35.1 %
MCHC RBC AUTO-ENTMCNC: 35.1 %
MCV RBC AUTO: 87 FL
MCV RBC AUTO: 87 FL
MONOCYTES NFR BLD: 5 %
MONOCYTES NFR BLD: 5 %
NEUTROPHILS NFR BLD: 0 %
NEUTROPHILS NFR BLD: 0 %
NUM UNITS TRANS PACKED RBC: NORMAL
NUM UNITS TRANS WBC-POOR PLATPHERESIS: NORMAL
PHOSPHATE SERPL-MCNC: 2.7 MG/DL
PLATELET # BLD AUTO: 9 K/UL
PLATELET # BLD AUTO: 9 K/UL
PLATELET BLD QL SMEAR: ABNORMAL
PLATELET BLD QL SMEAR: ABNORMAL
PMV BLD AUTO: ABNORMAL FL
PMV BLD AUTO: ABNORMAL FL
POIKILOCYTOSIS BLD QL SMEAR: SLIGHT
POIKILOCYTOSIS BLD QL SMEAR: SLIGHT
POTASSIUM SERPL-SCNC: 3.7 MMOL/L
POTASSIUM SERPL-SCNC: 3.7 MMOL/L
PROT SERPL-MCNC: 6.2 G/DL
PROT SERPL-MCNC: 6.2 G/DL
PROTHROMBIN TIME: 11.1 SEC
RBC # BLD AUTO: 2.13 M/UL
RBC # BLD AUTO: 2.13 M/UL
SODIUM SERPL-SCNC: 131 MMOL/L
SODIUM SERPL-SCNC: 131 MMOL/L
WBC # BLD AUTO: 0.47 K/UL
WBC # BLD AUTO: 0.47 K/UL

## 2017-02-17 PROCEDURE — 86850 RBC ANTIBODY SCREEN: CPT

## 2017-02-17 PROCEDURE — 25000003 PHARM REV CODE 250: Performed by: STUDENT IN AN ORGANIZED HEALTH CARE EDUCATION/TRAINING PROGRAM

## 2017-02-17 PROCEDURE — 87040 BLOOD CULTURE FOR BACTERIA: CPT | Mod: 59

## 2017-02-17 PROCEDURE — 25000003 PHARM REV CODE 250: Performed by: INTERNAL MEDICINE

## 2017-02-17 PROCEDURE — P9037 PLATE PHERES LEUKOREDU IRRAD: HCPCS

## 2017-02-17 PROCEDURE — 83615 LACTATE (LD) (LDH) ENZYME: CPT

## 2017-02-17 PROCEDURE — 83735 ASSAY OF MAGNESIUM: CPT

## 2017-02-17 PROCEDURE — 36415 COLL VENOUS BLD VENIPUNCTURE: CPT

## 2017-02-17 PROCEDURE — 25000003 PHARM REV CODE 250: Performed by: NURSE PRACTITIONER

## 2017-02-17 PROCEDURE — 85610 PROTHROMBIN TIME: CPT

## 2017-02-17 PROCEDURE — 86900 BLOOD TYPING SEROLOGIC ABO: CPT

## 2017-02-17 PROCEDURE — 99406 BEHAV CHNG SMOKING 3-10 MIN: CPT

## 2017-02-17 PROCEDURE — 86920 COMPATIBILITY TEST SPIN: CPT

## 2017-02-17 PROCEDURE — 80053 COMPREHEN METABOLIC PANEL: CPT

## 2017-02-17 PROCEDURE — 63600175 PHARM REV CODE 636 W HCPCS: Performed by: INTERNAL MEDICINE

## 2017-02-17 PROCEDURE — 84100 ASSAY OF PHOSPHORUS: CPT

## 2017-02-17 PROCEDURE — 85007 BL SMEAR W/DIFF WBC COUNT: CPT

## 2017-02-17 PROCEDURE — 99233 SBSQ HOSP IP/OBS HIGH 50: CPT | Mod: ,,, | Performed by: INTERNAL MEDICINE

## 2017-02-17 PROCEDURE — 85027 COMPLETE CBC AUTOMATED: CPT

## 2017-02-17 PROCEDURE — 99232 SBSQ HOSP IP/OBS MODERATE 35: CPT | Mod: ,,, | Performed by: INTERNAL MEDICINE

## 2017-02-17 PROCEDURE — P9040 RBC LEUKOREDUCED IRRADIATED: HCPCS

## 2017-02-17 PROCEDURE — 36430 TRANSFUSION BLD/BLD COMPNT: CPT

## 2017-02-17 PROCEDURE — 20600001 HC STEP DOWN PRIVATE ROOM

## 2017-02-17 RX ORDER — HYDROCODONE BITARTRATE AND ACETAMINOPHEN 500; 5 MG/1; MG/1
TABLET ORAL
Status: DISCONTINUED | OUTPATIENT
Start: 2017-02-17 | End: 2017-02-20 | Stop reason: SDUPTHER

## 2017-02-17 RX ORDER — ACETAMINOPHEN 325 MG/1
650 TABLET ORAL ONCE
Status: COMPLETED | OUTPATIENT
Start: 2017-02-17 | End: 2017-02-17

## 2017-02-17 RX ORDER — DIPHENHYDRAMINE HCL 25 MG
25 CAPSULE ORAL ONCE
Status: COMPLETED | OUTPATIENT
Start: 2017-02-17 | End: 2017-02-17

## 2017-02-17 RX ADMIN — Medication 10 ML: at 01:02

## 2017-02-17 RX ADMIN — POTASSIUM CHLORIDE 20 MEQ: 750 CAPSULE, EXTENDED RELEASE ORAL at 04:02

## 2017-02-17 RX ADMIN — DOLUTEGRAVIR SODIUM 50 MG: 50 TABLET, FILM COATED ORAL at 09:02

## 2017-02-17 RX ADMIN — DAPTOMYCIN 415 MG: 500 INJECTION, POWDER, LYOPHILIZED, FOR SOLUTION INTRAVENOUS at 04:02

## 2017-02-17 RX ADMIN — ACETAMINOPHEN 650 MG: 325 TABLET ORAL at 05:02

## 2017-02-17 RX ADMIN — LAMIVUDINE 300 MG: 100 TABLET, FILM COATED ORAL at 09:02

## 2017-02-17 RX ADMIN — CEFEPIME HYDROCHLORIDE 2 G: 2 INJECTION, SOLUTION INTRAVENOUS at 09:02

## 2017-02-17 RX ADMIN — Medication 10 ML: at 05:02

## 2017-02-17 RX ADMIN — CEFEPIME HYDROCHLORIDE 2 G: 2 INJECTION, SOLUTION INTRAVENOUS at 05:02

## 2017-02-17 RX ADMIN — VORICONAZOLE 200 MG: 200 TABLET, FILM COATED ORAL at 09:02

## 2017-02-17 RX ADMIN — ACETAMINOPHEN 650 MG: 325 TABLET ORAL at 09:02

## 2017-02-17 RX ADMIN — MAGNESIUM OXIDE TAB 400 MG (241.3 MG ELEMENTAL MG) 400 MG: 400 (241.3 MG) TAB at 05:02

## 2017-02-17 RX ADMIN — ACYCLOVIR 400 MG: 200 CAPSULE ORAL at 09:02

## 2017-02-17 RX ADMIN — MAGNESIUM OXIDE TAB 400 MG (241.3 MG ELEMENTAL MG) 400 MG: 400 (241.3 MG) TAB at 09:02

## 2017-02-17 RX ADMIN — DIPHENHYDRAMINE HYDROCHLORIDE 25 MG: 25 CAPSULE ORAL at 09:02

## 2017-02-17 RX ADMIN — MAGNESIUM OXIDE TAB 400 MG (241.3 MG ELEMENTAL MG) 400 MG: 400 (241.3 MG) TAB at 04:02

## 2017-02-17 RX ADMIN — CEFEPIME HYDROCHLORIDE 2 G: 2 INJECTION, SOLUTION INTRAVENOUS at 11:02

## 2017-02-17 RX ADMIN — ABACAVIR 600 MG: 300 TABLET, FILM COATED ORAL at 09:02

## 2017-02-17 NOTE — ASSESSMENT & PLAN NOTE
Patient with neutropenic fevers and BC now + 2/2 for enterococcus - sensitivities to follow     Rec:   1. Keep on dapto and cefipime for now   2. Await sensitivities of the enterococcus   3. Repeat BC in AM

## 2017-02-17 NOTE — PROGRESS NOTES
[facility]  Adult Nutrition  Consult Note    SUMMARY     Recommendations    Recommendation/Intervention:   1. Continue high calorie/high protein diet - double portions  2. Continue Boost Plus TID -  vanilla   3. Encourage optimal PO intake of >% to meet EEN/EPN   4. RD to monitor and follow up    Goals: Pt will meet >85% EEN with po intake + ONS     Nutrition Goal Status: progressing towards goal  Communication of RD Recs: reviewed with RN    Continuum of Care Plan    Referral to Outpatient Services: other (see comments) (Nutrition D/C Planning: high tiago/protein + ONS)    Reason for Assessment    Reason for Assessment: RD follow-up  Diagnosis: cancer diagnosis/related complications (acute leukemia)  Relevent Medical History: No PMH   Interdisciplinary Rounds: attended     General Information Comments: Pt consuming adequate intake. Po intake has been declining from decreased appetite, however RD provided guidance. Promoting ONS intake -- pt communicated he was saving the ONS for home. RD will continue to monitor.    Nutrition Prescription Ordered    Current Diet Order: Regular + Double portions  Nutrition Order Comments: 25-75%      Oral Nutrition Supplement: Boost Plus     Evaluation of Received Nutrients/Fluid Intake     Oral Fluid (mL): 840      IV Fluid (mL): 50       Nutrition Risk Screen     Nutrition Risk Screen: no indicators present    Nutrition/Diet History    Patient Reported Diet/Restrictions/Preferences: general  Typical Food/Fluid Intake: 25-75%  Food Preferences:  (no cultural or [employer]ist needs identified)     Factors Affecting Nutritional Intake:  (-)     Labs/Tests/Procedures/Meds     Pertinent Labs Reviewed: reviewed  Pertinent Labs Comments: glc 130, Na 140, K 4.1, BUN 18, Cr 0.6, H&H 7.9/24.2, plt 21  Pertinent Medications Reviewed: reviewed  Pertinent Medications Comments: cipro, acyclovir, senna dosucate, heparin, cytarabine, ondasetron    Physical Findings    Overall  Physical Appearance: underweight  Tubes:  (-)  Oral/Mouth Cavity: other (see comments) (dry/chapped lips)  Skin: intact    Anthropometrics     Height (inches): 63.9 in  Weight Method: Standard Scale  Weight (kg): 41.3 kg  Ideal Body Weight (IBW), Male: 129.4 lb     % Ideal Body Weight, Male (lb): 70.36 lb     BMI (kg/m2): 15.68  BMI Grade: less than 16 protein-energy malnutrition grade III        % Weight Change: 15.4 kg (27% x2-3 months)  Weight Loss: unintentional     Estimated/Assessed Needs    Weight Used For Calorie Calculations: 44.6 kg (98 lb 5.2 oz)   Height (cm): 162.3 cm     Energy Need Method: Kcal/kg (3873-4957 kcal (45-50 kcal/kg))     RMR ([place]-St. Jeor Equation): 1245        Weight Used For Protein Calculations: 44.6 kg (98 lb 5.2 oz)  Protein Requirements: 67-80 g    Fluid Need Method: RDA Method (or per MD)     Monitor and Evaluation    Food and Nutrient Intake: food and beverage intake, energy intake  Food and Nutrient Adminstration: diet order     Physical Activity and Function: nutrition-related ADLs and IADLs  Anthropometric Measurements: weight, weight change, body mass index  Biochemical Data, Medical Tests and Procedures: electrolyte and renal panel, glucose/endocrine profile, gastrointestinal profile, inflammatory profile, lipid profile  Nutrition-Focused Physical Findings: overall appearance    Nutrition Risk    Level of Risk: high    Nutrition Follow-Up    RD Follow-up?: Yes    Assessment and Plan    PES: Increased nutrient needs r/t weight loss and malnutrition + chemo + HIV and ca dx aeb increased EEN/EPN

## 2017-02-17 NOTE — PLAN OF CARE
Problem: Patient Care Overview  Goal: Plan of Care Review  Remained safe. ST on tele. SBP upper 80s to 90s. Temp 99.7 at time of 1600 vitals. Rechecked and temp 100.4. Will continue to monitor. 1 unit prbc and 1 unit platelets ordered and transfused. Pre-meds given. Pt tolerated well. Tongue white and patchy. Reported to MD and dukes ordered and administered. Mag and K+ replaced. Neutropenic and thrombocytopenic precautions maintained. Poor appetite. Complained of mouth pain. Fall precautions maintained and callbell and personal items kept within reach. No apparent distress.

## 2017-02-17 NOTE — SUBJECTIVE & OBJECTIVE
History reviewed. No pertinent past medical history.    History reviewed. No pertinent past surgical history.    Review of patient's allergies indicates:  No Known Allergies    Medications:  No prescriptions prior to admission.     Antibiotics     Start     Stop Route Frequency Ordered    02/15/17 1230  ceFEPIme in dextrose 5% 2 gram/50 mL IVPB 2 g      -- IV Every 8 hours (non-standard times) 02/15/17 1224    02/16/17 1630  daptomycin (CUBICIN) 415 mg in sodium chloride 0.9% IVPB      -- IV Every 24 hours (non-standard times) 02/16/17 1527        Antifungals     Start     Stop Route Frequency Ordered    01/22/17 0900  voriconazole tablet 200 mg      -- Oral 2 times daily 01/22/17 0754        Antivirals         Stop Route Frequency     acyclovir      -- Oral 2 times daily     abacavir      -- Oral Daily     lamivudine      -- Oral Daily     dolutegravir      -- Oral Daily             There is no immunization history on file for this patient.    Family History     None        Social History     Social History    Marital status: Single     Spouse name: N/A    Number of children: N/A    Years of education: N/A     Social History Main Topics    Smoking status: Current Every Day Smoker     Packs/day: 1.00     Years: 20.00     Types: Cigarettes    Smokeless tobacco: None      Comment: pt will contact clinic when ready    Alcohol use Yes    Drug use: Yes     Special: Marijuana    Sexual activity: Not Currently     Partners: Female     Birth control/ protection: Condom      Comment: per report last in 2005     Other Topics Concern    None     Social History Narrative     Travel History:   Has patient traveled outside of the [place]?  Not Relevant  Has patient traveled outside of [place]? Not Relevant      Review of Systems   Constitutional: Positive for fatigue and fever.   HENT: Negative.    Eyes: Negative.    Respiratory: Negative.    Cardiovascular: Negative.    Gastrointestinal: Negative.    Endocrine:  Negative.    Genitourinary: Negative.    Musculoskeletal: Negative.    Skin: Negative.      Objective:     Vital Signs (Most Recent):  Temp: 99.6 °F (37.6 °C) (02/16/17 1535)  Pulse: (!) 121 (02/16/17 1601)  Resp: 18 (02/16/17 1535)  BP: (!) 105/59 (02/16/17 1535)  SpO2: 96 % (02/16/17 1535) Vital Signs (24h Range):  Temp:  [98.2 °F (36.8 °C)-100.2 °F (37.9 °C)] 99.6 °F (37.6 °C)  Pulse:  [112-130] 121  Resp:  [16-18] 18  SpO2:  [94 %-99 %] 96 %  BP: ()/(45-59) 105/59     Weight: 41.3 kg (91 lb 2.6 oz)  Body mass index is 15.7 kg/(m^2).    Estimated Creatinine Clearance: 97.8 mL/min (based on Cr of 0.6).    Physical Exam   Constitutional: He is oriented to person, place, and time. He appears well-developed and well-nourished.   HENT:   Head: Normocephalic and atraumatic.   Eyes: Pupils are equal, round, and reactive to light.   Neck: Normal range of motion. Neck supple.   Cardiovascular: Normal rate and regular rhythm.    Pulmonary/Chest: Effort normal and breath sounds normal.   Abdominal: Soft. Bowel sounds are normal.   Musculoskeletal: Normal range of motion.   Neurological: He is alert and oriented to person, place, and time.   Skin: Skin is warm and dry.   Psychiatric: His behavior is normal. Thought content normal.   Vitals reviewed.      Significant Labs: All pertinent labs within the past 24 hours have been reviewed.    Significant Imaging: I have reviewed all pertinent imaging results/findings within the past 24 hours.

## 2017-02-17 NOTE — PROGRESS NOTES
Patient Consulted by CTTS:     The following was discussed by the Tobacco Treatment Specialist:  ? Relevance of Quitting  ? Risk to Health  ? Long Term Risk  ? Risk for Others  ? Rewards of Quitting  ? Motivation Intervention to Quit          Pt refused information on the smoking cessation program.

## 2017-02-17 NOTE — PLAN OF CARE
MDR's with Dr Sierra.  Patient is day 11 of MEC.  Patient has + blood cultures and on IV abx.  ID following.  D/c pending day 21 BMB and count recovery.  Will continue to follow.

## 2017-02-17 NOTE — PROGRESS NOTES
ID Attending  Infectious Disease Follow Up Note      Assessment:  1. Enterococcal bactermia  2. AIDS  3. AML    Plan and Recommendations:  1. Continue daptomycin for now given E faecium spp. Will make additional recs once sensitivities available  2. Started on MEC 2/9 after 7 + 3 started 1/20 for AML, and he remains neutropenic. Therefore reasonable to continue anti-GNR IV agent following fever on 2/14 until cultures finalized. Given alternative cause of fever here (GPC bacteremia), can consider switching to PO anti-GNR for prophyalxis  3. Receiving ABC/3TC/DTG for HIV treatment which is reasonable; CD4# will remain low with AML and/or chemo, although it is important to verify HIV suppression to maximize chance for good treatment outcome, especially when considering BMT (this statement is an extrapolation from outcomes with other malignancies and BMT [lymphoma] as there are insufficient data for AML in this context). Therefore, would recheck HIV viral load in this setting as it has been approx one month since starting ART  4. Continue voriconazole and acyclovir for prophylaxis related to neutropenia in AML setting  5. Monthly pentamidine is reasonable for PJP prophylaxis in this setting. Will need again next week. Since this approach is somewhat less efficacious and may result in unusual presentations with breakthrough infections, should still consider PJP if new pulmonary syndrome arises    Need to consider source of enterococcal bacteremia here (typically GI, , or less commonly an indwelling line)    Problem List:  Active Hospital Problems    Diagnosis  POA    *Acute leukemia [C95.00]  Yes    Bacteremia due to Enterococcus [R78.81]  Unknown    Acute myeloid leukemia not having achieved remission [C92.00]  Yes    Fever [R50.9]  Yes    Tobacco abuse [Z72.0]  Yes    HIV (human immunodeficiency virus infection) [Z21]  Yes      Resolved Hospital Problems    Diagnosis Date Resolved POA   No resolved problems to  display.        Subjective and Interval History:  - No fever since 2/14 (101)  - Cx from 2/15 collected from blood, but location not specified (although  in time), thus far showing E faecium  - WBC low with AML; ANC has been low since mid-Jan.   - In terms of enterococcal coverage, vancomycin was given (2250mg x 1) on 2/15, dapto 415mg x 1 given on 2/16 (and continued)  - for HIV, remains on ABC, 3TC, and DTG; CD4 221 (7%) on 1/18; HIV Phenosense with no significant mutations for NRTI, INSTI not done. HIV VL 1/18 = 22,121       Medications:  Antibiotics:   Antibiotics     Start     Stop Route Frequency Ordered    02/15/17 1230  ceFEPIme in dextrose 5% 2 gram/50 mL IVPB 2 g      -- IV Every 8 hours (non-standard times) 02/15/17 1224    02/16/17 1630  daptomycin (CUBICIN) 415 mg in sodium chloride 0.9% IVPB      -- IV Every 24 hours (non-standard times) 02/16/17 1527          Physical Exam:  Vitals:    02/17/17 1032   BP: (!) 88/52   Pulse: 108   Resp: 18   Temp: 98.5 °F (36.9 °C)      Temp:  [98.1 °F (36.7 °C)-99.7 °F (37.6 °C)]     General: well developed, cachectic, no distress  HENT: Head: NCAT. +Thrush   Eyes: conjunctivae/corneas clear. EOMI.   Neck: supple, symmetrical, trachea midline  Lungs: Diffuse coarse breath sounds.  CV: Heart: tachycardic but otherwise regular rhythm, no murmur appreciated  Extremities: no cyanosis, edema, or clubbing.   Abdomen: soft, non-tender non-distented; bowel sounds normal.   Skin: Skin color, texture, turgor normal. No rashes or lesions  Musculoskeletal: no clubbing, cyanosis  Neurologic: Mental status: Alert, oriented, awake x3. No focal deficits.   Psych/Behavioral: Normal mood and affect      Labs:  CBC:   Lab Results   Component Value Date    WBC 0.47 (LL) 02/17/2017    WBC 0.47 (LL) 02/17/2017    WBC 0.42 (LL) 02/16/2017    WBC 0.41 (LL) 02/15/2017    WBC 0.45 (LL) 02/14/2017    HCT 18.5 (LL) 02/17/2017    HCT 18.5 (LL) 02/17/2017    PLT 9 (LL) 02/17/2017    PLT 9  (LL) 02/17/2017        BMP:   Recent Labs  Lab 02/17/17  0530   GLU 87  87   *  131*   K 3.7  3.7     102   CO2 23  23   BUN 10  10   CREATININE 0.6  0.6   CALCIUM 8.6*  8.6*   MG 1.8       LFT: Lab Results   Component Value Date    ALT 79 (H) 02/17/2017    ALT 79 (H) 02/17/2017    AST 27 02/17/2017    AST 27 02/17/2017    GGT 38 01/17/2017    ALKPHOS 187 (H) 02/17/2017    ALKPHOS 187 (H) 02/17/2017    BILITOT 0.4 02/17/2017    BILITOT 0.4 02/17/2017         Microbiology x 7d:   Microbiology Results (last 7 days)     Procedure Component Value Units Date/Time    Blood culture [[id number]] Collected:  02/17/17 0732    Order Status:  Sent Specimen:  Blood Updated:  02/17/17 0830    Blood Culture #2 **CANNOT BE ORDERED STAT** [[id number]] Collected:  02/17/17 0732    Order Status:  Sent Specimen:  Blood Updated:  02/17/17 0830    Blood culture [[id number]]     Order Status:  Canceled Specimen:  Blood     Blood Culture #2 **CANNOT BE ORDERED STAT** [[id number]]     Order Status:  Canceled Specimen:  Blood     Blood culture [[id number]] Collected:  02/15/17 0031    Order Status:  Completed Specimen:  Blood Updated:  02/16/17 1437     Blood Culture, Routine Gram stain aer bottle: Gram positive cocci in chains resembling Strep      Blood Culture, Routine Gram stain dianne bottle: Gram positive cocci in chains resembling Strep      Blood Culture, Routine Results called to and read back by: Nohelia Peterson RN  02/15/2017  18:30     Blood Culture, Routine --     ENTEROCOCCUS SPECIES  Identification pending  For susceptibility see order # [id number]      Blood culture [[id number]] Collected:  02/15/17 0024    Order Status:  Completed Specimen:  Blood Updated:  02/16/17 1436     Blood Culture, Routine Gram stain aer bottle: Gram positive cocci in chains resembling Strep      Blood Culture, Routine Gram stain dianne bottle: Gram positive cocci in chains resembling Strep      Blood Culture, Routine Results called to and read  back by: Nohelia Peterson RN  02/15/2017  18:30     Blood Culture, Routine --     ENTEROCOCCUS FAECIUM  Susceptibility pending      Urine culture [[id number]] Collected:  02/15/17 0025    Order Status:  Completed Specimen:  Urine from Urine Updated:  02/16/17 0813     Urine Culture, Routine No growth    Blood culture [[id number]] Collected:  02/07/17 0943    Order Status:  Completed Specimen:  Blood Updated:  02/12/17 1212     Blood Culture, Routine No growth after 5 days.    Blood culture [[id number]] Collected:  02/07/17 0825    Order Status:  Completed Specimen:  Blood from Line, PICC Right Brachial Updated:  02/12/17 1212     Blood Culture, Routine No growth after 5 days.

## 2017-02-17 NOTE — PLAN OF CARE
Problem: Patient Care Overview  Goal: Plan of Care Review  Outcome: Ongoing (interventions implemented as appropriate)  Pt remained afebrile throughout shift. IV cefepime continued. Vanc discontinued. Daptomycin administered. Mag and phos replaced. ID consulted. EKG = ST. No acute events. Bed in lowest position, nonskid footwear on pt, call light and possessions within reach, side rails up x2. Pt voices understanding to call for assistance. Will continue to monitor.

## 2017-02-17 NOTE — PROGRESS NOTES
Progress Note  Hematology / Bone Marrow Transplant                                                              Team: [facility]    Patient Name: Marck Howard  YOB: 1980    Admit Date: 1/17/2017                     LOS: 31    SUBJECTIVE:     NAEON, VSS. Pt with no complaints overnight. Thrush on physical exam today, however, pt denies any symptoms consistent with mucositis. Needs 1U pRBC today     OBJECTIVE:     Vital Signs Range (Last 24H):  Temp:  [98.1 °F (36.7 °C)-99.7 °F (37.6 °C)]   Pulse:  []   Resp:  [16-18]   BP: ()/(52-65)   SpO2:  [96 %-99 %] Body mass index is 15.7 kg/(m^2).    I & O (Last 24H):    Intake/Output Summary (Last 24 hours) at 02/17/17 0952  Last data filed at 02/17/17 0923   Gross per 24 hour   Intake             6060 ml   Output             4300 ml   Net             1760 ml       Physical Exam:  General: well developed, cachectic, no distress  HENT: Head: NCAT. Thrush   Eyes: conjunctivae/corneas clear. EOMI.   Neck: supple, symmetrical, trachea midline  Lungs:  Diffuse coarse breath sounds.  CV: Heart: tachycardic but otherwise regular rhythm, no murmur appreciated  Extremities: no cyanosis, edema, or clubbing.   Abdomen: soft, non-tender non-distented; bowel sounds normal.   Skin: Skin color, texture, turgor normal. No rashes or lesions  Musculoskeletal: no clubbing, cyanosis  Neurologic: Mental status: Alert, oriented, awake x3. No focal deficits.   Psych/Behavioral:  Normal mood and affect    Diagnostic Results:  Lab Results   Component Value Date    WBC 0.47 (LL) 02/17/2017    WBC 0.47 (LL) 02/17/2017    HGB 6.5 (L) 02/17/2017    HGB 6.5 (L) 02/17/2017    HCT 18.5 (LL) 02/17/2017    HCT 18.5 (LL) 02/17/2017    MCV 87 02/17/2017    MCV 87 02/17/2017    PLT 17 (LL) 02/16/2017       Recent Labs  Lab 02/17/17  0530   GLU 87  87   *  131*   K 3.7  3.7     102   CO2 23  23   BUN 10  10   CREATININE 0.6  0.6   CALCIUM 8.6*   8.6*   MG 1.8     Lab Results   Component Value Date    INR 1.0 02/17/2017    INR 1.1 02/16/2017    INR 1.1 02/15/2017     No results found for: HGBA1C  Microbiology Results (last 7 days)     Procedure Component Value Units Date/Time    Blood culture [[id number]] Collected:  02/17/17 0732    Order Status:  Sent Specimen:  Blood Updated:  02/17/17 0830    Blood Culture #2 **CANNOT BE ORDERED STAT** [[id number]] Collected:  02/17/17 0732    Order Status:  Sent Specimen:  Blood Updated:  02/17/17 0830    Blood culture [[id number]]     Order Status:  Canceled Specimen:  Blood     Blood Culture #2 **CANNOT BE ORDERED STAT** [[id number]]     Order Status:  Canceled Specimen:  Blood     Blood culture [[id number]] Collected:  02/15/17 0031    Order Status:  Completed Specimen:  Blood Updated:  02/16/17 1437     Blood Culture, Routine Gram stain aer bottle: Gram positive cocci in chains resembling Strep      Blood Culture, Routine Gram stain dianne bottle: Gram positive cocci in chains resembling Strep      Blood Culture, Routine Results called to and read back by: Nohelia Peterson RN  02/15/2017  18:30     Blood Culture, Routine --     ENTEROCOCCUS SPECIES  Identification pending  For susceptibility see order # [id number]      Blood culture [[id number]] Collected:  02/15/17 0024    Order Status:  Completed Specimen:  Blood Updated:  02/16/17 1436     Blood Culture, Routine Gram stain aer bottle: Gram positive cocci in chains resembling Strep      Blood Culture, Routine Gram stain dianne bottle: Gram positive cocci in chains resembling Strep      Blood Culture, Routine Results called to and read back by: Nohelia Peterson RN  02/15/2017  18:30     Blood Culture, Routine --     ENTEROCOCCUS FAECIUM  Susceptibility pending      Urine culture [[id number]] Collected:  02/15/17 0025    Order Status:  Completed Specimen:  Urine from Urine Updated:  02/16/17 0813     Urine Culture, Routine No growth    Blood culture [[id number]] Collected:  02/07/17 0943     Order Status:  Completed Specimen:  Blood Updated:  02/12/17 1212     Blood Culture, Routine No growth after 5 days.    Blood culture [[id number]] Collected:  02/07/17 0825    Order Status:  Completed Specimen:  Blood from Line, PICC Right Brachial Updated:  02/12/17 1212     Blood Culture, Routine No growth after 5 days.        ASSESSMENT/PLAN:   Active Problems, Status & Treatment Plan Addressed Today:    # Neutropenic Fever  Afebrile   CXR concerning for mild PNA RLL, however, more likely source is gram positive rods in blood   --Enterococcus sp, consulted ID   --per recs will cont daptomycin + cefepime   --will repeat BCx and follow up     # AML  - s/p bone marrow biopsy (1/17/17) showing AML, cytogenetics and molecular markers reveal : NPM1 and FLT3 negative. Will need transplant given high risk disease  - 7+3 started 1/20/17  - Repeat Bone marrow biopsy 2/3, with circulating blasts in peripheral blood, 70% blasts, 100% cellularity.   - Started on MEC on 2/9/17- today is Day 11      #HIV positive (CD4: 118)  - new diagnosis on this admit  - toxoplasma gondii WNL  - ID following, appreciate recs, started Triumeq 2/2/17  - ppx bactrim held given myelosuppressive effects, received Pentamidine 1/22/17  - plan to restart bactrim once counts recovered.     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   # Thrombocytopenia due to leukemia   - no overt signs of bleeding  - likely 2/2 underlying hematologic malignancy and chemotherapy  - continue to monitor and transfuse as indicated for Hgb <7.0, PLT <10     # Tobacco Abuse  - 26 pack year history  - advise smoking cessation   - nicotine patch prn    # H/O Alcohol Abuse  - drank at least 2 beers per day prior to admission  - no h/o DTs and now out of period for withdrawal    # Hypoalbuminemia  - checked 24 hour urine protein  - Dietary consulted  - encourage increased nutritional intake     DVT ppx: encourage ambulation  Diet: regular  Code Status: full    DISPO: pending chemotherapy for  AML and repeat bone marrow     Case seen and discussed with staff attending, Dr. Sierra.    Naresh Addison M.D.  PGY-3, Internal Medicine  [phone]

## 2017-02-17 NOTE — PLAN OF CARE
Problem: Patient Care Overview  Goal: Plan of Care Review  Outcome: Ongoing (interventions implemented as appropriate)  Pt is AAOx3.Pt turns independently, pt is aware of bony area and pressure reduction positions.Pt denies pian and/or discomfort at this time.No breakdown noted, po fluids encouraged.Pt is afebrile, po fluids encouraged, TCBD encouraged, hand hygiene encouragedabx treatment continues no s/s of adverse reactions will continue to monitor.Pt remains injury and fall free, non skid footwear donned, call light within reach, personal items within reach, bed in low/locked position, pt able to voice needs all needs voiced have been met at this time.

## 2017-02-17 NOTE — CONSULTS
[facility]  Infectious Disease  Consult Note    Patient Name: Marck Howard  MRN: [id number]  Admission Date: 1/17/2017  Hospital Length of Stay: 30 days  Attending Physician: Gerson Sierra MD  Primary Care Provider: Primary Doctor No     Isolation Status: No active isolations    Patient information was obtained from patient and ER records.      Consults  Assessment/Plan:     HIV (human immunodeficiency virus infection)  Keep on current ART of trimeq - he is tolerating it well       Bacteremia due to Enterococcus  Patient with neutropenic fevers and BC now + 2/2 for enterococcus - sensitivities to follow     Rec:   1. Keep on dapto and cefipime for now   2. Await sensitivities of the enterococcus   3. Repeat BC in AM     Dr. Voss will cover the ID service on 2-17-17     Thank you for your consult. I will follow-up with patient. Please contact us if you have any additional questions.    Tanner Doherty MD  Infectious Disease  [facility]    Subjective:     Principal Problem: Acute leukemia    HPI: 37yo previously healthy man who was admitted as a transfer from an OSH on 1/17/2017 with 3mos of weight loss, malaise, and fevers and was found to have a new diagnosis of AML and a new diagnosis of HIV (CF2=263/7%, VL 22k, GT pending). He is improved clinically since starting induction chemotherapy c/b neutropenic fevers that have now resolved and no issues with ARVs    Patient found to have enterococcal bacteremia and ID consulted for assistance with abx management  - patient currently on dapto and cefipime, acyclovir, voriconazole and for HIV trimeq      History reviewed. No pertinent past medical history.    History reviewed. No pertinent past surgical history.    Review of patient's allergies indicates:  No Known Allergies    Medications:  No prescriptions prior to admission.     Antibiotics     Start     Stop Route Frequency Ordered    02/15/17 1230  ceFEPIme in  dextrose 5% 2 gram/50 mL IVPB 2 g      -- IV Every 8 hours (non-standard times) 02/15/17 1224    02/16/17 1630  daptomycin (CUBICIN) 415 mg in sodium chloride 0.9% IVPB      -- IV Every 24 hours (non-standard times) 02/16/17 1527        Antifungals     Start     Stop Route Frequency Ordered    01/22/17 0900  voriconazole tablet 200 mg      -- Oral 2 times daily 01/22/17 0754        Antivirals         Stop Route Frequency     acyclovir      -- Oral 2 times daily     abacavir      -- Oral Daily     lamivudine      -- Oral Daily     dolutegravir      -- Oral Daily             There is no immunization history on file for this patient.    Family History     None        Social History     Social History    Marital status: Single     Spouse name: N/A    Number of children: N/A    Years of education: N/A     Social History Main Topics    Smoking status: Current Every Day Smoker     Packs/day: 1.00     Years: 20.00     Types: Cigarettes    Smokeless tobacco: None      Comment: pt will contact clinic when ready    Alcohol use Yes    Drug use: Yes     Special: Marijuana    Sexual activity: Not Currently     Partners: Female     Birth control/ protection: Condom      Comment: per report last in 2005     Other Topics Concern    None     Social History Narrative     Travel History:   Has patient traveled outside of the [place]?  Not Relevant  Has patient traveled outside of [place]? Not Relevant      Review of Systems   Constitutional: Positive for fatigue and fever.   HENT: Negative.    Eyes: Negative.    Respiratory: Negative.    Cardiovascular: Negative.    Gastrointestinal: Negative.    Endocrine: Negative.    Genitourinary: Negative.    Musculoskeletal: Negative.    Skin: Negative.      Objective:     Vital Signs (Most Recent):  Temp: 99.6 °F (37.6 °C) (02/16/17 1535)  Pulse: (!) 121 (02/16/17 1601)  Resp: 18 (02/16/17 1535)  BP: (!) 105/59 (02/16/17 1535)  SpO2: 96 % (02/16/17 1535) Vital Signs (24h  Range):  Temp:  [98.2 °F (36.8 °C)-100.2 °F (37.9 °C)] 99.6 °F (37.6 °C)  Pulse:  [112-130] 121  Resp:  [16-18] 18  SpO2:  [94 %-99 %] 96 %  BP: ()/(45-59) 105/59     Weight: 41.3 kg (91 lb 2.6 oz)  Body mass index is 15.7 kg/(m^2).    Estimated Creatinine Clearance: 97.8 mL/min (based on Cr of 0.6).    Physical Exam   Constitutional: He is oriented to person, place, and time. He appears well-developed and well-nourished.   HENT:   Head: Normocephalic and atraumatic.   Eyes: Pupils are equal, round, and reactive to light.   Neck: Normal range of motion. Neck supple.   Cardiovascular: Normal rate and regular rhythm.    Pulmonary/Chest: Effort normal and breath sounds normal.   Abdominal: Soft. Bowel sounds are normal.   Musculoskeletal: Normal range of motion.   Neurological: He is alert and oriented to person, place, and time.   Skin: Skin is warm and dry.   Psychiatric: His behavior is normal. Thought content normal.   Vitals reviewed.      Significant Labs: All pertinent labs within the past 24 hours have been reviewed.    Significant Imaging: I have reviewed all pertinent imaging results/findings within the past 24 hours.

## 2017-02-18 LAB
ALBUMIN SERPL BCP-MCNC: 2.2 G/DL
ALP SERPL-CCNC: 273 U/L
ALT SERPL W/O P-5'-P-CCNC: 77 U/L
ANION GAP SERPL CALC-SCNC: 6 MMOL/L
ANISOCYTOSIS BLD QL SMEAR: SLIGHT
AST SERPL-CCNC: 27 U/L
BASOPHILS NFR BLD: 0 %
BILIRUB SERPL-MCNC: 0.5 MG/DL
BLASTS NFR BLD MANUAL: 29.4 %
BUN SERPL-MCNC: 9 MG/DL
CALCIUM SERPL-MCNC: 8.8 MG/DL
CHLORIDE SERPL-SCNC: 106 MMOL/L
CO2 SERPL-SCNC: 25 MMOL/L
CREAT SERPL-MCNC: 0.5 MG/DL
DIFFERENTIAL METHOD: ABNORMAL
EOSINOPHIL NFR BLD: 0 %
ERYTHROCYTE [DISTWIDTH] IN BLOOD BY AUTOMATED COUNT: 15.5 %
EST. GFR  (AFRICAN AMERICAN): >60 ML/MIN/1.73 M^2
EST. GFR  (NON AFRICAN AMERICAN): >60 ML/MIN/1.73 M^2
GLUCOSE SERPL-MCNC: 100 MG/DL
HCT VFR BLD AUTO: 23.2 %
HGB BLD-MCNC: 8.1 G/DL
INR PPP: 1
LDH SERPL L TO P-CCNC: 236 U/L
LYMPHOCYTES NFR BLD: 67.7 %
MAGNESIUM SERPL-MCNC: 1.8 MG/DL
MCH RBC QN AUTO: 30.6 PG
MCHC RBC AUTO-ENTMCNC: 34.9 %
MCV RBC AUTO: 88 FL
MONOCYTES NFR BLD: 2.9 %
NEUTROPHILS NFR BLD: 0 %
OVALOCYTES BLD QL SMEAR: ABNORMAL
PHOSPHATE SERPL-MCNC: 2.8 MG/DL
PLATELET # BLD AUTO: 41 K/UL
PLATELET BLD QL SMEAR: ABNORMAL
PMV BLD AUTO: 10.7 FL
POIKILOCYTOSIS BLD QL SMEAR: SLIGHT
POLYCHROMASIA BLD QL SMEAR: ABNORMAL
POTASSIUM SERPL-SCNC: 3.9 MMOL/L
PROT SERPL-MCNC: 6.8 G/DL
PROTHROMBIN TIME: 10.8 SEC
RBC # BLD AUTO: 2.65 M/UL
SODIUM SERPL-SCNC: 137 MMOL/L
WBC # BLD AUTO: 0.49 K/UL

## 2017-02-18 PROCEDURE — 85027 COMPLETE CBC AUTOMATED: CPT

## 2017-02-18 PROCEDURE — 83735 ASSAY OF MAGNESIUM: CPT

## 2017-02-18 PROCEDURE — 25000003 PHARM REV CODE 250: Performed by: INTERNAL MEDICINE

## 2017-02-18 PROCEDURE — 85007 BL SMEAR W/DIFF WBC COUNT: CPT

## 2017-02-18 PROCEDURE — 25000003 PHARM REV CODE 250: Performed by: STUDENT IN AN ORGANIZED HEALTH CARE EDUCATION/TRAINING PROGRAM

## 2017-02-18 PROCEDURE — 80053 COMPREHEN METABOLIC PANEL: CPT

## 2017-02-18 PROCEDURE — 63600175 PHARM REV CODE 636 W HCPCS: Performed by: NURSE PRACTITIONER

## 2017-02-18 PROCEDURE — 83615 LACTATE (LD) (LDH) ENZYME: CPT

## 2017-02-18 PROCEDURE — 84100 ASSAY OF PHOSPHORUS: CPT

## 2017-02-18 PROCEDURE — 25000003 PHARM REV CODE 250: Performed by: NURSE PRACTITIONER

## 2017-02-18 PROCEDURE — 63600175 PHARM REV CODE 636 W HCPCS: Performed by: INTERNAL MEDICINE

## 2017-02-18 PROCEDURE — 20600001 HC STEP DOWN PRIVATE ROOM

## 2017-02-18 PROCEDURE — 85610 PROTHROMBIN TIME: CPT

## 2017-02-18 PROCEDURE — 86644 CMV ANTIBODY: CPT

## 2017-02-18 PROCEDURE — 99232 SBSQ HOSP IP/OBS MODERATE 35: CPT | Mod: ,,, | Performed by: INTERNAL MEDICINE

## 2017-02-18 RX ADMIN — Medication 10 ML: at 05:02

## 2017-02-18 RX ADMIN — DAPTOMYCIN 415 MG: 500 INJECTION, POWDER, LYOPHILIZED, FOR SOLUTION INTRAVENOUS at 04:02

## 2017-02-18 RX ADMIN — MAGNESIUM OXIDE TAB 400 MG (241.3 MG ELEMENTAL MG) 400 MG: 400 (241.3 MG) TAB at 02:02

## 2017-02-18 RX ADMIN — CEFEPIME HYDROCHLORIDE 2 G: 2 INJECTION, SOLUTION INTRAVENOUS at 11:02

## 2017-02-18 RX ADMIN — ACETAMINOPHEN 650 MG: 325 TABLET ORAL at 12:02

## 2017-02-18 RX ADMIN — VORICONAZOLE 200 MG: 200 TABLET, FILM COATED ORAL at 08:02

## 2017-02-18 RX ADMIN — CEFEPIME HYDROCHLORIDE 2 G: 2 INJECTION, SOLUTION INTRAVENOUS at 09:02

## 2017-02-18 RX ADMIN — MAGNESIUM OXIDE TAB 400 MG (241.3 MG ELEMENTAL MG) 400 MG: 400 (241.3 MG) TAB at 09:02

## 2017-02-18 RX ADMIN — Medication 10 ML: at 11:02

## 2017-02-18 RX ADMIN — ACYCLOVIR 400 MG: 200 CAPSULE ORAL at 08:02

## 2017-02-18 RX ADMIN — LAMIVUDINE 300 MG: 100 TABLET, FILM COATED ORAL at 08:02

## 2017-02-18 RX ADMIN — VORICONAZOLE 200 MG: 200 TABLET, FILM COATED ORAL at 09:02

## 2017-02-18 RX ADMIN — ACYCLOVIR 400 MG: 200 CAPSULE ORAL at 09:02

## 2017-02-18 RX ADMIN — SODIUM CHLORIDE: 0.9 INJECTION, SOLUTION INTRAVENOUS at 05:02

## 2017-02-18 RX ADMIN — ABACAVIR 600 MG: 300 TABLET, FILM COATED ORAL at 08:02

## 2017-02-18 RX ADMIN — DOLUTEGRAVIR SODIUM 50 MG: 50 TABLET, FILM COATED ORAL at 08:02

## 2017-02-18 RX ADMIN — CEFEPIME HYDROCHLORIDE 2 G: 2 INJECTION, SOLUTION INTRAVENOUS at 05:02

## 2017-02-18 RX ADMIN — MAGNESIUM OXIDE TAB 400 MG (241.3 MG ELEMENTAL MG) 400 MG: 400 (241.3 MG) TAB at 11:02

## 2017-02-18 NOTE — SUBJECTIVE & OBJECTIVE
Interval History: Doing OK - had fevers overnight but no lateralizing signs or symptoms     HPI:  35yo previously healthy man who was admitted as a transfer from an OSH on 1/17/2017 with 3mos of weight loss, malaise, and fevers and was found to have a new diagnosis of AML and a new diagnosis of HIV (KV8=529/7%, VL 22k, GT pending). He is improved clinically since starting induction chemotherapy c/b neutropenic fevers that have now resolved and no issues with ARVs    Patient found to have enterococcal bacteremia and ID consulted for assistance with abx management  - patient currently on dapto and cefipime, acyclovir, voriconazole and for HIV trimeq      Review of Systems   Constitutional: Positive for fatigue and fever.   HENT: Negative.    Eyes: Negative.    Respiratory: Negative.    Cardiovascular: Negative.    Gastrointestinal: Negative.    Endocrine: Negative.    Genitourinary: Negative.    Musculoskeletal: Negative.    Skin: Negative.      Objective:     Vital Signs (Most Recent):  Temp: 98.8 °F (37.1 °C) (02/18/17 1144)  Pulse: (!) 132 (02/18/17 1144)  Resp: 18 (02/18/17 1144)  BP: 96/69 (02/18/17 1144)  SpO2: 99 % (02/18/17 1144) Vital Signs (24h Range):  Temp:  [98.7 °F (37.1 °C)-102.6 °F (39.2 °C)] 98.8 °F (37.1 °C)  Pulse:  [109-132] 132  Resp:  [16-18] 18  SpO2:  [96 %-99 %] 99 %  BP: ()/(55-69) 96/69     Weight: 41.2 kg (90 lb 11.5 oz)  Body mass index is 15.62 kg/(m^2).    Estimated Creatinine Clearance: 116.7 mL/min (based on Cr of 0.5).    Physical Exam   Constitutional: He is oriented to person, place, and time. He appears well-developed and well-nourished.   HENT:   Head: Normocephalic and atraumatic.   Eyes: Pupils are equal, round, and reactive to light.   Neck: Normal range of motion. Neck supple.   Cardiovascular: Normal rate and regular rhythm.    Pulmonary/Chest: Effort normal and breath sounds normal.   Abdominal: Soft. Bowel sounds are normal.   Musculoskeletal: Normal range of motion.    Neurological: He is alert and oriented to person, place, and time.   Skin: Skin is warm and dry.   Psychiatric: His behavior is normal. Thought content normal.   Vitals reviewed.      Significant Labs: All pertinent labs within the past 24 hours have been reviewed.    Significant Imaging: I have reviewed all pertinent imaging results/findings within the past 24 hours.

## 2017-02-18 NOTE — PLAN OF CARE
Problem: Patient Care Overview  Goal: Plan of Care Review  Outcome: Ongoing (interventions implemented as appropriate)  Bed in low locked position with call bell in reach.  Nonskid footwear in place.  Urinal at the bedside.  Tmax 102.5 fully relieved with 650mg of tylenol.  No complaints of pain or nausea.  Will continue to monitor.

## 2017-02-18 NOTE — ASSESSMENT & PLAN NOTE
Patient with neutropenic fevers and BC now + 2/2 for enterococcus - sensitivities show sensitive to vanco   Fever from last night unclear - could be another process     Rec:   1. Keep on dapto and cefipime for now  - if fevers come down - would switch to vanc and perhaps stop the cefipime   2. Re-culture as you have done

## 2017-02-18 NOTE — PROGRESS NOTES
[facility]  Infectious Disease  Progress Note    Patient Name: Marck Howard  MRN: [id number]  Admission Date: 1/17/2017  Length of Stay: 32 days  Attending Physician: Gerson Sierra MD  Primary Care Provider: Primary Doctor No    Isolation Status: No active isolations  Assessment/Plan:      HIV (human immunodeficiency virus infection)  Keep on current ART of trimeq - he is tolerating it well  - stable       Bacteremia due to Enterococcus  Patient with neutropenic fevers and BC now + 2/2 for enterococcus - sensitivities show sensitive to vanco   Fever from last night unclear - could be another process     Rec:   1. Keep on dapto and cefipime for now  - if fevers come down - would switch to vanc and perhaps stop the cefipime   2. Re-culture as you have done        Anticipated Disposition:     Thank you for your consult. I will follow-up with patient. Please contact us if you have any additional questions.    Tanner Doherty MD  Infectious Disease  [facility]    Subjective:     Principal Problem:Acute leukemia    Interval History: Doing OK - had fevers overnight but no lateralizing signs or symptoms     HPI:  37yo previously healthy man who was admitted as a transfer from an OSH on 1/17/2017 with 3mos of weight loss, malaise, and fevers and was found to have a new diagnosis of AML and a new diagnosis of HIV (UI4=300/7%, VL 22k, GT pending). He is improved clinically since starting induction chemotherapy c/b neutropenic fevers that have now resolved and no issues with ARVs    Patient found to have enterococcal bacteremia and ID consulted for assistance with abx management  - patient currently on dapto and cefipime, acyclovir, voriconazole and for HIV trimeq      Review of Systems   Constitutional: Positive for fatigue and fever.   HENT: Negative.    Eyes: Negative.    Respiratory: Negative.    Cardiovascular: Negative.    Gastrointestinal: Negative.    Endocrine:  Negative.    Genitourinary: Negative.    Musculoskeletal: Negative.    Skin: Negative.      Objective:     Vital Signs (Most Recent):  Temp: 98.8 °F (37.1 °C) (02/18/17 1144)  Pulse: (!) 132 (02/18/17 1144)  Resp: 18 (02/18/17 1144)  BP: 96/69 (02/18/17 1144)  SpO2: 99 % (02/18/17 1144) Vital Signs (24h Range):  Temp:  [98.7 °F (37.1 °C)-102.6 °F (39.2 °C)] 98.8 °F (37.1 °C)  Pulse:  [109-132] 132  Resp:  [16-18] 18  SpO2:  [96 %-99 %] 99 %  BP: ()/(55-69) 96/69     Weight: 41.2 kg (90 lb 11.5 oz)  Body mass index is 15.62 kg/(m^2).    Estimated Creatinine Clearance: 116.7 mL/min (based on Cr of 0.5).    Physical Exam   Constitutional: He is oriented to person, place, and time. He appears well-developed and well-nourished.   HENT:   Head: Normocephalic and atraumatic.   Eyes: Pupils are equal, round, and reactive to light.   Neck: Normal range of motion. Neck supple.   Cardiovascular: Normal rate and regular rhythm.    Pulmonary/Chest: Effort normal and breath sounds normal.   Abdominal: Soft. Bowel sounds are normal.   Musculoskeletal: Normal range of motion.   Neurological: He is alert and oriented to person, place, and time.   Skin: Skin is warm and dry.   Psychiatric: His behavior is normal. Thought content normal.   Vitals reviewed.      Significant Labs: All pertinent labs within the past 24 hours have been reviewed.    Significant Imaging: I have reviewed all pertinent imaging results/findings within the past 24 hours.

## 2017-02-18 NOTE — PROGRESS NOTES
Progress Note  Hematology / Bone Marrow Transplant                                                              Team: [facility] [place] HEMATOLOGY BMT    Patient Name: Marck Howard  YOB: 1980    Admit Date: 1/17/2017                     LOS: 32    SUBJECTIVE:     NAEON, febrile/tachy overnight but has since stabilized. Pt denies any fevers this AM, no SOB, diarrhea, CP, SOB, or nausea/vomiting.     OBJECTIVE:     Vital Signs Range (Last 24H):  Temp:  [98.1 °F (36.7 °C)-102.6 °F (39.2 °C)]   Pulse:  []   Resp:  [16-18]   BP: ()/(52-67)   SpO2:  [96 %-99 %] Body mass index is 15.62 kg/(m^2).    I & O (Last 24H):    Intake/Output Summary (Last 24 hours) at 02/18/17 0932  Last data filed at 02/18/17 0827   Gross per 24 hour   Intake             4586 ml   Output             4615 ml   Net              -29 ml       Physical Exam:  General: well developed, cachectic, no distress  HENT: Head: NCAT. Thrush   Eyes: conjunctivae/corneas clear. EOMI.   Neck: supple, symmetrical, trachea midline  Lungs:  Diffuse coarse breath sounds.  CV: Heart: tachycardic but otherwise regular rhythm, no murmur appreciated  Extremities: no cyanosis, edema, or clubbing.   Abdomen: soft, non-tender non-distented; bowel sounds normal.   Skin: Skin color, texture, turgor normal. No rashes or lesions  Musculoskeletal: no clubbing, cyanosis  Neurologic: Mental status: Alert, oriented, awake x3. No focal deficits.   Psych/Behavioral:  Normal mood and affect    Diagnostic Results:  Lab Results   Component Value Date    WBC 0.49 (LL) 02/18/2017    HGB 8.1 (L) 02/18/2017    HCT 23.2 (L) 02/18/2017    MCV 88 02/18/2017    PLT 41 (L) 02/18/2017       Recent Labs  Lab 02/18/17  0450         K 3.9      CO2 25   BUN 9   CREATININE 0.5   CALCIUM 8.8   MG 1.8     Lab Results   Component Value Date    INR 1.0 02/18/2017    INR 1.0 02/17/2017    INR 1.1 02/16/2017     No results found for: HGBA1C  Microbiology Results  (last 7 days)     Procedure Component Value Units Date/Time    Blood culture [[id number]] Collected:  02/17/17 0732    Order Status:  Completed Specimen:  Blood Updated:  02/17/17 2115     Blood Culture, Routine No Growth to date    Blood Culture #2 **CANNOT BE ORDERED STAT** [[id number]] Collected:  02/17/17 0732    Order Status:  Completed Specimen:  Blood Updated:  02/17/17 2115     Blood Culture, Routine No Growth to date    Blood culture [[id number]] Collected:  02/15/17 0031    Order Status:  Completed Specimen:  Blood Updated:  02/17/17 1532     Blood Culture, Routine Gram stain aer bottle: Gram positive cocci in chains resembling Strep      Blood Culture, Routine Gram stain dianne bottle: Gram positive cocci in chains resembling Strep      Blood Culture, Routine Results called to and read back by: Nohelia Peterson RN  02/15/2017  18:30     Blood Culture, Routine --     ENTEROCOCCUS FAECIUM  For susceptibility see order # [id number]      Blood culture [[id number]]  (Susceptibility) Collected:  02/15/17 0024    Order Status:  Completed Specimen:  Blood Updated:  02/17/17 1532     Blood Culture, Routine Gram stain aer bottle: Gram positive cocci in chains resembling Strep      Blood Culture, Routine Gram stain dianne bottle: Gram positive cocci in chains resembling Strep      Blood Culture, Routine Results called to and read back by: Nohelia Peterson RN  02/15/2017  18:30     Blood Culture, Routine ENTEROCOCCUS FAECIUM    Blood culture [[id number]]     Order Status:  Canceled Specimen:  Blood     Blood Culture #2 **CANNOT BE ORDERED STAT** [[id number]]     Order Status:  Canceled Specimen:  Blood     Urine culture [[id number]] Collected:  02/15/17 0025    Order Status:  Completed Specimen:  Urine from Urine Updated:  02/16/17 0813     Urine Culture, Routine No growth    Blood culture [[id number]] Collected:  02/07/17 0943    Order Status:  Completed Specimen:  Blood Updated:  02/12/17 1212     Blood Culture, Routine No growth after 5  days.    Blood culture [[id number]] Collected:  02/07/17 0825    Order Status:  Completed Specimen:  Blood from Line, PICC Right Brachial Updated:  02/12/17 1212     Blood Culture, Routine No growth after 5 days.        ASSESSMENT/PLAN:   Active Problems, Status & Treatment Plan Addressed Today:    # Neutropenic Fever  Febrile overnight   CXR concerning for mild PNA RLL, however, more likely source is gram positive rods in blood   Repeat Cx NGTD   --Enterococcus sp, consulted ID   --per recs will cont daptomycin + cefepime   --awaiting speciation     # AML  - s/p bone marrow biopsy (1/17/17) showing AML, cytogenetics and molecular markers reveal : NPM1 and FLT3 negative. Will need transplant given high risk disease  - 7+3 started 1/20/17  - Repeat Bone marrow biopsy 2/3, with circulating blasts in peripheral blood, 70% blasts, 100% cellularity.   - Started on MEC on 2/9/17- today is Day 12     #HIV positive (CD4: 118)  - new diagnosis on this admit  - toxoplasma gondii WNL  - ID following, appreciate recs, started Triumeq 2/2/17  - ppx bactrim held given myelosuppressive effects, received Pentamidine 1/22/17  - plan to restart bactrim once counts recovered.     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   # Thrombocytopenia due to leukemia   - no overt signs of bleeding  - likely 2/2 underlying hematologic malignancy and chemotherapy  - continue to monitor and transfuse as indicated for Hgb <7.0, PLT <10     # Tobacco Abuse  - 26 pack year history  - advise smoking cessation   - nicotine patch prn    # H/O Alcohol Abuse  - drank at least 2 beers per day prior to admission  - no h/o DTs and now out of period for withdrawal    # Hypoalbuminemia  - checked 24 hour urine protein   - Dietary consulted  - encourage increased nutritional intake     DVT ppx: encourage ambulation  Diet: regular  Code Status: full    DISPO: pending chemotherapy for AML and repeat bone marrow     Case seen and discussed with staff attending,   Mariela.    Naresh Addison M.D.  PGY-3, Internal Medicine  [phone]

## 2017-02-18 NOTE — PLAN OF CARE
Problem: Patient Care Overview  Goal: Plan of Care Review  Remained safe. Rested in bed most of shift. Afebrile by time of this documentation. HR in 130s at time of 1200 vitals. Dr. Altamirano notified. Instructed to give patient 1 liter bolus. Bolus administered. HR in 110s post bolus, consistent with baseline heart rate. SBP in 90s most of shift. Mag replaced. Neutropenic and thrombocytopenic precautions maintained. Pt claims to have bathed this shift, but continued to have body odor. Appetite still poor to fair. Pt stated that tongue is feeling better but still white. No complaints of pain by time of this documentation. No sign of pressure-related skin breakdown noted. Able to reposition self in bed. Fall precautions maintained and callbell and personal items kept within reach. Ambulated in hallway with steady gait. No apparent distress.

## 2017-02-19 LAB
ALBUMIN SERPL BCP-MCNC: 2 G/DL
ALP SERPL-CCNC: 245 U/L
ALT SERPL W/O P-5'-P-CCNC: 57 U/L
ANION GAP SERPL CALC-SCNC: 7 MMOL/L
ANISOCYTOSIS BLD QL SMEAR: SLIGHT
AST SERPL-CCNC: 19 U/L
BASOPHILS NFR BLD: 0 %
BILIRUB SERPL-MCNC: 0.4 MG/DL
BLASTS NFR BLD MANUAL: 33.3 %
BUN SERPL-MCNC: 8 MG/DL
CALCIUM SERPL-MCNC: 8.4 MG/DL
CHLORIDE SERPL-SCNC: 105 MMOL/L
CO2 SERPL-SCNC: 22 MMOL/L
CREAT SERPL-MCNC: 0.6 MG/DL
DIFFERENTIAL METHOD: ABNORMAL
EOSINOPHIL NFR BLD: 0 %
ERYTHROCYTE [DISTWIDTH] IN BLOOD BY AUTOMATED COUNT: 15.7 %
EST. GFR  (AFRICAN AMERICAN): >60 ML/MIN/1.73 M^2
EST. GFR  (NON AFRICAN AMERICAN): >60 ML/MIN/1.73 M^2
GLUCOSE SERPL-MCNC: 99 MG/DL
HCT VFR BLD AUTO: 21.2 %
HGB BLD-MCNC: 7.3 G/DL
INR PPP: 1.1
LDH SERPL L TO P-CCNC: 208 U/L
LYMPHOCYTES NFR BLD: 66.7 %
MAGNESIUM SERPL-MCNC: 1.7 MG/DL
MCH RBC QN AUTO: 30.3 PG
MCHC RBC AUTO-ENTMCNC: 34.4 %
MCV RBC AUTO: 88 FL
MONOCYTES NFR BLD: 0 %
NEUTROPHILS NFR BLD: 0 %
OVALOCYTES BLD QL SMEAR: ABNORMAL
PHOSPHATE SERPL-MCNC: 2.2 MG/DL
PLATELET # BLD AUTO: 12 K/UL
PLATELET BLD QL SMEAR: ABNORMAL
PMV BLD AUTO: 9.2 FL
POIKILOCYTOSIS BLD QL SMEAR: SLIGHT
POTASSIUM SERPL-SCNC: 3.6 MMOL/L
PROT SERPL-MCNC: 6.4 G/DL
PROTHROMBIN TIME: 11.4 SEC
RBC # BLD AUTO: 2.41 M/UL
SODIUM SERPL-SCNC: 134 MMOL/L
WBC # BLD AUTO: 0.52 K/UL

## 2017-02-19 PROCEDURE — 85027 COMPLETE CBC AUTOMATED: CPT

## 2017-02-19 PROCEDURE — 99232 SBSQ HOSP IP/OBS MODERATE 35: CPT | Mod: ,,, | Performed by: INTERNAL MEDICINE

## 2017-02-19 PROCEDURE — 25000003 PHARM REV CODE 250: Performed by: INTERNAL MEDICINE

## 2017-02-19 PROCEDURE — 85007 BL SMEAR W/DIFF WBC COUNT: CPT

## 2017-02-19 PROCEDURE — 85610 PROTHROMBIN TIME: CPT

## 2017-02-19 PROCEDURE — 83735 ASSAY OF MAGNESIUM: CPT

## 2017-02-19 PROCEDURE — 83615 LACTATE (LD) (LDH) ENZYME: CPT

## 2017-02-19 PROCEDURE — 80053 COMPREHEN METABOLIC PANEL: CPT

## 2017-02-19 PROCEDURE — 25000003 PHARM REV CODE 250: Performed by: STUDENT IN AN ORGANIZED HEALTH CARE EDUCATION/TRAINING PROGRAM

## 2017-02-19 PROCEDURE — 20600001 HC STEP DOWN PRIVATE ROOM

## 2017-02-19 PROCEDURE — 63600175 PHARM REV CODE 636 W HCPCS: Performed by: INTERNAL MEDICINE

## 2017-02-19 PROCEDURE — 25000003 PHARM REV CODE 250: Performed by: NURSE PRACTITIONER

## 2017-02-19 PROCEDURE — 84100 ASSAY OF PHOSPHORUS: CPT

## 2017-02-19 RX ADMIN — CEFEPIME HYDROCHLORIDE 2 G: 2 INJECTION, SOLUTION INTRAVENOUS at 04:02

## 2017-02-19 RX ADMIN — ABACAVIR 600 MG: 300 TABLET, FILM COATED ORAL at 09:02

## 2017-02-19 RX ADMIN — VORICONAZOLE 200 MG: 200 TABLET, FILM COATED ORAL at 09:02

## 2017-02-19 RX ADMIN — SODIUM CHLORIDE: 0.9 INJECTION, SOLUTION INTRAVENOUS at 04:02

## 2017-02-19 RX ADMIN — STANDARDIZED SENNA CONCENTRATE AND DOCUSATE SODIUM 1 TABLET: 8.6; 5 TABLET, FILM COATED ORAL at 08:02

## 2017-02-19 RX ADMIN — MAGNESIUM OXIDE TAB 400 MG (241.3 MG ELEMENTAL MG) 400 MG: 400 (241.3 MG) TAB at 03:02

## 2017-02-19 RX ADMIN — SODIUM CHLORIDE: 0.9 INJECTION, SOLUTION INTRAVENOUS at 11:02

## 2017-02-19 RX ADMIN — Medication 10 ML: at 05:02

## 2017-02-19 RX ADMIN — ACYCLOVIR 400 MG: 200 CAPSULE ORAL at 08:02

## 2017-02-19 RX ADMIN — ACETAMINOPHEN 650 MG: 325 TABLET ORAL at 11:02

## 2017-02-19 RX ADMIN — SODIUM CHLORIDE: 0.9 INJECTION, SOLUTION INTRAVENOUS at 03:02

## 2017-02-19 RX ADMIN — LAMIVUDINE 300 MG: 100 TABLET, FILM COATED ORAL at 09:02

## 2017-02-19 RX ADMIN — POTASSIUM CHLORIDE 20 MEQ: 750 CAPSULE, EXTENDED RELEASE ORAL at 08:02

## 2017-02-19 RX ADMIN — ACYCLOVIR 400 MG: 200 CAPSULE ORAL at 09:02

## 2017-02-19 RX ADMIN — MAGNESIUM OXIDE TAB 400 MG (241.3 MG ELEMENTAL MG) 400 MG: 400 (241.3 MG) TAB at 08:02

## 2017-02-19 RX ADMIN — DOLUTEGRAVIR SODIUM 50 MG: 50 TABLET, FILM COATED ORAL at 09:02

## 2017-02-19 RX ADMIN — VORICONAZOLE 200 MG: 200 TABLET, FILM COATED ORAL at 08:02

## 2017-02-19 RX ADMIN — MAGNESIUM OXIDE TAB 400 MG (241.3 MG ELEMENTAL MG) 400 MG: 400 (241.3 MG) TAB at 05:02

## 2017-02-19 RX ADMIN — DAPTOMYCIN 415 MG: 500 INJECTION, POWDER, LYOPHILIZED, FOR SOLUTION INTRAVENOUS at 03:02

## 2017-02-19 NOTE — PROGRESS NOTES
[facility]  Infectious Disease  Progress Note    Patient Name: Marck Howard  MRN: [id number]  Admission Date: 1/17/2017  Length of Stay: 33 days  Attending Physician: Gerson Sierra MD  Primary Care Provider: Primary Doctor No    Isolation Status: No active isolations  Assessment/Plan:      Bacteremia due to Enterococcus  Patient with neutropenic fevers and BC now + 2/2 for enterococcus - sensitivities show sensitive to vanco   Fevers have come down - no new culture data - follow up blood cultures negative      Rec:   1. Keep on dapto and cefipime for now       Anticipated Disposition:     Thank you for your consult. I will follow-up with patient. Please contact us if you have any additional questions.    Tanner Doherty MD  Infectious Disease  [facility]    Subjective:     Principal Problem:Acute leukemia    Interval History: Patient doing well with no complaints     HPI:  37yo previously healthy man who was admitted as a transfer from an OSH on 1/17/2017 with 3mos of weight loss, malaise, and fevers and was found to have a new diagnosis of AML and a new diagnosis of HIV (OX7=663/7%, VL 22k, GT pending). He is improved clinically since starting induction chemotherapy c/b neutropenic fevers that have now resolved and no issues with ARVs    Patient found to have enterococcal bacteremia and ID consulted for assistance with abx management  - patient currently on dapto and cefipime, acyclovir, voriconazole and for HIV trimeq      Review of Systems   Constitutional: Positive for fatigue and fever.   HENT: Negative.    Eyes: Negative.    Respiratory: Negative.    Cardiovascular: Negative.    Gastrointestinal: Negative.    Endocrine: Negative.    Genitourinary: Negative.    Musculoskeletal: Negative.    Skin: Negative.      Objective:     Vital Signs (Most Recent):  Temp: 99.1 °F (37.3 °C) (02/19/17 0748)  Pulse: (!) 114 (02/19/17 0748)  Resp: 17 (02/19/17 0748)  BP: 96/62  (02/19/17 0748)  SpO2: 96 % (02/19/17 0748) Vital Signs (24h Range):  Temp:  [98.4 °F (36.9 °C)-100.6 °F (38.1 °C)] 99.1 °F (37.3 °C)  Pulse:  [111-119] 114  Resp:  [16-18] 17  SpO2:  [96 %-99 %] 96 %  BP: ()/(60-67) 96/62     Weight: 41.1 kg (90 lb 9.7 oz)  Body mass index is 15.6 kg/(m^2).    Estimated Creatinine Clearance: 97 mL/min (based on Cr of 0.6).    Physical Exam   Constitutional: He is oriented to person, place, and time. He appears well-developed and well-nourished.   HENT:   Head: Normocephalic and atraumatic.   Eyes: Pupils are equal, round, and reactive to light.   Neck: Normal range of motion. Neck supple.   Cardiovascular: Normal rate and regular rhythm.    Pulmonary/Chest: Effort normal and breath sounds normal.   Abdominal: Soft. Bowel sounds are normal.   Musculoskeletal: Normal range of motion.   Neurological: He is alert and oriented to person, place, and time.   Skin: Skin is warm and dry.   Psychiatric: His behavior is normal. Thought content normal.   Vitals reviewed.      Significant Labs: All pertinent labs within the past 24 hours have been reviewed.    Significant Imaging: I have reviewed all pertinent imaging results/findings within the past 24 hours.

## 2017-02-19 NOTE — SUBJECTIVE & OBJECTIVE
Interval History: Patient doing well with no complaints     HPI:  37yo previously healthy man who was admitted as a transfer from an OSH on 1/17/2017 with 3mos of weight loss, malaise, and fevers and was found to have a new diagnosis of AML and a new diagnosis of HIV (BK6=671/7%, VL 22k, GT pending). He is improved clinically since starting induction chemotherapy c/b neutropenic fevers that have now resolved and no issues with ARVs    Patient found to have enterococcal bacteremia and ID consulted for assistance with abx management  - patient currently on dapto and cefipime, acyclovir, voriconazole and for HIV trimeq      Review of Systems   Constitutional: Positive for fatigue and fever.   HENT: Negative.    Eyes: Negative.    Respiratory: Negative.    Cardiovascular: Negative.    Gastrointestinal: Negative.    Endocrine: Negative.    Genitourinary: Negative.    Musculoskeletal: Negative.    Skin: Negative.      Objective:     Vital Signs (Most Recent):  Temp: 99.1 °F (37.3 °C) (02/19/17 0748)  Pulse: (!) 114 (02/19/17 0748)  Resp: 17 (02/19/17 0748)  BP: 96/62 (02/19/17 0748)  SpO2: 96 % (02/19/17 0748) Vital Signs (24h Range):  Temp:  [98.4 °F (36.9 °C)-100.6 °F (38.1 °C)] 99.1 °F (37.3 °C)  Pulse:  [111-119] 114  Resp:  [16-18] 17  SpO2:  [96 %-99 %] 96 %  BP: ()/(60-67) 96/62     Weight: 41.1 kg (90 lb 9.7 oz)  Body mass index is 15.6 kg/(m^2).    Estimated Creatinine Clearance: 97 mL/min (based on Cr of 0.6).    Physical Exam   Constitutional: He is oriented to person, place, and time. He appears well-developed and well-nourished.   HENT:   Head: Normocephalic and atraumatic.   Eyes: Pupils are equal, round, and reactive to light.   Neck: Normal range of motion. Neck supple.   Cardiovascular: Normal rate and regular rhythm.    Pulmonary/Chest: Effort normal and breath sounds normal.   Abdominal: Soft. Bowel sounds are normal.   Musculoskeletal: Normal range of motion.   Neurological: He is alert and  oriented to person, place, and time.   Skin: Skin is warm and dry.   Psychiatric: His behavior is normal. Thought content normal.   Vitals reviewed.      Significant Labs: All pertinent labs within the past 24 hours have been reviewed.    Significant Imaging: I have reviewed all pertinent imaging results/findings within the past 24 hours.

## 2017-02-19 NOTE — PROGRESS NOTES
Progress Note  Hematology / Bone Marrow Transplant                                                              Team: [facility] [place] HEMATOLOGY BMT    Patient Name: Marck Howard  YOB: 1980    Admit Date: 1/17/2017                     LOS: 33    SUBJECTIVE:     NAEON, VSS. Pt denies any fevers, chills, SOB, abdominal pain, nausea or vomiting. Pt has bad body odor this AM, states that he showered overnight however.     OBJECTIVE:     Vital Signs Range (Last 24H):  Temp:  [98.4 °F (36.9 °C)-100.6 °F (38.1 °C)]   Pulse:  [111-132]   Resp:  [16-18]   BP: ()/(60-69)   SpO2:  [96 %-99 %] Body mass index is 15.6 kg/(m^2).    I & O (Last 24H):    Intake/Output Summary (Last 24 hours) at 02/19/17 0905  Last data filed at 02/19/17 0751   Gross per 24 hour   Intake           5597.5 ml   Output             3845 ml   Net           1752.5 ml       Physical Exam:  General: well developed, cachectic, no distress  HENT: Head: NCAT. Thrush   Eyes: conjunctivae/corneas clear. EOMI.   Neck: supple, symmetrical, trachea midline  Lungs:  Diffuse coarse breath sounds.  CV: Heart: tachycardic but otherwise regular rhythm, no murmur appreciated  Extremities: no cyanosis, edema, or clubbing.   Abdomen: soft, non-tender non-distented; bowel sounds normal.   Skin: Skin color, texture, turgor normal. No rashes or lesions  Musculoskeletal: no clubbing, cyanosis  Neurologic: Mental status: Alert, oriented, awake x3. No focal deficits.   Psych/Behavioral:  Normal mood and affect    Diagnostic Results:  Lab Results   Component Value Date    WBC 0.52 (LL) 02/19/2017    HGB 7.3 (L) 02/19/2017    HCT 21.2 (L) 02/19/2017    MCV 88 02/19/2017    PLT 12 (LL) 02/19/2017       Recent Labs  Lab 02/19/17  0415   GLU 99   *   K 3.6      CO2 22*   BUN 8   CREATININE 0.6   CALCIUM 8.4*   MG 1.7     Lab Results   Component Value Date    INR 1.1 02/19/2017    INR 1.0 02/18/2017    INR 1.0 02/17/2017     No results found for:  HGBA1C  Microbiology Results (last 7 days)     Procedure Component Value Units Date/Time    Blood culture [[id number]] Collected:  02/17/17 0732    Order Status:  Completed Specimen:  Blood Updated:  02/18/17 1612     Blood Culture, Routine No Growth to date     Blood Culture, Routine No Growth to date    Blood Culture #2 **CANNOT BE ORDERED STAT** [[id number]] Collected:  02/17/17 0732    Order Status:  Completed Specimen:  Blood Updated:  02/18/17 1612     Blood Culture, Routine No Growth to date     Blood Culture, Routine No Growth to date    Blood culture [[id number]] Collected:  02/15/17 0031    Order Status:  Completed Specimen:  Blood Updated:  02/17/17 1532     Blood Culture, Routine Gram stain aer bottle: Gram positive cocci in chains resembling Strep      Blood Culture, Routine Gram stain dianne bottle: Gram positive cocci in chains resembling Strep      Blood Culture, Routine Results called to and read back by: Nohelia Peterson RN  02/15/2017  18:30     Blood Culture, Routine --     ENTEROCOCCUS FAECIUM  For susceptibility see order # [id number]      Blood culture [[id number]]  (Susceptibility) Collected:  02/15/17 0024    Order Status:  Completed Specimen:  Blood Updated:  02/17/17 1532     Blood Culture, Routine Gram stain aer bottle: Gram positive cocci in chains resembling Strep      Blood Culture, Routine Gram stain dianne bottle: Gram positive cocci in chains resembling Strep      Blood Culture, Routine Results called to and read back by: Nohelia Peterson RN  02/15/2017  18:30     Blood Culture, Routine ENTEROCOCCUS FAECIUM    Blood culture [[id number]]     Order Status:  Canceled Specimen:  Blood     Blood Culture #2 **CANNOT BE ORDERED STAT** [[id number]]     Order Status:  Canceled Specimen:  Blood     Urine culture [[id number]] Collected:  02/15/17 0025    Order Status:  Completed Specimen:  Urine from Urine Updated:  02/16/17 0813     Urine Culture, Routine No growth    Blood culture [[id number]] Collected:  02/07/17  0943    Order Status:  Completed Specimen:  Blood Updated:  02/12/17 1212     Blood Culture, Routine No growth after 5 days.    Blood culture [[id number]] Collected:  02/07/17 0825    Order Status:  Completed Specimen:  Blood from Line, PICC Right Brachial Updated:  02/12/17 1212     Blood Culture, Routine No growth after 5 days.        ASSESSMENT/PLAN:   Active Problems, Status & Treatment Plan Addressed Today:    # Neutropenic Fever  Afebrile overnight   CXR concerning for mild PNA RLL, however, more likely source is gram positive rods in blood   Repeat Cx NGTD   --Enterococcus sp, consulted ID   --sensitive to vanc  --will d/c cefepime and dapto, place on vanc since afebrile for 24 hours     # AML  - s/p bone marrow biopsy (1/17/17) showing AML, cytogenetics and molecular markers reveal : NPM1 and FLT3 negative. Will need transplant given high risk disease  - 7+3 started 1/20/17  - Repeat Bone marrow biopsy 2/3, with circulating blasts in peripheral blood, 70% blasts, 100% cellularity.   - Started on MEC on 2/9/17- today is Day 13     #HIV positive (CD4: 118)  - new diagnosis on this admit  - toxoplasma gondii WNL  - ID following, appreciate recs, started Triumeq 2/2/17  - ppx bactrim held given myelosuppressive effects, received Pentamidine 1/22/17  - plan to restart bactrim once counts recovered.     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   # Thrombocytopenia due to leukemia   - no overt signs of bleeding  - likely 2/2 underlying hematologic malignancy and chemotherapy  - continue to monitor and transfuse as indicated for Hgb <7.0, PLT <10     # Tobacco Abuse  - 26 pack year history  - advise smoking cessation   - nicotine patch prn    # H/O Alcohol Abuse  - drank at least 2 beers per day prior to admission  - no h/o DTs and now out of period for withdrawal    # Hypoalbuminemia  - checked 24 hour urine protein   - Dietary consulted  - encourage increased nutritional intake     DVT ppx: encourage ambulation  Diet:  regular  Code Status: full    DISPO: pending chemotherapy for AML and repeat bone marrow       Naresh Addison M.D.  PGY-3, Internal Medicine  [phone]

## 2017-02-19 NOTE — PLAN OF CARE
Problem: Patient Care Overview  Goal: Plan of Care Review  Outcome: Ongoing (interventions implemented as appropriate)    02/19/17 5649   Coping/Psychosocial   Plan Of Care Reviewed With patient      Pt resting comfortably. Denies pain and nausea this shift; mouth soreness improving; refused dukes solution. Cefepime discontinued; daptomycin continued per order. Afebrile this shift. IVF infusing. Mg, K replaced. Refused shower. No other complaints at this time. Pt has remained free from injury this shift. Bed in low locked position. Call light and personal belongings within reach. Side rails up x2. Wearing nonskid socks. VSS. All questions answered. Will continue to monitor.

## 2017-02-19 NOTE — PLAN OF CARE
Problem: Patient Care Overview  Goal: Plan of Care Review  Outcome: Ongoing (interventions implemented as appropriate)  Bed in low locked position with call bell in reach. Nonskid footwear in place. Urinal at the bedside. Tmax 100.6 that resolved on its own within the hour. No complaints of pain or nausea. Son is at the bedside.  Pt needs to bathe.  Issue has been addressed the past two days.  He stated that he showered yesterday but it is obvious that he did not. Will continue to monitor.

## 2017-02-20 LAB
ABO + RH BLD: NORMAL
ALBUMIN SERPL BCP-MCNC: 2 G/DL
ALP SERPL-CCNC: 234 U/L
ALT SERPL W/O P-5'-P-CCNC: 49 U/L
ANION GAP SERPL CALC-SCNC: 5 MMOL/L
ANISOCYTOSIS BLD QL SMEAR: SLIGHT
AST SERPL-CCNC: 19 U/L
BASOPHILS # BLD AUTO: ABNORMAL K/UL
BASOPHILS NFR BLD: 0 %
BILIRUB SERPL-MCNC: 0.4 MG/DL
BILIRUB UR QL STRIP: NEGATIVE
BLASTS NFR BLD MANUAL: 34 %
BLD GP AB SCN CELLS X3 SERPL QL: NORMAL
BLD PROD TYP BPU: NORMAL
BLD PROD TYP BPU: NORMAL
BLOOD UNIT EXPIRATION DATE: NORMAL
BLOOD UNIT EXPIRATION DATE: NORMAL
BLOOD UNIT TYPE CODE: 5100
BLOOD UNIT TYPE CODE: 7300
BLOOD UNIT TYPE: NORMAL
BLOOD UNIT TYPE: NORMAL
BUN SERPL-MCNC: 7 MG/DL
CALCIUM SERPL-MCNC: 8.3 MG/DL
CHLORIDE SERPL-SCNC: 108 MMOL/L
CLARITY UR REFRACT.AUTO: CLEAR
CO2 SERPL-SCNC: 23 MMOL/L
CODING SYSTEM: NORMAL
CODING SYSTEM: NORMAL
COLOR UR AUTO: YELLOW
CREAT SERPL-MCNC: 0.5 MG/DL
DIFFERENTIAL METHOD: ABNORMAL
DISPENSE STATUS: NORMAL
DISPENSE STATUS: NORMAL
EOSINOPHIL # BLD AUTO: ABNORMAL K/UL
EOSINOPHIL NFR BLD: 0 %
ERYTHROCYTE [DISTWIDTH] IN BLOOD BY AUTOMATED COUNT: 15.7 %
EST. GFR  (AFRICAN AMERICAN): >60 ML/MIN/1.73 M^2
EST. GFR  (NON AFRICAN AMERICAN): >60 ML/MIN/1.73 M^2
GLUCOSE SERPL-MCNC: 97 MG/DL
GLUCOSE UR QL STRIP: NEGATIVE
HCT VFR BLD AUTO: 20 %
HGB BLD-MCNC: 7 G/DL
HGB UR QL STRIP: NEGATIVE
INR PPP: 1
KETONES UR QL STRIP: NEGATIVE
LDH SERPL L TO P-CCNC: 194 U/L
LEUKOCYTE ESTERASE UR QL STRIP: NEGATIVE
LYMPHOCYTES # BLD AUTO: ABNORMAL K/UL
LYMPHOCYTES NFR BLD: 64 %
MAGNESIUM SERPL-MCNC: 1.6 MG/DL
MCH RBC QN AUTO: 30.4 PG
MCHC RBC AUTO-ENTMCNC: 35 %
MCV RBC AUTO: 87 FL
MONOCYTES # BLD AUTO: ABNORMAL K/UL
MONOCYTES NFR BLD: 2 %
NEUTROPHILS NFR BLD: 0 %
NITRITE UR QL STRIP: NEGATIVE
NUM UNITS TRANS PACKED RBC: NORMAL
NUM UNITS TRANS WBC-POOR PLATPHERESIS: NORMAL
PH UR STRIP: 7 [PH] (ref 5–8)
PHOSPHATE SERPL-MCNC: 2.2 MG/DL
PLATELET # BLD AUTO: 4 K/UL
PLATELET BLD QL SMEAR: ABNORMAL
PMV BLD AUTO: 8.4 FL
POTASSIUM SERPL-SCNC: 3.5 MMOL/L
PROT SERPL-MCNC: 6.2 G/DL
PROT UR QL STRIP: NEGATIVE
PROTHROMBIN TIME: 10.6 SEC
RBC # BLD AUTO: 2.3 M/UL
SODIUM SERPL-SCNC: 136 MMOL/L
SP GR UR STRIP: 1 (ref 1–1.03)
URN SPEC COLLECT METH UR: NORMAL
UROBILINOGEN UR STRIP-ACNC: NEGATIVE EU/DL
WBC # BLD AUTO: 0.63 K/UL

## 2017-02-20 PROCEDURE — 85027 COMPLETE CBC AUTOMATED: CPT

## 2017-02-20 PROCEDURE — 25000003 PHARM REV CODE 250: Performed by: NURSE PRACTITIONER

## 2017-02-20 PROCEDURE — 84100 ASSAY OF PHOSPHORUS: CPT

## 2017-02-20 PROCEDURE — 87086 URINE CULTURE/COLONY COUNT: CPT

## 2017-02-20 PROCEDURE — 99232 SBSQ HOSP IP/OBS MODERATE 35: CPT | Mod: ,,, | Performed by: INTERNAL MEDICINE

## 2017-02-20 PROCEDURE — 87107 FUNGI IDENTIFICATION MOLD: CPT

## 2017-02-20 PROCEDURE — 85007 BL SMEAR W/DIFF WBC COUNT: CPT

## 2017-02-20 PROCEDURE — 87186 SC STD MICRODIL/AGAR DIL: CPT

## 2017-02-20 PROCEDURE — 83735 ASSAY OF MAGNESIUM: CPT

## 2017-02-20 PROCEDURE — P9037 PLATE PHERES LEUKOREDU IRRAD: HCPCS

## 2017-02-20 PROCEDURE — 83615 LACTATE (LD) (LDH) ENZYME: CPT

## 2017-02-20 PROCEDURE — P9040 RBC LEUKOREDUCED IRRADIATED: HCPCS

## 2017-02-20 PROCEDURE — 81003 URINALYSIS AUTO W/O SCOPE: CPT

## 2017-02-20 PROCEDURE — 86920 COMPATIBILITY TEST SPIN: CPT

## 2017-02-20 PROCEDURE — 25000003 PHARM REV CODE 250: Performed by: INTERNAL MEDICINE

## 2017-02-20 PROCEDURE — 20600001 HC STEP DOWN PRIVATE ROOM

## 2017-02-20 PROCEDURE — 86850 RBC ANTIBODY SCREEN: CPT

## 2017-02-20 PROCEDURE — 25000003 PHARM REV CODE 250: Performed by: HOSPITALIST

## 2017-02-20 PROCEDURE — 85610 PROTHROMBIN TIME: CPT

## 2017-02-20 PROCEDURE — 87077 CULTURE AEROBIC IDENTIFY: CPT

## 2017-02-20 PROCEDURE — 87186 SC STD MICRODIL/AGAR DIL: CPT | Mod: 59

## 2017-02-20 PROCEDURE — 99233 SBSQ HOSP IP/OBS HIGH 50: CPT | Mod: ,,, | Performed by: INTERNAL MEDICINE

## 2017-02-20 PROCEDURE — 63600175 PHARM REV CODE 636 W HCPCS: Performed by: INTERNAL MEDICINE

## 2017-02-20 PROCEDURE — 25000003 PHARM REV CODE 250: Performed by: STUDENT IN AN ORGANIZED HEALTH CARE EDUCATION/TRAINING PROGRAM

## 2017-02-20 PROCEDURE — 87040 BLOOD CULTURE FOR BACTERIA: CPT

## 2017-02-20 PROCEDURE — 86900 BLOOD TYPING SEROLOGIC ABO: CPT

## 2017-02-20 PROCEDURE — 80053 COMPREHEN METABOLIC PANEL: CPT

## 2017-02-20 PROCEDURE — 63600175 PHARM REV CODE 636 W HCPCS: Performed by: HOSPITALIST

## 2017-02-20 PROCEDURE — 36415 COLL VENOUS BLD VENIPUNCTURE: CPT

## 2017-02-20 RX ORDER — HYDROCODONE BITARTRATE AND ACETAMINOPHEN 500; 5 MG/1; MG/1
TABLET ORAL
Status: DISCONTINUED | OUTPATIENT
Start: 2017-02-20 | End: 2017-02-21

## 2017-02-20 RX ORDER — DIPHENHYDRAMINE HCL 25 MG
25 CAPSULE ORAL
Status: DISCONTINUED | OUTPATIENT
Start: 2017-02-20 | End: 2017-02-20 | Stop reason: SDUPTHER

## 2017-02-20 RX ORDER — ACETAMINOPHEN 325 MG/1
650 TABLET ORAL
Status: DISCONTINUED | OUTPATIENT
Start: 2017-02-20 | End: 2017-02-20 | Stop reason: SDUPTHER

## 2017-02-20 RX ORDER — CEFEPIME HYDROCHLORIDE 2 G/50ML
2 INJECTION, SOLUTION INTRAVENOUS
Status: DISCONTINUED | OUTPATIENT
Start: 2017-02-20 | End: 2017-02-24 | Stop reason: HOSPADM

## 2017-02-20 RX ORDER — DIPHENHYDRAMINE HCL 25 MG
25 CAPSULE ORAL
Status: COMPLETED | OUTPATIENT
Start: 2017-02-20 | End: 2017-02-20

## 2017-02-20 RX ORDER — ACETAMINOPHEN 325 MG/1
650 TABLET ORAL
Status: COMPLETED | OUTPATIENT
Start: 2017-02-20 | End: 2017-02-20

## 2017-02-20 RX ORDER — FLUCONAZOLE 2 MG/ML
400 INJECTION, SOLUTION INTRAVENOUS
Status: DISCONTINUED | OUTPATIENT
Start: 2017-02-20 | End: 2017-02-21

## 2017-02-20 RX ADMIN — LAMIVUDINE 300 MG: 100 TABLET, FILM COATED ORAL at 09:02

## 2017-02-20 RX ADMIN — MAGNESIUM OXIDE TAB 400 MG (241.3 MG ELEMENTAL MG) 400 MG: 400 (241.3 MG) TAB at 03:02

## 2017-02-20 RX ADMIN — VORICONAZOLE 200 MG: 200 TABLET, FILM COATED ORAL at 09:02

## 2017-02-20 RX ADMIN — Medication 10 ML: at 11:02

## 2017-02-20 RX ADMIN — MAGNESIUM OXIDE TAB 400 MG (241.3 MG ELEMENTAL MG) 400 MG: 400 (241.3 MG) TAB at 09:02

## 2017-02-20 RX ADMIN — ABACAVIR 600 MG: 300 TABLET, FILM COATED ORAL at 09:02

## 2017-02-20 RX ADMIN — CEFEPIME HYDROCHLORIDE 2 G: 2 INJECTION, SOLUTION INTRAVENOUS at 04:02

## 2017-02-20 RX ADMIN — ACETAMINOPHEN 650 MG: 325 TABLET ORAL at 08:02

## 2017-02-20 RX ADMIN — Medication 10 ML: at 12:02

## 2017-02-20 RX ADMIN — POTASSIUM & SODIUM PHOSPHATES POWDER PACK 280-160-250 MG 1 PACKET: 280-160-250 PACK at 05:02

## 2017-02-20 RX ADMIN — POTASSIUM & SODIUM PHOSPHATES POWDER PACK 280-160-250 MG 1 PACKET: 280-160-250 PACK at 10:02

## 2017-02-20 RX ADMIN — OXYCODONE HYDROCHLORIDE 5 MG: 5 TABLET ORAL at 02:02

## 2017-02-20 RX ADMIN — MAGNESIUM OXIDE TAB 400 MG (241.3 MG ELEMENTAL MG) 400 MG: 400 (241.3 MG) TAB at 12:02

## 2017-02-20 RX ADMIN — SODIUM CHLORIDE: 0.9 INJECTION, SOLUTION INTRAVENOUS at 06:02

## 2017-02-20 RX ADMIN — CEFEPIME HYDROCHLORIDE 2 G: 2 INJECTION, SOLUTION INTRAVENOUS at 08:02

## 2017-02-20 RX ADMIN — POTASSIUM & SODIUM PHOSPHATES POWDER PACK 280-160-250 MG 1 PACKET: 280-160-250 PACK at 02:02

## 2017-02-20 RX ADMIN — Medication 10 ML: at 05:02

## 2017-02-20 RX ADMIN — VORICONAZOLE 200 MG: 200 TABLET, FILM COATED ORAL at 08:02

## 2017-02-20 RX ADMIN — FLUCONAZOLE IN SODIUM CHLORIDE 400 MG: 2 INJECTION, SOLUTION INTRAVENOUS at 09:02

## 2017-02-20 RX ADMIN — POTASSIUM CHLORIDE 20 MEQ: 750 CAPSULE, EXTENDED RELEASE ORAL at 09:02

## 2017-02-20 RX ADMIN — POTASSIUM & SODIUM PHOSPHATES POWDER PACK 280-160-250 MG 1 PACKET: 280-160-250 PACK at 06:02

## 2017-02-20 RX ADMIN — SODIUM CHLORIDE: 0.9 INJECTION, SOLUTION INTRAVENOUS at 03:02

## 2017-02-20 RX ADMIN — DOLUTEGRAVIR SODIUM 50 MG: 50 TABLET, FILM COATED ORAL at 09:02

## 2017-02-20 RX ADMIN — ACYCLOVIR 400 MG: 200 CAPSULE ORAL at 08:02

## 2017-02-20 RX ADMIN — ACYCLOVIR 400 MG: 200 CAPSULE ORAL at 09:02

## 2017-02-20 RX ADMIN — MAGNESIUM OXIDE TAB 400 MG (241.3 MG ELEMENTAL MG) 400 MG: 400 (241.3 MG) TAB at 08:02

## 2017-02-20 RX ADMIN — POTASSIUM CHLORIDE 20 MEQ: 750 CAPSULE, EXTENDED RELEASE ORAL at 06:02

## 2017-02-20 RX ADMIN — DIPHENHYDRAMINE HYDROCHLORIDE 25 MG: 25 CAPSULE ORAL at 09:02

## 2017-02-20 RX ADMIN — VANCOMYCIN HYDROCHLORIDE 750 MG: 750 INJECTION, POWDER, LYOPHILIZED, FOR SOLUTION INTRAVENOUS at 05:02

## 2017-02-20 RX ADMIN — ACETAMINOPHEN 650 MG: 325 TABLET ORAL at 09:02

## 2017-02-20 NOTE — PROGRESS NOTES
[facility]  Infectious Disease  Progress Note    Patient Name: Marck Howard  MRN: [id number]  Admission Date: 1/17/2017  Length of Stay: 34 days  Attending Physician: Gerson Sierra MD  Primary Care Provider: Primary Doctor No    Isolation Status: No active isolations  Assessment/Plan:      HIV (human immunodeficiency virus infection)  Keep on current ART of trimeq - he is tolerating it well  - stable       Bacteremia due to Enterococcus  Patient with neutropenic fevers and BC + 2/2 for enterococcus - sensitivities show sensitive to vanco     1. Recommend changing daptomycin to vancomycin  2. Cont cefepime for NF  3. If fevers persist then would obtain CT CAP and consider initiation of anti-fungal therapy as pt without symptoms to suggest typhilits as source.  Fever may also be 2/2 leukemia in setting of blasts on peripheral smear.      Thank you for your consult. I will follow-up with patient. Please contact us if you have any additional questions.    Nohemy Mcdaniel MD  Infectious Disease  [facility]    Subjective:     Principal Problem:Acute leukemia    Interval History: Patient doing well with no complaints other than wanting to go home.  Febrile overnight with chills.    HPI:  35yo previously healthy man who was admitted as a transfer from an OSH on 1/17/2017 with 3mos of weight loss, malaise, and fevers and was found to have a new diagnosis of AML and a new diagnosis of HIV (JG7=165/7%, VL 22k, GT pending). He is improved clinically since starting induction chemotherapy c/b neutropenic fevers that have now resolved and no issues with ARVs    Patient found to have enterococcal bacteremia and ID consulted for assistance with abx management  - patient currently on dapto and cefipime, acyclovir, voriconazole and for HIV trimeq      Review of Systems   Constitutional: Positive for fatigue and fever.   HENT: Negative.    Eyes: Negative.    Respiratory: Negative.     Cardiovascular: Negative.    Gastrointestinal: Negative.    Endocrine: Negative.    Genitourinary: Negative.    Musculoskeletal: Negative.    Skin: Negative.      Objective:     Vital Signs (Most Recent):  Temp: 98.5 °F (36.9 °C) (02/20/17 1613)  Pulse: 102 (02/20/17 1613)  Resp: 18 (02/20/17 1613)  BP: 94/61 (02/20/17 1613)  SpO2: 97 % (02/20/17 1613) Vital Signs (24h Range):  Temp:  [97.6 °F (36.4 °C)-101 °F (38.3 °C)] 98.5 °F (36.9 °C)  Pulse:  [] 102  Resp:  [16-18] 18  SpO2:  [97 %-99 %] 97 %  BP: ()/(56-74) 94/61     Weight: 41.1 kg (90 lb 9.7 oz)  Body mass index is 15.6 kg/(m^2).    Estimated Creatinine Clearance: 116.5 mL/min (based on Cr of 0.5).    Physical Exam   Constitutional: He is oriented to person, place, and time. He appears well-developed and well-nourished.   HENT:   Head: Normocephalic and atraumatic.   Eyes: Pupils are equal, round, and reactive to light.   Neck: Normal range of motion. Neck supple.   Cardiovascular: Normal rate and regular rhythm.    Pulmonary/Chest: Effort normal and breath sounds normal.   Abdominal: Soft. Bowel sounds are normal.   Musculoskeletal: Normal range of motion.   Neurological: He is alert and oriented to person, place, and time.   Skin: Skin is warm and dry.   Psychiatric: His behavior is normal. Thought content normal.   Vitals reviewed.      Significant Labs: All pertinent labs within the past 24 hours have been reviewed.    Significant Imaging: I have reviewed all pertinent imaging results/findings within the past 24 hours.

## 2017-02-20 NOTE — PROGRESS NOTES
"Progress Note  BMT                                                              Team: [facility] [place]    Patient Name: Marck Howard  YOB: 1980    Admit Date: 1/17/2017                   LOS: 34    SUBJECTIVE:     Reason for Admission: Acute leukemia    Subjective and Interval History:   Febrile overnight to 101.  Patient stating that he wants to go home today - no questions asked.  That he is "sick of being here" and "ready to go home" even though he is still having fevers.    Review of Systems:  General: (+) fever  HEENT:  Denies vision changes, sore throat, congestion  CVS:  Denies chest pain, pressure, palpitations  Resp:  Denies shortness of breath, cough, sputum  GI:  Denies diarrhea, constipation, abdominal pain, nausea, vomiting  :  Denies dysuria, hematuria, nocturia  MSK:  Denies myalgias, arthralgias  Skin:  Denies rashes, bleeding  Neuro:  Denies headache, dizziness, syncope, numbness, paresthesias  All other systems otherwise negative    Scheduled Medications   abacavir  600 mg Oral Daily    And    lamivudine  300 mg Oral Daily    acyclovir  400 mg Oral BID    DAPTOmycin (CUBICIN)  IV  10 mg/kg Intravenous Q24H    dolutegravir  50 mg Oral Daily    duke's soln (benadryl 30 mL, mylanta 30 mL, lidocane 30 mL, nystatin 30 mL) 120 mL  10 mL Oral QID    magnesium oxide  400 mg Oral Daily    polyethylene glycol  17 g Oral Daily    senna-docusate 8.6-50 mg  1 tablet Oral BID    sodium chloride 0.9%  10 mL Intravenous Q6H    voriconazole  200 mg Oral BID       Continuous Infusions   sodium chloride 0.9% 150 mL/hr at 02/20/17 0616       PRN Medications  sodium chloride, sodium chloride, sodium chloride, sodium chloride, acetaminophen, acetaminophen, alteplase, dextrose 50%, dextrose 50%, diphenhydrAMINE, glucagon (human recombinant), glucose, glucose, heparin, porcine (PF), HYDROmorphone, magnesium oxide, magnesium oxide, magnesium oxide, ondansetron, oxycodone, " potassium chloride, potassium chloride, potassium chloride, potassium, sodium phosphates, potassium, sodium phosphates, promethazine (PHENERGAN) IVPB, Flushing PICC Protocol **AND** sodium chloride 0.9% **AND** sodium chloride 0.9%, sodium chloride 0.9%    OBJECTIVE:     Temp:  [98.8 °F (37.1 °C)-101 °F (38.3 °C)]   Pulse:  [111-122]   Resp:  [16-18]   BP: ()/(58-71)   SpO2:  [96 %-99 %]   Body mass index is 15.6 kg/(m^2).    Intake/Output Summary    Intake/Output Summary (Last 24 hours) at 02/20/17 0635  Last data filed at 02/20/17 0500   Gross per 24 hour   Intake             4910 ml   Output             4250 ml   Net              660 ml       Physical Exam:  General: well developed, cachectic, no distress  HENT: Head: NCAT. Thrush   Eyes: conjunctivae/corneas clear. EOMI.   Neck: supple, symmetrical, trachea midline  Lungs: Diffuse coarse breath sounds.  CV: Heart: tachycardic but otherwise regular rhythm, no murmur appreciated  Extremities: no cyanosis, edema, or clubbing.   Abdomen: soft, non-tender non-distented; bowel sounds normal.   Skin: Skin color, texture, turgor normal. No rashes or lesions  Musculoskeletal: no clubbing, cyanosis  Neurologic: Mental status: Alert, oriented, awake x3. No focal deficits.   Psych/Behavioral: Normal mood and affect    Diagnostic Result    Lab Results   Component Value Date    WBC 0.63 (LL) 02/20/2017    HGB 7.0 (L) 02/20/2017    HCT 20.0 (L) 02/20/2017    PLT 4 (LL) 02/20/2017         Recent Labs  Lab 02/20/17  0500      K 3.5      CO2 23   BUN 7   CREATININE 0.5   MG 1.6   PHOS 2.2*       Lab Results   Component Value Date    INR 1.0 02/20/2017    INR 1.1 02/19/2017    INR 1.0 02/18/2017       No results for input(s): POCTGLUCOSE in the last 72 hours.    ASSESSMENT/PLAN:   Mr. Marck Howard is a 36 y.o. male with HIV, presenting with AML.    Current Problems List:  Active Hospital Problems    Diagnosis  POA    *Acute leukemia [C95.00]  Yes     Bacteremia due to Enterococcus [R78.81]  No    Acute myeloid leukemia not having achieved remission [C92.00]  Yes    Fever [R50.9]  Yes    Tobacco abuse [Z72.0]  Yes    HIV (human immunodeficiency virus infection) [Z21]  Yes      Resolved Hospital Problems    Diagnosis Date Resolved POA   No resolved problems to display.       Active Problems, Status & Treatment Plan Addressed Today:    Neutropenic Fever  -Febrile overnight to 101F and re-cultured.  Placed back on Dapto and Cefepime  -Blood and urine cx 2/20:  NGTD  -Enterococcus faecium on blood cx 2/15.  -Check C. Diff    AML  -S/p bone marrow biopsy (1/17/17) showing AML, cytogenetics and molecular markers reveal : NPM1 and FLT3 negative. Will need transplant given high risk disease  -7+3 started 1/20/17  -Repeat Bone marrow biopsy 2/3, with circulating blasts in peripheral blood, 70% blasts, 100% cellularity.   -Started on MEC on 2/9/17- today is Day 14    HIV positive (CD4: 118)  -New diagnosis on this admit  -Toxoplasma gondii WNL  -ID following, appreciate recs, started Triumeq 2/2/17  -Ppx bactrim held given myelosuppressive effects, received Pentamidine 1/22/17  -Plan to restart bactrim once counts recovered.     Pancytopenia 2/2 Chemotherapy  -No overt signs of bleeding  -Continue to monitor and transfuse as indicated for Hgb <7.0, PLT <10.  Will give a unit of PRBCs for Hemoglobin of 7 today      Tobacco Abuse  - 26 pack year history  - advise smoking cessation   - nicotine patch prn     H/O Alcohol Abuse  - drank at least 2 beers per day prior to admission  - no h/o DTs and now out of period for withdrawal     Hypoalbuminemia  - Dietary consulted  - encourage increased nutritional intake     Diet:  Regular  DVT PPx:  Encourage ambulation  Code Status:  Full      Dispo:  Pending chemotherapy for AML and repeat bone marrow        Signing Physician:  Merry Lopes MD  Med PGY3  [phone]

## 2017-02-20 NOTE — SUBJECTIVE & OBJECTIVE
Interval History: Patient doing well with no complaints other than wanting to go home.  Febrile overnight with chills.    HPI:  37yo previously healthy man who was admitted as a transfer from an OSH on 1/17/2017 with 3mos of weight loss, malaise, and fevers and was found to have a new diagnosis of AML and a new diagnosis of HIV (PG5=098/7%, VL 22k, GT pending). He is improved clinically since starting induction chemotherapy c/b neutropenic fevers that have now resolved and no issues with ARVs    Patient found to have enterococcal bacteremia and ID consulted for assistance with abx management  - patient currently on dapto and cefipime, acyclovir, voriconazole and for HIV trimeq      Review of Systems   Constitutional: Positive for fatigue and fever.   HENT: Negative.    Eyes: Negative.    Respiratory: Negative.    Cardiovascular: Negative.    Gastrointestinal: Negative.    Endocrine: Negative.    Genitourinary: Negative.    Musculoskeletal: Negative.    Skin: Negative.      Objective:     Vital Signs (Most Recent):  Temp: 98.5 °F (36.9 °C) (02/20/17 1613)  Pulse: 102 (02/20/17 1613)  Resp: 18 (02/20/17 1613)  BP: 94/61 (02/20/17 1613)  SpO2: 97 % (02/20/17 1613) Vital Signs (24h Range):  Temp:  [97.6 °F (36.4 °C)-101 °F (38.3 °C)] 98.5 °F (36.9 °C)  Pulse:  [] 102  Resp:  [16-18] 18  SpO2:  [97 %-99 %] 97 %  BP: ()/(56-74) 94/61     Weight: 41.1 kg (90 lb 9.7 oz)  Body mass index is 15.6 kg/(m^2).    Estimated Creatinine Clearance: 116.5 mL/min (based on Cr of 0.5).    Physical Exam   Constitutional: He is oriented to person, place, and time. He appears well-developed and well-nourished.   HENT:   Head: Normocephalic and atraumatic.   Eyes: Pupils are equal, round, and reactive to light.   Neck: Normal range of motion. Neck supple.   Cardiovascular: Normal rate and regular rhythm.    Pulmonary/Chest: Effort normal and breath sounds normal.   Abdominal: Soft. Bowel sounds are normal.   Musculoskeletal:  Normal range of motion.   Neurological: He is alert and oriented to person, place, and time.   Skin: Skin is warm and dry.   Psychiatric: His behavior is normal. Thought content normal.   Vitals reviewed.      Significant Labs: All pertinent labs within the past 24 hours have been reviewed.    Significant Imaging: I have reviewed all pertinent imaging results/findings within the past 24 hours.

## 2017-02-20 NOTE — ASSESSMENT & PLAN NOTE
Patient with neutropenic fevers and BC + 2/2 for enterococcus - sensitivities show sensitive to vanco     1. Recommend changing daptomycin to vancomycin  2. Cont cefepime for NF  3. If fevers persist then would obtain CT CAP and consider initiation of anti-fungal therapy as pt without symptoms to suggest typhilits as source.  Fever may also be 2/2 leukemia in setting of blasts on peripheral smear.

## 2017-02-20 NOTE — PLAN OF CARE
Problem: Patient Care Overview  Goal: Plan of Care Review  S/p 1U plts and 1U PRBC. Tolerated well. Afebrile throughout shift thus far. Mucositis - duke's ATC. Soft foods encouraged. Pain controlled with oxycodone. Magnesium and phosphorus replacements today. Waiting for day 21 bmbx.

## 2017-02-21 PROBLEM — A04.72 C. DIFFICILE DIARRHEA: Status: ACTIVE | Noted: 2017-02-21

## 2017-02-21 PROBLEM — A49.8 CLOSTRIDIUM DIFFICILE INFECTION: Status: ACTIVE | Noted: 2017-02-21

## 2017-02-21 LAB
ALBUMIN SERPL BCP-MCNC: 2.3 G/DL
ALP SERPL-CCNC: 230 U/L
ALT SERPL W/O P-5'-P-CCNC: 43 U/L
ANION GAP SERPL CALC-SCNC: 8 MMOL/L
ANISOCYTOSIS BLD QL SMEAR: SLIGHT
AST SERPL-CCNC: 18 U/L
BACTERIA BLD CULT: NORMAL
BACTERIA UR CULT: NORMAL
BASOPHILS # BLD AUTO: 0 K/UL
BASOPHILS NFR BLD: 0 %
BILIRUB SERPL-MCNC: 0.5 MG/DL
BUN SERPL-MCNC: 7 MG/DL
C DIFF GDH STL QL: POSITIVE
C DIFF TOX A+B STL QL IA: POSITIVE
CALCIUM SERPL-MCNC: 8.7 MG/DL
CHLORIDE SERPL-SCNC: 103 MMOL/L
CO2 SERPL-SCNC: 23 MMOL/L
CREAT SERPL-MCNC: 0.6 MG/DL
DIFFERENTIAL METHOD: ABNORMAL
EOSINOPHIL # BLD AUTO: 0 K/UL
EOSINOPHIL NFR BLD: 0 %
ERYTHROCYTE [DISTWIDTH] IN BLOOD BY AUTOMATED COUNT: 15.4 %
EST. GFR  (AFRICAN AMERICAN): >60 ML/MIN/1.73 M^2
EST. GFR  (NON AFRICAN AMERICAN): >60 ML/MIN/1.73 M^2
GLUCOSE SERPL-MCNC: 120 MG/DL
HCT VFR BLD AUTO: 22.9 %
HGB BLD-MCNC: 7.9 G/DL
INR PPP: 1.1
LDH SERPL L TO P-CCNC: 211 U/L
LYMPHOCYTES # BLD AUTO: 0.6 K/UL
LYMPHOCYTES NFR BLD: 85.3 %
MAGNESIUM SERPL-MCNC: 1.6 MG/DL
MCH RBC QN AUTO: 30.2 PG
MCHC RBC AUTO-ENTMCNC: 34.5 %
MCV RBC AUTO: 87 FL
MONOCYTES # BLD AUTO: 0.1 K/UL
MONOCYTES NFR BLD: 14.7 %
NEUTROPHILS # BLD AUTO: 0 K/UL
NEUTROPHILS NFR BLD: 0 %
PHOSPHATE SERPL-MCNC: 1.8 MG/DL
PLATELET # BLD AUTO: 19 K/UL
PLATELET BLD QL SMEAR: ABNORMAL
PMV BLD AUTO: 10 FL
POLYCHROMASIA BLD QL SMEAR: ABNORMAL
POTASSIUM SERPL-SCNC: 3.4 MMOL/L
PROT SERPL-MCNC: 6.7 G/DL
PROTHROMBIN TIME: 11.2 SEC
RBC # BLD AUTO: 2.62 M/UL
SODIUM SERPL-SCNC: 134 MMOL/L
VANCOMYCIN SERPL-MCNC: 2.1 UG/ML
WBC # BLD AUTO: 0.68 K/UL

## 2017-02-21 PROCEDURE — 85025 COMPLETE CBC W/AUTO DIFF WBC: CPT

## 2017-02-21 PROCEDURE — 25000003 PHARM REV CODE 250: Performed by: INTERNAL MEDICINE

## 2017-02-21 PROCEDURE — 25000003 PHARM REV CODE 250: Performed by: HOSPITALIST

## 2017-02-21 PROCEDURE — 99233 SBSQ HOSP IP/OBS HIGH 50: CPT | Mod: ,,, | Performed by: INTERNAL MEDICINE

## 2017-02-21 PROCEDURE — 83615 LACTATE (LD) (LDH) ENZYME: CPT

## 2017-02-21 PROCEDURE — 25000003 PHARM REV CODE 250: Performed by: NURSE PRACTITIONER

## 2017-02-21 PROCEDURE — 25500020 PHARM REV CODE 255: Performed by: INTERNAL MEDICINE

## 2017-02-21 PROCEDURE — 85610 PROTHROMBIN TIME: CPT

## 2017-02-21 PROCEDURE — 83735 ASSAY OF MAGNESIUM: CPT

## 2017-02-21 PROCEDURE — 63600175 PHARM REV CODE 636 W HCPCS: Performed by: HOSPITALIST

## 2017-02-21 PROCEDURE — 20600001 HC STEP DOWN PRIVATE ROOM

## 2017-02-21 PROCEDURE — 25500020 PHARM REV CODE 255: Performed by: STUDENT IN AN ORGANIZED HEALTH CARE EDUCATION/TRAINING PROGRAM

## 2017-02-21 PROCEDURE — 25000003 PHARM REV CODE 250: Performed by: STUDENT IN AN ORGANIZED HEALTH CARE EDUCATION/TRAINING PROGRAM

## 2017-02-21 PROCEDURE — 87070 CULTURE OTHR SPECIMN AEROBIC: CPT

## 2017-02-21 PROCEDURE — 80053 COMPREHEN METABOLIC PANEL: CPT

## 2017-02-21 PROCEDURE — 84100 ASSAY OF PHOSPHORUS: CPT

## 2017-02-21 PROCEDURE — 87449 NOS EACH ORGANISM AG IA: CPT

## 2017-02-21 PROCEDURE — 86644 CMV ANTIBODY: CPT

## 2017-02-21 PROCEDURE — 80202 ASSAY OF VANCOMYCIN: CPT

## 2017-02-21 RX ORDER — VORICONAZOLE 200 MG/1
200 TABLET, FILM COATED ORAL 2 TIMES DAILY
Status: DISCONTINUED | OUTPATIENT
Start: 2017-02-21 | End: 2017-02-23

## 2017-02-21 RX ORDER — METRONIDAZOLE 500 MG/1
500 TABLET ORAL EVERY 8 HOURS
Status: DISCONTINUED | OUTPATIENT
Start: 2017-02-21 | End: 2017-02-21

## 2017-02-21 RX ADMIN — POTASSIUM & SODIUM PHOSPHATES POWDER PACK 280-160-250 MG 2 PACKET: 280-160-250 PACK at 05:02

## 2017-02-21 RX ADMIN — ABACAVIR 600 MG: 300 TABLET, FILM COATED ORAL at 08:02

## 2017-02-21 RX ADMIN — POTASSIUM & SODIUM PHOSPHATES POWDER PACK 280-160-250 MG 2 PACKET: 280-160-250 PACK at 04:02

## 2017-02-21 RX ADMIN — POTASSIUM CHLORIDE 20 MEQ: 750 CAPSULE, EXTENDED RELEASE ORAL at 08:02

## 2017-02-21 RX ADMIN — VANCOMYCIN HYDROCHLORIDE 750 MG: 750 INJECTION, POWDER, LYOPHILIZED, FOR SOLUTION INTRAVENOUS at 10:02

## 2017-02-21 RX ADMIN — CEFEPIME HYDROCHLORIDE 2 G: 2 INJECTION, SOLUTION INTRAVENOUS at 12:02

## 2017-02-21 RX ADMIN — Medication 10 ML: at 05:02

## 2017-02-21 RX ADMIN — Medication 10 ML: at 12:02

## 2017-02-21 RX ADMIN — Medication 125 MG: at 05:02

## 2017-02-21 RX ADMIN — ACYCLOVIR 400 MG: 200 CAPSULE ORAL at 08:02

## 2017-02-21 RX ADMIN — LAMIVUDINE 300 MG: 100 TABLET, FILM COATED ORAL at 08:02

## 2017-02-21 RX ADMIN — IOHEXOL 75 ML: 350 INJECTION, SOLUTION INTRAVENOUS at 09:02

## 2017-02-21 RX ADMIN — CEFEPIME HYDROCHLORIDE 2 G: 2 INJECTION, SOLUTION INTRAVENOUS at 04:02

## 2017-02-21 RX ADMIN — Medication 125 MG: at 12:02

## 2017-02-21 RX ADMIN — POTASSIUM CHLORIDE 20 MEQ: 750 CAPSULE, EXTENDED RELEASE ORAL at 05:02

## 2017-02-21 RX ADMIN — DOLUTEGRAVIR SODIUM 50 MG: 50 TABLET, FILM COATED ORAL at 08:02

## 2017-02-21 RX ADMIN — VANCOMYCIN HYDROCHLORIDE 750 MG: 750 INJECTION, POWDER, LYOPHILIZED, FOR SOLUTION INTRAVENOUS at 12:02

## 2017-02-21 RX ADMIN — Medication 10 ML: at 11:02

## 2017-02-21 RX ADMIN — CEFEPIME HYDROCHLORIDE 2 G: 2 INJECTION, SOLUTION INTRAVENOUS at 11:02

## 2017-02-21 RX ADMIN — IOHEXOL 15 ML: 350 INJECTION, SOLUTION INTRAVENOUS at 05:02

## 2017-02-21 RX ADMIN — MAGNESIUM OXIDE TAB 400 MG (241.3 MG ELEMENTAL MG) 400 MG: 400 (241.3 MG) TAB at 10:02

## 2017-02-21 RX ADMIN — POTASSIUM & SODIUM PHOSPHATES POWDER PACK 280-160-250 MG 2 PACKET: 280-160-250 PACK at 10:02

## 2017-02-21 RX ADMIN — ACETAMINOPHEN 650 MG: 325 TABLET ORAL at 04:02

## 2017-02-21 RX ADMIN — MAGNESIUM OXIDE TAB 400 MG (241.3 MG ELEMENTAL MG) 400 MG: 400 (241.3 MG) TAB at 04:02

## 2017-02-21 RX ADMIN — VORICONAZOLE 200 MG: 200 TABLET, FILM COATED ORAL at 12:02

## 2017-02-21 RX ADMIN — IOHEXOL 15 ML: 350 INJECTION, SOLUTION INTRAVENOUS at 06:02

## 2017-02-21 RX ADMIN — VANCOMYCIN HYDROCHLORIDE 750 MG: 750 INJECTION, POWDER, LYOPHILIZED, FOR SOLUTION INTRAVENOUS at 04:02

## 2017-02-21 NOTE — PLAN OF CARE
Notified by the [facility] the 2/21/2017 stool specimen is toxin A&B positive for C difficile, follow SPECIAL CONTACT ISOLATION and utilize appropriate PPE (gown & gloves) for patient encounters.  WASH HANDS with soap & water after PPE removal.  Call the Infection Prevention dept. ([phone]) for isolation discontinuation criteria.

## 2017-02-21 NOTE — PLAN OF CARE
Problem: Patient Care Overview  Goal: Plan of Care Review  Outcome: Ongoing (interventions implemented as appropriate)  Pt involved in plan of care and communicating needs throughout shift.  Up in room independently; no c/o pain or discomfort today.  Tolerating diet, voiding without difficulty. Pt continues to have diarrhea; 2 episodes so far this shift; stool test resulted positive for c-diff; contact precautions and oral vancomycin initiated.  Neutropenic precautions maintained for WBC=0.68; bleeding precautions maintained for plt=19.  Unable to flush either lumen of PICC line; consulted PICC team who noted that the tip of the line was coiled on xray exam; PICC lined pulled this afternoon and PIV placed.  Tip of PICC line sent for culture.  Pt to have CT scan of abd/pelvis, chest, and sinuses this afternoon.  IVF and IV antibiotics admin throughout shift as ordered.  All VSS; no acute events so far this shift.  Pt remaining free from falls or injury throughout shift; bed in lowest position; call light within reach.  Pt instructed to call for assistance as needed.  Q1H rounding done on pt.

## 2017-02-21 NOTE — PROGRESS NOTES
Notified Dr. Dao of 102.4 temp.  Pt initially refused to take tylenol, but agreed fifteen minutes later.

## 2017-02-21 NOTE — PROGRESS NOTES
Progress Note  BMT                                                              [place] [facility] [place]    Patient Name: Marck Howard  YOB: 1980    Admit Date: 1/17/2017                   LOS: 35    SUBJECTIVE:     Reason for Admission: Acute leukemia    Subjective and Interval History:   Febrile overnight to 102.9.  Endorses new onset night sweats.  Says his stool is watery and green.  Otherwise offers no complaints.  Much more pleasant today.    Review of Systems:  General: (+) fever  HEENT:  Denies vision changes, sore throat, congestion  CVS:  Denies chest pain, pressure, palpitations  Resp:  Denies shortness of breath, cough, sputum  GI:  Denies diarrhea, constipation, abdominal pain, nausea, vomiting  :  Denies dysuria, hematuria, nocturia  MSK:  Denies myalgias, arthralgias  Skin:  Denies rashes, bleeding  Neuro:  Denies headache, dizziness, syncope, numbness, paresthesias  All other systems otherwise negative    Scheduled Medications   abacavir  600 mg Oral Daily    And    lamivudine  300 mg Oral Daily    acyclovir  400 mg Oral BID    ceFEPime (MAXIPIME) IVPB  2 g Intravenous Q8H    dolutegravir  50 mg Oral Daily    duke's soln (benadryl 30 mL, mylanta 30 mL, lidocane 30 mL, nystatin 30 mL) 120 mL  10 mL Oral QID    fluconazole (DIFLUCAN) IVPB  400 mg Intravenous Q24H    magnesium oxide  400 mg Oral Daily    sodium chloride 0.9%  10 mL Intravenous Q6H    vancomycin (VANCOCIN) IVPB  750 mg Intravenous Q12H       Continuous Infusions   sodium chloride 0.9% 150 mL/hr at 02/20/17 1640       PRN Medications  sodium chloride, sodium chloride, acetaminophen, alteplase, dextrose 50%, dextrose 50%, glucagon (human recombinant), glucose, glucose, heparin, porcine (PF), HYDROmorphone, magnesium oxide, magnesium oxide, magnesium oxide, ondansetron, oxycodone, potassium chloride, potassium chloride, potassium chloride, potassium, sodium phosphates, potassium, sodium phosphates,  promethazine (PHENERGAN) IVPB, Flushing PICC Protocol **AND** sodium chloride 0.9% **AND** sodium chloride 0.9%, sodium chloride 0.9%    OBJECTIVE:     Temp:  [97.6 °F (36.4 °C)-102.9 °F (39.4 °C)]   Pulse:  []   Resp:  [16-20]   BP: ()/(52-74)   SpO2:  [97 %-99 %]   Body mass index is 15.89 kg/(m^2).    Intake/Output Summary    Intake/Output Summary (Last 24 hours) at 02/21/17 0641  Last data filed at 02/21/17 0410   Gross per 24 hour   Intake           5885.5 ml   Output             3925 ml   Net           1960.5 ml       Physical Exam:  General: well developed, cachectic, no distress  HENT: Head: NCAT. Thrush   Eyes: conjunctivae/corneas clear. EOMI.   Neck: supple, symmetrical, trachea midline  Lungs: Diffuse coarse breath sounds.  CV: Heart: tachycardic but otherwise regular rhythm, no murmur appreciated  Extremities: no cyanosis, edema, or clubbing.   Abdomen: soft, non-tender non-distented; bowel sounds normal.   Skin: Skin color, texture, turgor normal. No rashes or lesions  Musculoskeletal: no clubbing, cyanosis  Neurologic: Mental status: Alert, oriented, awake x3. No focal deficits.   Psych/Behavioral: Normal mood and affect    Diagnostic Result    Lab Results   Component Value Date    WBC 0.68 (LL) 02/21/2017    HGB 7.9 (L) 02/21/2017    HCT 22.9 (L) 02/21/2017    PLT 19 (LL) 02/21/2017         Recent Labs  Lab 02/21/17  0330   *   K 3.4*      CO2 23   BUN 7   CREATININE 0.6   MG 1.6   PHOS 1.8*       Lab Results   Component Value Date    INR 1.1 02/21/2017    INR 1.0 02/20/2017    INR 1.1 02/19/2017       No results for input(s): POCTGLUCOSE in the last 72 hours.    ASSESSMENT/PLAN:   Mr. Marck Howard is a 36 y.o. male with HIV, presenting with AML.    Current Problems List:  Active Hospital Problems    Diagnosis  POA    *Acute leukemia [C95.00]  Yes    Bacteremia due to Enterococcus [R78.81]  No    Acute myeloid leukemia not having achieved remission [C92.00]  Yes     Fever [R50.9]  Yes    Tobacco abuse [Z72.0]  Yes    HIV (human immunodeficiency virus infection) [Z21]  Yes      Resolved Hospital Problems    Diagnosis Date Resolved POA   No resolved problems to display.       Active Problems, Status & Treatment Plan Addressed Today:    Neutropenic Fever  -ANC 0  -Febrile overnight to 102.9F  -Blood cx 2/20:  GPC in chains resembling Strep; likely Enterococcus faecium from previous bacteremia on 2/15  -CDiff 2/21:  Pending  -Urine cx 2/20:  NGTD  -ID following appreciate assistance   Continue Vanco and Cefepime   Continue Voriconazole for now   Check CT CAP to evaluate for possible source of infection    AML  -S/p bone marrow biopsy (1/17/17) showing AML, cytogenetics and molecular markers reveal : NPM1 and FLT3 negative. Will need transplant given high risk disease  -7+3 started 1/20/17  -Repeat Bone marrow biopsy 2/3, with circulating blasts in peripheral blood, 70% blasts, 100% cellularity.   -Started on MEC on 2/9/17- today is Day 15    HIV positive (CD4: 118)  -New diagnosis on this admit  -Toxoplasma gondii WNL  -ID following, appreciate recs, started Triumeq 2/2/17  -Ppx bactrim held given myelosuppressive effects, received Pentamidine 1/22/17  -Plan to restart bactrim once counts recovered.     Pancytopenia 2/2 Chemotherapy  -WBC 0.68  -Hgb 7.9  -Plt 19  -Continue to monitor and transfuse as indicated for Hgb <7.0, PLT <10.       Tobacco Abuse  - 26 pack year history  - advise smoking cessation   - nicotine patch prn     H/O Alcohol Abuse  - drank at least 2 beers per day prior to admission  - no h/o DTs and now out of period for withdrawal     Hypoalbuminemia  - Dietary consulted  - encourage increased nutritional intake     Hypophosphatemia  Hypomagnesemia  -Supplemented prn    Diet:  Regular  DVT PPx:  Encourage ambulation  Code Status:  Full      Dispo:  Pending chemotherapy for AML and repeat bone marrow        Signing Physician:  Merry Loeps MD  Med  PGY3  [phone]

## 2017-02-22 ENCOUNTER — TELEPHONE (OUTPATIENT)
Dept: PHARMACY | Facility: CLINIC | Age: 37
End: 2017-02-22

## 2017-02-22 PROBLEM — J18.9 PNEUMONIA DUE TO INFECTIOUS ORGANISM: Status: ACTIVE | Noted: 2017-02-22

## 2017-02-22 LAB
ALBUMIN SERPL BCP-MCNC: 2.1 G/DL
ALP SERPL-CCNC: 191 U/L
ALT SERPL W/O P-5'-P-CCNC: 37 U/L
ANION GAP SERPL CALC-SCNC: 9 MMOL/L
ANISOCYTOSIS BLD QL SMEAR: SLIGHT
AST SERPL-CCNC: 16 U/L
BACTERIA BLD CULT: NORMAL
BACTERIA BLD CULT: NORMAL
BASOPHILS # BLD AUTO: ABNORMAL K/UL
BASOPHILS NFR BLD: 0 %
BILIRUB SERPL-MCNC: 0.3 MG/DL
BLASTS NFR BLD MANUAL: 36.7 %
BLD PROD TYP BPU: NORMAL
BLOOD UNIT EXPIRATION DATE: NORMAL
BLOOD UNIT TYPE CODE: 5100
BLOOD UNIT TYPE: NORMAL
BUN SERPL-MCNC: 5 MG/DL
CALCIUM SERPL-MCNC: 8.7 MG/DL
CHLORIDE SERPL-SCNC: 104 MMOL/L
CO2 SERPL-SCNC: 23 MMOL/L
CODING SYSTEM: NORMAL
CREAT SERPL-MCNC: 0.6 MG/DL
DACRYOCYTES BLD QL SMEAR: ABNORMAL
DIFFERENTIAL METHOD: ABNORMAL
DISPENSE STATUS: NORMAL
EOSINOPHIL # BLD AUTO: ABNORMAL K/UL
EOSINOPHIL NFR BLD: 0 %
ERYTHROCYTE [DISTWIDTH] IN BLOOD BY AUTOMATED COUNT: 15.6 %
EST. GFR  (AFRICAN AMERICAN): >60 ML/MIN/1.73 M^2
EST. GFR  (NON AFRICAN AMERICAN): >60 ML/MIN/1.73 M^2
GLUCOSE SERPL-MCNC: 105 MG/DL
HCT VFR BLD AUTO: 20.2 %
HGB BLD-MCNC: 6.8 G/DL
LDH SERPL L TO P-CCNC: 220 U/L
LYMPHOCYTES # BLD AUTO: ABNORMAL K/UL
LYMPHOCYTES NFR BLD: 61.6 %
MAGNESIUM SERPL-MCNC: 1.6 MG/DL
MCH RBC QN AUTO: 29.7 PG
MCHC RBC AUTO-ENTMCNC: 33.7 %
MCV RBC AUTO: 88 FL
MONOCYTES # BLD AUTO: ABNORMAL K/UL
MONOCYTES NFR BLD: 1.7 %
NEUTROPHILS NFR BLD: 0 %
NUM UNITS TRANS PACKED RBC: NORMAL
OVALOCYTES BLD QL SMEAR: ABNORMAL
PHOSPHATE SERPL-MCNC: 2.5 MG/DL
PLATELET # BLD AUTO: 12 K/UL
PLATELET BLD QL SMEAR: ABNORMAL
PMV BLD AUTO: 11.4 FL
POIKILOCYTOSIS BLD QL SMEAR: SLIGHT
POTASSIUM SERPL-SCNC: 3.3 MMOL/L
PROT SERPL-MCNC: 6.4 G/DL
RBC # BLD AUTO: 2.29 M/UL
SODIUM SERPL-SCNC: 136 MMOL/L
VANCOMYCIN TROUGH SERPL-MCNC: 6.4 UG/ML
WBC # BLD AUTO: 0.75 K/UL

## 2017-02-22 PROCEDURE — 36430 TRANSFUSION BLD/BLD COMPNT: CPT

## 2017-02-22 PROCEDURE — 25000003 PHARM REV CODE 250: Performed by: INTERNAL MEDICINE

## 2017-02-22 PROCEDURE — P9038 RBC IRRADIATED: HCPCS

## 2017-02-22 PROCEDURE — 25000003 PHARM REV CODE 250: Performed by: HOSPITALIST

## 2017-02-22 PROCEDURE — 99232 SBSQ HOSP IP/OBS MODERATE 35: CPT | Mod: ,,, | Performed by: INTERNAL MEDICINE

## 2017-02-22 PROCEDURE — 63600175 PHARM REV CODE 636 W HCPCS: Performed by: HOSPITALIST

## 2017-02-22 PROCEDURE — 25000003 PHARM REV CODE 250: Performed by: STUDENT IN AN ORGANIZED HEALTH CARE EDUCATION/TRAINING PROGRAM

## 2017-02-22 PROCEDURE — 25000003 PHARM REV CODE 250: Performed by: NURSE PRACTITIONER

## 2017-02-22 PROCEDURE — 87040 BLOOD CULTURE FOR BACTERIA: CPT

## 2017-02-22 PROCEDURE — 80053 COMPREHEN METABOLIC PANEL: CPT

## 2017-02-22 PROCEDURE — 63600175 PHARM REV CODE 636 W HCPCS: Performed by: NURSE PRACTITIONER

## 2017-02-22 PROCEDURE — 84100 ASSAY OF PHOSPHORUS: CPT

## 2017-02-22 PROCEDURE — 20600001 HC STEP DOWN PRIVATE ROOM

## 2017-02-22 PROCEDURE — 80202 ASSAY OF VANCOMYCIN: CPT

## 2017-02-22 PROCEDURE — 85007 BL SMEAR W/DIFF WBC COUNT: CPT

## 2017-02-22 PROCEDURE — 83615 LACTATE (LD) (LDH) ENZYME: CPT

## 2017-02-22 PROCEDURE — 27201040 HC RC 50 FILTER

## 2017-02-22 PROCEDURE — 85027 COMPLETE CBC AUTOMATED: CPT

## 2017-02-22 PROCEDURE — 83735 ASSAY OF MAGNESIUM: CPT

## 2017-02-22 RX ORDER — METRONIDAZOLE 500 MG/1
500 TABLET ORAL EVERY 8 HOURS
Status: DISCONTINUED | OUTPATIENT
Start: 2017-02-22 | End: 2017-02-24

## 2017-02-22 RX ORDER — HYDROCODONE BITARTRATE AND ACETAMINOPHEN 500; 5 MG/1; MG/1
TABLET ORAL
Status: DISCONTINUED | OUTPATIENT
Start: 2017-02-22 | End: 2017-02-23

## 2017-02-22 RX ORDER — ACETAMINOPHEN 325 MG/1
650 TABLET ORAL ONCE
Status: COMPLETED | OUTPATIENT
Start: 2017-02-22 | End: 2017-02-22

## 2017-02-22 RX ORDER — DIPHENHYDRAMINE HCL 25 MG
25 CAPSULE ORAL ONCE
Status: COMPLETED | OUTPATIENT
Start: 2017-02-22 | End: 2017-02-22

## 2017-02-22 RX ADMIN — Medication 125 MG: at 12:02

## 2017-02-22 RX ADMIN — LAMIVUDINE 300 MG: 100 TABLET, FILM COATED ORAL at 08:02

## 2017-02-22 RX ADMIN — ACETAMINOPHEN 650 MG: 325 TABLET ORAL at 09:02

## 2017-02-22 RX ADMIN — ACYCLOVIR 400 MG: 200 CAPSULE ORAL at 08:02

## 2017-02-22 RX ADMIN — Medication 125 MG: at 05:02

## 2017-02-22 RX ADMIN — CEFEPIME HYDROCHLORIDE 2 G: 2 INJECTION, SOLUTION INTRAVENOUS at 12:02

## 2017-02-22 RX ADMIN — SODIUM CHLORIDE: 0.9 INJECTION, SOLUTION INTRAVENOUS at 12:02

## 2017-02-22 RX ADMIN — CEFEPIME HYDROCHLORIDE 2 G: 2 INJECTION, SOLUTION INTRAVENOUS at 08:02

## 2017-02-22 RX ADMIN — OXYCODONE HYDROCHLORIDE 5 MG: 5 TABLET ORAL at 08:02

## 2017-02-22 RX ADMIN — SODIUM CHLORIDE: 0.9 INJECTION, SOLUTION INTRAVENOUS at 05:02

## 2017-02-22 RX ADMIN — ABACAVIR 600 MG: 300 TABLET, FILM COATED ORAL at 08:02

## 2017-02-22 RX ADMIN — ACETAMINOPHEN 650 MG: 325 TABLET ORAL at 08:02

## 2017-02-22 RX ADMIN — DIPHENHYDRAMINE HYDROCHLORIDE 25 MG: 25 CAPSULE ORAL at 09:02

## 2017-02-22 RX ADMIN — VANCOMYCIN HYDROCHLORIDE 1250 MG: 1 INJECTION, POWDER, LYOPHILIZED, FOR SOLUTION INTRAVENOUS at 12:02

## 2017-02-22 RX ADMIN — Medication 10 ML: at 05:02

## 2017-02-22 RX ADMIN — METRONIDAZOLE 500 MG: 500 TABLET ORAL at 08:02

## 2017-02-22 RX ADMIN — VORICONAZOLE 200 MG: 200 TABLET, FILM COATED ORAL at 08:02

## 2017-02-22 RX ADMIN — MAGNESIUM OXIDE TAB 400 MG (241.3 MG ELEMENTAL MG) 400 MG: 400 (241.3 MG) TAB at 08:02

## 2017-02-22 RX ADMIN — METRONIDAZOLE 500 MG: 500 TABLET ORAL at 02:02

## 2017-02-22 RX ADMIN — CEFEPIME HYDROCHLORIDE 2 G: 2 INJECTION, SOLUTION INTRAVENOUS at 11:02

## 2017-02-22 RX ADMIN — VANCOMYCIN HYDROCHLORIDE 1250 MG: 1 INJECTION, POWDER, LYOPHILIZED, FOR SOLUTION INTRAVENOUS at 08:02

## 2017-02-22 RX ADMIN — DOLUTEGRAVIR SODIUM 50 MG: 50 TABLET, FILM COATED ORAL at 08:02

## 2017-02-22 RX ADMIN — VANCOMYCIN HYDROCHLORIDE 750 MG: 750 INJECTION, POWDER, LYOPHILIZED, FOR SOLUTION INTRAVENOUS at 05:02

## 2017-02-22 RX ADMIN — Medication 10 ML: at 12:02

## 2017-02-22 RX ADMIN — CEFEPIME HYDROCHLORIDE 2 G: 2 INJECTION, SOLUTION INTRAVENOUS at 03:02

## 2017-02-22 NOTE — PROGRESS NOTES
[facility]  Infectious Disease  Progress Note    Patient Name: Marck Howard  MRN: [id number]  Admission Date: 1/17/2017  Length of Stay: 35 days  Attending Physician: Gerson Sierra MD  Primary Care Provider: Primary Doctor No    Isolation Status: Special Contact  Assessment/Plan:      HIV (human immunodeficiency virus infection)  Keep on current ART of trimeq - he is tolerating it well  - stable  - no changes today       Bacteremia due to Enterococcus  Patient with neutropenic fevers and BC + 2/2 for enterococcus - sensitivities show sensitive to vanco. Now with another set of BC + strep - concerning for either a continuous bowel source vs and abscess - need to rule out the latter      1. Keep on vanc and cefipime   2. CT of abdomen at least but would check chest as well       Clostridium difficile infection  Patient denies diarrhea but has had 4-5 BMs in the last 24 hours.  C diff culture and EIA+ - agree with starting vanc PO  Await CT-abdomen to [facility] for typhlitis or possible abscess       Anticipated Disposition:     Thank you for your consult. I will follow-up with patient. Please contact us if you have any additional questions.    Tanner Doherty MD  Infectious Disease  [facility]    Subjective:     Principal Problem:Acute leukemia    Interval History: Patient doing OK - no notable shakes, chills, cough, nausea, vomtiing or diarrhea - no chest or abdominal pain - no skin breakdown     HPI:  35yo previously healthy man who was admitted as a transfer from an OSH on 1/17/2017 with 3mos of weight loss, malaise, and fevers and was found to have a new diagnosis of AML and a new diagnosis of HIV (XO6=413/7%, VL 22k, GT pending). He is improved clinically since starting induction chemotherapy c/b neutropenic fevers that have now resolved and no issues with ARVs    Patient found to have enterococcal bacteremia and ID consulted for assistance with abx management  -  patient currently on dapto and cefipime, acyclovir, voriconazole and for HIV trimeq      Review of Systems   Constitutional: Positive for fatigue and fever.   HENT: Negative.    Eyes: Negative.    Respiratory: Negative.    Cardiovascular: Negative.    Gastrointestinal: Negative.    Endocrine: Negative.    Genitourinary: Negative.    Musculoskeletal: Negative.    Skin: Negative.      Objective:     Vital Signs (Most Recent):  Temp: 99.5 °F (37.5 °C) (02/21/17 1930)  Pulse: (!) 120 (02/21/17 1930)  Resp: 18 (02/21/17 1930)  BP: 104/60 (02/21/17 1930)  SpO2: 97 % (02/21/17 1930) Vital Signs (24h Range):  Temp:  [98.5 °F (36.9 °C)-102.4 °F (39.1 °C)] 99.5 °F (37.5 °C)  Pulse:  [113-122] 120  Resp:  [16-20] 18  SpO2:  [97 %-100 %] 97 %  BP: ()/(54-68) 104/60     Weight: 41.8 kg (92 lb 4.2 oz)  Body mass index is 15.89 kg/(m^2).    Estimated Creatinine Clearance: 98.9 mL/min (based on Cr of 0.6).    Physical Exam   Constitutional: He is oriented to person, place, and time. He appears well-developed and well-nourished.   HENT:   Head: Normocephalic and atraumatic.   Eyes: Pupils are equal, round, and reactive to light.   Neck: Normal range of motion. Neck supple.   Cardiovascular: Normal rate and regular rhythm.    Pulmonary/Chest: Effort normal and breath sounds normal.   Abdominal: Soft. Bowel sounds are normal.   Musculoskeletal: Normal range of motion.   Neurological: He is alert and oriented to person, place, and time.   Skin: Skin is warm and dry.   Psychiatric: His behavior is normal. Thought content normal.   Vitals reviewed.      Significant Labs: All pertinent labs within the past 24 hours have been reviewed.    Significant Imaging: I have reviewed all pertinent imaging results/findings within the past 24 hours.

## 2017-02-22 NOTE — PLAN OF CARE
Problem: Patient Care Overview  Goal: Plan of Care Review  Outcome: Ongoing (interventions implemented as appropriate)  Pt involved in plan of care and communicating needs throughout shift. Up in room independently; no c/o pain or discomfort today. Tolerating diet, voiding without difficulty. Pt states only one episode of diarrhea so far this shift; remains on c-diff contact precautions; oral vancomycin changed to PO flagyl today.  IV vancomycin dose increased to 1250mg for trough = 6.4.  Neutropenic precautions maintained for WBC=0.75; bleeding precautions maintained for plt=12. 1 unit RBCs transfused as ordered for H/H=6.8/20.2; pt tolerated well.  IVF and IV antibiotics admin throughout shift as ordered. All VSS; no acute events so far this shift. Pt remaining free from falls or injury throughout shift; bed in lowest position; call light within reach. Pt instructed to call for assistance as needed. Q1H rounding done on pt.

## 2017-02-22 NOTE — ASSESSMENT & PLAN NOTE
CT chest concerning for RML pneumonia of unclear etiology - minimal symptoms at this time but he does have a cough but not successful to date with sputum cultures     Recs:   1.  Continue to try to get a sputum culture   2. If he continues to spike consider pulmonary consult for bronch and ID will consider adding additional ABX coverage

## 2017-02-22 NOTE — PROGRESS NOTES
[facility]  Infectious Disease  Progress Note    Patient Name: Marck Howard  MRN: [id number]  Admission Date: 1/17/2017  Length of Stay: 36 days  Attending Physician: Gerson Sierra MD  Primary Care Provider: Primary Doctor No    Isolation Status: Special Contact  Assessment/Plan:      Bacteremia due to Enterococcus  Patient with neutropenic fevers and BC + 2/2 for enterococcus - sensitivities show sensitive to vanco. Now with another set of BC + for enterococcus in the setting of CT-abdomen showing likely necrotizing colitis - typhilitis - so no cleat abscess but rather a conduit from the GI tract into the blood - need to cover for the cultured organisms but also those in the GI tract like GNR and anaeorbes in the setting of C diff as well and significant diarrhea     1.  Keep on vanc and cefipime and add flagyl which will cover the C diff and anerobes         Clostridium difficile infection  Patient denies diarrhea but has had 4-5 BMs in the last 24 hours.  C diff culture and EIA+ - agree with starting vanc PO  Await CT-abdomen to [facility] for typhlitis or possible abscess     To be more efficient with ABX - will switch to flagyl for C diff - we will watch carefully and if he does not respond may need to go back to PO vanc but leave the flagyl for gut anaerobic coverage     Pneumonia due to infectious organism  CT chest concerning for RML pneumonia of unclear etiology - minimal symptoms at this time but he does have a cough but not successful to date with sputum cultures     Recs:   1.  Continue to try to get a sputum culture   2. If he continues to spike consider pulmonary consult for bronch and ID will consider adding additional ABX coverage       Anticipated Disposition:   Thank you for your consult. I will follow-up with patient. Please contact us if you have any additional questions.    Tanner Doherty MD  Infectious Disease  [facility]    Subjective:     Principal  Problem:Acute leukemia    Interval History: Patient feels OK but he is warm at the time of exam.  No chills, slight cough but no sputum production, no abd pain, 2 BMs formed by patient's report - nursing has 6-7 in lat 24 hours.  No dysuria or skin breakdown     HPI:  35yo previously healthy man who was admitted as a transfer from an OSH on 1/17/2017 with 3mos of weight loss, malaise, and fevers and was found to have a new diagnosis of AML and a new diagnosis of HIV (XE8=146/7%, VL 22k, GT pending). He is improved clinically since starting induction chemotherapy c/b neutropenic fevers that have now resolved and no issues with ARVs    Patient found to have enterococcal bacteremia and ID consulted for assistance with abx management  - patient currently on dapto and cefipime, acyclovir, voriconazole and for HIV trimeq      Review of Systems  Objective:     Vital Signs (Most Recent):  Temp: 98.8 °F (37.1 °C) (02/22/17 1512)  Pulse: 96 (02/22/17 1512)  Resp: 17 (02/22/17 1512)  BP: 106/63 (02/22/17 1512)  SpO2: 98 % (02/22/17 1512) Vital Signs (24h Range):  Temp:  [98.6 °F (37 °C)-100.6 °F (38.1 °C)] 98.8 °F (37.1 °C)  Pulse:  [] 96  Resp:  [15-20] 17  SpO2:  [97 %-99 %] 98 %  BP: ()/(50-74) 106/63     Weight: 41.1 kg (90 lb 9.7 oz)  Body mass index is 15.6 kg/(m^2).    Estimated Creatinine Clearance: 97 mL/min (based on Cr of 0.6).    Physical Exam    Significant Labs: All pertinent labs within the past 24 hours have been reviewed.    Significant Imaging: I have reviewed all pertinent imaging results/findings within the past 24 hours. Significant for clear RML infiltrate and boewl c/w typhilitis

## 2017-02-22 NOTE — PLAN OF CARE
Full note to follow    Patient with typhilitis and right middle lobe pneumonia     WE have added flagyl which will also cover the c diff for now and DC the PO vanc     Please try to get a sputum sample

## 2017-02-22 NOTE — PLAN OF CARE
MDR's with Dr Manley.  Patient is day 16 of MEC.  Patient continues to have neutropenic fever.  ANC 0 and 36% blasts today.  On IV cefepime and IV vanc for + blood cultures.  On contact isolation for cdiff.  Patient has asked many times if he can go home.  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  MD explained the plan of care.  Plan for day 21 bone marrow biopsy.  Will continue to follow.

## 2017-02-22 NOTE — ASSESSMENT & PLAN NOTE
Patient denies diarrhea but has had 4-5 BMs in the last 24 hours.  C diff culture and EIA+ - agree with starting vanc PO  Await CT-abdomen to [facility] for typhlitis or possible abscess

## 2017-02-22 NOTE — PROGRESS NOTES
[facility]  Adult Nutrition  Consult Note    SUMMARY     Recommendations    Recommendation/Intervention: 1. Continue on high calorie/high protein diet 2. Order Boost Plus TID - in vanilla 3. Encourage optimal PO intake of >% to meet EEN/EPN 4. RD to monitor and follow up  Goals: Pt will meet >85% EEN with po intake + ONS  Nutrition Goal Status: progressing towards goal  Communication of RD Recs: reviewed with RN    Continuum of Care Plan    Referral to Outpatient Services: other (see comments) (Nutrition D/C Planning: high tiago/protein + ONS)    Reason for Assessment    Reason for Assessment: RD follow-up  Diagnosis: cancer diagnosis/related complications (acute leukemia)  Relevent Medical History: No PMH   Interdisciplinary Rounds: attended     General Information Comments: +CDff. Diarrhea continue. PO intake declines, RD continuing to encourage optimal po intake    Nutrition Prescription Ordered    Current Diet Order: Regular + Double portions  Nutrition Order Comments: 0-50%           Oral Nutrition Supplement: Boost Plus     Evaluation of Received Nutrients/Fluid Intake     Oral Fluid (mL): 1185      IV Fluid (mL): 3857       Nutrition Risk Screen     Nutrition Risk Screen: no indicators present    Nutrition/Diet History    Patient Reported Diet/Restrictions/Preferences: general  Typical Food/Fluid Intake: 25-75%  Food Preferences:  (no cultural or Christian needs identified)     Factors Affecting Nutritional Intake:  (-)     Labs/Tests/Procedures/Meds     Pertinent Labs Reviewed: reviewed  Pertinent Labs Comments: glc 130, Na 140, K 4.1, BUN 18, Cr 0.6, H&H 7.9/24.2, plt 21  Pertinent Medications Reviewed: reviewed  Pertinent Medications Comments: cipro, acyclovir, senna dosucate, heparin, cytarabine, ondasetron    Physical Findings    Overall Physical Appearance: underweight  Tubes:  (-)  Oral/Mouth Cavity: other (see comments) (dry/chapped lips)  Skin: intact    Anthropometrics        Height (inches): 63.9 in  Weight Method: Standard Scale  Weight (kg): 41.1 kg  Ideal Body Weight (IBW), Male: 129.4 lb     % Ideal Body Weight, Male (lb): 70.02 lb     BMI (kg/m2): 15.6  BMI Grade: less than 16 protein-energy malnutrition grade III        % Weight Change: 15.4 kg (27% x2-3 months)  Weight Loss: unintentional     Estimated/Assessed Needs    Weight Used For Calorie Calculations: 44.6 kg (98 lb 5.2 oz)   Height (cm): 162.3 cm     Energy Need Method: Kcal/kg (2007-2230 kcal (45-50 kcal/kg))   Weight Used For Protein Calculations: 44.6 kg (98 lb 5.2 oz)  Protein Requirements: 67-80 g    Fluid Need Method: RDA Method (or per MD)     Monitor and Evaluation    Food and Nutrient Intake: food and beverage intake, energy intake  Food and Nutrient Adminstration: diet order     Physical Activity and Function: nutrition-related ADLs and IADLs  Anthropometric Measurements: weight, weight change, body mass index  Biochemical Data, Medical Tests and Procedures: electrolyte and renal panel, glucose/endocrine profile, gastrointestinal profile, inflammatory profile, lipid profile  Nutrition-Focused Physical Findings: overall appearance    Nutrition Risk    Level of Risk: moderate    Nutrition Follow-Up    RD Follow-up?: Yes    Assessment and Plan     Nutrition Diagnosis  Problem: Increased nutrient needs  Etiology: HIV + Ca dx   S&S: diarrhea and increased EEN and EPN

## 2017-02-22 NOTE — SUBJECTIVE & OBJECTIVE
Interval History: Patient doing OK - no notable shakes, chills, cough, nausea, vomtiing or diarrhea - no chest or abdominal pain - no skin breakdown     HPI:  35yo previously healthy man who was admitted as a transfer from an OSH on 1/17/2017 with 3mos of weight loss, malaise, and fevers and was found to have a new diagnosis of AML and a new diagnosis of HIV (GX9=306/7%, VL 22k, GT pending). He is improved clinically since starting induction chemotherapy c/b neutropenic fevers that have now resolved and no issues with ARVs    Patient found to have enterococcal bacteremia and ID consulted for assistance with abx management  - patient currently on dapto and cefipime, acyclovir, voriconazole and for HIV trimeq      Review of Systems   Constitutional: Positive for fatigue and fever.   HENT: Negative.    Eyes: Negative.    Respiratory: Negative.    Cardiovascular: Negative.    Gastrointestinal: Negative.    Endocrine: Negative.    Genitourinary: Negative.    Musculoskeletal: Negative.    Skin: Negative.      Objective:     Vital Signs (Most Recent):  Temp: 99.5 °F (37.5 °C) (02/21/17 1930)  Pulse: (!) 120 (02/21/17 1930)  Resp: 18 (02/21/17 1930)  BP: 104/60 (02/21/17 1930)  SpO2: 97 % (02/21/17 1930) Vital Signs (24h Range):  Temp:  [98.5 °F (36.9 °C)-102.4 °F (39.1 °C)] 99.5 °F (37.5 °C)  Pulse:  [113-122] 120  Resp:  [16-20] 18  SpO2:  [97 %-100 %] 97 %  BP: ()/(54-68) 104/60     Weight: 41.8 kg (92 lb 4.2 oz)  Body mass index is 15.89 kg/(m^2).    Estimated Creatinine Clearance: 98.9 mL/min (based on Cr of 0.6).    Physical Exam   Constitutional: He is oriented to person, place, and time. He appears well-developed and well-nourished.   HENT:   Head: Normocephalic and atraumatic.   Eyes: Pupils are equal, round, and reactive to light.   Neck: Normal range of motion. Neck supple.   Cardiovascular: Normal rate and regular rhythm.    Pulmonary/Chest: Effort normal and breath sounds normal.   Abdominal: Soft. Bowel  sounds are normal.   Musculoskeletal: Normal range of motion.   Neurological: He is alert and oriented to person, place, and time.   Skin: Skin is warm and dry.   Psychiatric: His behavior is normal. Thought content normal.   Vitals reviewed.      Significant Labs: All pertinent labs within the past 24 hours have been reviewed.    Significant Imaging: I have reviewed all pertinent imaging results/findings within the past 24 hours.

## 2017-02-22 NOTE — PROGRESS NOTES
1 unit RBCs transfusing at this time per MD order for H/H=6.8/20.2.  Blood consent in chart.  Type and screen current.  Pt premedicated with tylenol and benadryl prior to start of transfusion.  RBCs checked against order and patient at bedside by 2 RNs per protocol.  Pt tolerating well; no distress noted; all VSS.  Will continue to monitor.

## 2017-02-22 NOTE — ASSESSMENT & PLAN NOTE
Patient denies diarrhea but has had 4-5 BMs in the last 24 hours.  C diff culture and EIA+ - agree with starting vanc PO  Await CT-abdomen to [facility] for typhlitis or possible abscess     To be more efficient with ABX - will switch to flagyl for C diff - we will watch carefully and if he does not respond may need to go back to PO vanc but leave the flagyl for gut anaerobic coverage

## 2017-02-22 NOTE — SUBJECTIVE & OBJECTIVE
Interval History: Patient feels OK but he is warm at the time of exam.  No chills, slight cough but no sputum production, no abd pain, 2 BMs formed by patient's report - nursing has 6-7 in lat 24 hours.  No dysuria or skin breakdown     HPI:  35yo previously healthy man who was admitted as a transfer from an OSH on 1/17/2017 with 3mos of weight loss, malaise, and fevers and was found to have a new diagnosis of AML and a new diagnosis of HIV (ZN7=436/7%, VL 22k, GT pending). He is improved clinically since starting induction chemotherapy c/b neutropenic fevers that have now resolved and no issues with ARVs    Patient found to have enterococcal bacteremia and ID consulted for assistance with abx management  - patient currently on dapto and cefipime, acyclovir, voriconazole and for HIV trimeq      Review of Systems  Objective:     Vital Signs (Most Recent):  Temp: 98.8 °F (37.1 °C) (02/22/17 1512)  Pulse: 96 (02/22/17 1512)  Resp: 17 (02/22/17 1512)  BP: 106/63 (02/22/17 1512)  SpO2: 98 % (02/22/17 1512) Vital Signs (24h Range):  Temp:  [98.6 °F (37 °C)-100.6 °F (38.1 °C)] 98.8 °F (37.1 °C)  Pulse:  [] 96  Resp:  [15-20] 17  SpO2:  [97 %-99 %] 98 %  BP: ()/(50-74) 106/63     Weight: 41.1 kg (90 lb 9.7 oz)  Body mass index is 15.6 kg/(m^2).    Estimated Creatinine Clearance: 97 mL/min (based on Cr of 0.6).    Physical Exam    Significant Labs: All pertinent labs within the past 24 hours have been reviewed.    Significant Imaging: I have reviewed all pertinent imaging results/findings within the past 24 hours. Significant for clear RML infiltrate and boewl c/w typhilitis

## 2017-02-22 NOTE — PROGRESS NOTES
Pt refused all PO medications.  Stated that he isn't taking any more pills because he wants to leave.

## 2017-02-22 NOTE — ASSESSMENT & PLAN NOTE
Patient with neutropenic fevers and BC + 2/2 for enterococcus - sensitivities show sensitive to vanco. Now with another set of BC + strep - concerning for either a continuous bowel source vs and abscess - need to rule out the latter      1. Keep on vanc and cefipime   2. CT of abdomen at least but would check chest as well

## 2017-02-22 NOTE — ASSESSMENT & PLAN NOTE
Patient with neutropenic fevers and BC + 2/2 for enterococcus - sensitivities show sensitive to vanco. Now with another set of BC + for enterococcus in the setting of CT-abdomen showing likely necrotizing colitis - typhilitis - so no cleat abscess but rather a conduit from the GI tract into the blood - need to cover for the cultured organisms but also those in the GI tract like GNR and anaeorbes in the setting of C diff as well and significant diarrhea     1.  Keep on vanc and cefipime and add flagyl which will cover the C diff and anerobes

## 2017-02-22 NOTE — PROGRESS NOTES
Progress Note  BMT                                                              Team: [facility] HEMATOLOGY BMT    Patient Name: Marck Howard  YOB: 1980    Admit Date: 1/17/2017                   LOS: 36    SUBJECTIVE:     Reason for Admission: Acute leukemia    Subjective and Interval History:   Febrile overnight, T.max 100.6.  1 BM overnight.  No complaints this am. Ambulating in halls. Eating without difficulty.    Review of Systems:  General: (+) fever  HEENT:  Denies vision changes, sore throat, congestion  CVS:  Denies chest pain, pressure, palpitations  Resp:  Denies shortness of breath, cough, sputum  GI:  Denies diarrhea, constipation, abdominal pain, nausea, vomiting  :  Denies dysuria, hematuria, nocturia  MSK:  Denies myalgias, arthralgias  Skin:  Denies rashes, bleeding  Neuro:  Denies headache, dizziness, syncope, numbness, paresthesias  All other systems otherwise negative    Scheduled Medications   abacavir  600 mg Oral Daily    And    lamivudine  300 mg Oral Daily    acyclovir  400 mg Oral BID    ceFEPime (MAXIPIME) IVPB  2 g Intravenous Q8H    dolutegravir  50 mg Oral Daily    magnesium oxide  400 mg Oral Daily    metronidazole  500 mg Oral Q8H    sodium chloride 0.9%  10 mL Intravenous Q6H    vancomycin (VANCOCIN) IVPB  1,250 mg Intravenous Q8H    voriconazole  200 mg Oral BID       Continuous Infusions   sodium chloride 0.9% 150 mL/hr at 02/22/17 0553       PRN Medications  sodium chloride, acetaminophen, alteplase, dextrose 50%, dextrose 50%, glucagon (human recombinant), glucose, glucose, heparin, porcine (PF), HYDROmorphone, magnesium oxide, magnesium oxide, magnesium oxide, omnipaque 350 iohexol, omnipaque 350 iohexol, ondansetron, oxycodone, potassium chloride, potassium chloride, potassium chloride, potassium, sodium phosphates, potassium, sodium phosphates, promethazine (PHENERGAN) IVPB, Flushing PICC Protocol **AND** sodium chloride 0.9% **AND** sodium  chloride 0.9%, sodium chloride 0.9%    OBJECTIVE:     Temp:  [98.6 °F (37 °C)-100.6 °F (38.1 °C)]   Pulse:  []   Resp:  [15-20]   BP: ()/(50-74)   SpO2:  [97 %-99 %]   Body mass index is 15.6 kg/(m^2).    Intake/Output Summary    Intake/Output Summary (Last 24 hours) at 02/22/17 1406  Last data filed at 02/22/17 1300   Gross per 24 hour   Intake             6160 ml   Output             1100 ml   Net             5060 ml       Physical Exam:  General: well developed, cachectic, no distress  HENT: Head: NCAT. Thrush   Eyes: conjunctivae/corneas clear. EOMI.   Neck: supple, symmetrical, trachea midline  Lungs: Diffuse coarse breath sounds.  CV: Heart: regular rhythm, no murmur appreciated  Extremities: no cyanosis, edema, or clubbing.   Abdomen: soft, non-tender non-distented; bowel sounds normal.   Skin: Skin color, texture, turgor normal. No rashes or lesions  Musculoskeletal: no clubbing, cyanosis  Neurologic: Mental status: Alert, oriented, awake x3. No focal deficits.   Psych/Behavioral: Normal mood and affect    Diagnostic Result    Lab Results   Component Value Date    WBC 0.75 (LL) 02/22/2017    HGB 6.8 (L) 02/22/2017    HCT 20.2 (L) 02/22/2017    PLT 12 (LL) 02/22/2017         Recent Labs  Lab 02/22/17  0557      K 3.3*      CO2 23   BUN 5*   CREATININE 0.6   MG 1.6   PHOS 2.5*       Lab Results   Component Value Date    INR 1.1 02/21/2017    INR 1.0 02/20/2017    INR 1.1 02/19/2017       No results for input(s): POCTGLUCOSE in the last 72 hours.    ASSESSMENT/PLAN:   Mr. Marck Howard is a 36 y.o. male with HIV, presenting with AML.    Current Problems List:  Active Hospital Problems    Diagnosis  POA    *Acute leukemia [C95.00]  Yes    Clostridium difficile infection [B96.89]  No    Bacteremia due to Enterococcus [R78.81]  No    Acute myeloid leukemia not having achieved remission [C92.00]  Yes    Fever [R50.9]  Yes    Tobacco abuse [Z72.0]  Yes    HIV (human  immunodeficiency virus infection) [Z21]  Yes      Resolved Hospital Problems    Diagnosis Date Resolved POA   No resolved problems to display.       Active Problems, Status & Treatment Plan Addressed Today:    Neutropenic Fever  -ANC 0  -Febrile overnight to 100.6 F  -Blood cx 2/20:  GPC in chains resembling Strep; likely Enterococcus faecium from previous bacteremia on 2/15. Repeat BC done this am pending  -CDiff 2/21 positive, started po Vanc   -Urine cx 2/20:  NGTD  -ID following appreciate assistance   Continue Vanco and Cefepime- increase Vanc dose, trough 6.4 this am   Continue Voriconazole for now   CT CAP to evaluate for possible source of infection showed typhlitis and pneumonia  -followed by ID    AML  -S/p bone marrow biopsy (1/17/17) showing AML, cytogenetics and molecular markers reveal : NPM1 and FLT3 negative. Will need transplant given high risk disease  -7+3 started 1/20/17  -Repeat Bone marrow biopsy 2/3, with circulating blasts in peripheral blood, 70% blasts, 100% cellularity.   -Started on MEC on 2/9/17- today is Day 16  - 36% peripheral blasts today    HIV positive (CD4: 118)  -New diagnosis on this admit  -Toxoplasma gondii WNL  -ID following, appreciate recs, started Triumeq 2/2/17  -Ppx bactrim held given myelosuppressive effects, received Pentamidine 1/22/17  -Plan to restart bactrim once counts recovered.     Pancytopenia 2/2 Chemotherapy  -WBC 0.75 k/ul,   -Hgb 6.8 gm/dl, will transfuse 1 unit of PRBC today  -Plt 12,000  -Continue to monitor and transfuse as indicated for Hgb <7.0, PLT <10.       Tobacco Abuse  - 26 pack year history  - advise smoking cessation   - nicotine patch prn     H/O Alcohol Abuse  - drank at least 2 beers per day prior to admission  - no h/o DTs and now out of period for withdrawal     Hypoalbuminemia  - Dietary consulted  - encourage increased nutritional intake     Hypophosphatemia  Hypomagnesemia  -Supplemented prn    Diet:  Regular  DVT PPx:  Encourage  ambulation  Code Status:  Full      Dispo:  Pending repeat bone marrow day 21    Marcella Neumann NP  Hematology/BMT

## 2017-02-23 ENCOUNTER — TELEPHONE (OUTPATIENT)
Dept: PHARMACY | Facility: CLINIC | Age: 37
End: 2017-02-23

## 2017-02-23 PROBLEM — J18.9 PNEUMONIA DUE TO INFECTIOUS ORGANISM: Status: ACTIVE | Noted: 2017-02-23

## 2017-02-23 LAB
ABO + RH BLD: NORMAL
ALBUMIN SERPL BCP-MCNC: 2.1 G/DL
ALP SERPL-CCNC: 181 U/L
ALT SERPL W/O P-5'-P-CCNC: 42 U/L
ANION GAP SERPL CALC-SCNC: 10 MMOL/L
ANISOCYTOSIS BLD QL SMEAR: SLIGHT
AST SERPL-CCNC: 25 U/L
BACTERIA CATH TIP CULT: NO GROWTH
BASOPHILS # BLD AUTO: ABNORMAL K/UL
BASOPHILS NFR BLD: 0 %
BILIRUB SERPL-MCNC: 0.3 MG/DL
BLASTS NFR BLD MANUAL: 78 %
BLD GP AB SCN CELLS X3 SERPL QL: NORMAL
BLD PROD TYP BPU: NORMAL
BLOOD UNIT EXPIRATION DATE: NORMAL
BLOOD UNIT TYPE CODE: 5100
BLOOD UNIT TYPE: NORMAL
BUN SERPL-MCNC: 6 MG/DL
CALCIUM SERPL-MCNC: 8.5 MG/DL
CHLORIDE SERPL-SCNC: 105 MMOL/L
CO2 SERPL-SCNC: 24 MMOL/L
CODING SYSTEM: NORMAL
CREAT SERPL-MCNC: 0.6 MG/DL
DIFFERENTIAL METHOD: ABNORMAL
DISPENSE STATUS: NORMAL
EOSINOPHIL # BLD AUTO: ABNORMAL K/UL
EOSINOPHIL NFR BLD: 0 %
ERYTHROCYTE [DISTWIDTH] IN BLOOD BY AUTOMATED COUNT: 14.8 %
EST. GFR  (AFRICAN AMERICAN): >60 ML/MIN/1.73 M^2
EST. GFR  (NON AFRICAN AMERICAN): >60 ML/MIN/1.73 M^2
GLUCOSE SERPL-MCNC: 108 MG/DL
HCT VFR BLD AUTO: 24.7 %
HGB BLD-MCNC: 8.5 G/DL
HYPOCHROMIA BLD QL SMEAR: ABNORMAL
LDH SERPL L TO P-CCNC: 212 U/L
LYMPHOCYTES # BLD AUTO: ABNORMAL K/UL
LYMPHOCYTES NFR BLD: 22 %
MAGNESIUM SERPL-MCNC: 1.6 MG/DL
MCH RBC QN AUTO: 30 PG
MCHC RBC AUTO-ENTMCNC: 34.4 %
MCV RBC AUTO: 87 FL
MONOCYTES # BLD AUTO: ABNORMAL K/UL
MONOCYTES NFR BLD: 0 %
NEUTROPHILS NFR BLD: 0 %
NUM UNITS TRANS WBC-POOR PLATPHERESIS: NORMAL
PHOSPHATE SERPL-MCNC: 2.8 MG/DL
PLATELET # BLD AUTO: 7 K/UL
PLATELET BLD QL SMEAR: ABNORMAL
PMV BLD AUTO: ABNORMAL FL
POIKILOCYTOSIS BLD QL SMEAR: SLIGHT
POTASSIUM SERPL-SCNC: 3 MMOL/L
PROT SERPL-MCNC: 6.5 G/DL
RBC # BLD AUTO: 2.83 M/UL
SODIUM SERPL-SCNC: 139 MMOL/L
VANCOMYCIN TROUGH SERPL-MCNC: 10.1 UG/ML
WBC # BLD AUTO: 0.9 K/UL

## 2017-02-23 PROCEDURE — 25000003 PHARM REV CODE 250: Performed by: HOSPITALIST

## 2017-02-23 PROCEDURE — 25000003 PHARM REV CODE 250: Performed by: INTERNAL MEDICINE

## 2017-02-23 PROCEDURE — P9037 PLATE PHERES LEUKOREDU IRRAD: HCPCS

## 2017-02-23 PROCEDURE — 63600175 PHARM REV CODE 636 W HCPCS: Performed by: HOSPITALIST

## 2017-02-23 PROCEDURE — 85007 BL SMEAR W/DIFF WBC COUNT: CPT

## 2017-02-23 PROCEDURE — 63600175 PHARM REV CODE 636 W HCPCS: Performed by: NURSE PRACTITIONER

## 2017-02-23 PROCEDURE — 25000003 PHARM REV CODE 250: Performed by: STUDENT IN AN ORGANIZED HEALTH CARE EDUCATION/TRAINING PROGRAM

## 2017-02-23 PROCEDURE — 36415 COLL VENOUS BLD VENIPUNCTURE: CPT

## 2017-02-23 PROCEDURE — 99232 SBSQ HOSP IP/OBS MODERATE 35: CPT | Mod: ,,, | Performed by: INTERNAL MEDICINE

## 2017-02-23 PROCEDURE — 27100132 HC NEBULIZER, FILTERED TREATMENT

## 2017-02-23 PROCEDURE — 86850 RBC ANTIBODY SCREEN: CPT

## 2017-02-23 PROCEDURE — 94640 AIRWAY INHALATION TREATMENT: CPT

## 2017-02-23 PROCEDURE — 25000003 PHARM REV CODE 250: Performed by: NURSE PRACTITIONER

## 2017-02-23 PROCEDURE — 85027 COMPLETE CBC AUTOMATED: CPT

## 2017-02-23 PROCEDURE — 86900 BLOOD TYPING SEROLOGIC ABO: CPT

## 2017-02-23 PROCEDURE — 84100 ASSAY OF PHOSPHORUS: CPT

## 2017-02-23 PROCEDURE — 83735 ASSAY OF MAGNESIUM: CPT

## 2017-02-23 PROCEDURE — 83615 LACTATE (LD) (LDH) ENZYME: CPT

## 2017-02-23 PROCEDURE — 20600001 HC STEP DOWN PRIVATE ROOM

## 2017-02-23 PROCEDURE — 80053 COMPREHEN METABOLIC PANEL: CPT

## 2017-02-23 PROCEDURE — 36430 TRANSFUSION BLD/BLD COMPNT: CPT

## 2017-02-23 PROCEDURE — 80202 ASSAY OF VANCOMYCIN: CPT

## 2017-02-23 RX ORDER — DIPHENHYDRAMINE HCL 25 MG
25 CAPSULE ORAL
Status: COMPLETED | OUTPATIENT
Start: 2017-02-23 | End: 2017-02-23

## 2017-02-23 RX ORDER — POTASSIUM CHLORIDE 20 MEQ/15ML
60 SOLUTION ORAL ONCE
Status: COMPLETED | OUTPATIENT
Start: 2017-02-23 | End: 2017-02-23

## 2017-02-23 RX ORDER — HYDROCODONE BITARTRATE AND ACETAMINOPHEN 500; 5 MG/1; MG/1
TABLET ORAL
Status: DISCONTINUED | OUTPATIENT
Start: 2017-02-23 | End: 2017-02-24 | Stop reason: HOSPADM

## 2017-02-23 RX ORDER — PENTAMIDINE ISETHIONATE 300 MG/300MG
300 INHALANT RESPIRATORY (INHALATION) ONCE
Status: COMPLETED | OUTPATIENT
Start: 2017-02-23 | End: 2017-02-23

## 2017-02-23 RX ORDER — ACETAMINOPHEN 325 MG/1
650 TABLET ORAL
Status: DISCONTINUED | OUTPATIENT
Start: 2017-02-23 | End: 2017-02-24 | Stop reason: HOSPADM

## 2017-02-23 RX ORDER — MAGNESIUM SULFATE HEPTAHYDRATE 40 MG/ML
2 INJECTION, SOLUTION INTRAVENOUS ONCE
Status: COMPLETED | OUTPATIENT
Start: 2017-02-23 | End: 2017-02-23

## 2017-02-23 RX ADMIN — ACYCLOVIR 400 MG: 200 CAPSULE ORAL at 09:02

## 2017-02-23 RX ADMIN — CEFEPIME HYDROCHLORIDE 2 G: 2 INJECTION, SOLUTION INTRAVENOUS at 03:02

## 2017-02-23 RX ADMIN — ACETAMINOPHEN 650 MG: 325 TABLET ORAL at 08:02

## 2017-02-23 RX ADMIN — Medication 10 ML: at 12:02

## 2017-02-23 RX ADMIN — VANCOMYCIN HYDROCHLORIDE 1250 MG: 1 INJECTION, POWDER, LYOPHILIZED, FOR SOLUTION INTRAVENOUS at 12:02

## 2017-02-23 RX ADMIN — MAGNESIUM SULFATE IN WATER 2 G: 40 INJECTION, SOLUTION INTRAVENOUS at 10:02

## 2017-02-23 RX ADMIN — SODIUM CHLORIDE: 0.9 INJECTION, SOLUTION INTRAVENOUS at 03:02

## 2017-02-23 RX ADMIN — SODIUM CHLORIDE 500 ML: 0.9 INJECTION, SOLUTION INTRAVENOUS at 06:02

## 2017-02-23 RX ADMIN — LAMIVUDINE 300 MG: 100 TABLET, FILM COATED ORAL at 09:02

## 2017-02-23 RX ADMIN — POTASSIUM CHLORIDE 60 MEQ: 20 SOLUTION ORAL at 10:02

## 2017-02-23 RX ADMIN — ABACAVIR 600 MG: 300 TABLET, FILM COATED ORAL at 09:02

## 2017-02-23 RX ADMIN — VANCOMYCIN HYDROCHLORIDE 1250 MG: 1 INJECTION, POWDER, LYOPHILIZED, FOR SOLUTION INTRAVENOUS at 08:02

## 2017-02-23 RX ADMIN — DIPHENHYDRAMINE HYDROCHLORIDE 25 MG: 25 CAPSULE ORAL at 10:02

## 2017-02-23 RX ADMIN — CEFEPIME HYDROCHLORIDE 2 G: 2 INJECTION, SOLUTION INTRAVENOUS at 09:02

## 2017-02-23 RX ADMIN — VORICONAZOLE 200 MG: 200 TABLET, FILM COATED ORAL at 09:02

## 2017-02-23 RX ADMIN — SODIUM CHLORIDE 1000 ML: 0.9 INJECTION, SOLUTION INTRAVENOUS at 08:02

## 2017-02-23 RX ADMIN — METRONIDAZOLE 500 MG: 500 TABLET ORAL at 03:02

## 2017-02-23 RX ADMIN — OXYCODONE HYDROCHLORIDE 5 MG: 5 TABLET ORAL at 03:02

## 2017-02-23 RX ADMIN — METRONIDAZOLE 500 MG: 500 TABLET ORAL at 06:02

## 2017-02-23 RX ADMIN — DOLUTEGRAVIR SODIUM 50 MG: 50 TABLET, FILM COATED ORAL at 09:02

## 2017-02-23 RX ADMIN — VANCOMYCIN HYDROCHLORIDE 1250 MG: 1 INJECTION, POWDER, LYOPHILIZED, FOR SOLUTION INTRAVENOUS at 04:02

## 2017-02-23 RX ADMIN — PENTAMIDINE ISETHIONATE 300 MG: 300 INHALANT RESPIRATORY (INHALATION) at 08:02

## 2017-02-23 RX ADMIN — ACETAMINOPHEN 650 MG: 325 TABLET ORAL at 05:02

## 2017-02-23 RX ADMIN — METRONIDAZOLE 500 MG: 500 TABLET ORAL at 09:02

## 2017-02-23 NOTE — PROGRESS NOTES
Rechecked pt's temp after acetaminophen 650 mg was given.   Pt's temp still febrile 100.8. Will notified Md.  Will continue to monitor

## 2017-02-23 NOTE — PROGRESS NOTES
"Attempted to administer micafungin, pt is refusing, stating that it "burned", and that his body was "rejecting it", but was not enough drug in line to reach the pt. RN explained this to pt, explained the importance of receiving this drug.  RN decreased rate, pt still refusing.  Dr. Lopes notified, will re-attempt administration later in evening.  "

## 2017-02-23 NOTE — PLAN OF CARE
Problem: Patient Care Overview  Goal: Plan of Care Review  Outcome: Ongoing (interventions implemented as appropriate)  Pt [id number]. Pt remains on contact isolation for C-diff. Pt remains free from falls and or injury. Pt received ivpb cefepime and vancomycin q8h. Pt c/o of pain, oxycodone IR given 5mg. Pt reported relieve. Bed in low position, bed wheels are locked, amd call bell is within reach.

## 2017-02-23 NOTE — PLAN OF CARE
Agree with starting micafungin for yeast in blood - likely GI source with nec enterocolitis.      Also would check echo now with persistent enterococcus and candida - last one was bout 3 weeks ago     Full note later

## 2017-02-23 NOTE — PROGRESS NOTES
Progress Note  BMT                                                              Team: [facility] HEMATOLOGY BMT    Patient Name: Marck Howard  YOB: 1980    Admit Date: 1/17/2017                   LOS: 37    SUBJECTIVE:     Reason for Admission: Acute leukemia    Subjective and Interval History:   Febrile overnight, T.max 100.9.  Reports that BMs are starting to become more formed, and that the color has become more brown.    Review of Systems:  General: (+) fever  HEENT:  Denies vision changes, sore throat, congestion  CVS:  Denies chest pain, pressure, palpitations  Resp:  Denies shortness of breath, cough, sputum  GI:  Denies diarrhea, constipation, abdominal pain, nausea, vomiting  :  Denies dysuria, hematuria, nocturia  MSK:  Denies myalgias, arthralgias  Skin:  Denies rashes, bleeding  Neuro:  Denies headache, dizziness, syncope, numbness, paresthesias  All other systems otherwise negative    Scheduled Medications   abacavir  600 mg Oral Daily    And    lamivudine  300 mg Oral Daily    acyclovir  400 mg Oral BID    ceFEPime (MAXIPIME) IVPB  2 g Intravenous Q8H    dolutegravir  50 mg Oral Daily    magnesium oxide  400 mg Oral Daily    metronidazole  500 mg Oral Q8H    micafungin (MYCAMINE) IVPB  100 mg Intravenous Q24H    sodium chloride 0.9%  10 mL Intravenous Q6H    vancomycin (VANCOCIN) IVPB  1,250 mg Intravenous Q8H    voriconazole  200 mg Oral BID       Continuous Infusions   sodium chloride 0.9% 150 mL/hr at 02/23/17 0359       PRN Medications  sodium chloride, acetaminophen, acetaminophen, alteplase, dextrose 50%, dextrose 50%, diphenhydrAMINE, glucagon (human recombinant), glucose, glucose, heparin, porcine (PF), HYDROmorphone, magnesium oxide, magnesium oxide, magnesium oxide, omnipaque 350 iohexol, omnipaque 350 iohexol, ondansetron, oxycodone, potassium chloride, potassium chloride, potassium chloride, potassium, sodium phosphates, potassium, sodium phosphates,  promethazine (PHENERGAN) IVPB, Flushing PICC Protocol **AND** sodium chloride 0.9% **AND** sodium chloride 0.9%, sodium chloride 0.9%    OBJECTIVE:     Temp:  [98.4 °F (36.9 °C)-100.9 °F (38.3 °C)]   Pulse:  []   Resp:  [15-17]   BP: ()/(50-74)   SpO2:  [96 %-99 %]   Body mass index is 15.43 kg/(m^2).    Intake/Output Summary    Intake/Output Summary (Last 24 hours) at 02/23/17 0757  Last data filed at 02/22/17 1800   Gross per 24 hour   Intake           3032.5 ml   Output              550 ml   Net           2482.5 ml       Physical Exam:  General: well developed, cachectic, no distress  HENT: Head: NCAT. Thrush   Eyes: conjunctivae/corneas clear. EOMI.   Neck: supple, symmetrical, trachea midline  Lungs: Diffuse coarse breath sounds.  CV: Heart: regular rhythm, no murmur appreciated  Extremities: no cyanosis, edema, or clubbing.   Abdomen: soft, non-tender non-distented; bowel sounds normal.   Skin: Skin color, texture, turgor normal. No rashes or lesions  Musculoskeletal: no clubbing, cyanosis  Neurologic: Mental status: Alert, oriented, awake x3. No focal deficits.   Psych/Behavioral: Normal mood and affect    Diagnostic Result    Lab Results   Component Value Date    WBC 0.90 (LL) 02/23/2017    HGB 8.5 (L) 02/23/2017    HCT 24.7 (L) 02/23/2017    PLT 7 (LL) 02/23/2017         Recent Labs  Lab 02/23/17  0342      K 3.0*      CO2 24   BUN 6   CREATININE 0.6   MG 1.6   PHOS 2.8       Lab Results   Component Value Date    INR 1.1 02/21/2017    INR 1.0 02/20/2017    INR 1.1 02/19/2017       No results for input(s): POCTGLUCOSE in the last 72 hours.    ASSESSMENT/PLAN:   Mr. Marck Howard is a 36 y.o. male with HIV, presenting with AML.    Current Problems List:  Active Hospital Problems    Diagnosis  POA    *Acute leukemia [C95.00]  Yes    Pneumonia due to infectious organism [J18.9]  Unknown    Clostridium difficile infection [B96.89]  No    Bacteremia due to Enterococcus  [R78.81]  No    Acute myeloid leukemia not having achieved remission [C92.00]  Yes    Fever [R50.9]  Yes    Tobacco abuse [Z72.0]  Yes    HIV (human immunodeficiency virus infection) [Z21]  Yes      Resolved Hospital Problems    Diagnosis Date Resolved POA   No resolved problems to display.       Active Problems, Status & Treatment Plan Addressed Today:    Neutropenic Fever  -ANC 0  -Febrile overnight to 100.6 F  -Blood cx 2/20: Enterococus faecium and yeast  -Blood cx 2/22:  NGTD  -CDiff 2/21 positive, started PO Vanc and Flagyl  -Urine cx 2/20:  NGTD  -ID following appreciate assistance   Continue Vanco and Cefepime   Continue Voriconazole for now   Start Micafungin for new onset fungemia    AML  -S/p bone marrow biopsy (1/17/17) showing AML, cytogenetics and molecular markers reveal : NPM1 and FLT3 negative. Will need transplant given high risk disease  -7+3 started 1/20/17  -Repeat Bone marrow biopsy 2/3, with circulating blasts in peripheral blood, 70% blasts, 100% cellularity.   -Started on MEC on 2/9/17- today is Day 17    HIV positive (CD4: 118)  -New diagnosis on this admit  -ID following, appreciate recs, started Triumeq 2/2/17  -Ppx bactrim held given myelosuppressive effects, received Pentamidine 1/22/17.  Plan to restart bactrim once counts recovered.     Pancytopenia 2/2 Chemotherapy  -WBC 0.90  -Hgb 8.5  -Plt 7, transfuse today  -Continue to monitor and transfuse as indicated for Hgb <7.0, PLT <10.       Tobacco Abuse  - 26 pack year history  - advise smoking cessation   - nicotine patch prn     H/O Alcohol Abuse  - drank at least 2 beers per day prior to admission  - no h/o DTs and now out of period for withdrawal     Hypoalbuminemia  - Dietary consulted  - encourage increased nutritional intake     Hypophosphatemia  Hypomagnesemia  -Supplemented prn    Diet:  Regular  DVT PPx:  Encourage ambulation  Code Status:  Full      Dispo:  Pending repeat bone marrow day 21      Merry Lopes MD  Med  PGY3  [phone]

## 2017-02-24 ENCOUNTER — TELEPHONE (OUTPATIENT)
Dept: HEMATOLOGY/ONCOLOGY | Facility: CLINIC | Age: 37
End: 2017-02-24

## 2017-02-24 VITALS
HEART RATE: 115 BPM | TEMPERATURE: 99 F | DIASTOLIC BLOOD PRESSURE: 63 MMHG | BODY MASS INDEX: 15.3 KG/M2 | OXYGEN SATURATION: 98 % | RESPIRATION RATE: 18 BRPM | WEIGHT: 89.63 LBS | HEIGHT: 64 IN | SYSTOLIC BLOOD PRESSURE: 107 MMHG

## 2017-02-24 LAB
ALBUMIN SERPL BCP-MCNC: 2.1 G/DL
ALP SERPL-CCNC: 169 U/L
ALT SERPL W/O P-5'-P-CCNC: 37 U/L
ANION GAP SERPL CALC-SCNC: 8 MMOL/L
ANISOCYTOSIS BLD QL SMEAR: SLIGHT
AST SERPL-CCNC: 19 U/L
BASOPHILS # BLD AUTO: ABNORMAL K/UL
BASOPHILS NFR BLD: 0 %
BILIRUB SERPL-MCNC: 0.3 MG/DL
BLASTS NFR BLD MANUAL: 66 %
BUN SERPL-MCNC: 5 MG/DL
CALCIUM SERPL-MCNC: 8.7 MG/DL
CHLORIDE SERPL-SCNC: 103 MMOL/L
CO2 SERPL-SCNC: 24 MMOL/L
CREAT SERPL-MCNC: 0.6 MG/DL
DIFFERENTIAL METHOD: ABNORMAL
EOSINOPHIL # BLD AUTO: ABNORMAL K/UL
EOSINOPHIL NFR BLD: 0 %
ERYTHROCYTE [DISTWIDTH] IN BLOOD BY AUTOMATED COUNT: 14.7 %
EST. GFR  (AFRICAN AMERICAN): >60 ML/MIN/1.73 M^2
EST. GFR  (NON AFRICAN AMERICAN): >60 ML/MIN/1.73 M^2
GLUCOSE SERPL-MCNC: 127 MG/DL
HCT VFR BLD AUTO: 23.1 %
HGB BLD-MCNC: 7.9 G/DL
HYPOCHROMIA BLD QL SMEAR: ABNORMAL
LDH SERPL L TO P-CCNC: 232 U/L
LYMPHOCYTES # BLD AUTO: ABNORMAL K/UL
LYMPHOCYTES NFR BLD: 34 %
MAGNESIUM SERPL-MCNC: 1.7 MG/DL
MCH RBC QN AUTO: 30.2 PG
MCHC RBC AUTO-ENTMCNC: 34.2 %
MCV RBC AUTO: 88 FL
MONOCYTES # BLD AUTO: ABNORMAL K/UL
MONOCYTES NFR BLD: 0 %
NEUTROPHILS NFR BLD: 0 %
OVALOCYTES BLD QL SMEAR: ABNORMAL
PHOSPHATE SERPL-MCNC: 2.1 MG/DL
PLATELET # BLD AUTO: 33 K/UL
PMV BLD AUTO: 11 FL
POIKILOCYTOSIS BLD QL SMEAR: SLIGHT
POLYCHROMASIA BLD QL SMEAR: ABNORMAL
POTASSIUM SERPL-SCNC: 3.2 MMOL/L
PROT SERPL-MCNC: 6.5 G/DL
RBC # BLD AUTO: 2.62 M/UL
SODIUM SERPL-SCNC: 135 MMOL/L
WBC # BLD AUTO: 0.97 K/UL

## 2017-02-24 PROCEDURE — 36415 COLL VENOUS BLD VENIPUNCTURE: CPT

## 2017-02-24 PROCEDURE — 87107 FUNGI IDENTIFICATION MOLD: CPT

## 2017-02-24 PROCEDURE — 25000003 PHARM REV CODE 250: Performed by: HOSPITALIST

## 2017-02-24 PROCEDURE — 63600175 PHARM REV CODE 636 W HCPCS: Performed by: HOSPITALIST

## 2017-02-24 PROCEDURE — 25000003 PHARM REV CODE 250: Performed by: INTERNAL MEDICINE

## 2017-02-24 PROCEDURE — 87040 BLOOD CULTURE FOR BACTERIA: CPT | Mod: 59

## 2017-02-24 PROCEDURE — 83735 ASSAY OF MAGNESIUM: CPT

## 2017-02-24 PROCEDURE — 85007 BL SMEAR W/DIFF WBC COUNT: CPT

## 2017-02-24 PROCEDURE — 63600175 PHARM REV CODE 636 W HCPCS: Performed by: NURSE PRACTITIONER

## 2017-02-24 PROCEDURE — 86644 CMV ANTIBODY: CPT

## 2017-02-24 PROCEDURE — 85027 COMPLETE CBC AUTOMATED: CPT

## 2017-02-24 PROCEDURE — 25000003 PHARM REV CODE 250: Performed by: NURSE PRACTITIONER

## 2017-02-24 PROCEDURE — 99238 HOSP IP/OBS DSCHRG MGMT 30/<: CPT | Mod: ,,, | Performed by: INTERNAL MEDICINE

## 2017-02-24 PROCEDURE — 25000003 PHARM REV CODE 250: Performed by: STUDENT IN AN ORGANIZED HEALTH CARE EDUCATION/TRAINING PROGRAM

## 2017-02-24 PROCEDURE — 84100 ASSAY OF PHOSPHORUS: CPT

## 2017-02-24 PROCEDURE — 83615 LACTATE (LD) (LDH) ENZYME: CPT

## 2017-02-24 PROCEDURE — 80053 COMPREHEN METABOLIC PANEL: CPT

## 2017-02-24 RX ORDER — MAGNESIUM SULFATE HEPTAHYDRATE 40 MG/ML
2 INJECTION, SOLUTION INTRAVENOUS
Status: DISCONTINUED | OUTPATIENT
Start: 2017-02-24 | End: 2017-02-24 | Stop reason: HOSPADM

## 2017-02-24 RX ORDER — ACYCLOVIR 200 MG/1
400 CAPSULE ORAL 2 TIMES DAILY
Qty: 30 CAPSULE | Refills: 3 | Status: SHIPPED | OUTPATIENT
Start: 2017-02-24 | End: 2017-03-14

## 2017-02-24 RX ORDER — POTASSIUM CHLORIDE 20 MEQ/15ML
60 SOLUTION ORAL ONCE
Status: COMPLETED | OUTPATIENT
Start: 2017-02-24 | End: 2017-02-24

## 2017-02-24 RX ORDER — MAGNESIUM SULFATE HEPTAHYDRATE 40 MG/ML
4 INJECTION, SOLUTION INTRAVENOUS
Status: DISCONTINUED | OUTPATIENT
Start: 2017-02-24 | End: 2017-02-24 | Stop reason: HOSPADM

## 2017-02-24 RX ORDER — ONDANSETRON 8 MG/1
8 TABLET, ORALLY DISINTEGRATING ORAL EVERY 8 HOURS PRN
Qty: 30 TABLET | Refills: 3 | Status: SHIPPED | OUTPATIENT
Start: 2017-02-24 | End: 2017-03-14

## 2017-02-24 RX ORDER — CIPROFLOXACIN 750 MG/1
750 TABLET, FILM COATED ORAL 2 TIMES DAILY
Qty: 20 TABLET | Refills: 0 | Status: SHIPPED | OUTPATIENT
Start: 2017-02-24 | End: 2017-03-06

## 2017-02-24 RX ORDER — POTASSIUM CHLORIDE 20 MEQ/15ML
40 SOLUTION ORAL
Status: DISCONTINUED | OUTPATIENT
Start: 2017-02-24 | End: 2017-02-24 | Stop reason: HOSPADM

## 2017-02-24 RX ORDER — METRONIDAZOLE 500 MG/1
500 TABLET ORAL 3 TIMES DAILY
Qty: 30 TABLET | Refills: 0 | Status: SHIPPED | OUTPATIENT
Start: 2017-02-24 | End: 2017-03-06

## 2017-02-24 RX ORDER — METRONIDAZOLE 500 MG/1
500 TABLET ORAL 3 TIMES DAILY
Status: DISCONTINUED | OUTPATIENT
Start: 2017-02-24 | End: 2017-02-24 | Stop reason: HOSPADM

## 2017-02-24 RX ORDER — POTASSIUM CHLORIDE 20 MEQ/15ML
60 SOLUTION ORAL
Status: DISCONTINUED | OUTPATIENT
Start: 2017-02-24 | End: 2017-02-24 | Stop reason: HOSPADM

## 2017-02-24 RX ORDER — MAGNESIUM SULFATE HEPTAHYDRATE 40 MG/ML
2 INJECTION, SOLUTION INTRAVENOUS ONCE
Status: COMPLETED | OUTPATIENT
Start: 2017-02-24 | End: 2017-02-24

## 2017-02-24 RX ADMIN — CEFEPIME HYDROCHLORIDE 2 G: 2 INJECTION, SOLUTION INTRAVENOUS at 12:02

## 2017-02-24 RX ADMIN — SODIUM CHLORIDE: 0.9 INJECTION, SOLUTION INTRAVENOUS at 12:02

## 2017-02-24 RX ADMIN — DOLUTEGRAVIR SODIUM 50 MG: 50 TABLET, FILM COATED ORAL at 08:02

## 2017-02-24 RX ADMIN — MAGNESIUM SULFATE IN WATER 2 G: 40 INJECTION, SOLUTION INTRAVENOUS at 09:02

## 2017-02-24 RX ADMIN — LAMIVUDINE 300 MG: 100 TABLET, FILM COATED ORAL at 08:02

## 2017-02-24 RX ADMIN — ABACAVIR 600 MG: 300 TABLET, FILM COATED ORAL at 08:02

## 2017-02-24 RX ADMIN — METRONIDAZOLE 500 MG: 500 TABLET ORAL at 08:02

## 2017-02-24 RX ADMIN — POTASSIUM CHLORIDE 60 MEQ: 20 SOLUTION ORAL at 09:02

## 2017-02-24 RX ADMIN — CEFEPIME HYDROCHLORIDE 2 G: 2 INJECTION, SOLUTION INTRAVENOUS at 08:02

## 2017-02-24 RX ADMIN — VANCOMYCIN HYDROCHLORIDE 1250 MG: 1 INJECTION, POWDER, LYOPHILIZED, FOR SOLUTION INTRAVENOUS at 04:02

## 2017-02-24 RX ADMIN — ACYCLOVIR 400 MG: 200 CAPSULE ORAL at 08:02

## 2017-02-24 NOTE — PROGRESS NOTES
"Pt making repeated requests this am.  Able to answer orientation questions correctly.  Refusing micafungin, and electrolyte replacements.  Stating that his body is "rejecting" his medications.  Stating that the medications we are administering is what is keeping him in the hospital.  Pt repeatedly stating he wants to be discharged today.  ISSA Petersen with social work notified.  Dr. Manley informed of pt's requests.  Dr. Manley at bedside. Explained to pt the high risk of death if pt is discharged, and that he would be leaving against medical advice.  Pt stating, "I don't want to hear what you have to say, just give me what I want." Psychiatry consulted for further evaluation.  Will continue to monitor.   "

## 2017-02-24 NOTE — PROGRESS NOTES
02/23/17 1839   Vital Signs   Temp (!) 102.9 °F (39.4 °C)   Temp src Oral   Pulse (!) 120   Heart Rate Source Monitor   Resp 18   SpO2 96 %   O2 Device (Oxygen Therapy) room air   BP (!) 86/48   BP Location Right arm   BP Method Automatic   Patient Position Sitting   MD notified of vital signs.  Started 500 cc bolus.  Verbal order to re check temp in 1 hr.  Pt asymptomatic at this time. Will continue to monitor.

## 2017-02-24 NOTE — PROGRESS NOTES
[facility]  Infectious Disease  Progress Note    Patient Name: Marck Howard  MRN: [id number]  Admission Date: 1/17/2017  Length of Stay: 38 days  Attending Physician: Eric Manley MD  Primary Care Provider: Primary Doctor No    Isolation Status: Special Contact  Assessment/Plan:      HIV (human immunodeficiency virus infection)  Keep on current ART of trimeq - he is tolerating it well  - stable  -   Dr. Walsh will assume care in AM       Bacteremia due to Enterococcus  Patient with neutropenic fevers and BC + 2/2 for enterococcus - sensitivities show sensitive to vanco. Now with another set of BC + for enterococcus in the setting of CT-abdomen showing likely necrotizing colitis - typhilitis - repeat BC NGTD  Now also with fugemia - likely candida but species not yet know - on micafungin - 2D echo done     1.  Keep on vanc and cefipime and flagyl which will cover the C diff and anerobes   2. Keep on micafungin         Clostridium difficile infection  Patient denies diarrhea but has less BMs in the last 24 hours.  C diff culture and EIA+ - on PO flagyl   Keep on this for now       Pneumonia due to infectious organism  CT chest concerning for RML pneumonia of unclear etiology - minimal symptoms at this time but he does have a cough but not successful to date with sputum cultures   Cough is worse today and still spiking high fevers  -     Recs:   1.  Continue to try to get a sputum culture   2. Recommend  pulmonary consult for bronch        Anticipated Disposition:     Thank you for your consult. I will follow-up with patient. Please contact us if you have any additional questions. - Dr. Walsh taking over service in AM     Tanner Doherty MD  Infectious Disease  [facility]    Subjective:     Principal Problem:Acute leukemia    Interval History: Patient feels OK - less diarrhea today - some cough with whitish sputum.  No abd pain     HPI:  37yo previously  healthy man who was admitted as a transfer from an OSH on 1/17/2017 with 3mos of weight loss, malaise, and fevers and was found to have a new diagnosis of AML and a new diagnosis of HIV (AP6=050/7%, VL 22k, GT pending). He is improved clinically since starting induction chemotherapy c/b neutropenic fevers that have now resolved and no issues with ARVs    Patient found to have enterococcal bacteremia and ID consulted for assistance with abx management  - patient currently on dapto and cefipime, acyclovir, voriconazole and for HIV trimeq      Review of Systems  Objective:     Vital Signs (Most Recent):  Temp: 100 °F (37.8 °C) (02/23/17 1915)  Pulse: 95 (02/23/17 2000)  Resp: 16 (02/23/17 2000)  BP: (!) 94/51 (02/23/17 1915)  SpO2: 97 % (02/23/17 2000) Vital Signs (24h Range):  Temp:  [98.1 °F (36.7 °C)-102.9 °F (39.4 °C)] 100 °F (37.8 °C)  Pulse:  [] 95  Resp:  [16-18] 16  SpO2:  [96 %-100 %] 97 %  BP: ()/(46-73) 94/51     Weight: 40.6 kg (89 lb 9.9 oz)  Body mass index is 15.43 kg/(m^2).    Estimated Creatinine Clearance: 96.1 mL/min (based on Cr of 0.6).    Physical Exam    Significant Labs: All pertinent labs within the past 24 hours have been reviewed.    Significant Imaging: I have reviewed all pertinent imaging results/findings within the past 24 hours.

## 2017-02-24 NOTE — SUBJECTIVE & OBJECTIVE
Interval History:     HPI:  35yo previously healthy man who was admitted as a transfer from an OSH on 1/17/2017 with 3mos of weight loss, malaise, and fevers and was found to have a new diagnosis of AML and a new diagnosis of HIV (LM4=818/7%, VL 22k, GT pending). He is improved clinically since starting induction chemotherapy c/b neutropenic fevers that have now resolved and no issues with ARVs    Patient found to have enterococcal bacteremia and ID consulted for assistance with abx management  - patient currently on dapto and cefipime, acyclovir, voriconazole and for HIV trimeq      Review of Systems  Objective:     Vital Signs (Most Recent):  Temp: 99.2 °F (37.3 °C) (02/24/17 0807)  Pulse: (!) 115 (02/24/17 0807)  Resp: 18 (02/24/17 0807)  BP: 107/63 (02/24/17 0807)  SpO2: 98 % (02/24/17 0807) Vital Signs (24h Range):  Temp:  [98.1 °F (36.7 °C)-102.9 °F (39.4 °C)] 99.2 °F (37.3 °C)  Pulse:  [] 115  Resp:  [16-18] 18  SpO2:  [96 %-100 %] 98 %  BP: ()/(48-70) 107/63     Weight: 40.6 kg (89 lb 9.9 oz)  Body mass index is 15.43 kg/(m^2).    Estimated Creatinine Clearance: 96.1 mL/min (based on Cr of 0.6).    Physical Exam    Significant Labs: {Results:43263}    Significant Imaging: {Imaging Review:57114}

## 2017-02-24 NOTE — SIGNIFICANT EVENT
"For the past 2 days, Mr. Howard has become combative and started refusing therapy.  Specifically, he was refusing Micafungin for his fungemia, as well as electrolyte replacement.  He started to become combative with nursing staff.  On 2/24/17, the patient decided that he wanted to leave the hospital.  Multiple conversations were held with staff, ancillary staff, and nursing regarding the consequences of the patient leaving against medical advice - including worsening bacteremia, fungemia, and AML - potentially leading to death.  He was explained that he needed IV antibiotics in order to treat his infections, as well as additional bone marrow biopsies and chemotherapy for his AML.  He was told that the medications that could be prescribed when he leaves [place] will not be appropriate therapy for treating his multiple medical conditions and will be considered not appropriate therapy.  The patient understood these risks, and was able to repeat the consequences of leaving against medical advice.  Mr. Howard still decided to leave [facility] against medical advice, claiming "he will come back if he starts feeling bad."  Cipro, Flagyl, Duke's Solution, Zofran, and HAART therapy was sent to the [facility] pharmacy.    Charge Nurse and Dr. Manley made aware.    Merry Lopes MD  Med PGY3  [phone]    "

## 2017-02-24 NOTE — PROGRESS NOTES
Met with pt. This am per his request. Pt. States that he is ready to leave the hospital and no longer wants to stay for further treatment. Pt. States that he has been told by MD that he is not medically stable to leave the hospital and he states he understands the risks that he is taking but he states that he must go because he has some personal business that he needs to take care of and he was not willing to discuss what he needed to do in the event there was something we could help him with. Pt. Adamant that he is leaving and that family on the way to provide transportation to home. Pt. Given this sw'er's phone# and encouraged to call if he needed assistance. Pt. Also instructed to go to nearest ER if he gets progressively worse. Pt. Expressed understanding.

## 2017-02-24 NOTE — ASSESSMENT & PLAN NOTE
Keep on current ART of trimeq - he is tolerating it well  - stable  -   Dr. Walsh will assume care in AM

## 2017-02-24 NOTE — TELEPHONE ENCOUNTER
----- Message from Jazlyn Childress RN sent at 2/24/2017 11:39 AM CST -----  Contact: Jazlyn   Mr Howard signed out AMA today.  Can you please call him with a f/u early next week with whoever can see him.  He will also need a lab draw (CBC, CMP, MG, PHOS, T&S).    Thanks

## 2017-02-24 NOTE — ASSESSMENT & PLAN NOTE
Patient denies diarrhea but has less BMs in the last 24 hours.  C diff culture and EIA+ - on PO flagyl   Keep on this for now

## 2017-02-24 NOTE — SUBJECTIVE & OBJECTIVE
Interval History: Patient feels OK - less diarrhea today - some cough with whitish sputum.  No abd pain     HPI:  37yo previously healthy man who was admitted as a transfer from an OSH on 1/17/2017 with 3mos of weight loss, malaise, and fevers and was found to have a new diagnosis of AML and a new diagnosis of HIV (WG0=596/7%, VL 22k, GT pending). He is improved clinically since starting induction chemotherapy c/b neutropenic fevers that have now resolved and no issues with ARVs    Patient found to have enterococcal bacteremia and ID consulted for assistance with abx management  - patient currently on dapto and cefipime, acyclovir, voriconazole and for HIV trimeq      Review of Systems  Objective:     Vital Signs (Most Recent):  Temp: 100 °F (37.8 °C) (02/23/17 1915)  Pulse: 95 (02/23/17 2000)  Resp: 16 (02/23/17 2000)  BP: (!) 94/51 (02/23/17 1915)  SpO2: 97 % (02/23/17 2000) Vital Signs (24h Range):  Temp:  [98.1 °F (36.7 °C)-102.9 °F (39.4 °C)] 100 °F (37.8 °C)  Pulse:  [] 95  Resp:  [16-18] 16  SpO2:  [96 %-100 %] 97 %  BP: ()/(46-73) 94/51     Weight: 40.6 kg (89 lb 9.9 oz)  Body mass index is 15.43 kg/(m^2).    Estimated Creatinine Clearance: 96.1 mL/min (based on Cr of 0.6).    Physical Exam    Significant Labs: All pertinent labs within the past 24 hours have been reviewed.    Significant Imaging: I have reviewed all pertinent imaging results/findings within the past 24 hours.

## 2017-02-24 NOTE — ASSESSMENT & PLAN NOTE
Patient with neutropenic fevers and BC + 2/2 for enterococcus - sensitivities show sensitive to vanco. Now with another set of BC + for enterococcus in the setting of CT-abdomen showing likely necrotizing colitis - typhilitis - repeat BC NGTD  Now also with fugemia - likely candida but species not yet know - on micafungin - 2D echo done     1.  Keep on vanc and cefipime and flagyl which will cover the C diff and anerobes   2. Keep on micafungin

## 2017-02-24 NOTE — DISCHARGE SUMMARY
"DISCHARGE SUMMARY  BMT    Team: [facility] HEMATOLOGY BMT    Patient Name: Marck Howard  YOB: 1980    Admit Date: 1/17/2017    Discharge Date: 02/24/2017    LOS: 38    Discharge Attending Physician: No att. providers found   Discharge Resident:  Merry Lopes MD    Diagnoses:  Active Hospital Problems    Diagnosis  POA    *Acute leukemia [C95.00]  Yes    Pneumonia due to infectious organism [J18.9]  No    Clostridium difficile infection [B96.89]  No    Bacteremia due to Enterococcus [R78.81]  No    Acute myeloid leukemia not having achieved remission [C92.00]  Yes    Fever [R50.9]  Yes    Tobacco abuse [Z72.0]  Yes    HIV (human immunodeficiency virus infection) [Z21]  Yes      Resolved Hospital Problems    Diagnosis Date Resolved POA   No resolved problems to display.       Discharged Condition: Poor.  Patient left AMA    Hospital Course:    Initial Presentation:  Marck Howard is a 36 y.o. years old male with no PMH who presented to [facility] ED complaining of 1 month long " muscle pain" in bilateral lower arms and bilateral lower legs. 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 His muscle pain is so severe that he usually tries not to change body position, denies any exacerbating or alleviating factors . He endorses lack of weight gain (report usually gains weight when he doesn't work ~ 1 month, however this was not the case this time) Pt otherwise denies any trauma, fever, chills, night sweats, lymphadenopathy, sore throate, cough, dysphagia, SOB, CP, bruising , rashes, skin changes, blood in urin, stool, melena, dysuria, frequency or urgency. Denies any sick person exposure. He smokes a pack/day of cigarettes and drinks alcohol. Denies any family history of any malignancies.     In OSH he was noted to have temp of 100.8, tachy in 150 with lactate of 2.5 and " WBC of 49.95. He was started on vanc (23:48 pm) and zosyn (22:41 pm) And 2 L IVF and transferred to [facility] for further evaluation    Course of Principle Problem for Admission:  AML  -S/p bone marrow biopsy (1/17/17) showing AML, cytogenetics and molecular markers reveal : NPM1 and FLT3 negative. Will need transplant given high risk disease  -7+3 started 1/20/17  -Repeat Bone marrow biopsy 2/3, with circulating blasts in peripheral blood, 70% blasts, 100% cellularity.   -Started on MEC on 2/9/17    Neutropenic Fever  -Continued to have nightly persistent fevers up to 103F  -Blood cx 2/20: Enterococus faecium and yeast, awaiting speciation  -CDiff 2/21 positive, started PO Vanc and Flagyl  -2D echo was ordered to evaluate for for endocarditis because of persistently positive blood cx, but patient left AMA before it could be completed  -Received IV Cefepime for Neutropenic Fevers  -Received IV Vanc and Dapto for Enterococcus bacteremia  -Ordered IV Micafungin for fungemia, but patient didn't receive any doses because he said it burned him  -Received PO Flagyl for C. Diff  -Received PO Cipro, Acyclovir, and Voriconazole for ppx    Other Medical Problems Addressed in the Hospital:  HIV positive (CD4: 118)  -New diagnosis on this admit  -Started Triumeq 2/2/17  -Ppx bactrim held given myelosuppressive effects, received Pentamidine 1/22/17. Plan to restart bactrim once counts recovered.     Pancytopenia 2/2 Chemotherapy  -Received multiple transfusions  -Transfuse as indicated for Hgb <7.0, PLT <10.       Tobacco Abuse  - 26 pack year history  - advise smoking cessation   - nicotine patch prn      H/O Alcohol Abuse  - Drank at least 2 beers per day prior to admission  - No h/o DTs and now out of period for withdrawal      Hypoalbuminemia  - Dietary consulted  - Encourage increased nutritional intake      Consults:  ID:  New onset HIV, bacteremia, fungemia  Nutrition    Pertinent Lab Findings:  Enterococcus  "bacteremia  Fungemia    Pertinent/Significant Diagnostic Studies:   BM bx    Special Treatments/Procedures:   HAART therapy  Chemotherapy  BM bx    Disposition:  Patient left A.  For the past 2 days, Mr. Howard has become combative and started refusing therapy. Specifically, he was refusing Micafungin for his fungemia, as well as electrolyte replacement. He started to become combative with nursing staff. On 2/24/17, the patient decided that he wanted to leave the hospital. Multiple conversations were held with staff, ancillary staff, and nursing regarding the consequences of the patient leaving against medical advice - including worsening bacteremia, fungemia, and AML - potentially leading to death. He was explained that he needed IV antibiotics in order to treat his infections, as well as additional bone marrow biopsies and chemotherapy for his AML. He was told that the medications that could be prescribed when he leaves [place] will not be appropriate therapy for treating his multiple medical conditions and will be considered not appropriate therapy. The patient understood these risks, and was able to repeat the consequences of leaving against medical advice. Mr. Howard still decided to leave [facility] against medical advice, claiming "he will come back if he starts feeling bad." Cipro, Flagyl, Duke's Solution, Zofran, and HAART therapy was sent to the [facility] pharmacy.    Future Scheduled Appointments:  Future Appointments  Date Time Provider Department Center   2/24/2017 1:45 PM [facility] FERNANDEZ Santana       Follow-up Plans from This Hospitalization:  [facility] ASAP  ID for new onset HIV    Discharge Medication List:  Reconciled Home Medications:   Discharge Medication List as of 2/24/2017 10:57 AM      START taking these medications    Details   abacavir-dolutegravir-lamivud (TRIUMEQ) 600- mg Tab Take 1 tablet by mouth once daily., Starting 2/21/2017, Until Wed 2/21/18, Normal    "   acyclovir (ZOVIRAX) 200 MG capsule Take 2 capsules (400 mg total) by mouth 2 (two) times daily., Starting 2/24/2017, Until Sat 2/24/18, Normal      ciprofloxacin HCl (CIPRO) 750 MG tablet Take 1 tablet (750 mg total) by mouth 2 (two) times daily., Starting 2/24/2017, Until Mon 3/6/17, Normal      metronidazole (FLAGYL) 500 MG tablet Take 1 tablet (500 mg total) by mouth 3 (three) times daily., Starting 2/24/2017, Until Mon 3/6/17, Normal      NYSTATIN (DUKE'S SOLUTION) Take 10 mLs by mouth 4 (four) times daily., Starting 2/24/2017, Until Discontinued, Normal      ondansetron (ZOFRAN-ODT) 8 MG TbDL Take 1 tablet (8 mg total) by mouth every 8 (eight) hours as needed., Starting 2/24/2017, Until Discontinued, Normal             At the time of discharge, patient was advised to take all medications as directed, and to follow up with all future appointments.  Patient was informed to return to the ER if symptoms worsen or fail to resolve.      Signing Physician:  Merry Lopes MD  Med PGY3  [phone]

## 2017-02-24 NOTE — ASSESSMENT & PLAN NOTE
35yo previously healthy man who was admitted as a transfer from an OSH on 1/17/2017 with 3mos of weight loss, malaise, and fevers and was found to have a new diagnosis of AML and a new diagnosis of HIV (NE8=569/7%, VL 22k, GT pan sensitive). He is improved clinically since starting induction chemotherapy c/b neutropenic fevers now resolved      - would continue cefepime for now but if remains afebrile would transition back to quinolone ppx, voriconazole and acyclovir neutropenic prophylaxis per protocol  - patient is s/p pentamidine PCP prophylaxis (chosen over bactrim given myelosuppressive effects while in house) -- would recommend continuing ppx after neutropenia for AIDS.  However would instead consider atovaquone or dapsone if not using bactrim when ppx is again started  - continue triumeq components  - patient should be discharged with triumeq 1 pill once daily.  He should be set up with specialty pharmacy here to have med guidance and home delivery  - please have patient f/u with me in clinic within 2 weeks of discharge

## 2017-02-24 NOTE — PLAN OF CARE
Problem: Patient Care Overview  Goal: Plan of Care Review  Outcome: Ongoing (interventions implemented as appropriate)  Pt is AAOx3.Pt is ambulatory and independent.Pt able to perform all ADL's without assistance. NAD noted.Pt denies pian and/or discomfort at this time.Pt turns independently, pt is aware of bony area and pressure reduction positions. Pt refused micafungin 3 times. Pt reports only one BM. Pt is afebrile, po fluids encouraged, TCBD encouraged, hand hygiene encouragedabx treatment continues no s/s of adverse reactions will continue to monitor. T max 99.8. Pt remains injury and fall free, non skid footwear donned, call light within reach, personal items within reach, bed in low/locked position, pt able to voice needs all needs voiced have been met at this time.

## 2017-02-24 NOTE — PLAN OF CARE
"MDR's with Dr Manley.  Patient left the hospital today AMA.  Patient was explained many time of the potential negative outcomes of discharging home.  Patient was explained that he has a blood stream infection which can only be treated with IV antibiotics in the hospital.  Patient was also explained that his immune system is extremely weak and he will likely get very sick very fast without IV antibiotics.  CM asked the patient what he plans to do at home when he does have fever.  Patient stated, "I'll have my sister go buy me a couple of Tylenol at the corner store".  Patient verbalized that he knows what's going on, but he still wants to leave.  When told that he could possibly die the patient said "I know ya'll keep saying that, but I don't want to hear it". Patient notified that rx were sent to the OP [facility].  Patient's sister picked him up.  She was unaware that he was leaving AMA.  CM sent a message to the oncology clinic to call the patient with a clinic appointment early next week.  AMA form signed.      02/24/17 1142   Final Note   Assessment Type Final Discharge Note   Discharge Disposition Left Against   Discharge planning education complete? Yes   What phone number can be called within the next 1-3 days to see how you are doing after discharge? ([phone])   Hospital Follow Up  Appt(s) scheduled? (message sent to the oncology clinic to call the patient with a f/u)   Offered [facility] -- Bedside Delivery? (Rx sent to [facility])   Discharge/Hospital Encounter Summary to (non-[facility]) PCP n/a   Referral to Outpatient Case Management complete? Yes   Referral to / orders for Home Health Complete? n/a   Any social issues identified prior to discharge? Yes   Did you assess the readiness or willingness of the family or caregiver to support self management of care? Yes     "

## 2017-02-24 NOTE — ASSESSMENT & PLAN NOTE
CT chest concerning for RML pneumonia of unclear etiology - minimal symptoms at this time but he does have a cough but not successful to date with sputum cultures   Cough is worse today and still spiking high fevers  -     Recs:   1.  Continue to try to get a sputum culture   2. Recommend  pulmonary consult for bronch

## 2017-02-24 NOTE — PROGRESS NOTES
Progress Note  BMT                                                              Team: [facility] [place] [facility]EMATOLOGY BMT    Patient Name: Marck Howard  YOB: 1980    Admit Date: 1/17/2017                   LOS: 38    SUBJECTIVE:     Reason for Admission: Acute leukemia    Subjective and Interval History:   Febrile overnight, Tmax 102.9.  Refused Micafungin yesteday and throughout the night - saying that it burned, and refused Flagyl this morning saying he takes his meds at 9am.  Says he isn't eating because the food here isnt appetizing.  Wants to go home and take his meds at home.    Review of Systems:  General: (+) fever  HEENT:  Denies vision changes, sore throat, congestion  CVS:  Denies chest pain, pressure, palpitations  Resp:  Denies shortness of breath, cough, sputum  GI:  Denies diarrhea, constipation, abdominal pain, nausea, vomiting  :  Denies dysuria, hematuria, nocturia  MSK:  Denies myalgias, arthralgias  Skin:  Denies rashes, bleeding  Neuro:  Denies headache, dizziness, syncope, numbness, paresthesias  All other systems otherwise negative    Scheduled Medications   abacavir  600 mg Oral Daily    And    lamivudine  300 mg Oral Daily    acyclovir  400 mg Oral BID    ceFEPime (MAXIPIME) IVPB  2 g Intravenous Q8H    dolutegravir  50 mg Oral Daily    metronidazole  500 mg Oral TID    micafungin (MYCAMINE) IVPB  100 mg Intravenous Q24H    sodium chloride 0.9%  10 mL Intravenous Q6H    vancomycin (VANCOCIN) IVPB  1,250 mg Intravenous Q8H       Continuous Infusions   sodium chloride 0.9% 150 mL/hr at 02/24/17 0018       PRN Medications  sodium chloride, acetaminophen, acetaminophen, alteplase, dextrose 50%, dextrose 50%, glucagon (human recombinant), glucose, glucose, heparin, porcine (PF), HYDROmorphone, omnipaque 350 iohexol, omnipaque 350 iohexol, ondansetron, oxycodone, potassium chloride, potassium chloride, potassium chloride, potassium, sodium phosphates, potassium, sodium  phosphates, promethazine (PHENERGAN) IVPB, Flushing PICC Protocol **AND** sodium chloride 0.9% **AND** sodium chloride 0.9%, sodium chloride 0.9%    OBJECTIVE:     Temp:  [98.1 °F (36.7 °C)-102.9 °F (39.4 °C)]   Pulse:  []   Resp:  [16-18]   BP: ()/(46-70)   SpO2:  [96 %-100 %]   Body mass index is 15.43 kg/(m^2).    Intake/Output Summary    Intake/Output Summary (Last 24 hours) at 02/24/17 0753  Last data filed at 02/24/17 0018   Gross per 24 hour   Intake           6927.5 ml   Output                0 ml   Net           6927.5 ml       Physical Exam:  General: well developed, cachectic, no distress  HENT: Head: NCAT. Thrush   Eyes: conjunctivae/corneas clear. EOMI.   Neck: supple, symmetrical, trachea midline  Lungs: Diffuse coarse breath sounds.  CV: Heart: regular rhythm, no murmur appreciated  Extremities: no cyanosis, edema, or clubbing.   Abdomen: soft, non-tender non-distented; bowel sounds normal.   Skin: Skin color, texture, turgor normal. No rashes or lesions  Musculoskeletal: no clubbing, cyanosis  Neurologic: Mental status: Alert, oriented, awake x3. No focal deficits.   Psych/Behavioral: Normal mood and affect    Diagnostic Result    Lab Results   Component Value Date    WBC 0.90 (LL) 02/23/2017    HGB 8.5 (L) 02/23/2017    HCT 24.7 (L) 02/23/2017    PLT 7 (LL) 02/23/2017         Recent Labs  Lab 02/24/17  0606   *   K 3.2*      CO2 24   BUN 5*   CREATININE 0.6   MG 1.7   PHOS 2.1*       Lab Results   Component Value Date    INR 1.1 02/21/2017    INR 1.0 02/20/2017    INR 1.1 02/19/2017       No results for input(s): POCTGLUCOSE in the last 72 hours.    ASSESSMENT/PLAN:   Mr. Marck Howard is a 36 y.o. male with HIV, presenting with AML.    Current Problems List:  Active Hospital Problems    Diagnosis  POA    *Acute leukemia [C95.00]  Yes    Pneumonia due to infectious organism [J18.9]  No    Clostridium difficile infection [B96.89]  No    Bacteremia due to  Enterococcus [R78.81]  No    Acute myeloid leukemia not having achieved remission [C92.00]  Yes    Fever [R50.9]  Yes    Tobacco abuse [Z72.0]  Yes    HIV (human immunodeficiency virus infection) [Z21]  Yes      Resolved Hospital Problems    Diagnosis Date Resolved POA   No resolved problems to display.       Active Problems, Status & Treatment Plan Addressed Today:    Neutropenic Fever  -ANC pending  -Febrile overnight to 102.9F  -Blood cx 2/20: Enterococus faecium and yeast, awaiting speciation  -Blood cx 2/22, 2/24:  NGTD  -CDiff 2/21 positive, started PO Vanc and Flagyl  -Urine cx 2/20:  NGTD  -Check 2D echo to evaluate for endocarditis because of persistently positive blood cx  -ID following appreciate assistance   Continue Vanco and Cefepime   Start Micafungin for new onset fungemia    AML  -S/p bone marrow biopsy (1/17/17) showing AML, cytogenetics and molecular markers reveal : NPM1 and FLT3 negative. Will need transplant given high risk disease  -7+3 started 1/20/17  -Repeat Bone marrow biopsy 2/3, with circulating blasts in peripheral blood, 70% blasts, 100% cellularity.   -Started on MEC on 2/9/17- today is Day 18    HIV positive (CD4: 118)  -New diagnosis on this admit  -ID following, appreciate recs, started Triumeq 2/2/17  -Ppx bactrim held given myelosuppressive effects, received Pentamidine 1/22/17.  Plan to restart bactrim once counts recovered.     Pancytopenia 2/2 Chemotherapy  -WBC 0.97  -Hgb 7.9  -Plt pending  -Continue to monitor and transfuse as indicated for Hgb <7.0, PLT <10.       Tobacco Abuse  - 26 pack year history  - advise smoking cessation   - nicotine patch prn     H/O Alcohol Abuse  - drank at least 2 beers per day prior to admission  - no h/o DTs and now out of period for withdrawal     Hypoalbuminemia  - Dietary consulted  - encourage increased nutritional intake     Hypophosphatemia  Hypomagnesemia  -Supplemented prn    Diet:  Regular  DVT PPx:  Encourage ambulation  Code  Status:  Full      Dispo:  Pending repeat bone marrow day 21 (Monday)      Merry Lopes MD  Med PGY3  [phone]

## 2017-02-24 NOTE — PROGRESS NOTES
Pt left AMA.  MDs, , and  reiterated to pt dangers of leaving AMA and living with untreated c.diff, fungal infection, pneumonia, AML, and HIV.  Pt signed AMA paperwork, stated he was aware of dangers. Family also educated on pt's situation.  Prescriptions sent to [facility].  Pt left unit before prescriptions were sent, family informed that prescriptions were sent to [facility]. PIV was removed. Pt ambulated independently off of unit.

## 2017-02-25 LAB — BACTERIA BLD CULT: NORMAL

## 2017-02-27 LAB — BACTERIA BLD CULT: NORMAL

## 2017-03-01 LAB
BACTERIA BLD CULT: NORMAL

## 2017-03-06 ENCOUNTER — TELEPHONE (OUTPATIENT)
Dept: HEMATOLOGY/ONCOLOGY | Facility: CLINIC | Age: 37
End: 2017-03-06

## 2017-03-06 NOTE — TELEPHONE ENCOUNTER
----- Message from Govind Jones MD sent at 3/5/2017 10:56 AM CST -----  Contact: Jazlyn   Looks like this patient never got seen in fu???   He needs to be seen urgently either on 3/6 or 3/7 with labs as below drawn.    Please call him to confirm he needs to be seen urgently.    ----- Message -----     From: Jazlyn Childress RN     Sent: 2/24/2017  11:39 AM       To: Kell Subramanian NP, Helga Ruano, #    Mr Lee signed out AMA today.  Can you please call him with a f/u early next week with whoever can see him.  He will also need a lab draw (CBC, CMP, MG, PHOS, T&S).    Thanks

## 2017-03-13 LAB
BACTERIA BLD CULT: NORMAL

## 2017-03-14 ENCOUNTER — HOSPITAL ENCOUNTER (INPATIENT)
Facility: HOSPITAL | Age: 37
LOS: 14 days | Discharge: HOSPICE | DRG: 974 | End: 2017-03-28
Attending: EMERGENCY MEDICINE | Admitting: INTERNAL MEDICINE
Payer: MEDICAID

## 2017-03-14 DIAGNOSIS — R65.10 SIRS (SYSTEMIC INFLAMMATORY RESPONSE SYNDROME): ICD-10-CM

## 2017-03-14 DIAGNOSIS — A41.9 SEPSIS: ICD-10-CM

## 2017-03-14 DIAGNOSIS — A41.50 SEPSIS DUE TO GRAM-NEGATIVE ORGANISM WITH SEPTIC SHOCK: ICD-10-CM

## 2017-03-14 DIAGNOSIS — R65.21 SEPSIS DUE TO GRAM-NEGATIVE ORGANISM WITH SEPTIC SHOCK: ICD-10-CM

## 2017-03-14 DIAGNOSIS — I96 GANGRENE: ICD-10-CM

## 2017-03-14 DIAGNOSIS — C95.00 ACUTE LEUKEMIA NOT HAVING ACHIEVED REMISSION: Primary | ICD-10-CM

## 2017-03-14 PROBLEM — R74.01 TRANSAMINITIS: Status: ACTIVE | Noted: 2017-03-14

## 2017-03-14 PROBLEM — E87.6 HYPOKALEMIA: Status: ACTIVE | Noted: 2017-03-14

## 2017-03-14 PROBLEM — E87.1 HYPONATREMIA: Status: ACTIVE | Noted: 2017-03-14

## 2017-03-14 PROBLEM — D70.1 CHEMOTHERAPY-INDUCED NEUTROPENIA: Status: ACTIVE | Noted: 2017-03-14

## 2017-03-14 PROBLEM — I95.9 HYPOTENSION: Status: ACTIVE | Noted: 2017-03-14

## 2017-03-14 PROBLEM — T45.1X5A CHEMOTHERAPY-INDUCED NEUTROPENIA: Status: ACTIVE | Noted: 2017-03-14

## 2017-03-14 PROBLEM — Z91.199 NONCOMPLIANCE: Status: ACTIVE | Noted: 2017-03-14

## 2017-03-14 PROBLEM — D84.9 IMMUNOCOMPROMISED PATIENT: Status: ACTIVE | Noted: 2017-03-14

## 2017-03-14 PROBLEM — D61.818 PANCYTOPENIA: Status: ACTIVE | Noted: 2017-03-14

## 2017-03-14 PROBLEM — E80.6 HYPERBILIRUBINEMIA: Status: ACTIVE | Noted: 2017-03-14

## 2017-03-14 PROBLEM — E87.20 LACTIC ACID ACIDOSIS: Status: ACTIVE | Noted: 2017-03-14

## 2017-03-14 LAB
ABO + RH BLD: NORMAL
ALBUMIN SERPL BCP-MCNC: 1.4 G/DL
ALBUMIN SERPL BCP-MCNC: 1.8 G/DL
ALLENS TEST: ABNORMAL
ALP SERPL-CCNC: 198 U/L
ALP SERPL-CCNC: 299 U/L
ALT SERPL W/O P-5'-P-CCNC: 259 U/L
ALT SERPL W/O P-5'-P-CCNC: 390 U/L
ANION GAP SERPL CALC-SCNC: 10 MMOL/L
ANION GAP SERPL CALC-SCNC: 14 MMOL/L
APTT BLDCRRT: 26.9 SEC
AST SERPL-CCNC: 507 U/L
AST SERPL-CCNC: 886 U/L
BACTERIA #/AREA URNS AUTO: NORMAL /HPF
BASOPHILS # BLD AUTO: ABNORMAL K/UL
BASOPHILS NFR BLD: 0 %
BILIRUB SERPL-MCNC: 2.8 MG/DL
BILIRUB SERPL-MCNC: 3.3 MG/DL
BILIRUB UR QL STRIP: NEGATIVE
BLD GP AB SCN CELLS X3 SERPL QL: NORMAL
BNP SERPL-MCNC: 26 PG/ML
BUN SERPL-MCNC: 11 MG/DL
BUN SERPL-MCNC: 14 MG/DL
CALCIUM SERPL-MCNC: 6.9 MG/DL
CALCIUM SERPL-MCNC: 8.6 MG/DL
CHLORIDE SERPL-SCNC: 108 MMOL/L
CHLORIDE SERPL-SCNC: 93 MMOL/L
CLARITY UR REFRACT.AUTO: ABNORMAL
CO2 SERPL-SCNC: 17 MMOL/L
CO2 SERPL-SCNC: 24 MMOL/L
COLOR UR AUTO: ABNORMAL
CREAT SERPL-MCNC: 0.5 MG/DL
CREAT SERPL-MCNC: 0.6 MG/DL
DIFFERENTIAL METHOD: ABNORMAL
EOSINOPHIL # BLD AUTO: ABNORMAL K/UL
EOSINOPHIL NFR BLD: 0 %
ERYTHROCYTE [DISTWIDTH] IN BLOOD BY AUTOMATED COUNT: 15.4 %
EST. GFR  (AFRICAN AMERICAN): >60 ML/MIN/1.73 M^2
EST. GFR  (AFRICAN AMERICAN): >60 ML/MIN/1.73 M^2
EST. GFR  (NON AFRICAN AMERICAN): >60 ML/MIN/1.73 M^2
EST. GFR  (NON AFRICAN AMERICAN): >60 ML/MIN/1.73 M^2
FIBRINOGEN PPP-MCNC: >800 MG/DL
GLUCOSE SERPL-MCNC: 100 MG/DL
GLUCOSE SERPL-MCNC: 87 MG/DL
GLUCOSE UR QL STRIP: NEGATIVE
HCO3 UR-SCNC: 20.5 MMOL/L (ref 24–28)
HCT VFR BLD AUTO: 22.5 %
HGB BLD-MCNC: 7.7 G/DL
HGB UR QL STRIP: ABNORMAL
HYALINE CASTS UR QL AUTO: 0 /LPF
INR PPP: 1.5
KETONES UR QL STRIP: NEGATIVE
LACTATE SERPL-SCNC: 3.6 MMOL/L
LACTATE SERPL-SCNC: 3.9 MMOL/L
LACTATE SERPL-SCNC: 3.9 MMOL/L
LEUKOCYTE ESTERASE UR QL STRIP: NEGATIVE
LYMPHOCYTES # BLD AUTO: ABNORMAL K/UL
LYMPHOCYTES NFR BLD: 18 %
MAGNESIUM SERPL-MCNC: 1.6 MG/DL
MAGNESIUM SERPL-MCNC: 1.7 MG/DL
MCH RBC QN AUTO: 30 PG
MCHC RBC AUTO-ENTMCNC: 34.2 %
MCV RBC AUTO: 88 FL
MICROSCOPIC COMMENT: NORMAL
MONOCYTES # BLD AUTO: ABNORMAL K/UL
MONOCYTES NFR BLD: 1 %
NEUTROPHILS NFR BLD: 0 %
NITRITE UR QL STRIP: NEGATIVE
PCO2 BLDA: 31.5 MMHG (ref 35–45)
PH SMN: 7.42 [PH] (ref 7.35–7.45)
PH UR STRIP: 6 [PH] (ref 5–8)
PLATELET # BLD AUTO: 54 K/UL
PLATELET BLD QL SMEAR: ABNORMAL
PMV BLD AUTO: 11.5 FL
PO2 BLDA: 34 MMHG (ref 40–60)
POC BE: -4 MMOL/L
POC SATURATED O2: 67 % (ref 95–100)
POC TCO2: 21 MMOL/L (ref 24–29)
POTASSIUM SERPL-SCNC: 2.1 MMOL/L
POTASSIUM SERPL-SCNC: 3.3 MMOL/L
PROCALCITONIN SERPL IA-MCNC: 49.43 NG/ML
PROT SERPL-MCNC: 4.8 G/DL
PROT SERPL-MCNC: 6.7 G/DL
PROT UR QL STRIP: ABNORMAL
PROTHROMBIN TIME: 15.3 SEC
RBC # BLD AUTO: 2.57 M/UL
RBC #/AREA URNS AUTO: 1 /HPF (ref 0–4)
SAMPLE: ABNORMAL
SITE: ABNORMAL
SODIUM SERPL-SCNC: 131 MMOL/L
SODIUM SERPL-SCNC: 135 MMOL/L
SP GR UR STRIP: 1.02 (ref 1–1.03)
SQUAMOUS #/AREA URNS AUTO: 1 /HPF
TROPONIN I SERPL DL<=0.01 NG/ML-MCNC: 0.01 NG/ML
URATE SERPL-MCNC: 2.8 MG/DL
URN SPEC COLLECT METH UR: ABNORMAL
UROBILINOGEN UR STRIP-ACNC: ABNORMAL EU/DL
WBC # BLD AUTO: 4.32 K/UL
WBC #/AREA URNS AUTO: 3 /HPF (ref 0–5)
WBC OTHER NFR BLD MANUAL: 81 %

## 2017-03-14 PROCEDURE — 87186 SC STD MICRODIL/AGAR DIL: CPT

## 2017-03-14 PROCEDURE — 80053 COMPREHEN METABOLIC PANEL: CPT | Mod: 91

## 2017-03-14 PROCEDURE — 93010 ELECTROCARDIOGRAM REPORT: CPT | Mod: 76,,, | Performed by: INTERNAL MEDICINE

## 2017-03-14 PROCEDURE — 63600175 PHARM REV CODE 636 W HCPCS: Performed by: HOSPITALIST

## 2017-03-14 PROCEDURE — 84550 ASSAY OF BLOOD/URIC ACID: CPT

## 2017-03-14 PROCEDURE — 80053 COMPREHEN METABOLIC PANEL: CPT

## 2017-03-14 PROCEDURE — 86900 BLOOD TYPING SEROLOGIC ABO: CPT

## 2017-03-14 PROCEDURE — 84484 ASSAY OF TROPONIN QUANT: CPT

## 2017-03-14 PROCEDURE — 96368 THER/DIAG CONCURRENT INF: CPT

## 2017-03-14 PROCEDURE — 93005 ELECTROCARDIOGRAM TRACING: CPT

## 2017-03-14 PROCEDURE — 84145 PROCALCITONIN (PCT): CPT

## 2017-03-14 PROCEDURE — 99291 CRITICAL CARE FIRST HOUR: CPT | Mod: ,,, | Performed by: INTERNAL MEDICINE

## 2017-03-14 PROCEDURE — 25000003 PHARM REV CODE 250: Performed by: HOSPITALIST

## 2017-03-14 PROCEDURE — 99291 CRITICAL CARE FIRST HOUR: CPT

## 2017-03-14 PROCEDURE — 86920 COMPATIBILITY TEST SPIN: CPT

## 2017-03-14 PROCEDURE — 87040 BLOOD CULTURE FOR BACTERIA: CPT

## 2017-03-14 PROCEDURE — 83880 ASSAY OF NATRIURETIC PEPTIDE: CPT

## 2017-03-14 PROCEDURE — 96361 HYDRATE IV INFUSION ADD-ON: CPT

## 2017-03-14 PROCEDURE — 85384 FIBRINOGEN ACTIVITY: CPT

## 2017-03-14 PROCEDURE — 85060 BLOOD SMEAR INTERPRETATION: CPT | Mod: ,,, | Performed by: PATHOLOGY

## 2017-03-14 PROCEDURE — 25000003 PHARM REV CODE 250: Performed by: EMERGENCY MEDICINE

## 2017-03-14 PROCEDURE — 96367 TX/PROPH/DG ADDL SEQ IV INF: CPT

## 2017-03-14 PROCEDURE — 25000003 PHARM REV CODE 250: Performed by: INTERNAL MEDICINE

## 2017-03-14 PROCEDURE — 86850 RBC ANTIBODY SCREEN: CPT

## 2017-03-14 PROCEDURE — 83605 ASSAY OF LACTIC ACID: CPT | Mod: 91

## 2017-03-14 PROCEDURE — 87086 URINE CULTURE/COLONY COUNT: CPT

## 2017-03-14 PROCEDURE — 99291 CRITICAL CARE FIRST HOUR: CPT | Mod: ,,, | Performed by: EMERGENCY MEDICINE

## 2017-03-14 PROCEDURE — 80074 ACUTE HEPATITIS PANEL: CPT

## 2017-03-14 PROCEDURE — 63600175 PHARM REV CODE 636 W HCPCS: Performed by: EMERGENCY MEDICINE

## 2017-03-14 PROCEDURE — 85007 BL SMEAR W/DIFF WBC COUNT: CPT

## 2017-03-14 PROCEDURE — 25000003 PHARM REV CODE 250

## 2017-03-14 PROCEDURE — 96366 THER/PROPH/DIAG IV INF ADDON: CPT

## 2017-03-14 PROCEDURE — 83735 ASSAY OF MAGNESIUM: CPT

## 2017-03-14 PROCEDURE — 02HV33Z INSERTION OF INFUSION DEVICE INTO SUPERIOR VENA CAVA, PERCUTANEOUS APPROACH: ICD-10-PCS | Performed by: INTERNAL MEDICINE

## 2017-03-14 PROCEDURE — 36556 INSERT NON-TUNNEL CV CATH: CPT

## 2017-03-14 PROCEDURE — 81001 URINALYSIS AUTO W/SCOPE: CPT

## 2017-03-14 PROCEDURE — 85730 THROMBOPLASTIN TIME PARTIAL: CPT

## 2017-03-14 PROCEDURE — 86665 EPSTEIN-BARR CAPSID VCA: CPT

## 2017-03-14 PROCEDURE — 85027 COMPLETE CBC AUTOMATED: CPT

## 2017-03-14 PROCEDURE — 63600175 PHARM REV CODE 636 W HCPCS: Performed by: INTERNAL MEDICINE

## 2017-03-14 PROCEDURE — 20000000 HC ICU ROOM

## 2017-03-14 PROCEDURE — 83735 ASSAY OF MAGNESIUM: CPT | Mod: 91

## 2017-03-14 PROCEDURE — 96365 THER/PROPH/DIAG IV INF INIT: CPT

## 2017-03-14 PROCEDURE — 96375 TX/PRO/DX INJ NEW DRUG ADDON: CPT

## 2017-03-14 PROCEDURE — 99231 SBSQ HOSP IP/OBS SF/LOW 25: CPT | Mod: ,,, | Performed by: INTERNAL MEDICINE

## 2017-03-14 PROCEDURE — 85610 PROTHROMBIN TIME: CPT

## 2017-03-14 PROCEDURE — 96374 THER/PROPH/DIAG INJ IV PUSH: CPT

## 2017-03-14 PROCEDURE — 63600175 PHARM REV CODE 636 W HCPCS: Performed by: PHYSICIAN ASSISTANT

## 2017-03-14 PROCEDURE — 25000003 PHARM REV CODE 250: Performed by: PHYSICIAN ASSISTANT

## 2017-03-14 RX ORDER — PHENYLEPHRINE HCL IN 0.9% NACL 1 MG/10 ML
100 SYRINGE (ML) INTRAVENOUS ONCE
Status: DISCONTINUED | OUTPATIENT
Start: 2017-03-14 | End: 2017-03-16

## 2017-03-14 RX ORDER — SODIUM CHLORIDE 9 MG/ML
INJECTION, SOLUTION INTRAVENOUS CONTINUOUS
Status: DISCONTINUED | OUTPATIENT
Start: 2017-03-14 | End: 2017-03-16

## 2017-03-14 RX ORDER — MAGNESIUM SULFATE HEPTAHYDRATE 40 MG/ML
2 INJECTION, SOLUTION INTRAVENOUS ONCE
Status: COMPLETED | OUTPATIENT
Start: 2017-03-14 | End: 2017-03-14

## 2017-03-14 RX ORDER — ONDANSETRON 8 MG/1
8 TABLET, ORALLY DISINTEGRATING ORAL EVERY 8 HOURS PRN
Status: DISCONTINUED | OUTPATIENT
Start: 2017-03-14 | End: 2017-03-27

## 2017-03-14 RX ORDER — POTASSIUM CHLORIDE 20 MEQ/15ML
40 SOLUTION ORAL
Status: DISCONTINUED | OUTPATIENT
Start: 2017-03-14 | End: 2017-03-21

## 2017-03-14 RX ORDER — SODIUM CHLORIDE 9 MG/ML
1000 INJECTION, SOLUTION INTRAVENOUS
Status: COMPLETED | OUTPATIENT
Start: 2017-03-14 | End: 2017-03-14

## 2017-03-14 RX ORDER — MAGNESIUM SULFATE HEPTAHYDRATE 40 MG/ML
2 INJECTION, SOLUTION INTRAVENOUS
Status: DISCONTINUED | OUTPATIENT
Start: 2017-03-14 | End: 2017-03-21

## 2017-03-14 RX ORDER — POTASSIUM CHLORIDE 29.8 MG/ML
40 INJECTION INTRAVENOUS ONCE
Status: COMPLETED | OUTPATIENT
Start: 2017-03-14 | End: 2017-03-14

## 2017-03-14 RX ORDER — NOREPINEPHRINE BITARTRATE/D5W 4MG/250ML
0.02 PLASTIC BAG, INJECTION (ML) INTRAVENOUS CONTINUOUS
Status: DISCONTINUED | OUTPATIENT
Start: 2017-03-14 | End: 2017-03-20

## 2017-03-14 RX ORDER — POTASSIUM CHLORIDE 20 MEQ/1
40 TABLET, EXTENDED RELEASE ORAL ONCE
Status: DISCONTINUED | OUTPATIENT
Start: 2017-03-14 | End: 2017-03-14

## 2017-03-14 RX ORDER — PHENYLEPHRINE HCL IN 0.9% NACL 1 MG/10 ML
SYRINGE (ML) INTRAVENOUS
Status: COMPLETED
Start: 2017-03-14 | End: 2017-03-14

## 2017-03-14 RX ORDER — ACETAMINOPHEN 650 MG/20.3ML
650 LIQUID ORAL EVERY 4 HOURS PRN
Status: DISCONTINUED | OUTPATIENT
Start: 2017-03-14 | End: 2017-03-27

## 2017-03-14 RX ORDER — METRONIDAZOLE 500 MG/100ML
500 INJECTION, SOLUTION INTRAVENOUS
Status: DISCONTINUED | OUTPATIENT
Start: 2017-03-14 | End: 2017-03-14

## 2017-03-14 RX ORDER — ACYCLOVIR 800 MG/1
400 TABLET ORAL 2 TIMES DAILY
Status: DISCONTINUED | OUTPATIENT
Start: 2017-03-14 | End: 2017-03-14

## 2017-03-14 RX ORDER — GLUCAGON 1 MG
1 KIT INJECTION
Status: DISCONTINUED | OUTPATIENT
Start: 2017-03-14 | End: 2017-03-27

## 2017-03-14 RX ORDER — MAGNESIUM SULFATE HEPTAHYDRATE 40 MG/ML
4 INJECTION, SOLUTION INTRAVENOUS
Status: DISCONTINUED | OUTPATIENT
Start: 2017-03-14 | End: 2017-03-21

## 2017-03-14 RX ORDER — CEFEPIME HYDROCHLORIDE 2 G/50ML
2 INJECTION, SOLUTION INTRAVENOUS
Status: COMPLETED | OUTPATIENT
Start: 2017-03-14 | End: 2017-03-14

## 2017-03-14 RX ORDER — LOPERAMIDE HYDROCHLORIDE 2 MG/1
2 CAPSULE ORAL 4 TIMES DAILY PRN
Status: DISCONTINUED | OUTPATIENT
Start: 2017-03-14 | End: 2017-03-16

## 2017-03-14 RX ORDER — MAGNESIUM SULFATE HEPTAHYDRATE 40 MG/ML
2 INJECTION, SOLUTION INTRAVENOUS ONCE
Status: COMPLETED | OUTPATIENT
Start: 2017-03-14 | End: 2017-03-15

## 2017-03-14 RX ORDER — METRONIDAZOLE 500 MG/100ML
500 INJECTION, SOLUTION INTRAVENOUS
Status: DISCONTINUED | OUTPATIENT
Start: 2017-03-14 | End: 2017-03-20

## 2017-03-14 RX ORDER — SODIUM CHLORIDE 0.9 % (FLUSH) 0.9 %
3 SYRINGE (ML) INJECTION EVERY 8 HOURS
Status: DISCONTINUED | OUTPATIENT
Start: 2017-03-14 | End: 2017-03-27

## 2017-03-14 RX ADMIN — PIPERACILLIN SODIUM AND TAZOBACTAM SODIUM 4.5 G: 4; .5 INJECTION, POWDER, LYOPHILIZED, FOR SOLUTION INTRAVENOUS at 06:03

## 2017-03-14 RX ADMIN — Medication 3 ML: at 09:03

## 2017-03-14 RX ADMIN — SODIUM CHLORIDE 1000 ML: 0.9 INJECTION, SOLUTION INTRAVENOUS at 02:03

## 2017-03-14 RX ADMIN — DEXTROSE MONOHYDRATE 240 MG: 50 INJECTION, SOLUTION INTRAVENOUS at 09:03

## 2017-03-14 RX ADMIN — SODIUM CHLORIDE 1000 ML: 0.9 INJECTION, SOLUTION INTRAVENOUS at 11:03

## 2017-03-14 RX ADMIN — SODIUM CHLORIDE: 0.9 INJECTION, SOLUTION INTRAVENOUS at 07:03

## 2017-03-14 RX ADMIN — POTASSIUM CHLORIDE 40 MEQ: 400 INJECTION, SOLUTION INTRAVENOUS at 09:03

## 2017-03-14 RX ADMIN — SODIUM CHLORIDE 1000 ML: 0.9 INJECTION, SOLUTION INTRAVENOUS at 04:03

## 2017-03-14 RX ADMIN — Medication 0.02 MCG/KG/MIN: at 06:03

## 2017-03-14 RX ADMIN — MAGNESIUM SULFATE IN WATER 2 G: 40 INJECTION, SOLUTION INTRAVENOUS at 09:03

## 2017-03-14 RX ADMIN — ACETAMINOPHEN 650 MG: 160 SOLUTION ORAL at 03:03

## 2017-03-14 RX ADMIN — MAGNESIUM SULFATE IN WATER 2 G: 40 INJECTION, SOLUTION INTRAVENOUS at 03:03

## 2017-03-14 RX ADMIN — CEFEPIME HYDROCHLORIDE 2 G: 2 INJECTION, SOLUTION INTRAVENOUS at 11:03

## 2017-03-14 RX ADMIN — MAGNESIUM SULFATE IN WATER 2 G: 40 INJECTION, SOLUTION INTRAVENOUS at 10:03

## 2017-03-14 RX ADMIN — VANCOMYCIN HYDROCHLORIDE 500 MG: 500 INJECTION, POWDER, LYOPHILIZED, FOR SOLUTION INTRAVENOUS at 01:03

## 2017-03-14 RX ADMIN — ACYCLOVIR SODIUM 410 MG: 50 INJECTION, SOLUTION INTRAVENOUS at 04:03

## 2017-03-14 RX ADMIN — Medication 1 MG: at 05:03

## 2017-03-14 RX ADMIN — METRONIDAZOLE 500 MG: 500 INJECTION, SOLUTION INTRAVENOUS at 04:03

## 2017-03-14 NOTE — PROCEDURES
"Marck Howard is a 36 y.o. male patient.    Temp: 98.7 °F (37.1 °C) (03/14/17 1832)  Pulse: (!) 128 (03/14/17 1849)  Resp: 18 (03/14/17 1846)  BP: (!) 84/60 (03/14/17 1849)  SpO2: 99 % (03/14/17 1846)  Weight: 40.8 kg (90 lb) (03/14/17 1032)  Height: 5' 3" (160 cm) (03/14/17 1032)       Central Line  Date/Time: 3/14/2017 6:51 PM  Performed by: EBONY LLOYD  Consent Done: Yes  Time out: Immediately prior to procedure a "time out" was called to verify the correct patient, procedure, equipment, support staff and site/side marked as required.  Indications: med administration  Preparation: skin prepped with ChloraPrep  Location details: right internal jugular  Catheter type: triple lumen  Ultrasound guidance: yes  Vessel Caliber: mediumNeedle advanced into vessel with real time Ultrasound guidance.  Guidewire confirmed in vessel.  Sterile sheath used.  Number of attempts: 2  Specimens: No  Implants: No  Post-procedure: line sutured  Complications: No  Comments: CXR orderd          Ebony Lloyd  3/14/2017  "

## 2017-03-14 NOTE — ED TRIAGE NOTES
Patient states he came to hospital due to :weakness' and being unable to stand up. Emaciated, states he is on no meds, was recently hospitalized for 1 month. States he is eating at home. Small diarrhea stool on admit to ED.

## 2017-03-14 NOTE — ASSESSMENT & PLAN NOTE
- septic shock v. Chronic lactic acidosis 2/2 AIDS/malignancy  - LA 3.9 on admission > 3.6 following 3L IVF  - trend LA q4h

## 2017-03-14 NOTE — SUBJECTIVE & OBJECTIVE
"Past Medical History:   Diagnosis Date    Cancer     from "smoking cigarettes"    HIV disease      History reviewed. No pertinent surgical history.    Review of patient's allergies indicates:  No Known Allergies    Family History     None        Social History Main Topics    Smoking status: Former Smoker     Packs/day: 1.00     Years: 20.00     Types: Cigarettes    Smokeless tobacco: Not on file      Comment: pt will contact clinic when ready    Alcohol use No    Drug use: Yes     Special: Marijuana      Comment: denies    Sexual activity: Not Currently     Partners: Female     Birth control/ protection: Condom      Comment: per report last in 2005      Review of Systems   Reason unable to perform ROS: ROS limited by poor history.   Constitutional: Positive for activity change, appetite change, chills, fatigue and fever. Negative for diaphoresis.   HENT: Negative for congestion, mouth sores, nosebleeds, rhinorrhea, sinus pressure and sneezing.    Respiratory: Positive for shortness of breath. Negative for cough, choking, chest tightness, wheezing and stridor.    Cardiovascular: Negative for chest pain, palpitations and leg swelling.   Gastrointestinal: Positive for abdominal pain and diarrhea. Negative for abdominal distention, anal bleeding, blood in stool, constipation, nausea, rectal pain and vomiting.   Endocrine: Positive for cold intolerance. Negative for heat intolerance and polyuria.   Genitourinary: Positive for decreased urine volume. Negative for difficulty urinating, dysuria, enuresis, flank pain and hematuria.   Musculoskeletal: Positive for myalgias. Negative for back pain.   Skin: Negative for color change and rash.   Allergic/Immunologic: Positive for immunocompromised state.   Neurological: Positive for weakness. Negative for dizziness, seizures, syncope, speech difficulty, light-headedness, numbness and headaches.   Psychiatric/Behavioral: Negative for agitation and confusion. The patient is " not nervous/anxious.      Objective:     Vital Signs (Most Recent):  Temp: (!) 103.1 °F (39.5 °C) (03/14/17 1513)  Pulse: (!) 153 (03/14/17 1542)  Resp: 18 (03/14/17 1542)  BP: (!) 94/48 (03/14/17 1542)  SpO2: 98 % (03/14/17 1542) Vital Signs (24h Range):  Temp:  [99.5 °F (37.5 °C)-103.1 °F (39.5 °C)] 103.1 °F (39.5 °C)  Pulse:  [132-162] 153  Resp:  [18-28] 18  SpO2:  [98 %-100 %] 98 %  BP: ()/(43-68) 94/48   Weight: 40.8 kg (90 lb)  Body mass index is 15.94 kg/(m^2).    No intake or output data in the 24 hours ending 03/14/17 1602     Physical Exam   Constitutional: He is oriented to person, place, and time.   Cachetic, shivering, ill-appearing   HENT:   Head: Normocephalic and atraumatic.   Eyes: Pupils are equal, round, and reactive to light.   Neck: Normal range of motion.   Cardiovascular: Regular rhythm, normal heart sounds and intact distal pulses.  Exam reveals no gallop and no friction rub.    No murmur heard.  Tachycardia (132-163)/138   Pulmonary/Chest: Breath sounds normal. No respiratory distress. He has no wheezes. He has no rales. He exhibits no tenderness.   Intermittent tachypnea (20s-30s)   Abdominal: Soft. Bowel sounds are normal. He exhibits no distension and no mass. There is tenderness. There is guarding. There is no rebound. No hernia.   Diffusely tender with increased tenderness over RUQ   Musculoskeletal: He exhibits no edema.   Neurological: He is alert and oriented to person, place, and time.   Skin: Skin is warm and dry.   Psychiatric:   Patient denies HIV diagnosis despite previous hospitalization   Nursing note and vitals reviewed.      Vents:     Lines/Drains/Airways     Drain                 Urethral Catheter 03/14/17 1150 Non-latex 16 Fr. less than 1 day          Peripheral Intravenous Line                 Peripheral IV - Single Lumen 02/21/17 1127 Left Forearm 21 days         Peripheral IV - Single Lumen 03/14/17 1353 Right Antecubital less than 1 day              Significant  Labs:    CBC/Anemia Profile:    Recent Labs  Lab 03/14/17  1128   WBC 4.32   HGB 7.7*   HCT 22.5*   PLT 54*   MCV 88   RDW 15.4*        Chemistries:    Recent Labs  Lab 03/14/17  1128   *   K 3.3*   CL 93*   CO2 24   BUN 14   CREATININE 0.6   CALCIUM 8.6*   ALBUMIN 1.8*   PROT 6.7   BILITOT 3.3*   ALKPHOS 299*   *   *       Blood Culture: No results for input(s): LABBLOO in the last 48 hours.  CMP:   Recent Labs  Lab 03/14/17  1128   *   K 3.3*   CL 93*   CO2 24   GLU 87   BUN 14   CREATININE 0.6   CALCIUM 8.6*   PROT 6.7   ALBUMIN 1.8*   BILITOT 3.3*   ALKPHOS 299*   *   *   ANIONGAP 14   EGFRNONAA >60.0     Coagulation:   Recent Labs  Lab 03/14/17  1128   INR 1.5*   APTT 26.9     Lactic Acid:   Recent Labs  Lab 03/14/17  1128 03/14/17  1353   LACTATE 3.9*  3.9* 3.6*     Respiratory Culture: No results for input(s): GSRESP, RESPIRATORYC in the last 48 hours.  Troponin:   Recent Labs  Lab 03/14/17  1128   TROPONINI 0.007     Urine Culture: No results for input(s): LABURIN in the last 48 hours.  Urine Studies:   Recent Labs  Lab 03/14/17  1150   COLORU Huong   APPEARANCEUA Hazy*   PHUR 6.0   SPECGRAV 1.020   PROTEINUA 2+*   GLUCUA Negative   KETONESU Negative   BILIRUBINUA Negative   OCCULTUA 2+*   NITRITE Negative   UROBILINOGEN 2.0-3.0*   LEUKOCYTESUR Negative   RBCUA 1   WBCUA 3   BACTERIA None   SQUAMEPITHEL 1   HYALINECASTS 0       Significant Imaging:   CXR 3/14:  FINDINGS: Support devices: None. Abdomen: Nonobstructive bowel gas pattern. No free air or portal venous gas on this single view. Chest: Lung bases are clear. Other: N/A.    CT Abd/Pelvis 3/14: pending  US Abd Complete 3/14: pending

## 2017-03-14 NOTE — ED PROVIDER NOTES
"Encounter Date: 3/14/2017    SCRIBE #1 NOTE: I, Chela Drummond , am scribing for, and in the presence of,  Dr. Del Rosario . I have scribed the following portions of the note - the EKG reading and the APC attestation.       History     Chief Complaint   Patient presents with    General Illness     Patient doesn't feel good, no specific complaints.  Poor appetite and intake.  Weak.     Review of patient's allergies indicates:  No Known Allergies  HPI Comments: 35 y/o male with history of recent diagnosis of HIV, AML, Cdiff, bacteremia during admission in January presents to the ER with chief complaint of generalized weakness and shortness of breath with exertion for 2 weeks.   He reports non productive cough since discharge from the hospital 2 weeks ago . He denies chest pain, abdominal pain, vomiting, diarrhea.  He reports subjective fever due to cold sweats. He is having normal , solid brown stool and denies melena or bloody stool.    The patient was transferred to [facility] on 1/17 and left AMA 2/24.  He had presented to a [facility] initially due to weight loss, muscle pain, and weakness.      He was found to have neutropenic fever and 2/2 + blood cultures for enterococcus.  He was treated with IV vancomycin, cefepim, flagyl for C diff and anaerobic coverage.  Planned to treat with Micafungin for fungemia, but the patient refused treatment and left AMA.  He says today that he left AMA because he had been there too long.    The patient was discharged on cipro, flagyl, zofran, HAART therapy, and nystatin.  He agreed to return if he continued to feel bad.     Past Medical History:   Diagnosis Date    Cancer     from "smoking cigarettes"    HIV disease      History reviewed. No pertinent surgical history.  History reviewed. No pertinent family history.  Social History   Substance Use Topics    Smoking status: Former Smoker     Packs/day: 1.00     Years: 20.00     Types: Cigarettes    Smokeless " tobacco: None      Comment: pt will contact clinic when ready    Alcohol use No     Review of Systems   Constitutional: Positive for chills (cold sweats). Negative for fever.   HENT: Negative for sore throat.    Respiratory: Positive for cough and shortness of breath. Negative for wheezing.    Cardiovascular: Negative for chest pain.   Gastrointestinal: Negative for abdominal pain, nausea and vomiting.   Genitourinary: Negative for dysuria.   Musculoskeletal: Negative for back pain.   Skin: Negative for rash.   Neurological: Negative for dizziness, syncope, weakness and light-headedness.   Hematological: Does not bruise/bleed easily.       Physical Exam   Initial Vitals   BP Pulse Resp Temp SpO2   03/14/17 0948 03/14/17 0948 03/14/17 0948 03/14/17 0950 03/14/17 0948   100/54 132 18 99.5 °F (37.5 °C) 98 %     Physical Exam    Constitutional: He appears cachectic.  Non-toxic appearance. He appears ill.   HENT:   Head: Atraumatic.   Mouth/Throat: Oropharynx is clear and moist.   Eyes: Conjunctivae and EOM are normal. Pupils are equal, round, and reactive to light.   Neck: Normal range of motion. Neck supple.   Cardiovascular: Regular rhythm. Tachycardia present.    Pulmonary/Chest: Breath sounds normal. No respiratory distress. He has no wheezes. He has no rhonchi. He has no rales.   Abdominal: Soft. Bowel sounds are normal. There is no tenderness.   Neurological: He is alert and oriented to person, place, and time.   Skin: No rash noted.   Psychiatric: He has a normal mood and affect.         ED Course   Critical Care  Date/Time: 3/14/2017 6:11 PM  Performed by: JOSSELIN WILKINS  Authorized by: JOSSELIN WILKINS   Total critical care time (exclusive of procedural time) : 45 minutes  Critical care time was exclusive of teaching time and separately billable procedures and treating other patients.  Critical care was necessary to treat or prevent imminent or life-threatening deterioration of the following conditions:  sepsis.  Critical care was time spent personally by me on the following activities: discussions with consultants, obtaining history from patient or surrogate, ordering and review of radiographic studies, review of old charts, re-evaluation of patient's condition, ordering and review of laboratory studies, examination of patient, ordering and performing treatments and interventions and evaluation of patient's response to treatment.        Labs Reviewed   CBC W/ AUTO DIFFERENTIAL - Abnormal; Notable for the following:        Result Value    RBC 2.57 (*)     Hemoglobin 7.7 (*)     Hematocrit 22.5 (*)     RDW 15.4 (*)     Platelets 54 (*)     Gran% 0.0 (*)     Mono% 1.0 (*)     Platelet Estimate Decreased (*)     All other components within normal limits   COMPREHENSIVE METABOLIC PANEL - Abnormal; Notable for the following:     Sodium 131 (*)     Potassium 3.3 (*)     Chloride 93 (*)     Calcium 8.6 (*)     Albumin 1.8 (*)     Total Bilirubin 3.3 (*)     Alkaline Phosphatase 299 (*)      (*)      (*)     All other components within normal limits   LACTIC ACID, PLASMA - Abnormal; Notable for the following:     Lactate (Lactic Acid) 3.9 (*)     All other components within normal limits   LACTIC ACID, PLASMA - Abnormal; Notable for the following:     Lactate (Lactic Acid) 3.9 (*)     All other components within normal limits   PROTIME-INR - Abnormal; Notable for the following:     Prothrombin Time 15.3 (*)     INR 1.5 (*)     All other components within normal limits   PROCALCITONIN - Abnormal; Notable for the following:     Procalcitonin 49.43 (*)     All other components within normal limits    Narrative:     Repeat lactic acid after 2nd liter of IV fluids completed  QCMV, EBV, HEPAC ADD ON PER DR. URIEL BOOKER ORDER# [id number]  [id number]   03/14/2017  15:30    URINALYSIS - Abnormal; Notable for the following:     Appearance, UA Hazy (*)     Protein, UA 2+ (*)     Occult Blood UA 2+ (*)     Urobilinogen, UA  2.0-3.0 (*)     All other components within normal limits   LACTIC ACID, PLASMA - Abnormal; Notable for the following:     Lactate (Lactic Acid) 3.6 (*)     All other components within normal limits    Narrative:     Repeat lactic acid after 2nd liter of IV fluids completed   ISTAT PROCEDURE - Abnormal; Notable for the following:     POC PCO2 31.5 (*)     POC PO2 34 (*)     POC HCO3 20.5 (*)     POC SATURATED O2 67 (*)     POC TCO2 21 (*)     All other components within normal limits   CRYPTOCOCCAL ANTIGEN - BLOOD   CULTURE, BLOOD    Narrative:     Aerobic and anaerobic   CULTURE, BLOOD    Narrative:     Aerobic and anaerobic   CULTURE, URINE   CLOSTRIDIUM DIFFICILE   CULTURE, RESPIRATORY   GRAM STAIN   FUNGUS CULTURE, BLOOD OR BONE MARROW   CULTURE, STOOL   APTT   TROPONIN I   B-TYPE NATRIURETIC PEPTIDE   URINALYSIS MICROSCOPIC   MAGNESIUM   HEPATITIS PANEL, ACUTE   CMV DNA, QUANTITATIVE, PCR   ABDIRIZAK-BARR VIRUS ANTIBODY PANEL   MAGNESIUM    Narrative:     Repeat lactic acid after 2nd liter of IV fluids completed  QCMV, EBV, HEPAC ADD ON PER DR. URIEL BOOKER ORDER# [id number]  [id number]   03/14/2017   16:39   MG ADD ON PER DR MIKEL BOOKER ORDER # [id number]   03/14/2017  16:39    HERPES SIMPLEX 1 & 2 IGM   HISTOPLASMA ANTIGEN, SERUM   HISTOPLASMA ANTIBODY, SERUM   QUANTIFERON GOLD TB   LACTIC ACID, PLASMA   T-HELPER CELLS (CD4) COUNT   PROTIME-INR   VANCOMYCIN, TROUGH   CMV DNA, QUANTITATIVE, PCR    Narrative:     Repeat lactic acid after 2nd liter of IV fluids completed  QCMV, EBV, HEPAC ADD ON PER DR. URIEL BOOKER ORDER# [id number]  [id number]   03/14/2017  15:30    ABDIRIZAK-CONNELLY VIRUS ANTIBODY PANEL    Narrative:     Repeat lactic acid after 2nd liter of IV fluids completed  QCMV, EBV, HEPAC ADD ON PER DR. URIEL BOOKER ORDER# [id number]  [id number]   03/14/2017  15:30    HEPATITIS PANEL, ACUTE    Narrative:     Repeat lactic acid after 2nd liter of IV fluids completed  QCMV, EBV, HEPAC ADD ON PER DR. URIEL BOOKER ORDER#  [id number]  [id number]   03/14/2017  15:30    TSH   PROTIME-INR   STOOL EXAM-OVA,CYSTS,PARASITES   WBC, STOOL   POCT GLUCOSE MONITORING CONTINUOUS     EKG Readings: (Independently Interpreted)   Sinus tachycardia at 152 with shortened intervals, normal axis. No ischemic changes.        X-Rays:   Independently Interpreted Readings:   Other Readings:  CXR: persistent patchy infiltrate of right middle and upper lobs.      Medical Decision Making:   History:   Old Medical Records: I decided to obtain old medical records.  Independently Interpreted Test(s):   I have ordered and independently interpreted X-rays - see prior notes.  I have ordered and independently interpreted EKG Reading(s) - see prior notes  Clinical Tests:   Lab Tests: Ordered and Reviewed  Radiological Study: Ordered and Reviewed  Medical Tests: Ordered and Reviewed       APC / Resident Notes:   Patient with recent diagnosis of AML, HIV, Cdiff, bacteremia, fungemia, presents to the ER with chief complaint of weakness, SOB with exertion x 2 weeks.  Patient is hypotensive, tachycardic, and tachypneic on initial evaluation.  I have ordered sepsis protocol labs and will start Vancomycin and cefepime based on previous  Blood cultures.     I spoke with BMT physician on call.  Patient with 3/4 SIRS criteria, lactate 3.9.  Will continue IV fluid bolus and antibiotics and repeat lactic acid after 2 liters of fluid are completed.  I will contact ICU if lactic does not improve.   Patient continues to have large amount of watery brown stool, 3 episodes.    Repeat lactate is 3.6 and the patients pulse remains elevated to 150.  I have contacted ICU physician, Dr. Lee.  They have evaluated the patient in the ER and will admit to the ICU.    Additional labs reveal H/H 7.7/22.5, consistent with previous anemia. Mildly decreased potassium - 3.3. Newly elevated LFTs (including bili, alk phos,  AST>ALT).   Xray shows opacity in RUL consistent with imaging from recent  admission.  No other acute findings.    I discussed the care of this patient with my supervising MD.         Bishnu Attestation:   Scribe #1: I performed the above scribed service and the documentation accurately describes the services I performed. I attest to the accuracy of the note.    Attending Attestation:     Physician Attestation Statement for NP/PA:   I have conducted a face to face encounter with this patient in addition to the NP/PA, due to Medical Complexity    Other NP/PA Attestation Additions:    History of Present Illness: Emergent evaluation of 36 y.o. male with PMHx of HIV, leukemia. Recently discharged from hospital [place] secondary to personal issues. Was being treated for bacteremia. Patient now presents hypertensive and tachycardic. Labs significant for elevated lactic acid. Treated with broad spectrum antibiotics and IV fluids. Will reevaluate. ICU consulted they evaluate patient and will admit for further treatment and evaluation.        Attending Critical Care:   Critical Care Times:   ==============================================================  · Total Critical Care Time - exclusive of procedural time: 45 minutes.  ==============================================================  Critical care was necessary to treat or prevent imminent or life-threatening deterioration of the following conditions: sepsis.     Physician Attestation for Scribe:  Physician Attestation Statement for Scribe #1: I, Dr. Del Rosario , reviewed documentation, as scribed by Chela Drummond  in my presence, and it is both accurate and complete.                 ED Course     Clinical Impression:   The primary encounter diagnosis was SIRS (systemic inflammatory response syndrome). A diagnosis of Sepsis was also pertinent to this visit.    Disposition:   Disposition: Admitted  Condition: Serious       TASHA Batista  03/14/17 1630       Kirsty Del Rosario MD  03/14/17 1812

## 2017-03-14 NOTE — ED NOTES
To ICU with this RN, tech, monitors.B/P prior to transfer 80s, pateint awake, alert, follows commands, Levophed , IVF and Zosyn over 4 hours cont to infuse.

## 2017-03-14 NOTE — PROGRESS NOTES
Hematology/BMT Brief Note      Mr. Howard is a 37 y/o M who is Day + 36 post MEC reinduction post original 7&3 therapy for AML in the background of HIV. Last hospital course was complicated by neutropenic fever with GPC in blood, C. Diff Infection, and Fungemia.  Signed out AMA on 2/24 with lost to follow up. He presented today with generalized weakness, shortness of breath and fevers. Found to be in severe sepsis/shock in the ER, ICU consulted for higher level of care. Patient admitted to ICU now.     Counts reviewed, no significant granulocyte percentage and with 81% Blasts likely representing persistent/refractory disease. Mild coagulopathy with INR: 1.5. Renal function stable for now, but severe transaminitis noted.     - primary management per ICU, appreciate assistance  - on board spectrum coverage: Acyclovir, Cefepime, Flagyl, Zosyn, Vancomycin, and Voriconazole.  - monitor CBC Q12H for now, transfuse if Hb < 7, Plt < 20,000/mm3  - check LDH, Uric Acid now, can give Rasburicase 6 mg IV x 1 dose if Uric Acid > 8.   - check Fibrinogen now, if < 100 then transfuse 1 unit of Cyroprecipitate   - consider FFP if INR > 1.5 - 2  - monitor TLS and DIC labs daily.      - Hematology/BMT service will follow along with you.    Case discussed with Dr. Jones.     Wilfredo Hernández MD  Hematology/Oncology Fellow

## 2017-03-14 NOTE — H&P
[facility]  Critical Care Medicine  History & Physical    Patient Name: Marck Howard  MRN: [id number]  Admission Date: 3/14/2017  Hospital Length of Stay: 0 days  Code Status: Full Code  Attending Physician: DOM Stephenson MD   Primary Care Provider: Primary Doctor No   Principal Problem: Septic shock due to undetermined organism    Subjective:     HPI:  37 yo male presents to the ED with generalized weakness and shortness of breath with exertion for the past two weeks. He denies fever, but shivers with chills. Denies cough, nausea, vomiting. States that he has had several stools in the past two days. Per RN, patient had three episodes of large volume diarrhea in the ED, all nonbloody. He denies chest pain, headache, lightheadedness, dizziness, LOC. Reports diffuse but mild abdominal pain.     Patient was recently admitted for approximately 1 month (1/17-2/24 ) for weight loss, muscle pain, and weakness and was diagnosed with HIV and AML at that time. 7+3 induction chemotherapy and HAART therapy were initiated. Hedeveloped neutropenic fever and was later found to have blood cultures growing E. faecium and Fusarium, and stool cultures with C. difficile. Infections were inadequately treated with cefipime, vancomycin, and flagy, as patient declined antifungal therapy and elected to leave the hospital AMA. Patient did not complete home courses of ciprofloxacin, flagyl, or nystatin. He has not taken HAART therapy as an outpatient.     Of note, patient denies HIV status. States he has never been diagnosed with HIV. Denies drug, EtOH use.       Hospital/ICU Course:  3/14: 4L NS administered in the ED. Admitted to the ICU. Consult ID/BMT, Heme Onc. Blood, urine, respiratory cultures. C. difficile EIA. PO vancomycin, IV flagyl, IV cefipime, IV vorconazole started. US and CT abdomen ordered for transaminitis. HAART held. Contact precautions.       Past Medical History:   Diagnosis Date    Cancer   "   from "smoking cigarettes"    HIV disease      History reviewed. No pertinent surgical history.    Review of patient's allergies indicates:  No Known Allergies    Family History     None        Social History Main Topics    Smoking status: Former Smoker     Packs/day: 1.00     Years: 20.00     Types: Cigarettes    Smokeless tobacco: Not on file      Comment: pt will contact clinic when ready    Alcohol use No    Drug use: Yes     Special: Marijuana      Comment: denies    Sexual activity: Not Currently     Partners: Female     Birth control/ protection: Condom      Comment: per report last in 2005      Review of Systems   Reason unable to perform ROS: ROS limited by poor history.   Constitutional: Positive for activity change, appetite change, chills, fatigue and fever. Negative for diaphoresis.   HENT: Negative for congestion, mouth sores, nosebleeds, rhinorrhea, sinus pressure and sneezing.    Respiratory: Positive for shortness of breath. Negative for cough, choking, chest tightness, wheezing and stridor.    Cardiovascular: Negative for chest pain, palpitations and leg swelling.   Gastrointestinal: Positive for abdominal pain and diarrhea. Negative for abdominal distention, anal bleeding, blood in stool, constipation, nausea, rectal pain and vomiting.   Endocrine: Positive for cold intolerance. Negative for heat intolerance and polyuria.   Genitourinary: Positive for decreased urine volume. Negative for difficulty urinating, dysuria, enuresis, flank pain and hematuria.   Musculoskeletal: Positive for myalgias. Negative for back pain.   Skin: Negative for color change and rash.   Allergic/Immunologic: Positive for immunocompromised state.   Neurological: Positive for weakness. Negative for dizziness, seizures, syncope, speech difficulty, light-headedness, numbness and headaches.   Psychiatric/Behavioral: Negative for agitation and confusion. The patient is not nervous/anxious.      Objective:     Vital " Signs (Most Recent):  Temp: (!) 103.1 °F (39.5 °C) (03/14/17 1513)  Pulse: (!) 153 (03/14/17 1542)  Resp: 18 (03/14/17 1542)  BP: (!) 94/48 (03/14/17 1542)  SpO2: 98 % (03/14/17 1542) Vital Signs (24h Range):  Temp:  [99.5 °F (37.5 °C)-103.1 °F (39.5 °C)] 103.1 °F (39.5 °C)  Pulse:  [132-162] 153  Resp:  [18-28] 18  SpO2:  [98 %-100 %] 98 %  BP: ()/(43-68) 94/48   Weight: 40.8 kg (90 lb)  Body mass index is 15.94 kg/(m^2).    No intake or output data in the 24 hours ending 03/14/17 1602     Physical Exam   Constitutional: He is oriented to person, place, and time.   Cachetic, shivering, ill-appearing   HENT:   Head: Normocephalic and atraumatic.   Eyes: Pupils are equal, round, and reactive to light.   Neck: Normal range of motion.   Cardiovascular: Regular rhythm, normal heart sounds and intact distal pulses.  Exam reveals no gallop and no friction rub.    No murmur heard.  Tachycardia (132-163)/138   Pulmonary/Chest: Breath sounds normal. No respiratory distress. He has no wheezes. He has no rales. He exhibits no tenderness.   Intermittent tachypnea (20s-30s)   Abdominal: Soft. Bowel sounds are normal. He exhibits no distension and no mass. There is tenderness. There is guarding. There is no rebound. No hernia.   Diffusely tender with increased tenderness over RUQ   Musculoskeletal: He exhibits no edema.   Neurological: He is alert and oriented to person, place, and time.   Skin: Skin is warm and dry.   Psychiatric:   Patient denies HIV diagnosis despite previous hospitalization   Nursing note and vitals reviewed.      Vents:     Lines/Drains/Airways     Drain                 Urethral Catheter 03/14/17 1150 Non-latex 16 Fr. less than 1 day          Peripheral Intravenous Line                 Peripheral IV - Single Lumen 02/21/17 1127 Left Forearm 21 days         Peripheral IV - Single Lumen 03/14/17 1353 Right Antecubital less than 1 day              Significant Labs:    CBC/Anemia Profile:    Recent  Labs  Lab 03/14/17  1128   WBC 4.32   HGB 7.7*   HCT 22.5*   PLT 54*   MCV 88   RDW 15.4*        Chemistries:    Recent Labs  Lab 03/14/17  1128   *   K 3.3*   CL 93*   CO2 24   BUN 14   CREATININE 0.6   CALCIUM 8.6*   ALBUMIN 1.8*   PROT 6.7   BILITOT 3.3*   ALKPHOS 299*   *   *       Blood Culture: No results for input(s): LABBLOO in the last 48 hours.  CMP:   Recent Labs  Lab 03/14/17  1128   *   K 3.3*   CL 93*   CO2 24   GLU 87   BUN 14   CREATININE 0.6   CALCIUM 8.6*   PROT 6.7   ALBUMIN 1.8*   BILITOT 3.3*   ALKPHOS 299*   *   *   ANIONGAP 14   EGFRNONAA >60.0     Coagulation:   Recent Labs  Lab 03/14/17  1128   INR 1.5*   APTT 26.9     Lactic Acid:   Recent Labs  Lab 03/14/17  1128 03/14/17  1353   LACTATE 3.9*  3.9* 3.6*     Respiratory Culture: No results for input(s): GSRESP, RESPIRATORYC in the last 48 hours.  Troponin:   Recent Labs  Lab 03/14/17  1128   TROPONINI 0.007     Urine Culture: No results for input(s): LABURIN in the last 48 hours.  Urine Studies:   Recent Labs  Lab 03/14/17  1150   COLORU Huong   APPEARANCEUA Hazy*   PHUR 6.0   SPECGRAV 1.020   PROTEINUA 2+*   GLUCUA Negative   KETONESU Negative   BILIRUBINUA Negative   OCCULTUA 2+*   NITRITE Negative   UROBILINOGEN 2.0-3.0*   LEUKOCYTESUR Negative   RBCUA 1   WBCUA 3   BACTERIA None   SQUAMEPITHEL 1   HYALINECASTS 0       Significant Imaging:   CXR 3/14:  FINDINGS: Support devices: None. Abdomen: Nonobstructive bowel gas pattern. No free air or portal venous gas on this single view. Chest: Lung bases are clear. Other: N/A.    CT Abd/Pelvis 3/14: pending  US Abd Complete 3/14: pending    Assessment/Plan:     Cardiac  * Septic shock due to undetermined organism  - BP: ()/(40-68) 80/45   - CXR 3/14 persistent (but improving) patchy infiltrate in the right upper and midlung fields   - aggressive IV fluid resuscitation with NS in the ED  - Triple lumen IJ central line placed: will start  norepinephrine for pressor support  - antimicrobials: vanc, cefipime, vorconazole, flagyl, acyclovir  - fungal workup, stool analysis  - leukocytosis:4.32 on admission (bseline ~0.9); ANC 0; CD4 count pending    Renal  Lactic acid acidosis  - septic shock v. Chronic lactic acidosis 2/2 AIDS/malignancy  - LA 3.9 on admission > 3.6 following 3L IVF  - trend LA q4h    ID  HIV (human immunodeficiency virus infection)  - CD4 count pending  - ANC 0  - Holding HAART per ID recommendations  - IV acyclovir therapy  - EBV, CMV, acute hepatitis panel pending  - BAL to r/o PJP disseminated Mac    Clostridium difficile infection  - repeat C. difficile EIA   - Stool culture pending  - Stool ova and parasites pending  - Histoplasmosis, Cryptosporidium studies    Bacteremia due to Enterococcus  - as above in septic shock    Hem/Onc  Acute myeloid leukemia not having achieved remission  - consult Heme Onc, awaiting recs  - [facility]'s solution   - LDH, uric acid ordered to r/o tumor lysis  - DIC panel ordered    Pancytopenia  - new diagnosis of HIV  - s/p induction chemotherapy 7+3  - monitor Hg: transfuse Hg < 7 or Plt <10  - Plts in 50s on admission  - INR 1.5  - Checkng DIC panel  - holding all anticoagulation  - BMT consulted for neutropenia  - Type and screen ordered    Chemotherapy-induced neutropenia  - see pancytopenia    Acute leukemia  - see AML not in remission    Fluids/Electrolytes/Nutrition/GI  Hyperbilirubinemia  - r/o intravascular hemolysis   - RUQ and abdominal imaging pending      Hyponatremia  - Na 131 on admission  - likely 2/2 septic shock  - s/p 3L NS  - monitor with repeat CMP pending    Hypokalemia  - 3.3 on admission  - repleted 3/14    Other  Transaminitis  - Abd Xray 3/14: Nonobstructive bowel gas pattern. No free air or portal venous gas on this single view.   - US Abdomen complete ordered  - CT Abd/Pelvis ordered  - AST//390, Alk Phos 299 on admission  - acute hepatitis panel  pending    Immunocompromised patient  - holding HAART per ID/BMT    Noncompliance  - history of leaving hospital AMA  - noncompliant with HAART and antimicrobials     Tobacco abuse  - cessation counseling as indicated      Critical Care Medicine Daily Checklist:    A: Awake: RASS Goal/Actual Goal:    Actual:     B: Spontaneous Breathing Trial Performed?  NA - spontaneously breathing   C: SAT & SBT Coordinated?  NA                      D: Delirium: CAM-ICU  No   E: Early Mobility Performed? Yes   F: Feeding Goal:    Status:     Current Diet Order   Procedures    Diet Neutropenic      AS: Analgesia/Sedation NA   T: Thromboembolic Prophylaxis SCDs, no heparin for thrombocytopenia   H: HOB > 300 Yes   U: Stress Ulcer Prophylaxis (if needed) NA   G: Glucose Control Neutropenic diet, POCT glucose q6h   B: Bowel Function  Yes   I: Indwelling Catheter (Lines & Pineda) Necessity PIV L arm; PIV R arm; Triple lumen IJ (3/14)   D: De-escalation of Antimicrobials/Pharmacotherapies As cultures result    Plan for the day/ETD Admit to ICU    Code Status:  Family/Goals of Care: Full Code       Critical Care Time: 45 minutes  Critical secondary to Patient has a condition that poses threat to life and bodily function: Septic Shock  Patient is currently on drug therapy requiring intensive monitoring for toxicity: Vancomycin     Critical care was time spent personally by me on the following activities: development of treatment plan with patient or surrogate and bedside caregivers, discussions with consultants, evaluation of patient's response to treatment, examination of patient, ordering and performing treatments and interventions, ordering and review of laboratory studies, ordering and review of radiographic studies, pulse oximetry, re-evaluation of patient's condition. This critical care time did not overlap with that of any other provider or involve time for any procedures.     Phoebe Bonds MD  Critical Care Medicine  [facility]

## 2017-03-14 NOTE — ASSESSMENT & PLAN NOTE
- consult Heme Onc, awaiting recs  - [facility]'s solution   - LDH, uric acid ordered to r/o tumor lysis  - DIC panel ordered

## 2017-03-14 NOTE — ASSESSMENT & PLAN NOTE
- repeat C. difficile EIA   - Stool culture pending  - Stool ova and parasites pending  - Histoplasmosis, Cryptosporidium studies

## 2017-03-14 NOTE — ASSESSMENT & PLAN NOTE
- CD4 count pending  - ANC 0  - Holding HAART per ID recommendations  - IV acyclovir therapy  - EBV, CMV, acute hepatitis panel pending  - BAL to r/o PJP disseminated Mac

## 2017-03-14 NOTE — ASSESSMENT & PLAN NOTE
- Abd Xray 3/14: Nonobstructive bowel gas pattern. No free air or portal venous gas on this single view.   - US Abdomen complete ordered  - CT Abd/Pelvis ordered  - AST//390, Alk Phos 299 on admission  - acute hepatitis panel pending

## 2017-03-14 NOTE — ASSESSMENT & PLAN NOTE
- BP: ()/(40-68) 80/45   - CXR 3/14 persistent (but improving) patchy infiltrate in the right upper and midlung fields   - aggressive IV fluid resuscitation with NS in the ED  - Triple lumen IJ central line placed: will start norepinephrine for pressor support  - antimicrobials: vanc, cefipime, vorconazole, flagyl, acyclovir  - fungal workup, stool analysis  - leukocytosis:4.32 on admission (bseline ~0.9); ANC 0; CD4 count pending

## 2017-03-14 NOTE — ASSESSMENT & PLAN NOTE
- new diagnosis of HIV  - s/p induction chemotherapy 7+3  - monitor Hg: transfuse Hg < 7 or Plt <10  - Plts in 50s on admission  - INR 1.5  - Checkng DIC panel  - holding all anticoagulation  - BMT consulted for neutropenia  - Type and screen ordered

## 2017-03-15 PROBLEM — R10.9 ABDOMINAL PAIN: Status: ACTIVE | Noted: 2017-03-15

## 2017-03-15 PROBLEM — M86.242 SUBACUTE OSTEOMYELITIS OF LEFT HAND: Status: ACTIVE | Noted: 2017-03-15

## 2017-03-15 LAB
ALBUMIN SERPL BCP-MCNC: 1.4 G/DL
ALP SERPL-CCNC: 202 U/L
ALT SERPL W/O P-5'-P-CCNC: 255 U/L
ANION GAP SERPL CALC-SCNC: 10 MMOL/L
ANISOCYTOSIS BLD QL SMEAR: SLIGHT
APTT BLDCRRT: 37.4 SEC
AST SERPL-CCNC: 428 U/L
BACTERIA UR CULT: NORMAL
BACTERIA UR CULT: NORMAL
BASOPHILS # BLD AUTO: ABNORMAL K/UL
BASOPHILS NFR BLD: 0 %
BILIRUB SERPL-MCNC: 3.3 MG/DL
BLASTS NFR BLD MANUAL: 89 %
BLASTS NFR BLD MANUAL: 92 %
BLASTS NFR BLD MANUAL: 93 %
BLD PROD TYP BPU: NORMAL
BLD PROD TYP BPU: NORMAL
BLOOD UNIT EXPIRATION DATE: NORMAL
BLOOD UNIT EXPIRATION DATE: NORMAL
BLOOD UNIT TYPE CODE: 5100
BLOOD UNIT TYPE CODE: 5100
BLOOD UNIT TYPE: NORMAL
BLOOD UNIT TYPE: NORMAL
BUN SERPL-MCNC: 10 MG/DL
BUN SERPL-MCNC: 10 MG/DL
BUN SERPL-MCNC: 7 MG/DL
BUN SERPL-MCNC: 9 MG/DL
C DIFF GDH STL QL: POSITIVE
C DIFF TOX A+B STL QL IA: NEGATIVE
C DIFF TOX GENS STL QL NAA+PROBE: POSITIVE
CALCIUM SERPL-MCNC: 7.5 MG/DL
CALCIUM SERPL-MCNC: 7.5 MG/DL
CALCIUM SERPL-MCNC: 7.6 MG/DL
CALCIUM SERPL-MCNC: 7.7 MG/DL
CHLORIDE SERPL-SCNC: 106 MMOL/L
CHLORIDE SERPL-SCNC: 108 MMOL/L
CHLORIDE SERPL-SCNC: 108 MMOL/L
CHLORIDE SERPL-SCNC: 109 MMOL/L
CO2 SERPL-SCNC: 19 MMOL/L
CO2 SERPL-SCNC: 20 MMOL/L
CO2 SERPL-SCNC: 21 MMOL/L
CO2 SERPL-SCNC: 21 MMOL/L
CODING SYSTEM: NORMAL
CODING SYSTEM: NORMAL
CREAT SERPL-MCNC: 0.5 MG/DL
CRP SERPL-MCNC: 260.7 MG/L
DIFFERENTIAL METHOD: ABNORMAL
DISPENSE STATUS: NORMAL
DISPENSE STATUS: NORMAL
EOSINOPHIL # BLD AUTO: ABNORMAL K/UL
EOSINOPHIL NFR BLD: 0 %
EOSINOPHIL NFR BLD: 0 %
EOSINOPHIL NFR BLD: 1 %
ERYTHROCYTE [DISTWIDTH] IN BLOOD BY AUTOMATED COUNT: 15.2 %
ERYTHROCYTE [DISTWIDTH] IN BLOOD BY AUTOMATED COUNT: 15.5 %
ERYTHROCYTE [DISTWIDTH] IN BLOOD BY AUTOMATED COUNT: 15.7 %
ERYTHROCYTE [SEDIMENTATION RATE] IN BLOOD BY WESTERGREN METHOD: >150 MM/HR
EST. GFR  (AFRICAN AMERICAN): >60 ML/MIN/1.73 M^2
EST. GFR  (NON AFRICAN AMERICAN): >60 ML/MIN/1.73 M^2
GLOBAL PERICARDIAL EFFUSION: ABNORMAL
GLUCOSE SERPL-MCNC: 110 MG/DL
GLUCOSE SERPL-MCNC: 118 MG/DL
GLUCOSE SERPL-MCNC: 133 MG/DL
GLUCOSE SERPL-MCNC: 133 MG/DL
HAV IGM SERPL QL IA: NEGATIVE
HBV CORE IGM SERPL QL IA: NEGATIVE
HBV SURFACE AG SERPL QL IA: NEGATIVE
HCT VFR BLD AUTO: 17.3 %
HCT VFR BLD AUTO: 20.2 %
HCT VFR BLD AUTO: 22.6 %
HCV AB SERPL QL IA: NEGATIVE
HGB BLD-MCNC: 6 G/DL
HGB BLD-MCNC: 6.9 G/DL
HGB BLD-MCNC: 7.7 G/DL
HYPOCHROMIA BLD QL SMEAR: ABNORMAL
HYPOCHROMIA BLD QL SMEAR: ABNORMAL
INR PPP: 1.8
LACTATE SERPL-SCNC: 2 MMOL/L
LYMPHOCYTES # BLD AUTO: ABNORMAL K/UL
LYMPHOCYTES NFR BLD: 3 %
LYMPHOCYTES NFR BLD: 4 %
LYMPHOCYTES NFR BLD: 6 %
MAGNESIUM SERPL-MCNC: 1.6 MG/DL
MAGNESIUM SERPL-MCNC: 2.3 MG/DL
MCH RBC QN AUTO: 30 PG
MCH RBC QN AUTO: 30.2 PG
MCH RBC QN AUTO: 30.4 PG
MCHC RBC AUTO-ENTMCNC: 34.1 %
MCHC RBC AUTO-ENTMCNC: 34.2 %
MCHC RBC AUTO-ENTMCNC: 34.7 %
MCV RBC AUTO: 87 FL
MCV RBC AUTO: 88 FL
MCV RBC AUTO: 89 FL
MONOCYTES # BLD AUTO: ABNORMAL K/UL
MONOCYTES NFR BLD: 1 %
NEUTROPHILS NFR BLD: 2 %
NEUTROPHILS NFR BLD: 2 %
NEUTROPHILS NFR BLD: 4 %
NEUTS BAND NFR BLD MANUAL: 1 %
NUM UNITS TRANS PACKED RBC: NORMAL
NUM UNITS TRANS PACKED RBC: NORMAL
OVALOCYTES BLD QL SMEAR: ABNORMAL
OVALOCYTES BLD QL SMEAR: ABNORMAL
PATH REV BLD -IMP: NORMAL
PHOSPHATE SERPL-MCNC: 1.4 MG/DL
PHOSPHATE SERPL-MCNC: 1.7 MG/DL
PLATELET # BLD AUTO: 22 K/UL
PLATELET # BLD AUTO: 42 K/UL
PLATELET # BLD AUTO: 46 K/UL
PLATELET BLD QL SMEAR: ABNORMAL
PMV BLD AUTO: 10.4 FL
PMV BLD AUTO: 11 FL
PMV BLD AUTO: ABNORMAL FL
POCT GLUCOSE: 111 MG/DL (ref 70–110)
POCT GLUCOSE: 144 MG/DL (ref 70–110)
POCT GLUCOSE: 214 MG/DL (ref 70–110)
POIKILOCYTOSIS BLD QL SMEAR: SLIGHT
POIKILOCYTOSIS BLD QL SMEAR: SLIGHT
POLYCHROMASIA BLD QL SMEAR: ABNORMAL
POLYCHROMASIA BLD QL SMEAR: ABNORMAL
POTASSIUM SERPL-SCNC: 2.1 MMOL/L
POTASSIUM SERPL-SCNC: 2.1 MMOL/L
POTASSIUM SERPL-SCNC: 2.5 MMOL/L
POTASSIUM SERPL-SCNC: 3.7 MMOL/L
PROT SERPL-MCNC: 5.2 G/DL
PROTHROMBIN TIME: 17.8 SEC
RBC # BLD AUTO: 1.99 M/UL
RBC # BLD AUTO: 2.3 M/UL
RBC # BLD AUTO: 2.53 M/UL
RETIRED EF AND QEF - SEE NOTES: 35 (ref 55–65)
SMUDGE CELLS BLD QL SMEAR: PRESENT
SODIUM SERPL-SCNC: 137 MMOL/L
SODIUM SERPL-SCNC: 138 MMOL/L
SODIUM SERPL-SCNC: 138 MMOL/L
SODIUM SERPL-SCNC: 139 MMOL/L
TSH SERPL DL<=0.005 MIU/L-ACNC: 0.58 UIU/ML
WBC # BLD AUTO: 2.89 K/UL
WBC # BLD AUTO: 4.09 K/UL
WBC # BLD AUTO: 4.8 K/UL
WBC #/AREA STL HPF: NORMAL /[HPF]

## 2017-03-15 PROCEDURE — 85025 COMPLETE CBC W/AUTO DIFF WBC: CPT

## 2017-03-15 PROCEDURE — 63600175 PHARM REV CODE 636 W HCPCS: Performed by: STUDENT IN AN ORGANIZED HEALTH CARE EDUCATION/TRAINING PROGRAM

## 2017-03-15 PROCEDURE — 99233 SBSQ HOSP IP/OBS HIGH 50: CPT | Mod: ,,, | Performed by: INTERNAL MEDICINE

## 2017-03-15 PROCEDURE — 25000003 PHARM REV CODE 250: Performed by: INTERNAL MEDICINE

## 2017-03-15 PROCEDURE — 84100 ASSAY OF PHOSPHORUS: CPT

## 2017-03-15 PROCEDURE — 83605 ASSAY OF LACTIC ACID: CPT

## 2017-03-15 PROCEDURE — 25000003 PHARM REV CODE 250: Performed by: STUDENT IN AN ORGANIZED HEALTH CARE EDUCATION/TRAINING PROGRAM

## 2017-03-15 PROCEDURE — 85730 THROMBOPLASTIN TIME PARTIAL: CPT

## 2017-03-15 PROCEDURE — 25000003 PHARM REV CODE 250: Performed by: NURSE PRACTITIONER

## 2017-03-15 PROCEDURE — 85651 RBC SED RATE NONAUTOMATED: CPT

## 2017-03-15 PROCEDURE — 63600175 PHARM REV CODE 636 W HCPCS: Performed by: INTERNAL MEDICINE

## 2017-03-15 PROCEDURE — 93306 TTE W/DOPPLER COMPLETE: CPT

## 2017-03-15 PROCEDURE — 27201040 HC RC 50 FILTER: Mod: 91

## 2017-03-15 PROCEDURE — 87493 C DIFF AMPLIFIED PROBE: CPT

## 2017-03-15 PROCEDURE — 85007 BL SMEAR W/DIFF WBC COUNT: CPT

## 2017-03-15 PROCEDURE — 86140 C-REACTIVE PROTEIN: CPT

## 2017-03-15 PROCEDURE — 87040 BLOOD CULTURE FOR BACTERIA: CPT | Mod: 59

## 2017-03-15 PROCEDURE — 87045 FECES CULTURE AEROBIC BACT: CPT

## 2017-03-15 PROCEDURE — 85027 COMPLETE CBC AUTOMATED: CPT

## 2017-03-15 PROCEDURE — 87427 SHIGA-LIKE TOXIN AG IA: CPT | Mod: 59

## 2017-03-15 PROCEDURE — 84443 ASSAY THYROID STIM HORMONE: CPT

## 2017-03-15 PROCEDURE — 80053 COMPREHEN METABOLIC PANEL: CPT

## 2017-03-15 PROCEDURE — 36430 TRANSFUSION BLD/BLD COMPNT: CPT

## 2017-03-15 PROCEDURE — 80048 BASIC METABOLIC PNL TOTAL CA: CPT | Mod: 91

## 2017-03-15 PROCEDURE — 87449 NOS EACH ORGANISM AG IA: CPT

## 2017-03-15 PROCEDURE — 63600175 PHARM REV CODE 636 W HCPCS: Performed by: HOSPITALIST

## 2017-03-15 PROCEDURE — 85610 PROTHROMBIN TIME: CPT

## 2017-03-15 PROCEDURE — 87103 BLOOD FUNGUS CULTURE: CPT

## 2017-03-15 PROCEDURE — 89055 LEUKOCYTE ASSESSMENT FECAL: CPT

## 2017-03-15 PROCEDURE — 83735 ASSAY OF MAGNESIUM: CPT

## 2017-03-15 PROCEDURE — P9038 RBC IRRADIATED: HCPCS

## 2017-03-15 PROCEDURE — 87046 STOOL CULTR AEROBIC BACT EA: CPT | Mod: 59

## 2017-03-15 PROCEDURE — 83735 ASSAY OF MAGNESIUM: CPT | Mod: 91

## 2017-03-15 PROCEDURE — 80048 BASIC METABOLIC PNL TOTAL CA: CPT

## 2017-03-15 PROCEDURE — 93306 TTE W/DOPPLER COMPLETE: CPT | Mod: 26,,, | Performed by: INTERNAL MEDICINE

## 2017-03-15 PROCEDURE — 86361 T CELL ABSOLUTE COUNT: CPT

## 2017-03-15 PROCEDURE — 87209 SMEAR COMPLEX STAIN: CPT

## 2017-03-15 PROCEDURE — 84100 ASSAY OF PHOSPHORUS: CPT | Mod: 91

## 2017-03-15 PROCEDURE — 20000000 HC ICU ROOM

## 2017-03-15 PROCEDURE — 25000003 PHARM REV CODE 250: Performed by: HOSPITALIST

## 2017-03-15 RX ORDER — POTASSIUM CHLORIDE 20 MEQ/15ML
40 SOLUTION ORAL ONCE
Status: COMPLETED | OUTPATIENT
Start: 2017-03-15 | End: 2017-03-15

## 2017-03-15 RX ORDER — PHYTONADIONE 5 MG/1
5 TABLET ORAL DAILY
Status: COMPLETED | OUTPATIENT
Start: 2017-03-15 | End: 2017-03-17

## 2017-03-15 RX ORDER — POTASSIUM CHLORIDE 20 MEQ/15ML
40 SOLUTION ORAL
Status: COMPLETED | OUTPATIENT
Start: 2017-03-15 | End: 2017-03-15

## 2017-03-15 RX ORDER — POTASSIUM CHLORIDE 29.8 MG/ML
80 INJECTION INTRAVENOUS ONCE
Status: COMPLETED | OUTPATIENT
Start: 2017-03-15 | End: 2017-03-15

## 2017-03-15 RX ORDER — ACETAMINOPHEN 500 MG
500 TABLET ORAL
Status: DISCONTINUED | OUTPATIENT
Start: 2017-03-15 | End: 2017-03-25

## 2017-03-15 RX ORDER — HYDROCODONE BITARTRATE AND ACETAMINOPHEN 500; 5 MG/1; MG/1
TABLET ORAL
Status: DISCONTINUED | OUTPATIENT
Start: 2017-03-15 | End: 2017-03-21

## 2017-03-15 RX ORDER — DIPHENHYDRAMINE HCL 25 MG
25 CAPSULE ORAL
Status: DISCONTINUED | OUTPATIENT
Start: 2017-03-15 | End: 2017-03-16

## 2017-03-15 RX ORDER — POTASSIUM CHLORIDE 29.8 MG/ML
40 INJECTION INTRAVENOUS ONCE
Status: DISCONTINUED | OUTPATIENT
Start: 2017-03-15 | End: 2017-03-15

## 2017-03-15 RX ORDER — POTASSIUM CHLORIDE 29.8 MG/ML
40 INJECTION INTRAVENOUS ONCE
Status: COMPLETED | OUTPATIENT
Start: 2017-03-15 | End: 2017-03-15

## 2017-03-15 RX ADMIN — Medication 250 MG: at 05:03

## 2017-03-15 RX ADMIN — Medication 250 MG: at 11:03

## 2017-03-15 RX ADMIN — POTASSIUM CHLORIDE 40 MEQ: 400 INJECTION, SOLUTION INTRAVENOUS at 02:03

## 2017-03-15 RX ADMIN — SODIUM PHOSPHATE, MONOBASIC, MONOHYDRATE 20.01 MMOL: 276; 142 INJECTION, SOLUTION INTRAVENOUS at 07:03

## 2017-03-15 RX ADMIN — ACYCLOVIR SODIUM 370 MG: 50 INJECTION, SOLUTION INTRAVENOUS at 10:03

## 2017-03-15 RX ADMIN — ACETAMINOPHEN 500 MG: 500 TABLET ORAL at 02:03

## 2017-03-15 RX ADMIN — MAGNESIUM SULFATE IN WATER 2 G: 40 INJECTION, SOLUTION INTRAVENOUS at 08:03

## 2017-03-15 RX ADMIN — DIPHENHYDRAMINE HYDROCHLORIDE 25 MG: 25 CAPSULE ORAL at 02:03

## 2017-03-15 RX ADMIN — Medication 250 MG: at 01:03

## 2017-03-15 RX ADMIN — Medication 3 ML: at 02:03

## 2017-03-15 RX ADMIN — Medication 0.24 MCG/KG/MIN: at 05:03

## 2017-03-15 RX ADMIN — Medication 3 ML: at 06:03

## 2017-03-15 RX ADMIN — METRONIDAZOLE 500 MG: 500 INJECTION, SOLUTION INTRAVENOUS at 05:03

## 2017-03-15 RX ADMIN — SODIUM PHOSPHATE, MONOBASIC, MONOHYDRATE 20.01 MMOL: 276; 142 INJECTION, SOLUTION INTRAVENOUS at 06:03

## 2017-03-15 RX ADMIN — SODIUM CHLORIDE 500 ML: 0.9 INJECTION, SOLUTION INTRAVENOUS at 06:03

## 2017-03-15 RX ADMIN — Medication 10 ML: at 08:03

## 2017-03-15 RX ADMIN — ACYCLOVIR SODIUM 370 MG: 50 INJECTION, SOLUTION INTRAVENOUS at 02:03

## 2017-03-15 RX ADMIN — AMPHOTERICIN B 200 MG: 50 INJECTION, POWDER, LYOPHILIZED, FOR SOLUTION INTRAVENOUS at 04:03

## 2017-03-15 RX ADMIN — Medication 0.04 MCG/KG/MIN: at 06:03

## 2017-03-15 RX ADMIN — Medication 3 ML: at 10:03

## 2017-03-15 RX ADMIN — POTASSIUM CHLORIDE 40 MEQ: 20 SOLUTION ORAL at 04:03

## 2017-03-15 RX ADMIN — METRONIDAZOLE 500 MG: 500 INJECTION, SOLUTION INTRAVENOUS at 11:03

## 2017-03-15 RX ADMIN — POTASSIUM CHLORIDE 80 MEQ: 400 INJECTION, SOLUTION INTRAVENOUS at 04:03

## 2017-03-15 RX ADMIN — Medication 10 ML: at 09:03

## 2017-03-15 RX ADMIN — PIPERACILLIN SODIUM AND TAZOBACTAM SODIUM 4.5 G: 4; .5 INJECTION, POWDER, LYOPHILIZED, FOR SOLUTION INTRAVENOUS at 03:03

## 2017-03-15 RX ADMIN — METRONIDAZOLE 500 MG: 500 INJECTION, SOLUTION INTRAVENOUS at 09:03

## 2017-03-15 RX ADMIN — DEXTROSE MONOHYDRATE 240 MG: 50 INJECTION, SOLUTION INTRAVENOUS at 06:03

## 2017-03-15 RX ADMIN — TOBRAMYCIN SULFATE 250 MG: 40 INJECTION, SOLUTION INTRAMUSCULAR; INTRAVENOUS at 11:03

## 2017-03-15 RX ADMIN — ACYCLOVIR SODIUM 410 MG: 50 INJECTION, SOLUTION INTRAVENOUS at 06:03

## 2017-03-15 RX ADMIN — PIPERACILLIN SODIUM AND TAZOBACTAM SODIUM 4.5 G: 4; .5 INJECTION, POWDER, LYOPHILIZED, FOR SOLUTION INTRAVENOUS at 06:03

## 2017-03-15 RX ADMIN — POTASSIUM CHLORIDE 40 MEQ: 20 SOLUTION ORAL at 08:03

## 2017-03-15 RX ADMIN — METRONIDAZOLE 500 MG: 500 INJECTION, SOLUTION INTRAVENOUS at 12:03

## 2017-03-15 RX ADMIN — Medication 250 MG: at 07:03

## 2017-03-15 RX ADMIN — PHYTONADIONE 5 MG: 5 TABLET ORAL at 11:03

## 2017-03-15 RX ADMIN — PIPERACILLIN SODIUM AND TAZOBACTAM SODIUM 4.5 G: 4; .5 INJECTION, POWDER, LYOPHILIZED, FOR SOLUTION INTRAVENOUS at 10:03

## 2017-03-15 RX ADMIN — SODIUM CHLORIDE 500 ML: 0.9 INJECTION, SOLUTION INTRAVENOUS at 03:03

## 2017-03-15 RX ADMIN — SODIUM PHOSPHATE, MONOBASIC, MONOHYDRATE 39.99 MMOL: 276; 142 INJECTION, SOLUTION INTRAVENOUS at 10:03

## 2017-03-15 RX ADMIN — POTASSIUM CHLORIDE 40 MEQ: 20 SOLUTION ORAL at 02:03

## 2017-03-15 NOTE — PROGRESS NOTES
Lab orders clarified with MD Mccann w/[facility]. Pt given total of 80mEq PO K+ replacement and 40mEq IV K+ replacement for most recent critical low of K=2.5. Plan to re-check K+ this evening and more PO replacement scheduled @2000. Per lab new orders placed for re-collect samples.

## 2017-03-15 NOTE — CONSULTS
Palliative Care Acknowledgement of Consult - .date 3/15/17 11:38     Consult received.  Will touch base with team prior to seeing patient.  Full consult to follow.    Thank you for allowing us to be a part of the care of this patient.

## 2017-03-15 NOTE — CONSULTS
Consult Note  Palliative Care      Consult Requested By: Yamilex Stephenson MD  Reason for Consult: goals of care,  child life       ASSESSMENT/PLAN:   Chart reviewed and patient discussed with Dr. Mccann [facility] resident   Impression: Mr. Howard is a 36 yr old gentleman with advanced HIV and refractory AML with sepitic shock. He is awake, alert, oriented, lethargic.  Does not engage in conversation, long pauses between each word, did not open eyes during this visit. Receiving low dose pressor support.  No complaints of pain or discomfort.  No acute  Distress.     Goals of Care: Introduced components of palliative care. Emphasized this meeting to get to know him and learn more about his hopes and wishes.  No family present.  Patient not  receptive to conversation at this time. .  States he is tired and does not want to talk with anyone.  Brief discussion regarding children's knowledge and understanding of how sick he is. Children are 12 and 13.   Mr. Howard states they do not know.  [facility] [employer] offered for assistance when talking to children.  Patient refuses at this time and states he does not know how. Palliative care will come back to talk more at better time.  Patient is amenable to this. Palliative care will reach out to patient's  Family by telephone if patient is amenable. I     Symptom Management  Nausea:  Patient may benefit from continuing current medications   Current    Ondansetron 8 mg ODT every 8 hrs as needed.   Diarrhea: patient is currently being treated for c.diff     Plan/Recommendations:  1.  Palliative care  Will continue to establish rapport and assist patient in developing goals of care  2.  Mr. Howard has  Teenage (12 and 13)  children at home,  unaware of how sick he is.  May benefit from Child Life consult.  3.  Patient may benefit from family meeting with palliative care and primary care team.  Palliative care will facilitate   3.  Obtain advanced directives  4.  Address resuscitation  "status      Dr. Mccann  notified of above recommendations.  Thank you for opportunity to participate in Mr. Howard's  care.     Signature: Gayla Schofield, APRN, ACNS-BC, OCN     > 50% of 70  min visit spent in chart review, face to face discussion of goals of care,  symptom assessment, coordination of care and emotional support.      SUBJECTIVE:     History of Present Illness:      Mr. Howard is a 35 yo gentleman with PMH of advanced HIV and refractory AML (dx Jan 2017). S/P re-induction chemotherapy.  During this hospitalization (1/ 2017 - 2/2017 developed neutropenic fever, positive blood cultures (GPC), C. Diff and fungemia.  Mr. Howard refused antifungals and left hospital AMA.  He has been  nonadherent with discharge medication regimen(HAART, Cipro, Flagyl)  and made no effort to follow up with [facility] despite several attempts. On 3/14/17 He presented to [facility] ED with complaints of generalized weakness, shortness of breath and fever.  Found to be in septic shock and transferred to ICU. Currently receiving pressor support, with levo, multiple antibiotics and antifungals.  Palliative care consulted for symptom management and goals of care.           Past Medical History:   Diagnosis Date    Cancer     from "smoking cigarettes"    HIV disease      History reviewed. No pertinent surgical history.  History reviewed. No pertinent family history.  Review of patient's allergies indicates:  No Known Allergies    Medications:  Continuous Infusions:    sodium chloride 0.9% 10 mL/hr at 03/15/17 0400    norepinephrine bitartrate-D5W 0.12 mcg/kg/min (03/15/17 1200)     Scheduled:    acyclovir  10 mg/kg Intravenous Q8H    duke's soln (benadryl 30 mL, mylanta 30 mL, lidocane 30 mL, nystatin 30 mL) 120 mL  10 mL Oral BID    metronidazole  500 mg Intravenous Q8H    phenylephrine HCl in 0.9% NaCl  100 mcg Intravenous Once    phytonadione  5 mg Oral Daily    piperacillin-tazobactam 4.5 g in dextrose 5 % 100 " mL IVPB (ready to mix system)  4.5 g Intravenous Q8H    sodium chloride 0.9%  3 mL Intravenous Q8H    vancomycin  250 mg Oral Q6H    vancomycin (VANCOCIN) IVPB  15 mg/kg Intravenous Q12H    vorconazole (VFEND) IVPB  6 mg/kg Intravenous Q12H     PRN Meds: sodium chloride, acetaminophen, dextrose 50%, glucagon (human recombinant), loperamide, magnesium sulfate IVPB, magnesium sulfate IVPB, ondansetron, potassium chloride 10%, potassium chloride 10%, sodium phosphate IVPB, sodium phosphate IVPB    24h Oral Morphine Equivalents (OME): 0    Bowel Management Plan (BMP): Yes (  )  NO  (  )    OBJECTIVE:   Symptom Assessment (ESAS 0-10 scale)     ESAS 0 1 2 3 4 5 6 7 8 9 10   Pain              Dyspnea              Anxiety              Nausea     X         Depression               Anorexia              Fatigue              Insomnia              Restlessness               Agitation              Constipation     __ --  ___+   Diarrhea           +  C. Diff   Comments:       Perfromance Status: PPS Score (30 )       Physical Exam:  Vitals:reviewed   General: thin appears cachexia  Afebrile, alert, comfortable, no acute distress.   Pulmonary: Non labored,clear to auscultation anteriorly.   Cardiac: tachycardia  normal S1 & S2 w/o rubs/murmurs/gallops.   Abdominal: tender to touch , non-distended.Bowel sounds present x 4. No appreciable hepatosplenomegaly. No guarding or rebound tenderness. Diarrhea persists   Extremities: Moves all extremities x 4. No peripheral edema. 2+ pulses.  Skin: No jaundice, rashes, or visible lesions.  Neurological: Alert and oriented x 4. No focal neuro deficits.   Psych/Mental Status: flat affect, appears sad, unable to have conversations this AM   CAM / Delirium __ --  ___+   FAST Stage for Dementia:      Labs:  CBC:   WBC   Date Value Ref Range Status   03/15/2017 4.09 3.90 - 12.70 K/uL Final     Hemoglobin   Date Value Ref Range Status   03/15/2017 6.9 (L) 14.0 - 18.0 g/dL Final     Hematocrit    Date Value Ref Range Status   03/15/2017 20.2 (L) 40.0 - 54.0 % Final     MCV   Date Value Ref Range Status   03/15/2017 88 82 - 98 fL Final     Platelets   Date Value Ref Range Status   03/15/2017 46 (L) 150 - 350 K/uL Final       BMP:   Recent Labs  Lab 03/15/17  0323 03/15/17  0424    118*    139   K 2.1* 2.1*    108   CO2 20* 21*   BUN 10 10   CREATININE 0.5 0.5   CALCIUM 7.5* 7.6*   MG 2.3  --        LFT: Lab Results   Component Value Date     (H) 03/15/2017    GGT 38 01/17/2017    ALKPHOS 202 (H) 03/15/2017    BILITOT 3.3 (H) 03/15/2017       Albumin:   Albumin   Date Value Ref Range Status   03/15/2017 1.4 (L) 3.5 - 5.2 g/dL Final     Protein:   Total Protein   Date Value Ref Range Status   03/15/2017 5.2 (L) 6.0 - 8.4 g/dL Final       LACTIC ACID:   Lab Results   Component Value Date    LACTATE 2.0 03/15/2017    LACTATE 3.6 (HH) 03/14/2017       Radiology: reviewed   CT chest and abdomen:  Interval development of multiple ill-defined subcentimeter hypodensities within the liver and spleen concerning for developing abscesses in this patient with neutropenia and sepsis.    2. Diffuse wall thickening and hyperemia of the cecum and right colon extending to the hepatic flexure which has increased in extent from the prior study. Findings may represent neutropenic colitis.    3. Increased patchy consolidation in the visualized portion of the lungs may represent multifocal pneumonia or septic emboli.    4. Diffuse bladder wall thickening may relate to incomplete distention or cystitis.    5. Mild scattered ascites, increased.    6. Additional findings as detailed above.  Electronically signed by: PETE RUSSO MD, Date: 03/16/17, Time: 12:27       Legal/Advanced Directives: not on file   Living Will: not on file   Resuscitate Status: Full Code  Decision-Making Capacity:   Medical Power of : self     Psychosocial/Cultural: Two children age 12 and 13     Spiritual:     F- Fa[place]  and Belief Wendy     I - Importance  .  C - Community    A - Address in Care:  notified for visits with patient       Problem list:  Active Hospital Problems    Diagnosis  POA    *Septic shock due to undetermined organism [A41.9, R65.21]  Yes    Subacute osteomyelitis of left hand [M86.242]  Yes    Chemotherapy-induced neutropenia [D70.1]  Yes    Pancytopenia [D61.818]  Yes    Transaminitis [R74.0]  Yes    Lactic acid acidosis [E87.2]  Yes    Hyperbilirubinemia [E80.6]  Yes    Immunocompromised patient [D84.9]  Yes    Noncompliance [Z91.19]  Not Applicable    Hyponatremia [E87.1]  Yes    Hypokalemia [E87.6]  Unknown    Clostridium difficile infection [B96.89]  Yes    Bacteremia due to Enterococcus [R78.81]  Yes    Acute myeloid leukemia not having achieved remission [C92.00]  Yes    Fever [R50.9]  Yes    HIV (human immunodeficiency virus infection) [Z21]  Yes    Tobacco abuse [Z72.0]  Yes    Acute leukemia [C95.00]  Yes      Resolved Hospital Problems    Diagnosis Date Resolved POA   No resolved problems to display.

## 2017-03-15 NOTE — SUBJECTIVE & OBJECTIVE
Interval History/Significant Events:   Afebrile overnight. Ortho has seen the patient for concern for L hand osteo. Tobramycin coverage added this AM. Requiring 0.25 levophed for pressure support overnight. H/H fell to 6/17 this AM and the patient was transfused 1u pRBC. UOP adequate.      Review of Systems   Reason unable to perform ROS: ROS limited by poor history.   Constitutional: Positive for activity change, appetite change, chills, fatigue and fever. Negative for diaphoresis.   HENT: Negative for congestion, mouth sores, nosebleeds, rhinorrhea, sinus pressure and sneezing.    Respiratory: Positive for shortness of breath. Negative for cough, choking, chest tightness, wheezing and stridor.    Cardiovascular: Negative for chest pain, palpitations and leg swelling.   Gastrointestinal: Positive for abdominal pain and diarrhea. Negative for abdominal distention, anal bleeding, blood in stool, constipation, nausea, rectal pain and vomiting.   Endocrine: Positive for cold intolerance. Negative for heat intolerance and polyuria.   Genitourinary: Positive for decreased urine volume. Negative for difficulty urinating, dysuria, enuresis, flank pain and hematuria.   Musculoskeletal: Positive for myalgias. Negative for back pain.   Skin: Negative for color change and rash.   Allergic/Immunologic: Positive for immunocompromised state.   Neurological: Positive for weakness. Negative for dizziness, seizures, syncope, speech difficulty, light-headedness, numbness and headaches.   Psychiatric/Behavioral: Negative for agitation and confusion. The patient is not nervous/anxious.      Objective:     Vital Signs (Most Recent):  Temp: 98.7 °F (37.1 °C) (03/15/17 1030)  Pulse: (!) 117 (03/15/17 1300)  Resp: (!) 26 (03/15/17 1300)  BP: (!) 92/59 (03/15/17 1300)  SpO2: 100 % (03/15/17 1300) Vital Signs (24h Range):  Temp:  [98.3 °F (36.8 °C)-103.1 °F (39.5 °C)] 98.7 °F (37.1 °C)  Pulse:  [117-163] 117  Resp:  [18-36] 26  SpO2:  [96  %-100 %] 100 %  BP: ()/(40-70) 92/59   Weight: 36.6 kg (80 lb 11 oz)  Body mass index is 14.29 kg/(m^2).      Intake/Output Summary (Last 24 hours) at 03/15/17 1333  Last data filed at 03/15/17 1200   Gross per 24 hour   Intake          4335.19 ml   Output             3170 ml   Net          1165.19 ml       Physical Exam   Constitutional: He is oriented to person, place, and time.   Cachetic, shivering, ill-appearing   HENT:   Head: Normocephalic and atraumatic.   Eyes: Pupils are equal, round, and reactive to light.   Neck: Normal range of motion.   Cardiovascular: Regular rhythm, normal heart sounds and intact distal pulses.  Exam reveals no gallop and no friction rub.    No murmur heard.  Tachycardia (132-163)/138   Pulmonary/Chest: Breath sounds normal. No respiratory distress. He has no wheezes. He has no rales. He exhibits no tenderness.   Intermittent tachypnea (20s-30s)   Abdominal: Soft. Bowel sounds are normal. He exhibits no distension and no mass. There is tenderness. There is guarding. There is no rebound. No hernia.   Diffusely tender with increased tenderness over RUQ   Musculoskeletal: He exhibits deformity. He exhibits no edema.   L hand deformity on digits 3-4 at DIP   Neurological: He is alert and oriented to person, place, and time.   Skin: Skin is warm and dry.   Psychiatric:   Patient denies HIV diagnosis despite previous hospitalization   Nursing note and vitals reviewed.      Vents:     Lines/Drains/Airways     Central Venous Catheter Line                 CVC Triple Lumen - PreSep Cath 03/14/17 right internal jugular 1 day          Drain                 Urethral Catheter 03/14/17 1150 Non-latex 16 Fr. 1 day          Peripheral Intravenous Line                 Peripheral IV - Single Lumen 02/21/17 1127 Left Forearm 22 days         Peripheral IV - Single Lumen 03/14/17 Left Antecubital 1 day              Significant Labs:    CBC/Anemia Profile:    Recent Labs  Lab 03/14/17  1128  03/15/17  0424 03/15/17  1152   WBC 4.32 4.80 4.09   HGB 7.7* 6.0* 6.9*   HCT 22.5* 17.3* 20.2*   PLT 54* 46*  --    MCV 88 87 88   RDW 15.4* 15.5* 15.7*     Chemistries:    Recent Labs  Lab 03/14/17  1128 03/14/17  1352 03/14/17  2010 03/15/17  0323 03/15/17  0424   *  --  135* 138 139   K 3.3*  --  2.1* 2.1* 2.1*   CL 93*  --  108 108 108   CO2 24  --  17* 20* 21*   BUN 14  --  11 10 10   CREATININE 0.6  --  0.5 0.5 0.5   CALCIUM 8.6*  --  6.9* 7.5* 7.6*   ALBUMIN 1.8*  --  1.4* 1.4*  --    PROT 6.7  --  4.8* 5.2*  --    BILITOT 3.3*  --  2.8* 3.3*  --    ALKPHOS 299*  --  198* 202*  --    *  --  259* 255*  --    *  --  507* 428*  --    MG  --  1.6 1.7 2.3  --    PHOS  --   --   --  1.7*  --      Lactic Acid:   Recent Labs  Lab 03/14/17  1128 03/14/17  1353 03/15/17  0907   LACTATE 3.9*  3.9* 3.6* 2.0     Significant Imaging:    Cardiac 2D Echo 3/15  CONCLUSIONS     1 - Eccentric hypertrophy.     2 - Mild left ventricular enlargement.     3 - Moderately depressed left ventricular systolic function (EF 35-40%).     4 - Normal right ventricular systolic function .     5 - Normal valvular structure and function.     US Abdomen 3/14  FINDINGS: The visualized portion of the pancreas is unremarkable.  The IVC is visualized.  The aorta is nonaneurysmal.  Gallbladder is not well-distended and demonstrates increased wall thickness measuring 0.8 cm.  The gallbladder wall thickening may be secondary to nondistention.  However, it can also be seen in setting of hepatitis.  Correlation otherwise.  No evidence of gallstones.  Sonographic Tejeda's sign is negative.  There is no evidence of intra-or extrahepatic ductal dilatation with the common bile duct measuring 0.3 cm.  The liver measures 14.3 cm without evidence of focal hepatic abnormalities.  The spleen measures 9.8 x 5.0 cm.  The right kidney measures 14.3 cm while the left kidney measures 13.2 cm.  There is no evidence of hydronephrosis.    L Hand  Xray 3/14  There is medullary expansion of the phalanges, nonspecific. There is lytic destruction involving the distal phalanx of the fourth digit and the base of the distal phalanx of the third digit with associated joint space narrowing at the DIP joints. There is soft tissue swelling in the fourth digit. Findings suspicious for osteomyelitis involving the distal phalanges of both digits. Report has been flagged in the [employer] medical record system.

## 2017-03-15 NOTE — ASSESSMENT & PLAN NOTE
- DIC panel negative 3/14   - RUQ US with gall bladder wall thickening; no evidence of obstruction  - patient declined CT abdomen with contrast on 3/14

## 2017-03-15 NOTE — PLAN OF CARE
Problem: Patient Care Overview  Goal: Plan of Care Review  Pt remains on room air at 100%, levo weaned to 0.04 for MAP>60. Total of 2 units PRBC given today, IVF given, pt has poor appetite, denies ABD pain however ADB is taut with hypoactive BS. Continues to have loose mucous stool, urine WNL via powell cath. Multiple electrolytes replaced- continuing to trend serial labs. Pt had episode of emesis. Small amt. Unable to tolerate PO intake. POC discussed with pt, no family present.

## 2017-03-15 NOTE — SUBJECTIVE & OBJECTIVE
"Past Medical History:   Diagnosis Date    Cancer     from "smoking cigarettes"    HIV disease        History reviewed. No pertinent surgical history.    Review of patient's allergies indicates:  No Known Allergies    Medications:  No prescriptions prior to admission.     Antibiotics     Start     Stop Route Frequency Ordered    03/14/17 1615  metronidazole IVPB 500 mg      -- IV Every 8 hours (non-standard times) 03/14/17 1509    03/14/17 1800  vancomycin 250mg / 10ml oral suspension 250 mg      -- Oral Every 6 hours 03/14/17 1509    03/14/17 1800  piperacillin-tazobactam 4.5 g in dextrose 5 % 100 mL IVPB (ready to mix system)      -- IV Every 8 hours (non-standard times) 03/14/17 1510        Antifungals     Start     Stop Route Frequency Ordered    03/14/17 2100  duke's soln (benadryl 30 mL, mylanta 30 mL, lidocane 30 mL, nystatin 30 mL) 120 mL      -- Oral 2 times daily 03/14/17 1507    03/15/17 1600  amphotericin B liposome (AMBISOME) 200 mg in dextrose 5 % 200 mL IVPB  (amphotericin B liposome (AMBISOME) IVPB panel)      -- IV Every 24 hours (non-standard times) 03/15/17 1404        Antivirals         Stop Route Frequency     acyclovir (ZOVIRAX) injection      -- IV Every 8 hours (non-standard times)             There is no immunization history on file for this patient.    Family History     None        Social History     Social History    Marital status: Single     Spouse name: N/A    Number of children: N/A    Years of education: N/A     Social History Main Topics    Smoking status: Former Smoker     Packs/day: 1.00     Years: 20.00     Types: Cigarettes    Smokeless tobacco: None      Comment: pt will contact clinic when ready    Alcohol use No    Drug use: Yes     Special: Marijuana      Comment: denies    Sexual activity: Not Currently     Partners: Female     Birth control/ protection: Condom      Comment: per report last in 2005     Other Topics Concern    None     Social History Narrative "     Review of Systems   Constitutional: Positive for activity change, appetite change, chills, diaphoresis, fatigue, fever and unexpected weight change.   HENT: Negative for dental problem, drooling, ear pain, mouth sores, postnasal drip, rhinorrhea, sinus pressure, sore throat and trouble swallowing.    Eyes: Negative for photophobia, pain and redness.   Respiratory: Positive for cough and shortness of breath. Negative for wheezing.    Cardiovascular: Negative for chest pain and leg swelling.   Gastrointestinal: Positive for abdominal pain and diarrhea. Negative for abdominal distention and nausea.   Genitourinary: Negative for dysuria, flank pain, frequency and urgency.   Musculoskeletal: Positive for arthralgias. Negative for back pain, gait problem and myalgias.   Skin: Negative for pallor and rash.   Neurological: Negative for dizziness, tremors, seizures, weakness and headaches.   Hematological: Bruises/bleeds easily.   Psychiatric/Behavioral: Negative for confusion.     Objective:     Vital Signs (Most Recent):  Temp: 98.1 °F (36.7 °C) (03/15/17 1548)  Pulse: (!) 125 (03/15/17 1548)  Resp: (!) 30 (03/15/17 1548)  BP: (!) 84/50 (03/15/17 1548)  SpO2: 99 % (03/15/17 1548) Vital Signs (24h Range):  Temp:  [98.1 °F (36.7 °C)-99.8 °F (37.7 °C)] 98.1 °F (36.7 °C)  Pulse:  [117-138] 125  Resp:  [18-36] 30  SpO2:  [96 %-100 %] 99 %  BP: ()/(40-70) 84/50     Weight: 36.6 kg (80 lb 11 oz)  Body mass index is 14.29 kg/(m^2).    Estimated Creatinine Clearance: 103.7 mL/min (based on Cr of 0.5).    Physical Exam   Constitutional: No distress.   Markedly more cachectic than when I last saw him.   HENT:   Head: Atraumatic.   Mouth/Throat: Oropharynx is clear and moist. No oropharyngeal exudate.   Eyes: Conjunctivae and EOM are normal. Pupils are equal, round, and reactive to light. No scleral icterus.   Neck: Neck supple.   Cardiovascular: Normal rate and regular rhythm.  Exam reveals no friction rub.    Murmur  heard.  Pulmonary/Chest: No respiratory distress. He has no wheezes. He has rales. He exhibits no tenderness.   Abdominal: Soft. Bowel sounds are normal. He exhibits no distension. There is no tenderness. There is no rebound and no guarding.   Musculoskeletal: Normal range of motion. He exhibits no edema.   Lymphadenopathy:     He has no cervical adenopathy.   Neurological: He is alert. No cranial nerve deficit. He exhibits normal muscle tone. Coordination normal.   Skin: No rash noted. No erythema.       Significant Labs:   CBC:   Recent Labs  Lab 03/14/17  1128 03/15/17  0424 03/15/17  1152   WBC 4.32 4.80 4.09   HGB 7.7* 6.0* 6.9*   HCT 22.5* 17.3* 20.2*   PLT 54* 46* 42*     CMP:   Recent Labs  Lab 03/14/17  1128 03/14/17  2010 03/15/17  0323 03/15/17  0424 03/15/17  1154   * 135* 138 139 137   K 3.3* 2.1* 2.1* 2.1* 2.5*   CL 93* 108 108 108 106   CO2 24 17* 20* 21* 21*   GLU 87 100 110 118* 133*   BUN 14 11 10 10 7   CREATININE 0.6 0.5 0.5 0.5 0.5   CALCIUM 8.6* 6.9* 7.5* 7.6* 7.5*   PROT 6.7 4.8* 5.2*  --   --    ALBUMIN 1.8* 1.4* 1.4*  --   --    BILITOT 3.3* 2.8* 3.3*  --   --    ALKPHOS 299* 198* 202*  --   --    * 507* 428*  --   --    * 259* 255*  --   --    ANIONGAP 14 10 10 10 10   EGFRNONAA >60.0 >60.0 >60.0 >60.0 >60.0       Significant Imaging: I have reviewed all pertinent imaging results/findings within the past 24 hours.     Abdominal U/S:  Nondistended gallbladder demonstrating increased wall thickness of 0.8 cm.  No cholelithiasis. The gallbladder wall thickening may be secondary to nondistention.  However, it can also be seen in the setting of hepatitis.  Correlation advised.     Left hand XR:    There is medullary expansion of the phalanges, nonspecific.     There is lytic destruction involving the distal phalanx of the fourth digit and the base of the distal phalanx of the third digit with associated joint space narrowing at the DIP joints. There is soft tissue swelling in  the fourth digit. Findings suspicious for osteomyelitis involving the distal phalanges of both digits.    CXR: Impression persistent patchy infiltrate in the right upper and midlung field.    Microbiology:  3/14 blood cx: Klebsiella x2  3/14 urine cx: negative  3/14 sputum cx: NGTD  3/14 SCrAg pending  3/15 stool cx: pending  3/15 C.diff pending  3/15 Shiga toxin pending  3/15 O&P pending  3/15 fungal blood cx: pending

## 2017-03-15 NOTE — ASSESSMENT & PLAN NOTE
- Na 131 on admission, improved to 139 with fluid resuscitation   - likely 2/2 septic shock  - s/p 3L NS  - monitor with repeat CMPs

## 2017-03-15 NOTE — PLAN OF CARE
Problem: Patient Care Overview  Goal: Plan of Care Review  Outcome: Ongoing (interventions implemented as appropriate)  Pt admitted from the ER with generalized weakness. Pt placed on C. Diff precautions for positive stool for C. Diff. Pt also on neutropenic precautions. Pt HR 120s sustained, SBP 82-92, pt received 4L NS bolus in ER, Levo infusion maintained. Right IJ TLC placement verified by CXR. Administered electrolyte replacement for critically low electrolyte levels. Blood cultures positive 3/4 bottles, MD notified about results and second set of blood cultures obtained. Abdominal U/S completed at the bedside. Pt refused oral contrast, stated it makes him have diarrhea, MD notified about oral contrast refusal. Pt consented for blood, will receive 1 unit PRBCs as ordered. Answered pt's questions and concerns about plan of care. Pt in no noted distress at this time, will continue to monitor pt for changes in status.

## 2017-03-15 NOTE — PROGRESS NOTES
[facility] contacted for patient report.  Palliative care consult pending.  As per Dr. Sim CCS to evaluate patient first.  [facility] to contact palliative care.     Thank you for opportunity to participate in Mr. Howard's care.     KIMMY Bermudez, ACNS-BC, OCN   Palliative Medicine [facility] link [phone]

## 2017-03-15 NOTE — PROGRESS NOTES
[facility]  Hematology/Oncology  Progress Note    Patient Name: Marck Howard  Admission Date: 3/14/2017  Hospital Length of Stay: 1 days  Code Status: Full Code     Subjective:     Interval History: 37 y/o M who is Day + 37 post MEC reinduction post original 7&3 therapy for AML in the background of HIV. Last hospital course was complicated by neutropenic fever with GPC in blood, C. Diff Infection, and Fungemia.  Signed out AMA on 2/24 with lost to follow up. He presented with generalized weakness, shortness of breath and fevers on 3/14. Found to be in severe sepsis/shock in the ER and transferred to ICU for higher level of care.      -Tmax 103.1 in the past 24 hours  -On Levophed gtt for hypotension  -patient visibly fatigued but AAOx3  -reports some pain to his left hand      Oncology Treatment Plan:   [facility] - INPATIENT    Medications:  Continuous Infusions:   sodium chloride 0.9% 10 mL/hr at 03/15/17 0600    norepinephrine bitartrate-D5W 0.12 mcg/kg/min (03/15/17 1200)     Scheduled Meds:   acetaminophen  500 mg Oral Q24H    acyclovir  10 mg/kg Intravenous Q8H    amphotericin B liposome (AMBISOME) IVPB  5 mg/kg (Dosing Weight) Intravenous Q24H    diphenhydrAMINE  25 mg Oral Q24H    duke's soln (benadryl 30 mL, mylanta 30 mL, lidocane 30 mL, nystatin 30 mL) 120 mL  10 mL Oral BID    metronidazole  500 mg Intravenous Q8H    phenylephrine HCl in 0.9% NaCl  100 mcg Intravenous Once    phytonadione  5 mg Oral Daily    piperacillin-tazobactam 4.5 g in dextrose 5 % 100 mL IVPB (ready to mix system)  4.5 g Intravenous Q8H    potassium chloride 10%  40 mEq Oral Q2H    potassium chloride  40 mEq Intravenous Once    sodium chloride 0.9%  500 mL Intravenous Q24H    sodium chloride 0.9%  500 mL Intravenous Q24H    sodium chloride 0.9%  3 mL Intravenous Q8H    vancomycin  250 mg Oral Q6H     PRN Meds:sodium chloride, acetaminophen, dextrose 50%, glucagon (human recombinant),  loperamide, magnesium sulfate IVPB, magnesium sulfate IVPB, ondansetron, potassium chloride 10%, potassium chloride 10%, sodium phosphate IVPB, sodium phosphate IVPB     Review of Systems   Constitutional: Positive for fatigue and fever. Negative for activity change, appetite change, chills, diaphoresis and unexpected weight change.   HENT: Negative for congestion, dental problem, mouth sores, nosebleeds, postnasal drip, rhinorrhea, sinus pressure, sore throat and trouble swallowing.    Eyes: Negative for photophobia and visual disturbance.   Respiratory: Positive for shortness of breath. Negative for cough.         With exertion   Cardiovascular: Negative for chest pain, palpitations and leg swelling.   Gastrointestinal: Negative for abdominal distention, abdominal pain, blood in stool, constipation, diarrhea, nausea and vomiting.   Genitourinary: Negative for dysuria, frequency, hematuria and urgency.   Musculoskeletal: Negative for arthralgias, back pain and myalgias.   Skin: Negative for pallor and rash.   Allergic/Immunologic: Positive for immunocompromised state.   Neurological: Positive for weakness. Negative for dizziness, syncope, numbness and headaches.   Hematological: Negative for adenopathy. Does not bruise/bleed easily.   Psychiatric/Behavioral: Negative for confusion. The patient is not nervous/anxious.      Objective:     Vital Signs (Most Recent):  Temp: 98.7 °F (37.1 °C) (03/15/17 1030)  Pulse: (!) 117 (03/15/17 1300)  Resp: (!) 26 (03/15/17 1300)  BP: (!) 92/59 (03/15/17 1300)  SpO2: 100 % (03/15/17 1300) Vital Signs (24h Range):  Temp:  [98.3 °F (36.8 °C)-103.1 °F (39.5 °C)] 98.7 °F (37.1 °C)  Pulse:  [117-153] 117  Resp:  [18-36] 26  SpO2:  [96 %-100 %] 100 %  BP: ()/(40-70) 92/59     Weight: 36.6 kg (80 lb 11 oz)  Body mass index is 14.29 kg/(m^2).  Body surface area is 1.28 meters squared.      Intake/Output Summary (Last 24 hours) at 03/15/17 1421  Last data filed at 03/15/17 1200    Gross per 24 hour   Intake          4465.19 ml   Output             3695 ml   Net           770.19 ml       Physical Exam   Constitutional: He is oriented to person, place, and time. He appears well-developed. He appears cachectic. He has a sickly appearance. No distress.   HENT:   Head: Normocephalic.   Mouth/Throat: Oropharynx is clear and moist. No oropharyngeal exudate.   Eyes: Conjunctivae are normal. Pupils are equal, round, and reactive to light. No scleral icterus.   Neck: Normal range of motion. Neck supple. Normal range of motion present. No thyromegaly present.   Cardiovascular: Normal rate, regular rhythm, normal heart sounds and intact distal pulses.    Pulmonary/Chest: Effort normal and breath sounds normal. No respiratory distress.   Abdominal: Soft. Bowel sounds are normal. He exhibits no distension. There is no tenderness.   Musculoskeletal: Normal range of motion. He exhibits no edema or tenderness.   Lymphadenopathy:        Head (right side): No submandibular, no preauricular, no posterior auricular and no occipital adenopathy present.        Head (left side): No submandibular, no preauricular, no posterior auricular and no occipital adenopathy present.     He has no cervical adenopathy.     He has no axillary adenopathy.   Neurological: He is alert and oriented to person, place, and time. No cranial nerve deficit. Coordination normal.   Skin: Skin is warm and dry. No petechiae and no rash noted. No pallor.   Psychiatric: He has a normal mood and affect. Thought content normal.   Vitals reviewed.      Significant Labs:   CBC:   Recent Labs  Lab 03/14/17  1128 03/15/17  0424 03/15/17  1152   WBC 4.32 4.80 4.09   HGB 7.7* 6.0* 6.9*   HCT 22.5* 17.3* 20.2*   PLT 54* 46*  --    , CMP:   Recent Labs  Lab 03/14/17  1128 03/14/17  2010 03/15/17  0323 03/15/17  0424 03/15/17  1154   * 135* 138 139 137   K 3.3* 2.1* 2.1* 2.1* 2.5*   CL 93* 108 108 108 106   CO2 24 17* 20* 21* 21*   GLU 87 100 110  118* 133*   BUN 14 11 10 10 7   CREATININE 0.6 0.5 0.5 0.5 0.5   CALCIUM 8.6* 6.9* 7.5* 7.6* 7.5*   PROT 6.7 4.8* 5.2*  --   --    ALBUMIN 1.8* 1.4* 1.4*  --   --    BILITOT 3.3* 2.8* 3.3*  --   --    ALKPHOS 299* 198* 202*  --   --    * 507* 428*  --   --    * 259* 255*  --   --    ANIONGAP 14 10 10 10 10   EGFRNONAA >60.0 >60.0 >60.0 >60.0 >60.0   , Coagulation:   Recent Labs  Lab 03/14/17  1128   INR 1.5*   APTT 26.9   , LDH: No results for input(s): LDHCSF, BFSOURCE in the last 48 hours. and Uric Acid   Recent Labs  Lab 03/14/17  1352   URICACID 2.8*       Assessment/Plan:     Plan:    Sepsis/Septic shock  - T.max 103.1, hypotensive on levophed gtt  - chest x-ray with R infiltrate, abdominal u/s with gallbladder wall thickening, CT abdomen ordered but patient refused po contrast  - left hand with osteomyelitis to 3rd and 4th finger per hand x-ray  - BC with gram negative rods 4/4 bottles  - known history of untreated fungemia  - on board spectrum coverage: Acyclovir, Flagyl, Zosyn, Vancomycin, and Voriconazole.  - primary management per ICU, appreciate assistanc      AML without remission  - s/p 7+3 and MEC induction with known persistent disease  - 92% blasts on differential today  - monitor CBC Q12H for now, transfuse if Hb < 7, Plt < 10,000/mm3  - check LDH, Uric Acid 2.8, Fibrinogen >800   - INR 1.5, will give Vitamin K 5 mg daily for 3 days  - monitor TLS and DIC labs daily.    - Hematology/BMT service will follow along with you.       Marcella Neumann NP  Hematology/Oncology  [facility]

## 2017-03-15 NOTE — ASSESSMENT & PLAN NOTE
- Heme Onc recs:  - on board spectrum coverage: Acyclovir, Flagyl, Zosyn, Vancomycin, and Voriconazole.  - monitor CBC Q12H for now, transfuse if Hb < 7, Plt < 20,000/mm3  - check LDH, Uric Acid now, can give Rasburicase 6 mg IV x 1 dose if Uric Acid > 8.   - check Fibrinogen now, if < 100 then transfuse 1 unit of Cyroprecipitate   - consider FFP if INR > 1.5 - 2  - monitor TLS and DIC labs daily.   - Hematology/BMT service will follow along with you.    - Duke's solution   - LDH, uric acid ordered to r/o tumor lysis  - DIC panel ordered: PT/INR 15.3/1.5, PTT 26, fibrinogen > 800; reassuring against DIC  - H/h 6/17 this AM: now s/p 1upRBC, repeat CBC 6.9/20  - labs reassuring against tumor lysis at this time

## 2017-03-15 NOTE — ASSESSMENT & PLAN NOTE
- BP: ()/(40-68) 80/45   - CXR 3/14 persistent (but improving) patchy infiltrate in the right upper and midlung fields  - aggressive IV fluid resuscitation with NS in the ED  - Triple lumen IJ central line placed: will start norepinephrine for pressor support  - antimicrobials: vanc, zosyn, vorconazole, flagyl, acyclovir, tobramycin added this AM  - fungal workup, stool analysis  - leukocytosis:4.32 on admission (baseline ~0.9); ANC 0; CD4 count pending  - LA 3/15: 2.0, improving

## 2017-03-15 NOTE — ASSESSMENT & PLAN NOTE
35yo man w/a history of recently diagnosed HIV/AIDS (dx 1/17/2017; XT4=574/7%, VL 22k, GT wildtype; started triumeq 2/2 but stopped 2/24) and AML (s/p 7+3 induction with persistent leukemia on day 14 marrow; induction course c/b neutropenic fevers due to C.diff colitis, VSE (faecium) septicemia, and Fusarium fungemia; not on ART or therapy at home for these pathogens after leaving [place] 2/24) who was admitted on 3/14/2017 with chills/sweats, malaise, weakness, MÉNDEZ, abdominal pain, diarrhea, and left hand pain and was found to have neutropenic fever/septic shock from Klebsiella septicemia, likely persistent CDI, untreated fusariosis (of note, resistant to voriconazole), and active AML (with circulating peripheral blasts) -- course c/b acute hepatitis and cardiomyopathy, likely related to septic shock. He was started on empiric IV vanc/zosyn, PO vanc/IV flagyl, voriconazole, and acyclovir upon arrival with improvement in his hemodynamics and fever curve but his overall long-term prognosis is grave with high-risk AML, AIDS, active mold/bacterial infections, and medical noncompliance.    - would continue IV zosyn for Klebsiella septicemia and would repeat blood cx tomorrow to assess for clearance  - OK to stop IV vancomycin (no GP organism growth in cultures, will not help probably CDI)  - would continue PO vanc/IV flagyl for suspected persistent C.diff colitis and place on contact precautions  - would stop voriconazole as prior Fusarium isolate is resistant and instead treat with ambisome (ordered already by ID pharm with premeds/infusions)  - would continue acyclovir prophylaxis  - would hold ART at this time while stabilizing patient and determining goals  - having met this gentleman in mid 1/2017, I am a bit surprised to learn of his turn in compliance on the last admission -- as an AIDS/leukemia patient, I think it would be reasonable to scan his head (start with CT head w/contrast if possible to look for mass  lesions such as toxoplasmosis) to ensure he does not have an alternative medical cause for his change in personality (left AMA before this was done last admit)  - if aggressive care is being pursued, we should likely restage his expected pulmonary Fusarium infection with CT chest to monitor therapy; send fungal markers (fungitell, urine histo, and aspergillus antigen) to help monitor therapy; and send an AFB blood culture -- of note, these studies can be pursued when goals are better clarified  - given the constellation of issues detailed above, would agree with palliative care involvement as I do not think he will be a candidate for further chemotherapy if he is found to be competent for his medical decision making of late

## 2017-03-15 NOTE — CONSULTS
"Orthopaedic Surgery  Consult Note    Marck Howard  03/15/2017    HPI:    CC: L long and ring finger pain    Patient is a 36 y.o. male with PMHx significant for recently diagnosed HIV and AML presented to ED 3/14/17 with generalized weakness, SOB the last 2 weeks.      Pt hospitalized with weight loss, weakness, body aches 1/17/17-2/224/17 and diagnosed with HIV and AML.  Started on induction chemo and HAART.  Blood cultures at that time grew E. Faecium and Fusarium fungus, stool with C. Dif.  Pt declined antifungal therapy and left AMA.  Did not take HAART, cipro, flagyl, or nystatin that was prescribed to him outpatient.  Presented to ED 3/14/17 with sepsis.  Currently in ICU on levophed, multiple IV abx, antifungals, acyclovir.    Ortho consulted for concern for osteomyelitis of L ring and long fingers.  Pt reports he cut his fingers a couple of months ago and wounds were closed at OSH ED.  He denies being prescribed antibiotics at that time.  He has been soaking fingers in peroxide; however, L ring finger wound has not improved.  Reports redness, swelling, pain.    Past Medical History:   Diagnosis Date    Cancer     from "smoking cigarettes"    HIV disease      History reviewed. No pertinent surgical history.  History reviewed. No pertinent family history.  Social History     Social History    Marital status: Single     Spouse name: N/A    Number of children: N/A    Years of education: N/A     Occupational History    Not on file.     Social History Main Topics    Smoking status: Former Smoker     Packs/day: 1.00     Years: 20.00     Types: Cigarettes    Smokeless tobacco: Not on file      Comment: pt will contact clinic when ready    Alcohol use No    Drug use: Yes     Special: Marijuana      Comment: denies    Sexual activity: Not Currently     Partners: Female     Birth control/ protection: Condom      Comment: per report last in 2005     Other Topics Concern    Not on file     Social " History Narrative     No current facility-administered medications on file prior to encounter.      No current outpatient prescriptions on file prior to encounter.       Review of Systems:    Patient denies constitutional symptoms, cardiac symptoms, respiratory symptoms, GI symptoms, psychiatric symptoms, endocrine related symptoms.  The remainder of the musculoskeletal ROS is included in the HPI.    Physical Exam:    Temp:  [98.3 °F (36.8 °C)-103.1 °F (39.5 °C)] 98.7 °F (37.1 °C)  Pulse:  [119-163] 119  Resp:  [18-36] 25  SpO2:  [96 %-100 %] 99 %  BP: ()/(40-69) 100/68    PE:    AA&O x 4.  NAD  HEENT:  NCAT, sclera nonicteric  Lungs:  Respirations are equal and unlabored.  CV:  2+ bilateral upper and lower extremity pulses.  Skin:  Intact throughout.    MSK:  LUE: Scab from previous cut over volar aspect of L ring P3; erythema over P3 with associated swelling and TTP; No drainage or palpable fluctuance; Mild TTP P3 long finger; No flex/ext of ring or long DIP; SILT throughout; grossly motor intact AIN/PIN/U; brisk cap refill, warm well perfused; radial pulse palpable    Diagnostic Results:  ESR >150, , procalc 49.43, WBC 4.09, 93% blasts, 2% granulocytes, 4% lymphocytes, platelets 42, INR 1.8    XR with L long and ring P2/3 erosions concerning for possible osteomyelitis    A/P:  36 y.o. male with likely osteomyelitis L ring and long 2/3.  This is unlikely to be a source of sepsis.  Options are to treat with IV abx or nonemergent amputation.  Will discuss with staff in AM.  OK to resume diet from ortho standpoint.

## 2017-03-15 NOTE — CONSULTS
[facility]  Infectious Disease  Consult Note    Patient Name: Marck Howard  MRN: [id number]  Admission Date: 3/14/2017  Hospital Length of Stay: 1 days  Attending Physician: Yamilex Stephenson MD  Primary Care Provider: Primary Doctor No     Isolation Status: Special Contact    Patient information was obtained from patient and past medical records.      Inpatient consult to Infectious Diseases  Consult performed by: JEAN-PAUL ESCALANTE  Consult ordered by: JENIFFER JORDAN  Reason for consult: septic shock in AIDS/AML patient        Assessment/Plan:     HIV (human immunodeficiency virus infection)  35yo man w/a history of recently diagnosed HIV/AIDS (dx 1/17/2017; QL1=034/7%, VL 22k, GT wildtype; started triumeq 2/2 but stopped 2/24) and AML (s/p 7+3 induction with persistent leukemia on day 14 marrow; induction course c/b neutropenic fevers due to C.diff colitis, VSE (faecium) septicemia, and Fusarium fungemia; not on ART or therapy at home for these pathogens after leaving [place] 2/24) who was admitted on 3/14/2017 with chills/sweats, malaise, weakness, MÉNDEZ, abdominal pain, diarrhea, and left hand pain and was found to have neutropenic fever/septic shock from Klebsiella septicemia, likely persistent CDI, untreated fusariosis (of note, resistant to voriconazole), and active AML (with circulating peripheral blasts) -- course c/b acute hepatitis and cardiomyopathy, likely related to septic shock. He was started on empiric IV vanc/zosyn, PO vanc/IV flagyl, voriconazole, and acyclovir upon arrival with improvement in his hemodynamics and fever curve but his overall long-term prognosis is grave with high-risk AML, AIDS, active mold/bacterial infections, and medical noncompliance.    - would continue IV zosyn for Klebsiella septicemia and would repeat blood cx tomorrow to assess for clearance  - OK to stop IV vancomycin (no GP organism growth in cultures, will not help probably CDI)  - would  continue PO vanc/IV flagyl for suspected persistent C.diff colitis and place on contact precautions  - would stop voriconazole as prior Fusarium isolate is resistant and instead treat with ambisome (ordered already by ID pharm with premeds/infusions)  - would continue acyclovir prophylaxis  - would hold ART at this time while stabilizing patient and determining goals  - having met this gentleman in mid 1/2017, I am a bit surprised to learn of his turn in compliance on the last admission -- as an AIDS/leukemia patient, I think it would be reasonable to scan his head (start with CT head w/contrast if possible to look for mass lesions such as toxoplasmosis) to ensure he does not have an alternative medical cause for his change in personality (left AMA before this was done last admit)  - if aggressive care is being pursued, we should likely restage his expected pulmonary Fusarium infection with CT chest to monitor therapy; send fungal markers (fungitell, urine histo, and aspergillus antigen) to help monitor therapy; and send an AFB blood culture -- of note, these studies can be pursued when goals are better clarified  - given the constellation of issues detailed above, would agree with palliative care involvement as I do not think he will be a candidate for further chemotherapy if he is found to be competent for his medical decision making of late      Thank you for your consult. I will follow-up with patient. Please contact us if you have any additional questions.     Mae Mike MD  Transplant ID Attending  [id number]      Mae Mike MD  Infectious Disease  [facility]    Subjective:     Principal Problem: Septic shock due to undetermined organism    HPI: Mr. Howard is a 37yo man w/a history of recently diagnosed HIV/AIDS (dx 1/17/2017; RN0=134/7%, VL 22k, GT wildtype; started triumeq 2/2 but stopped 2/24) and AML (s/p 7+3 induction with persistent leukemia on day 14 marrow; induction  "course c/b neutropenic fevers due to C.diff colitis, VSE (faecium) septicemia, and Fusarium fungemia; not on ART or therapy at home for these pathogens after leaving [place] 2/24) who was admitted on 3/14/2017 with chills/sweats, malaise, weakness, MÉNDEZ, abdominal pain, diarrhea, and left hand pain and was found to have neutropenic fever/septic shock from Klebsiella septicemia, likely persistent CDI, and untreated fusariosis (of note, resistant to voriconazole). He was started on empiric IV vanc/zosyn, PO vanc/IV flagyl, voriconazole, and acyclovir upon arrival with improvement in his hemodynamics and fever curve. Workup to date is also notable for: 93% blasts in peripheral smear, anemia/thrombocytopenia, new hepatitis (TB of 3.3), and new cardiomyopathy (EF 35%). Of note, patient tells me he is aware of his HIV diagnosis from last admission and remembered me. He did not elaborate further on why he left [place] at the time.    Past Medical History:   Diagnosis Date    Cancer     from "smoking cigarettes"    HIV disease        History reviewed. No pertinent surgical history.    Review of patient's allergies indicates:  No Known Allergies    Medications:  No prescriptions prior to admission.     Antibiotics     Start     Stop Route Frequency Ordered    03/14/17 1615  metronidazole IVPB 500 mg      -- IV Every 8 hours (non-standard times) 03/14/17 1509    03/14/17 1800  vancomycin 250mg / 10ml oral suspension 250 mg      -- Oral Every 6 hours 03/14/17 1509    03/14/17 1800  piperacillin-tazobactam 4.5 g in dextrose 5 % 100 mL IVPB (ready to mix system)      -- IV Every 8 hours (non-standard times) 03/14/17 1510        Antifungals     Start     Stop Route Frequency Ordered    03/14/17 2100  duke's soln (benadryl 30 mL, mylanta 30 mL, lidocane 30 mL, nystatin 30 mL) 120 mL      -- Oral 2 times daily 03/14/17 1507    03/15/17 1600  amphotericin B liposome (AMBISOME) 200 mg in dextrose 5 % 200 mL IVPB  (amphotericin B liposome " (AMBISOME) IVPB panel)      -- IV Every 24 hours (non-standard times) 03/15/17 1404        Antivirals         Stop Route Frequency     acyclovir (ZOVIRAX) injection      -- IV Every 8 hours (non-standard times)             There is no immunization history on file for this patient.    Family History     None        Social History     Social History    Marital status: Single     Spouse name: N/A    Number of children: N/A    Years of education: N/A     Social History Main Topics    Smoking status: Former Smoker     Packs/day: 1.00     Years: 20.00     Types: Cigarettes    Smokeless tobacco: None      Comment: pt will contact clinic when ready    Alcohol use No    Drug use: Yes     Special: Marijuana      Comment: denies    Sexual activity: Not Currently     Partners: Female     Birth control/ protection: Condom      Comment: per report last in 2005     Other Topics Concern    None     Social History Narrative     Review of Systems   Constitutional: Positive for activity change, appetite change, chills, diaphoresis, fatigue, fever and unexpected weight change.   HENT: Negative for dental problem, drooling, ear pain, mouth sores, postnasal drip, rhinorrhea, sinus pressure, sore throat and trouble swallowing.    Eyes: Negative for photophobia, pain and redness.   Respiratory: Positive for cough and shortness of breath. Negative for wheezing.    Cardiovascular: Negative for chest pain and leg swelling.   Gastrointestinal: Positive for abdominal pain and diarrhea. Negative for abdominal distention and nausea.   Genitourinary: Negative for dysuria, flank pain, frequency and urgency.   Musculoskeletal: Positive for arthralgias. Negative for back pain, gait problem and myalgias.   Skin: Negative for pallor and rash.   Neurological: Negative for dizziness, tremors, seizures, weakness and headaches.   Hematological: Bruises/bleeds easily.   Psychiatric/Behavioral: Negative for confusion.     Objective:     Vital Signs  (Most Recent):  Temp: 98.1 °F (36.7 °C) (03/15/17 1548)  Pulse: (!) 125 (03/15/17 1548)  Resp: (!) 30 (03/15/17 1548)  BP: (!) 84/50 (03/15/17 1548)  SpO2: 99 % (03/15/17 1548) Vital Signs (24h Range):  Temp:  [98.1 °F (36.7 °C)-99.8 °F (37.7 °C)] 98.1 °F (36.7 °C)  Pulse:  [117-138] 125  Resp:  [18-36] 30  SpO2:  [96 %-100 %] 99 %  BP: ()/(40-70) 84/50     Weight: 36.6 kg (80 lb 11 oz)  Body mass index is 14.29 kg/(m^2).    Estimated Creatinine Clearance: 103.7 mL/min (based on Cr of 0.5).    Physical Exam   Constitutional: No distress.   Markedly more cachectic than when I last saw him.   HENT:   Head: Atraumatic.   Mouth/Throat: Oropharynx is clear and moist. No oropharyngeal exudate.   Eyes: Conjunctivae and EOM are normal. Pupils are equal, round, and reactive to light. No scleral icterus.   Neck: Neck supple.   Cardiovascular: Normal rate and regular rhythm.  Exam reveals no friction rub.    Murmur heard.  Pulmonary/Chest: No respiratory distress. He has no wheezes. He has rales. He exhibits no tenderness.   Abdominal: Soft. Bowel sounds are normal. He exhibits no distension. There is no tenderness. There is no rebound and no guarding.   Musculoskeletal: Normal range of motion. He exhibits no edema.   Lymphadenopathy:     He has no cervical adenopathy.   Neurological: He is alert. No cranial nerve deficit. He exhibits normal muscle tone. Coordination normal.   Skin: No rash noted. No erythema.       Significant Labs:   CBC:   Recent Labs  Lab 03/14/17  1128 03/15/17  0424 03/15/17  1152   WBC 4.32 4.80 4.09   HGB 7.7* 6.0* 6.9*   HCT 22.5* 17.3* 20.2*   PLT 54* 46* 42*     CMP:   Recent Labs  Lab 03/14/17  1128 03/14/17  2010 03/15/17  0323 03/15/17  0424 03/15/17  1154   * 135* 138 139 137   K 3.3* 2.1* 2.1* 2.1* 2.5*   CL 93* 108 108 108 106   CO2 24 17* 20* 21* 21*   GLU 87 100 110 118* 133*   BUN 14 11 10 10 7   CREATININE 0.6 0.5 0.5 0.5 0.5   CALCIUM 8.6* 6.9* 7.5* 7.6* 7.5*   PROT 6.7 4.8*  5.2*  --   --    ALBUMIN 1.8* 1.4* 1.4*  --   --    BILITOT 3.3* 2.8* 3.3*  --   --    ALKPHOS 299* 198* 202*  --   --    * 507* 428*  --   --    * 259* 255*  --   --    ANIONGAP 14 10 10 10 10   EGFRNONAA >60.0 >60.0 >60.0 >60.0 >60.0       Significant Imaging: I have reviewed all pertinent imaging results/findings within the past 24 hours.     Abdominal U/S:  Nondistended gallbladder demonstrating increased wall thickness of 0.8 cm.  No cholelithiasis. The gallbladder wall thickening may be secondary to nondistention.  However, it can also be seen in the setting of hepatitis.  Correlation advised.     Left hand XR:    There is medullary expansion of the phalanges, nonspecific.     There is lytic destruction involving the distal phalanx of the fourth digit and the base of the distal phalanx of the third digit with associated joint space narrowing at the DIP joints. There is soft tissue swelling in the fourth digit. Findings suspicious for osteomyelitis involving the distal phalanges of both digits.    CXR: Impression persistent patchy infiltrate in the right upper and midlung field.    Microbiology:  3/14 blood cx: Klebsiella x2  3/14 urine cx: negative  3/14 sputum cx: NGTD  3/14 SCrAg pending  3/15 stool cx: pending  3/15 C.diff pending  3/15 Shiga toxin pending  3/15 O&P pending  3/15 fungal blood cx: pending

## 2017-03-15 NOTE — ASSESSMENT & PLAN NOTE
- repeat C. difficile EIA   - Stool culture pending  - Stool ova and parasites pending  - Histoplasmosis, Cryptosporidium studies  - contine PO/IV vanc, IV flagyl

## 2017-03-15 NOTE — ASSESSMENT & PLAN NOTE
- Abd Xray 3/14: Nonobstructive bowel gas pattern. No free air or portal venous gas on this single view.   - US Abdomen complete ordered  - CT Abd/Pelvis ordered  - AST//390, Alk Phos 299 on admission   - downtrended to 428/255 on 3/15  - acute hepatitis panel pan-negative

## 2017-03-15 NOTE — ASSESSMENT & PLAN NOTE
- KUB on admission 3/14: Nonobstructive bowel gas pattern. No free air or portal venous gas on this single view.   - mildly tympanic on exam 3/15: repeat KUB on 3/16 AM

## 2017-03-15 NOTE — PLAN OF CARE
03/15/17 1543   Readmission Questionnaire   At the time of your discharge, did someone talk to you about what your health problems were? Yes   At the time of discharge, did someone talk to you about what to watch out for regarding worsening of your health problem? Yes   At the time of discharge, did someone talk to you about what to do if you experienced worsening of your health problem? Yes   At the time of discharge, did someone talk to you about which medication to take when you left the hospital and which ones to stop taking? Yes   At the time of discharge, did someone talk to you about when and where to follow up with a doctor after you left the hospital? Yes   How often do you need to have someone help you when you read instructions, pamphlets, or other written material from your doctor or pharmacy? Always   Do you have problems taking your medications as prescribed? Yes   Do you have any problems affording any of  your prescribed medications? To be determined   Do you have problems obtaining/receiving your medications? No   Does the patient have transportation to healthcare appointments? Yes   Lives With child(alber), dependent;sibling(s)   Living Arrangements house   Does the patient have family/friends to help with healtcare needs after discharge? yes   Who are your caregiver(s) and their phone number(s)? ryan jones

## 2017-03-15 NOTE — ASSESSMENT & PLAN NOTE
- new diagnosis of HIV  - s/p induction chemotherapy 7+3  - monitor Hg: transfuse Hg < 7 or Plt <10  - Plts in 50s on admission  - INR 1.5  - Checkng DIC panels daily, per Heme Onc/BMT  - holding all anticoagulation  - Type and screen 3/14  - 3/15: 1u pRBC transfused for H/h 6/17, appropriate rise, but marginal H/h  - transfuse 1u pRBC (2u total on 3/15/17)

## 2017-03-15 NOTE — PROGRESS NOTES
Abdominal CT with contrast order, Pt. Refusing to drink oral contrast, Dr. Ramirez notified, no new orderw at this time, will continue to monitor.

## 2017-03-15 NOTE — PROGRESS NOTES
[facility]  Critical Care Medicine  Progress Note    Patient Name: Marck Howard  MRN: [id number]  Admission Date: 3/14/2017  Hospital Length of Stay: 1 days  Code Status: Full Code  Attending Provider: Yamilex Stephenson MD  Primary Care Provider: Primary Doctor No   Principal Problem: Septic shock due to undetermined organism    Subjective:     HPI:  35 yo male presents to the ED with generalized weakness and shortness of breath with exertion for the past two weeks. He denies fever, but shivers with chills. Denies cough, nausea, vomiting. States that he has had several stools in the past two days. Per RN, patient had three episodes of large volume diarrhea in the ED, all nonbloody. He denies chest pain, headache, lightheadedness, dizziness, LOC. Reports diffuse but mild abdominal pain.     Patient was recently admitted for approximately 1 month (1/17-2/24 ) for weight loss, muscle pain, and weakness and was diagnosed with HIV and AML at that time. 7+3 induction chemotherapy and HAART therapy were initiated. Hedeveloped neutropenic fever and was later found to have blood cultures growing E. faecium and Fusarium, and stool cultures with C. difficile. Infections were inadequately treated with cefipime, vancomycin, and flagy, as patient declined antifungal therapy and elected to leave the hospital AMA. Patient did not complete home courses of ciprofloxacin, flagyl, or nystatin. He has not taken HAART therapy as an outpatient.     Of note, patient denies HIV status. States he has never been diagnosed with HIV. Denies drug, EtOH use.         Hospital/ICU Course:  3/14: 4L NS administered in the ED. LA 3.8 > 3.6 with hydration.  Admitted to the ICU. Consult ID/BMT, Heme Onc. Blood, urine, respiratory cultures. C. difficile EIA. PO/IV vancomycin, IV flagyl, IV cefipime, IV vorconazole started. US and CT abdomen ordered for transaminitis. HAART held. Contact precautions. Hand  Xray concerning for  osteomyelitis of left distal phalanges, digits 3-4.      Interval History/Significant Events:   Afebrile overnight. Ortho has seen the patient for concern for L hand osteo. Tobramycin coverage added this AM. Requiring 0.25 levophed for pressure support overnight. H/H fell to 6/17 this AM and the patient was transfused 1u pRBC. UOP adequate.      Review of Systems   Reason unable to perform ROS: ROS limited by poor history.   Constitutional: Positive for activity change, appetite change, chills, fatigue and fever. Negative for diaphoresis.   HENT: Negative for congestion, mouth sores, nosebleeds, rhinorrhea, sinus pressure and sneezing.    Respiratory: Positive for shortness of breath. Negative for cough, choking, chest tightness, wheezing and stridor.    Cardiovascular: Negative for chest pain, palpitations and leg swelling.   Gastrointestinal: Positive for abdominal pain and diarrhea. Negative for abdominal distention, anal bleeding, blood in stool, constipation, nausea, rectal pain and vomiting.   Endocrine: Positive for cold intolerance. Negative for heat intolerance and polyuria.   Genitourinary: Positive for decreased urine volume. Negative for difficulty urinating, dysuria, enuresis, flank pain and hematuria.   Musculoskeletal: Positive for myalgias. Negative for back pain.   Skin: Negative for color change and rash.   Allergic/Immunologic: Positive for immunocompromised state.   Neurological: Positive for weakness. Negative for dizziness, seizures, syncope, speech difficulty, light-headedness, numbness and headaches.   Psychiatric/Behavioral: Negative for agitation and confusion. The patient is not nervous/anxious.      Objective:     Vital Signs (Most Recent):  Temp: 98.7 °F (37.1 °C) (03/15/17 1030)  Pulse: (!) 117 (03/15/17 1300)  Resp: (!) 26 (03/15/17 1300)  BP: (!) 92/59 (03/15/17 1300)  SpO2: 100 % (03/15/17 1300) Vital Signs (24h Range):  Temp:  [98.3 °F (36.8 °C)-103.1 °F (39.5 °C)] 98.7 °F (37.1  °C)  Pulse:  [117-163] 117  Resp:  [18-36] 26  SpO2:  [96 %-100 %] 100 %  BP: ()/(40-70) 92/59   Weight: 36.6 kg (80 lb 11 oz)  Body mass index is 14.29 kg/(m^2).      Intake/Output Summary (Last 24 hours) at 03/15/17 1333  Last data filed at 03/15/17 1200   Gross per 24 hour   Intake          4335.19 ml   Output             3170 ml   Net          1165.19 ml       Physical Exam   Constitutional: He is oriented to person, place, and time.   Cachetic, shivering, ill-appearing   HENT:   Head: Normocephalic and atraumatic.   Eyes: Pupils are equal, round, and reactive to light.   Neck: Normal range of motion.   Cardiovascular: Regular rhythm, normal heart sounds and intact distal pulses.  Exam reveals no gallop and no friction rub.    No murmur heard.  Tachycardia (132-163)/138   Pulmonary/Chest: Breath sounds normal. No respiratory distress. He has no wheezes. He has no rales. He exhibits no tenderness.   Intermittent tachypnea (20s-30s)   Abdominal: Soft. Bowel sounds are normal. He exhibits no distension and no mass. There is tenderness. There is guarding. There is no rebound. No hernia.   Diffusely tender with increased tenderness over RUQ   Musculoskeletal: He exhibits deformity. He exhibits no edema.   L hand deformity on digits 3-4 at DIP   Neurological: He is alert and oriented to person, place, and time.   Skin: Skin is warm and dry.   Psychiatric:   Patient denies HIV diagnosis despite previous hospitalization   Nursing note and vitals reviewed.      Vents:     Lines/Drains/Airways     Central Venous Catheter Line                 CVC Triple Lumen - PreSep Cath 03/14/17 right internal jugular 1 day          Drain                 Urethral Catheter 03/14/17 1150 Non-latex 16 Fr. 1 day          Peripheral Intravenous Line                 Peripheral IV - Single Lumen 02/21/17 1127 Left Forearm 22 days         Peripheral IV - Single Lumen 03/14/17 Left Antecubital 1 day              Significant  Labs:    CBC/Anemia Profile:    Recent Labs  Lab 03/14/17  1128 03/15/17  0424 03/15/17  1152   WBC 4.32 4.80 4.09   HGB 7.7* 6.0* 6.9*   HCT 22.5* 17.3* 20.2*   PLT 54* 46*  --    MCV 88 87 88   RDW 15.4* 15.5* 15.7*     Chemistries:    Recent Labs  Lab 03/14/17  1128 03/14/17  1352 03/14/17  2010 03/15/17  0323 03/15/17  0424   *  --  135* 138 139   K 3.3*  --  2.1* 2.1* 2.1*   CL 93*  --  108 108 108   CO2 24  --  17* 20* 21*   BUN 14  --  11 10 10   CREATININE 0.6  --  0.5 0.5 0.5   CALCIUM 8.6*  --  6.9* 7.5* 7.6*   ALBUMIN 1.8*  --  1.4* 1.4*  --    PROT 6.7  --  4.8* 5.2*  --    BILITOT 3.3*  --  2.8* 3.3*  --    ALKPHOS 299*  --  198* 202*  --    *  --  259* 255*  --    *  --  507* 428*  --    MG  --  1.6 1.7 2.3  --    PHOS  --   --   --  1.7*  --      Lactic Acid:   Recent Labs  Lab 03/14/17  1128 03/14/17  1353 03/15/17  0907   LACTATE 3.9*  3.9* 3.6* 2.0     Significant Imaging:    Cardiac 2D Echo 3/15  CONCLUSIONS     1 - Eccentric hypertrophy.     2 - Mild left ventricular enlargement.     3 - Moderately depressed left ventricular systolic function (EF 35-40%).     4 - Normal right ventricular systolic function .     5 - Normal valvular structure and function.     US Abdomen 3/14  FINDINGS: The visualized portion of the pancreas is unremarkable.  The IVC is visualized.  The aorta is nonaneurysmal.  Gallbladder is not well-distended and demonstrates increased wall thickness measuring 0.8 cm.  The gallbladder wall thickening may be secondary to nondistention.  However, it can also be seen in setting of hepatitis.  Correlation otherwise.  No evidence of gallstones.  Sonographic Tejeda's sign is negative.  There is no evidence of intra-or extrahepatic ductal dilatation with the common bile duct measuring 0.3 cm.  The liver measures 14.3 cm without evidence of focal hepatic abnormalities.  The spleen measures 9.8 x 5.0 cm.  The right kidney measures 14.3 cm while the left kidney  measures 13.2 cm.  There is no evidence of hydronephrosis.    L Hand Xray 3/14  There is medullary expansion of the phalanges, nonspecific. There is lytic destruction involving the distal phalanx of the fourth digit and the base of the distal phalanx of the third digit with associated joint space narrowing at the DIP joints. There is soft tissue swelling in the fourth digit. Findings suspicious for osteomyelitis involving the distal phalanges of both digits. Report has been flagged in the EPIC medical record system.    Assessment/Plan:     Neuro  Abdominal pain  - KUB on admission 3/14: Nonobstructive bowel gas pattern. No free air or portal venous gas on this single view.   - mildly tympanic on exam 3/15: repeat KUB on 3/16 AM      Cardiac  * Septic shock due to undetermined organism  - BP: ()/(40-68) 80/45   - CXR 3/14 persistent (but improving) patchy infiltrate in the right upper and midlung fields  - aggressive IV fluid resuscitation with NS in the ED  - Triple lumen IJ central line placed: will start norepinephrine for pressor support  - antimicrobials: vanc, zosyn, vorconazole, flagyl, acyclovir, tobramycin added this AM  - fungal workup, stool analysis  - leukocytosis:4.32 on admission (baseline ~0.9); ANC 0; CD4 count pending  - LA 3/15: 2.0, improving    Renal  Lactic acid acidosis  - septic shock v. Chronic lactic acidosis 2/2 AIDS/malignancy  - LA 3.9 on admission > 3.6 following 3L IVF  - LA improved to 2.0 on 3/15, trend daily    ID  HIV (human immunodeficiency virus infection)  - CD4 count pending  - ANC 0  - Holding HAART per ID recommendations  - IV acyclovir therapy  - EBV, CMV, acute hepatitis panel pending  - BAL to r/o PJP disseminated Mac    Clostridium difficile infection  - repeat C. difficile EIA   - Stool culture pending  - Stool ova and parasites pending  - Histoplasmosis, Cryptosporidium studies  - contine PO/IV vanc, IV flagyl    Subacute osteomyelitis of left hand  - concern for  osteo on digits 3-4 on left hand in distal phalanges  - Orthro has seen the patient, awaiting recs    Bacteremia due to Enterococcus  - as above in septic shock    Hem/Onc  Acute myeloid leukemia not having achieved remission  - Heme Onc recs:  - on board spectrum coverage: Acyclovir, Flagyl, Zosyn, Vancomycin, and Voriconazole.  - monitor CBC Q12H for now, transfuse if Hb < 7, Plt < 20,000/mm3  - check LDH, Uric Acid now, can give Rasburicase 6 mg IV x 1 dose if Uric Acid > 8.   - check Fibrinogen now, if < 100 then transfuse 1 unit of Cyroprecipitate   - consider FFP if INR > 1.5 - 2  - monitor TLS and DIC labs daily.   - Hematology/BMT service will follow along with you.    - Duke's solution   - LDH, uric acid ordered to r/o tumor lysis  - DIC panel ordered: PT/INR 15.3/1.5, PTT 26, fibrinogen > 800; reassuring against DIC  - H/h 6/17 this AM: now s/p 1upRBC, repeat CBC 6.9/20  - labs reassuring against tumor lysis at this time    Pancytopenia  - new diagnosis of HIV  - s/p induction chemotherapy 7+3  - monitor Hg: transfuse Hg < 7 or Plt <10  - Plts in 50s on admission  - INR 1.5  - Checkng DIC panels daily, per Heme Onc/BMT  - holding all anticoagulation  - Type and screen 3/14  - 3/15: 1u pRBC transfused for H/h 6/17, appropriate rise, but marginal H/h  - transfuse 1u pRBC (2u total on 3/15/17)    Chemotherapy-induced neutropenia  - see pancytopenia    Acute leukemia  - see AML not in remission    Fluids/Electrolytes/Nutrition/GI  Hyperbilirubinemia  - DIC panel negative 3/14   - RUQ US with gall bladder wall thickening; no evidence of obstruction  - patient declined CT abdomen with contrast on 3/14     Hyponatremia  - Na 131 on admission, improved to 139 with fluid resuscitation   - likely 2/2 septic shock  - s/p 3L NS  - monitor with repeat CMPs    Hypokalemia  - 3.3 on admission  - repleted 3/14, 3/15  - K 2.1 on 3/15  - repeat BMP with K 2.5; K repleted, BMP q8h with Mg level    Other  Transaminitis  - Abd  Xray 3/14: Nonobstructive bowel gas pattern. No free air or portal venous gas on this single view.   - US Abdomen complete ordered  - CT Abd/Pelvis ordered  - AST//390, Alk Phos 299 on admission   - downtrended to 428/255 on 3/15  - acute hepatitis panel pan-negative    Immunocompromised patient  - holding HAART per ID/BMT    Fever  - Tlast/max: 103.1 on 3/14 @ 1500  - afebrile currently    Noncompliance  - history of leaving hospital AMA  - noncompliant with HAART and antimicrobials  - plan to meet with family today    Tobacco abuse  - cessation counseling as indicated     Critical Care Medicine Daily Checklist:    A: Awake: RASS Goal/Actual Goal:    Actual: Arellano Agitation Sedation Scale (RASS): Alert and calm   B: Spontaneous Breathing Trial Performed?  NA, spontaneously breathing   C: SAT & SBT Coordinated?  NA                      D: Delirium: CAM-ICU Overall CAM-ICU: Negative   E: Early Mobility Performed? Yes   F: Feeding Goal:    Status:     Current Diet Order   Procedures    Diet NPO Except for: Medication     Order Specific Question:   Except for     Answer:   Medication      AS: Analgesia/Sedation None   T: Thromboembolic Prophylaxis Held for plt 46   H: HOB > 300 Yes   U: Stress Ulcer Prophylaxis (if needed) None   G: Glucose Control NPO at this time   B: Bowel Function Stool Occurrence: 1   I: Indwelling Catheter (Lines & Pineda) Necessity Pineda in place draining clear yellow urine   D: De-escalation of Antimicrobials/Pharmacotherapies Awaiting cultures    Plan for the day/ETD Ortho assessment of osteo, wean levophed    Code Status:  Family/Goals of Care: Full Code  Meet with family     Critical Care Time: 30 minutes  Critical secondary to Patient is currently on drug therapy requiring intensive monitoring for toxicity: Vancomycin      Critical care was time spent personally by me on the following activities: development of treatment plan with patient or surrogate and bedside caregivers,  discussions with consultants, evaluation of patient's response to treatment, examination of patient, ordering and performing treatments and interventions, ordering and review of laboratory studies, ordering and review of radiographic studies, pulse oximetry, re-evaluation of patient's condition. This critical care time did not overlap with that of any other provider or involve time for any procedures.     Phoebe Bonds MD  Critical Care Medicine  [facility]

## 2017-03-15 NOTE — PLAN OF CARE
Payor: MEDICAID / Plan: [employer] [facility] [employer] / Product Type: Managed Medicaid /     Extended Emergency Contact Information  Primary Emergency Contact: Roland Howard   [place]  Mobile Phone: [phone]  Relation: Sister     03/15/17 1528   Discharge Assessment   Assessment Type Discharge Planning Assessment   Assessment information obtained from? Medical Record   Arrived From home or self-care   Lives With child(alber), dependent;sibling(s)   Able to Return to Prior Arrangements unable to determine at this time (comments)   Is patient able to care for self after discharge? Unable to determine at this time (comments)   How many people do you have in your home that can help with your care after discharge? 1   Who are your caregiver(s) and their phone number(s)? Mita Howard -[phone]   Patient's perception of discharge disposition home or selfcare   Readmission Within The Last 30 Days other (see comments)  (Patient left AMA\)   If YES, was patient admitted for the same reason? Yes   Patient currently being followed by outpatient case management? No   Patient currently receives home health services? No   Does the patient currently use HME? No   Patient currently receives private duty nursing? N/A   Patient currently receives any other outside agency services? No   Equipment Currently Used at Home none   Do you have any problems affording any of your prescribed medications? No   Is the patient taking medications as prescribed? no   Do you have any financial concerns preventing you from receiving the healthcare you need? No   Does the patient have transportation to healthcare appointments? Yes   Transportation Available family or friend will provide   On Dialysis? No   Does the patient receive services at the Coumadin Clinic? No   Are there any open cases? No   Discharge Plan A Home with family;Home   Discharge Plan B Other  (dependent upon hospital progression)   Patient/Family In  Agreement With Plan unable to assess   Paola Calzada RN, BSN  Case Management  [facility]  Ext. [phone]

## 2017-03-15 NOTE — PROGRESS NOTES
Received pt. From ED  To room 3088, per stretcher. AAO x4. SBP 80/50s, levophed  and NS infusing. Pt. Transfer from portable to bedside monitor. Complete assessment and frequent vitals per flowsheet, will continue to monitor pt.

## 2017-03-15 NOTE — ASSESSMENT & PLAN NOTE
- septic shock v. Chronic lactic acidosis 2/2 AIDS/malignancy  - LA 3.9 on admission > 3.6 following 3L IVF  - LA improved to 2.0 on 3/15, trend daily

## 2017-03-15 NOTE — ED NOTES
Patient identifiers verified and correct for Mr Patel    C/C: weakness  APPEARANCE: awake and alert  SKIN: warm, dry and intact. No breakdown or bruising Skin dry, .  MUSCULOSKELETAL: Patient moving all extremities spontaneously, no obvious swelling or deformities noted.   RESPIRATORY: no shortness of breath. All breath sounds CTA bilaterally.  CARDIAC: heart tones normal. HR 160s ST 2+ distal pulses; no peripheral edema  ABDOMEN: S/ND/NT, normoactive bowel sounds present in all four quadrants Denies diarrhea upon admit to ED  : voids spontaneously without difficulty.  Neurologic: AAO x 4; follows commands equal strength in all extremities; speech garbled at times.

## 2017-03-15 NOTE — ASSESSMENT & PLAN NOTE
- 3.3 on admission  - repleted 3/14, 3/15  - K 2.1 on 3/15  - repeat BMP with K 2.5; K repleted, BMP q8h with Mg level

## 2017-03-15 NOTE — ASSESSMENT & PLAN NOTE
- history of leaving hospital AMA  - noncompliant with HAART and antimicrobials  - plan to meet with family today

## 2017-03-15 NOTE — PROGRESS NOTES
Dr. Ramirez notified of critical am labs, H/H 6&17.3, K 2.1, orders received and carried out, will continue to monitor pt.

## 2017-03-15 NOTE — ED NOTES
Critical care physician in to assess, states she will place central line as B/P in 80s with HR 140s after 3 Liter IVF. Cn, Arelis in to give jordin IV push and hang drip per orders.

## 2017-03-15 NOTE — ASSESSMENT & PLAN NOTE
- concern for osteo on digits 3-4 on left hand in distal phalanges  - Divinaro has seen the patient, awaiting recs

## 2017-03-16 PROBLEM — A41.50 SEPSIS DUE TO GRAM-NEGATIVE ORGANISM WITH SEPTIC SHOCK: Status: ACTIVE | Noted: 2017-03-14

## 2017-03-16 PROBLEM — D70.9 NEUTROPENIC FEVER: Status: ACTIVE | Noted: 2017-03-16

## 2017-03-16 PROBLEM — E87.1 HYPONATREMIA: Status: RESOLVED | Noted: 2017-03-14 | Resolved: 2017-03-16

## 2017-03-16 PROBLEM — R50.81 NEUTROPENIC FEVER: Status: RESOLVED | Noted: 2017-03-16 | Resolved: 2017-03-16

## 2017-03-16 PROBLEM — R50.81 NEUTROPENIC FEVER: Status: ACTIVE | Noted: 2017-03-16

## 2017-03-16 PROBLEM — E87.6 HYPOKALEMIA: Status: RESOLVED | Noted: 2017-03-14 | Resolved: 2017-03-16

## 2017-03-16 PROBLEM — D70.9 NEUTROPENIC FEVER: Status: RESOLVED | Noted: 2017-03-16 | Resolved: 2017-03-16

## 2017-03-16 LAB
ALBUMIN SERPL BCP-MCNC: 1.3 G/DL
ALP SERPL-CCNC: 164 U/L
ALT SERPL W/O P-5'-P-CCNC: 179 U/L
ANION GAP SERPL CALC-SCNC: 11 MMOL/L
ANION GAP SERPL CALC-SCNC: 9 MMOL/L
ANISOCYTOSIS BLD QL SMEAR: SLIGHT
AST SERPL-CCNC: 146 U/L
BACTERIA BLD CULT: NORMAL
BASOPHILS NFR BLD: 0 %
BILIRUB SERPL-MCNC: 3.9 MG/DL
BLASTS NFR BLD MANUAL: 81 %
BUN SERPL-MCNC: 10 MG/DL
BUN SERPL-MCNC: 11 MG/DL
CALCIUM SERPL-MCNC: 7.5 MG/DL
CALCIUM SERPL-MCNC: 7.5 MG/DL
CALCIUM SERPL-MCNC: 7.7 MG/DL
CD3+CD4+ CELLS # BLD: 6 CELLS/UL (ref 300–1400)
CD3+CD4+ CELLS NFR BLD: 10.1 % (ref 28–57)
CHLORIDE SERPL-SCNC: 108 MMOL/L
CHLORIDE SERPL-SCNC: 110 MMOL/L
CHLORIDE SERPL-SCNC: 111 MMOL/L
CHLORIDE SERPL-SCNC: 112 MMOL/L
CHLORIDE SERPL-SCNC: 112 MMOL/L
CO2 SERPL-SCNC: 19 MMOL/L
CO2 SERPL-SCNC: 19 MMOL/L
CO2 SERPL-SCNC: 20 MMOL/L
CO2 SERPL-SCNC: 20 MMOL/L
CO2 SERPL-SCNC: 21 MMOL/L
CREAT SERPL-MCNC: 0.5 MG/DL
DIFFERENTIAL METHOD: ABNORMAL
E COLI SXT1 STL QL IA: NEGATIVE
E COLI SXT2 STL QL IA: NEGATIVE
EOSINOPHIL NFR BLD: 0 %
ERYTHROCYTE [DISTWIDTH] IN BLOOD BY AUTOMATED COUNT: 15.3 %
EST. GFR  (AFRICAN AMERICAN): >60 ML/MIN/1.73 M^2
EST. GFR  (NON AFRICAN AMERICAN): >60 ML/MIN/1.73 M^2
FIBRINOGEN PPP-MCNC: >800 MG/DL
GLUCOSE SERPL-MCNC: 101 MG/DL
GLUCOSE SERPL-MCNC: 101 MG/DL
GLUCOSE SERPL-MCNC: 108 MG/DL
GLUCOSE SERPL-MCNC: 125 MG/DL
GLUCOSE SERPL-MCNC: 176 MG/DL
HAPTOGLOB SERPL-MCNC: 270 MG/DL
HCT VFR BLD AUTO: 24.2 %
HGB BLD-MCNC: 8.4 G/DL
INR PPP: 1.4
LDH SERPL L TO P-CCNC: 444 U/L
LYMPHOCYTES NFR BLD: 16 %
MAGNESIUM SERPL-MCNC: 1.7 MG/DL
MAGNESIUM SERPL-MCNC: 2.2 MG/DL
MCH RBC QN AUTO: 30.5 PG
MCHC RBC AUTO-ENTMCNC: 34.7 %
MCV RBC AUTO: 88 FL
MONOCYTES NFR BLD: 2 %
NEUTROPHILS NFR BLD: 1 %
O+P STL TRI STN: NORMAL
PHOSPHATE SERPL-MCNC: 2.9 MG/DL
PLATELET # BLD AUTO: 26 K/UL
PLATELET BLD QL SMEAR: ABNORMAL
PMV BLD AUTO: ABNORMAL FL
POCT GLUCOSE: 119 MG/DL (ref 70–110)
POCT GLUCOSE: 163 MG/DL (ref 70–110)
POTASSIUM SERPL-SCNC: 2.8 MMOL/L
POTASSIUM SERPL-SCNC: 3.1 MMOL/L
POTASSIUM SERPL-SCNC: 3.9 MMOL/L
POTASSIUM SERPL-SCNC: 4.3 MMOL/L
POTASSIUM SERPL-SCNC: 4.3 MMOL/L
PROT SERPL-MCNC: 5.1 G/DL
PROTHROMBIN TIME: 14.7 SEC
RBC # BLD AUTO: 2.75 M/UL
SODIUM SERPL-SCNC: 138 MMOL/L
SODIUM SERPL-SCNC: 140 MMOL/L
SODIUM SERPL-SCNC: 141 MMOL/L
WBC # BLD AUTO: 3.05 K/UL

## 2017-03-16 PROCEDURE — 80053 COMPREHEN METABOLIC PANEL: CPT

## 2017-03-16 PROCEDURE — 25000003 PHARM REV CODE 250: Performed by: NURSE PRACTITIONER

## 2017-03-16 PROCEDURE — 85384 FIBRINOGEN ACTIVITY: CPT

## 2017-03-16 PROCEDURE — 25000003 PHARM REV CODE 250: Performed by: STUDENT IN AN ORGANIZED HEALTH CARE EDUCATION/TRAINING PROGRAM

## 2017-03-16 PROCEDURE — 25500020 PHARM REV CODE 255: Performed by: INTERNAL MEDICINE

## 2017-03-16 PROCEDURE — 63600175 PHARM REV CODE 636 W HCPCS: Performed by: STUDENT IN AN ORGANIZED HEALTH CARE EDUCATION/TRAINING PROGRAM

## 2017-03-16 PROCEDURE — 63600175 PHARM REV CODE 636 W HCPCS: Performed by: INTERNAL MEDICINE

## 2017-03-16 PROCEDURE — 83615 LACTATE (LD) (LDH) ENZYME: CPT

## 2017-03-16 PROCEDURE — 63600175 PHARM REV CODE 636 W HCPCS: Performed by: HOSPITALIST

## 2017-03-16 PROCEDURE — 25000003 PHARM REV CODE 250: Performed by: INTERNAL MEDICINE

## 2017-03-16 PROCEDURE — 85610 PROTHROMBIN TIME: CPT

## 2017-03-16 PROCEDURE — 20000000 HC ICU ROOM

## 2017-03-16 PROCEDURE — 99233 SBSQ HOSP IP/OBS HIGH 50: CPT | Mod: ,,, | Performed by: INTERNAL MEDICINE

## 2017-03-16 PROCEDURE — 85007 BL SMEAR W/DIFF WBC COUNT: CPT

## 2017-03-16 PROCEDURE — 25000003 PHARM REV CODE 250: Performed by: HOSPITALIST

## 2017-03-16 PROCEDURE — 85027 COMPLETE CBC AUTOMATED: CPT

## 2017-03-16 PROCEDURE — 80048 BASIC METABOLIC PNL TOTAL CA: CPT | Mod: 91

## 2017-03-16 PROCEDURE — 83735 ASSAY OF MAGNESIUM: CPT

## 2017-03-16 PROCEDURE — 80048 BASIC METABOLIC PNL TOTAL CA: CPT

## 2017-03-16 PROCEDURE — 83010 ASSAY OF HAPTOGLOBIN QUANT: CPT

## 2017-03-16 PROCEDURE — 84100 ASSAY OF PHOSPHORUS: CPT

## 2017-03-16 PROCEDURE — 99232 SBSQ HOSP IP/OBS MODERATE 35: CPT | Mod: ,,, | Performed by: INTERNAL MEDICINE

## 2017-03-16 PROCEDURE — 83735 ASSAY OF MAGNESIUM: CPT | Mod: 91

## 2017-03-16 PROCEDURE — 99291 CRITICAL CARE FIRST HOUR: CPT | Mod: ,,, | Performed by: INTERNAL MEDICINE

## 2017-03-16 RX ORDER — CEFAZOLIN SODIUM 1 G/50ML
1 SOLUTION INTRAVENOUS
Status: DISCONTINUED | OUTPATIENT
Start: 2017-03-16 | End: 2017-03-20

## 2017-03-16 RX ORDER — MAGNESIUM SULFATE HEPTAHYDRATE 40 MG/ML
2 INJECTION, SOLUTION INTRAVENOUS ONCE
Status: COMPLETED | OUTPATIENT
Start: 2017-03-16 | End: 2017-03-16

## 2017-03-16 RX ORDER — POTASSIUM CHLORIDE 14.9 MG/ML
20 INJECTION INTRAVENOUS
Status: COMPLETED | OUTPATIENT
Start: 2017-03-16 | End: 2017-03-16

## 2017-03-16 RX ORDER — POTASSIUM CHLORIDE 20 MEQ/15ML
40 SOLUTION ORAL ONCE
Status: COMPLETED | OUTPATIENT
Start: 2017-03-16 | End: 2017-03-16

## 2017-03-16 RX ORDER — POTASSIUM CHLORIDE 29.8 MG/ML
40 INJECTION INTRAVENOUS
Status: COMPLETED | OUTPATIENT
Start: 2017-03-16 | End: 2017-03-16

## 2017-03-16 RX ADMIN — Medication 10 ML: at 08:03

## 2017-03-16 RX ADMIN — IOHEXOL 75 ML: 350 INJECTION, SOLUTION INTRAVENOUS at 11:03

## 2017-03-16 RX ADMIN — POTASSIUM CHLORIDE 20 MEQ: 200 INJECTION, SOLUTION INTRAVENOUS at 04:03

## 2017-03-16 RX ADMIN — ACYCLOVIR SODIUM 370 MG: 50 INJECTION, SOLUTION INTRAVENOUS at 06:03

## 2017-03-16 RX ADMIN — AMPHOTERICIN B 200 MG: 50 INJECTION, POWDER, LYOPHILIZED, FOR SOLUTION INTRAVENOUS at 04:03

## 2017-03-16 RX ADMIN — Medication 250 MG: at 06:03

## 2017-03-16 RX ADMIN — SODIUM CHLORIDE 500 ML: 0.9 INJECTION, SOLUTION INTRAVENOUS at 03:03

## 2017-03-16 RX ADMIN — POTASSIUM CHLORIDE 20 MEQ: 200 INJECTION, SOLUTION INTRAVENOUS at 01:03

## 2017-03-16 RX ADMIN — POTASSIUM CHLORIDE 40 MEQ: 20 SOLUTION ORAL at 12:03

## 2017-03-16 RX ADMIN — Medication 3 ML: at 06:03

## 2017-03-16 RX ADMIN — ACYCLOVIR SODIUM 300 MG: 50 INJECTION, SOLUTION INTRAVENOUS at 06:03

## 2017-03-16 RX ADMIN — MAGNESIUM SULFATE IN WATER 2 G: 40 INJECTION, SOLUTION INTRAVENOUS at 12:03

## 2017-03-16 RX ADMIN — ACETAMINOPHEN 500 MG: 500 TABLET ORAL at 03:03

## 2017-03-16 RX ADMIN — METRONIDAZOLE 500 MG: 500 INJECTION, SOLUTION INTRAVENOUS at 03:03

## 2017-03-16 RX ADMIN — PIPERACILLIN SODIUM AND TAZOBACTAM SODIUM 4.5 G: 4; .5 INJECTION, POWDER, LYOPHILIZED, FOR SOLUTION INTRAVENOUS at 11:03

## 2017-03-16 RX ADMIN — PHYTONADIONE 5 MG: 5 TABLET ORAL at 08:03

## 2017-03-16 RX ADMIN — POTASSIUM CHLORIDE 40 MEQ: 400 INJECTION, SOLUTION INTRAVENOUS at 12:03

## 2017-03-16 RX ADMIN — Medication 0.08 MCG/KG/MIN: at 03:03

## 2017-03-16 RX ADMIN — Medication 250 MG: at 12:03

## 2017-03-16 RX ADMIN — POTASSIUM CHLORIDE 40 MEQ: 400 INJECTION, SOLUTION INTRAVENOUS at 03:03

## 2017-03-16 RX ADMIN — Medication 3 ML: at 02:03

## 2017-03-16 RX ADMIN — METRONIDAZOLE 500 MG: 500 INJECTION, SOLUTION INTRAVENOUS at 08:03

## 2017-03-16 RX ADMIN — PIPERACILLIN SODIUM AND TAZOBACTAM SODIUM 4.5 G: 4; .5 INJECTION, POWDER, LYOPHILIZED, FOR SOLUTION INTRAVENOUS at 01:03

## 2017-03-16 RX ADMIN — SODIUM CHLORIDE 500 ML: 0.9 INJECTION, SOLUTION INTRAVENOUS at 05:03

## 2017-03-16 NOTE — PROGRESS NOTES
[facility]  Critical Care Medicine  Progress Note    Patient Name: Marck Howard  MRN: [id number]  Admission Date: 3/14/2017  Hospital Length of Stay: 2 days  Code Status: Full Code  Attending Provider: Yamilex Stephenson MD  Primary Care Provider: Primary Doctor No   Principal Problem: Sepsis due to Gram-negative organism with septic shock    Subjective:     HPI:  37 yo male presents to the ED with generalized weakness and shortness of breath with exertion for the past two weeks. He denies fever, but shivers with chills. Denies cough, nausea, vomiting. States that he has had several stools in the past two days. Per RN, patient had three episodes of large volume diarrhea in the ED, all nonbloody. He denies chest pain, headache, lightheadedness, dizziness, LOC. Reports diffuse but mild abdominal pain.     Patient was recently admitted for approximately 1 month (1/17-2/24 ) for weight loss, muscle pain, and weakness and was diagnosed with HIV and AML at that time. 7+3 induction chemotherapy and HAART therapy were initiated. Hedeveloped neutropenic fever and was later found to have blood cultures growing E. faecium and Fusarium, and stool cultures with C. difficile. Infections were inadequately treated with cefipime, vancomycin, and flagy, as patient declined antifungal therapy and elected to leave the hospital AMA. Patient did not complete home courses of ciprofloxacin, flagyl, or nystatin. He has not taken HAART therapy as an outpatient.     Of note, patient denies HIV status. States he has never been diagnosed with HIV. Denies drug, EtOH use.         Hospital/ICU Course:  3/14: 4L NS administered in the ED. LA 3.8 > 3.6 with hydration.  Admitted to the ICU. Consult ID/BMT, Heme Onc. Blood, urine, respiratory cultures. C. difficile EIA. PO/IV vancomycin, IV flagyl, IV cefipime, IV vorconazole started. US and CT abdomen ordered for transaminitis. HAART held. Contact precautions. Hand  Xray  concerning for osteomyelitis of left distal phalanges, digits 3-4.      3/16/2017: weaning down on pressors, obtaining CT abdomen today, still complaining of diarrhea and pending results, ortho not planning for OR today      Interval History/Significant Events:   NAOEN. Complaining of abdominal pain with the diarrhea, low po intake and refusing po contrast for the CT abdo/pelvix. Low grade fever recorded, weaning down on pressors,     Review of Systems   Denies any fevers or chills. No chest pain or SOB. + abdominal pain and diarrhea, low appetite, no n/v.     Objective:     Vital Signs (Most Recent):  Temp: (!) 100.4 °F (38 °C) (03/16/17 0700)  Pulse: (!) 129 (03/16/17 0700)  Resp: (!) 34 (03/16/17 0700)  BP: 93/62 (03/16/17 0700)  SpO2: 99 % (03/16/17 0700) Vital Signs (24h Range):  Temp:  [97.5 °F (36.4 °C)-100.4 °F (38 °C)] 100.4 °F (38 °C)  Pulse:  [101-133] 129  Resp:  [0-38] 34  SpO2:  [92 %-100 %] 99 %  BP: ()/(46-70) 93/62   Weight: 35.8 kg (78 lb 14.8 oz)  Body mass index is 13.98 kg/(m^2).      Intake/Output Summary (Last 24 hours) at 03/16/17 0926  Last data filed at 03/16/17 0700   Gross per 24 hour   Intake          4134.24 ml   Output             2343 ml   Net          1791.24 ml       Physical Exam  General: cachectic, ill-appearing, NAD  Eyes: conjunctivae/corneas clear. PERRL.  Neck: supple, symmetrical, trachea midline, no JVD  Cardiovascular: Heart: regular rate and rhythm, S1, S2 normal, no murmurs   Lungs: clear to auscultation bilaterally and normal respiratory effort  Chest Wall: no tenderness  Abdomen/Rectal: Abdomen: non-distended. + TTP allover, + BS. No masses. Rectal: not examined  Extremities: no edema, no redness or tenderness in the calves or thighs. Left 3rd and 4th digit ulceration and crusting noted with no purulent discharge, Pulses: 2+ and symmetric  Skin: Skin color, texture, turgor normal. No rashes or lesions  Musculoskeletal: full range of motion of joints  Lymph Nodes:  + submandibular LN, No cervical adenopathy  Psych/Behavioral: Normal. Flat affect, Alert and oriented    Not requiring Vents:     Lines/Drains/Airways     Central Venous Catheter Line                 CVC Triple Lumen - PreSep Cath 03/14/17 right internal jugular 2 days          Drain                 Urethral Catheter 03/14/17 1150 Non-latex 16 Fr. 1 day          Peripheral Intravenous Line                 Peripheral IV - Single Lumen 02/21/17 1127 Left Forearm 22 days         Peripheral IV - Single Lumen 03/14/17 Left Antecubital 2 days              Significant Labs:    CBC/Anemia Profile:    Recent Labs  Lab 03/15/17  1152 03/15/17  1726 03/16/17  0442   WBC 4.09 2.89* 3.05*   HGB 6.9* 7.7* 8.4*   HCT 20.2* 22.6* 24.2*   PLT 42* 22* 26*   MCV 88 89 88   RDW 15.7* 15.2* 15.3*        Chemistries:    Recent Labs  Lab 03/14/17  2010 03/15/17  0323  03/15/17  1424 03/15/17  1726 03/15/17  2346 03/16/17  0442   * 138  < >  --  138 138 140   K 2.1* 2.1*  < >  --  3.7 2.8* 3.9    108  < >  --  109 108 111*   CO2 17* 20*  < >  --  19* 21* 20*   BUN 11 10  < >  --  9 10 11   CREATININE 0.5 0.5  < >  --  0.5 0.5 0.5   CALCIUM 6.9* 7.5*  < >  --  7.7* 7.5* 7.5*   ALBUMIN 1.4* 1.4*  --   --   --   --  1.3*   PROT 4.8* 5.2*  --   --   --   --  5.1*   BILITOT 2.8* 3.3*  --   --   --   --  3.9*   ALKPHOS 198* 202*  --   --   --   --  164*   * 255*  --   --   --   --  179*   * 428*  --   --   --   --  146*   MG 1.7 2.3  --   --  1.6 2.2  --    PHOS  --  1.7*  --  1.4*  --   --  2.9   < > = values in this interval not displayed.      Significant Imaging:  KUB  The abdomen is relatively gasless; intraperitoneal fluid is not excluded in this patient with a history of abdominal distention and tenderness.  I do not identify bowel distention, intramural gas, intraportal gas or free peritoneal gas.  Amorphous radiopaque material projects over the RIGHT lower quadrant suggesting a dressing.  CT might provide more  sensitive assessment if warranted.    Chest is incompletely included on this study.  No new intrathoracic disease identified.    I detect no significant osseous abnormality.    X-ray:  There is medullary expansion of the phalanges, nonspecific.     There is lytic destruction involving the distal phalanx of the fourth digit and the base of the distal phalanx of the third digit with associated joint space narrowing at the DIP joints. There is soft tissue swelling in the fourth digit. Findings suspicious for osteomyelitis involving the distal phalanges of both digits.    Report has been flagged in the EPIC medical record system.      Assessment/Plan:     Neuro  Abdominal pain  - KUB on admission 3/14: Nonobstructive bowel gas pattern. No free air or portal venous gas on this single view. Ordering CT abdomen for further delineation of abdominal pain today.  - prn and scheduled pain control with tylenol       Cardiac  * Sepsis due to Gram-negative organism with septic shock  - due to klebs pna bacteremia since the 3/14/2017 that is pan-sensitive, repeat blood cx from the 15th with NGTD   - Hypotensive on vasopressors  - CXR 3/14 persistent patchy infiltrate in the right upper and midlung fields  - antimicrobials: zosyn, flagyl, received one dose of tobramycin on 3/15/2017, d/c vanc   - d/c voriconazole, continue ampho B. Continue acyclovir   - fungal workup, stool analysis  - leukocytosis:4.32 on admission (baseline ~0.9); ANC 0; CD4 count pending  - LA 3/15: 2.0, improving    Renal  Lactic acid acidosis  - septic shock vs. chronic lactic acidosis 2/2 AIDS/malignancy and hepatitis  - LA 3.9 on admission > 2.0 following 3L IVF  - resolved     ID  HIV (human immunodeficiency virus infection)  - CD4 count pending, last CD4 count was 7.3 in 1/18/2017  - ANC 0  - Holding HAART per ID recommendations to avoid IRIS  - continue IV acyclovir   - EBV not sent, CMV urine pending, acute hepatitis panel negative  - BAL to r/o PJP or  disseminated MAC not on ppx bactrim or azithromycin     Bacteremia due to Enterococcus  - as above in septic shock    Subacute osteomyelitis of left hand  - concern for osteo on digits 3-4 on left hand in distal phalanges  - ortho think it is unlikely source of sepsis   - no plans for OR today, continue following appreciate assistance    Clostridium difficile infection  - repeat C. difficile EIA negative toxin but positive PCR and Ag indicating colonization but not activity   - Stool culture and stool ovm pending   - diarrhea continues to be an ongoing issue for the patient  - Histoplasmosis, Cryptosporidium studies pending  - consider holding PO vancomycin     Hem/Onc  Acute myeloid leukemia not having achieved remission  - Heme/Onc following, appreciate assistance  - monitor CBC Q12H for now, transfuse if Hb < 7, Plt < 20,000/mm3  - H/H 8.4/24.2 and plt 26k - received one unit of pRBC on 3/15 monitor cbc bid   - no signs or symptoms of TLS   -  and uric acid is 2.8, daily uric acid and consider rasburicase for uric Acid > 8  - no signs of DIC with elevated fibrinogen and INR <1.5  - continue [facility]'s solution     Pancytopenia  - likely secondary to HIV vs AML vs sepsis   - s/p induction chemotherapy 7+3  - Type and screen q3 days  - continue to monitor     Acute leukemia  - see AML not in remission    Fluids/Electrolytes/Nutrition/GI  Hyperbilirubinemia  - direct vs indirect  - RUQ US with gall bladder wall thickening; no evidence of obstruction  - obtaining CT abdomen for further structural evaluation       Other  Immunocompromised patient  - holding HAART per ID/BMT  - continue ppx with acyclovir  - discussing case with ID for starting MAC and PJP ppx     Critical Care Medicine Daily Checklist:    A: Awake: RASS Goal/Actual Goal:    Actual: Arellano Agitation Sedation Scale (RASS): Alert and calm   B: Spontaneous Breathing Trial Performed?  n/a   C: SAT & SBT Coordinated?  n/a                     D:  Delirium: CAM-ICU Overall CAM-ICU: Negative   E: Early Mobility Performed? no   F: Feeding Goal:    Status:     Current Diet Order   Procedures    Diet Dental Soft      AS: Analgesia/Sedation prn   T: Thromboembolic Prophylaxis lovenox   H: HOB > 300 yes   U: Stress Ulcer Prophylaxis (if needed) none   G: Glucose Control prn   B: Bowel Function Stool Occurrence: 0   I: Indwelling Catheter (Lines & Pineda) Necessity yes   D: De-escalation of Antimicrobials/Pharmacotherapies yes    Plan for the day/ETD Resolution of septic shock, following up with consulting team and discussing goals of care      Code Status:  Family/Goals of Care: Full Code             JAMILA Mahmood  Critical Care Medicine  [facility]

## 2017-03-16 NOTE — PLAN OF CARE
Problem: Patient Care Overview  Goal: Plan of Care Review  Outcome: Ongoing (interventions implemented as appropriate)  No changes overnight. Remains  on Levophed gtt, and multiple antibiotics. No c/o pain. assessment and frequent vitals per flowsheet. Questions/concerns addressed, Plan of care reviewed with pt.

## 2017-03-16 NOTE — SUBJECTIVE & OBJECTIVE
Interval History: Feels a bit more comfortable today and denies pain. Tearful after discussion of end of life with hematology but states he understands it is approaching. Amenable to palliative care discussion today.      Review of Systems   Constitutional: Positive for activity change, appetite change, chills, diaphoresis, fatigue, fever and unexpected weight change.   HENT: Negative for dental problem, drooling, ear pain, mouth sores, postnasal drip, rhinorrhea, sinus pressure, sore throat and trouble swallowing.    Eyes: Negative for photophobia, pain and redness.   Respiratory: Positive for cough and shortness of breath. Negative for wheezing.    Cardiovascular: Negative for chest pain and leg swelling.   Gastrointestinal: Positive for abdominal pain and diarrhea. Negative for abdominal distention and nausea.   Genitourinary: Negative for dysuria, flank pain, frequency and urgency.   Musculoskeletal: Positive for arthralgias. Negative for back pain, gait problem and myalgias.   Skin: Negative for pallor and rash.   Neurological: Negative for dizziness, tremors, seizures, weakness and headaches.   Hematological: Bruises/bleeds easily.   Psychiatric/Behavioral: Negative for confusion.     Objective:     Vital Signs (Most Recent):  Temp: 98.8 °F (37.1 °C) (03/16/17 1100)  Pulse: (!) 127 (03/16/17 1300)  Resp: (!) 28 (03/16/17 1300)  BP: 94/61 (03/16/17 1300)  SpO2: 100 % (03/16/17 1300) Vital Signs (24h Range):  Temp:  [97.5 °F (36.4 °C)-100.4 °F (38 °C)] 98.8 °F (37.1 °C)  Pulse:  [101-133] 127  Resp:  [0-38] 28  SpO2:  [92 %-100 %] 100 %  BP: (71-97)/(46-66) 94/61     Weight: 35.8 kg (78 lb 14.8 oz)  Body mass index is 13.98 kg/(m^2).    Estimated Creatinine Clearance: 101.4 mL/min (based on Cr of 0.5).    Physical Exam   Constitutional: No distress.   Markedly cachectic.   HENT:   Head: Atraumatic.   Mouth/Throat: Oropharynx is clear and moist. No oropharyngeal exudate.   Eyes: Conjunctivae and EOM are normal.  Pupils are equal, round, and reactive to light. No scleral icterus.   Neck: Neck supple.   Cardiovascular: Normal rate and regular rhythm.  Exam reveals no friction rub.    Murmur heard.  Pulmonary/Chest: No respiratory distress. He has no wheezes. He has rales. He exhibits no tenderness.   Abdominal: Soft. Bowel sounds are normal. He exhibits no distension. There is no tenderness. There is no rebound and no guarding.   Musculoskeletal: Normal range of motion. He exhibits no edema.   Lymphadenopathy:     He has no cervical adenopathy.   Neurological: He is alert. No cranial nerve deficit. He exhibits normal muscle tone. Coordination normal.   Skin: No rash noted. No erythema.       Significant Labs:   CBC:   Recent Labs  Lab 03/15/17  1152 03/15/17  1726 03/16/17  0442   WBC 4.09 2.89* 3.05*   HGB 6.9* 7.7* 8.4*   HCT 20.2* 22.6* 24.2*   PLT 42* 22* 26*     CMP:   Recent Labs  Lab 03/14/17  2010 03/15/17  0323  03/15/17  2346 03/16/17  0442 03/16/17  0849   * 138  < > 138 140 141   K 2.1* 2.1*  < > 2.8* 3.9 3.1*    108  < > 108 111* 110   CO2 17* 20*  < > 21* 20* 20*    110  < > 176* 125* 108   BUN 11 10  < > 10 11 11   CREATININE 0.5 0.5  < > 0.5 0.5 0.5   CALCIUM 6.9* 7.5*  < > 7.5* 7.5* 7.7*   PROT 4.8* 5.2*  --   --  5.1*  --    ALBUMIN 1.4* 1.4*  --   --  1.3*  --    BILITOT 2.8* 3.3*  --   --  3.9*  --    ALKPHOS 198* 202*  --   --  164*  --    * 428*  --   --  146*  --    * 255*  --   --  179*  --    ANIONGAP 10 10  < > 9 9 11   EGFRNONAA >60.0 >60.0  < > >60.0 >60.0 >60.0   < > = values in this interval not displayed.    Significant Imaging: I have reviewed all pertinent imaging results/findings within the past 24 hours.     Abdominal U/S:  Nondistended gallbladder demonstrating increased wall thickness of 0.8 cm.  No cholelithiasis. The gallbladder wall thickening may be secondary to nondistention. However, it can also be seen in the setting of hepatitis.  Correlation  advised.      Left hand XR:  There is medullary expansion of the phalanges, nonspecific.   There is lytic destruction involving the distal phalanx of the fourth digit and the base of the distal phalanx of the third digit with associated joint space narrowing at the DIP joints. There is soft tissue swelling in the fourth digit. Findings suspicious for osteomyelitis involving the distal phalanges of both digits.     CXR: Impression persistent patchy infiltrate in the right upper and midlung field.     Microbiology:  3/14 blood cx: Klebsiella x2  3/14 urine cx: negative  3/14 sputum cx: NGTD  3/14 SCrAg pending  3/14 blood cx: Klebsiella x2  3/15 stool cx: pending  3/15 C.diff testing discordant, PCR positive  3/15 Shiga toxin pending  3/15 O&P pending  3/15 fungal blood cx: pending

## 2017-03-16 NOTE — ASSESSMENT & PLAN NOTE
- holding HAART per ID/BMT  - continue ppx with acyclovir  - discussing case with ID for starting MAC and PJP ppx

## 2017-03-16 NOTE — SUBJECTIVE & OBJECTIVE
Interval History/Significant Events:   NAOEN. Complaining of abdominal pain with the diarrhea, low po intake and refusing po contrast for the CT abdo/pelvix. Low grade fever recorded, weaning down on pressors,     Review of Systems   Denies any fevers or chills. No chest pain or SOB. + abdominal pain and diarrhea, low appetite, no n/v.     Objective:     Vital Signs (Most Recent):  Temp: (!) 100.4 °F (38 °C) (03/16/17 0700)  Pulse: (!) 129 (03/16/17 0700)  Resp: (!) 34 (03/16/17 0700)  BP: 93/62 (03/16/17 0700)  SpO2: 99 % (03/16/17 0700) Vital Signs (24h Range):  Temp:  [97.5 °F (36.4 °C)-100.4 °F (38 °C)] 100.4 °F (38 °C)  Pulse:  [101-133] 129  Resp:  [0-38] 34  SpO2:  [92 %-100 %] 99 %  BP: ()/(46-70) 93/62   Weight: 35.8 kg (78 lb 14.8 oz)  Body mass index is 13.98 kg/(m^2).      Intake/Output Summary (Last 24 hours) at 03/16/17 0926  Last data filed at 03/16/17 0700   Gross per 24 hour   Intake          4134.24 ml   Output             2343 ml   Net          1791.24 ml       Physical Exam  General: cachectic, ill-appearing, NAD  Eyes: conjunctivae/corneas clear. PERRL.  Neck: supple, symmetrical, trachea midline, no JVD  Cardiovascular: Heart: regular rate and rhythm, S1, S2 normal, no murmurs   Lungs: clear to auscultation bilaterally and normal respiratory effort  Chest Wall: no tenderness  Abdomen/Rectal: Abdomen: non-distended. + TTP allover, + BS. No masses. Rectal: not examined  Extremities: no edema, no redness or tenderness in the calves or thighs. Left 3rd and 4th digit ulceration and crusting noted with no purulent discharge, Pulses: 2+ and symmetric  Skin: Skin color, texture, turgor normal. No rashes or lesions  Musculoskeletal: full range of motion of joints  Lymph Nodes: + submandibular LN, No cervical adenopathy  Psych/Behavioral: Normal. Flat affect, Alert and oriented    Not requiring Vents:     Lines/Drains/Airways     Central Venous Catheter Line                 CVC Triple Lumen -  PreSep Cath 03/14/17 right internal jugular 2 days          Drain                 Urethral Catheter 03/14/17 1150 Non-latex 16 Fr. 1 day          Peripheral Intravenous Line                 Peripheral IV - Single Lumen 02/21/17 1127 Left Forearm 22 days         Peripheral IV - Single Lumen 03/14/17 Left Antecubital 2 days              Significant Labs:    CBC/Anemia Profile:    Recent Labs  Lab 03/15/17  1152 03/15/17  1726 03/16/17  0442   WBC 4.09 2.89* 3.05*   HGB 6.9* 7.7* 8.4*   HCT 20.2* 22.6* 24.2*   PLT 42* 22* 26*   MCV 88 89 88   RDW 15.7* 15.2* 15.3*        Chemistries:    Recent Labs  Lab 03/14/17  2010 03/15/17  0323  03/15/17  1424 03/15/17  1726 03/15/17  2346 03/16/17  0442   * 138  < >  --  138 138 140   K 2.1* 2.1*  < >  --  3.7 2.8* 3.9    108  < >  --  109 108 111*   CO2 17* 20*  < >  --  19* 21* 20*   BUN 11 10  < >  --  9 10 11   CREATININE 0.5 0.5  < >  --  0.5 0.5 0.5   CALCIUM 6.9* 7.5*  < >  --  7.7* 7.5* 7.5*   ALBUMIN 1.4* 1.4*  --   --   --   --  1.3*   PROT 4.8* 5.2*  --   --   --   --  5.1*   BILITOT 2.8* 3.3*  --   --   --   --  3.9*   ALKPHOS 198* 202*  --   --   --   --  164*   * 255*  --   --   --   --  179*   * 428*  --   --   --   --  146*   MG 1.7 2.3  --   --  1.6 2.2  --    PHOS  --  1.7*  --  1.4*  --   --  2.9   < > = values in this interval not displayed.      Significant Imaging:  KUB  The abdomen is relatively gasless; intraperitoneal fluid is not excluded in this patient with a history of abdominal distention and tenderness.  I do not identify bowel distention, intramural gas, intraportal gas or free peritoneal gas.  Amorphous radiopaque material projects over the RIGHT lower quadrant suggesting a dressing.  CT might provide more sensitive assessment if warranted.    Chest is incompletely included on this study.  No new intrathoracic disease identified.    I detect no significant osseous abnormality.    X-ray:  There is medullary expansion of  the phalanges, nonspecific.     There is lytic destruction involving the distal phalanx of the fourth digit and the base of the distal phalanx of the third digit with associated joint space narrowing at the DIP joints. There is soft tissue swelling in the fourth digit. Findings suspicious for osteomyelitis involving the distal phalanges of both digits.    Report has been flagged in the EPIC medical record system.

## 2017-03-16 NOTE — ASSESSMENT & PLAN NOTE
37yo man w/a history of recently diagnosed HIV/AIDS (dx 1/17/2017; VC0=214/7%, VL 22k, GT wildtype; started triumeq 2/2 but stopped 2/24) and AML (s/p 7+3 induction with persistent leukemia on day 14 marrow; induction course c/b neutropenic fevers due to C.diff colitis, VSE (faecium) septicemia, and Fusarium fungemia; not on ART or therapy at home for these pathogens after leaving [place] 2/24) who was admitted on 3/14/2017 with chills/sweats, malaise, weakness, MÉNDEZ, abdominal pain, diarrhea, and left hand pain and was found to have neutropenic fever/septic shock from Klebsiella septicemia, likely persistent CDI, untreated fusariosis (of note, resistant to voriconazole), and active AML (with circulating peripheral blasts) -- course c/b acute hepatitis and cardiomyopathy, likely related to septic shock. He has stabilized some on empiric zosyn, PO vanc/IV flagyl, ambisome, and acyclovir but his overall long-term prognosis is grave with high-risk AML, AIDS, active mold/bacterial infections, and medical noncompliance. End of life discussions have begun appropriately.    - would continue current coverage as detailed above while assessing goals of care with palliative care team and can taper bacterial coverage based on culture results (patient is amenable to being DNI/DNR and anticipate after having discussions with him and his children that he will be amenable to transfer to inpatient hospice)   - would hold ART as will not impact his immediate survival  - would hold on additional interventions at this time while awaiting pending goals of care discussion

## 2017-03-16 NOTE — PLAN OF CARE
Problem: Patient Care Overview  Goal: Plan of Care Review  Outcome: Ongoing (interventions implemented as appropriate)  POC reviewed with patient at bedside. Pt remains on levophed throughout shift to keep MAP>60. HR still 120s-130s team aware. Pt with multiple loose bowel movments this shift. Pineda catheter with 100-175ml/hr. Electrolyte replacements throughout shift. Multiple abx given this shift as ordered. Palliative care meeting with patient this shift, per patients wishes he was made a DNR. Wants to meet with his children and child life specialist. Plans to be made to make this possible. Boost given for nutrition. All other questions and concerns addressed. Comfort and support provided for patient.

## 2017-03-16 NOTE — PROGRESS NOTES
[facility]  Infectious Disease  Progress Note    Patient Name: Marck Howard  MRN: [id number]  Admission Date: 3/14/2017  Length of Stay: 2 days  Attending Physician: Yamilex Stephenson MD  Primary Care Provider: Primary Doctor No    Isolation Status: Special Contact  Assessment/Plan:      HIV (human immunodeficiency virus infection)  37yo man w/a history of recently diagnosed HIV/AIDS (dx 1/17/2017; SP7=310/7%, VL 22k, GT wildtype; started triumeq 2/2 but stopped 2/24) and AML (s/p 7+3 induction with persistent leukemia on day 14 marrow; induction course c/b neutropenic fevers due to C.diff colitis, VSE (faecium) septicemia, and Fusarium fungemia; not on ART or therapy at home for these pathogens after leaving [place] 2/24) who was admitted on 3/14/2017 with chills/sweats, malaise, weakness, MÉNDEZ, abdominal pain, diarrhea, and left hand pain and was found to have neutropenic fever/septic shock from Klebsiella septicemia, likely persistent CDI, untreated fusariosis (of note, resistant to voriconazole), and active AML (with circulating peripheral blasts) -- course c/b acute hepatitis and cardiomyopathy, likely related to septic shock. He has stabilized some on empiric zosyn, PO vanc/IV flagyl, ambisome, and acyclovir but his overall long-term prognosis is grave with high-risk AML, AIDS, active mold/bacterial infections, and medical noncompliance. End of life discussions have begun appropriately.    - would continue current coverage as detailed above while assessing goals of care with palliative care team and can taper bacterial coverage based on culture results (patient is amenable to being DNI/DNR and anticipate after having discussions with him and his children that he will be amenable to transfer to inpatient hospice)   - would hold ART as will not impact his immediate survival  - would hold on additional interventions at this time while awaiting pending goals of care  discussion      Anticipated Disposition: pending goals of care discussion    Thank you for your consult. I will follow-up with patient. Please contact us if you have any additional questions.     Mae Mike MD  Transplant ID Attending  [id number]      Mae Mike MD  Infectious Disease  [facility]    Subjective:     Principal Problem:Sepsis due to Gram-negative organism with septic shock    HPI:   Interval History: Feels a bit more comfortable today and denies pain. Tearful after discussion of end of life with hematology but states he understands it is approaching. Amenable to palliative care discussion today.      Review of Systems   Constitutional: Positive for activity change, appetite change, chills, diaphoresis, fatigue, fever and unexpected weight change.   HENT: Negative for dental problem, drooling, ear pain, mouth sores, postnasal drip, rhinorrhea, sinus pressure, sore throat and trouble swallowing.    Eyes: Negative for photophobia, pain and redness.   Respiratory: Positive for cough and shortness of breath. Negative for wheezing.    Cardiovascular: Negative for chest pain and leg swelling.   Gastrointestinal: Positive for abdominal pain and diarrhea. Negative for abdominal distention and nausea.   Genitourinary: Negative for dysuria, flank pain, frequency and urgency.   Musculoskeletal: Positive for arthralgias. Negative for back pain, gait problem and myalgias.   Skin: Negative for pallor and rash.   Neurological: Negative for dizziness, tremors, seizures, weakness and headaches.   Hematological: Bruises/bleeds easily.   Psychiatric/Behavioral: Negative for confusion.     Objective:     Vital Signs (Most Recent):  Temp: 98.8 °F (37.1 °C) (03/16/17 1100)  Pulse: (!) 127 (03/16/17 1300)  Resp: (!) 28 (03/16/17 1300)  BP: 94/61 (03/16/17 1300)  SpO2: 100 % (03/16/17 1300) Vital Signs (24h Range):  Temp:  [97.5 °F (36.4 °C)-100.4 °F (38 °C)] 98.8 °F (37.1 °C)  Pulse:  [101-133]  127  Resp:  [0-38] 28  SpO2:  [92 %-100 %] 100 %  BP: (71-97)/(46-66) 94/61     Weight: 35.8 kg (78 lb 14.8 oz)  Body mass index is 13.98 kg/(m^2).    Estimated Creatinine Clearance: 101.4 mL/min (based on Cr of 0.5).    Physical Exam   Constitutional: No distress.   Markedly cachectic.   HENT:   Head: Atraumatic.   Mouth/Throat: Oropharynx is clear and moist. No oropharyngeal exudate.   Eyes: Conjunctivae and EOM are normal. Pupils are equal, round, and reactive to light. No scleral icterus.   Neck: Neck supple.   Cardiovascular: Normal rate and regular rhythm.  Exam reveals no friction rub.    Murmur heard.  Pulmonary/Chest: No respiratory distress. He has no wheezes. He has rales. He exhibits no tenderness.   Abdominal: Soft. Bowel sounds are normal. He exhibits no distension. There is no tenderness. There is no rebound and no guarding.   Musculoskeletal: Normal range of motion. He exhibits no edema.   Lymphadenopathy:     He has no cervical adenopathy.   Neurological: He is alert. No cranial nerve deficit. He exhibits normal muscle tone. Coordination normal.   Skin: No rash noted. No erythema.       Significant Labs:   CBC:   Recent Labs  Lab 03/15/17  1152 03/15/17  1726 03/16/17  0442   WBC 4.09 2.89* 3.05*   HGB 6.9* 7.7* 8.4*   HCT 20.2* 22.6* 24.2*   PLT 42* 22* 26*     CMP:   Recent Labs  Lab 03/14/17  2010 03/15/17  0323  03/15/17  2346 03/16/17  0442 03/16/17  0849   * 138  < > 138 140 141   K 2.1* 2.1*  < > 2.8* 3.9 3.1*    108  < > 108 111* 110   CO2 17* 20*  < > 21* 20* 20*    110  < > 176* 125* 108   BUN 11 10  < > 10 11 11   CREATININE 0.5 0.5  < > 0.5 0.5 0.5   CALCIUM 6.9* 7.5*  < > 7.5* 7.5* 7.7*   PROT 4.8* 5.2*  --   --  5.1*  --    ALBUMIN 1.4* 1.4*  --   --  1.3*  --    BILITOT 2.8* 3.3*  --   --  3.9*  --    ALKPHOS 198* 202*  --   --  164*  --    * 428*  --   --  146*  --    * 255*  --   --  179*  --    ANIONGAP 10 10  < > 9 9 11   EGFRNONAA >60.0 >60.0  < >  >60.0 >60.0 >60.0   < > = values in this interval not displayed.    Significant Imaging: I have reviewed all pertinent imaging results/findings within the past 24 hours.     Abdominal U/S:  Nondistended gallbladder demonstrating increased wall thickness of 0.8 cm.  No cholelithiasis. The gallbladder wall thickening may be secondary to nondistention. However, it can also be seen in the setting of hepatitis.  Correlation advised.      Left hand XR:  There is medullary expansion of the phalanges, nonspecific.   There is lytic destruction involving the distal phalanx of the fourth digit and the base of the distal phalanx of the third digit with associated joint space narrowing at the DIP joints. There is soft tissue swelling in the fourth digit. Findings suspicious for osteomyelitis involving the distal phalanges of both digits.     CXR: Impression persistent patchy infiltrate in the right upper and midlung field.     Microbiology:  3/14 blood cx: Klebsiella x2  3/14 urine cx: negative  3/14 sputum cx: NGTD  3/14 SCrAg pending  3/14 blood cx: Klebsiella x2  3/15 stool cx: pending  3/15 C.diff testing discordant, PCR positive  3/15 Shiga toxin pending  3/15 O&P pending  3/15 fungal blood cx: pending

## 2017-03-16 NOTE — PROGRESS NOTES
ORTHO PROGRESS NOTE:    Marck Howard is a 36 y.o. male admitted on 3/14/2017  Hospital Day: 2     The patient was seen and examined this morning at the bedside. Adequate control of pain on current regimen.  Attempted levo wean overnight.  Still on 0.1.    PHYSICAL EXAM:  Awake/oriented x 3  Lethargic  AF, tachycardic, hypotensive, tachypneic  Good inspiratory effortbreathing  L ring and long fingers with no change in clinical appearance; no fluctuance or drainage; continued erythema, TTP, swelling  NVI distally    Vitals:    03/16/17 0527 03/16/17 0530 03/16/17 0600 03/16/17 0700   BP:  (!) 84/52 (!) 91/55 93/62   BP Location:   Right arm Right arm   Patient Position:   Lying Lying   BP Method:   Automatic Automatic   Pulse: (!) 127 (!) 127 (!) 128 (!) 129   Resp: (!) 35 (!) 36 (!) 35 (!) 34   Temp:    (!) 100.4 °F (38 °C)   TempSrc:    Oral   SpO2: 98% 97% 100% 99%   Weight:       Height:         I/O last 3 completed shifts:  In: 5862 [P.O.:480; I.V.:982; Blood:550; IV Piggyback:3850]  Out: 5013 [Urine:5010; Stool:3]    Recent Labs  Lab 03/14/17  2010 03/15/17  0323  03/15/17  1726 03/15/17  2346 03/16/17  0442   CALCIUM 6.9* 7.5*  < > 7.7* 7.5* 7.5*   PROT 4.8* 5.2*  --   --   --  5.1*   * 138  < > 138 138 140   K 2.1* 2.1*  < > 3.7 2.8* 3.9   CO2 17* 20*  < > 19* 21* 20*    108  < > 109 108 111*   BUN 11 10  < > 9 10 11   CREATININE 0.5 0.5  < > 0.5 0.5 0.5   < > = values in this interval not displayed.    Recent Labs  Lab 03/15/17  1152 03/15/17  1726 03/16/17  0442   WBC 4.09 2.89* 3.05*   RBC 2.30* 2.53* 2.75*   HGB 6.9* 7.7* 8.4*   HCT 20.2* 22.6* 24.2*   PLT 42* 22* 26*       Recent Labs  Lab 03/14/17  1128 03/15/17  1423 03/16/17  0442   INR 1.5* 1.8* 1.4*   APTT 26.9 37.4*  --        A/P: 36 y.o. male with L long and ring P2/3 erosions concerning for possible osteomyelitis.  Will discuss surgical management with staff.  Continue antibiotic treatment per primary.

## 2017-03-16 NOTE — ASSESSMENT & PLAN NOTE
- due to klebs pna bacteremia since the 3/14/2017 that is pan-sensitive, repeat blood cx from the 15th with NGTD   - Hypotensive on vasopressors  - CXR 3/14 persistent patchy infiltrate in the right upper and midlung fields  - antimicrobials: vanc, zosyn, flagyl, received one dose of tobramycin on 3/15/2017  - d/c voriconazole, continue acyclovir for ppx   - fungal workup, stool analysis  - leukocytosis:4.32 on admission (baseline ~0.9); ANC 0; CD4 count pending  - LA 3/15: 2.0, improving

## 2017-03-16 NOTE — PROGRESS NOTES
[facility]  Hematology/Oncology  Progress Note    Patient Name: Marck Howard  Admission Date: 3/14/2017  Hospital Length of Stay: 2 days  Code Status: Full Code     Subjective:     Interval History: 37 y/o M who is Day + 38 post MEC reinduction post original 7&3 therapy for AML in the background of HIV. Last hospital course was complicated by neutropenic fever with GPC in blood, C. Diff Infection, and Fungemia.  Signed out AMA on 2/24 with lost to follow up. He presented with generalized weakness, shortness of breath and fevers on 3/14. Found to be in severe sepsis/shock in the ER and transferred to ICU for higher level of care.      -Tmax 100.4 in the past 24 hours  -On Levophed gtt for hypotension  -patient seen in ICU, resting comfortably    Oncology Treatment Plan:   [facility] - INPATIENT    Medications:  Continuous Infusions:   sodium chloride 0.9% 10 mL/hr at 03/15/17 0600    norepinephrine bitartrate-D5W 0.1 mcg/kg/min (03/16/17 0700)     Scheduled Meds:   acetaminophen  500 mg Oral Q24H    acyclovir  200 mg Intravenous Q12H    amphotericin B liposome (AMBISOME) IVPB  5 mg/kg (Dosing Weight) Intravenous Q24H    diphenhydrAMINE  25 mg Oral Q24H    duke's soln (benadryl 30 mL, mylanta 30 mL, lidocane 30 mL, nystatin 30 mL) 120 mL  10 mL Oral BID    metronidazole  500 mg Intravenous Q8H    phenylephrine HCl in 0.9% NaCl  100 mcg Intravenous Once    phytonadione  5 mg Oral Daily    piperacillin-tazobactam 4.5 g in dextrose 5 % 100 mL IVPB (ready to mix system)  4.5 g Intravenous Q8H    sodium chloride 0.9%  500 mL Intravenous Q24H    sodium chloride 0.9%  500 mL Intravenous Q24H    sodium chloride 0.9%  3 mL Intravenous Q8H    vancomycin  250 mg Oral Q6H     PRN Meds:sodium chloride, sodium chloride, acetaminophen, dextrose 50%, glucagon (human recombinant), loperamide, magnesium sulfate IVPB, magnesium sulfate IVPB, ondansetron, potassium chloride 10%, potassium chloride  10%, sodium phosphate IVPB, sodium phosphate IVPB     Review of Systems   Constitutional: Positive for fatigue and fever. Negative for activity change, appetite change, chills, diaphoresis and unexpected weight change.   HENT: Negative for congestion, dental problem, mouth sores, nosebleeds, postnasal drip, rhinorrhea, sinus pressure, sore throat and trouble swallowing.    Eyes: Negative for photophobia and visual disturbance.   Respiratory: Positive for shortness of breath. Negative for cough.         With exertion   Cardiovascular: Negative for chest pain, palpitations and leg swelling.   Gastrointestinal: Negative for abdominal distention, abdominal pain, blood in stool, constipation, diarrhea, nausea and vomiting.   Genitourinary: Negative for dysuria, frequency, hematuria and urgency.   Musculoskeletal: Negative for arthralgias, back pain and myalgias.   Skin: Negative for pallor and rash.   Allergic/Immunologic: Positive for immunocompromised state.   Neurological: Positive for weakness. Negative for dizziness, syncope, numbness and headaches.   Hematological: Negative for adenopathy. Does not bruise/bleed easily.   Psychiatric/Behavioral: Negative for confusion. The patient is not nervous/anxious.      Objective:     Vital Signs (Most Recent):  Temp: (!) 100.4 °F (38 °C) (03/16/17 0700)  Pulse: (!) 129 (03/16/17 0700)  Resp: (!) 34 (03/16/17 0700)  BP: 93/62 (03/16/17 0700)  SpO2: 99 % (03/16/17 0700) Vital Signs (24h Range):  Temp:  [97.5 °F (36.4 °C)-100.4 °F (38 °C)] 100.4 °F (38 °C)  Pulse:  [101-133] 129  Resp:  [0-38] 34  SpO2:  [92 %-100 %] 99 %  BP: ()/(46-70) 93/62     Weight: 35.8 kg (78 lb 14.8 oz)  Body mass index is 13.98 kg/(m^2).  Body surface area is 1.26 meters squared.      Intake/Output Summary (Last 24 hours) at 03/16/17 1019  Last data filed at 03/16/17 0700   Gross per 24 hour   Intake          4109.74 ml   Output             2067 ml   Net          2042.74 ml       Physical Exam    Constitutional: He is oriented to person, place, and time. He appears well-developed. He appears cachectic. He has a sickly appearance. No distress.   HENT:   Head: Normocephalic.   Mouth/Throat: Oropharynx is clear and moist. No oropharyngeal exudate.   Eyes: Conjunctivae are normal. Pupils are equal, round, and reactive to light. No scleral icterus.   Neck: Normal range of motion. Neck supple. Normal range of motion present. No thyromegaly present.   Cardiovascular: Normal rate, regular rhythm, normal heart sounds and intact distal pulses.    Pulmonary/Chest: Effort normal and breath sounds normal. No respiratory distress.   Abdominal: Soft. Bowel sounds are normal. He exhibits no distension. There is no tenderness.   Musculoskeletal: Normal range of motion. He exhibits no edema or tenderness.   Lymphadenopathy:        Head (right side): No submandibular, no preauricular, no posterior auricular and no occipital adenopathy present.        Head (left side): No submandibular, no preauricular, no posterior auricular and no occipital adenopathy present.     He has no cervical adenopathy.     He has no axillary adenopathy.   Neurological: He is alert and oriented to person, place, and time. No cranial nerve deficit. Coordination normal.   Skin: Skin is warm and dry. No petechiae and no rash noted. No pallor.   Psychiatric: He has a normal mood and affect. Thought content normal.   Vitals reviewed.      Significant Labs:   CBC:     Recent Labs  Lab 03/15/17  1152 03/15/17  1726 03/16/17  0442   WBC 4.09 2.89* 3.05*   HGB 6.9* 7.7* 8.4*   HCT 20.2* 22.6* 24.2*   PLT 42* 22* 26*   , CMP:   Recent Labs  Lab 03/14/17  2010 03/15/17  0323  03/15/17  1726 03/15/17  2346 03/16/17  0442   * 138  < > 138 138 140   K 2.1* 2.1*  < > 3.7 2.8* 3.9    108  < > 109 108 111*   CO2 17* 20*  < > 19* 21* 20*    110  < > 133* 176* 125*   BUN 11 10  < > 9 10 11   CREATININE 0.5 0.5  < > 0.5 0.5 0.5   CALCIUM 6.9* 7.5*  <  > 7.7* 7.5* 7.5*   PROT 4.8* 5.2*  --   --   --  5.1*   ALBUMIN 1.4* 1.4*  --   --   --  1.3*   BILITOT 2.8* 3.3*  --   --   --  3.9*   ALKPHOS 198* 202*  --   --   --  164*   * 428*  --   --   --  146*   * 255*  --   --   --  179*   ANIONGAP 10 10  < > 10 9 9   EGFRNONAA >60.0 >60.0  < > >60.0 >60.0 >60.0   < > = values in this interval not displayed., Coagulation:     Recent Labs  Lab 03/14/17  1128 03/15/17  1423 03/16/17  0442   INR 1.5* 1.8* 1.4*   APTT 26.9 37.4*  --    , LDH: No results for input(s): LDHCSF, BFSOURCE in the last 48 hours. and Uric Acid     Recent Labs  Lab 03/14/17  1352   URICACID 2.8*       Assessment/Plan:     Plan:    Sepsis/Septic shock  - T.max 100.4, hypotensive on levophed gtt  - chest x-ray with R infiltrate, abdominal u/s with gallbladder wall thickening, KUB unremarkable, CT abdomen without contrast pending  - left hand with osteomyelitis to 3rd and 4th finger per hand x-ray, surgery consulted. We do not recommend proceeding with any surgical interventions  - BC with gram negative rods 4/4 bottles (Klebsiella pneumoniae)   - known history of untreated fungemia  - C.diff +  - on board spectrum coverage: Acyclovir, Flagyl, Zosyn, Vancomycin, and ambisome added. s/p Tobramycin.  - primary management per ICU, appreciate assistanc      AML without remission  - s/p 7+3 and MEC induction with known persistent disease  - 81% blasts on differential today  - monitor CBC Q12H for now, transfuse if Hb < 7, Plt < 10,000/mm3  - check LDH, Uric Acid 2.8, Fibrinogen >800   - INR 1.4, Vitamin K 5 mg daily for 3 days (day 2)  - monitor TLS and DIC labs daily.    - patient is not a candidate for further therapy for is AML given his functional status and current infections, recommend palliative care and DNR. Palliative care consulted. Patient states there is no way to contact his sister to discuss patient's care.       Marcella Neumann NP  Hematology/Oncology  [facility]  [place]

## 2017-03-16 NOTE — ASSESSMENT & PLAN NOTE
- CD4 count pending, last CD4 count was 7.3 in 1/18/2017  - ANC 0  - Holding HAART per ID recommendations to avoid IRIS  - continue IV acyclovir   - EBV not sent, CMV urine pending, acute hepatitis panel negative  - BAL to r/o PJP or disseminated MAC not on ppx bactrim or azithromycin

## 2017-03-16 NOTE — ASSESSMENT & PLAN NOTE
- KUB on admission 3/14: Nonobstructive bowel gas pattern. No free air or portal venous gas on this single view. Ordering CT abdomen for further delineation of abdominal pain today.  - prn and scheduled pain control with tylenol

## 2017-03-16 NOTE — ASSESSMENT & PLAN NOTE
- septic shock vs. chronic lactic acidosis 2/2 AIDS/malignancy and hepatitis  - LA 3.9 on admission > 2.0 following 3L IVF  - resolved

## 2017-03-16 NOTE — ASSESSMENT & PLAN NOTE
- concern for osteo on digits 3-4 on left hand in distal phalanges  - ortho think it is unlikely source of sepsis   - no plans for OR today, continue following appreciate assistance

## 2017-03-16 NOTE — ASSESSMENT & PLAN NOTE
- Heme/Onc following, appreciate assistance  - monitor CBC Q12H for now, transfuse if Hb < 7, Plt < 20,000/mm3  - H/H 8.4/24.2 and plt 26k - received one unit of pRBC on 3/15 monitor cbc bid   - no signs or symptoms of TLS   -  and uric acid is 2.8, daily uric acid and consider rasburicase for uric Acid > 8  - no signs of DIC with elevated fibrinogen and INR <1.5  - continue Cage's solution

## 2017-03-16 NOTE — ASSESSMENT & PLAN NOTE
- direct vs indirect  - RUQ US with gall bladder wall thickening; no evidence of obstruction  - obtaining CT abdomen for further structural evaluation

## 2017-03-16 NOTE — ASSESSMENT & PLAN NOTE
- repeat C. difficile EIA negative toxin but positive PCR and Ag indicating colonization but not activity   - Stool culture and stool ovm pending   - diarrhea continues to be an ongoing issue for the patient  - Histoplasmosis, Cryptosporidium studies pending  - consider holding PO vancomycin

## 2017-03-17 LAB
ALBUMIN SERPL BCP-MCNC: 1.3 G/DL
ALP SERPL-CCNC: 131 U/L
ALT SERPL W/O P-5'-P-CCNC: 112 U/L
ANION GAP SERPL CALC-SCNC: 10 MMOL/L
ANION GAP SERPL CALC-SCNC: 11 MMOL/L
ANION GAP SERPL CALC-SCNC: 8 MMOL/L
ANISOCYTOSIS BLD QL SMEAR: SLIGHT
AST SERPL-CCNC: 49 U/L
BACTERIA BLD CULT: NORMAL
BASOPHILS NFR BLD: 0 %
BILIRUB SERPL-MCNC: 4.6 MG/DL
BLASTS NFR BLD MANUAL: 85 %
BUN SERPL-MCNC: 13 MG/DL
BUN SERPL-MCNC: 13 MG/DL
BUN SERPL-MCNC: 14 MG/DL
BURR CELLS BLD QL SMEAR: ABNORMAL
CALCIUM SERPL-MCNC: 7.5 MG/DL
CALCIUM SERPL-MCNC: 7.6 MG/DL
CALCIUM SERPL-MCNC: 7.8 MG/DL
CHLORIDE SERPL-SCNC: 112 MMOL/L
CHLORIDE SERPL-SCNC: 112 MMOL/L
CHLORIDE SERPL-SCNC: 113 MMOL/L
CO2 SERPL-SCNC: 18 MMOL/L
CO2 SERPL-SCNC: 19 MMOL/L
CO2 SERPL-SCNC: 21 MMOL/L
CREAT SERPL-MCNC: 0.4 MG/DL
CYTOMEGALOVIRUS DNA: NOT DETECTED
CYTOMEGALOVIRUS LOG (IU/ML): <2.4 LOG (10) COPIES/ML
CYTOMEGALOVIRUS PCR, QUANT: <250 COPIES/ML
CYTOMEGALOVIRUS SOURCE: NORMAL
DIFFERENTIAL METHOD: ABNORMAL
EOSINOPHIL NFR BLD: 3 %
ERYTHROCYTE [DISTWIDTH] IN BLOOD BY AUTOMATED COUNT: 15.5 %
EST. GFR  (AFRICAN AMERICAN): >60 ML/MIN/1.73 M^2
EST. GFR  (NON AFRICAN AMERICAN): >60 ML/MIN/1.73 M^2
FIBRINOGEN PPP-MCNC: 736 MG/DL
GLUCOSE SERPL-MCNC: 65 MG/DL
GLUCOSE SERPL-MCNC: 77 MG/DL
GLUCOSE SERPL-MCNC: 95 MG/DL
HAPTOGLOB SERPL-MCNC: 259 MG/DL
HCT VFR BLD AUTO: 22.8 %
HGB BLD-MCNC: 7.8 G/DL
HYPOCHROMIA BLD QL SMEAR: ABNORMAL
INR PPP: 1.4
LDH SERPL L TO P-CCNC: 342 U/L
LYMPHOCYTES NFR BLD: 2 %
MAGNESIUM SERPL-MCNC: 1.6 MG/DL
MAGNESIUM SERPL-MCNC: 2.6 MG/DL
MCH RBC QN AUTO: 29.7 PG
MCHC RBC AUTO-ENTMCNC: 34.2 %
MCV RBC AUTO: 87 FL
MONOCYTES NFR BLD: 0 %
NEUTROPHILS NFR BLD: 9 %
NEUTS BAND NFR BLD MANUAL: 1 %
PHOSPHATE SERPL-MCNC: 1.9 MG/DL
PLATELET # BLD AUTO: 16 K/UL
PLATELET BLD QL SMEAR: ABNORMAL
PMV BLD AUTO: ABNORMAL FL
POCT GLUCOSE: 118 MG/DL (ref 70–110)
POCT GLUCOSE: 124 MG/DL (ref 70–110)
POCT GLUCOSE: 73 MG/DL (ref 70–110)
POCT GLUCOSE: 82 MG/DL (ref 70–110)
POCT GLUCOSE: 91 MG/DL (ref 70–110)
POCT GLUCOSE: 94 MG/DL (ref 70–110)
POIKILOCYTOSIS BLD QL SMEAR: SLIGHT
POTASSIUM SERPL-SCNC: 3 MMOL/L
POTASSIUM SERPL-SCNC: 3.1 MMOL/L
POTASSIUM SERPL-SCNC: 3.3 MMOL/L
POTASSIUM SERPL-SCNC: 3.6 MMOL/L
PROT SERPL-MCNC: 4.8 G/DL
PROTHROMBIN TIME: 14.3 SEC
RBC # BLD AUTO: 2.63 M/UL
SODIUM SERPL-SCNC: 141 MMOL/L
SODIUM SERPL-SCNC: 141 MMOL/L
SODIUM SERPL-SCNC: 142 MMOL/L
WBC # BLD AUTO: 2.16 K/UL

## 2017-03-17 PROCEDURE — 63600175 PHARM REV CODE 636 W HCPCS: Performed by: STUDENT IN AN ORGANIZED HEALTH CARE EDUCATION/TRAINING PROGRAM

## 2017-03-17 PROCEDURE — 99232 SBSQ HOSP IP/OBS MODERATE 35: CPT | Mod: ,,, | Performed by: INTERNAL MEDICINE

## 2017-03-17 PROCEDURE — 83010 ASSAY OF HAPTOGLOBIN QUANT: CPT

## 2017-03-17 PROCEDURE — 84100 ASSAY OF PHOSPHORUS: CPT

## 2017-03-17 PROCEDURE — 84132 ASSAY OF SERUM POTASSIUM: CPT

## 2017-03-17 PROCEDURE — 20000000 HC ICU ROOM

## 2017-03-17 PROCEDURE — 83615 LACTATE (LD) (LDH) ENZYME: CPT

## 2017-03-17 PROCEDURE — 99233 SBSQ HOSP IP/OBS HIGH 50: CPT | Mod: ,,, | Performed by: ORTHOPAEDIC SURGERY

## 2017-03-17 PROCEDURE — 25000003 PHARM REV CODE 250: Performed by: HOSPITALIST

## 2017-03-17 PROCEDURE — 85027 COMPLETE CBC AUTOMATED: CPT

## 2017-03-17 PROCEDURE — 99291 CRITICAL CARE FIRST HOUR: CPT | Mod: ,,, | Performed by: INTERNAL MEDICINE

## 2017-03-17 PROCEDURE — 85610 PROTHROMBIN TIME: CPT

## 2017-03-17 PROCEDURE — 25000003 PHARM REV CODE 250: Performed by: INTERNAL MEDICINE

## 2017-03-17 PROCEDURE — 85384 FIBRINOGEN ACTIVITY: CPT

## 2017-03-17 PROCEDURE — 36415 COLL VENOUS BLD VENIPUNCTURE: CPT

## 2017-03-17 PROCEDURE — 85007 BL SMEAR W/DIFF WBC COUNT: CPT

## 2017-03-17 PROCEDURE — 63600175 PHARM REV CODE 636 W HCPCS: Performed by: INTERNAL MEDICINE

## 2017-03-17 PROCEDURE — 25000003 PHARM REV CODE 250: Performed by: NURSE PRACTITIONER

## 2017-03-17 PROCEDURE — 99233 SBSQ HOSP IP/OBS HIGH 50: CPT | Mod: ,,, | Performed by: INTERNAL MEDICINE

## 2017-03-17 PROCEDURE — 80053 COMPREHEN METABOLIC PANEL: CPT

## 2017-03-17 PROCEDURE — 80048 BASIC METABOLIC PNL TOTAL CA: CPT | Mod: 91

## 2017-03-17 PROCEDURE — 83735 ASSAY OF MAGNESIUM: CPT

## 2017-03-17 PROCEDURE — 87040 BLOOD CULTURE FOR BACTERIA: CPT | Mod: 59

## 2017-03-17 RX ORDER — POTASSIUM CHLORIDE 7.45 MG/ML
10 INJECTION INTRAVENOUS ONCE
Status: COMPLETED | OUTPATIENT
Start: 2017-03-17 | End: 2017-03-17

## 2017-03-17 RX ORDER — POTASSIUM CHLORIDE 20 MEQ/1
40 TABLET, EXTENDED RELEASE ORAL ONCE
Status: DISCONTINUED | OUTPATIENT
Start: 2017-03-17 | End: 2017-03-17

## 2017-03-17 RX ORDER — POTASSIUM CHLORIDE 29.8 MG/ML
40 INJECTION INTRAVENOUS ONCE
Status: COMPLETED | OUTPATIENT
Start: 2017-03-17 | End: 2017-03-17

## 2017-03-17 RX ORDER — POTASSIUM CHLORIDE 7.45 MG/ML
10 INJECTION INTRAVENOUS
Status: COMPLETED | OUTPATIENT
Start: 2017-03-17 | End: 2017-03-17

## 2017-03-17 RX ORDER — SODIUM,POTASSIUM PHOSPHATES 280-250MG
1 POWDER IN PACKET (EA) ORAL ONCE
Status: DISCONTINUED | OUTPATIENT
Start: 2017-03-17 | End: 2017-03-17

## 2017-03-17 RX ORDER — POTASSIUM CHLORIDE 20 MEQ/15ML
40 SOLUTION ORAL ONCE
Status: DISCONTINUED | OUTPATIENT
Start: 2017-03-17 | End: 2017-03-17

## 2017-03-17 RX ORDER — MAGNESIUM SULFATE HEPTAHYDRATE 40 MG/ML
2 INJECTION, SOLUTION INTRAVENOUS
Status: COMPLETED | OUTPATIENT
Start: 2017-03-17 | End: 2017-03-17

## 2017-03-17 RX ADMIN — MAGNESIUM SULFATE IN WATER 2 G: 40 INJECTION, SOLUTION INTRAVENOUS at 02:03

## 2017-03-17 RX ADMIN — SODIUM PHOSPHATE, MONOBASIC, MONOHYDRATE 20.01 MMOL: 276; 142 INJECTION, SOLUTION INTRAVENOUS at 06:03

## 2017-03-17 RX ADMIN — MAGNESIUM SULFATE IN WATER 2 G: 40 INJECTION, SOLUTION INTRAVENOUS at 04:03

## 2017-03-17 RX ADMIN — POTASSIUM CHLORIDE 10 MEQ: 10 INJECTION, SOLUTION INTRAVENOUS at 03:03

## 2017-03-17 RX ADMIN — ACYCLOVIR SODIUM 300 MG: 50 INJECTION, SOLUTION INTRAVENOUS at 06:03

## 2017-03-17 RX ADMIN — ACETAMINOPHEN 500 MG: 500 TABLET ORAL at 03:03

## 2017-03-17 RX ADMIN — METRONIDAZOLE 500 MG: 500 INJECTION, SOLUTION INTRAVENOUS at 08:03

## 2017-03-17 RX ADMIN — METRONIDAZOLE 500 MG: 500 INJECTION, SOLUTION INTRAVENOUS at 05:03

## 2017-03-17 RX ADMIN — SODIUM CHLORIDE 500 ML: 0.9 INJECTION, SOLUTION INTRAVENOUS at 05:03

## 2017-03-17 RX ADMIN — METRONIDAZOLE 500 MG: 500 INJECTION, SOLUTION INTRAVENOUS at 12:03

## 2017-03-17 RX ADMIN — Medication 0.06 MCG/KG/MIN: at 02:03

## 2017-03-17 RX ADMIN — POTASSIUM CHLORIDE 10 MEQ: 10 INJECTION, SOLUTION INTRAVENOUS at 04:03

## 2017-03-17 RX ADMIN — AMPHOTERICIN B 200 MG: 50 INJECTION, POWDER, LYOPHILIZED, FOR SOLUTION INTRAVENOUS at 04:03

## 2017-03-17 RX ADMIN — Medication 3 ML: at 02:03

## 2017-03-17 RX ADMIN — POTASSIUM CHLORIDE 40 MEQ: 400 INJECTION, SOLUTION INTRAVENOUS at 09:03

## 2017-03-17 RX ADMIN — POTASSIUM CHLORIDE 10 MEQ: 10 INJECTION, SOLUTION INTRAVENOUS at 02:03

## 2017-03-17 RX ADMIN — PHYTONADIONE 5 MG: 5 TABLET ORAL at 08:03

## 2017-03-17 RX ADMIN — CEFAZOLIN SODIUM 1 G: 1 SOLUTION INTRAVENOUS at 06:03

## 2017-03-17 RX ADMIN — SODIUM CHLORIDE 500 ML: 0.9 INJECTION, SOLUTION INTRAVENOUS at 03:03

## 2017-03-17 RX ADMIN — CEFAZOLIN SODIUM 1 G: 1 SOLUTION INTRAVENOUS at 09:03

## 2017-03-17 RX ADMIN — POTASSIUM CHLORIDE 10 MEQ: 10 INJECTION, SOLUTION INTRAVENOUS at 06:03

## 2017-03-17 RX ADMIN — CEFAZOLIN SODIUM 1 G: 1 SOLUTION INTRAVENOUS at 04:03

## 2017-03-17 NOTE — ASSESSMENT & PLAN NOTE
- 3.3 on admission  - repleted 3/14, 3/15  - K 2.1 on 3/15  - repeat BMP with K 2.5; K repleted, BMP q8h with Mg level  - ongoing issue likely 2/2 persistent diarrhea, poor PO intake  - replete K and Mag PRN

## 2017-03-17 NOTE — PROGRESS NOTES
[facility]  Hematology/Oncology  Progress Note    Patient Name: Marck Howard  Admission Date: 3/14/2017  Hospital Length of Stay: 3 days  Code Status: DNR     Subjective:     Interval History: 37 y/o M who is Day + 39 post MEC reinduction post original 7&3 therapy for AML in the background of HIV. Last hospital course was complicated by neutropenic fever with GPC in blood, C. Diff Infection, and Fungemia.  Signed out AMA on 2/24 with lost to follow up. He presented with generalized weakness, shortness of breath and fevers on 3/14. Found to be in severe sepsis/shock in the ER and transferred to ICU for higher level of care.      -Afebrile for the past 24 hours  -On Levophed gtt for hypotension, remains tachyphemic and tachycardic   -DNR  -more withdrawn since yesterday per nursing staff    Oncology Treatment Plan:   [facility] - INPATIENT    Medications:  Continuous Infusions:   norepinephrine bitartrate-D5W 0.06 mcg/kg/min (03/17/17 0800)     Scheduled Meds:   acetaminophen  500 mg Oral Q24H    acyclovir  300 mg Intravenous Q12H    amphotericin B liposome (AMBISOME) IVPB  5 mg/kg (Dosing Weight) Intravenous Q24H    ceFAZolin (ANCEF) IVPB  1 g Intravenous Q8H    duke's soln (benadryl 30 mL, mylanta 30 mL, lidocane 30 mL, nystatin 30 mL) 120 mL  10 mL Oral BID    metronidazole  500 mg Intravenous Q8H    sodium chloride 0.9%  500 mL Intravenous Q24H    sodium chloride 0.9%  500 mL Intravenous Q24H    sodium chloride 0.9%  3 mL Intravenous Q8H    vancomycin  250 mg Oral Q6H     PRN Meds:sodium chloride, sodium chloride, acetaminophen, dextrose 50%, glucagon (human recombinant), magnesium sulfate IVPB, magnesium sulfate IVPB, ondansetron, potassium chloride 10%, potassium chloride 10%, sodium phosphate IVPB, sodium phosphate IVPB     Review of Systems   Constitutional: Positive for fever. Negative for activity change, appetite change, chills, diaphoresis, fatigue and unexpected weight  change.   HENT: Negative for congestion, dental problem, mouth sores, nosebleeds, postnasal drip, rhinorrhea, sinus pressure, sore throat and trouble swallowing.    Eyes: Negative for photophobia and visual disturbance.   Respiratory: Positive for shortness of breath. Negative for cough.         With exertion   Cardiovascular: Negative for chest pain, palpitations and leg swelling.   Gastrointestinal: Negative for abdominal distention, abdominal pain, blood in stool, constipation, diarrhea, nausea and vomiting.   Genitourinary: Negative for dysuria, frequency, hematuria and urgency.   Musculoskeletal: Negative for arthralgias, back pain and myalgias.   Skin: Negative for pallor and rash.   Allergic/Immunologic: Positive for immunocompromised state.   Neurological: Positive for weakness. Negative for dizziness, syncope, numbness and headaches.   Hematological: Negative for adenopathy. Does not bruise/bleed easily.   Psychiatric/Behavioral: Negative for confusion. The patient is not nervous/anxious.      Objective:     Vital Signs (Most Recent):  Temp: 98.1 °F (36.7 °C) (03/17/17 0710)  Pulse: (!) 121 (03/17/17 0800)  Resp: (!) 36 (03/17/17 0800)  BP: 98/66 (03/17/17 0800)  SpO2: 100 % (03/17/17 0800) Vital Signs (24h Range):  Temp:  [97.8 °F (36.6 °C)-98.9 °F (37.2 °C)] 98.1 °F (36.7 °C)  Pulse:  [104-130] 121  Resp:  [20-36] 36  SpO2:  [98 %-100 %] 100 %  BP: (75-99)/(44-67) 98/66     Weight: 35.8 kg (78 lb 14.8 oz)  Body mass index is 13.98 kg/(m^2).  Body surface area is 1.26 meters squared.      Intake/Output Summary (Last 24 hours) at 03/17/17 0850  Last data filed at 03/17/17 0830   Gross per 24 hour   Intake          2484.78 ml   Output             2225 ml   Net           259.78 ml       Physical Exam   Constitutional: He is oriented to person, place, and time. He appears well-developed. He appears cachectic. He has a sickly appearance. No distress.   HENT:   Head: Normocephalic.   Mouth/Throat: Oropharynx is  clear and moist. No oropharyngeal exudate.   Eyes: Conjunctivae are normal. Pupils are equal, round, and reactive to light. No scleral icterus.   Neck: Normal range of motion. Neck supple. Normal range of motion present. No thyromegaly present.   Cardiovascular: Normal rate, regular rhythm, normal heart sounds and intact distal pulses.    Pulmonary/Chest: Effort normal and breath sounds normal. No respiratory distress.   Abdominal: Soft. Bowel sounds are normal. He exhibits no distension. There is no tenderness.   Musculoskeletal: Normal range of motion. He exhibits no edema or tenderness.   Lymphadenopathy:        Head (right side): No submandibular, no preauricular, no posterior auricular and no occipital adenopathy present.        Head (left side): No submandibular, no preauricular, no posterior auricular and no occipital adenopathy present.     He has no cervical adenopathy.     He has no axillary adenopathy.   Neurological: He is alert and oriented to person, place, and time. No cranial nerve deficit. Coordination normal.   Skin: Skin is warm and dry. No petechiae and no rash noted. No pallor.   Psychiatric: He has a normal mood and affect. Thought content normal.   Vitals reviewed.      Significant Labs:   CBC:     Recent Labs  Lab 03/15/17  1726 03/16/17  0442 03/17/17  0326   WBC 2.89* 3.05* 2.16*   HGB 7.7* 8.4* 7.8*   HCT 22.6* 24.2* 22.8*   PLT 22* 26* 16*   , CMP:     Recent Labs  Lab 03/16/17  0442 03/16/17  0849 03/16/17  1712 03/17/17  0326    141 140  140 142   K 3.9 3.1* 4.3  4.3 3.1*   * 110 112*  112* 113*   CO2 20* 20* 19*  19* 18*   * 108 101  101 65*   BUN 11 11 11  11 13   CREATININE 0.5 0.5 0.5  0.5 0.4*   CALCIUM 7.5* 7.7* 7.7*  7.7* 7.6*   PROT 5.1*  --   --  4.8*   ALBUMIN 1.3*  --   --  1.3*   BILITOT 3.9*  --   --  4.6*   ALKPHOS 164*  --   --  131   *  --   --  49*   *  --   --  112*   ANIONGAP 9 11 9  9 11   EGFRNONAA >60.0 >60.0 >60.0  >60.0  >60.0   , Coagulation:     Recent Labs  Lab 03/15/17  1423 03/16/17  0442 03/17/17  0326   INR 1.8* 1.4* 1.4*   APTT 37.4*  --   --    , LDH: No results for input(s): LDHCSF, BFSOURCE in the last 48 hours. and Uric Acid   No results for input(s): URICACID in the last 48 hours.    Assessment/Plan:     Plan:    Sepsis/Septic shock  - afebrile overnight, hypotensive on levophed gtt  - chest x-ray with R infiltrate, abdominal u/s with gallbladder wall thickening, KUB unremarkable, CT abdomen without contrast pending  - left hand with osteomyelitis to 3rd and 4th finger per hand x-ray, surgery consulted. We do not recommend proceeding with any surgical interventions  - BC with gram negative rods 4/4 bottles (Klebsiella pneumoniae)--sensitivities pending  - known history of untreated fungemia  - C.diff +  - on board spectrum coverage: Acyclovir, Flagyl, Zosyn, Vancomycin, and ambisome added. s/p Tobramycin.  - primary management per ICU, appreciate assistanc      Refractory AML   - s/p 7+3 and MEC induction with known persistent disease  - 85% blasts on differential today  - transfuse if Hb < 7, Plt < 10,000/mm3  - LDH wnl, Uric Acid 2.8, Fibrinogen >800   - INR 1.4, Vitamin K 5 mg daily for 3 days (day 3)  - monitor TLS and DIC labs daily.    - patient is not a candidate for further therapy for is AML given his functional status, refractory disease, AIDS and current infections, recommend palliative care. Palliative care following. Patient is an DNR.       Marcella Neumann NP  Hematology/Oncology  [facility]

## 2017-03-17 NOTE — PROGRESS NOTES
[facility]  Critical Care Medicine  Progress Note    Patient Name: Marck Howard  MRN: [id number]  Admission Date: 3/14/2017  Hospital Length of Stay: 3 days  Code Status: DNR  Attending Provider: Yamilex Stephenson MD  Primary Care Provider: Primary Doctor No   Principal Problem: Sepsis due to Gram-negative organism with septic shock    Subjective:     HPI:  35 yo male presents to the ED with generalized weakness and shortness of breath with exertion for the past two weeks. He denies fever, but shivers with chills. Denies cough, nausea, vomiting. States that he has had several stools in the past two days. Per RN, patient had three episodes of large volume diarrhea in the ED, all nonbloody. He denies chest pain, headache, lightheadedness, dizziness, LOC. Reports diffuse but mild abdominal pain.     Patient was recently admitted for approximately 1 month (1/17-2/24 ) for weight loss, muscle pain, and weakness and was diagnosed with HIV and AML at that time. 7+3 induction chemotherapy and HAART therapy were initiated. Hedeveloped neutropenic fever and was later found to have blood cultures growing E. faecium and Fusarium, and stool cultures with C. difficile. Infections were inadequately treated with cefipime, vancomycin, and flagy, as patient declined antifungal therapy and elected to leave the hospital AMA. Patient did not complete home courses of ciprofloxacin, flagyl, or nystatin. He has not taken HAART therapy as an outpatient.     Of note, patient denies HIV status. States he has never been diagnosed with HIV. Denies drug, EtOH use.         Hospital/ICU Course:  3/14: 4L NS administered in the ED. LA 3.8 > 3.6 with hydration.  Admitted to the ICU. Consult ID/BMT, Heme Onc. Blood, urine, respiratory cultures. C. difficile EIA. PO/IV vancomycin, IV flagyl, IV cefipime, IV vorconazole started. US and CT abdomen ordered for transaminitis. HAART held. Contact precautions. Hand  Xray  concerning for osteomyelitis of left distal phalanges, digits 3-4.    3/16/2017: weaning down on pressors, obtaining CT abdomen today, still complaining of diarrhea and pending results, ortho not planning for OR today  3/17: NAEON. Complaining of abdominal pain with the diarrhea, low po intake and refusing po contrast for the CT abdo/pelvix. Low grade fever recorded, weaning down on pressors       Interval History/Significant Events:   Overnight no acute events. Discussed with patient and family yesterday goals of care. Patient has elected to change code status to DNR. Per RN, patient withdrawn overnight and seemed depressed. This AM no complaints, but remains withdrawn. CT chest/head are ordered. Blood cultures growing K. Pneumoniae. C. diff+ stool. Norepinephrine requirements 0.06-0.07 overnight, stable on RA.    Review of Systems  Objective:     Vital Signs (Most Recent):  Temp: 98.1 °F (36.7 °C) (03/17/17 0710)  Pulse: (!) 125 (03/17/17 0710)  Resp: 20 (03/17/17 0710)  BP: (!) 87/54 (03/17/17 0710)  SpO2: 98 % (03/17/17 0710) Vital Signs (24h Range):  Temp:  [97.8 °F (36.6 °C)-98.9 °F (37.2 °C)] 98.1 °F (36.7 °C)  Pulse:  [104-130] 125  Resp:  [20-34] 20  SpO2:  [98 %-100 %] 98 %  BP: (75-99)/(44-67) 87/54   Weight: 35.8 kg (78 lb 14.8 oz)  Body mass index is 13.98 kg/(m^2).      Intake/Output Summary (Last 24 hours) at 03/17/17 0838  Last data filed at 03/17/17 0700   Gross per 24 hour   Intake          2365.23 ml   Output             2225 ml   Net           140.23 ml     Physical Exam   Constitutional: He is oriented to person, place, and time.   HENT:   Head: Normocephalic and atraumatic.   Eyes: Pupils are equal, round, and reactive to light.   Neck: Neck supple.   Cardiovascular: Regular rhythm and normal heart sounds.  Exam reveals no friction rub.    No murmur heard.  Tachycardic (120s)   Pulmonary/Chest: Effort normal and breath sounds normal. No respiratory distress. He has no wheezes.   Abdominal:  Soft. Bowel sounds are normal. He exhibits no distension. There is no tenderness.   Neurological: He is alert and oriented to person, place, and time.   Skin: Skin is warm and dry.   Psychiatric:   Depressed mood   Nursing note and vitals reviewed.    Vents:     Lines/Drains/Airways     Central Venous Catheter Line                 CVC Triple Lumen - PreSep Cath 03/14/17 right internal jugular 3 days          Drain                 Urethral Catheter 03/14/17 1150 Non-latex 16 Fr. 2 days          Peripheral Intravenous Line                 Peripheral IV - Single Lumen 03/14/17 Left Antecubital 3 days              Significant Labs:    CBC/Anemia Profile:    Recent Labs  Lab 03/15/17  1726 03/16/17  0442 03/17/17  0326   WBC 2.89* 3.05* 2.16*   HGB 7.7* 8.4* 7.8*   HCT 22.6* 24.2* 22.8*   PLT 22* 26* 16*   MCV 89 88 87   RDW 15.2* 15.3* 15.5*      Chemistries:    Recent Labs  Lab 03/15/17  1424  03/15/17  2346 03/16/17  0442 03/16/17  0849 03/16/17  1712 03/17/17  0326   NA  --   < > 138 140 141 140  140 142   K  --   < > 2.8* 3.9 3.1* 4.3  4.3 3.1*   CL  --   < > 108 111* 110 112*  112* 113*   CO2  --   < > 21* 20* 20* 19*  19* 18*   BUN  --   < > 10 11 11 11  11 13   CREATININE  --   < > 0.5 0.5 0.5 0.5  0.5 0.4*   CALCIUM  --   < > 7.5* 7.5* 7.7* 7.7*  7.7* 7.6*   ALBUMIN  --   --   --  1.3*  --   --  1.3*   PROT  --   --   --  5.1*  --   --  4.8*   BILITOT  --   --   --  3.9*  --   --  4.6*   ALKPHOS  --   --   --  164*  --   --  131   ALT  --   --   --  179*  --   --  112*   AST  --   --   --  146*  --   --  49*   MG  --   < > 2.2  --  1.7 2.2  2.2  --    PHOS 1.4*  --   --  2.9  --   --  1.9*   < > = values in this interval not displayed.    Coagulation:   Recent Labs  Lab 03/15/17  1423  03/17/17  0326   INR 1.8*  < > 1.4*   APTT 37.4*  --   --    < > = values in this interval not displayed.  Lactic Acid:   Recent Labs  Lab 03/15/17  0907   LACTATE 2.0     Significant Imaging:  CT A/P: I have reviewed all  pertinent results/findings within the past 24 hours:  1. Interval development of multiple ill-defined subcentimeter hypodensities within the liver and spleen concerning for developing abscesses in this patient with neutropenia and sepsis.  2. Diffuse wall thickening and hyperemia of the cecum and right colon extending to the hepatic flexure which has increased in extent from the prior study. Findings may represent neutropenic colitis.  3. Increased patchy consolidation in the visualized portion of the lungs may represent multifocal pneumonia or septic emboli.  4. Diffuse bladder wall thickening may relate to incomplete distention or cystitis.  5. Mild scattered ascites, increased.    Assessment/Plan:     Neuro  Abdominal pain  - KUB on admission 3/14: Nonobstructive bowel gas pattern. No free air or portal venous gas on this single view. Ordering CT abdomen for further delineation of abdominal pain today.  - prn and scheduled pain control with tylenol       Cardiac  * Sepsis due to Gram-negative organism with septic shock  - due to klebs pna bacteremia since the 3/14/2017 that is pan-sensitive, repeat blood cx from the 15th with NGTD   - Hypotensive on vasopressors   - CXR 3/14 persistent patchy infiltrate in the right upper and midlung fields  - antimicrobials: vanc, ancef, flagyl, received one dose of tobramycin on 3/15/2017  - d/c voriconazole, continue amphotericin, continue acyclovir for ppx   - fungal workup, stool analysis pending: C. dif +  - leukocytosis:4.32 on admission (baseline ~0.9); ANC 0; abs CD4 count 6 (Th4 10%)  - LA 3/15: 2.0, improving  - CT chest and head ordered    Renal  Lactic acid acidosis  - septic shock vs. chronic lactic acidosis 2/2 AIDS/malignancy and hepatitis  - LA 3.9 on admission > 2.0 following 3L IVF  - resolved     ID  HIV (human immunodeficiency virus infection)  - CD4 count 6, last CD4 count was 7.3 in 1/18/2017  - ANC 0  - Holding HAART per ID recommendations to avoid IRIS  -  continue IV acyclovir   - EBV not sent, CMV urine pending, acute hepatitis panel negative  - BAL to r/o PJP or disseminated MAC not on ppx bactrim or azithromycin     Clostridium difficile infection  - repeat C. difficile EIA negative toxin but positive PCR and Ag indicating colonization but not activity   - Stool culture and stool ovm pending   - diarrhea continues to be an ongoing issue for the patient  - Histoplasmosis, Cryptosporidium studies pending  - continue PO vancomycin     Bacteremia due to Enterococcus  - as above in septic shock  - patient with blood cultures growing K. pneumonia x2; on appropriate antibiotic coverage    Hem/Onc  Acute myeloid leukemia not having achieved remission  - Heme/Onc following, appreciate assistance  - monitor CBC Q12H for now, transfuse if Hb < 7, Plt < 20,000/mm3  - H/H 8.4/24.2 and plt 26k - received one unit of pRBC on 3/15 monitor cbc bid   - no signs or symptoms of TLS   -  and uric acid is 2.8, daily uric acid and consider rasburicase for uric Acid > 8  - no signs of DIC with elevated fibrinogen and INR <1.5  - continue Duke's solution     Pancytopenia  - likely secondary to HIV vs AML vs sepsis   - s/p induction chemotherapy 7+3  - Type and screen q3 days  - continue to monitor   - worsening thrombocytopenia  (42>>16)   - no s/s of bleeding, stable H/H, negative DIC panels to date    Acute leukemia  - see AML not in remission    Fluids/Electrolytes/Nutrition/GI  Hyperbilirubinemia  - direct vs indirect  - RUQ US with gall bladder wall thickening; no evidence of obstruction  - CT abdomen wit no structural issues, c/f liver abscesses       Hypokalemia  - 3.3 on admission  - repleted 3/14, 3/15  - K 2.1 on 3/15  - repeat BMP with K 2.5; K repleted, BMP q8h with Mg level  - ongoing issue likely 2/2 persistent diarrhea, poor PO intake  - replete K and Mag PRN    Other  Transaminitis  - Abd Xray 3/14: Nonobstructive bowel gas pattern. No free air or portal venous gas  on this single view.   - US Abdomen complete ordered  - CT Abd/Pelvis ordered  - AST//390, Alk Phos 299 on admission   - downtrendin/255 >> 49/112   - acute hepatitis panel pan-negative    Immunocompromised patient  - holding HAART per ID/BMT  - continue ppx with acyclovir  - discussing case with ID for starting MAC and PJP ppx     Critical Care Medicine Daily Checklist:    A: Awake: RASS Goal/Actual Goal:    Actual: Arellano Agitation Sedation Scale (RASS): Alert and calm   B: Spontaneous Breathing Trial Performed?  NA- spontaneously breathing   C: SAT & SBT Coordinated?  NA                      D: Delirium: CAM-ICU Overall CAM-ICU: Negative   E: Early Mobility Performed? No   F: Feeding Goal:    Status:     Current Diet Order   Procedures    Diet Dental Soft      AS: Analgesia/Sedation Tylenol PRN   T: Thromboembolic Prophylaxis Holding for plt <30   H: HOB > 300 Yes   U: Stress Ulcer Prophylaxis (if needed) NA   G: Glucose Control Boost as tolerated   B: Bowel Function Stool Occurrence: 1   I: Indwelling Catheter (Lines & Pineda) Necessity Triple lumen RIJ, PIV, Pineda   D: De-escalation of Antimicrobials/Pharmacotherapies No de-escalation at this time    Plan for the day/ETD Family meeting, goals of care    Code Status:  Family/Goals of Care: DNR  Meeting with family     Critical Care Time: 30 minutes  Critical secondary to Patient has a condition that poses threat to life and bodily function: AIDS with CD4 ct 4  Patient is currently on drug therapy requiring intensive monitoring for toxicity: Vancomycin      Critical care was time spent personally by me on the following activities: development of treatment plan with patient or surrogate and bedside caregivers, discussions with consultants, evaluation of patient's response to treatment, examination of patient, ordering and performing treatments and interventions, ordering and review of laboratory studies, ordering and review of radiographic studies,  pulse oximetry, re-evaluation of patient's condition. This critical care time did not overlap with that of any other provider or involve time for any procedures.     Phoebe Bonds MD  Critical Care Medicine  [facility]

## 2017-03-17 NOTE — ASSESSMENT & PLAN NOTE
- Abd Xray 3/14: Nonobstructive bowel gas pattern. No free air or portal venous gas on this single view.   - US Abdomen complete ordered  - CT Abd/Pelvis ordered  - AST//390, Alk Phos 299 on admission   - downtrendin/255 >> 49/112   - acute hepatitis panel pan-negative

## 2017-03-17 NOTE — ASSESSMENT & PLAN NOTE
- repeat C. difficile EIA negative toxin but positive PCR and Ag indicating colonization but not activity   - Stool culture and stool ovm pending   - diarrhea continues to be an ongoing issue for the patient  - Histoplasmosis, Cryptosporidium studies pending  - continue PO vancomycin

## 2017-03-17 NOTE — PROGRESS NOTES
Palliative Care Progress Note: Follow up visit.  Mr. Howard is awake, alert and oriented and in no distress. Appears to be withdrawn kept  head under covers but did peak his head out and have short conversation.  States he is doing okay today has no pain or discomforts.  His sister and children came for a short visit.  He reports that he did not talk with them about his illness and he just wanted to spend the time with him.    Returned sister Colette's telephone call who states she has no concerns or questions at this time. She seems to understand that he is very sick and states the family hoped the leukemia would have gotten better after the chemotherapy.  Colette did not seem to know about the the HIV: she had no questions and no information was given,   Colette states the children  will be visiting again on Sat. [facility] not available on Sat.  Resources for talking with children about serious illness and death obtained from [facility].  Nursing staff will give this information to family when they visit this weekend. Colette reports her family lives in [place] and has limited transportation. She states they will need some time to make arrangements  to get back and forth to hospital.   Plan and recommendations:   1. Palliative care will facilitate family meeting with primary team and palliative care to focus on goals of care   2. Palliative care will reach out to [facility] [employer] to obtain more resources for Mr. Howard's family and children.  Patietn and family may benefit from having [facility] at family meeting.      Emotional support given.     Thanks for opportunity to participate in Mr. Howard's care.    KIMMY Bermudez, ACNS-BC, OCN

## 2017-03-17 NOTE — PLAN OF CARE
Problem: Patient Care Overview  Goal: Plan of Care Review  Outcome: Ongoing (interventions implemented as appropriate)  No acute events throughout day. See vital signs and assessments in flowsheets. See below for updates on today's progress.      Pulmonary: RA sats %     Cardiovascular: remains ST in 110-120s, levo required to keep MAP >60     Neurological: RASS = 0; two episodes of confusion after waking up, but was quickly reoriented.     Gastrointestinal: poor appetite, BM X 1     Genitourinary: Pineda draining dark yellow urine to gravity.      Endocrine: accuchecks WNL no coverage needed.     Infusions: Levo      Patient progressing towards goals as tolerated, plan of care communicated and reviewed with Marck Howard and family. All concerns addressed. Will continue to monitor.

## 2017-03-17 NOTE — ASSESSMENT & PLAN NOTE
- as above in septic shock  - patient with blood cultures growing K. pneumonia x2; on appropriate antibiotic coverage

## 2017-03-17 NOTE — ASSESSMENT & PLAN NOTE
35yo man w/a history of recently diagnosed HIV/AIDS (dx 1/17/2017; YW9=057/7%, VL 22k, GT wildtype; started triumeq 2/2 but stopped 2/24) and AML (s/p 7+3 induction with persistent leukemia on day 14 marrow; induction course c/b neutropenic fevers due to C.diff colitis, VSE (faecium) septicemia, and Fusarium fungemia; not on ART or therapy at home for these pathogens after leaving [facility] 2/24) who was admitted on 3/14/2017 with chills/sweats, malaise, weakness, MÉNDEZ, abdominal pain, diarrhea, and left hand pain and was found to have neutropenic fever/septic shock from Klebsiella septicemia, likely persistent CDI, untreated fusariosis (of note, resistant to voriconazole), and active AML (with circulating peripheral blasts) -- course c/b acute hepatitis and cardiomyopathy, likely related to septic shock. He has stabilized some on empiric zosyn, PO vanc/IV flagyl, ambisome, and acyclovir but his overall long-term prognosis is terminal with high-risk AML, AIDS, active mold/bacterial infections, and medical noncompliance. End of life discussions have begun appropriately.    - would transition zosyn to oral cipro as his Klebsiella isolate is sensitive (will also likely improve comfort)  - would continue PO vanc/IV flagyl for CDI for now -- as diarrhea improves, can taper off flagyl  - would continue ambisome for disseminated Fusariosis (likely reflects the pulmonary nodules and hepatosplenic lesions seen on his recent imaging)  - may continue ACV prophylaxis to prevent HSV flares  - would hold ART as will not impact his survival  - would hold on additional interventions while arrangements for inpatient hospice are made -- patient remains DNI/DNR  - greatly appreciate assistance of palliative care team in arranging family care/hospice for patient -- will continue to follow from a distance and help taper his medications for them as this transition occurs; I am available on my cell phone if they would like for me to speak  further with his family when they are present for a family meeting

## 2017-03-17 NOTE — SUBJECTIVE & OBJECTIVE
Interval History: Patient reserved today. No new complaints. Fever curve improving. Klebsiella sensitive to cipro.       Review of Systems   Constitutional: Positive for activity change, appetite change, fatigue and unexpected weight change. Negative for chills, diaphoresis and fever.   HENT: Negative for dental problem, drooling, ear pain, mouth sores, postnasal drip, rhinorrhea, sinus pressure, sore throat and trouble swallowing.    Eyes: Negative for photophobia, pain and redness.   Respiratory: Positive for shortness of breath. Negative for cough and wheezing.    Cardiovascular: Negative for chest pain and leg swelling.   Gastrointestinal: Positive for abdominal pain. Negative for abdominal distention, diarrhea and nausea.   Genitourinary: Negative for dysuria, flank pain, frequency and urgency.   Musculoskeletal: Positive for arthralgias. Negative for back pain, gait problem and myalgias.   Skin: Negative for pallor and rash.   Neurological: Negative for dizziness, tremors, seizures, weakness and headaches.   Hematological: Bruises/bleeds easily.   Psychiatric/Behavioral: Negative for confusion.     Objective:     Vital Signs (Most Recent):  Temp: 98.3 °F (36.8 °C) (03/17/17 1100)  Pulse: (!) 125 (03/17/17 1502)  Resp: (!) 35 (03/17/17 1502)  BP: (!) 97/56 (03/17/17 1502)  SpO2: 97 % (03/17/17 1502) Vital Signs (24h Range):  Temp:  [97.8 °F (36.6 °C)-98.4 °F (36.9 °C)] 98.3 °F (36.8 °C)  Pulse:  [104-130] 125  Resp:  [20-36] 35  SpO2:  [97 %-100 %] 97 %  BP: (75-99)/(44-67) 97/56     Weight: 35.8 kg (78 lb 14.8 oz)  Body mass index is 13.98 kg/(m^2).    Estimated Creatinine Clearance: 126.8 mL/min (based on Cr of 0.4).    Physical Exam   Constitutional: No distress.   Markedly cachectic.   HENT:   Head: Atraumatic.   Mouth/Throat: Oropharynx is clear and moist. No oropharyngeal exudate.   Eyes: Conjunctivae and EOM are normal. Pupils are equal, round, and reactive to light. No scleral icterus.   Neck: Neck  supple.   Cardiovascular: Normal rate and regular rhythm.  Exam reveals no friction rub.    Murmur heard.  Pulmonary/Chest: No respiratory distress. He has no wheezes. He has rales. He exhibits no tenderness.   Abdominal: Soft. Bowel sounds are normal. He exhibits no distension. There is no tenderness. There is no rebound and no guarding.   Musculoskeletal: Normal range of motion. He exhibits no edema.   Lymphadenopathy:     He has no cervical adenopathy.   Neurological: He is alert. No cranial nerve deficit. He exhibits normal muscle tone. Coordination normal.   Skin: No rash noted. No erythema.       Significant Labs:   CBC:     Recent Labs  Lab 03/15/17  1726 03/16/17  0442 03/17/17  0326   WBC 2.89* 3.05* 2.16*   HGB 7.7* 8.4* 7.8*   HCT 22.6* 24.2* 22.8*   PLT 22* 26* 16*     CMP:   Recent Labs  Lab 03/16/17  0442  03/16/17  1712 03/17/17  0326 03/17/17  0831     < > 140  140 142 141   K 3.9  < > 4.3  4.3 3.1* 3.3*   *  < > 112*  112* 113* 112*   CO2 20*  < > 19*  19* 18* 19*   *  < > 101  101 65* 77   BUN 11  < > 11  11 13 13   CREATININE 0.5  < > 0.5  0.5 0.4* 0.4*   CALCIUM 7.5*  < > 7.7*  7.7* 7.6* 7.8*   PROT 5.1*  --   --  4.8*  --    ALBUMIN 1.3*  --   --  1.3*  --    BILITOT 3.9*  --   --  4.6*  --    ALKPHOS 164*  --   --  131  --    *  --   --  49*  --    *  --   --  112*  --    ANIONGAP 9  < > 9  9 11 10   EGFRNONAA >60.0  < > >60.0  >60.0 >60.0 >60.0   < > = values in this interval not displayed.    Significant Imaging: I have reviewed all pertinent imaging results/findings within the past 24 hours.     Abdominal U/S:  Nondistended gallbladder demonstrating increased wall thickness of 0.8 cm.  No cholelithiasis. The gallbladder wall thickening may be secondary to nondistention. However, it can also be seen in the setting of hepatitis.  Correlation advised.      Left hand XR:  There is medullary expansion of the phalanges, nonspecific.   There is lytic  destruction involving the distal phalanx of the fourth digit and the base of the distal phalanx of the third digit with associated joint space narrowing at the DIP joints. There is soft tissue swelling in the fourth digit. Findings suspicious for osteomyelitis involving the distal phalanges of both digits.     CXR: Impression persistent patchy infiltrate in the right upper and midlung field.     Microbiology:  3/14 blood cx: Klebsiella x2  3/14 urine cx: negative  3/14 sputum cx: NGTD  3/14 SCrAg pending  3/14 blood cx: Klebsiella x2  3/15 stool cx: NGTD  3/15 C.diff testing discordant, PCR positive  3/15 Shiga toxin negative  3/15 O&P negative  3/15 fungal blood cx: NGTD  3/16 blood cx: NGTD

## 2017-03-17 NOTE — SUBJECTIVE & OBJECTIVE
Interval History/Significant Events:   Overnight no acute events. Discussed with patient and family yesterday goals of care. Patient has elected to change code status to DNR. Per RN, patient withdrawn overnight and seemed depressed. This AM no complaints, but remains withdrawn. CT chest/head are ordered. Blood cultures growing K. Pneumoniae. C. diff+ stool. Norepinephrine requirements 0.06-0.07 overnight, stable on RA.    Review of Systems  Objective:     Vital Signs (Most Recent):  Temp: 98.1 °F (36.7 °C) (03/17/17 0710)  Pulse: (!) 125 (03/17/17 0710)  Resp: 20 (03/17/17 0710)  BP: (!) 87/54 (03/17/17 0710)  SpO2: 98 % (03/17/17 0710) Vital Signs (24h Range):  Temp:  [97.8 °F (36.6 °C)-98.9 °F (37.2 °C)] 98.1 °F (36.7 °C)  Pulse:  [104-130] 125  Resp:  [20-34] 20  SpO2:  [98 %-100 %] 98 %  BP: (75-99)/(44-67) 87/54   Weight: 35.8 kg (78 lb 14.8 oz)  Body mass index is 13.98 kg/(m^2).      Intake/Output Summary (Last 24 hours) at 03/17/17 0838  Last data filed at 03/17/17 0700   Gross per 24 hour   Intake          2365.23 ml   Output             2225 ml   Net           140.23 ml     Physical Exam   Constitutional: He is oriented to person, place, and time.   HENT:   Head: Normocephalic and atraumatic.   Eyes: Pupils are equal, round, and reactive to light.   Neck: Neck supple.   Cardiovascular: Regular rhythm and normal heart sounds.  Exam reveals no friction rub.    No murmur heard.  Tachycardic (120s)   Pulmonary/Chest: Effort normal and breath sounds normal. No respiratory distress. He has no wheezes.   Abdominal: Soft. Bowel sounds are normal. He exhibits no distension. There is no tenderness.   Neurological: He is alert and oriented to person, place, and time.   Skin: Skin is warm and dry.   Psychiatric:   Depressed mood   Nursing note and vitals reviewed.    Vents:     Lines/Drains/Airways     Central Venous Catheter Line                 CVC Triple Lumen - PreSep Cath 03/14/17 right internal jugular 3 days           Drain                 Urethral Catheter 03/14/17 1150 Non-latex 16 Fr. 2 days          Peripheral Intravenous Line                 Peripheral IV - Single Lumen 03/14/17 Left Antecubital 3 days              Significant Labs:    CBC/Anemia Profile:    Recent Labs  Lab 03/15/17  1726 03/16/17  0442 03/17/17  0326   WBC 2.89* 3.05* 2.16*   HGB 7.7* 8.4* 7.8*   HCT 22.6* 24.2* 22.8*   PLT 22* 26* 16*   MCV 89 88 87   RDW 15.2* 15.3* 15.5*      Chemistries:    Recent Labs  Lab 03/15/17  1424  03/15/17  2346 03/16/17  0442 03/16/17  0849 03/16/17  1712 03/17/17  0326   NA  --   < > 138 140 141 140  140 142   K  --   < > 2.8* 3.9 3.1* 4.3  4.3 3.1*   CL  --   < > 108 111* 110 112*  112* 113*   CO2  --   < > 21* 20* 20* 19*  19* 18*   BUN  --   < > 10 11 11 11  11 13   CREATININE  --   < > 0.5 0.5 0.5 0.5  0.5 0.4*   CALCIUM  --   < > 7.5* 7.5* 7.7* 7.7*  7.7* 7.6*   ALBUMIN  --   --   --  1.3*  --   --  1.3*   PROT  --   --   --  5.1*  --   --  4.8*   BILITOT  --   --   --  3.9*  --   --  4.6*   ALKPHOS  --   --   --  164*  --   --  131   ALT  --   --   --  179*  --   --  112*   AST  --   --   --  146*  --   --  49*   MG  --   < > 2.2  --  1.7 2.2  2.2  --    PHOS 1.4*  --   --  2.9  --   --  1.9*   < > = values in this interval not displayed.    Coagulation:   Recent Labs  Lab 03/15/17  1423  03/17/17  0326   INR 1.8*  < > 1.4*   APTT 37.4*  --   --    < > = values in this interval not displayed.  Lactic Acid:   Recent Labs  Lab 03/15/17  0907   LACTATE 2.0     Significant Imaging:  CT A/P: I have reviewed all pertinent results/findings within the past 24 hours:  1. Interval development of multiple ill-defined subcentimeter hypodensities within the liver and spleen concerning for developing abscesses in this patient with neutropenia and sepsis.  2. Diffuse wall thickening and hyperemia of the cecum and right colon extending to the hepatic flexure which has increased in extent from the prior study. Findings  may represent neutropenic colitis.  3. Increased patchy consolidation in the visualized portion of the lungs may represent multifocal pneumonia or septic emboli.  4. Diffuse bladder wall thickening may relate to incomplete distention or cystitis.  5. Mild scattered ascites, increased.

## 2017-03-17 NOTE — ASSESSMENT & PLAN NOTE
- direct vs indirect  - RUQ US with gall bladder wall thickening; no evidence of obstruction  - CT abdomen wit no structural issues, c/f liver abscesses

## 2017-03-17 NOTE — ASSESSMENT & PLAN NOTE
- CD4 count 6, last CD4 count was 7.3 in 1/18/2017  - ANC 0  - Holding HAART per ID recommendations to avoid IRIS  - continue IV acyclovir   - EBV not sent, CMV urine pending, acute hepatitis panel negative  - BAL to r/o PJP or disseminated MAC not on ppx bactrim or azithromycin

## 2017-03-17 NOTE — ASSESSMENT & PLAN NOTE
- due to klebs pna bacteremia since the 3/14/2017 that is pan-sensitive, repeat blood cx from the 15th with NGTD   - Hypotensive on vasopressors   - CXR 3/14 persistent patchy infiltrate in the right upper and midlung fields  - antimicrobials: vanc, ancef, flagyl, received one dose of tobramycin on 3/15/2017  - d/c voriconazole, continue amphotericin, continue acyclovir for ppx   - fungal workup, stool analysis pending: C. dif +  - leukocytosis:4.32 on admission (baseline ~0.9); ANC 0; abs CD4 count 6 (Th4 10%)  - LA 3/15: 2.0, improving  - CT chest and head ordered

## 2017-03-17 NOTE — ASSESSMENT & PLAN NOTE
- likely secondary to HIV vs AML vs sepsis   - s/p induction chemotherapy 7+3  - Type and screen q3 days  - continue to monitor   - worsening thrombocytopenia  (42>>16)   - no s/s of bleeding, stable H/H, negative DIC panels to date

## 2017-03-17 NOTE — PT/OT/SLP PROGRESS
Physical Therapy      Miguelitoemmanuel Howard  MRN: [id number]    Patient not seen today, orders to be discharged per rounds at this time.    Shantell Spivey, PT   3/17/2017

## 2017-03-18 LAB
ALBUMIN SERPL BCP-MCNC: 1.2 G/DL
ALP SERPL-CCNC: 122 U/L
ALT SERPL W/O P-5'-P-CCNC: 70 U/L
ANION GAP SERPL CALC-SCNC: 10 MMOL/L
ANION GAP SERPL CALC-SCNC: 8 MMOL/L
ANION GAP SERPL CALC-SCNC: 9 MMOL/L
ANISOCYTOSIS BLD QL SMEAR: SLIGHT
AST SERPL-CCNC: 25 U/L
BACTERIA STL CULT: NORMAL
BASOPHILS # BLD AUTO: 0 K/UL
BASOPHILS NFR BLD: 0 %
BILIRUB SERPL-MCNC: 4.5 MG/DL
BUN SERPL-MCNC: 14 MG/DL
BUN SERPL-MCNC: 15 MG/DL
BUN SERPL-MCNC: 17 MG/DL
CALCIUM SERPL-MCNC: 7.3 MG/DL
CALCIUM SERPL-MCNC: 7.4 MG/DL
CALCIUM SERPL-MCNC: 7.5 MG/DL
CHLORIDE SERPL-SCNC: 112 MMOL/L
CHLORIDE SERPL-SCNC: 113 MMOL/L
CHLORIDE SERPL-SCNC: 113 MMOL/L
CO2 SERPL-SCNC: 19 MMOL/L
CO2 SERPL-SCNC: 20 MMOL/L
CO2 SERPL-SCNC: 20 MMOL/L
CREAT SERPL-MCNC: 0.4 MG/DL
DIFFERENTIAL METHOD: ABNORMAL
EOSINOPHIL # BLD AUTO: 0 K/UL
EOSINOPHIL NFR BLD: 0.4 %
ERYTHROCYTE [DISTWIDTH] IN BLOOD BY AUTOMATED COUNT: 16.1 %
EST. GFR  (AFRICAN AMERICAN): >60 ML/MIN/1.73 M^2
EST. GFR  (NON AFRICAN AMERICAN): >60 ML/MIN/1.73 M^2
FIBRINOGEN PPP-MCNC: 654 MG/DL
GLUCOSE SERPL-MCNC: 63 MG/DL
GLUCOSE SERPL-MCNC: 67 MG/DL
GLUCOSE SERPL-MCNC: 93 MG/DL
HAPTOGLOB SERPL-MCNC: 253 MG/DL
HCT VFR BLD AUTO: 24 %
HGB BLD-MCNC: 8.2 G/DL
INR PPP: 1.5
LDH SERPL L TO P-CCNC: 324 U/L
LYMPHOCYTES # BLD AUTO: 2.1 K/UL
LYMPHOCYTES NFR BLD: 78.3 %
MAGNESIUM SERPL-MCNC: 1.6 MG/DL
MAGNESIUM SERPL-MCNC: 1.8 MG/DL
MCH RBC QN AUTO: 29.9 PG
MCHC RBC AUTO-ENTMCNC: 34.2 %
MCV RBC AUTO: 88 FL
MONOCYTES # BLD AUTO: 0.3 K/UL
MONOCYTES NFR BLD: 11.6 %
NEUTROPHILS # BLD AUTO: 0.3 K/UL
NEUTROPHILS NFR BLD: 9.7 %
PHOSPHATE SERPL-MCNC: 2.8 MG/DL
PLATELET # BLD AUTO: 13 K/UL
PLATELET BLD QL SMEAR: ABNORMAL
PMV BLD AUTO: ABNORMAL FL
POCT GLUCOSE: 104 MG/DL (ref 70–110)
POCT GLUCOSE: 74 MG/DL (ref 70–110)
POCT GLUCOSE: 76 MG/DL (ref 70–110)
POIKILOCYTOSIS BLD QL SMEAR: SLIGHT
POTASSIUM SERPL-SCNC: 3.2 MMOL/L
POTASSIUM SERPL-SCNC: 3.7 MMOL/L
POTASSIUM SERPL-SCNC: 4 MMOL/L
PROT SERPL-MCNC: 4.6 G/DL
PROTHROMBIN TIME: 15.1 SEC
RBC # BLD AUTO: 2.74 M/UL
SODIUM SERPL-SCNC: 141 MMOL/L
SODIUM SERPL-SCNC: 141 MMOL/L
SODIUM SERPL-SCNC: 142 MMOL/L
WBC # BLD AUTO: 2.67 K/UL

## 2017-03-18 PROCEDURE — 99233 SBSQ HOSP IP/OBS HIGH 50: CPT | Mod: ,,, | Performed by: INTERNAL MEDICINE

## 2017-03-18 PROCEDURE — 85384 FIBRINOGEN ACTIVITY: CPT

## 2017-03-18 PROCEDURE — 84100 ASSAY OF PHOSPHORUS: CPT

## 2017-03-18 PROCEDURE — 85610 PROTHROMBIN TIME: CPT

## 2017-03-18 PROCEDURE — 25000003 PHARM REV CODE 250: Performed by: INTERNAL MEDICINE

## 2017-03-18 PROCEDURE — 80053 COMPREHEN METABOLIC PANEL: CPT

## 2017-03-18 PROCEDURE — 63600175 PHARM REV CODE 636 W HCPCS: Performed by: INTERNAL MEDICINE

## 2017-03-18 PROCEDURE — 83735 ASSAY OF MAGNESIUM: CPT

## 2017-03-18 PROCEDURE — 20000000 HC ICU ROOM

## 2017-03-18 PROCEDURE — 80048 BASIC METABOLIC PNL TOTAL CA: CPT

## 2017-03-18 PROCEDURE — 83010 ASSAY OF HAPTOGLOBIN QUANT: CPT

## 2017-03-18 PROCEDURE — 83615 LACTATE (LD) (LDH) ENZYME: CPT

## 2017-03-18 PROCEDURE — 63600175 PHARM REV CODE 636 W HCPCS: Performed by: HOSPITALIST

## 2017-03-18 PROCEDURE — 25000003 PHARM REV CODE 250: Performed by: HOSPITALIST

## 2017-03-18 PROCEDURE — 85025 COMPLETE CBC W/AUTO DIFF WBC: CPT

## 2017-03-18 PROCEDURE — 63600175 PHARM REV CODE 636 W HCPCS: Performed by: STUDENT IN AN ORGANIZED HEALTH CARE EDUCATION/TRAINING PROGRAM

## 2017-03-18 RX ORDER — POTASSIUM CHLORIDE 29.8 MG/ML
40 INJECTION INTRAVENOUS ONCE
Status: DISCONTINUED | OUTPATIENT
Start: 2017-03-18 | End: 2017-03-18

## 2017-03-18 RX ORDER — POTASSIUM CHLORIDE 29.8 MG/ML
40 INJECTION INTRAVENOUS EVERY 4 HOURS
Status: COMPLETED | OUTPATIENT
Start: 2017-03-18 | End: 2017-03-18

## 2017-03-18 RX ORDER — MAGNESIUM SULFATE HEPTAHYDRATE 40 MG/ML
2 INJECTION, SOLUTION INTRAVENOUS
Status: COMPLETED | OUTPATIENT
Start: 2017-03-18 | End: 2017-03-18

## 2017-03-18 RX ADMIN — POTASSIUM CHLORIDE 40 MEQ: 400 INJECTION, SOLUTION INTRAVENOUS at 12:03

## 2017-03-18 RX ADMIN — SODIUM CHLORIDE 500 ML: 0.9 INJECTION, SOLUTION INTRAVENOUS at 03:03

## 2017-03-18 RX ADMIN — ACYCLOVIR SODIUM 300 MG: 50 INJECTION, SOLUTION INTRAVENOUS at 05:03

## 2017-03-18 RX ADMIN — MAGNESIUM SULFATE IN WATER 2 G: 40 INJECTION, SOLUTION INTRAVENOUS at 09:03

## 2017-03-18 RX ADMIN — AMPHOTERICIN B 200 MG: 50 INJECTION, POWDER, LYOPHILIZED, FOR SOLUTION INTRAVENOUS at 04:03

## 2017-03-18 RX ADMIN — ACETAMINOPHEN 500 MG: 500 TABLET ORAL at 03:03

## 2017-03-18 RX ADMIN — SODIUM CHLORIDE 500 ML: 0.9 INJECTION, SOLUTION INTRAVENOUS at 06:03

## 2017-03-18 RX ADMIN — POTASSIUM CHLORIDE 40 MEQ: 400 INJECTION, SOLUTION INTRAVENOUS at 09:03

## 2017-03-18 RX ADMIN — CEFAZOLIN SODIUM 1 G: 1 SOLUTION INTRAVENOUS at 05:03

## 2017-03-18 RX ADMIN — Medication 3 ML: at 03:03

## 2017-03-18 RX ADMIN — MAGNESIUM SULFATE IN WATER 2 G: 40 INJECTION, SOLUTION INTRAVENOUS at 05:03

## 2017-03-18 RX ADMIN — ACYCLOVIR SODIUM 300 MG: 50 INJECTION, SOLUTION INTRAVENOUS at 06:03

## 2017-03-18 RX ADMIN — POTASSIUM CHLORIDE 40 MEQ: 400 INJECTION, SOLUTION INTRAVENOUS at 05:03

## 2017-03-18 RX ADMIN — METRONIDAZOLE 500 MG: 500 INJECTION, SOLUTION INTRAVENOUS at 12:03

## 2017-03-18 RX ADMIN — METRONIDAZOLE 500 MG: 500 INJECTION, SOLUTION INTRAVENOUS at 04:03

## 2017-03-18 RX ADMIN — METRONIDAZOLE 500 MG: 500 INJECTION, SOLUTION INTRAVENOUS at 08:03

## 2017-03-18 RX ADMIN — CEFAZOLIN SODIUM 1 G: 1 SOLUTION INTRAVENOUS at 02:03

## 2017-03-18 RX ADMIN — POTASSIUM CHLORIDE 40 MEQ: 400 INJECTION, SOLUTION INTRAVENOUS at 06:03

## 2017-03-18 RX ADMIN — CEFAZOLIN SODIUM 1 G: 1 SOLUTION INTRAVENOUS at 09:03

## 2017-03-18 RX ADMIN — MAGNESIUM SULFATE IN WATER 2 G: 40 INJECTION, SOLUTION INTRAVENOUS at 06:03

## 2017-03-18 RX ADMIN — Medication 3 ML: at 06:03

## 2017-03-18 NOTE — ASSESSMENT & PLAN NOTE
- due to klebs pna bacteremia since the 3/14/2017 that is pan-sensitive  - repeat cultures 3/17 with NGTD  - Hypotensive on vasopressors: pressor requirement decreasing (0.03)   - CXR 3/14 persistent patchy infiltrate in the right upper and midlung fields  - antimicrobials: vanc, ancef, flagyl, received one dose of tobramycin on 3/15/2017  - d/c voriconazole, continue amphotericin, continue acyclovir for ppx   - fungal workup, stool analysis: C. dif +, negative for ova/parasites  - leukocytosis:4.32 on admission (baseline ~0.9); ANC 0; abs CD4 count 6 (Th4 10%)  - LA 3/15: 2.0, improving

## 2017-03-18 NOTE — ASSESSMENT & PLAN NOTE
- Abd Xray 3/14: Nonobstructive bowel gas pattern. No free air or portal venous gas on this single view.   - US Abdomen complete ordered  - acute hepatitis panel pan-negative  - AST//390, Alk Phos 299 on admission   - resolvin/255 >> 49/112 > 25/70  - CT Abd/Pelvis 3/16 with multiple sub-cm foci c/f abscesses: per ID, likely 2/2 fusarium

## 2017-03-18 NOTE — ASSESSMENT & PLAN NOTE
- 3.3 on admission  - repleted 3/14, 3/15  - K 2.1 on 3/15  - repeat BMP with K 2.5; K repleted, BMP q8h with Mg level  - ongoing issue likely 2/2 persistent diarrhea, poor PO intake  - diarrhea improving  - replete K and Mag PRN

## 2017-03-18 NOTE — ASSESSMENT & PLAN NOTE
- as above in septic shock  - patient with blood cultures growing K. pneumonia x2; on appropriate antibiotic coverage  - repeat cultures NGTD

## 2017-03-18 NOTE — PROGRESS NOTES
[facility]  Critical Care Medicine  Progress Note    Patient Name: Marck Howard  MRN: [id number]  Admission Date: 3/14/2017  Hospital Length of Stay: 4 days  Code Status: DNR  Attending Provider: Yamilex Stephenson MD  Primary Care Provider: Primary Doctor No   Principal Problem: Sepsis due to Gram-negative organism with septic shock    Subjective:     HPI:  35 yo male presents to the ED with generalized weakness and shortness of breath with exertion for the past two weeks. He denies fever, but shivers with chills. Denies cough, nausea, vomiting. States that he has had several stools in the past two days. Per RN, patient had three episodes of large volume diarrhea in the ED, all nonbloody. He denies chest pain, headache, lightheadedness, dizziness, LOC. Reports diffuse but mild abdominal pain.     Patient was recently admitted for approximately 1 month (1/17-2/24 ) for weight loss, muscle pain, and weakness and was diagnosed with HIV and AML at that time. 7+3 induction chemotherapy and HAART therapy were initiated. Hedeveloped neutropenic fever and was later found to have blood cultures growing E. faecium and Fusarium, and stool cultures with C. difficile. Infections were inadequately treated with cefipime, vancomycin, and flagy, as patient declined antifungal therapy and elected to leave the hospital AMA. Patient did not complete home courses of ciprofloxacin, flagyl, or nystatin. He has not taken HAART therapy as an outpatient.     Of note, patient denies HIV status. States he has never been diagnosed with HIV. Denies drug, EtOH use.         Hospital/ICU Course:  3/14: 4L NS administered in the ED. LA 3.8 > 3.6 with hydration.  Admitted to the ICU. Consult ID/BMT, Heme Onc. Blood, urine, respiratory cultures. C. difficile EIA. PO/IV vancomycin, IV flagyl, IV cefipime, IV vorconazole started. US and CT abdomen ordered for transaminitis. HAART held. Contact precautions. Hand  Xray  concerning for osteomyelitis of left distal phalanges, digits 3-4.    3/16/2017: weaning down on pressors, obtaining CT abdomen today, still complaining of diarrhea and pending results, ortho not planning for OR today  3/17: NAEON. Complaining of abdominal pain with the diarrhea, low po intake and refusing po contrast for the CT abdo/pelvix. Low grade fever recorded, weaning down on pressors       Interval History/Significant Events:   Overnight RYEES. Pressor requirement decreasing, levophed at 0.03 this AM with stable BPs. Remained tachycardic to the 120s with intermittent tachypnea. No complaints this AM. States that family visited yesterday. Diarrhea improving per RN: one episode yesterday, one episode overnight.    Review of Systems   Constitutional: Positive for fatigue. Negative for chills, diaphoresis and fever.   HENT: Negative for dental problem, drooling, ear pain, mouth sores, postnasal drip, rhinorrhea, sinus pressure, sore throat and trouble swallowing.    Eyes: Negative for photophobia, pain and redness.   Respiratory: Negative for cough, shortness of breath and wheezing.    Cardiovascular: Negative for chest pain and leg swelling.   Gastrointestinal: Negative for abdominal distention, abdominal pain, diarrhea and nausea.   Genitourinary: Negative for dysuria, flank pain, frequency and urgency.   Musculoskeletal: Negative for arthralgias, back pain, gait problem and myalgias.   Skin: Negative for pallor and rash.   Neurological: Negative for dizziness, tremors, seizures, weakness and headaches.   Psychiatric/Behavioral: Negative for confusion.   All other systems reviewed and are negative.    Objective:     Vital Signs (Most Recent):  Temp: 98.6 °F (37 °C) (03/18/17 0710)  Pulse: (!) 119 (03/18/17 0800)  Resp: (!) 33 (03/18/17 0800)  BP: 94/60 (03/18/17 0710)  SpO2: 99 % (03/18/17 0800) Vital Signs (24h Range):  Temp:  [98.2 °F (36.8 °C)-99.4 °F (37.4 °C)] 98.6 °F (37 °C)  Pulse:  [113-128] 119  Resp:   [20-38] 33  SpO2:  [96 %-100 %] 99 %  BP: ()/(51-71) 94/60   Weight: 35.8 kg (78 lb 14.8 oz)  Body mass index is 13.98 kg/(m^2).    Intake/Output Summary (Last 24 hours) at 03/18/17 0810  Last data filed at 03/18/17 0800   Gross per 24 hour   Intake          3153.85 ml   Output             2215 ml   Net           938.85 ml     Physical Exam   Constitutional: No distress.   Markedly cachectic.   HENT:   Head: Atraumatic.   Mouth/Throat: Oropharynx is clear and moist. No oropharyngeal exudate.   Eyes: Conjunctivae and EOM are normal. Pupils are equal, round, and reactive to light. No scleral icterus.   Neck: Neck supple.   Cardiovascular: Normal rate and regular rhythm.  Exam reveals no friction rub.    Murmur heard.  Pulmonary/Chest: No respiratory distress. He has no wheezes. He has rales. He exhibits no tenderness.   Abdominal: Soft. Bowel sounds are normal. He exhibits no distension. There is no tenderness. There is no rebound and no guarding.   Musculoskeletal: Normal range of motion. He exhibits no edema.   Lymphadenopathy:     He has no cervical adenopathy.   Neurological: He is alert. No cranial nerve deficit. He exhibits normal muscle tone. Coordination normal.   Skin: No rash noted. No erythema.     Vents:     Lines/Drains/Airways     Central Venous Catheter Line                 CVC Triple Lumen - PreSep Cath 03/14/17 right internal jugular 4 days          Drain                 Urethral Catheter 03/14/17 1150 Non-latex 16 Fr. 3 days          Peripheral Intravenous Line                 Peripheral IV - Single Lumen 03/14/17 Left Antecubital 4 days              Significant Labs:    CBC/Anemia Profile:    Recent Labs  Lab 03/17/17  0326 03/18/17  0230   WBC 2.16* 2.67*   HGB 7.8* 8.2*   HCT 22.8* 24.0*   PLT 16*  --    MCV 87 88   RDW 15.5* 16.1*     Chemistries:    Recent Labs  Lab 03/16/17  1712 03/17/17  0326 03/17/17  0831 03/17/17  1801 03/17/17  2131 03/18/17  0230     140 142 141 141  --  141    K 4.3  4.3 3.1* 3.3* 3.6 3.0* 3.7   *  112* 113* 112* 112*  --  113*   CO2 19*  19* 18* 19* 21*  --  19*   BUN 11  11 13 13 14  --  14   CREATININE 0.5  0.5 0.4* 0.4* 0.4*  --  0.4*   CALCIUM 7.7*  7.7* 7.6* 7.8* 7.5*  --  7.4*   ALBUMIN  --  1.3*  --   --   --  1.2*   PROT  --  4.8*  --   --   --  4.6*   BILITOT  --  4.6*  --   --   --  4.5*   ALKPHOS  --  131  --   --   --  122   ALT  --  112*  --   --   --  70*   AST  --  49*  --   --   --  25   MG 2.2  2.2  --  1.6 2.6  --   --    PHOS  --  1.9*  --   --   --  2.8     Blood Culture:   Recent Labs  Lab 03/17/17  0327   LABBLOO No Growth to date  No Growth to date  No Growth to date  No Growth to date     Coagulation:   Recent Labs  Lab 03/18/17  0230   INR 1.5*     Significant Imaging:  No new imaging in last 24 hours    Assessment/Plan:     Neuro  Abdominal pain  - KUB on admission 3/14: Nonobstructive bowel gas pattern. No free air or portal venous gas on this single view. Ordering CT abdomen for further delineation of abdominal pain today.  - prn and scheduled pain control with tylenol       Cardiac  * Sepsis due to Gram-negative organism with septic shock  - due to klebs pna bacteremia since the 3/14/2017 that is pan-sensitive  - repeat cultures 3/17 with NGTD  - Hypotensive on vasopressors: pressor requirement decreasing (0.03)   - CXR 3/14 persistent patchy infiltrate in the right upper and midlung fields  - antimicrobials: vanc, ancef, flagyl, received one dose of tobramycin on 3/15/2017  - d/c voriconazole, continue amphotericin, continue acyclovir for ppx   - fungal workup, stool analysis: C. dif +, negative for ova/parasites  - leukocytosis:4.32 on admission (baseline ~0.9); ANC 0; abs CD4 count 6 (Th4 10%)  - LA 3/15: 2.0, improving    Renal  Lactic acid acidosis  - septic shock vs. chronic lactic acidosis 2/2 AIDS/malignancy and hepatitis  - LA 3.9 on admission > 2.0 following 3L IVF  - resolved     ID  HIV (human immunodeficiency  virus infection)  - CD4 count 6, last CD4 count was 7.3 in 2017  - ANC 0  - Holding HAART per ID recommendations to avoid IRIS  - continue IV acyclovir   - EBV not sent, CMV urine pending, acute hepatitis panel negative  - BAL to r/o PJP or disseminated MAC not on ppx bactrim or azithromycin     Clostridium difficile infection  - repeat C. difficile EIA negative toxin but positive PCR and Ag indicating colonization but not activity   - Stool culture and stool ovm negative  - diarrhea improvin episode 3/17, 1 episode overnight  - Histoplasmosis, Cryptosporidium studies pending  - continue PO vancomycin     Subacute osteomyelitis of left hand  - concern for osteo on digits 3-4 on left hand in distal phalanges  - ortho think it is unlikely source of sepsis   - no plans for OR, continue following appreciate assistance    Bacteremia due to Enterococcus  - as above in septic shock  - patient with blood cultures growing K. pneumonia x2; on appropriate antibiotic coverage  - repeat cultures NGTD    Hem/Onc  Acute myeloid leukemia not having achieved remission  - Heme/Onc following, appreciate assistance  - monitor CBC Q12H for now, transfuse if Hb < 7, Plt < 20,000/mm3  - H/H 8.4/24.2 and plt 26k - received one unit of pRBC on 3/15 monitor cbc bid   - no signs or symptoms of TLS   -  and uric acid is 2.8, daily uric acid and consider rasburicase for uric Acid > 8  - daily DIC labs: elevated fibrinogen 654, INR 1.5 (plt 3/17 16)  - continue Cage's solution     Pancytopenia  - likely secondary to HIV vs AML vs sepsis   - s/p induction chemotherapy 7+3  - Type and screen q3 days  - continue to monitor   - worsening thrombocytopenia  (42>>16>13)   - no s/s of bleeding, stable H/H, negative DIC panels to date    Chemotherapy-induced neutropenia  - see pancytopenia    Acute leukemia  - see AML not in remission    Fluids/Electrolytes/Nutrition/GI  Hyperbilirubinemia  - direct vs indirect  - RUQ US with gall bladder  wall thickening; no evidence of obstruction  - CT abdomen wit no structural issues, c/f liver abscesses       Hypokalemia  - 3.3 on admission  - repleted 3/14, 3/15  - K 2.1 on 3/15  - repeat BMP with K 2.5; K repleted, BMP q8h with Mg level  - ongoing issue likely 2/2 persistent diarrhea, poor PO intake  - diarrhea improving  - replete K and Mag PRN    Other  Transaminitis  - Abd Xray 3/14: Nonobstructive bowel gas pattern. No free air or portal venous gas on this single view.   - US Abdomen complete ordered  - acute hepatitis panel pan-negative  - AST//390, Alk Phos 299 on admission   - resolvin/255 >> 49/112 > 25/70  - CT Abd/Pelvis 3/16 with multiple sub-cm foci c/f abscesses: per ID, likely 2/2 fusarium     Immunocompromised patient  - holding HAART per ID/BMT  - continue ppx with acyclovir  - discussing case with ID for starting MAC and PJP ppx    Fever  - Tlast/max: 103.1 on 3/14 @ 1500  - afebrile currently    Noncompliance  - history of leaving hospital AMA  - noncompliant with HAART and antimicrobials  - patient is DNR/DNI  - working with palliative for placement of children    Tobacco abuse  - cessation counseling not indicated     Critical Care Medicine Daily Checklist:    A: Awake: RASS Goal/Actual Goal:    Actual: Arellano Agitation Sedation Scale (RASS): Alert and calm   B: Spontaneous Breathing Trial Performed?  NA, spontaneously breathing   C: SAT & SBT Coordinated?  NA                      D: Delirium: CAM-ICU Overall CAM-ICU: Negative   E: Early Mobility Performed? Yes   F: Feeding Goal:    Status:     Current Diet Order   Procedures    Diet Dental Soft      AS: Analgesia/Sedation NA   T: Thromboembolic Prophylaxis Holding for plt <30   H: HOB > 300 Yes   U: Stress Ulcer Prophylaxis (if needed) NA   G: Glucose Control Boost comfort feeds PRN   B: Bowel Function Stool Occurrence: 1   I: Indwelling Catheter (Lines & Pineda) Necessity Triple lumen RIJ, TREASUREE PIV, Pineda   D: De-escalation  of Antimicrobials/Pharmacotherapies Not appropriate at this time    Plan for the day/ETD Child Life, Palliation, Family meetings PRN    Code Status:  Family/Goals of Care: DNR       Critical Care Time: 30 minutes  Critical secondary to Patient has a condition that poses threat to life and bodily function: Severe sepsis      Critical care was time spent personally by me on the following activities: development of treatment plan with patient or surrogate and bedside caregivers, discussions with consultants, evaluation of patient's response to treatment, examination of patient, ordering and performing treatments and interventions, ordering and review of laboratory studies, ordering and review of radiographic studies, pulse oximetry, re-evaluation of patient's condition. This critical care time did not overlap with that of any other provider or involve time for any procedures.     Phoebe Bonds MD  Critical Care Medicine  [facility]

## 2017-03-18 NOTE — ASSESSMENT & PLAN NOTE
- Heme/Onc following, appreciate assistance  - monitor CBC Q12H for now, transfuse if Hb < 7, Plt < 20,000/mm3  - H/H 8.4/24.2 and plt 26k - received one unit of pRBC on 3/15 monitor cbc bid   - no signs or symptoms of TLS   -  and uric acid is 2.8, daily uric acid and consider rasburicase for uric Acid > 8  - daily DIC labs: elevated fibrinogen 654, INR 1.5 (plt 3/17 16)  - continue [facility]'s solution

## 2017-03-18 NOTE — ASSESSMENT & PLAN NOTE
- likely secondary to HIV vs AML vs sepsis   - s/p induction chemotherapy 7+3  - Type and screen q3 days  - continue to monitor   - worsening thrombocytopenia  (42>>16>13)   - no s/s of bleeding, stable H/H, negative DIC panels to date

## 2017-03-18 NOTE — PLAN OF CARE
"Problem: Patient Care Overview  Goal: Plan of Care Review  Outcome: Ongoing (interventions implemented as appropriate)  No acute events throughout day. See vital signs and assessments in flowsheets. See below for updates on today's progress.      Pulmonary: RA; sats %     Cardiovascular: remains ST, levo requirements down to 0.01 to keep MAP>60     Neurological: RASS = 0; pt insists that he needs to go home today to check on his property. Dr. Stephenson informed pt that this will not be possible at this point. Pt consistently removing BP cuff and pulse ox probe bc he is "getting ready to go home". Also, he keeps insisting that his family members have been by to see him when they have not. He is answering orientation questions appropriately although seeing family members in the room.      Gastrointestinal: Did not want to eat today. No BM this shift.      Genitourinary: powell draining dark yellow urine to gravity.      Endocrine: accuchecks done Q6H, WNL.     Integumentary/Other: Plan is still to find care for children after pt's passing and to get in touch with family/friends to make arrangements for after the hospital.      Infusions: levo @ 0.01     Patient progressing towards goals as tolerated, plan of care communicated and reviewed with Marck Howard and family. All concerns addressed. Will continue to monitor.                    "

## 2017-03-18 NOTE — SUBJECTIVE & OBJECTIVE
Interval History/Significant Events:   Overnight REYES. Pressor requirement decreasing, levophed at 0.03 this AM with stable BPs. Remained tachycardic to the 120s with intermittent tachypnea. No complaints this AM. States that family visited yesterday. Diarrhea improving per RN: one episode yesterday, one episode overnight.    Review of Systems   Constitutional: Positive for fatigue. Negative for chills, diaphoresis and fever.   HENT: Negative for dental problem, drooling, ear pain, mouth sores, postnasal drip, rhinorrhea, sinus pressure, sore throat and trouble swallowing.    Eyes: Negative for photophobia, pain and redness.   Respiratory: Negative for cough, shortness of breath and wheezing.    Cardiovascular: Negative for chest pain and leg swelling.   Gastrointestinal: Negative for abdominal distention, abdominal pain, diarrhea and nausea.   Genitourinary: Negative for dysuria, flank pain, frequency and urgency.   Musculoskeletal: Negative for arthralgias, back pain, gait problem and myalgias.   Skin: Negative for pallor and rash.   Neurological: Negative for dizziness, tremors, seizures, weakness and headaches.   Psychiatric/Behavioral: Negative for confusion.   All other systems reviewed and are negative.    Objective:     Vital Signs (Most Recent):  Temp: 98.6 °F (37 °C) (03/18/17 0710)  Pulse: (!) 119 (03/18/17 0800)  Resp: (!) 33 (03/18/17 0800)  BP: 94/60 (03/18/17 0710)  SpO2: 99 % (03/18/17 0800) Vital Signs (24h Range):  Temp:  [98.2 °F (36.8 °C)-99.4 °F (37.4 °C)] 98.6 °F (37 °C)  Pulse:  [113-128] 119  Resp:  [20-38] 33  SpO2:  [96 %-100 %] 99 %  BP: ()/(51-71) 94/60   Weight: 35.8 kg (78 lb 14.8 oz)  Body mass index is 13.98 kg/(m^2).    Intake/Output Summary (Last 24 hours) at 03/18/17 0810  Last data filed at 03/18/17 0800   Gross per 24 hour   Intake          3153.85 ml   Output             2215 ml   Net           938.85 ml     Physical Exam   Constitutional: No distress.   Markedly cachectic.    HENT:   Head: Atraumatic.   Mouth/Throat: Oropharynx is clear and moist. No oropharyngeal exudate.   Eyes: Conjunctivae and EOM are normal. Pupils are equal, round, and reactive to light. No scleral icterus.   Neck: Neck supple.   Cardiovascular: Normal rate and regular rhythm.  Exam reveals no friction rub.    Murmur heard.  Pulmonary/Chest: No respiratory distress. He has no wheezes. He has rales. He exhibits no tenderness.   Abdominal: Soft. Bowel sounds are normal. He exhibits no distension. There is no tenderness. There is no rebound and no guarding.   Musculoskeletal: Normal range of motion. He exhibits no edema.   Lymphadenopathy:     He has no cervical adenopathy.   Neurological: He is alert. No cranial nerve deficit. He exhibits normal muscle tone. Coordination normal.   Skin: No rash noted. No erythema.     Vents:     Lines/Drains/Airways     Central Venous Catheter Line                 CVC Triple Lumen - PreSep Cath 03/14/17 right internal jugular 4 days          Drain                 Urethral Catheter 03/14/17 1150 Non-latex 16 Fr. 3 days          Peripheral Intravenous Line                 Peripheral IV - Single Lumen 03/14/17 Left Antecubital 4 days              Significant Labs:    CBC/Anemia Profile:    Recent Labs  Lab 03/17/17  0326 03/18/17  0230   WBC 2.16* 2.67*   HGB 7.8* 8.2*   HCT 22.8* 24.0*   PLT 16*  --    MCV 87 88   RDW 15.5* 16.1*     Chemistries:    Recent Labs  Lab 03/16/17  1712 03/17/17  0326 03/17/17  0831 03/17/17  1801 03/17/17  2131 03/18/17  0230     140 142 141 141  --  141   K 4.3  4.3 3.1* 3.3* 3.6 3.0* 3.7   *  112* 113* 112* 112*  --  113*   CO2 19*  19* 18* 19* 21*  --  19*   BUN 11  11 13 13 14  --  14   CREATININE 0.5  0.5 0.4* 0.4* 0.4*  --  0.4*   CALCIUM 7.7*  7.7* 7.6* 7.8* 7.5*  --  7.4*   ALBUMIN  --  1.3*  --   --   --  1.2*   PROT  --  4.8*  --   --   --  4.6*   BILITOT  --  4.6*  --   --   --  4.5*   ALKPHOS  --  131  --   --   --  122    ALT  --  112*  --   --   --  70*   AST  --  49*  --   --   --  25   MG 2.2  2.2  --  1.6 2.6  --   --    PHOS  --  1.9*  --   --   --  2.8     Blood Culture:   Recent Labs  Lab 03/17/17  0327   LABBLOO No Growth to date  No Growth to date  No Growth to date  No Growth to date     Coagulation:   Recent Labs  Lab 03/18/17  0230   INR 1.5*     Significant Imaging:  No new imaging in last 24 hours

## 2017-03-18 NOTE — PT/OT/SLP PROGRESS
Occupational Therapy      Marck Howard  MRN: [id number]    Patient not seen today secondary to Other (MD requesting discontinuation of OT/PT orders; OT planned to attempt eval to focus on education in regards to position.  No family at bedside x3 attempt this date).  Orders to be removed with no follow up unless MD re-consulting therapy.     JORGE A Lerner  3/18/2017

## 2017-03-18 NOTE — ASSESSMENT & PLAN NOTE
- history of leaving hospital AMA  - noncompliant with HAART and antimicrobials  - patient is DNR/DNI  - working with palliative for placement of children

## 2017-03-18 NOTE — ASSESSMENT & PLAN NOTE
- concern for osteo on digits 3-4 on left hand in distal phalanges  - ortho think it is unlikely source of sepsis   - no plans for OR, continue following appreciate assistance

## 2017-03-18 NOTE — PROGRESS NOTES
Progress Note  BMT                                                              [place] [facility] [place]    Patient Name: Marck Howard  YOB: 1980    Admit Date: 3/14/2017                   LOS: 4    SUBJECTIVE:     Reason for Admission: Sepsis due to Gram-negative organism with septic shock    Subjective and Interval History:   No acute events over night.  Remains on Levo 0.03.  Resting comfortably.  No complaints.    Review of Systems:  General:  Denies weight loss, fever, chills, (+) weakness, fatigue  HEENT:  Denies vision changes, sore throat, congestion  CVS:  Denies chest pain, pressure, palpitations  Resp:  Denies shortness of breath, cough, sputum  GI:  Denies diarrhea, constipation, abdominal pain, nausea, vomiting  :  Denies dysuria, hematuria, nocturia  MSK:  Denies myalgias, arthralgias  Skin:  Denies rashes, bleeding  Neuro:  Denies headache, dizziness, syncope, numbness, paresthesias  All other systems otherwise negative    Scheduled Medications   acetaminophen  500 mg Oral Q24H    acyclovir  300 mg Intravenous Q12H    amphotericin B liposome (AMBISOME) IVPB  5 mg/kg (Dosing Weight) Intravenous Q24H    ceFAZolin (ANCEF) IVPB  1 g Intravenous Q8H    duke's soln (benadryl 30 mL, mylanta 30 mL, lidocane 30 mL, nystatin 30 mL) 120 mL  10 mL Oral BID    metronidazole  500 mg Intravenous Q8H    sodium chloride 0.9%  500 mL Intravenous Q24H    sodium chloride 0.9%  500 mL Intravenous Q24H    sodium chloride 0.9%  3 mL Intravenous Q8H    vancomycin  250 mg Oral Q6H       Continuous Infusions   norepinephrine bitartrate-D5W 0.03 mcg/kg/min (03/18/17 0600)       PRN Medications  sodium chloride, sodium chloride, acetaminophen, dextrose 50%, glucagon (human recombinant), magnesium sulfate IVPB, magnesium sulfate IVPB, ondansetron, potassium chloride 10%, potassium chloride 10%, sodium phosphate IVPB, sodium phosphate IVPB    OBJECTIVE:     Temp:  [98.1 °F (36.7  °C)-99.4 °F (37.4 °C)]   Pulse:  [113-128]   Resp:  [20-38]   BP: ()/(51-71)   SpO2:  [96 %-100 %]   Body mass index is 13.98 kg/(m^2).    Intake/Output Summary    Intake/Output Summary (Last 24 hours) at 03/18/17 0622  Last data filed at 03/18/17 0600   Gross per 24 hour   Intake           3273.4 ml   Output             2140 ml   Net           1133.4 ml       Physical Exam:  General:  Thin, cachectic, no acute distress  HEENT:  Normocephalic, atraumatic, EOMI, clear sclera, throat clear without erythema or exudates  CVS:  Tachycardic, S1 and S2 normal, no murmurs, rubs, gallops  Resp:  Lungs clear to auscultation, no wheezes, rales, rhonchi, cough.  Tachypnea  GI:  Abdomen soft, non-tender, non-distended, normoactive bowel sounds, no organomegaly  :  Deferred  MSK:  No muscle atrophy, cyanosis, peripheral edema  Skin:  No rashes, ulcers, erythema  Neuro:  CNII-XII grossly intact  Psych:  Alert and oriented to person, place, and time    Diagnostic Result    Lab Results   Component Value Date    WBC 2.67 (L) 03/18/2017    HGB 8.2 (L) 03/18/2017    HCT 24.0 (L) 03/18/2017    PLT 16 (LL) 03/17/2017         Recent Labs  Lab 03/17/17  1801  03/18/17  0230     --  141   K 3.6  < > 3.7   *  --  113*   CO2 21*  --  19*   BUN 14  --  14   CREATININE 0.4*  --  0.4*   MG 2.6  --   --    PHOS  --   --  2.8   < > = values in this interval not displayed.    Lab Results   Component Value Date    INR 1.5 (H) 03/18/2017    INR 1.4 (H) 03/17/2017    INR 1.4 (H) 03/16/2017       Recent Labs      03/15/17   2346  03/16/17   0629  03/16/17   1833  03/17/17   0003  03/17/17   0622  03/17/17   1124  03/17/17   1804  03/17/17   2308   POCTGLUCOSE  214*  119*  118*  91  73  82  124*  94       ASSESSMENT/PLAN:   Mr. Marck Howard is a 36 y.o. male     Current Problems List:  Active Hospital Problems    Diagnosis  POA    *Sepsis due to Gram-negative organism with septic shock [A41.50, R65.21]  Yes    Subacute  osteomyelitis of left hand [M86.242]  Yes    Abdominal pain [R10.9]  Yes    Chemotherapy-induced neutropenia [D70.1]  Yes    Pancytopenia [D61.818]  Yes    Transaminitis [R74.0]  Yes    Lactic acid acidosis [E87.2]  Yes    Hyperbilirubinemia [E80.6]  Yes    Immunocompromised patient [D84.9]  Yes    Noncompliance [Z91.19]  Not Applicable    Hypokalemia [E87.6]  Yes    Clostridium difficile infection [B96.89]  Yes    Bacteremia due to Enterococcus [R78.81]  Yes    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 We do not recommend proceeding with any surgical interventions  - Known history of untreated fungemia  - C.diff +, on PO Vanc and IV Flagyl  - On board spectrum coverage: Cefazolin, Amphotericin B, Acyclovir  - primary management per ICU, appreciate assistance      Refractory AML   - S/p 7+3 and MEC induction with known persistent disease  - 85% blasts on differential today  - Transfuse if Hb < 7, Plt < 10,000/mm3  - LDH wnl, Uric Acid 2.8, Fibrinogen >800   - INR 1.5  - Monitor TLS and DIC labs daily.    - Patient is not a candidate for further therapy for is AML given his functional status, refractory disease, AIDS and current infections, recommend palliative care. Palliative care following.    Diet:  Dental Soft  DVT PPx:  Holding mechanical in the setting of thrombocytopenia  Code Status:  DNR/DNI      Dispo:  Prognosis poor, appreciate Palliative  assistance        Signing Physician:  Merry Lopes MD  Med PGY3  [phone]

## 2017-03-19 PROBLEM — B20 AIDS (ACQUIRED IMMUNODEFICIENCY SYNDROME), CD4 <=200: Status: ACTIVE | Noted: 2017-03-19

## 2017-03-19 LAB
ALBUMIN SERPL BCP-MCNC: 1.1 G/DL
ALP SERPL-CCNC: 123 U/L
ALT SERPL W/O P-5'-P-CCNC: 37 U/L
ANION GAP SERPL CALC-SCNC: 6 MMOL/L
ANION GAP SERPL CALC-SCNC: 8 MMOL/L
ANION GAP SERPL CALC-SCNC: 9 MMOL/L
ANISOCYTOSIS BLD QL SMEAR: SLIGHT
AST SERPL-CCNC: 16 U/L
BASOPHILS # BLD AUTO: ABNORMAL K/UL
BASOPHILS NFR BLD: 0 %
BILIRUB SERPL-MCNC: 3.9 MG/DL
BLASTS NFR BLD MANUAL: 58 %
BLD PROD TYP BPU: NORMAL
BLOOD UNIT EXPIRATION DATE: NORMAL
BLOOD UNIT TYPE CODE: 6200
BLOOD UNIT TYPE: NORMAL
BUN SERPL-MCNC: 13 MG/DL
BUN SERPL-MCNC: 14 MG/DL
BUN SERPL-MCNC: 15 MG/DL
CALCIUM SERPL-MCNC: 6.8 MG/DL
CALCIUM SERPL-MCNC: 7.2 MG/DL
CALCIUM SERPL-MCNC: 7.4 MG/DL
CHLORIDE SERPL-SCNC: 105 MMOL/L
CHLORIDE SERPL-SCNC: 113 MMOL/L
CHLORIDE SERPL-SCNC: 113 MMOL/L
CO2 SERPL-SCNC: 17 MMOL/L
CO2 SERPL-SCNC: 18 MMOL/L
CO2 SERPL-SCNC: 21 MMOL/L
CODING SYSTEM: NORMAL
CREAT SERPL-MCNC: 0.4 MG/DL
DACRYOCYTES BLD QL SMEAR: ABNORMAL
DIFFERENTIAL METHOD: ABNORMAL
DISPENSE STATUS: NORMAL
EOSINOPHIL # BLD AUTO: ABNORMAL K/UL
EOSINOPHIL NFR BLD: 0 %
ERYTHROCYTE [DISTWIDTH] IN BLOOD BY AUTOMATED COUNT: 16.2 %
EST. GFR  (AFRICAN AMERICAN): >60 ML/MIN/1.73 M^2
EST. GFR  (NON AFRICAN AMERICAN): >60 ML/MIN/1.73 M^2
FIBRINOGEN PPP-MCNC: 565 MG/DL
GLUCOSE SERPL-MCNC: 124 MG/DL
GLUCOSE SERPL-MCNC: 422 MG/DL
GLUCOSE SERPL-MCNC: 99 MG/DL
HAPTOGLOB SERPL-MCNC: 235 MG/DL
HCT VFR BLD AUTO: 21.1 %
HGB BLD-MCNC: 7.3 G/DL
HYPOCHROMIA BLD QL SMEAR: ABNORMAL
INR PPP: 1.4
LDH SERPL L TO P-CCNC: 273 U/L
LYMPHOCYTES # BLD AUTO: ABNORMAL K/UL
LYMPHOCYTES NFR BLD: 20 %
MAGNESIUM SERPL-MCNC: 1.2 MG/DL
MAGNESIUM SERPL-MCNC: 1.6 MG/DL
MCH RBC QN AUTO: 30.3 PG
MCHC RBC AUTO-ENTMCNC: 34.6 %
MCV RBC AUTO: 88 FL
MONOCYTES # BLD AUTO: ABNORMAL K/UL
MONOCYTES NFR BLD: 0 %
NEUTROPHILS NFR BLD: 21 %
NEUTS BAND NFR BLD MANUAL: 1 %
NUM UNITS TRANS WBC-POOR PLATPHERESIS: NORMAL
OVALOCYTES BLD QL SMEAR: ABNORMAL
PHOSPHATE SERPL-MCNC: 3.4 MG/DL
PLATELET # BLD AUTO: 8 K/UL
PLATELET BLD QL SMEAR: ABNORMAL
PMV BLD AUTO: ABNORMAL FL
POCT GLUCOSE: 128 MG/DL (ref 70–110)
POCT GLUCOSE: 132 MG/DL (ref 70–110)
POCT GLUCOSE: 141 MG/DL (ref 70–110)
POCT GLUCOSE: 162 MG/DL (ref 70–110)
POIKILOCYTOSIS BLD QL SMEAR: SLIGHT
POTASSIUM SERPL-SCNC: 3.5 MMOL/L
POTASSIUM SERPL-SCNC: 3.9 MMOL/L
POTASSIUM SERPL-SCNC: 4.9 MMOL/L
PROT SERPL-MCNC: 3.9 G/DL
PROTHROMBIN TIME: 14.8 SEC
RBC # BLD AUTO: 2.41 M/UL
SODIUM SERPL-SCNC: 131 MMOL/L
SODIUM SERPL-SCNC: 139 MMOL/L
SODIUM SERPL-SCNC: 140 MMOL/L
WBC # BLD AUTO: 2.24 K/UL

## 2017-03-19 PROCEDURE — P9037 PLATE PHERES LEUKOREDU IRRAD: HCPCS

## 2017-03-19 PROCEDURE — 63600175 PHARM REV CODE 636 W HCPCS: Performed by: INTERNAL MEDICINE

## 2017-03-19 PROCEDURE — 36415 COLL VENOUS BLD VENIPUNCTURE: CPT

## 2017-03-19 PROCEDURE — 85610 PROTHROMBIN TIME: CPT

## 2017-03-19 PROCEDURE — 25000003 PHARM REV CODE 250: Performed by: INTERNAL MEDICINE

## 2017-03-19 PROCEDURE — 83615 LACTATE (LD) (LDH) ENZYME: CPT

## 2017-03-19 PROCEDURE — 63600175 PHARM REV CODE 636 W HCPCS: Performed by: HOSPITALIST

## 2017-03-19 PROCEDURE — 80048 BASIC METABOLIC PNL TOTAL CA: CPT

## 2017-03-19 PROCEDURE — 85007 BL SMEAR W/DIFF WBC COUNT: CPT

## 2017-03-19 PROCEDURE — 20000000 HC ICU ROOM

## 2017-03-19 PROCEDURE — 85027 COMPLETE CBC AUTOMATED: CPT

## 2017-03-19 PROCEDURE — 83010 ASSAY OF HAPTOGLOBIN QUANT: CPT

## 2017-03-19 PROCEDURE — 99233 SBSQ HOSP IP/OBS HIGH 50: CPT | Mod: ,,, | Performed by: INTERNAL MEDICINE

## 2017-03-19 PROCEDURE — 85384 FIBRINOGEN ACTIVITY: CPT

## 2017-03-19 PROCEDURE — 80053 COMPREHEN METABOLIC PANEL: CPT

## 2017-03-19 PROCEDURE — 83735 ASSAY OF MAGNESIUM: CPT

## 2017-03-19 PROCEDURE — 25000003 PHARM REV CODE 250: Performed by: HOSPITALIST

## 2017-03-19 PROCEDURE — 84100 ASSAY OF PHOSPHORUS: CPT

## 2017-03-19 PROCEDURE — 36430 TRANSFUSION BLD/BLD COMPNT: CPT

## 2017-03-19 RX ORDER — POTASSIUM CHLORIDE 14.9 MG/ML
20 INJECTION INTRAVENOUS
Status: COMPLETED | OUTPATIENT
Start: 2017-03-19 | End: 2017-03-19

## 2017-03-19 RX ORDER — POTASSIUM CHLORIDE 29.8 MG/ML
60 INJECTION INTRAVENOUS DAILY
Status: DISCONTINUED | OUTPATIENT
Start: 2017-03-20 | End: 2017-03-19

## 2017-03-19 RX ORDER — POTASSIUM CHLORIDE 29.8 MG/ML
40 INJECTION INTRAVENOUS ONCE
Status: COMPLETED | OUTPATIENT
Start: 2017-03-19 | End: 2017-03-19

## 2017-03-19 RX ADMIN — SODIUM CHLORIDE 500 ML: 0.9 INJECTION, SOLUTION INTRAVENOUS at 03:03

## 2017-03-19 RX ADMIN — CEFAZOLIN SODIUM 1 G: 1 SOLUTION INTRAVENOUS at 05:03

## 2017-03-19 RX ADMIN — CEFAZOLIN SODIUM 1 G: 1 SOLUTION INTRAVENOUS at 09:03

## 2017-03-19 RX ADMIN — POTASSIUM CHLORIDE 40 MEQ: 400 INJECTION, SOLUTION INTRAVENOUS at 06:03

## 2017-03-19 RX ADMIN — METRONIDAZOLE 500 MG: 500 INJECTION, SOLUTION INTRAVENOUS at 12:03

## 2017-03-19 RX ADMIN — SODIUM CHLORIDE 500 ML: 0.9 INJECTION, SOLUTION INTRAVENOUS at 06:03

## 2017-03-19 RX ADMIN — POTASSIUM CHLORIDE 20 MEQ: 200 INJECTION, SOLUTION INTRAVENOUS at 12:03

## 2017-03-19 RX ADMIN — Medication 3 ML: at 03:03

## 2017-03-19 RX ADMIN — MAGNESIUM SULFATE IN WATER 4 G: 40 INJECTION, SOLUTION INTRAVENOUS at 07:03

## 2017-03-19 RX ADMIN — ACYCLOVIR SODIUM 300 MG: 50 INJECTION, SOLUTION INTRAVENOUS at 06:03

## 2017-03-19 RX ADMIN — Medication 3 ML: at 09:03

## 2017-03-19 RX ADMIN — POTASSIUM CHLORIDE 20 MEQ: 200 INJECTION, SOLUTION INTRAVENOUS at 04:03

## 2017-03-19 RX ADMIN — Medication 10 ML: at 09:03

## 2017-03-19 RX ADMIN — AMPHOTERICIN B 200 MG: 50 INJECTION, POWDER, LYOPHILIZED, FOR SOLUTION INTRAVENOUS at 04:03

## 2017-03-19 RX ADMIN — METRONIDAZOLE 500 MG: 500 INJECTION, SOLUTION INTRAVENOUS at 07:03

## 2017-03-19 RX ADMIN — METRONIDAZOLE 500 MG: 500 INJECTION, SOLUTION INTRAVENOUS at 03:03

## 2017-03-19 RX ADMIN — ACETAMINOPHEN 500 MG: 500 TABLET ORAL at 03:03

## 2017-03-19 RX ADMIN — POTASSIUM CHLORIDE 20 MEQ: 200 INJECTION, SOLUTION INTRAVENOUS at 02:03

## 2017-03-19 RX ADMIN — CEFAZOLIN SODIUM 1 G: 1 SOLUTION INTRAVENOUS at 02:03

## 2017-03-19 NOTE — PROGRESS NOTES
"Pt is agitated and continuously stating that he needs to get out of bed and go home. States that "whatever medicine you gave me yesterday made me weak and i'm not taking anymore of it so don't even bring it in here". Pt instantly removes BP cuff, pulse ox probe, and telemetry leads after they are placed back on him.   "

## 2017-03-19 NOTE — PROGRESS NOTES
Spoke with patient's oldest sister, Kerri Howard, who states that she does not have transportation but is planning to come visit Friday or Saturday. Her phone number is [phone] and would like updates when possible. (number placed in sticky notes as well)

## 2017-03-19 NOTE — PROGRESS NOTES
BMT contacted to inform of step down from [facility]. Orders placed for transfer.    Phoebe Bonds MD, PhD  OBGYN, PGY-1

## 2017-03-19 NOTE — PROGRESS NOTES
Progress Note  BMT                                                              Team: [facility] [place] [facility]    Patient Name: Marck Howard  YOB: 1980    Admit Date: 3/14/2017                   LOS: 5    SUBJECTIVE:     Reason for Admission: Sepsis due to Gram-negative organism with septic shock    Subjective and Interval History:   No acute events over night.  Weaned off of Levo.  Resting comfortably.  Requests ice cream, crackers, and honey buns for breakfast.    Review of Systems:  General:  Denies weight loss, fever, chills, (+) weakness, fatigue  HEENT:  Denies vision changes, sore throat, congestion  CVS:  Denies chest pain, pressure, palpitations  Resp:  Denies shortness of breath, cough, sputum  GI:  Denies diarrhea, constipation, abdominal pain, nausea, vomiting  :  Denies dysuria, hematuria, nocturia  MSK:  Denies myalgias, arthralgias  Skin:  Denies rashes, bleeding  Neuro:  Denies headache, dizziness, syncope, numbness, paresthesias  All other systems otherwise negative    Scheduled Medications   acetaminophen  500 mg Oral Q24H    acyclovir  300 mg Intravenous Q12H    amphotericin B liposome (AMBISOME) IVPB  5 mg/kg (Dosing Weight) Intravenous Q24H    ceFAZolin (ANCEF) IVPB  1 g Intravenous Q8H    duke's soln (benadryl 30 mL, mylanta 30 mL, lidocane 30 mL, nystatin 30 mL) 120 mL  10 mL Oral BID    metronidazole  500 mg Intravenous Q8H    sodium chloride 0.9%  500 mL Intravenous Q24H    sodium chloride 0.9%  500 mL Intravenous Q24H    sodium chloride 0.9%  3 mL Intravenous Q8H    vancomycin  250 mg Oral Q6H       Continuous Infusions   norepinephrine bitartrate-D5W 0.01 mcg/kg/min (03/19/17 0200)       PRN Medications  sodium chloride, sodium chloride, acetaminophen, dextrose 50%, glucagon (human recombinant), magnesium sulfate IVPB, magnesium sulfate IVPB, ondansetron, potassium chloride 10%, potassium chloride 10%, sodium phosphate IVPB, sodium phosphate  IVPB    OBJECTIVE:     Temp:  [97.7 °F (36.5 °C)-99 °F (37.2 °C)]   Pulse:  [103-128]   Resp:  [18-37]   BP: ()/(48-66)   SpO2:  [97 %-100 %]   Body mass index is 13.98 kg/(m^2).    Intake/Output Summary    Intake/Output Summary (Last 24 hours) at 03/19/17 0611  Last data filed at 03/19/17 0500   Gross per 24 hour   Intake          2703.96 ml   Output             2365 ml   Net           338.96 ml       Physical Exam:  General:  Thin, cachectic, no acute distress  HEENT:  Normocephalic, atraumatic, EOMI, clear sclera, throat clear without erythema or exudates  CVS:  Tachycardic, S1 and S2 normal, no murmurs, rubs, gallops  Resp:  Lungs clear to auscultation, no wheezes, rales, rhonchi, cough.  Tachypnea  GI:  Abdomen soft, non-tender, non-distended, normoactive bowel sounds, no organomegaly  :  Deferred  MSK:  No muscle atrophy, cyanosis, peripheral edema  Skin:  No rashes, ulcers, erythema  Neuro:  CNII-XII grossly intact  Psych:  Alert and oriented to person, place, and time    Diagnostic Result    Lab Results   Component Value Date    WBC 2.67 (L) 03/18/2017    HGB 8.2 (L) 03/18/2017    HCT 24.0 (L) 03/18/2017    PLT 13 (LL) 03/18/2017         Recent Labs  Lab 03/18/17  1723 03/19/17  0243    131*   K 3.2* 3.5   * 105   CO2 20* 18*   BUN 17 14   CREATININE 0.4* 0.4*   MG 1.8  --    PHOS  --  3.4       Lab Results   Component Value Date    INR 1.4 (H) 03/19/2017    INR 1.5 (H) 03/18/2017    INR 1.4 (H) 03/17/2017       Recent Labs      03/17/17   0622  03/17/17   1124  03/17/17   1804  03/17/17   2308  03/18/17   0643  03/18/17   1255  03/18/17   1726  03/19/17   0037   POCTGLUCOSE  73  82  124*  94  74  76  104  132*       ASSESSMENT/PLAN:   Mr. Marck Howard is a 36 y.o. male     Current Problems List:  Active Hospital Problems    Diagnosis  POA    *Sepsis due to Gram-negative organism with septic shock [A41.50, R65.21]  Yes    Subacute osteomyelitis of left hand [M86.242]  Yes     Abdominal pain [R10.9]  Yes    Chemotherapy-induced neutropenia [D70.1]  Yes    Pancytopenia [D61.818]  Yes    Transaminitis [R74.0]  Yes    Lactic acid acidosis [E87.2]  Yes    Hyperbilirubinemia [E80.6]  Yes    Immunocompromised patient [D84.9]  Yes    Noncompliance [Z91.19]  Not Applicable    Hypokalemia [E87.6]  Yes    Clostridium difficile infection [B96.89]  Yes    Bacteremia due to Enterococcus [R78.81]  Yes    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 We do not recommend proceeding with any surgical interventions  - Known history of untreated fungemia, but repeat blood cx negative for fungus  - C.diff +, on PO Vanc and IV Flagyl  - On board spectrum coverage: Cefazolin, Amphotericin B, Acyclovir  - Primary management per ICU, appreciate assistance      Refractory AML   - S/p 7+3 and MEC induction with known persistent disease  - 85% blasts on differential today  - Transfuse if Hb < 7, Plt < 10,000/mm3  - LDH wnl, Uric Acid 2.8, Fibrinogen >800   - INR 1.4  - Monitor TLS and DIC labs daily.    - Patient is not a candidate for further therapy for is AML given his functional status, refractory disease, AIDS and current infections, recommend palliative care. Palliative care following.    Diet:  Dental Soft  DVT PPx:  Holding mechanical in the setting of thrombocytopenia  Code Status:  DNR/DNI      Dispo:  Prognosis poor, appreciate Palliative assistance.  Ok to step  down to BMT when stable for the floor.        Signing Physician:  Merry Lopes MD  Med PGY3  [phone]

## 2017-03-19 NOTE — ASSESSMENT & PLAN NOTE
- likely secondary to HIV vs AML vs sepsis   - s/p induction chemotherapy 7+3  - Type and screen q3 days  - continue to monitor   - worsening thrombocytopenia  (42>>16>13)   - no s/s of bleeding, stable H/H, negative DIC panels to date  -3/19 platelets 8: plt transfusion ordered

## 2017-03-19 NOTE — ASSESSMENT & PLAN NOTE
- due to klebs pna bacteremia since the 3/14/2017 that is pan-sensitive  - Hypotensive on vasopressors: pressor requirement decreasing (0.03)   - CXR 3/14 persistent patchy infiltrate in the right upper and midlung fields  - antimicrobials: vanc, ancef, flagyl, received one dose of tobramycin on 3/15/2017  - d/c voriconazole, continue amphotericin, continue acyclovir for ppx   - fungal workup, stool analysis: C. dif +, negative for ova/parasites  - leukocytosis:4.32 on admission (baseline ~0.9); ANC 0; abs CD4 count 6 (Th4 10%)  - LA 3/15: 2.0, improving  - repeat blood cultures 3/17 NGTD  - patient declining PO vanc

## 2017-03-19 NOTE — ASSESSMENT & PLAN NOTE
- Abd Xray 3/14: Nonobstructive bowel gas pattern. No free air or portal venous gas on this single view.   - US Abdomen complete ordered  - acute hepatitis panel pan-negative  - AST//390, Alk Phos 299 on admission   - resolvin/255 >> 49/112 > 25/70 > 16/37  - CT Abd/Pelvis 3/16 with multiple sub-cm foci c/f abscesses: per ID, likely 2/2 fusarium

## 2017-03-19 NOTE — PROGRESS NOTES
Progress Note  BMT                                                              Team: [facility] [place] [facility]    Patient Name: Marck Howard  YOB: 1980    Admit Date: 3/14/2017                   LOS: 5    SUBJECTIVE:     Reason for Admission: Sepsis due to Gram-negative organism with septic shock    Subjective and Interval History:   No acute events over night.  Remains on Levo 0.03.  Resting comfortably.  No complaints.    Review of Systems:  General:  Denies weight loss, fever, chills, (+) weakness, fatigue  HEENT:  Denies vision changes, sore throat, congestion  CVS:  Denies chest pain, pressure, palpitations  Resp:  Denies shortness of breath, cough, sputum  GI:  Denies diarrhea, constipation, abdominal pain, nausea, vomiting  :  Denies dysuria, hematuria, nocturia  MSK:  Denies myalgias, arthralgias  Skin:  Denies rashes, bleeding  Neuro:  Denies headache, dizziness, syncope, numbness, paresthesias  All other systems otherwise negative    Scheduled Medications   acetaminophen  500 mg Oral Q24H    acyclovir  300 mg Intravenous Q12H    amphotericin B liposome (AMBISOME) IVPB  5 mg/kg (Dosing Weight) Intravenous Q24H    ceFAZolin (ANCEF) IVPB  1 g Intravenous Q8H    duke's soln (benadryl 30 mL, mylanta 30 mL, lidocane 30 mL, nystatin 30 mL) 120 mL  10 mL Oral BID    metronidazole  500 mg Intravenous Q8H    potassium chloride  20 mEq Intravenous Q2H    sodium chloride 0.9%  500 mL Intravenous Q24H    sodium chloride 0.9%  500 mL Intravenous Q24H    sodium chloride 0.9%  3 mL Intravenous Q8H    vancomycin  250 mg Oral Q6H       Continuous Infusions   norepinephrine bitartrate-D5W 0.01 mcg/kg/min (03/19/17 0200)       PRN Medications  sodium chloride, sodium chloride, acetaminophen, dextrose 50%, glucagon (human recombinant), magnesium sulfate IVPB, magnesium sulfate IVPB, ondansetron, potassium chloride 10%, potassium chloride 10%, sodium phosphate IVPB, sodium phosphate  IVPB    OBJECTIVE:     Temp:  [97.7 °F (36.5 °C)-99 °F (37.2 °C)]   Pulse:  [103-215]   Resp:  [18-39]   BP: ()/(48-71)   SpO2:  [97 %-100 %]   Body mass index is 13.98 kg/(m^2).    Intake/Output Summary    Intake/Output Summary (Last 24 hours) at 03/19/17 1302  Last data filed at 03/19/17 1140   Gross per 24 hour   Intake          3198.26 ml   Output             2100 ml   Net          1098.26 ml       Physical Exam:  General:  Thin, cachectic, no acute distress  HEENT:  Normocephalic, atraumatic, EOMI, clear sclera, throat clear without erythema or exudates  CVS:  Tachycardic, S1 and S2 normal, no murmurs, rubs, gallops  Resp:  Lungs clear to auscultation, no wheezes, rales, rhonchi, cough.  Tachypnea  GI:  Abdomen soft, non-tender, non-distended, normoactive bowel sounds, no organomegaly  :  Deferred  MSK:  No muscle atrophy, cyanosis, peripheral edema  Skin:  No rashes, ulcers, erythema  Neuro:  CNII-XII grossly intact  Psych:  Alert and oriented to person, place, and time    Diagnostic Result    Lab Results   Component Value Date    WBC 2.24 (L) 03/19/2017    HGB 7.3 (L) 03/19/2017    HCT 21.1 (L) 03/19/2017    PLT 8 (LL) 03/19/2017         Recent Labs  Lab 03/19/17  0243 03/19/17  1038   * 140   K 3.5 3.9    113*   CO2 18* 21*   BUN 14 15   CREATININE 0.4* 0.4*   MG  --  1.6   PHOS 3.4  --        Lab Results   Component Value Date    INR 1.4 (H) 03/19/2017    INR 1.5 (H) 03/18/2017    INR 1.4 (H) 03/17/2017       Recent Labs      03/17/17   1124  03/17/17   1804  03/17/17   2308  03/18/17   0643  03/18/17   1255  03/18/17   1726  03/19/17   0037  03/19/17   0702   POCTGLUCOSE  82  124*  94  74  76  104  132*  162*       ASSESSMENT/PLAN:   Mr. Marck Howard is a 36 y.o. male     Current Problems List:  Active Hospital Problems    Diagnosis  POA    *Sepsis due to Gram-negative organism with septic shock [A41.50, R65.21]  Yes    Subacute osteomyelitis of left hand [M86.242]  Yes     Abdominal pain [R10.9]  Yes    Chemotherapy-induced neutropenia [D70.1]  Yes    Pancytopenia [D61.818]  Yes    Transaminitis [R74.0]  Yes    Lactic acid acidosis [E87.2]  Yes    Hyperbilirubinemia [E80.6]  Yes    Immunocompromised patient [D84.9]  Yes    Noncompliance [Z91.19]  Not Applicable    Hypokalemia [E87.6]  Yes    Clostridium difficile infection [B96.89]  Yes    Bacteremia due to Enterococcus [R78.81]  Yes    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 We do not recommend proceeding with any surgical interventions  - Known history of untreated fungemia  - C.diff +, on PO Vanc and IV Flagyl  - On board spectrum coverage: Cefazolin, Amphotericin B, Acyclovir  - primary management per ICU, appreciate assistance      Refractory AML   - S/p 7+3 and MEC induction with known persistent disease  - 85% blasts on differential today  - Transfuse if Hb < 7, Plt < 10,000/mm3  - LDH wnl, Uric Acid 2.8, Fibrinogen >800   - INR 1.5  - Monitor TLS and DIC labs daily.    - Patient is not a candidate for further therapy for is AML given his functional status, refractory disease, AIDS and current infections, recommend palliative care. Palliative care following.    Diet:  Dental Soft  DVT PPx:  Holding mechanical in the setting of thrombocytopenia  Code Status:  DNR/DNI      Dispo:  Prognosis poor, appreciate Palliative assistance        Signing Physician:  Merry Lopes,  MD  Med PGY3  [phone]

## 2017-03-19 NOTE — ASSESSMENT & PLAN NOTE
- Heme/Onc following, appreciate assistance  - monitor CBC Q12H for now, transfuse if Hb < 7, Plt < 20,000/mm3  - H/H 8.4/24.2 and plt 26k - received one unit of pRBC on 3/15 monitor cbc bid   - no signs or symptoms of TLS   -  and uric acid is 2.8, daily uric acid and consider rasburicase for uric Acid > 8  - daily DIC labs: negative for DIC to date  - continue Cage's solution

## 2017-03-19 NOTE — PLAN OF CARE
"Problem: Patient Care Overview  Goal: Plan of Care Review  Outcome: Ongoing (interventions implemented as appropriate)  No acute events throughout day. See vital signs and assessments in flowsheets. See below for updates on today's progress.      Pulmonary: RA; on intermittent spot checks sats are % (pt refuses to wear pulse ox continuously)     Cardiovascular: remains ST in 110-120s, MAP 60-70s off of levo this shift.      Neurological: RASS = 0; however, pt has episodes of confusion. He is extremely fixated on going home to "take care of things". I have explained multiple times this shift that this isn't possible with his current health status.      Gastrointestinal: very poor appetite, did not want to eat anything other than one ice cream this morning. No BM this shift.      Genitourinary: powell draining dark, yellow urine to gravity.      Endocrine: accuchecks done Q6H, WNL no coverage needed.      Integumentary/Other: pt to be stepped down once an oncology bed is available. Child life and  to continue to work on case.      Patient progressing towards goals as tolerated, plan of care communicated and reviewed with Marck Howard and family. All concerns addressed. Will continue to monitor.                    "

## 2017-03-19 NOTE — RESIDENT HANDOFF
Handoff     Primary Team: [facility] CRITICAL CARE MEDICINE Room Number: [id number]     Patient Name: Marck Howard MRN: [id number]     Date of Birth: [birth year] Allergies: Review of patient's allergies indicates no known allergies.     Age: 36 y.o. Admit Date: 3/14/2017     Sex: male  BMI: Body mass index is 13.98 kg/(m^2).     Code Status: DNR        Illness Level (current clinical status): DNR, DNI    Reason for Admission: Sepsis due to Gram-negative organism with septic shock    Brief HPI (pertinent PMH and diagnosis or differential diagnosis):   35 yo male presents to the ED with generalized weakness and shortness of breath with exertion for the past two weeks. He denies fever, but shivers with chills. Denies cough, nausea, vomiting. States that he has had several stools in the past two days. Per RN, patient had three episodes of large volume diarrhea in the ED, all nonbloody. He denies chest pain, headache, lightheadedness, dizziness, LOC. Reports diffuse but mild abdominal pain.      Patient was recently admitted for approximately 1 month (1/17-2/24 ) for weight loss, muscle pain, and weakness and was diagnosed with HIV and AML at that time. 7+3 induction chemotherapy and HAART therapy were initiated. Hedeveloped neutropenic fever and was later found to have blood cultures growing E. faecium and Fusarium, and stool cultures with C. difficile. Infections were inadequately treated with cefipime, vancomycin, and flagy, as patient declined antifungal therapy and elected to leave the hospital AMA. Patient did not complete home courses of ciprofloxacin, flagyl, or nystatin. He has not taken HAART therapy as an outpatient.      Procedure Date: Central line RIJ placed 3/15/2017    Hospital Course (updated, brief assessment by system or problem, significant events):   3/14: 4L NS administered in the ED. LA 3.8 > 3.6 with hydration. Admitted to the ICU. Consult ID/BMT, Heme Onc. Blood, urine, respiratory cultures. DOM  difficile EIA. PO/IV vancomycin, IV flagyl, IV cefipime, IV vorconazole started. US and CT abdomen ordered for transaminitis. HAART held. Contact precautions. Hand Xray concerning for osteomyelitis of left distal phalanges, digits 3-4.   3/16/2017: weaning down on pressors, obtaining CT abdomen today, still complaining of diarrhea and pending results, ortho not planning for OR today  3/17: NAEON. Complaining of abdominal pain with the diarrhea, low po intake and refusing po contrast for the CT abdo/pelvix. Low grade fever recorded, weaning down on pressors   3/18: Overnight REYES. Pressor requirement decreasing, levophed at 0.03 this AM with stable BPs. Remained tachycardic to the 120s with intermittent tachypnea. No complaints this AM. States that family visited yesterday. Diarrhea improving per RN: one episode yesterday, one episode overnight.  3/19: platelets transfused for AM CBC with plt <10. Step down to BMT     Tasks (specific, using if-then statements): Palliative continues to follow patient for assistance with Child Life     Contingency Plan (special circumstances anticipated and plan): NA    Estimated Discharge Date: Unable to determine    Discharge Disposition: undetermined    Mentored By: DOM Stephenson MD

## 2017-03-19 NOTE — SUBJECTIVE & OBJECTIVE
Interval History/Significant Events:   NAEON. Weaned from pressors in the early AM. BPs stable in the 80-100s/50-60s. 3/17 repeat blood cultures with no growth to date. DIC panel negative. No episodes of diarrhea overnight. Patient is declining AM labs.    Review of Systems   Constitutional: Positive for fatigue. Negative for chills, diaphoresis and fever.   HENT: Negative for dental problem, drooling, ear pain, mouth sores, postnasal drip, rhinorrhea, sinus pressure, sore throat and trouble swallowing.    Eyes: Negative for photophobia, pain and redness.   Respiratory: Negative for cough, shortness of breath and wheezing.    Cardiovascular: Negative for chest pain and leg swelling.   Gastrointestinal: Negative for abdominal distention, abdominal pain, diarrhea and nausea.   Genitourinary: Negative for dysuria, flank pain, frequency and urgency.   Musculoskeletal: Negative for arthralgias, back pain, gait problem and myalgias.   Skin: Negative for pallor and rash.   Neurological: Negative for dizziness, tremors, seizures, weakness and headaches.   Psychiatric/Behavioral: Negative for confusion.   All other systems reviewed and are negative.    Objective:     Vital Signs (Most Recent):  Temp: 98 °F (36.7 °C) (03/19/17 0710)  Pulse: (!) 112 (03/19/17 0710)  Resp: 20 (03/19/17 0710)  BP: (!) 89/55 (03/19/17 0710)  SpO2: 98 % (03/19/17 0710) Vital Signs (24h Range):  Temp:  [97.7 °F (36.5 °C)-99 °F (37.2 °C)] 98 °F (36.7 °C)  Pulse:  [103-128] 112  Resp:  [18-34] 20  SpO2:  [97 %-100 %] 98 %  BP: ()/(48-66) 89/55   Weight: 35.8 kg (78 lb 14.8 oz)  Body mass index is 13.98 kg/(m^2).    Intake/Output Summary (Last 24 hours) at 03/19/17 0845  Last data filed at 03/19/17 0800   Gross per 24 hour   Intake          2989.76 ml   Output             2315 ml   Net           674.76 ml     Physical Exam   Constitutional: No distress.   Markedly cachectic.   HENT:   Head: Atraumatic.   Mouth/Throat: Oropharynx is clear and  moist. No oropharyngeal exudate.   Eyes: Conjunctivae and EOM are normal. Pupils are equal, round, and reactive to light. No scleral icterus.   Neck: Neck supple.   Cardiovascular: Normal rate and regular rhythm.  Exam reveals no friction rub.    Murmur heard.  Pulmonary/Chest: No respiratory distress. He has no wheezes. He has no rales. He exhibits no tenderness.   Abdominal: Soft. Bowel sounds are normal. He exhibits no distension. There is no tenderness. There is no rebound and no guarding.   Musculoskeletal: Normal range of motion. He exhibits no edema.   Lymphadenopathy:     He has no cervical adenopathy.   Neurological: He is alert. No cranial nerve deficit. He exhibits normal muscle tone. Coordination normal.   Skin: No rash noted. No erythema.     Vents:     Lines/Drains/Airways     Central Venous Catheter Line                 CVC Triple Lumen - PreSep Cath 03/14/17 right internal jugular 5 days          Drain                 Urethral Catheter 03/14/17 1150 Non-latex 16 Fr. 4 days          Peripheral Intravenous Line                 Peripheral IV - Single Lumen 03/14/17 Left Antecubital 5 days              Significant Labs:  CBC/Anemia Profile:    Recent Labs  Lab 03/18/17  0230 03/19/17  0243   WBC 2.67* 2.24*   HGB 8.2* 7.3*   HCT 24.0* 21.1*   PLT 13*  --    MCV 88 88   RDW 16.1* 16.2*     Chemistries:    Recent Labs  Lab 03/17/17  1801  03/18/17  0230 03/18/17  0820 03/18/17  1723 03/19/17  0243     --  141 141 142 131*   K 3.6  < > 3.7 4.0 3.2* 3.5   *  --  113* 113* 112* 105   CO2 21*  --  19* 20* 20* 18*   BUN 14  --  14 15 17 14   CREATININE 0.4*  --  0.4* 0.4* 0.4* 0.4*   CALCIUM 7.5*  --  7.4* 7.5* 7.3* 6.8*   ALBUMIN  --   --  1.2*  --   --  1.1*   PROT  --   --  4.6*  --   --  3.9*   BILITOT  --   --  4.5*  --   --  3.9*   ALKPHOS  --   --  122  --   --  123   ALT  --   --  70*  --   --  37   AST  --   --  25  --   --  16   MG 2.6  --   --  1.6 1.8  --    PHOS  --   --  2.8  --   --   3.4   < > = values in this interval not displayed.    Blood Culture: No results for input(s): LABBLOO in the last 48 hours.  Coagulation:     Recent Labs  Lab 03/19/17  0243   INR 1.4*       Significant Imaging:  No new imaging

## 2017-03-19 NOTE — PROGRESS NOTES
[facility]  Critical Care Medicine  Progress Note    Patient Name: Marck Howard  MRN: [id number]  Admission Date: 3/14/2017  Hospital Length of Stay: 5 days  Code Status: DNR  Attending Provider: Yamilex Stephenson MD  Primary Care Provider: Primary Doctor No   Principal Problem: Sepsis due to Gram-negative organism with septic shock    Subjective:     HPI:  37 yo male presents to the ED with generalized weakness and shortness of breath with exertion for the past two weeks. He denies fever, but shivers with chills. Denies cough, nausea, vomiting. States that he has had several stools in the past two days. Per RN, patient had three episodes of large volume diarrhea in the ED, all nonbloody. He denies chest pain, headache, lightheadedness, dizziness, LOC. Reports diffuse but mild abdominal pain.     Patient was recently admitted for approximately 1 month (1/17-2/24 ) for weight loss, muscle pain, and weakness and was diagnosed with HIV and AML at that time. 7+3 induction chemotherapy and HAART therapy were initiated. Hedeveloped neutropenic fever and was later found to have blood cultures growing E. faecium and Fusarium, and stool cultures with C. difficile. Infections were inadequately treated with cefipime, vancomycin, and flagy, as patient declined antifungal therapy and elected to leave the hospital AMA. Patient did not complete home courses of ciprofloxacin, flagyl, or nystatin. He has not taken HAART therapy as an outpatient.     Of note, patient denies HIV status. States he has never been diagnosed with HIV. Denies drug, EtOH use.         Hospital/ICU Course:  3/14: 4L NS administered in the ED. LA 3.8 > 3.6 with hydration.  Admitted to the ICU. Consult ID/BMT, Heme Onc. Blood, urine, respiratory cultures. C. difficile EIA. PO/IV vancomycin, IV flagyl, IV cefipime, IV vorconazole started. US and CT abdomen ordered for transaminitis. HAART held. Contact precautions. Hand  Xray  concerning for osteomyelitis of left distal phalanges, digits 3-4.    3/16/2017: weaning down on pressors, obtaining CT abdomen today, still complaining of diarrhea and pending results, ortho not planning for OR today  3/17: NAEON. Complaining of abdominal pain with the diarrhea, low po intake and refusing po contrast for the CT abdo/pelvix. Low grade fever recorded, weaning down on pressors   3/18: Overnight REYES. Pressor requirement decreasing, levophed at 0.03 this AM with stable BPs. Remained tachycardic to the 120s with intermittent tachypnea. No complaints this AM. States that family visited yesterday. Diarrhea improving per RN: one episode yesterday, one episode overnight.      Interval History/Significant Events:   NAEON. Weaned from pressors in the early AM. BPs stable in the 80-100s/50-60s. 3/17 repeat blood cultures with no growth to date. DIC panel negative. No episodes of diarrhea overnight. Patient is declining AM labs.    Review of Systems   Constitutional: Positive for fatigue. Negative for chills, diaphoresis and fever.   HENT: Negative for dental problem, drooling, ear pain, mouth sores, postnasal drip, rhinorrhea, sinus pressure, sore throat and trouble swallowing.    Eyes: Negative for photophobia, pain and redness.   Respiratory: Negative for cough, shortness of breath and wheezing.    Cardiovascular: Negative for chest pain and leg swelling.   Gastrointestinal: Negative for abdominal distention, abdominal pain, diarrhea and nausea.   Genitourinary: Negative for dysuria, flank pain, frequency and urgency.   Musculoskeletal: Negative for arthralgias, back pain, gait problem and myalgias.   Skin: Negative for pallor and rash.   Neurological: Negative for dizziness, tremors, seizures, weakness and headaches.   Psychiatric/Behavioral: Negative for confusion.   All other systems reviewed and are negative.    Objective:     Vital Signs (Most Recent):  Temp: 98 °F (36.7 °C) (03/19/17 0710)  Pulse: (!)  112 (03/19/17 0710)  Resp: 20 (03/19/17 0710)  BP: (!) 89/55 (03/19/17 0710)  SpO2: 98 % (03/19/17 0710) Vital Signs (24h Range):  Temp:  [97.7 °F (36.5 °C)-99 °F (37.2 °C)] 98 °F (36.7 °C)  Pulse:  [103-128] 112  Resp:  [18-34] 20  SpO2:  [97 %-100 %] 98 %  BP: ()/(48-66) 89/55   Weight: 35.8 kg (78 lb 14.8 oz)  Body mass index is 13.98 kg/(m^2).    Intake/Output Summary (Last 24 hours) at 03/19/17 0845  Last data filed at 03/19/17 0800   Gross per 24 hour   Intake          2989.76 ml   Output             2315 ml   Net           674.76 ml     Physical Exam   Constitutional: No distress.   Markedly cachectic.   HENT:   Head: Atraumatic.   Mouth/Throat: Oropharynx is clear and moist. No oropharyngeal exudate.   Eyes: Conjunctivae and EOM are normal. Pupils are equal, round, and reactive to light. No scleral icterus.   Neck: Neck supple.   Cardiovascular: Normal rate and regular rhythm.  Exam reveals no friction rub.    Murmur heard.  Pulmonary/Chest: No respiratory distress. He has no wheezes. He has no rales. He exhibits no tenderness.   Abdominal: Soft. Bowel sounds are normal. He exhibits no distension. There is no tenderness. There is no rebound and no guarding.   Musculoskeletal: Normal range of motion. He exhibits no edema.   Lymphadenopathy:     He has no cervical adenopathy.   Neurological: He is alert. No cranial nerve deficit. He exhibits normal muscle tone. Coordination normal.   Skin: No rash noted. No erythema.     Vents:     Lines/Drains/Airways     Central Venous Catheter Line                 CVC Triple Lumen - PreSep Cath 03/14/17 right internal jugular 5 days          Drain                 Urethral Catheter 03/14/17 1150 Non-latex 16 Fr. 4 days          Peripheral Intravenous Line                 Peripheral IV - Single Lumen 03/14/17 Left Antecubital 5 days              Significant Labs:  CBC/Anemia Profile:    Recent Labs  Lab 03/18/17  0230 03/19/17  0243   WBC 2.67* 2.24*   HGB 8.2* 7.3*    HCT 24.0* 21.1*   PLT 13*  --    MCV 88 88   RDW 16.1* 16.2*     Chemistries:    Recent Labs  Lab 03/17/17  1801  03/18/17  0230 03/18/17  0820 03/18/17  1723 03/19/17  0243     --  141 141 142 131*   K 3.6  < > 3.7 4.0 3.2* 3.5   *  --  113* 113* 112* 105   CO2 21*  --  19* 20* 20* 18*   BUN 14  --  14 15 17 14   CREATININE 0.4*  --  0.4* 0.4* 0.4* 0.4*   CALCIUM 7.5*  --  7.4* 7.5* 7.3* 6.8*   ALBUMIN  --   --  1.2*  --   --  1.1*   PROT  --   --  4.6*  --   --  3.9*   BILITOT  --   --  4.5*  --   --  3.9*   ALKPHOS  --   --  122  --   --  123   ALT  --   --  70*  --   --  37   AST  --   --  25  --   --  16   MG 2.6  --   --  1.6 1.8  --    PHOS  --   --  2.8  --   --  3.4   < > = values in this interval not displayed.    Blood Culture: No results for input(s): LABBLOO in the last 48 hours.  Coagulation:     Recent Labs  Lab 03/19/17  0243   INR 1.4*       Significant Imaging:  No new imaging    Assessment/Plan:     Neuro  Abdominal pain  - KUB on admission 3/14: Nonobstructive bowel gas pattern. No free air or portal venous gas on this single view. Ordering CT abdomen for further delineation of abdominal pain today.  - prn and scheduled pain control with tylenol       Cardiac  * Sepsis due to Gram-negative organism with septic shock  - due to klebs pna bacteremia since the 3/14/2017 that is pan-sensitive  - Hypotensive on vasopressors: pressor requirement decreasing (0.03)   - CXR 3/14 persistent patchy infiltrate in the right upper and midlung fields  - antimicrobials: vanc, ancef, flagyl, received one dose of tobramycin on 3/15/2017  - d/c voriconazole, continue amphotericin, continue acyclovir for ppx   - fungal workup, stool analysis: C. dif +, negative for ova/parasites  - leukocytosis:4.32 on admission (baseline ~0.9); ANC 0; abs CD4 count 6 (Th4 10%)  - LA 3/15: 2.0, improving  - repeat blood cultures 3/17 NGTD  - patient declining PO vanc    Renal  Lactic acid acidosis  - septic shock vs.  chronic lactic acidosis 2/2 AIDS/malignancy and hepatitis  - LA 3.9 on admission > 2.0 following 3L IVF  - resolved     ID  HIV (human immunodeficiency virus infection)  - CD4 count 6, last CD4 count was 7.3 in 1/18/2017  - ANC 0  - Holding HAART per ID recommendations to avoid IRIS  - continue IV acyclovir   - EBV not sent, CMV urine pending, acute hepatitis panel negative  - BAL to r/o PJP or disseminated MAC not on ppx bactrim or azithromycin     Clostridium difficile infection  - repeat C. difficile EIA negative toxin but positive PCR and Ag indicating colonization but not activity   - Stool culture and stool ovm negative  - diarrhea improving: no episodes overnight; electrolytes stable  - Histoplasmosis, Cryptosporidium studies pending  - continue PO vancomycin     Subacute osteomyelitis of left hand  - concern for osteo on digits 3-4 on left hand in distal phalanges  - ortho think it is unlikely source of sepsis   - no plans for OR, continue following appreciate assistance    Bacteremia due to Enterococcus  - as above in septic shock  - patient with blood cultures growing K. pneumonia x2; on appropriate antibiotic coverage  - repeat cultures NGTD    Hem/Onc  Acute myeloid leukemia not having achieved remission  - Heme/Onc following, appreciate assistance  - monitor CBC Q12H for now, transfuse if Hb < 7, Plt < 20,000/mm3  - H/H 8.4/24.2 and plt 26k - received one unit of pRBC on 3/15 monitor cbc bid   - no signs or symptoms of TLS   -  and uric acid is 2.8, daily uric acid and consider rasburicase for uric Acid > 8  - daily DIC labs: negative for DIC to date  - continue Cage's solution     Pancytopenia  - likely secondary to HIV vs AML vs sepsis   - s/p induction chemotherapy 7+3  - Type and screen q3 days  - continue to monitor   - worsening thrombocytopenia  (42>>16>13)   - no s/s of bleeding, stable H/H, negative DIC panels to date  -3/19 platelets 8: plt transfusion ordered    Chemotherapy-induced  neutropenia  - see pancytopenia    Acute leukemia  - see AML not in remission    Fluids/Electrolytes/Nutrition/GI  Hyperbilirubinemia  - direct vs indirect  - RUQ US with gall bladder wall thickening; no evidence of obstruction  - CT abdomen wit no structural issues, c/f liver abscesses       Hypokalemia  - 3.3 on admission  - repleted 3/14, 3/15  - K 2.1 on 3/15  - repeat BMP with K 2.5; K repleted, BMP q8h with Mg level  - ongoing issue likely 2/2 persistent diarrhea, poor PO intake  - diarrhea improving  - replete K and Mag PRN    Other  Transaminitis  - Abd Xray 3/14: Nonobstructive bowel gas pattern. No free air or portal venous gas on this single view.   - US Abdomen complete ordered  - acute hepatitis panel pan-negative  - AST//390, Alk Phos 299 on admission   - resolvin/255 >> 49/112 > 25/70 > 16/37  - CT Abd/Pelvis 3/16 with multiple sub-cm foci c/f abscesses: per ID, likely 2/2 fusarium     Immunocompromised patient  - holding HAART per ID/BMT  - continue ppx with acyclovir  - discussing case with ID for starting MAC and PJP ppx    Fever  - Tlast/max: 103.1 on 3/14 @ 1500  - afebrile currently    Noncompliance  - history of leaving hospital AMA  - noncompliant with HAART and antimicrobials  - patient is DNR/DNI  - working with palliative for placement of children; re-address on 3/20     Critical Care Medicine Daily Checklist:    A: Awake: RASS Goal/Actual Goal:    Actual: Arellano Agitation Sedation Scale (RASS): Restless   B: Spontaneous Breathing Trial Performed?  NA, breathing spontaneously   C: SAT & SBT Coordinated?  NA                      D: Delirium: CAM-ICU Overall CAM-ICU: Positive   E: Early Mobility Performed? No   F: Feeding Goal:    Status:     Current Diet Order   Procedures    Diet Dental Soft      AS: Analgesia/Sedation NA   T: Thromboembolic Prophylaxis Held for plt < 30   H: HOB > 300 Yes   U: Stress Ulcer Prophylaxis (if needed) NA   G: Glucose Control Boost comfort feeds,  soft diet   B: Bowel Function Stool Occurrence: 1   I: Indwelling Catheter (Lines & Pineda) Necessity Triple lumen RIJ, LUE PIV, Pineda   D: De-escalation of Antimicrobials/Pharmacotherapies Repeat Blood Cx prelim negative, AIDS, continue acyclovir, ancef, flagyl, patient declining PO vanc    Plan for the day/ETD Palliative assisting with Child Life    Code Status:  Family/Goals of Care: DNR  Palliation     Critical Care Time: 30 minutes  Critical secondary to Patient has a condition that poses threat to life and bodily function: AIDS, palliative care      Critical care was time spent personally by me on the following activities: development of treatment plan with patient or surrogate and bedside caregivers, discussions with consultants, evaluation of patient's response to treatment, examination of patient, ordering and performing treatments and interventions, ordering and review of laboratory studies, ordering and review of radiographic studies, pulse oximetry, re-evaluation of patient's condition. This critical care time did not overlap with that of any other provider or involve time for any procedures.     Phoebe Bonds MD  Critical Care Medicine  [facility]

## 2017-03-19 NOTE — ASSESSMENT & PLAN NOTE
- history of leaving hospital AMA  - noncompliant with HAART and antimicrobials  - patient is DNR/DNI  - working with palliative for placement of children; re-address on 3/20

## 2017-03-19 NOTE — ASSESSMENT & PLAN NOTE
- repeat C. difficile EIA negative toxin but positive PCR and Ag indicating colonization but not activity   - Stool culture and stool ovm negative  - diarrhea improving: no episodes overnight; electrolytes stable  - Histoplasmosis, Cryptosporidium studies pending  - continue PO vancomycin

## 2017-03-20 LAB
ALBUMIN SERPL BCP-MCNC: 1.2 G/DL
ALP SERPL-CCNC: 192 U/L
ALT SERPL W/O P-5'-P-CCNC: 29 U/L
ANION GAP SERPL CALC-SCNC: 8 MMOL/L
ANION GAP SERPL CALC-SCNC: 9 MMOL/L
ANISOCYTOSIS BLD QL SMEAR: SLIGHT
AST SERPL-CCNC: 16 U/L
BACTERIA BLD CULT: NORMAL
BASOPHILS # BLD AUTO: 0 K/UL
BASOPHILS NFR BLD: 0 %
BILIRUB SERPL-MCNC: 3.1 MG/DL
BUN SERPL-MCNC: 13 MG/DL
BUN SERPL-MCNC: 13 MG/DL
CALCIUM SERPL-MCNC: 7.2 MG/DL
CALCIUM SERPL-MCNC: 7.3 MG/DL
CHLORIDE SERPL-SCNC: 113 MMOL/L
CHLORIDE SERPL-SCNC: 113 MMOL/L
CO2 SERPL-SCNC: 20 MMOL/L
CO2 SERPL-SCNC: 20 MMOL/L
CREAT SERPL-MCNC: 0.4 MG/DL
CREAT SERPL-MCNC: 0.4 MG/DL
DIFFERENTIAL METHOD: ABNORMAL
EOSINOPHIL # BLD AUTO: 0 K/UL
EOSINOPHIL NFR BLD: 0 %
ERYTHROCYTE [DISTWIDTH] IN BLOOD BY AUTOMATED COUNT: 16.3 %
EST. GFR  (AFRICAN AMERICAN): >60 ML/MIN/1.73 M^2
EST. GFR  (AFRICAN AMERICAN): >60 ML/MIN/1.73 M^2
EST. GFR  (NON AFRICAN AMERICAN): >60 ML/MIN/1.73 M^2
EST. GFR  (NON AFRICAN AMERICAN): >60 ML/MIN/1.73 M^2
FIBRINOGEN PPP-MCNC: 518 MG/DL
GLUCOSE SERPL-MCNC: 61 MG/DL
GLUCOSE SERPL-MCNC: 69 MG/DL
HAPTOGLOB SERPL-MCNC: 220 MG/DL
HCT VFR BLD AUTO: 22.6 %
HGB BLD-MCNC: 7.5 G/DL
HYPOCHROMIA BLD QL SMEAR: ABNORMAL
INR PPP: 1.4
LDH SERPL L TO P-CCNC: 250 U/L
LYMPHOCYTES # BLD AUTO: 1.5 K/UL
LYMPHOCYTES NFR BLD: 69.1 %
MAGNESIUM SERPL-MCNC: 1.6 MG/DL
MCH RBC QN AUTO: 29 PG
MCHC RBC AUTO-ENTMCNC: 33.2 %
MCV RBC AUTO: 87 FL
MONOCYTES # BLD AUTO: 0.6 K/UL
MONOCYTES NFR BLD: 25.6 %
NEUTROPHILS # BLD AUTO: 0.1 K/UL
NEUTROPHILS NFR BLD: 5.3 %
OVALOCYTES BLD QL SMEAR: ABNORMAL
PHOSPHATE SERPL-MCNC: 4.6 MG/DL
PLATELET # BLD AUTO: 15 K/UL
PMV BLD AUTO: ABNORMAL FL
POCT GLUCOSE: 84 MG/DL (ref 70–110)
POCT GLUCOSE: 88 MG/DL (ref 70–110)
POCT GLUCOSE: 92 MG/DL (ref 70–110)
POIKILOCYTOSIS BLD QL SMEAR: SLIGHT
POLYCHROMASIA BLD QL SMEAR: ABNORMAL
POTASSIUM SERPL-SCNC: 3.3 MMOL/L
POTASSIUM SERPL-SCNC: 3.5 MMOL/L
PROT SERPL-MCNC: 4.2 G/DL
PROTHROMBIN TIME: 14.2 SEC
RBC # BLD AUTO: 2.59 M/UL
SODIUM SERPL-SCNC: 141 MMOL/L
SODIUM SERPL-SCNC: 142 MMOL/L
WBC # BLD AUTO: 2.23 K/UL

## 2017-03-20 PROCEDURE — 80053 COMPREHEN METABOLIC PANEL: CPT

## 2017-03-20 PROCEDURE — 85025 COMPLETE CBC W/AUTO DIFF WBC: CPT

## 2017-03-20 PROCEDURE — 25000003 PHARM REV CODE 250: Performed by: STUDENT IN AN ORGANIZED HEALTH CARE EDUCATION/TRAINING PROGRAM

## 2017-03-20 PROCEDURE — 85610 PROTHROMBIN TIME: CPT

## 2017-03-20 PROCEDURE — 83615 LACTATE (LD) (LDH) ENZYME: CPT

## 2017-03-20 PROCEDURE — 25000003 PHARM REV CODE 250: Performed by: INTERNAL MEDICINE

## 2017-03-20 PROCEDURE — 99233 SBSQ HOSP IP/OBS HIGH 50: CPT | Mod: ,,, | Performed by: INTERNAL MEDICINE

## 2017-03-20 PROCEDURE — 99232 SBSQ HOSP IP/OBS MODERATE 35: CPT | Mod: ,,, | Performed by: INTERNAL MEDICINE

## 2017-03-20 PROCEDURE — 83010 ASSAY OF HAPTOGLOBIN QUANT: CPT

## 2017-03-20 PROCEDURE — 83735 ASSAY OF MAGNESIUM: CPT

## 2017-03-20 PROCEDURE — 20600001 HC STEP DOWN PRIVATE ROOM

## 2017-03-20 PROCEDURE — 84100 ASSAY OF PHOSPHORUS: CPT

## 2017-03-20 PROCEDURE — 85384 FIBRINOGEN ACTIVITY: CPT

## 2017-03-20 PROCEDURE — 86644 CMV ANTIBODY: CPT

## 2017-03-20 PROCEDURE — 63600175 PHARM REV CODE 636 W HCPCS: Performed by: INTERNAL MEDICINE

## 2017-03-20 PROCEDURE — 80048 BASIC METABOLIC PNL TOTAL CA: CPT

## 2017-03-20 RX ORDER — CIPROFLOXACIN 500 MG/1
500 TABLET ORAL EVERY 12 HOURS
Status: DISCONTINUED | OUTPATIENT
Start: 2017-03-20 | End: 2017-03-24

## 2017-03-20 RX ADMIN — ACETAMINOPHEN 500 MG: 500 TABLET ORAL at 03:03

## 2017-03-20 RX ADMIN — SODIUM CHLORIDE 500 ML: 0.9 INJECTION, SOLUTION INTRAVENOUS at 08:03

## 2017-03-20 RX ADMIN — METRONIDAZOLE 500 MG: 500 INJECTION, SOLUTION INTRAVENOUS at 08:03

## 2017-03-20 RX ADMIN — CEFAZOLIN SODIUM 1 G: 1 SOLUTION INTRAVENOUS at 02:03

## 2017-03-20 RX ADMIN — SODIUM CHLORIDE 500 ML: 0.9 INJECTION, SOLUTION INTRAVENOUS at 03:03

## 2017-03-20 RX ADMIN — CIPROFLOXACIN HYDROCHLORIDE 500 MG: 500 TABLET, FILM COATED ORAL at 10:03

## 2017-03-20 RX ADMIN — ACYCLOVIR SODIUM 300 MG: 50 INJECTION, SOLUTION INTRAVENOUS at 06:03

## 2017-03-20 RX ADMIN — AMPHOTERICIN B 200 MG: 50 INJECTION, POWDER, LYOPHILIZED, FOR SOLUTION INTRAVENOUS at 05:03

## 2017-03-20 RX ADMIN — CIPROFLOXACIN HYDROCHLORIDE 500 MG: 500 TABLET, FILM COATED ORAL at 08:03

## 2017-03-20 RX ADMIN — METRONIDAZOLE 500 MG: 500 INJECTION, SOLUTION INTRAVENOUS at 12:03

## 2017-03-20 NOTE — PLAN OF CARE
Problem: Patient Care Overview  Goal: Plan of Care Review  Outcome: Ongoing (interventions implemented as appropriate)  Pt involved in plan of care and communicating needs throughout shift. Bedrest maintained. Tolerating diet, powell to gravity. Contact precautions d/c. Ancef, flagyl, and vanc discontinued. Cipro PO started. Amphotericin B given as ordered. Hypotensive throughout the day. Total of 2 500 cc bolus given in addition to the 2 already ordered. Cardiac monitoring continued. Afebrile. Accuchecks q6h.   All other vitals stable; no acute events this shift.  Pt remaining free from falls or injury throughout shift; bed in lowest position; call light within reach; pt instructed to call for assistance as needed. Q1h rounding on patient. Will continue to monitor.

## 2017-03-20 NOTE — PROGRESS NOTES
Pt arrived to the floor with the nurse and was placed in room 822 without any complications. Pt is AAOx4. Oriented to room, nurse and call light. Assessment performed. No skin breakdown noted. Cardiac monitoring in place. All questions answered at this time. Will cont to samuel pt.

## 2017-03-20 NOTE — PROGRESS NOTES
Notified MD at 0800 that BP is 82/45- ordered to give 500 cc NS bolus. /58 after.  Notified MD at 1107 that BP is 88/51. Ordered to give another 500cc NS bolus. BP 93/60 after. Will cont to monitor

## 2017-03-20 NOTE — PLAN OF CARE
Problem: Patient Care Overview  Goal: Plan of Care Review  Outcome: Ongoing (interventions implemented as appropriate)  Afebrile. Free from falls or injury. No complaints of pain. Ancef and Acyclovir given as scheduled. Accu checks q 6. Tachy on telemetry. Bed locked in lowest position, non skid socks on, call light within reach. Pt instructed to call if any assistance is needed. Vitals stable. C-diff precautions maintained. Will cont to samuel pt.

## 2017-03-20 NOTE — NURSING TRANSFER
Nursing Transfer Note      3/20/2017     Transfer From: 3088     Transfer via bed    Transfer with cardiac monitoring    Transported by RN and PCT    Medicines sent: yes    Chart send with patient: Yes    Notified: receiving RN Gerber    Patient reassessed at: 3/20/2017 at 0015    Upon arrival to floor: cardiac monitor applied, patient oriented to room, call bell in reach and bed in lowest position.

## 2017-03-20 NOTE — PROGRESS NOTES
Progress Note  BMT                                                              [place] [facility] [place] [facility]    Patient Name: Marck Howard  YOB: 1980    Admit Date: 3/14/2017                   LOS: 6    SUBJECTIVE:     Reason for Admission: Sepsis due to Gram-negative organism with septic shock    Subjective and Interval History:   Patient was stepped down from MICU, patient denies any complain, however he is not engaging in any conversation.       Review of Systems:  General:  Denies weight loss, fever, chills, (+) weakness, fatigue  HEENT:  Denies vision changes, sore throat, congestion  CVS:  Denies chest pain, pressure, palpitations  Resp:  Denies shortness of breath, cough, sputum  GI:  Denies diarrhea, constipation, abdominal pain, nausea, vomiting  :  Denies dysuria, hematuria, nocturia  MSK:  Denies myalgias, arthralgias  Skin:  Denies rashes, bleeding  Neuro:  Denies headache, dizziness, syncope, numbness, paresthesias  All other systems otherwise negative    Scheduled Medications   acetaminophen  500 mg Oral Q24H    acyclovir  300 mg Intravenous Q12H    amphotericin B liposome (AMBISOME) IVPB  5 mg/kg (Dosing Weight) Intravenous Q24H    ciprofloxacin HCl  500 mg Oral Q12H    duke's soln (benadryl 30 mL, mylanta 30 mL, lidocane 30 mL, nystatin 30 mL) 120 mL  10 mL Oral BID    sodium chloride 0.9%  500 mL Intravenous Q24H    sodium chloride 0.9%  500 mL Intravenous Q24H    sodium chloride 0.9%  3 mL Intravenous Q8H       Continuous Infusions       PRN Medications  sodium chloride, sodium chloride, acetaminophen, dextrose 50%, glucagon (human recombinant), magnesium sulfate IVPB, magnesium sulfate IVPB, ondansetron, potassium chloride 10%, potassium chloride 10%, sodium phosphate IVPB, sodium phosphate IVPB    OBJECTIVE:     Temp:  [97.4 °F (36.3 °C)-98.8 °F (37.1 °C)]   Pulse:  [102-127]   Resp:  [16-29]   BP: ()/(45-76)   SpO2:  [93 %-99 %]   Body mass index is  13.98 kg/(m^2).    Intake/Output Summary    Intake/Output Summary (Last 24 hours) at 03/20/17 1321  Last data filed at 03/20/17 1105   Gross per 24 hour   Intake             3495 ml   Output             2965 ml   Net              530 ml       Physical Exam:  General:  Thin, cachectic, no acute distress  HEENT:  Normocephalic, atraumatic, EOMI, clear sclera, throat clear without erythema or exudates  CVS:  Tachycardic, S1 and S2 normal, no murmurs, rubs, gallops  Resp:  Lungs clear to auscultation, no wheezes, rales, rhonchi, cough.  Tachypnea  GI:  Abdomen soft, non-tender, non-distended, normoactive bowel sounds, no organomegaly  :  Deferred  MSK:  No muscle atrophy, cyanosis, peripheral edema  Skin:  No rashes, ulcers, erythema  Neuro:  CNII-XII grossly intact  Psych:  Alert and oriented to person, place, and time    Diagnostic Result    Lab Results   Component Value Date    WBC 2.23 (L) 03/20/2017    HGB 7.5 (L) 03/20/2017    HCT 22.6 (L) 03/20/2017    PLT 15 (LL) 03/20/2017         Recent Labs  Lab 03/20/17  0432 03/20/17  1054    142   K 3.5 3.3*   * 113*   CO2 20* 20*   BUN 13 13   CREATININE 0.4* 0.4*   MG  --  1.6   PHOS 4.6*  --        Lab Results   Component Value Date    INR 1.4 (H) 03/20/2017    INR 1.4 (H) 03/19/2017    INR 1.5 (H) 03/18/2017       Recent Labs      03/18/17   1255  03/18/17   1726  03/19/17   0037  03/19/17   0702  03/19/17   1039  03/19/17   1807  03/20/17   0736  03/20/17   1145   POCTGLUCOSE  76  104  132*  162*  128*  141*  84  88       ASSESSMENT/PLAN:   Mr. Marck Howard is a 36 y.o. male     Current Problems List:  Active Hospital Problems    Diagnosis  POA    *Sepsis due to Gram-negative organism with septic shock [A41.50, R65.21]  Yes    Gangrene [I96]  Unknown    AIDS (acquired immunodeficiency syndrome), CD4 <=200 [B20]  Yes    Subacute osteomyelitis of left hand [M86.242]  Yes    Abdominal pain [R10.9]  Yes    Chemotherapy-induced neutropenia  [D70.1]  Yes    Pancytopenia [D61.818]  Yes    Transaminitis [R74.0]  Yes    Lactic acid acidosis [E87.2]  Yes    Hyperbilirubinemia [E80.6]  Yes    Immunocompromised patient [D84.9]  Yes    Noncompliance [Z91.19]  Not Applicable    Hypokalemia [E87.6]  Yes    Clostridium difficile infection [B96.89]  Yes    Bacteremia due to Enterococcus [R78.81]  Yes    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 We do not recommend proceeding with any surgical interventions  - Known history of untreated fungemia, but repeat blood cx negative for fungus  - C.diff +, on PO Vanc and IV Flagyl--> will dc.   - will dc all abx and start cipro   - On board spectrum coverage: Amphotericin B, Acyclovir  - Primary management per ICU, appreciate assistance      Refractory AML   - S/p 7+3 and MEC induction with known persistent disease  - 85% blasts on differential today  - Transfuse if Hb < 7, Plt < 10,000/mm3  - LDH wnl, Uric Acid 2.8, Fibrinogen >800   - INR 1.4  - Monitor TLS and DIC labs daily.    - Patient is not a candidate for further therapy for is AML given his functional status, refractory disease, AIDS and current infections, recommend palliative care. Palliative care following.    Diet:  Dental Soft  DVT PPx:  Holding mechanical in the setting of thrombocytopenia  Code Status:  DNR/DNI      Dispo:  Prognosis poor, appreciate Palliative assistance.  Ok to step down to BMT when stable  for the floor.        Signing Physician:  ---------------------------------      Efrain Cintron  Internal Medicine PGY3  [phone]

## 2017-03-20 NOTE — ASSESSMENT & PLAN NOTE
35yo man w/a history of recently diagnosed HIV/AIDS (dx 1/17/2017; SX5=768/7%, VL 22k, GT wildtype; started triumeq 2/2 but stopped 2/24) and AML (s/p 7+3 induction with persistent leukemia on day 14 marrow; induction course c/b neutropenic fevers due to C.diff colitis, VSE (faecium) septicemia, and Fusarium fungemia; not on ART or therapy at home for these pathogens after leaving [place] 2/24) who was admitted on 3/14/2017 with chills/sweats, malaise, weakness, MÉNDEZ, abdominal pain, diarrhea, and left hand pain and was found to have neutropenic fever/septic shock from Klebsiella septicemia, likely persistent CDI, untreated fusariosis (of note, 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 He has stabilized on ancef, PO vanc/IV flagyl, ambisome, and acyclovir but his overall long-term prognosis is terminal with high-risk AML, AIDS, active mold/bacterial infections, and medical noncompliance. End of life discussions have begun appropriately.    - would transition ancef to oral cipro for Klebsiella transient septicemia and neutropenic prophylaxis  - would continue PO vanc for CDI (can stop IV flagyl as diarrhea improved)  - would continue ambisome for disseminated Fusariosis until placement/goals are set by patient and family   - would continue ACV prophylaxis to prevent HSV flares  - would hold ART as will not impact his survival  - would hold on additional interventions while arrangements for inpatient hospice are made -- patient remains DNI/DNR  - greatly appreciate assistance of palliative care team in arranging family care/hospice for patient -- will defer further discussions to them and the hematology team (now primary) who know him well; please contact me if I can help taper his medications further during this transition

## 2017-03-20 NOTE — SUBJECTIVE & OBJECTIVE
Interval History: Afebrile. No new complaints. Child life to meet with patient today. Transferred to floor on heme service.       Review of Systems   Constitutional: Positive for activity change, appetite change, fatigue and unexpected weight change. Negative for chills, diaphoresis and fever.   HENT: Negative for dental problem, drooling, ear pain, mouth sores, postnasal drip, rhinorrhea, sinus pressure, sore throat and trouble swallowing.    Eyes: Negative for photophobia, pain and redness.   Respiratory: Positive for shortness of breath. Negative for cough and wheezing.    Cardiovascular: Negative for chest pain and leg swelling.   Gastrointestinal: Positive for abdominal pain. Negative for abdominal distention, diarrhea and nausea.   Genitourinary: Negative for dysuria, flank pain, frequency and urgency.   Musculoskeletal: Positive for arthralgias. Negative for back pain, gait problem and myalgias.   Skin: Negative for pallor and rash.   Neurological: Negative for dizziness, tremors, seizures, weakness and headaches.   Hematological: Bruises/bleeds easily.   Psychiatric/Behavioral: Negative for confusion.     Objective:     Vital Signs (Most Recent):  Temp: 97.4 °F (36.3 °C) (03/20/17 1107)  Pulse: (!) 116 (03/20/17 1300)  Resp: 16 (03/20/17 1107)  BP: 93/60 (03/20/17 1145)  SpO2: 96 % (03/20/17 1107) Vital Signs (24h Range):  Temp:  [97.4 °F (36.3 °C)-98.8 °F (37.1 °C)] 97.4 °F (36.3 °C)  Pulse:  [102-127] 116  Resp:  [16-29] 16  SpO2:  [93 %-99 %] 96 %  BP: ()/(45-76) 93/60     Weight: 35.8 kg (78 lb 14.8 oz)  Body mass index is 13.98 kg/(m^2).    Estimated Creatinine Clearance: 126.8 mL/min (based on Cr of 0.4).    Physical Exam   Constitutional: No distress.   Markedly cachectic.   HENT:   Head: Atraumatic.   Mouth/Throat: Oropharynx is clear and moist. No oropharyngeal exudate.   Eyes: Conjunctivae and EOM are normal. Pupils are equal, round, and reactive to light. No scleral icterus.   Neck: Neck  supple.   Cardiovascular: Normal rate and regular rhythm.  Exam reveals no friction rub.    Murmur heard.  Pulmonary/Chest: No respiratory distress. He has no wheezes. He has rales. He exhibits no tenderness.   Abdominal: Soft. Bowel sounds are normal. He exhibits no distension. There is no tenderness. There is no rebound and no guarding.   Musculoskeletal: Normal range of motion. He exhibits no edema.   Lymphadenopathy:     He has no cervical adenopathy.   Neurological: He is alert. No cranial nerve deficit. He exhibits normal muscle tone. Coordination normal.   Skin: No rash noted. No erythema.       Significant Labs:   CBC:     Recent Labs  Lab 03/19/17  0243 03/20/17  0432   WBC 2.24* 2.23*   HGB 7.3* 7.5*   HCT 21.1* 22.6*   PLT 8* 15*     CMP:   Recent Labs  Lab 03/19/17  0243  03/19/17  1806 03/20/17  0432 03/20/17  1054   *  < > 139 141 142   K 3.5  < > 4.9 3.5 3.3*     < > 113* 113* 113*   CO2 18*  < > 17* 20* 20*   *  < > 99 61* 69*   BUN 14  < > 13 13 13   CREATININE 0.4*  < > 0.4* 0.4* 0.4*   CALCIUM 6.8*  < > 7.2* 7.3* 7.2*   PROT 3.9*  --   --  4.2*  --    ALBUMIN 1.1*  --   --  1.2*  --    BILITOT 3.9*  --   --  3.1*  --    ALKPHOS 123  --   --  192*  --    AST 16  --   --  16  --    ALT 37  --   --  29  --    ANIONGAP 8  < > 9 8 9   EGFRNONAA >60.0  < > >60.0 >60.0 >60.0   < > = values in this interval not displayed.    Significant Imaging: I have reviewed all pertinent imaging results/findings within the past 24 hours.     Abdominal U/S:  Nondistended gallbladder demonstrating increased wall thickness of 0.8 cm.  No cholelithiasis. The gallbladder wall thickening may be secondary to nondistention. However, it can also be seen in the setting of hepatitis.  Correlation advised.      Left hand XR:  There is medullary expansion of the phalanges, nonspecific.   There is lytic destruction involving the distal phalanx of the fourth digit and the base of the distal phalanx of the third  digit with associated joint space narrowing at the DIP joints. There is soft tissue swelling in the fourth digit. Findings suspicious for osteomyelitis involving the distal phalanges of both digits.     CXR: Impression persistent patchy infiltrate in the right upper and midlung field.     Microbiology:  3/14 blood cx: Klebsiella x2  3/14 urine cx: negative  3/14 sputum cx: NGTD  3/14 SCrAg pending  3/14 blood cx: Klebsiella x2  3/15 stool cx: NGTD  3/15 C.diff testing discordant, PCR positive  3/15 Shiga toxin negative  3/15 O&P negative  3/15 fungal blood cx: NGTD  3/16 blood cx: NGTD

## 2017-03-21 PROBLEM — I96 GANGRENE: Status: ACTIVE | Noted: 2017-03-21

## 2017-03-21 LAB
ALBUMIN SERPL BCP-MCNC: 1.2 G/DL
ALP SERPL-CCNC: 310 U/L
ALT SERPL W/O P-5'-P-CCNC: 23 U/L
ANION GAP SERPL CALC-SCNC: 10 MMOL/L
ANISOCYTOSIS BLD QL SMEAR: SLIGHT
AST SERPL-CCNC: 16 U/L
BASOPHILS NFR BLD: 0 %
BILIRUB SERPL-MCNC: 3.4 MG/DL
BLASTS NFR BLD MANUAL: 52 %
BUN SERPL-MCNC: 14 MG/DL
CALCIUM SERPL-MCNC: 7.1 MG/DL
CHLORIDE SERPL-SCNC: 112 MMOL/L
CO2 SERPL-SCNC: 20 MMOL/L
CREAT SERPL-MCNC: 0.4 MG/DL
DIFFERENTIAL METHOD: ABNORMAL
EOSINOPHIL NFR BLD: 0 %
ERYTHROCYTE [DISTWIDTH] IN BLOOD BY AUTOMATED COUNT: 16.2 %
EST. GFR  (AFRICAN AMERICAN): >60 ML/MIN/1.73 M^2
EST. GFR  (NON AFRICAN AMERICAN): >60 ML/MIN/1.73 M^2
FIBRINOGEN PPP-MCNC: 504 MG/DL
GLUCOSE SERPL-MCNC: 66 MG/DL
HAPTOGLOB SERPL-MCNC: 224 MG/DL
HCT VFR BLD AUTO: 21.8 %
HGB BLD-MCNC: 7.6 G/DL
INR PPP: 1.3
LDH SERPL L TO P-CCNC: 270 U/L
LYMPHOCYTES NFR BLD: 35 %
MAGNESIUM SERPL-MCNC: 1.3 MG/DL
MCH RBC QN AUTO: 29.8 PG
MCHC RBC AUTO-ENTMCNC: 34.9 %
MCV RBC AUTO: 86 FL
MONOCYTES NFR BLD: 1 %
NEUTROPHILS NFR BLD: 12 %
PHOSPHATE SERPL-MCNC: 4.3 MG/DL
PLATELET # BLD AUTO: 11 K/UL
PLATELET BLD QL SMEAR: ABNORMAL
PMV BLD AUTO: ABNORMAL FL
POCT GLUCOSE: 138 MG/DL (ref 70–110)
POCT GLUCOSE: 181 MG/DL (ref 70–110)
POCT GLUCOSE: 74 MG/DL (ref 70–110)
POCT GLUCOSE: 74 MG/DL (ref 70–110)
POLYCHROMASIA BLD QL SMEAR: ABNORMAL
POTASSIUM SERPL-SCNC: 2.8 MMOL/L
PROT SERPL-MCNC: 4.2 G/DL
PROTHROMBIN TIME: 13.9 SEC
RBC # BLD AUTO: 2.55 M/UL
SODIUM SERPL-SCNC: 142 MMOL/L
WBC # BLD AUTO: 2.84 K/UL

## 2017-03-21 PROCEDURE — 83735 ASSAY OF MAGNESIUM: CPT

## 2017-03-21 PROCEDURE — 25000003 PHARM REV CODE 250: Performed by: INTERNAL MEDICINE

## 2017-03-21 PROCEDURE — 85027 COMPLETE CBC AUTOMATED: CPT

## 2017-03-21 PROCEDURE — 85610 PROTHROMBIN TIME: CPT

## 2017-03-21 PROCEDURE — 99232 SBSQ HOSP IP/OBS MODERATE 35: CPT | Mod: ,,, | Performed by: INTERNAL MEDICINE

## 2017-03-21 PROCEDURE — 25000003 PHARM REV CODE 250: Performed by: STUDENT IN AN ORGANIZED HEALTH CARE EDUCATION/TRAINING PROGRAM

## 2017-03-21 PROCEDURE — 25000003 PHARM REV CODE 250: Performed by: NURSE PRACTITIONER

## 2017-03-21 PROCEDURE — 83010 ASSAY OF HAPTOGLOBIN QUANT: CPT

## 2017-03-21 PROCEDURE — 63600175 PHARM REV CODE 636 W HCPCS: Performed by: INTERNAL MEDICINE

## 2017-03-21 PROCEDURE — 25000003 PHARM REV CODE 250: Performed by: HOSPITALIST

## 2017-03-21 PROCEDURE — 85007 BL SMEAR W/DIFF WBC COUNT: CPT

## 2017-03-21 PROCEDURE — 85384 FIBRINOGEN ACTIVITY: CPT

## 2017-03-21 PROCEDURE — 83615 LACTATE (LD) (LDH) ENZYME: CPT

## 2017-03-21 PROCEDURE — 20600001 HC STEP DOWN PRIVATE ROOM

## 2017-03-21 PROCEDURE — 80053 COMPREHEN METABOLIC PANEL: CPT

## 2017-03-21 PROCEDURE — 84100 ASSAY OF PHOSPHORUS: CPT

## 2017-03-21 PROCEDURE — 63600175 PHARM REV CODE 636 W HCPCS: Performed by: NURSE PRACTITIONER

## 2017-03-21 RX ORDER — LANOLIN ALCOHOL/MO/W.PET/CERES
800 CREAM (GRAM) TOPICAL EVERY 4 HOURS PRN
Status: DISCONTINUED | OUTPATIENT
Start: 2017-03-21 | End: 2017-03-23

## 2017-03-21 RX ORDER — LANOLIN ALCOHOL/MO/W.PET/CERES
800 CREAM (GRAM) TOPICAL 2 TIMES DAILY
Status: COMPLETED | OUTPATIENT
Start: 2017-03-21 | End: 2017-03-21

## 2017-03-21 RX ORDER — POTASSIUM CHLORIDE 750 MG/1
20 CAPSULE, EXTENDED RELEASE ORAL
Status: DISCONTINUED | OUTPATIENT
Start: 2017-03-21 | End: 2017-03-23

## 2017-03-21 RX ORDER — POTASSIUM CHLORIDE 20 MEQ/1
60 TABLET, EXTENDED RELEASE ORAL ONCE
Status: DISCONTINUED | OUTPATIENT
Start: 2017-03-21 | End: 2017-03-23

## 2017-03-21 RX ORDER — SODIUM,POTASSIUM PHOSPHATES 280-250MG
2 POWDER IN PACKET (EA) ORAL EVERY 4 HOURS PRN
Status: DISCONTINUED | OUTPATIENT
Start: 2017-03-21 | End: 2017-03-23

## 2017-03-21 RX ORDER — POTASSIUM CHLORIDE 20 MEQ/1
40 TABLET, EXTENDED RELEASE ORAL 2 TIMES DAILY
Status: DISPENSED | OUTPATIENT
Start: 2017-03-21 | End: 2017-03-22

## 2017-03-21 RX ORDER — LANOLIN ALCOHOL/MO/W.PET/CERES
400 CREAM (GRAM) TOPICAL EVERY 4 HOURS PRN
Status: DISCONTINUED | OUTPATIENT
Start: 2017-03-21 | End: 2017-03-23

## 2017-03-21 RX ORDER — OXYCODONE HYDROCHLORIDE 5 MG/1
5 TABLET ORAL EVERY 6 HOURS PRN
Status: DISCONTINUED | OUTPATIENT
Start: 2017-03-21 | End: 2017-03-28 | Stop reason: HOSPADM

## 2017-03-21 RX ORDER — SODIUM,POTASSIUM PHOSPHATES 280-250MG
1 POWDER IN PACKET (EA) ORAL EVERY 4 HOURS PRN
Status: DISCONTINUED | OUTPATIENT
Start: 2017-03-21 | End: 2017-03-23

## 2017-03-21 RX ORDER — POTASSIUM CHLORIDE 7.45 MG/ML
10 INJECTION INTRAVENOUS
Status: COMPLETED | OUTPATIENT
Start: 2017-03-21 | End: 2017-03-21

## 2017-03-21 RX ADMIN — MAGNESIUM OXIDE TAB 400 MG (241.3 MG ELEMENTAL MG) 800 MG: 400 (241.3 MG) TAB at 08:03

## 2017-03-21 RX ADMIN — ACYCLOVIR SODIUM 300 MG: 50 INJECTION, SOLUTION INTRAVENOUS at 08:03

## 2017-03-21 RX ADMIN — ACYCLOVIR SODIUM 300 MG: 50 INJECTION, SOLUTION INTRAVENOUS at 06:03

## 2017-03-21 RX ADMIN — POTASSIUM CHLORIDE 10 MEQ: 10 INJECTION, SOLUTION INTRAVENOUS at 05:03

## 2017-03-21 RX ADMIN — MAGNESIUM OXIDE TAB 400 MG (241.3 MG ELEMENTAL MG) 800 MG: 400 (241.3 MG) TAB at 11:03

## 2017-03-21 RX ADMIN — ACETAMINOPHEN 500 MG: 500 TABLET ORAL at 03:03

## 2017-03-21 RX ADMIN — SODIUM CHLORIDE 500 ML: 0.9 INJECTION, SOLUTION INTRAVENOUS at 03:03

## 2017-03-21 RX ADMIN — SODIUM CHLORIDE 500 ML: 0.9 INJECTION, SOLUTION INTRAVENOUS at 07:03

## 2017-03-21 RX ADMIN — Medication 125 MG: at 12:03

## 2017-03-21 RX ADMIN — OXYCODONE HYDROCHLORIDE 5 MG: 5 TABLET ORAL at 01:03

## 2017-03-21 RX ADMIN — POTASSIUM CHLORIDE 10 MEQ: 10 INJECTION, SOLUTION INTRAVENOUS at 03:03

## 2017-03-21 RX ADMIN — AMPHOTERICIN B 200 MG: 50 INJECTION, POWDER, LYOPHILIZED, FOR SOLUTION INTRAVENOUS at 04:03

## 2017-03-21 RX ADMIN — Medication 3 ML: at 02:03

## 2017-03-21 RX ADMIN — POTASSIUM CHLORIDE 10 MEQ: 10 INJECTION, SOLUTION INTRAVENOUS at 11:03

## 2017-03-21 RX ADMIN — POTASSIUM CHLORIDE 40 MEQ: 1500 TABLET, EXTENDED RELEASE ORAL at 08:03

## 2017-03-21 RX ADMIN — Medication 125 MG: at 06:03

## 2017-03-21 RX ADMIN — CIPROFLOXACIN HYDROCHLORIDE 500 MG: 500 TABLET, FILM COATED ORAL at 08:03

## 2017-03-21 RX ADMIN — POTASSIUM CHLORIDE 10 MEQ: 10 INJECTION, SOLUTION INTRAVENOUS at 02:03

## 2017-03-21 RX ADMIN — POTASSIUM CHLORIDE 10 MEQ: 10 INJECTION, SOLUTION INTRAVENOUS at 01:03

## 2017-03-21 NOTE — PROGRESS NOTES
Progress Note  BMT                                                              Team: Networked reference to record PCT     Patient Name: Marck Howard  YOB: 1980    Admit Date: 3/14/2017                   LOS: 7    SUBJECTIVE:     Reason for Admission: Sepsis due to Gram-negative organism with septic shock    Subjective and Interval History:   -patient awake and conversant this am  -his sisters are at the bedside  -afebrile  -voices no complaints this a    Review of Systems:  General:  Denies weight loss, fever, chills, (+) weakness, fatigue  HEENT:  Denies vision changes, sore throat, congestion  CVS:  Denies chest pain, pressure, palpitations  Resp:  Denies shortness of breath, cough, sputum  GI:  Denies diarrhea, constipation, abdominal pain, nausea, vomiting  :  Denies dysuria, hematuria, nocturia  MSK:  Denies myalgias, arthralgias  Skin:  Denies rashes, bleeding  Neuro:  Denies headache, dizziness, syncope, numbness, paresthesias  All other systems otherwise negative    Scheduled Medications   acetaminophen  500 mg Oral Q24H    acyclovir  300 mg Intravenous Q12H    amphotericin B liposome (AMBISOME) IVPB  5 mg/kg (Dosing Weight) Intravenous Q24H    ciprofloxacin HCl  500 mg Oral Q12H    duke's soln (benadryl 30 mL, mylanta 30 mL, lidocane 30 mL, nystatin 30 mL) 120 mL  10 mL Oral BID    sodium chloride 0.9%  500 mL Intravenous Q24H    sodium chloride 0.9%  500 mL Intravenous Q24H    sodium chloride 0.9%  3 mL Intravenous Q8H    vancomycin  125 mg Oral Q6H          PRN Medications  sodium chloride, sodium chloride, acetaminophen, dextrose 50%, glucagon (human recombinant), magnesium sulfate IVPB, magnesium sulfate IVPB, ondansetron, potassium chloride 10%, potassium chloride 10%, sodium phosphate IVPB, sodium phosphate IVPB    OBJECTIVE:     Temp:  [97.4 °F (36.3 °C)-98.2 °F (36.8 °C)]   Pulse:  []   Resp:  [14-18]   BP: ()/(49-62)   SpO2:  [96 %-100 %]   Body  mass index is 13.98 kg/(m^2).    Intake/Output Summary    Intake/Output Summary (Last 24 hours) at 03/21/17 0915  Last data filed at 03/21/17 0734   Gross per 24 hour   Intake             2390 ml   Output             3350 ml   Net             -960 ml       Physical Exam:  General:  Thin, cachectic, no acute distress  HEENT:  Normocephalic, atraumatic, EOMI, clear sclera, throat clear without erythema or exudates  CVS:  Tachycardic, S1 and S2 normal, no murmurs, rubs, gallops  Resp:  Lungs clear to auscultation, no wheezes, rales, rhonchi, cough.  Tachypnea  GI:  Abdomen soft, non-tender, non-distended, normoactive bowel sounds, no organomegaly  MSK:  No muscle atrophy, cyanosis, peripheral edema  Skin:  No rashes, erythema--ulceration noted to fourth finger of left hand  Neuro:  CNII-XII grossly intact  Psych:  Alert and oriented to person, place, and time    Diagnostic Result    Lab Results   Component Value Date    WBC 2.84 (L) 03/21/2017    HGB 7.6 (L) 03/21/2017    HCT 21.8 (L) 03/21/2017    PLT 11 (LL) 03/21/2017         Recent Labs  Lab 03/21/17  0416      K 2.8*   *   CO2 20*   BUN 14   CREATININE 0.4*   MG 1.3*   PHOS 4.3       Lab Results   Component Value Date    INR 1.3 (H) 03/21/2017    INR 1.4 (H) 03/20/2017    INR 1.4 (H) 03/19/2017       Recent Labs      03/19/17   0702  03/19/17   1039  03/19/17   1807  03/20/17   0736  03/20/17   1145  03/20/17   1832  03/21/17   0046  03/21/17   0614   POCTGLUCOSE  162*  128*  141*  84  88  92  74  74       ASSESSMENT/PLAN:   Mr. Marck Howard is a 36 y.o. male     Current Problems List:  Active Hospital Problems    Diagnosis  POA    *Sepsis due to Gram-negative organism with septic shock [A41.50, R65.21]  Yes    Gangrene [I96]  Unknown    AIDS (acquired immunodeficiency syndrome), CD4 <=200 [B20]  Yes    Subacute osteomyelitis of left hand [M86.242]  Yes    Abdominal pain [R10.9]  Yes    Chemotherapy-induced neutropenia [D70.1]  Yes     Pancytopenia [D61.818]  Yes    Transaminitis [R74.0]  Yes    Lactic acid acidosis [E87.2]  Yes    Hyperbilirubinemia [E80.6]  Yes    Immunocompromised patient [D84.9]  Yes    Noncompliance [Z91.19]  Not Applicable    Hypokalemia [E87.6]  Yes    Clostridium difficile infection [B96.89]  Yes    Bacteremia due to Enterococcus [R78.81]  Yes    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 We do not recommend proceeding with any surgical interventions  - Known history of untreated fungemia, but repeat blood cx negative for fungus  - C.diff +, continue PO Vanc   - IV antibiotics dc'd and continue po cipro  (3/20)  - On board spectrum coverage: Amphotericin B, Acyclovir      Refractory AML   - S/p 7+3 and MEC induction with known persistent disease  - 53% blasts on differential today  - hgb 7.6 and platelets   - Transfuse if Hb < 7, Plt < 10,000/mm3  - Monitor TLS and DIC labs daily.    - Patient is not a candidate for further therapy for is AML given his functional status, refractory disease, AIDS and current infections, recommend palliative care. Palliative care following.    Hypokalemia and hypomagnesemia   - will replaced as needed    Diet:  Dental Soft  DVT PPx:  Holding mechanical in the setting of thrombocytopenia  Code Status:  DNR/DNI      Dispo:  Prognosis poor, appreciate Palliative assistance.  Plan to discharge to home hospice later this week.      Marcella Dodson  NP  Hematology/BMT

## 2017-03-21 NOTE — PLAN OF CARE
Problem: Patient Care Overview  Goal: Plan of Care Review  Outcome: Ongoing (interventions implemented as appropriate)  Afebrile. Free from falls or injury. No complaints of pain. NS infusing at 75ml/hr. Cipro given as scheduled. Accu checks q 6. Bed locked in lowest position, non skid socks on, call light within reach. Pt instructed to call if any assistance is needed. Vitals stable. Will cont to samuel pt.

## 2017-03-21 NOTE — PLAN OF CARE
MDR's with Dr Jones.  Patient's continues to decline.  Plan to transition to home hospice.  SW working with the patient and his niece (Obdulia) to make custody arrangements with the patient's 2 younger children.  Plan for d/c as soon as legal arrangements have been made and along with hospice.  Will continue to follow.

## 2017-03-21 NOTE — PLAN OF CARE
Problem: Patient Care Overview  Goal: Plan of Care Review  Outcome: Ongoing (interventions implemented as appropriate)  Pt involved in plan of care and communicating needs throughout shift. Bedrest maintained. Tolerating diet, powell to gravity. Vanc and Amphotericin B given as ordered. Hypotensive throughout the day. Total of 1L bolus given in addition to the 2 already ordered. Cardiac monitoring discontinued. Afebrile. Accuchecks q6h. KCl and magnesium replacements given.  Oxy given x1 for pain in left hand finger All other vitals stable; no acute events this shift. Pt remaining free from falls or injury throughout shift; bed in lowest position; call light within reach; pt instructed to call for assistance as needed. Q1h rounding on patient. Will continue to monitor.

## 2017-03-21 NOTE — PROGRESS NOTES
Consult Note  Palliative Care      Consult Requested By: Yamilex Stephenson MD  Reason for Consult: goals of care,  child life       ASSESSMENT/PLAN:   Chart reviewed and patient discussed with Dr. Mccann [facility] resident   Impression: Mr. Howard is a 36 yr old gentleman with advanced HIV and refractory AML with sepitic shock stepped down from ICU. In bed with blanket over his head.   Opens eyes to verbal and contact stimuli.  Alert and oriented.No acute distress  Limited engagement in conversation: avoids questions regarding goals of care -  appears to be having some denial of current clinical situation . No acute distress.  a short time on Friday.  Family planned to return on Sat.  When speaking of children  He is awake, alert, oriented, lethargic.  Does not engage in conversation, long pauses between each word, did not open eyes during this visit. Receiving low dose pressor support.  No complaints of pain or discomfort.  No acute  Distress.     Goals of Care:  Gentle conversation regarding goals of care and discharge planning.  Mr. Howard is reluctant to speak of discharge planning.    Given the  advanced HIV and refractory AML with no additional treatment options a transition to comfort care in setting of inpatient  hospice is appropriate. Patient does not appear to be ready for this discussion and has not been initiated at this time.  Emotional support provided.     Reports that sister and children visited for short period on Friday.   Family had planned to visit again this past Saturday.  Family has had difficulty with transportation to hospital. Home environment and psychosocial  Support is concerning. He has three known sisters.  Vinny ([phone]) cares for his children and she is a heavy drinker.  Sister Kerri [phone] that he is estranged from and does not want her to have information and one sister, name unknown who lives in [place].      Family had planned to visit hospital this past Saturday.  Child Life unavailable on Sat.  Child life resources to be given to family.   Sisters do not have reliable transportation and were unable to visit on Saturday.  Attempted to reach Joe by telephone, no answer, message left.  Telephoned sister Kerri who states she is aware of Mr. Howard's grave illness and his children are also aware.  Kerri states they will not be able to come to hospital until Friday or Saturday.  Kerri was encouraged to contact her sister and to make plans to come to hospital as soon as possible. Palliative care will reach out to family  Again tomorrow and is collaborating with BMT .     Symptom Management  Nausea:  Patient may benefit from continuing current medications   Current    Ondansetron 8 mg ODT every 8 hrs as needed.        Plan/Recommendations:  1.  Palliative care  Will continue to follow,  establish rapport and assist patient in developing goals of care  2.  Palliative care will  collaborate with BMT social workers for visit with children .  3.  Patient may benefit from family meeting with palliative care and primary care team.  Palliative care will facilitate   4. Emotional support       BMT aware of recommendations and discussed patient with AMINA Dunne, BMT .  Thank you for opportunity to participate in Mr. Howard's  care.     Signature: Gayla Schofield, APRN, ACNS-BC, OCN     > 50% of 25  min visit spent in chart review, face to face discussion of goals of care,  symptom assessment, coordination of care and emotional support.      SUBJECTIVE:     History of Present Illness:      Mr. Howard is a 35 yo gentleman with PMH of advanced HIV and refractory AML (dx Jan 2017). S/P re-induction chemotherapy.  During this hospitalization (1/ 2017 - 2/2017 developed neutropenic fever, positive blood cultures (GPC), C. Diff and fungemia.  Mr. Howard refused antifungals and left hospital AMA.  He has been  nonadherent with discharge medication regimen(HAART,  "Cipro, Flagyl)  and made no effort to follow up with [facility] despite several attempts. On 3/14/17 He [place] [facility] [place] with complaints of generalized weakness, shortness of breath and fever.  Found to be in septic shock and transferred to ICU. Currently receiving pressor support, with levo, multiple antibiotics and antifungals.  Palliative care consulted for symptom management and goals of care.           Past Medical History:   Diagnosis Date    Cancer     from "smoking cigarettes"    HIV disease      History reviewed. No pertinent surgical history.  History reviewed. No pertinent family history.  Review of patient's allergies indicates:  No Known Allergies    Medications:  Continuous Infusions:      Scheduled:    acetaminophen  500 mg Oral Q24H    acyclovir  300 mg Intravenous Q12H    amphotericin B liposome (AMBISOME) IVPB  5 mg/kg (Dosing Weight) Intravenous Q24H    ciprofloxacin HCl  500 mg Oral Q12H    duke's soln (benadryl 30 mL, mylanta 30 mL, lidocane 30 mL, nystatin 30 mL) 120 mL  10 mL Oral BID    sodium chloride 0.9%  500 mL Intravenous Q24H    sodium chloride 0.9%  500 mL Intravenous Q24H    sodium chloride 0.9%  3 mL Intravenous Q8H     PRN Meds: sodium chloride, sodium chloride, acetaminophen, dextrose 50%, glucagon (human recombinant), magnesium sulfate IVPB, magnesium sulfate IVPB, ondansetron, potassium chloride 10%, potassium chloride 10%, sodium phosphate IVPB, sodium phosphate IVPB    24h Oral Morphine Equivalents (OME): 0    Bowel Management Plan (BMP): Yes (  )  NO  (X )    OBJECTIVE:   Symptom Assessment (ESAS 0-10 scale)     ESAS 0 1 2 3 4 5 6 7 8 9 10   Pain              Dyspnea              Anxiety              Nausea   X           Depression               Anorexia              Fatigue              Insomnia              Restlessness               Agitation              Constipation     __ --  ___+   Diarrhea           +  C. Diff   Comments:       Perfromance Status: PPS Score " (20 )       Physical Exam:  Vitals:reviewed   General: thin appears cachexia  Afebrile, alert, comfortable, no acute distress.   Pulmonary: Non labored,clear to auscultation anteriorly.   Cardiac: tachycardia  normal S1 & S2 w/o rubs/murmurs/gallops.   Abdominal: tender to touch , non-distended.Bowel sounds present x 4. No appreciable hepatosplenomegaly. No guarding or rebound tenderness. Reports no diarrhea today    Extremities: Moves all extremities x 4. No peripheral edema. 2+ pulses.  Skin: No jaundice, rashes, or visible lesions.  Neurological: Alert and oriented x 4. No focal neuro deficits.   Psych/Mental Status: avoids eye contact and answering questions, avoids conversations this afternoon, appears to be depressed     CAM / Delirium __ --  ___+   FAST Stage for Dementia:      Labs: reviewed   CBC:   WBC   Date Value Ref Range Status   03/20/2017 2.23 (L) 3.90 - 12.70 K/uL Final       Hemoglobin   Date Value Ref Range Status   03/20/2017 7.5 (L) 14.0 - 18.0 g/dL Final       Hematocrit   Date Value Ref Range Status   03/20/2017 22.6 (L) 40.0 - 54.0 % Final       MCV   Date Value Ref Range Status   03/20/2017 87 82 - 98 fL Final       Platelets   Date Value Ref Range Status   03/20/2017 15 (LL) 150 - 350 K/uL Final     Comment:     plt  critical result(s) called and verbal readback obtained from   le callowaynurse, 03/20/2017 05:56         BMP:     Recent Labs  Lab 03/20/17  1054   GLU 69*      K 3.3*   *   CO2 20*   BUN 13   CREATININE 0.4*   CALCIUM 7.2*   MG 1.6       LFT:   Lab Results   Component Value Date    AST 16 03/20/2017    GGT 38 01/17/2017    ALKPHOS 192 (H) 03/20/2017    BILITOT 3.1 (H) 03/20/2017       Albumin:   Albumin   Date Value Ref Range Status   03/20/2017 1.2 (L) 3.5 - 5.2 g/dL Final     Protein:   Total Protein   Date Value Ref Range Status   03/20/2017 4.2 (L) 6.0 - 8.4 g/dL Final       LACTIC ACID:   Lab Results   Component Value Date    LACTATE 2.0 03/15/2017    LACTATE  3.6 () 03/14/2017       4  Legal/Advanced Directives: not on file   Living Will: not on file   Resuscitate Status: DNR/DNI  Decision-Making Capacity:   Medical Power of : self     Psychosocial/Cultural: Two children age 12 and 13     Spiritual:     F- [employer] and [employer]     I - Importance  .  C - Community    A - Address in Care:  notified for visits with patient       Problem list:  Active Hospital Problems    Diagnosis  POA    *Sepsis due to Gram-negative organism with septic shock [A41.50, R65.21]  Yes    Gangrene [I96]  Unknown    AIDS (acquired immunodeficiency syndrome), CD4 <=200 [B20]  Yes    Subacute osteomyelitis of left hand [M86.242]  Yes    Abdominal pain [R10.9]  Yes    Chemotherapy-induced neutropenia [D70.1]  Yes    Pancytopenia [D61.818]  Yes    Transaminitis [R74.0]  Yes    Lactic acid acidosis [E87.2]  Yes    Hyperbilirubinemia [E80.6]  Yes    Immunocompromised patient [D84.9]  Yes    Noncompliance [Z91.19]  Not Applicable    Hypokalemia [E87.6]  Yes    Clostridium difficile infection [B96.89]  Yes    Bacteremia due to Enterococcus [R78.81]  Yes    Acute myeloid leukemia not having achieved remission [C92.00]  Yes    Fever [R50.9]  Yes    HIV (human immunodeficiency virus infection) [Z21]  Yes    Tobacco abuse [Z72.0]  Yes    Acute leukemia [C95.00]  Yes      Resolved Hospital Problems    Diagnosis Date Resolved POA    Hyponatremia [E87.1] 03/16/2017 Yes

## 2017-03-22 LAB
ALBUMIN SERPL BCP-MCNC: 1.2 G/DL
ALP SERPL-CCNC: 419 U/L
ALT SERPL W/O P-5'-P-CCNC: 18 U/L
ANION GAP SERPL CALC-SCNC: 9 MMOL/L
ANION GAP SERPL CALC-SCNC: 9 MMOL/L
ANISOCYTOSIS BLD QL SMEAR: SLIGHT
AST SERPL-CCNC: 9 U/L
BACTERIA BLD CULT: NORMAL
BACTERIA BLD CULT: NORMAL
BASOPHILS NFR BLD: 0 %
BILIRUB SERPL-MCNC: 2.6 MG/DL
BLASTS NFR BLD MANUAL: 76 %
BLD PROD TYP BPU: NORMAL
BLOOD UNIT EXPIRATION DATE: NORMAL
BLOOD UNIT TYPE CODE: 6200
BLOOD UNIT TYPE: NORMAL
BUN SERPL-MCNC: 12 MG/DL
BUN SERPL-MCNC: 12 MG/DL
CALCIUM SERPL-MCNC: 7.4 MG/DL
CALCIUM SERPL-MCNC: 7.4 MG/DL
CHLORIDE SERPL-SCNC: 113 MMOL/L
CHLORIDE SERPL-SCNC: 113 MMOL/L
CO2 SERPL-SCNC: 19 MMOL/L
CO2 SERPL-SCNC: 19 MMOL/L
CODING SYSTEM: NORMAL
CREAT SERPL-MCNC: 0.4 MG/DL
CREAT SERPL-MCNC: 0.4 MG/DL
DACRYOCYTES BLD QL SMEAR: ABNORMAL
DIFFERENTIAL METHOD: ABNORMAL
DISPENSE STATUS: NORMAL
EBV EA IGG SER QL IF: ABNORMAL TITER
EBV NA IGG SER IA-ACNC: ABNORMAL TITER
EBV VCA IGG SER IA-ACNC: ABNORMAL TITER
EBV VCA IGM SER IA-ACNC: ABNORMAL TITER
EOSINOPHIL NFR BLD: 1 %
ERYTHROCYTE [DISTWIDTH] IN BLOOD BY AUTOMATED COUNT: 16.3 %
EST. GFR  (AFRICAN AMERICAN): >60 ML/MIN/1.73 M^2
EST. GFR  (AFRICAN AMERICAN): >60 ML/MIN/1.73 M^2
EST. GFR  (NON AFRICAN AMERICAN): >60 ML/MIN/1.73 M^2
EST. GFR  (NON AFRICAN AMERICAN): >60 ML/MIN/1.73 M^2
FIBRINOGEN PPP-MCNC: 475 MG/DL
GLUCOSE SERPL-MCNC: 112 MG/DL
GLUCOSE SERPL-MCNC: 112 MG/DL
HAPTOGLOB SERPL-MCNC: 218 MG/DL
HCT VFR BLD AUTO: 21.2 %
HGB BLD-MCNC: 7.4 G/DL
HYPOCHROMIA BLD QL SMEAR: ABNORMAL
INR PPP: 1.3
LDH SERPL L TO P-CCNC: 232 U/L
LYMPHOCYTES NFR BLD: 19 %
MAGNESIUM SERPL-MCNC: 1.4 MG/DL
MCH RBC QN AUTO: 30 PG
MCHC RBC AUTO-ENTMCNC: 34.9 %
MCV RBC AUTO: 86 FL
MONOCYTES NFR BLD: 1 %
NEUTROPHILS NFR BLD: 3 %
NUM UNITS TRANS WBC-POOR PLATPHERESIS: NORMAL
PHOSPHATE SERPL-MCNC: 3.3 MG/DL
PLATELET # BLD AUTO: 6 K/UL
PLATELET BLD QL SMEAR: ABNORMAL
PMV BLD AUTO: ABNORMAL FL
POCT GLUCOSE: 142 MG/DL (ref 70–110)
POCT GLUCOSE: 167 MG/DL (ref 70–110)
POCT GLUCOSE: 176 MG/DL (ref 70–110)
POIKILOCYTOSIS BLD QL SMEAR: SLIGHT
POTASSIUM SERPL-SCNC: 2.8 MMOL/L
POTASSIUM SERPL-SCNC: 2.8 MMOL/L
PROT SERPL-MCNC: 4.1 G/DL
PROTHROMBIN TIME: 13.6 SEC
RBC # BLD AUTO: 2.47 M/UL
SCHISTOCYTES BLD QL SMEAR: PRESENT
SODIUM SERPL-SCNC: 141 MMOL/L
SODIUM SERPL-SCNC: 141 MMOL/L
TARGETS BLD QL SMEAR: ABNORMAL
WBC # BLD AUTO: 2.47 K/UL

## 2017-03-22 PROCEDURE — 84100 ASSAY OF PHOSPHORUS: CPT

## 2017-03-22 PROCEDURE — 25000003 PHARM REV CODE 250: Performed by: EMERGENCY MEDICINE

## 2017-03-22 PROCEDURE — 25000003 PHARM REV CODE 250: Performed by: INTERNAL MEDICINE

## 2017-03-22 PROCEDURE — 85027 COMPLETE CBC AUTOMATED: CPT

## 2017-03-22 PROCEDURE — 80053 COMPREHEN METABOLIC PANEL: CPT

## 2017-03-22 PROCEDURE — P9037 PLATE PHERES LEUKOREDU IRRAD: HCPCS

## 2017-03-22 PROCEDURE — 25000003 PHARM REV CODE 250: Performed by: NURSE PRACTITIONER

## 2017-03-22 PROCEDURE — 83615 LACTATE (LD) (LDH) ENZYME: CPT

## 2017-03-22 PROCEDURE — 63600175 PHARM REV CODE 636 W HCPCS: Performed by: INTERNAL MEDICINE

## 2017-03-22 PROCEDURE — 83735 ASSAY OF MAGNESIUM: CPT

## 2017-03-22 PROCEDURE — 85007 BL SMEAR W/DIFF WBC COUNT: CPT

## 2017-03-22 PROCEDURE — 85610 PROTHROMBIN TIME: CPT

## 2017-03-22 PROCEDURE — 20600001 HC STEP DOWN PRIVATE ROOM

## 2017-03-22 PROCEDURE — 25000003 PHARM REV CODE 250: Performed by: STUDENT IN AN ORGANIZED HEALTH CARE EDUCATION/TRAINING PROGRAM

## 2017-03-22 PROCEDURE — 85384 FIBRINOGEN ACTIVITY: CPT

## 2017-03-22 PROCEDURE — 99232 SBSQ HOSP IP/OBS MODERATE 35: CPT | Mod: ,,, | Performed by: INTERNAL MEDICINE

## 2017-03-22 PROCEDURE — 83010 ASSAY OF HAPTOGLOBIN QUANT: CPT

## 2017-03-22 PROCEDURE — 63600175 PHARM REV CODE 636 W HCPCS: Performed by: NURSE PRACTITIONER

## 2017-03-22 PROCEDURE — 36430 TRANSFUSION BLD/BLD COMPNT: CPT

## 2017-03-22 RX ORDER — DIPHENHYDRAMINE HYDROCHLORIDE 50 MG/ML
12.5 INJECTION INTRAMUSCULAR; INTRAVENOUS ONCE
Status: COMPLETED | OUTPATIENT
Start: 2017-03-22 | End: 2017-03-22

## 2017-03-22 RX ORDER — POTASSIUM CHLORIDE 7.45 MG/ML
10 INJECTION INTRAVENOUS
Status: COMPLETED | OUTPATIENT
Start: 2017-03-22 | End: 2017-03-22

## 2017-03-22 RX ORDER — HYDROCODONE BITARTRATE AND ACETAMINOPHEN 500; 5 MG/1; MG/1
TABLET ORAL
Status: DISCONTINUED | OUTPATIENT
Start: 2017-03-22 | End: 2017-03-23

## 2017-03-22 RX ORDER — MAGNESIUM SULFATE HEPTAHYDRATE 40 MG/ML
2 INJECTION, SOLUTION INTRAVENOUS ONCE
Status: COMPLETED | OUTPATIENT
Start: 2017-03-22 | End: 2017-03-22

## 2017-03-22 RX ADMIN — POTASSIUM CHLORIDE 10 MEQ: 10 INJECTION, SOLUTION INTRAVENOUS at 01:03

## 2017-03-22 RX ADMIN — MAGNESIUM OXIDE TAB 400 MG (241.3 MG ELEMENTAL MG) 400 MG: 400 (241.3 MG) TAB at 10:03

## 2017-03-22 RX ADMIN — OXYCODONE HYDROCHLORIDE 5 MG: 5 TABLET ORAL at 11:03

## 2017-03-22 RX ADMIN — MAGNESIUM OXIDE TAB 400 MG (241.3 MG ELEMENTAL MG) 400 MG: 400 (241.3 MG) TAB at 02:03

## 2017-03-22 RX ADMIN — POTASSIUM CHLORIDE 10 MEQ: 10 INJECTION, SOLUTION INTRAVENOUS at 02:03

## 2017-03-22 RX ADMIN — Medication 125 MG: at 12:03

## 2017-03-22 RX ADMIN — ACETAMINOPHEN 650 MG: 160 SOLUTION ORAL at 10:03

## 2017-03-22 RX ADMIN — ACETAMINOPHEN 500 MG: 500 TABLET ORAL at 02:03

## 2017-03-22 RX ADMIN — MAGNESIUM SULFATE IN WATER 2 G: 40 INJECTION, SOLUTION INTRAVENOUS at 10:03

## 2017-03-22 RX ADMIN — SODIUM CHLORIDE 500 ML: 0.9 INJECTION, SOLUTION INTRAVENOUS at 02:03

## 2017-03-22 RX ADMIN — POTASSIUM CHLORIDE 20 MEQ: 750 CAPSULE, EXTENDED RELEASE ORAL at 08:03

## 2017-03-22 RX ADMIN — ACYCLOVIR SODIUM 300 MG: 50 INJECTION, SOLUTION INTRAVENOUS at 06:03

## 2017-03-22 RX ADMIN — Medication 125 MG: at 05:03

## 2017-03-22 RX ADMIN — POTASSIUM CHLORIDE 10 MEQ: 10 INJECTION, SOLUTION INTRAVENOUS at 10:03

## 2017-03-22 RX ADMIN — CIPROFLOXACIN HYDROCHLORIDE 500 MG: 500 TABLET, FILM COATED ORAL at 08:03

## 2017-03-22 RX ADMIN — POTASSIUM CHLORIDE 10 MEQ: 10 INJECTION, SOLUTION INTRAVENOUS at 11:03

## 2017-03-22 RX ADMIN — SODIUM CHLORIDE 500 ML: 0.9 INJECTION, SOLUTION INTRAVENOUS at 06:03

## 2017-03-22 RX ADMIN — ACYCLOVIR SODIUM 300 MG: 50 INJECTION, SOLUTION INTRAVENOUS at 05:03

## 2017-03-22 RX ADMIN — AMPHOTERICIN B 200 MG: 50 INJECTION, POWDER, LYOPHILIZED, FOR SOLUTION INTRAVENOUS at 04:03

## 2017-03-22 RX ADMIN — Medication 125 MG: at 11:03

## 2017-03-22 RX ADMIN — POTASSIUM CHLORIDE 20 MEQ: 750 CAPSULE, EXTENDED RELEASE ORAL at 06:03

## 2017-03-22 RX ADMIN — DIPHENHYDRAMINE HYDROCHLORIDE 12.5 MG: 50 INJECTION, SOLUTION INTRAMUSCULAR; INTRAVENOUS at 10:03

## 2017-03-22 NOTE — PROGRESS NOTES
Progress Note  BMT                                                              Team: Networked reference to record PCT     Patient Name: Marck Howard  YOB: 1980    Admit Date: 3/14/2017                   LOS: 8    SUBJECTIVE:     Reason for Admission: Sepsis due to Gram-negative organism with septic shock    Subjective and Interval History:   -patient AAOX3, he refused to talk to  yesterday about hospice and custody for his children   -afebrile  -BP improved this am after bolus overnight  - no complaints this am    Review of Systems:  General:  Denies weight loss, fever, chills, (+) weakness, fatigue  HEENT:  Denies vision changes, sore throat, congestion  CVS:  Denies chest pain, pressure, palpitations  Resp:  Denies shortness of breath, cough, sputum  GI:  Denies diarrhea, constipation, abdominal pain, nausea, vomiting  :  Denies dysuria, hematuria, nocturia  MSK:  Denies myalgias, arthralgias  Skin:  Denies rashes, bleeding  Neuro:  Denies headache, dizziness, syncope, numbness, paresthesias  All other systems otherwise negative    Scheduled Medications   acetaminophen  500 mg Oral Q24H    acyclovir  300 mg Intravenous Q12H    amphotericin B liposome (AMBISOME) IVPB  5 mg/kg (Dosing Weight) Intravenous Q24H    ciprofloxacin HCl  500 mg Oral Q12H    duke's soln (benadryl 30 mL, mylanta 30 mL, lidocane 30 mL, nystatin 30 mL) 120 mL  10 mL Oral BID    potassium chloride  10 mEq Intravenous Q1H    potassium chloride SA  60 mEq Oral Once    sodium chloride 0.9%  500 mL Intravenous Q24H    sodium chloride 0.9%  500 mL Intravenous Q24H    sodium chloride 0.9%  3 mL Intravenous Q8H    vancomycin  125 mg Oral Q6H          PRN Medications  sodium chloride, acetaminophen, dextrose 50%, glucagon (human recombinant), magnesium oxide, magnesium oxide, magnesium oxide, ondansetron, oxycodone, potassium chloride, potassium chloride, potassium chloride, potassium, sodium  phosphates, potassium, sodium phosphates    OBJECTIVE:     Temp:  [97.6 °F (36.4 °C)-98.3 °F (36.8 °C)]   Pulse:  [101-113]   Resp:  [16-18]   BP: ()/(49-87)   SpO2:  [95 %-99 %]   Body mass index is 17.34 kg/(m^2).    Intake/Output Summary    Intake/Output Summary (Last 24 hours) at 03/22/17 1234  Last data filed at 03/22/17 1150   Gross per 24 hour   Intake             4258 ml   Output             5300 ml   Net            -1042 ml       Physical Exam:  General:  Thin, cachectic, no acute distress  HEENT:  Normocephalic, atraumatic, EOMI, clear sclera, throat clear without erythema or exudates  CVS:  Tachycardic, S1 and S2 normal, no murmurs, rubs, gallops  Resp:  Lungs clear to auscultation, no wheezes, rales, rhonchi, cough.  Tachypnea  GI:  Abdomen soft, non-tender, non-distended, normoactive bowel sounds, no organomegaly  MSK:  No muscle atrophy, cyanosis, peripheral edema  Skin:  No rashes, erythema--ulceration noted to fourth finger of left hand  Neuro:  CNII-XII grossly intact  Psych:  Alert and oriented to person, place, and time    Diagnostic Result    Lab Results   Component Value Date    WBC 2.47 (L) 03/22/2017    HGB 7.4 (L) 03/22/2017    HCT 21.2 (L) 03/22/2017    PLT 6 (LL) 03/22/2017         Recent Labs  Lab 03/22/17  0502     141   K 2.8*  2.8*   *  113*   CO2 19*  19*   BUN 12  12   CREATININE 0.4*  0.4*   MG 1.4*   PHOS 3.3       Lab Results   Component Value Date    INR 1.3 (H) 03/22/2017    INR 1.3 (H) 03/21/2017    INR 1.4 (H) 03/20/2017       Recent Labs      03/20/17   0736  03/20/17   1145  03/20/17   1832  03/21/17   0046  03/21/17   0614  03/21/17   1140  03/21/17   1817  03/22/17   0030   POCTGLUCOSE  84  88  92  74  74  138*  181*  176*       ASSESSMENT/PLAN:   Mr. Marck Howard is a 36 y.o. male     Current Problems List:  Active Hospital Problems    Diagnosis  POA    *Sepsis due to Gram-negative organism with septic shock [A41.50, R65.21]  Yes     Gangrene [I96]  Yes    AIDS (acquired immunodeficiency syndrome), CD4 <=200 [B20]  Yes    Subacute osteomyelitis of left hand [M86.242]  Yes    Abdominal pain [R10.9]  Yes    Chemotherapy-induced neutropenia [D70.1]  Yes    Pancytopenia [D61.818]  Yes    Transaminitis [R74.0]  Yes    Lactic acid acidosis [E87.2]  Yes    Hyperbilirubinemia [E80.6]  Yes    Immunocompromised patient [D84.9]  Yes    Noncompliance [Z91.19]  Not Applicable    Hypokalemia [E87.6]  Yes    Clostridium difficile infection [B96.89]  Yes    Bacteremia due to Enterococcus [R78.81]  Yes    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 We do not recommend proceeding with any surgical interventions  - Known history of untreated fungemia, but repeat blood cx negative for fungus  - C.diff +, continue PO Vanc   - IV antibiotics dc'd and continue po cipro (3/20)  - On board spectrum coverage: Amphotericin B, Acyclovir      Refractory AML   - S/p 7+3 and MEC induction with known persistent disease  - 76% blasts on differential today  - Patient is not a candidate for further therapy for is AML given his functional status, refractory disease, AIDS and current infections, recommend palliative care. Palliative care following.    Pancytopenia due to chemotherapy  - hgb 7.4 and platelets 6,000  - will transfuse 1 unit platelets today   - Transfuse if Hb < 7, Plt < 10,000/mm3      Hypokalemia and hypomagnesemia   - will  replaced as needed    Diet:  Dental Soft  DVT PPx:  Holding mechanical in the setting of thrombocytopenia  Code Status:  DNR/DNI  Dispo:  Prognosis poor, appreciate Palliative assistance.  Plan to discharge to home hospice later this week.      Marcella Dodson NP  Hematology/BMT

## 2017-03-22 NOTE — PLAN OF CARE
Problem: Patient Care Overview  Goal: Plan of Care Review  Outcome: Ongoing (interventions implemented as appropriate)  Pt involved in plan of care and communicating needs throughout shift. Bedrest maintained. Tolerating diet, powell to gravity with adequate output. 1 unit of Plt given without complications. Mag and Potassium replacements given. Accuchecks q6h. Oxy given x1 for finger pain. Amphotericin B given with 500 cc bolus of NS given before and after.  All vitals stable; no acute events this shift.  Pt remaining free from falls or injury throughout shift; bed in lowest position; call light within reach; pt instructed to call for assistance as needed. Q1h rounding on patient. Will continue to monitor.

## 2017-03-22 NOTE — PROGRESS NOTES
Palliative care progress note:    BMT social workers involved in patient's discharge planning and have been in contact with patient's sister and niece. They are working with   to ensure continued care of  Mr. Howard's children after his death.  As per ' notes Mr. Howard's niece Obdulia ([phone]) will care for children   Family has requested to complete these arrangements before transitioning to hospice care. As discussed with Dr. Ramirez palliative care will sign off.  Dr. Jones and primary team notified.  Please re-consult as needed.   KIMMY Bermudez, ACNS-BC, OCN

## 2017-03-22 NOTE — PLAN OF CARE
Problem: Patient Care Overview  Goal: Plan of Care Review  Outcome: Ongoing (interventions implemented as appropriate)  Pt remained afebrile during night with no complaints of pain. AAOx4.  Pt on vanc po and acyclovir IV. Neutropenic and thrombocytopenic precautions in place. Safety precautions maintained. Pt remained free from injury during night. Bed is in low and locked position with side rails up x 2. Call bell is within reach of pt.

## 2017-03-23 LAB
ABO + RH BLD: NORMAL
ALBUMIN SERPL BCP-MCNC: 1.3 G/DL
ALP SERPL-CCNC: 436 U/L
ALT SERPL W/O P-5'-P-CCNC: 19 U/L
ANION GAP SERPL CALC-SCNC: 12 MMOL/L
ANION GAP SERPL CALC-SCNC: 12 MMOL/L
ANISOCYTOSIS BLD QL SMEAR: SLIGHT
AST SERPL-CCNC: 10 U/L
BASOPHILS # BLD AUTO: ABNORMAL K/UL
BASOPHILS NFR BLD: 0 %
BILIRUB SERPL-MCNC: 2.4 MG/DL
BLASTS NFR BLD MANUAL: 85 %
BLD GP AB SCN CELLS X3 SERPL QL: NORMAL
BLD PROD TYP BPU: NORMAL
BLOOD UNIT EXPIRATION DATE: NORMAL
BLOOD UNIT TYPE CODE: 5100
BLOOD UNIT TYPE: NORMAL
BUN SERPL-MCNC: 8 MG/DL
BUN SERPL-MCNC: 8 MG/DL
CALCIUM SERPL-MCNC: 7.4 MG/DL
CALCIUM SERPL-MCNC: 7.4 MG/DL
CHLORIDE SERPL-SCNC: 111 MMOL/L
CHLORIDE SERPL-SCNC: 111 MMOL/L
CO2 SERPL-SCNC: 20 MMOL/L
CO2 SERPL-SCNC: 20 MMOL/L
CODING SYSTEM: NORMAL
CREAT SERPL-MCNC: 0.4 MG/DL
CREAT SERPL-MCNC: 0.4 MG/DL
DIFFERENTIAL METHOD: ABNORMAL
DISPENSE STATUS: NORMAL
EOSINOPHIL # BLD AUTO: ABNORMAL K/UL
EOSINOPHIL NFR BLD: 0 %
ERYTHROCYTE [DISTWIDTH] IN BLOOD BY AUTOMATED COUNT: 16.3 %
EST. GFR  (AFRICAN AMERICAN): >60 ML/MIN/1.73 M^2
EST. GFR  (AFRICAN AMERICAN): >60 ML/MIN/1.73 M^2
EST. GFR  (NON AFRICAN AMERICAN): >60 ML/MIN/1.73 M^2
EST. GFR  (NON AFRICAN AMERICAN): >60 ML/MIN/1.73 M^2
FIBRINOGEN PPP-MCNC: 401 MG/DL
GLUCOSE SERPL-MCNC: 91 MG/DL
GLUCOSE SERPL-MCNC: 91 MG/DL
HAPTOGLOB SERPL-MCNC: 211 MG/DL
HCT VFR BLD AUTO: 18.8 %
HGB BLD-MCNC: 6.6 G/DL
INR PPP: 1.3
LDH SERPL L TO P-CCNC: 245 U/L
LYMPHOCYTES # BLD AUTO: ABNORMAL K/UL
LYMPHOCYTES NFR BLD: 12 %
MAGNESIUM SERPL-MCNC: 1.4 MG/DL
MCH RBC QN AUTO: 30.4 PG
MCHC RBC AUTO-ENTMCNC: 35.1 %
MCV RBC AUTO: 87 FL
MONOCYTES # BLD AUTO: ABNORMAL K/UL
MONOCYTES NFR BLD: 1 %
MYCOBACTERIUM SPEC QL CULT: NORMAL
NEUTROPHILS NFR BLD: 2 %
NUM UNITS TRANS PACKED RBC: NORMAL
OVALOCYTES BLD QL SMEAR: ABNORMAL
PHOSPHATE SERPL-MCNC: 3.3 MG/DL
PLATELET # BLD AUTO: 34 K/UL
PLATELET BLD QL SMEAR: ABNORMAL
PMV BLD AUTO: 11.9 FL
POCT GLUCOSE: 116 MG/DL (ref 70–110)
POCT GLUCOSE: 161 MG/DL (ref 70–110)
POCT GLUCOSE: 188 MG/DL (ref 70–110)
POIKILOCYTOSIS BLD QL SMEAR: SLIGHT
POLYCHROMASIA BLD QL SMEAR: ABNORMAL
POTASSIUM SERPL-SCNC: 2.9 MMOL/L
POTASSIUM SERPL-SCNC: 2.9 MMOL/L
PROT SERPL-MCNC: 4.3 G/DL
PROTHROMBIN TIME: 13.2 SEC
RBC # BLD AUTO: 2.17 M/UL
SODIUM SERPL-SCNC: 143 MMOL/L
SODIUM SERPL-SCNC: 143 MMOL/L
WBC # BLD AUTO: 2.61 K/UL

## 2017-03-23 PROCEDURE — 85384 FIBRINOGEN ACTIVITY: CPT

## 2017-03-23 PROCEDURE — 86900 BLOOD TYPING SEROLOGIC ABO: CPT

## 2017-03-23 PROCEDURE — 20600001 HC STEP DOWN PRIVATE ROOM

## 2017-03-23 PROCEDURE — 80053 COMPREHEN METABOLIC PANEL: CPT

## 2017-03-23 PROCEDURE — 27201040 HC RC 50 FILTER

## 2017-03-23 PROCEDURE — 86920 COMPATIBILITY TEST SPIN: CPT

## 2017-03-23 PROCEDURE — 85610 PROTHROMBIN TIME: CPT

## 2017-03-23 PROCEDURE — 63600175 PHARM REV CODE 636 W HCPCS: Performed by: STUDENT IN AN ORGANIZED HEALTH CARE EDUCATION/TRAINING PROGRAM

## 2017-03-23 PROCEDURE — P9038 RBC IRRADIATED: HCPCS

## 2017-03-23 PROCEDURE — 25000003 PHARM REV CODE 250: Performed by: EMERGENCY MEDICINE

## 2017-03-23 PROCEDURE — 63600175 PHARM REV CODE 636 W HCPCS: Performed by: INTERNAL MEDICINE

## 2017-03-23 PROCEDURE — 25000003 PHARM REV CODE 250: Performed by: INTERNAL MEDICINE

## 2017-03-23 PROCEDURE — 83735 ASSAY OF MAGNESIUM: CPT

## 2017-03-23 PROCEDURE — 85007 BL SMEAR W/DIFF WBC COUNT: CPT

## 2017-03-23 PROCEDURE — 84100 ASSAY OF PHOSPHORUS: CPT

## 2017-03-23 PROCEDURE — 99232 SBSQ HOSP IP/OBS MODERATE 35: CPT | Mod: ,,, | Performed by: INTERNAL MEDICINE

## 2017-03-23 PROCEDURE — 85027 COMPLETE CBC AUTOMATED: CPT

## 2017-03-23 PROCEDURE — 86901 BLOOD TYPING SEROLOGIC RH(D): CPT

## 2017-03-23 PROCEDURE — 25000003 PHARM REV CODE 250: Performed by: STUDENT IN AN ORGANIZED HEALTH CARE EDUCATION/TRAINING PROGRAM

## 2017-03-23 PROCEDURE — 83615 LACTATE (LD) (LDH) ENZYME: CPT

## 2017-03-23 PROCEDURE — 36430 TRANSFUSION BLD/BLD COMPNT: CPT

## 2017-03-23 PROCEDURE — 83010 ASSAY OF HAPTOGLOBIN QUANT: CPT

## 2017-03-23 PROCEDURE — 25000003 PHARM REV CODE 250: Performed by: NURSE PRACTITIONER

## 2017-03-23 RX ORDER — HYDROCODONE BITARTRATE AND ACETAMINOPHEN 500; 5 MG/1; MG/1
TABLET ORAL
Status: DISCONTINUED | OUTPATIENT
Start: 2017-03-23 | End: 2017-03-24

## 2017-03-23 RX ORDER — POTASSIUM CHLORIDE 7.45 MG/ML
60 INJECTION INTRAVENOUS
Status: DISCONTINUED | OUTPATIENT
Start: 2017-03-23 | End: 2017-03-24

## 2017-03-23 RX ORDER — MAGNESIUM SULFATE HEPTAHYDRATE 40 MG/ML
2 INJECTION, SOLUTION INTRAVENOUS
Status: DISCONTINUED | OUTPATIENT
Start: 2017-03-23 | End: 2017-03-24

## 2017-03-23 RX ORDER — POTASSIUM CHLORIDE 7.45 MG/ML
40 INJECTION INTRAVENOUS
Status: DISCONTINUED | OUTPATIENT
Start: 2017-03-23 | End: 2017-03-24

## 2017-03-23 RX ORDER — POTASSIUM CHLORIDE 7.45 MG/ML
80 INJECTION INTRAVENOUS
Status: DISCONTINUED | OUTPATIENT
Start: 2017-03-23 | End: 2017-03-24

## 2017-03-23 RX ORDER — POTASSIUM CHLORIDE 7.45 MG/ML
10 INJECTION INTRAVENOUS
Status: COMPLETED | OUTPATIENT
Start: 2017-03-23 | End: 2017-03-23

## 2017-03-23 RX ORDER — DIPHENHYDRAMINE HCL 25 MG
25 CAPSULE ORAL EVERY 6 HOURS PRN
Status: DISCONTINUED | OUTPATIENT
Start: 2017-03-23 | End: 2017-03-27

## 2017-03-23 RX ORDER — MAGNESIUM SULFATE HEPTAHYDRATE 40 MG/ML
2 INJECTION, SOLUTION INTRAVENOUS
Status: COMPLETED | OUTPATIENT
Start: 2017-03-23 | End: 2017-03-23

## 2017-03-23 RX ADMIN — POTASSIUM CHLORIDE 20 MEQ: 750 CAPSULE, EXTENDED RELEASE ORAL at 06:03

## 2017-03-23 RX ADMIN — Medication 125 MG: at 06:03

## 2017-03-23 RX ADMIN — ACYCLOVIR SODIUM 300 MG: 50 INJECTION, SOLUTION INTRAVENOUS at 06:03

## 2017-03-23 RX ADMIN — Medication 125 MG: at 12:03

## 2017-03-23 RX ADMIN — POTASSIUM CHLORIDE 10 MEQ: 10 INJECTION, SOLUTION INTRAVENOUS at 09:03

## 2017-03-23 RX ADMIN — ACETAMINOPHEN 650 MG: 160 SOLUTION ORAL at 09:03

## 2017-03-23 RX ADMIN — ACETAMINOPHEN 650 MG: 160 SOLUTION ORAL at 08:03

## 2017-03-23 RX ADMIN — MAGNESIUM SULFATE IN WATER 2 G: 40 INJECTION, SOLUTION INTRAVENOUS at 09:03

## 2017-03-23 RX ADMIN — ACETAMINOPHEN 500 MG: 500 TABLET ORAL at 03:03

## 2017-03-23 RX ADMIN — SODIUM CHLORIDE 500 ML: 0.9 INJECTION, SOLUTION INTRAVENOUS at 03:03

## 2017-03-23 RX ADMIN — CIPROFLOXACIN HYDROCHLORIDE 500 MG: 500 TABLET, FILM COATED ORAL at 09:03

## 2017-03-23 RX ADMIN — POTASSIUM CHLORIDE 10 MEQ: 10 INJECTION, SOLUTION INTRAVENOUS at 10:03

## 2017-03-23 RX ADMIN — POTASSIUM CHLORIDE 10 MEQ: 10 INJECTION, SOLUTION INTRAVENOUS at 11:03

## 2017-03-23 RX ADMIN — POTASSIUM CHLORIDE 10 MEQ: 10 INJECTION, SOLUTION INTRAVENOUS at 02:03

## 2017-03-23 RX ADMIN — POTASSIUM CHLORIDE 10 MEQ: 10 INJECTION, SOLUTION INTRAVENOUS at 12:03

## 2017-03-23 RX ADMIN — DIPHENHYDRAMINE HYDROCHLORIDE 25 MG: 25 CAPSULE ORAL at 09:03

## 2017-03-23 RX ADMIN — AMPHOTERICIN B 200 MG: 50 INJECTION, POWDER, LYOPHILIZED, FOR SOLUTION INTRAVENOUS at 04:03

## 2017-03-23 RX ADMIN — Medication 125 MG: at 05:03

## 2017-03-23 RX ADMIN — POTASSIUM CHLORIDE 10 MEQ: 10 INJECTION, SOLUTION INTRAVENOUS at 01:03

## 2017-03-23 RX ADMIN — MAGNESIUM SULFATE IN WATER 2 G: 40 INJECTION, SOLUTION INTRAVENOUS at 11:03

## 2017-03-23 RX ADMIN — CIPROFLOXACIN HYDROCHLORIDE 500 MG: 500 TABLET, FILM COATED ORAL at 08:03

## 2017-03-23 RX ADMIN — SODIUM CHLORIDE 500 ML: 0.9 INJECTION, SOLUTION INTRAVENOUS at 06:03

## 2017-03-23 NOTE — PROGRESS NOTES
Progress Note  BMT                                                              Team: Networked reference to record PCT     Patient Name: Marck Howard  YOB: 1980    Admit Date: 3/14/2017                   LOS: 9    SUBJECTIVE:     Reason for Admission: Sepsis due to Gram-negative organism with septic shock    Subjective and Interval History:   -patient AAOX3, he refused to talk to  yesterday about hospice and custody for his children   -afebrile  -BP improved this am after bolus overnight  - no complaints this am    Review of Systems:  General:  Denies weight loss, fever, chills, (+) weakness, fatigue  HEENT:  Denies vision changes, sore throat, congestion  CVS:  Denies chest pain, pressure, palpitations  Resp:  Denies shortness of breath, cough, sputum  GI:  Denies diarrhea, constipation, abdominal pain, nausea, vomiting  :  Denies dysuria, hematuria, nocturia  MSK:  Denies myalgias, arthralgias  Skin:  Denies rashes, bleeding  Neuro:  Denies headache, dizziness, syncope, numbness, paresthesias  All other systems otherwise negative    Scheduled Medications   acetaminophen  500 mg Oral Q24H    acyclovir  300 mg Intravenous Q12H    amphotericin B liposome (AMBISOME) IVPB  5 mg/kg (Dosing Weight) Intravenous Q24H    ciprofloxacin HCl  500 mg Oral Q12H    magnesium sulfate IVPB  2 g Intravenous Q2H    potassium chloride  10 mEq Intravenous Q1H    sodium chloride 0.9%  500 mL Intravenous Q24H    sodium chloride 0.9%  500 mL Intravenous Q24H    sodium chloride 0.9%  3 mL Intravenous Q8H    vancomycin  125 mg Oral Q6H          PRN Medications  sodium chloride, sodium chloride, acetaminophen, dextrose 50%, diphenhydrAMINE, glucagon (human recombinant), magnesium sulfate IVPB, magnesium sulfate IVPB, ondansetron, oxycodone, potassium chloride **AND** potassium chloride **AND** potassium chloride, sodium phosphate IVPB, sodium phosphate IVPB, sodium phosphate  IVPB    OBJECTIVE:     Temp:  [97.3 °F (36.3 °C)-100 °F (37.8 °C)]   Pulse:  [108-120]   Resp:  [14-22]   BP: ()/(51-65)   SpO2:  [94 %-100 %]   Body mass index is 16.95 kg/(m^2).    Intake/Output Summary    Intake/Output Summary (Last 24 hours) at 03/23/17 1153  Last data filed at 03/23/17 1117   Gross per 24 hour   Intake             2601 ml   Output             5175 ml   Net            -2574 ml       Physical Exam:  General:  Thin, cachectic, no acute distress  HEENT:  Normocephalic, atraumatic, EOMI, clear sclera, throat clear without erythema or exudates  CVS:  Tachycardic, S1 and S2 normal, no murmurs, rubs, gallops  Resp:  Lungs clear to auscultation, no wheezes, rales, rhonchi, cough.  Tachypnea  GI:  Abdomen soft, non-tender, non-distended, normoactive bowel sounds, no organomegaly  MSK:  No muscle atrophy, cyanosis, peripheral edema  Skin:  No rashes, erythema--ulceration noted to fourth finger of left hand  Neuro:  CNII-XII grossly intact  Psych:  Alert and oriented to person, place, and time    Diagnostic Result    Lab Results   Component Value Date    WBC 2.61 (L) 03/23/2017    HGB 6.6 (L) 03/23/2017    HCT 18.8 (LL) 03/23/2017    PLT 34 (LL) 03/23/2017         Recent Labs  Lab 03/23/17  0459     143   K 2.9*  2.9*   *  111*   CO2 20*  20*   BUN 8  8   CREATININE 0.4*  0.4*   MG 1.4*   PHOS 3.3       Lab Results   Component Value Date    INR 1.3 (H) 03/23/2017    INR 1.3 (H) 03/22/2017    INR 1.3 (H) 03/21/2017       Recent Labs      03/21/17   0046  03/21/17   0614  03/21/17   1140  03/21/17   1817  03/22/17   0030  03/22/17   1825  03/22/17   1959  03/23/17   0500   POCTGLUCOSE  74  74  138*  181*  176*  142*  167*  116*       ASSESSMENT/PLAN:   Mr. Marck Howard is a 36 y.o. male     Current Problems List:  Active Hospital Problems    Diagnosis  POA    *Sepsis due to Gram-negative organism with septic shock [A41.50, R65.21]  Yes    Gangrene [I96]  Yes    AIDS  (acquired immunodeficiency syndrome), CD4 <=200 [B20]  Yes    Subacute osteomyelitis of left hand [M86.242]  Yes    Abdominal pain [R10.9]  Yes    Chemotherapy-induced neutropenia [D70.1]  Yes    Pancytopenia [D61.818]  Yes    Transaminitis [R74.0]  Yes    Lactic acid acidosis [E87.2]  Yes    Hyperbilirubinemia [E80.6]  Yes    Immunocompromised patient [D84.9]  Yes    Noncompliance [Z91.19]  Not Applicable    Hypokalemia [E87.6]  Yes    Clostridium difficile infection [B96.89]  Yes    Bacteremia due to Enterococcus [R78.81]  Yes    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 We do not recommend proceeding with any surgical interventions  - Known history of untreated fungemia, but repeat blood cx negative for fungus  - C.diff +, continue PO Vanc   - IV antibiotics dc'd and continue po cipro (3/20)  - On board spectrum coverage: Amphotericin B, Acyclovir      Refractory AML   - S/p 7+3 and MEC induction with known persistent disease  - 76% blasts on differential today  - Patient is not a candidate for further therapy for is AML given his functional status, refractory disease, AIDS and current infections, recommend palliative care. Palliative care following.    Pancytopenia due to chemotherapy  - hgb 7.4 and platelets 6,000  - will transfuse 1 unit platelets today   - Transfuse if Hb < 7, Plt < 10,000/mm3      Hypokalemia and hypomagnesemia   - will replaced as needed    Diet:   Dental Soft  DVT PPx:  Holding mechanical in the setting of thrombocytopenia  Code Status:  DNR/DNI  Dispo:  Prognosis poor, appreciate Palliative assistance.  Plan to discharge to home hospice later this week.      Marcella Dodson NP  Hematology/BMT

## 2017-03-23 NOTE — PLAN OF CARE
Problem: Patient Care Overview  Goal: Plan of Care Review  Outcome: Ongoing (interventions implemented as appropriate)  POC reviewed with patient. Pt verbalizes understanding. Pt received Potassium and Magnesium replacement this shift. VS now qshift. Pt received 1 unit of blood this shift. Appetite improving. Pt. with nonskid footwear on, bed in lowest position, and locked with bed rails up x2. Pt. has call light and personal items within reach. VSS and REYES this shift. All questions and concerns address at this time. Will continue to monitor.

## 2017-03-23 NOTE — PLAN OF CARE
Problem: Patient Care Overview  Goal: Plan of Care Review  Outcome: Ongoing (interventions implemented as appropriate)  Pt T-max was 100.0 during night, Temp decreased with no intervention. Pt had no complaints of pain. AAOx4. Pt on vanc po and acyclovir IV. Neutropenic and thrombocytopenic precautions in place. Safety precautions maintained. Pt remained free from injury during night. Bed is in low and locked position with side rails up x 2. Call bell is within reach of pt.

## 2017-03-24 LAB
ALBUMIN SERPL BCP-MCNC: 1.3 G/DL
ALP SERPL-CCNC: 450 U/L
ALT SERPL W/O P-5'-P-CCNC: 20 U/L
ANION GAP SERPL CALC-SCNC: 11 MMOL/L
ANION GAP SERPL CALC-SCNC: 11 MMOL/L
ANISOCYTOSIS BLD QL SMEAR: SLIGHT
AST SERPL-CCNC: 11 U/L
BASOPHILS NFR BLD: 0 %
BILIRUB SERPL-MCNC: 2.3 MG/DL
BLASTS NFR BLD MANUAL: 79 %
BUN SERPL-MCNC: 5 MG/DL
BUN SERPL-MCNC: 5 MG/DL
BURR CELLS BLD QL SMEAR: ABNORMAL
CALCIUM SERPL-MCNC: 7.3 MG/DL
CALCIUM SERPL-MCNC: 7.3 MG/DL
CHLORIDE SERPL-SCNC: 110 MMOL/L
CHLORIDE SERPL-SCNC: 110 MMOL/L
CO2 SERPL-SCNC: 21 MMOL/L
CO2 SERPL-SCNC: 21 MMOL/L
CREAT SERPL-MCNC: 0.4 MG/DL
CREAT SERPL-MCNC: 0.4 MG/DL
DACRYOCYTES BLD QL SMEAR: ABNORMAL
DIFFERENTIAL METHOD: ABNORMAL
EOSINOPHIL NFR BLD: 0 %
ERYTHROCYTE [DISTWIDTH] IN BLOOD BY AUTOMATED COUNT: 15.8 %
EST. GFR  (AFRICAN AMERICAN): >60 ML/MIN/1.73 M^2
EST. GFR  (AFRICAN AMERICAN): >60 ML/MIN/1.73 M^2
EST. GFR  (NON AFRICAN AMERICAN): >60 ML/MIN/1.73 M^2
EST. GFR  (NON AFRICAN AMERICAN): >60 ML/MIN/1.73 M^2
FIBRINOGEN PPP-MCNC: 432 MG/DL
GLUCOSE SERPL-MCNC: 89 MG/DL
GLUCOSE SERPL-MCNC: 89 MG/DL
HAPTOGLOB SERPL-MCNC: 237 MG/DL
HCT VFR BLD AUTO: 21.7 %
HGB BLD-MCNC: 7.8 G/DL
HYPOCHROMIA BLD QL SMEAR: ABNORMAL
INR PPP: 1.2
LDH SERPL L TO P-CCNC: 233 U/L
LYMPHOCYTES NFR BLD: 19 %
MAGNESIUM SERPL-MCNC: 1.6 MG/DL
MCH RBC QN AUTO: 30.5 PG
MCHC RBC AUTO-ENTMCNC: 35.9 %
MCV RBC AUTO: 85 FL
MONOCYTES NFR BLD: 2 %
NEUTROPHILS NFR BLD: 0 %
OVALOCYTES BLD QL SMEAR: ABNORMAL
PHOSPHATE SERPL-MCNC: 3.2 MG/DL
PLATELET # BLD AUTO: 27 K/UL
PLATELET BLD QL SMEAR: ABNORMAL
PMV BLD AUTO: ABNORMAL FL
POCT GLUCOSE: 121 MG/DL (ref 70–110)
POCT GLUCOSE: 80 MG/DL (ref 70–110)
POIKILOCYTOSIS BLD QL SMEAR: SLIGHT
POLYCHROMASIA BLD QL SMEAR: ABNORMAL
POTASSIUM SERPL-SCNC: 2.7 MMOL/L
POTASSIUM SERPL-SCNC: 2.7 MMOL/L
PROT SERPL-MCNC: 4.4 G/DL
PROTHROMBIN TIME: 12.7 SEC
RBC # BLD AUTO: 2.56 M/UL
SODIUM SERPL-SCNC: 142 MMOL/L
SODIUM SERPL-SCNC: 142 MMOL/L
TARGETS BLD QL SMEAR: ABNORMAL
WBC # BLD AUTO: 3.32 K/UL

## 2017-03-24 PROCEDURE — 25000003 PHARM REV CODE 250: Performed by: STUDENT IN AN ORGANIZED HEALTH CARE EDUCATION/TRAINING PROGRAM

## 2017-03-24 PROCEDURE — 85384 FIBRINOGEN ACTIVITY: CPT

## 2017-03-24 PROCEDURE — 25000003 PHARM REV CODE 250: Performed by: INTERNAL MEDICINE

## 2017-03-24 PROCEDURE — 83735 ASSAY OF MAGNESIUM: CPT

## 2017-03-24 PROCEDURE — 99233 SBSQ HOSP IP/OBS HIGH 50: CPT | Mod: ,,, | Performed by: INTERNAL MEDICINE

## 2017-03-24 PROCEDURE — 63600175 PHARM REV CODE 636 W HCPCS: Performed by: NURSE PRACTITIONER

## 2017-03-24 PROCEDURE — 20600001 HC STEP DOWN PRIVATE ROOM

## 2017-03-24 PROCEDURE — 25000003 PHARM REV CODE 250: Performed by: NURSE PRACTITIONER

## 2017-03-24 PROCEDURE — 85025 COMPLETE CBC W/AUTO DIFF WBC: CPT

## 2017-03-24 PROCEDURE — 63600175 PHARM REV CODE 636 W HCPCS: Performed by: INTERNAL MEDICINE

## 2017-03-24 PROCEDURE — 84100 ASSAY OF PHOSPHORUS: CPT

## 2017-03-24 PROCEDURE — 83010 ASSAY OF HAPTOGLOBIN QUANT: CPT

## 2017-03-24 PROCEDURE — 80053 COMPREHEN METABOLIC PANEL: CPT

## 2017-03-24 PROCEDURE — 83615 LACTATE (LD) (LDH) ENZYME: CPT

## 2017-03-24 PROCEDURE — 85610 PROTHROMBIN TIME: CPT

## 2017-03-24 RX ORDER — MAGNESIUM SULFATE HEPTAHYDRATE 40 MG/ML
2 INJECTION, SOLUTION INTRAVENOUS ONCE
Status: DISCONTINUED | OUTPATIENT
Start: 2017-03-24 | End: 2017-03-24

## 2017-03-24 RX ORDER — POTASSIUM CHLORIDE 7.45 MG/ML
10 INJECTION INTRAVENOUS
Status: DISCONTINUED | OUTPATIENT
Start: 2017-03-24 | End: 2017-03-24

## 2017-03-24 RX ADMIN — POTASSIUM CHLORIDE 10 MEQ: 10 INJECTION, SOLUTION INTRAVENOUS at 12:03

## 2017-03-24 RX ADMIN — SODIUM CHLORIDE 500 ML: 0.9 INJECTION, SOLUTION INTRAVENOUS at 03:03

## 2017-03-24 RX ADMIN — POTASSIUM CHLORIDE 10 MEQ: 10 INJECTION, SOLUTION INTRAVENOUS at 02:03

## 2017-03-24 RX ADMIN — Medication 125 MG: at 11:03

## 2017-03-24 RX ADMIN — POTASSIUM CHLORIDE 10 MEQ: 10 INJECTION, SOLUTION INTRAVENOUS at 08:03

## 2017-03-24 RX ADMIN — POTASSIUM CHLORIDE 10 MEQ: 10 INJECTION, SOLUTION INTRAVENOUS at 06:03

## 2017-03-24 RX ADMIN — SODIUM CHLORIDE 500 ML: 0.9 INJECTION, SOLUTION INTRAVENOUS at 06:03

## 2017-03-24 RX ADMIN — AMPHOTERICIN B 200 MG: 50 INJECTION, POWDER, LYOPHILIZED, FOR SOLUTION INTRAVENOUS at 04:03

## 2017-03-24 RX ADMIN — POTASSIUM CHLORIDE 10 MEQ: 10 INJECTION, SOLUTION INTRAVENOUS at 11:03

## 2017-03-24 RX ADMIN — ACETAMINOPHEN 500 MG: 500 TABLET ORAL at 04:03

## 2017-03-24 RX ADMIN — POTASSIUM CHLORIDE 10 MEQ: 10 INJECTION, SOLUTION INTRAVENOUS at 01:03

## 2017-03-24 RX ADMIN — Medication 125 MG: at 06:03

## 2017-03-24 RX ADMIN — POTASSIUM CHLORIDE 10 MEQ: 10 INJECTION, SOLUTION INTRAVENOUS at 09:03

## 2017-03-24 RX ADMIN — CIPROFLOXACIN HYDROCHLORIDE 500 MG: 500 TABLET, FILM COATED ORAL at 07:03

## 2017-03-24 RX ADMIN — MAGNESIUM SULFATE IN WATER 2 G: 40 INJECTION, SOLUTION INTRAVENOUS at 07:03

## 2017-03-24 RX ADMIN — ACYCLOVIR SODIUM 300 MG: 50 INJECTION, SOLUTION INTRAVENOUS at 05:03

## 2017-03-24 RX ADMIN — SODIUM CHLORIDE 500 ML: 0.9 INJECTION, SOLUTION INTRAVENOUS at 08:03

## 2017-03-24 RX ADMIN — Medication 125 MG: at 05:03

## 2017-03-24 RX ADMIN — Medication 125 MG: at 12:03

## 2017-03-24 NOTE — PLAN OF CARE
Problem: Patient Care Overview  Goal: Plan of Care Review  Outcome: Ongoing (interventions implemented as appropriate)  Pt T-max was 100.7 during night, Temp decreased with tylenol 50 mg po. Pt had no complaints of pain. AAOx4. Pt on vanc po and acyclovir IV. Neutropenic and thrombocytopenic precautions in place. Safety precautions maintained. Pt remained free from injury during night. Bed is in low and locked position with side rails up x 2. Call bell is within reach of pt.

## 2017-03-24 NOTE — PROGRESS NOTES
Progress Note  BMT                                                              Team: Networked reference to record PCT     Patient Name: Marck Howard  YOB: 1980    Admit Date: 3/14/2017                   LOS: 10    SUBJECTIVE:     Reason for Admission: Sepsis due to Gram-negative organism with septic shock    Subjective and Interval History:   -patient AAOX3 and no complaints this am  -family meeting planned for this am for discharge planning   -T.max 100.7 overnight  -hypotensive this am    Review of Systems:  General:  Denies weight loss, fever, chills, (+) weakness, fatigue  HEENT:  Denies vision changes, sore throat, congestion  CVS:  Denies chest pain, pressure, palpitations  Resp:  Denies shortness of breath, cough, sputum  GI:  Denies diarrhea, constipation, abdominal pain, nausea, vomiting  :  Denies dysuria, hematuria, nocturia  MSK:  Denies myalgias, arthralgias  Skin:  Denies rashes, bleeding  Neuro:  Denies headache, dizziness, syncope, numbness, paresthesias  All other systems otherwise negative    Scheduled Medications   acetaminophen  500 mg Oral Q24H    acyclovir  300 mg Intravenous Q12H    amphotericin B liposome (AMBISOME) IVPB  5 mg/kg (Dosing Weight) Intravenous Q24H    ciprofloxacin HCl  500 mg Oral Q12H    sodium chloride 0.9%  500 mL Intravenous Q24H    sodium chloride 0.9%  500 mL Intravenous Q24H    sodium chloride 0.9%  500 mL Intravenous Once    sodium chloride 0.9%  3 mL Intravenous Q8H    vancomycin  125 mg Oral Q6H          PRN Medications  acetaminophen, dextrose 50%, diphenhydrAMINE, glucagon (human recombinant), magnesium sulfate IVPB, magnesium sulfate IVPB, ondansetron, oxycodone, potassium chloride **AND** potassium chloride **AND** potassium chloride, sodium phosphate IVPB, sodium phosphate IVPB, sodium phosphate IVPB    OBJECTIVE:     Temp:  [97.5 °F (36.4 °C)-100.7 °F (38.2 °C)]   Pulse:  [108-125]   Resp:  [16-22]   BP: ()/(51-61)    SpO2:  [95 %-99 %]   Body mass index is 16.44 kg/(m^2).    Intake/Output Summary    Intake/Output Summary (Last 24 hours) at 03/24/17 0835  Last data filed at 03/24/17 0800   Gross per 24 hour   Intake             3651 ml   Output             6625 ml   Net            -2974 ml       Physical Exam:  General:  Thin, cachectic, no acute distress  HEENT:  Normocephalic, atraumatic, EOMI, clear sclera, throat clear without erythema or exudates  CVS:  Tachycardic, S1 and S2 normal, no murmurs, rubs, gallops  Resp:  Lungs clear to auscultation, no wheezes, rales, rhonchi, cough.    GI:  Abdomen soft, non-tender, non-distended, normoactive bowel sounds, no organomegaly  MSK:  No muscle atrophy, cyanosis, peripheral edema  Skin:  No rashes, erythema--ulceration noted to fourth finger of left hand  Neuro:  CNII-XII grossly intact  Psych:  Alert and oriented to person, place, and time    Diagnostic Result    Lab Results   Component Value Date    WBC 3.32 (L) 03/24/2017    HGB 7.8 (L) 03/24/2017    HCT 21.7 (L) 03/24/2017    PLT 34 (LL) 03/23/2017         Recent Labs  Lab 03/24/17  0516     142   K 2.7*  2.7*     110   CO2 21*  21*   BUN 5*  5*   CREATININE 0.4*  0.4*   MG 1.6   PHOS 3.2       Lab Results   Component Value Date    INR 1.2 03/24/2017    INR 1.3 (H) 03/23/2017    INR 1.3 (H) 03/22/2017       Recent Labs      03/21/17   1140  03/21/17   1817  03/22/17   0030  03/22/17   1825  03/22/17   1959  03/23/17   0500  03/23/17   1231  03/23/17 2028   POCTGLUCOSE  138*  181*  176*  142*  167*  116*  161*  188*       ASSESSMENT/PLAN:   Mr. Marck Howard is a 36 y.o. male     Current Problems List:  Active Hospital Problems    Diagnosis  POA    *Sepsis due to Gram-negative organism with septic shock [A41.50, R65.21]  Yes    Gangrene [I96]  Yes    AIDS (acquired immunodeficiency syndrome), CD4 <=200 [B20]  Yes    Subacute osteomyelitis of left hand [M86.242]  Yes    Abdominal pain [R10.9]   Yes    Chemotherapy-induced neutropenia [D70.1]  Yes    Pancytopenia [D61.818]  Yes    Transaminitis [R74.0]  Yes    Lactic acid acidosis [E87.2]  Yes    Hyperbilirubinemia [E80.6]  Yes    Immunocompromised patient [D84.9]  Yes    Noncompliance [Z91.19]  Not Applicable    Hypokalemia [E87.6]  Yes    Clostridium difficile infection [B96.89]  Yes    Bacteremia due to Enterococcus [R78.81]  Yes    Acute myeloid leukemia not having achieved remission [C92.00]  Yes    Fever [R50.9]  Yes    HIV (human immunodeficiency virus infection) [Z21]  Yes    Tobacco abuse [Z72.0]  Yes    Acute leukemia [C95.00]  Yes      Resolved Hospital Problems    Diagnosis Date Resolved POA    Hyponatremia [E87.1] 03/16/2017 Yes       Active Problems, Status & Treatment Plan Addressed Today:    Sepsis/Septic Shock  - 2/2 Klebsiella pneumoniae on blood cx from 3/14 and 3/15, repeat BC from 3/19 NGTD  - ANC 0  - T.max 100.7, hypotensive this am 80/50's--will give 1 liter NS bolus  - Left hand with osteomyelitis to 3rd and 4th finger per hand x-ray. We do not recommend proceeding with any surgical interventions  - CT abdomen with multiple abscesses to the liver    - Known history of untreated fungemia, but repeat blood cx negative for fungus  - C.diff +, continue PO Vanc   - IV antibiotics dc'd and continue po cipro (3/20)  - On board spectrum coverage: Amphotericin B, Acyclovir      Refractory AML   - S/p 7+3 and MEC induction with known persistent disease  - 79% blasts on differential today  - Patient is not a candidate for further therapy for is AML given his functional status, refractory disease, AIDS and current infections, recommend palliative care. Palliative care following.    Pancytopenia due to chemotherapy  - hgb 7.8 and platelets 27,000  - no transfusions needed today  - Transfuse if Hb < 7, Plt < 10,000/mm3      Hypokalemia and hypomagnesemia   - will replace today    Diet:  Dental Soft  DVT PPx:  Holding mechanical in  the setting of thrombocytopenia  Code Status:  DNR/DNI  Dispo:  Prognosis poor, appreciate Palliative assistance.  Plan to discharge to home hospice possibly today--family meeting this am.      Marcella Dodson NP  Hematology/BMT

## 2017-03-24 NOTE — PROGRESS NOTES
Call received from pts. Nurse (Marguerite) stating that family in room to speak with [facility]. Family informed that [facility] would be to room shortly. Call received again (20 minutes) later that family was leaving as the family needed to leave. [facility] nurse to ask family to stay as this [facility] was on the elevator. Once arrived to the floor, family has already left. Contacted pts. Sister (Allegra) and she states that they had to leave as family was not feeling well. They will return in the morning to discuss dc plans with this [facility]. Family to arrive around 9am tomorrow. Will follow.

## 2017-03-24 NOTE — PROGRESS NOTES
Notified Dr. Henry for BMT that pt has temp of 101.5 at this time.  Pt remains tachycardic and hypotensive at baseline; 90s/50s.  No c/o pain; no distress noted.  500mg tylenol admin at this time as premed for ampho B.  No new orders at present; will continue to monitor.

## 2017-03-24 NOTE — PLAN OF CARE
Problem: Patient Care Overview  Goal: Plan of Care Review  Outcome: Ongoing (interventions implemented as appropriate)  Pt involved in plan of care and communicating needs throughout shift as able; not always reliable to make needs known; pt withdrawn but cooperative with care; frequent rounding done on pt.  Pt remaining on bedrest throughout shift; turns and repositions self in bed.  No c/o pain or discomfort today.  Tolerating diet; family brings food from home.  Pt has powell catheter; intact; patent to gravity; good urine output noted.  Pt had BM today; noted to be incontinent of stool.  Daily labs and accuchecks d/c'd today per MD order.  Cipro and acyclovir d/c'd today; IV ampho B admin as ordered with NS boluses before and after.  1L NS bolus admin this am for BP 80s/50s; BP remaining in 90s/50s throughout the rest of the shift.  T max = 101.5; MD notified.  Total of 80mEq KCl admin for K=2.7; 2g mag sulfate admin for mg=1.6.  No acute events so far this shift.  Pt remaining free from falls or injury throughout shift; bed in lowest position; call light within reach.  Pt instructed to call for assistance as needed.  Q1H rounding done on pt.

## 2017-03-24 NOTE — PROGRESS NOTES
BP 80/51 this am; Dr. Henry and Marcella Neumann, NP for BMT notified; ; pt otherwise asymptomatic.  Total of 1L NS bolus admin at this time; will continue to monitor; will recheck pressure at completion of bolus.

## 2017-03-24 NOTE — PROGRESS NOTES
Met with pt. And his two sisters and niece to discuss dc plans. Pts. Bin ([phone]) states that they have discussed dc plans with the pt. And niece would like to take her uncle to her home where he will have someone to care for him. She however needs to speak with her  to discuss the plan and to make sure that he is OK with pt. Moving to her home. Pts. Sisters state that they will also be available to help with pts. Care. Family also report that they are discussing care for pts. 2 teenage children. Sisters state that the children are currently with their other sister but the long term plan is for pts. Nisourav to care for the children. Family states that they are working on getting necessary paperwork signed so that proper custody can be made for the children. Niece states she would like to meet again with danielle'jonas around 3:00pm once her  is off from work so that we can discuss plans for pt. To go to their home and see if  will agree. Will follow.

## 2017-03-25 PROCEDURE — 20600001 HC STEP DOWN PRIVATE ROOM

## 2017-03-25 PROCEDURE — 25000003 PHARM REV CODE 250: Performed by: NURSE PRACTITIONER

## 2017-03-25 PROCEDURE — 25000003 PHARM REV CODE 250: Performed by: HOSPITALIST

## 2017-03-25 PROCEDURE — 99233 SBSQ HOSP IP/OBS HIGH 50: CPT | Mod: ,,, | Performed by: INTERNAL MEDICINE

## 2017-03-25 RX ADMIN — Medication 3 ML: at 05:03

## 2017-03-25 RX ADMIN — Medication 125 MG: at 05:03

## 2017-03-25 NOTE — PROGRESS NOTES
Progress Note  BMT                                                              Team: Networked reference to record PCT     Patient Name: Marck Howard  YOB: 1980    Admit Date: 3/14/2017                   LOS: 11    SUBJECTIVE:     Reason for Admission: Sepsis due to Gram-negative organism with septic shock    Subjective and Interval History:   -patient AAOX3 and no complaints this am  -family meeting planned for this am for discharge planning   -T.max 100.7 overnight  -hypotensive this am    Review of Systems:  General:  Denies weight loss, fever, chills, (+) weakness, fatigue  HEENT:  Denies vision changes, sore throat, congestion  CVS:  Denies chest pain, pressure, palpitations  Resp:  Denies shortness of breath, cough, sputum  GI:  Denies diarrhea, constipation, abdominal pain, nausea, vomiting  :  Denies dysuria, hematuria, nocturia  MSK:  Denies myalgias, arthralgias  Skin:  Denies rashes, bleeding  Neuro:  Denies headache, dizziness, syncope, numbness, paresthesias  All other systems otherwise negative    Scheduled Medications   acetaminophen  500 mg Oral Q24H    sodium chloride 0.9%  500 mL Intravenous Q24H    sodium chloride 0.9%  500 mL Intravenous Q24H    sodium chloride 0.9%  3 mL Intravenous Q8H    vancomycin  125 mg Oral Q6H          PRN Medications  acetaminophen, dextrose 50%, diphenhydrAMINE, glucagon (human recombinant), ondansetron, oxycodone    OBJECTIVE:     Temp:  [98.5 °F (36.9 °C)-101.5 °F (38.6 °C)]   Pulse:  [111-123]   Resp:  [14-17]   BP: ()/(52-66)   SpO2:  [95 %-100 %]   Body mass index is 16.44 kg/(m^2).    Intake/Output Summary    Intake/Output Summary (Last 24 hours) at 03/25/17 1005  Last data filed at 03/25/17 1000   Gross per 24 hour   Intake             3280 ml   Output             6600 ml   Net            -3320 ml       Physical Exam:  General:  Thin, cachectic, no acute distress  HEENT:  Normocephalic, atraumatic, EOMI, clear sclera,  throat clear without erythema or exudates  CVS:  Tachycardic, S1 and S2 normal, no murmurs, rubs, gallops  Resp:  Lungs clear to auscultation, no wheezes, rales, rhonchi, cough.    GI:  Abdomen soft, non-tender, non-distended, normoactive bowel sounds, no organomegaly  MSK:  No muscle atrophy, cyanosis, peripheral edema  Skin:  No rashes, erythema--ulceration noted to fourth finger of left hand  Neuro:  CNII-XII grossly intact  Psych:  Alert and oriented to person, place, and time    Diagnostic Result    Lab Results   Component Value Date    WBC 3.32 (L) 03/24/2017    HGB 7.8 (L) 03/24/2017    HCT 21.7 (L) 03/24/2017    PLT 27 (LL) 03/24/2017       No results for input(s): NA, K, CL, CO2, BUN, CREATININE, MG, PHOS in the last 24 hours.    Invalid input(s): CA    Lab Results   Component Value Date    INR 1.2 03/24/2017    INR 1.3 (H) 03/23/2017    INR 1.3 (H) 03/22/2017       Recent Labs      03/22/17   1825  03/22/17   1959  03/23/17   0500  03/23/17   1231  03/23/17   2028  03/24/17   0759  03/24/17   1127   POCTGLUCOSE  142*  167*  116*  161*  188*  80  121*       ASSESSMENT/PLAN:   Mr. Marck Howard is a 36 y.o. male     Current Problems List:  Active Hospital Problems    Diagnosis  POA    *Sepsis due to Gram-negative organism with septic shock [A41.50, R65.21]  Yes    Gangrene [I96]  Yes    AIDS (acquired immunodeficiency syndrome), CD4 <=200 [B20]  Yes    Subacute osteomyelitis of left hand [M86.242]  Yes    Abdominal pain [R10.9]  Yes    Chemotherapy-induced neutropenia [D70.1]  Yes    Pancytopenia [D61.818]  Yes    Transaminitis [R74.0]  Yes    Lactic acid acidosis [E87.2]  Yes    Hyperbilirubinemia [E80.6]  Yes    Immunocompromised patient [D84.9]  Yes    Noncompliance [Z91.19]  Not Applicable    Hypokalemia [E87.6]  Yes    Clostridium difficile infection [B96.89]  Yes    Bacteremia due to Enterococcus [R78.81]  Yes    Acute myeloid leukemia not having achieved remission [C92.00]   Yes    Fever [R50.9]  Yes    HIV (human immunodeficiency virus infection) [Z21]  Yes    Tobacco abuse [Z72.0]  Yes    Acute leukemia [C95.00]  Yes      Resolved Hospital Problems    Diagnosis Date Resolved POA    Hyponatremia [E87.1] 03/16/2017 Yes       Active Problems, Status & Treatment Plan Addressed Today:    Sepsis/Septic Shock  - 2/2 Klebsiella pneumoniae on blood cx from 3/14 and 3/15, repeat BC from 3/19 NGTD  - ANC 0  - T.max 100.7, hypotensive this am 80/50's--will give 1 liter NS bolus  - Left hand with osteomyelitis to 3rd and 4th finger per hand x-ray. We do not recommend proceeding with any surgical interventions  - CT abdomen with multiple abscesses to the liver    - Known history of untreated fungemia, but repeat blood cx negative for fungus  - C.diff +, continue PO Vanc   - IV antibiotics dc'd and continue po cipro (3/20)  - patient didn't grow up aspergillus, will d/c amphoB       Refractory AML   - S/p 7+3 and MEC induction with known persistent disease  - 79% blasts on differential today  - Patient is not a candidate for further therapy for is AML given his functional status, refractory disease, AIDS and current infections, recommend palliative care. Palliative care following.  - Hospice consulted and will revisit patient Monday 3/27/17.    Pancytopenia due to chemotherapy  - hgb 7.8 and platelets 27,000  - no transfusions needed today  - Transfuse if Hb < 7, Plt < 10,000/mm3      Hypokalemia and hypomagnesemia   - will replace today    Diet:  Dental Soft  DVT PPx:  Holding mechanical in the setting of thrombocytopenia  Code Status:  DNR/DNI  Dispo:  Prognosis poor, appreciate Palliative assistance.  Plan to discharge to home hospice possibly today--family meeting this am.    ---------------------------------      Efrain Cintron  Internal Medicine PGY3  [phone]

## 2017-03-25 NOTE — PLAN OF CARE
Problem: Patient Care Overview  Goal: Plan of Care Review  Outcome: Ongoing (interventions implemented as appropriate)  Patient DNR, continued on oral vanc and amph B, afebrile. Pt sleeping throughout night. Pineda intact. Instructed patient to call for assistance, bed low and locked, call bell within reach, nonskid socks on, patient verbalized understanding.

## 2017-03-25 NOTE — PLAN OF CARE
Problem: Patient Care Overview  Goal: Plan of Care Review  Outcome: Ongoing (interventions implemented as appropriate)  Pt involved in plan of care and communicating needs throughout shift as able; not always reliable to make needs known; pt withdrawn but cooperative with care; frequent rounding done on pt. Pt remaining on bedrest throughout shift; turns and repositions self in bed. No c/o pain or discomfort today. Tolerating diet; eating food that family brought from home. Pt has powell catheter; intact; patent to gravity; good urine output noted.  IV ampho B d/c'd today per MD order; pt remaining saline locked throughout shift.  Pt refused noon dose vancomycin.  No acute events so far this shift. Pt remaining free from falls or injury throughout shift; bed in lowest position; call light within reach. Pt instructed to call for assistance as needed. Q1H rounding done on pt.

## 2017-03-26 PROCEDURE — 99233 SBSQ HOSP IP/OBS HIGH 50: CPT | Mod: ,,, | Performed by: INTERNAL MEDICINE

## 2017-03-26 PROCEDURE — 25000003 PHARM REV CODE 250: Performed by: HOSPITALIST

## 2017-03-26 PROCEDURE — 25000003 PHARM REV CODE 250: Performed by: NURSE PRACTITIONER

## 2017-03-26 PROCEDURE — 20600001 HC STEP DOWN PRIVATE ROOM

## 2017-03-26 RX ADMIN — Medication 3 ML: at 10:03

## 2017-03-26 RX ADMIN — Medication 125 MG: at 12:03

## 2017-03-26 RX ADMIN — Medication 125 MG: at 05:03

## 2017-03-26 RX ADMIN — Medication 125 MG: at 11:03

## 2017-03-26 RX ADMIN — OXYCODONE HYDROCHLORIDE 5 MG: 5 TABLET ORAL at 07:03

## 2017-03-26 NOTE — PLAN OF CARE
Problem: Patient Care Overview  Goal: Plan of Care Review  Outcome: Ongoing (interventions implemented as appropriate)  Pt involved in plan of care and communicating needs throughout shift as able; not always reliable to make needs known; pt withdrawn but cooperative with care; frequent rounding done on pt. Pt remaining on bedrest throughout shift; turns and repositions self in bed. No c/o pain or discomfort today. Tolerating diet; appetite good today; pt asking for a hamburger. Pt has powell catheter; intact; patent to gravity; good urine output noted. PO vancomycin admin as ordered.  No acute events so far this shift. Pt remaining free from falls or injury throughout shift; bed in lowest position; call light within reach. Pt instructed to call for assistance as needed. Bed alarm on for safety. Q1H rounding done on pt.

## 2017-03-26 NOTE — PROGRESS NOTES
Progress Note  BMT                                                              Team: Networked reference to record PCT     Patient Name: Marck Howard  YOB: 1980    Admit Date: 3/14/2017                   LOS: 12    SUBJECTIVE:     Reason for Admission: Sepsis due to Gram-negative organism with septic shock    Subjective and Interval History:   -patient AAOX3 and no complaints this am  - patient was hypotensive during the night    Review of Systems:  General:  Denies weight loss, fever, chills, (+) weakness, fatigue  HEENT:  Denies vision changes, sore throat, congestion  CVS:  Denies chest pain, pressure, palpitations  Resp:  Denies shortness of breath, cough, sputum  GI:  Denies diarrhea, constipation, abdominal pain, nausea, vomiting  :  Denies dysuria, hematuria, nocturia  MSK:  Denies myalgias, arthralgias  Skin:  Denies rashes, bleeding  Neuro:  Denies headache, dizziness, syncope, numbness, paresthesias  All other systems otherwise negative    Scheduled Medications   sodium chloride 0.9%  3 mL Intravenous Q8H    vancomycin  125 mg Oral Q6H          PRN Medications  acetaminophen, dextrose 50%, diphenhydrAMINE, glucagon (human recombinant), ondansetron, oxycodone    OBJECTIVE:     Temp:  [98.3 °F (36.8 °C)-99.9 °F (37.7 °C)]   Pulse:  [114-131]   Resp:  [16-19]   BP: (93-97)/(51-58)   SpO2:  [96 %-97 %]   Body mass index is 15.11 kg/(m^2).    Intake/Output Summary    Intake/Output Summary (Last 24 hours) at 03/26/17 0955  Last data filed at 03/26/17 0853   Gross per 24 hour   Intake             1400 ml   Output             3300 ml   Net            -1900 ml       Physical Exam:  General:  Thin, cachectic, no acute distress  HEENT:  Normocephalic, atraumatic, EOMI, clear sclera, throat clear without erythema or exudates  CVS:  Tachycardic, S1 and S2 normal, no murmurs, rubs, gallops  Resp:  Lungs clear to auscultation, no wheezes, rales, rhonchi, cough.    GI:  Abdomen soft,  non-tender, non-distended, normoactive bowel sounds, no organomegaly  MSK:  No muscle atrophy, cyanosis, peripheral edema  Skin:  No rashes, erythema--ulceration noted to fourth finger of left hand  Neuro:  CNII-XII grossly intact  Psych:  Alert and oriented to person, place, and time    Diagnostic Result    Lab Results   Component Value Date    WBC 3.32 (L) 03/24/2017    HGB 7.8 (L) 03/24/2017    HCT 21.7 (L) 03/24/2017    PLT 27 (LL) 03/24/2017       No results for input(s): NA, K, CL, CO2, BUN, CREATININE, MG, PHOS in the last 24 hours.    Invalid input(s): CA    Lab Results   Component Value Date    INR 1.2 03/24/2017    INR 1.3 (H) 03/23/2017    INR 1.3 (H) 03/22/2017       Recent Labs      03/23/17   1231  03/23/17   2028  03/24/17   0759  03/24/17   1127   POCTGLUCOSE  161*  188*  80  121*       ASSESSMENT/PLAN:   Mr. Marck Howard is a 36 y.o. male     Current Problems List:  Active Hospital Problems    Diagnosis  POA    *Sepsis due to Gram-negative organism with septic shock [A41.50, R65.21]  Yes    Gangrene [I96]  Yes    AIDS (acquired immunodeficiency syndrome), CD4 <=200 [B20]  Yes    Subacute osteomyelitis of left hand [M86.242]  Yes    Abdominal pain [R10.9]  Yes    Chemotherapy-induced neutropenia [D70.1]  Yes    Pancytopenia [D61.818]  Yes    Transaminitis [R74.0]  Yes    Lactic acid acidosis [E87.2]  Yes    Hyperbilirubinemia [E80.6]  Yes    Immunocompromised patient [D84.9]  Yes    Noncompliance [Z91.19]  Not Applicable    Hypokalemia [E87.6]  Yes    Clostridium difficile infection [B96.89]  Yes    Bacteremia due to Enterococcus [R78.81]  Yes    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 We do not recommend proceeding with any surgical interventions  - CT abdomen with multiple abscesses to the liver    - Known history of untreated fungemia, but repeat blood cx negative for fungus  - C.diff +, continue PO Vanc   - IV antibiotics dc'd and continue po cipro (3/20)  - patient didn't grow up aspergillus,  amphoB was dced.      Refractory AML   - S/p 7+3 and MEC induction with known persistent disease  - 79% blasts on differential today  - Patient is not a candidate for further therapy for is AML given his functional status, refractory disease, AIDS and current infections, recommend palliative care. Palliative care following.  - [facility] consulted and will revisit patient Monday 3/27/17.    Pancytopenia due to chemotherapy  - replace per protocol       Hypokalemia and hypomagnesemia   - will replace today    Diet: regular.   DVT PPx:  Holding mechanical in the setting of thrombocytopenia  Code Status:  DNR/DNI  Dispo:  Prognosis poor, appreciate Palliative assistance.  Plan to discharge to home hospice possibly today--family meeting this am.    ---------------------------------      Efrain Cintron  Internal Medicine PGY3  [phone]

## 2017-03-27 LAB
ABO + RH BLD: NORMAL
ALBUMIN SERPL BCP-MCNC: 1.2 G/DL
ALP SERPL-CCNC: 548 U/L
ALT SERPL W/O P-5'-P-CCNC: 33 U/L
ANION GAP SERPL CALC-SCNC: 12 MMOL/L
AST SERPL-CCNC: 25 U/L
BILIRUB SERPL-MCNC: 2.2 MG/DL
BLD GP AB SCN CELLS X3 SERPL QL: NORMAL
BLD PROD TYP BPU: NORMAL
BLD PROD TYP BPU: NORMAL
BLOOD UNIT EXPIRATION DATE: NORMAL
BLOOD UNIT EXPIRATION DATE: NORMAL
BLOOD UNIT TYPE CODE: 5100
BLOOD UNIT TYPE CODE: 5100
BLOOD UNIT TYPE: NORMAL
BLOOD UNIT TYPE: NORMAL
BUN SERPL-MCNC: 8 MG/DL
CALCIUM SERPL-MCNC: 7.7 MG/DL
CHLORIDE SERPL-SCNC: 101 MMOL/L
CO2 SERPL-SCNC: 26 MMOL/L
CODING SYSTEM: NORMAL
CODING SYSTEM: NORMAL
CREAT SERPL-MCNC: 0.5 MG/DL
DISPENSE STATUS: NORMAL
DISPENSE STATUS: NORMAL
EST. GFR  (AFRICAN AMERICAN): >60 ML/MIN/1.73 M^2
EST. GFR  (NON AFRICAN AMERICAN): >60 ML/MIN/1.73 M^2
GLUCOSE SERPL-MCNC: 103 MG/DL
MAGNESIUM SERPL-MCNC: 1.3 MG/DL
NUM UNITS TRANS PACKED RBC: NORMAL
NUM UNITS TRANS PACKED RBC: NORMAL
PHOSPHATE SERPL-MCNC: 3.3 MG/DL
POTASSIUM SERPL-SCNC: 2.1 MMOL/L
PROT SERPL-MCNC: 4.6 G/DL
SODIUM SERPL-SCNC: 139 MMOL/L

## 2017-03-27 PROCEDURE — 25000003 PHARM REV CODE 250: Performed by: NURSE PRACTITIONER

## 2017-03-27 PROCEDURE — 85027 COMPLETE CBC AUTOMATED: CPT

## 2017-03-27 PROCEDURE — 80053 COMPREHEN METABOLIC PANEL: CPT

## 2017-03-27 PROCEDURE — 63600175 PHARM REV CODE 636 W HCPCS: Performed by: NURSE PRACTITIONER

## 2017-03-27 PROCEDURE — 86920 COMPATIBILITY TEST SPIN: CPT

## 2017-03-27 PROCEDURE — 25000003 PHARM REV CODE 250: Performed by: HOSPITALIST

## 2017-03-27 PROCEDURE — 25000003 PHARM REV CODE 250: Performed by: INTERNAL MEDICINE

## 2017-03-27 PROCEDURE — 20600001 HC STEP DOWN PRIVATE ROOM

## 2017-03-27 PROCEDURE — 99232 SBSQ HOSP IP/OBS MODERATE 35: CPT | Mod: ,,, | Performed by: INTERNAL MEDICINE

## 2017-03-27 PROCEDURE — P9040 RBC LEUKOREDUCED IRRADIATED: HCPCS

## 2017-03-27 PROCEDURE — 86900 BLOOD TYPING SEROLOGIC ABO: CPT

## 2017-03-27 PROCEDURE — 84100 ASSAY OF PHOSPHORUS: CPT

## 2017-03-27 PROCEDURE — 86580 TB INTRADERMAL TEST: CPT | Performed by: NURSE PRACTITIONER

## 2017-03-27 PROCEDURE — 85007 BL SMEAR W/DIFF WBC COUNT: CPT

## 2017-03-27 PROCEDURE — 83735 ASSAY OF MAGNESIUM: CPT

## 2017-03-27 PROCEDURE — 36430 TRANSFUSION BLD/BLD COMPNT: CPT

## 2017-03-27 PROCEDURE — 86901 BLOOD TYPING SEROLOGIC RH(D): CPT

## 2017-03-27 RX ORDER — DIPHENHYDRAMINE HCL 25 MG
25 CAPSULE ORAL EVERY 6 HOURS PRN
Status: DISCONTINUED | OUTPATIENT
Start: 2017-03-27 | End: 2017-03-28 | Stop reason: HOSPADM

## 2017-03-27 RX ORDER — MAGNESIUM SULFATE HEPTAHYDRATE 40 MG/ML
2 INJECTION, SOLUTION INTRAVENOUS ONCE
Status: COMPLETED | OUTPATIENT
Start: 2017-03-27 | End: 2017-03-27

## 2017-03-27 RX ORDER — POTASSIUM CHLORIDE 20 MEQ/15ML
40 SOLUTION ORAL ONCE
Status: COMPLETED | OUTPATIENT
Start: 2017-03-27 | End: 2017-03-27

## 2017-03-27 RX ORDER — ACETAMINOPHEN 650 MG/20.3ML
650 LIQUID ORAL EVERY 4 HOURS PRN
Status: DISCONTINUED | OUTPATIENT
Start: 2017-03-27 | End: 2017-03-28 | Stop reason: HOSPADM

## 2017-03-27 RX ORDER — HYDROCODONE BITARTRATE AND ACETAMINOPHEN 500; 5 MG/1; MG/1
TABLET ORAL
Status: DISCONTINUED | OUTPATIENT
Start: 2017-03-27 | End: 2017-03-28 | Stop reason: HOSPADM

## 2017-03-27 RX ORDER — POTASSIUM CHLORIDE 7.45 MG/ML
10 INJECTION INTRAVENOUS
Status: COMPLETED | OUTPATIENT
Start: 2017-03-27 | End: 2017-03-27

## 2017-03-27 RX ADMIN — POTASSIUM CHLORIDE 10 MEQ: 10 INJECTION, SOLUTION INTRAVENOUS at 08:03

## 2017-03-27 RX ADMIN — Medication 5 UNITS: at 06:03

## 2017-03-27 RX ADMIN — POTASSIUM CHLORIDE 10 MEQ: 10 INJECTION, SOLUTION INTRAVENOUS at 01:03

## 2017-03-27 RX ADMIN — ACETAMINOPHEN 650 MG: 160 SOLUTION ORAL at 07:03

## 2017-03-27 RX ADMIN — POTASSIUM CHLORIDE 40 MEQ: 20 SOLUTION ORAL at 08:03

## 2017-03-27 RX ADMIN — Medication 125 MG: at 05:03

## 2017-03-27 RX ADMIN — POTASSIUM CHLORIDE 10 MEQ: 10 INJECTION, SOLUTION INTRAVENOUS at 11:03

## 2017-03-27 RX ADMIN — MAGNESIUM SULFATE IN WATER 2 G: 40 INJECTION, SOLUTION INTRAVENOUS at 08:03

## 2017-03-27 RX ADMIN — Medication 125 MG: at 11:03

## 2017-03-27 RX ADMIN — OXYCODONE HYDROCHLORIDE 5 MG: 5 TABLET ORAL at 07:03

## 2017-03-27 RX ADMIN — Medication 3 ML: at 01:03

## 2017-03-27 RX ADMIN — POTASSIUM CHLORIDE 10 MEQ: 10 INJECTION, SOLUTION INTRAVENOUS at 10:03

## 2017-03-27 RX ADMIN — ACETAMINOPHEN 650 MG: 160 SOLUTION ORAL at 10:03

## 2017-03-27 RX ADMIN — DIPHENHYDRAMINE HYDROCHLORIDE 25 MG: 25 CAPSULE ORAL at 10:03

## 2017-03-27 RX ADMIN — Medication 125 MG: at 06:03

## 2017-03-27 RX ADMIN — OXYCODONE HYDROCHLORIDE 5 MG: 5 TABLET ORAL at 11:03

## 2017-03-27 RX ADMIN — OXYCODONE HYDROCHLORIDE 5 MG: 5 TABLET ORAL at 04:03

## 2017-03-27 NOTE — PROGRESS NOTES
03/27/17 1116   Vital Signs   Temp 98.1 °F (36.7 °C)   Temp src Oral   Pulse (!) 131   Resp 20   SpO2 (!) 92 %   BP (!) 87/56   Dr. Cintron notified of hypotension and tachycardia. PRBCs infusing. Patient stable, will continue to monitor.

## 2017-03-27 NOTE — PROGRESS NOTES
Met with pts. Two sisters this am/afternoon and they have informed this sw'er that pts. Niece will likely not be able to take pt. To her home as her  is ill and has been admitted to the hospital. Spoke with pt. And family about a possible referral to [facility] ([phone]) for inpatient stay. Pt. And family agreeable to this plan. Spoke with Adwoa Armando and Amor at [facility]. Pt. Evaluated for inpatient stay but may not be able to transfer as Medicaid only allows for 5 days of inpatient stay and pt. Would need a plan for dc from inpatient after 5 days. Family still trying to work on a long term plan. Will f/u with pt. And family on Monday.

## 2017-03-27 NOTE — PLAN OF CARE
MDR'S with Dr. Manley, patient to d/c today to inpatient hospice at [facility] to follow for needs and support.

## 2017-03-27 NOTE — PLAN OF CARE
Problem: Patient Care Overview  Goal: Plan of Care Review  Outcome: Ongoing (interventions implemented as appropriate)  VSS. TMax 100.5 overnight unsustained.  PRN Oxy IR given for c/o pain.  Pt in no acute distress at this time.  Pineda catheter in place with adequate output.  Pt remains free from falls and injury during shift.  Bed locked in lowest position, side rails up x2, call light within reach.  Will continue to monitor pt.

## 2017-03-27 NOTE — PLAN OF CARE
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]  Yes     Priority: 2     AIDS (acquired immunodeficiency syndrome), CD4 <=200 [B20]  Yes     Priority: 3     Subacute osteomyelitis of left hand [M86.242]  Yes     Priority: 4     Hypokalemia [E87.6]  Yes     Priority: 4     Abdominal pain [R10.9]  Yes    Chemotherapy-induced neutropenia [D70.1]  Yes    Pancytopenia [D61.818]  Yes    Transaminitis [R74.0]  Yes    Lactic acid acidosis [E87.2]  Yes    Hyperbilirubinemia [E80.6]  Yes    Immunocompromised patient [D84.9]  Yes    Noncompliance [Z91.19]  Not Applicable    Clostridium difficile infection [B96.89]  Yes    Bacteremia due to Enterococcus [R78.81]  Yes    Fever [R50.9]  Yes    HIV (human immunodeficiency virus infection) [Z21]  Yes    Tobacco abuse [Z72.0]  Yes      Resolved Hospital Problems    Diagnosis Date Resolved POA    Hyponatremia [E87.1] 03/16/2017 Yes       Hospice Qualifying Diagnoses: Acute Myeloid Leukemia, AIDS, Sepsis       Patient has a life expectancy < 6 months due to these conditions.    Vital Signs: Routine per Hospice Protocol.    Allergies:Review of patient's allergies indicates:  No Known Allergies    Diet: Regular    Activities: As tolerated    Nursing: Per Hospice Routine    Future Orders:  Hospice Medical Director may dictate new orders for comfortable care measures & sign death certificate.    Medications:         Comfort Care Medications Only       There are no discharge medications for this  patient.           _________________________________  Marcella Neumann, ANTONIA  03/27/2017

## 2017-03-27 NOTE — PROGRESS NOTES
Spoke with pt. And his two sisters this am. Sister (Kerri) states that she is willing to take the pt. To her home where he will have full time care. Sister however states that she will need some time to get he home prepared and clear out a room where pt. Will be able to stay and have equipment delivered. Spoke with Amor at [facility] to see if pt. Can come for 5 day inpatient stay and then transition to sisters home once she is prepared to take the pt. Home. [facility] to meet with the pt. And family this afternoon to complete paperwork and transition to inpatient. Will follow.

## 2017-03-27 NOTE — PROGRESS NOTES
Progress Note  BMT                                                              Team: Networked reference to record PCT     Patient Name: Marck Howard  YOB: 1980    Admit Date: 3/14/2017                   LOS: 13    SUBJECTIVE:     Reason for Admission: Sepsis due to Gram-negative organism with septic shock    Subjective and Interval History:   - patient AAOX3 and no complaints this am  - family at bedside this am  - patient remains febrile, BP stable, On RA    Review of Systems:  General:  Denies weight loss, fever, chills, (+) weakness, fatigue  HEENT:  Denies vision changes, sore throat, congestion  CVS:  Denies chest pain, pressure, palpitations  Resp:  Denies shortness of breath, cough, sputum  GI:  Denies diarrhea, constipation, abdominal pain, nausea, vomiting  :  Denies dysuria, hematuria, nocturia  MSK:  Denies myalgias, arthralgias  Skin:  Denies rashes, bleeding  Neuro:  Denies headache, dizziness, syncope, numbness, paresthesias  All other systems otherwise negative    Scheduled Medications   potassium chloride 10%  40 mEq Oral Once    sodium chloride 0.9%  3 mL Intravenous Q8H    vancomycin  125 mg Oral Q6H          PRN Medications  acetaminophen, dextrose 50%, diphenhydrAMINE, glucagon (human recombinant), ondansetron, oxycodone    OBJECTIVE:     Temp:  [98.3 °F (36.8 °C)-100.8 °F (38.2 °C)]   Pulse:  [116-137]   Resp:  [16-18]   BP: (90-97)/(50-58)   SpO2:  [95 %-97 %]   Body mass index is 14.76 kg/(m^2).    Intake/Output Summary    Intake/Output Summary (Last 24 hours) at 03/27/17 0746  Last data filed at 03/27/17 0444   Gross per 24 hour   Intake             2380 ml   Output             3350 ml   Net             -970 ml       Physical Exam:  General:  Thin, cachectic, no acute distress  HEENT:  Normocephalic, atraumatic, EOMI, clear sclera, throat clear without erythema or exudates  CVS:  Tachycardic, S1 and S2 normal, no murmurs, rubs, gallops  Resp:  Lungs clear to  auscultation, no wheezes, rales, rhonchi, cough.    GI:  Abdomen soft, non-tender, non-distended, normoactive bowel sounds, no organomegaly  MSK:  No muscle atrophy, cyanosis, peripheral edema  Skin:  No rashes, erythema--ulceration noted to fourth finger of left hand  Neuro:  CNII-XII grossly intact  Psych:  Alert and oriented to person, place, and time    Diagnostic Result    Lab Results   Component Value Date    WBC 8.84 03/27/2017    HGB 6.9 (L) 03/27/2017    HCT 20.2 (L) 03/27/2017    PLT 34 (LL) 03/27/2017         Recent Labs  Lab 03/27/17  0444      K 2.1*      CO2 26   BUN 8   CREATININE 0.5   MG 1.3*   PHOS 3.3       Lab Results   Component Value Date    INR 1.2 03/24/2017    INR 1.3 (H) 03/23/2017    INR 1.3 (H) 03/22/2017       Recent Labs      03/24/17   0759  03/24/17   1127   POCTGLUCOSE  80  121*       ASSESSMENT/PLAN:   Mr. Marck Howard is a 36 y.o. male     Current Problems List:  Active Hospital Problems    Diagnosis  POA    *Sepsis due to Gram-negative organism with septic shock [A41.50, R65.21]  Yes    Gangrene [I96]  Yes    AIDS (acquired immunodeficiency syndrome), CD4 <=200 [B20]  Yes    Subacute osteomyelitis of left hand [M86.242]  Yes    Abdominal pain [R10.9]  Yes    Chemotherapy-induced neutropenia [D70.1]  Yes    Pancytopenia [D61.818]  Yes    Transaminitis [R74.0]  Yes    Lactic acid acidosis [E87.2]  Yes    Hyperbilirubinemia [E80.6]  Yes    Immunocompromised patient [D84.9]  Yes    Noncompliance [Z91.19]  Not Applicable    Hypokalemia [E87.6]  Yes    Clostridium difficile infection [B96.89]  Yes    Bacteremia due to Enterococcus [R78.81]  Yes    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   - CT abdomen with multiple abscesses to the liver    - Known history of untreated fungemia, but repeat blood cx negative for fungus  - C.diff +, continue PO Vanc   - antimicrobials dc'd      Refractory AML   - S/p 7+3 and MEC induction with known persistent disease  - 94% blasts on differential today  - Patient is not a candidate for further therapy for is AML given his functional status, refractory disease, AIDS and current infections, recommend palliative care. Palliative care following.  - [facility] will revisit patient today with plans to discharge to inpatient hospice today.    Pancytopenia due to chemotherapy  - transfuse 1 unit PRBC today for hgb 6.9 gm/dl       Hypokalemia and hypomagnesemia   - will replace today    Diet: regular.   DVT PPx:  Holding mechanical in the setting of thrombocytopenia  Code Status:  DNR/DNI  Dispo:  Plan to discharge to inpatient hospice today with transition to home hospice--family at bedside and is in agreement with plan.    Marcella Neumann NP  Hematology/BMT

## 2017-03-28 VITALS
DIASTOLIC BLOOD PRESSURE: 51 MMHG | HEART RATE: 128 BPM | HEIGHT: 63 IN | TEMPERATURE: 98 F | WEIGHT: 83.31 LBS | SYSTOLIC BLOOD PRESSURE: 86 MMHG | BODY MASS INDEX: 14.76 KG/M2 | RESPIRATION RATE: 18 BRPM | OXYGEN SATURATION: 93 %

## 2017-03-28 LAB
ANISOCYTOSIS BLD QL SMEAR: SLIGHT
BASOPHILS # BLD AUTO: ABNORMAL K/UL
BASOPHILS NFR BLD: 0 %
BLASTS NFR BLD MANUAL: 94 %
DIFFERENTIAL METHOD: ABNORMAL
EOSINOPHIL # BLD AUTO: ABNORMAL K/UL
EOSINOPHIL NFR BLD: 0 %
ERYTHROCYTE [DISTWIDTH] IN BLOOD BY AUTOMATED COUNT: 16.3 %
HCT VFR BLD AUTO: 20.2 %
HGB BLD-MCNC: 6.9 G/DL
HYPOCHROMIA BLD QL SMEAR: ABNORMAL
LYMPHOCYTES # BLD AUTO: ABNORMAL K/UL
LYMPHOCYTES NFR BLD: 6 %
MCH RBC QN AUTO: 29.7 PG
MCHC RBC AUTO-ENTMCNC: 34.2 %
MCV RBC AUTO: 87 FL
MONOCYTES # BLD AUTO: ABNORMAL K/UL
MONOCYTES NFR BLD: 0 %
NEUTROPHILS NFR BLD: 0 %
OVALOCYTES BLD QL SMEAR: ABNORMAL
PLATELET # BLD AUTO: 34 K/UL
PLATELET BLD QL SMEAR: ABNORMAL
PMV BLD AUTO: 13 FL
POIKILOCYTOSIS BLD QL SMEAR: SLIGHT
POLYCHROMASIA BLD QL SMEAR: ABNORMAL
RBC # BLD AUTO: 2.32 M/UL
SMUDGE CELLS BLD QL SMEAR: PRESENT
WBC # BLD AUTO: 8.84 K/UL

## 2017-03-28 PROCEDURE — 99239 HOSP IP/OBS DSCHRG MGMT >30: CPT | Mod: ,,, | Performed by: INTERNAL MEDICINE

## 2017-03-28 PROCEDURE — 25000003 PHARM REV CODE 250: Performed by: NURSE PRACTITIONER

## 2017-03-28 RX ADMIN — OXYCODONE HYDROCHLORIDE 5 MG: 5 TABLET ORAL at 10:03

## 2017-03-28 RX ADMIN — OXYCODONE HYDROCHLORIDE 5 MG: 5 TABLET ORAL at 03:03

## 2017-03-28 RX ADMIN — OXYCODONE HYDROCHLORIDE 5 MG: 5 TABLET ORAL at 05:03

## 2017-03-28 RX ADMIN — Medication 125 MG: at 02:03

## 2017-03-28 RX ADMIN — Medication 125 MG: at 05:03

## 2017-03-28 NOTE — PROGRESS NOTES
Pt. And family met with Anthony from [facility] to sign consents, After consents were completed, pts. Sister asked to meet with this sw'er outside pts. Room. She informed me that after she met with hospice, she was made aware that she would need full time care at home. Sister states that she will need to return to work in a few weeks and is unsure if she can care for pt. As previously stated. Spoke with pt. And sister about looking into nursing home placement with hospice. Sister would like to pursue placement at [facility] ([phone]). Contacted [facility] and spoke with Juju, she states that they can review for acceptance with hospice. Faxed clinicals to the office for review ([phone]). Will await response regarding acceptance. Family would still like to continue with hospice thru [facility]. Will follow.

## 2017-03-28 NOTE — PROGRESS NOTES
[facility]  Adult Nutrition  Consult Note    SUMMARY     Recommendations    Recommendation/Intervention:   Encourage patient to consume optimal intake as desired; pleasure eating for comfort as tolerated. consult RD for any further nutrition intervention as needed.    Goals: Pt will consume adequate po intake as desired  Nutrition Goal Status: new  Communication of RD Recs: discussed on rounds    Continuum of Care Plan    Referral to Outpatient Services: other (see comments) (Nutrition D/C Planning: hospice care; optimal po intake )    Reason for Assessment    Reason for Assessment: length of stay  Diagnosis: cancer diagnosis/related complications, infection/sepsis (Acute leukemia; SIRS)  Relevent Medical History: Ca, HIV   Interdisciplinary Rounds: attended     General Information Comments: Pt likely to d/c soon on hospice. Pt presents with good appetite and no nutrition related complaints.     Nutrition Prescription Ordered    Current Diet Order: Regular       Nutrition Risk Screen     Nutrition Risk Screen: reduced oral intake over the last month    Nutrition/Diet History    Patient Reported Diet/Restrictions/Preferences: general  Typical Food/Fluid Intake: %  Food Preferences: no cultural or Judaism needs identified        Factors Affecting Nutritional Intake:  (-)     Labs/Tests/Procedures/Meds     Pertinent Labs Reviewed: reviewed     Pertinent Medications Reviewed: reviewed     Physical Findings    Overall Physical Appearance: generalized wasting, loss of subcutaneous fat, loss of muscle mass     Skin:  (wound: ulceration 4th finger)    Anthropometrics     Height (inches): 62.99 in  Weight Method: Bed Scale  Weight (kg): 37.8 kg  Ideal Body Weight (IBW), Male: 123.94 lb     % Ideal Body Weight, Male (lb): 67.23 lb     BMI (kg/m2): 14.77  BMI Grade: less than 16 protein-energy malnutrition grade III      Estimated/Assessed Needs    Weight Used For Calorie Calculations: 37.8 kg (83  lb 5.3 oz)   Height (cm): 160 cm     Energy Need Method: Kcal/kg (9632-7151 kcal (40-45 kcal/kg))      RMR ([place]-St. Jeor Equation): 1195.66        Weight Used For Protein Calculations: 37.8 kg (83 lb 5.3 oz)  Protein Requirements: 56-70 g (1.5-1.8 g/kg)    Fluid Need Method: RDA Method     Monitor and Evaluation    Food and Nutrient Intake: energy intake  Food and Nutrient Adminstration: diet order        Anthropometric Measurements: weight  Biochemical Data, Medical Tests and Procedures: electrolyte and renal panel, glucose/endocrine profile  Nutrition-Focused Physical Findings: overall appearance    Nutrition Risk    Level of Risk:  (1x weekly)    Nutrition Follow-Up    RD Follow-up?: Yes    Nutrition Diagnosis    Malnutrition related to underweight and continued weight loss as evidenced by BMI 14 and generalized wasting of extremities  - new

## 2017-03-28 NOTE — NURSING
Report given to JAYCE Jc at [facility], at this time awaiting ambulance transportation

## 2017-03-28 NOTE — PROGRESS NOTES
All arrangements have been made and the pt. Has been accepted to [facility] ([phone]). Spoke with Juju at [facility] and confirmed transfer today. Faxed NH orders and hospice orders to [facility] ([phone]). Nurse given phone # of where to call report. Also assigned [facility] to [facility] so that they can get necessary orders for hospice. Spoke with pt. And discussed transfer to [facility], he is in agreement with the plan. Inquired to pt. If he has made arrangements for the custody of his teenage children in his absence. Pt. States that he has made an arrangement with his sister (Kerri) and that they are working on paperwork. Asked pt. If he felt good about this decision and he states yes as long as his kids are with his family. Pt. Appears very comfortable with his decision. Pt. To transfer to NH via [facility]. All arrangements have been made. Pt. And sister (Kerri) are aware of the above and in agreement. Will follow.

## 2017-03-28 NOTE — PROGRESS NOTES
Dr. Guan notified of current vital signs.  Tylenol given per PRN orders.  Will continue to monitor.       03/27/17 1917   Vital Signs   Temp (!) 103 °F (39.4 °C)   Pulse (!) 147   Resp 18   SpO2 (!) 94 %   O2 Device (Oxygen Therapy) room air   BP (!) 98/54   BP Location Right arm   BP Method Automatic   Patient Position Lying

## 2017-03-28 NOTE — NURSING
Informed Dr. Hernández that pt has a K of 2.1 and most recent vital signs, MD stated no new orders at this time, will continue to monitor.

## 2017-03-28 NOTE — PLAN OF CARE
Problem: Patient Care Overview  Goal: Plan of Care Review  Outcome: Ongoing (interventions implemented as appropriate)  Pt progressing towards discharge, pin medications given Q6  Hrs, powell in place with adequate output thus far, R TLC IJ to be removed before discharge, plan of care includes [facility], VS Q8, acute changes presented to MD, will continue to monitor

## 2017-03-28 NOTE — PLAN OF CARE
Problem: Patient Care Overview  Goal: Plan of Care Review  Outcome: Ongoing (interventions implemented as appropriate)  Patient progressing towards discharge.  Patient had no acute events noted throughout the night at this time.  Oxycodone controlling pain appropriately.  Will continue to monitor.

## 2017-03-28 NOTE — PLAN OF CARE
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]  Yes     Priority: 2     AIDS (acquired immunodeficiency syndrome), CD4 <=200 [B20]  Yes     Priority: 3     Subacute osteomyelitis of left hand [M86.242]  Yes     Priority: 4     Hypokalemia [E87.6]  Yes     Priority: 4     Abdominal pain [R10.9]  Yes    Chemotherapy-induced neutropenia [D70.1]  Yes    Pancytopenia [D61.818]  Yes    Transaminitis [R74.0]  Yes    Lactic acid acidosis [E87.2]  Yes    Hyperbilirubinemia [E80.6]  Yes    Immunocompromised patient [D84.9]  Yes    Noncompliance [Z91.19]  Not Applicable    Clostridium difficile infection [B96.89]  Yes    Bacteremia due to Enterococcus [R78.81]  Yes    Fever [R50.9]  Yes    HIV (human immunodeficiency virus infection) [Z21]  Yes    Tobacco abuse [Z72.0]  Yes      Resolved Hospital Problems    Diagnosis Date Resolved POA    Hyponatremia [E87.1] 03/16/2017 Yes       Patient is homebound due to:  Sepsis due to Gram-negative organism with septic shock    Allergies:Review of patient's allergies indicates:  No Known Allergies    Vitals:       Routine, once monthly      Diet: Regular   Supplement:  1 can every three times a day with meals                         Type:  House        Acitivities:      - Ambulate with assistance to bathroom    LABS:  None    Nursing Precautions:     - Fall precautions per nursing home protocol    CONSULTS:    Nutrition to evaluate and recommend diet    MISCELLANEOUS CARE:     Routine Skin for  Bedridden Patients:  Apply moisture barrier cream to all    skin folds and wet areas in perineal area daily and after baths and                           all bowel movements.                     Medications: Discontinue all previous medication orders, if any. See new list below.    Comfort care meds only per hospice    There are no discharge medications for this patient.             _________________________________  Marcella Neumann NP  03/28/2017

## 2017-03-28 NOTE — NURSING
R neck tripple lumen IJ removed at this time, pt tolerated well, bleeding controlled, pt to lie flat for 30 minutes after removal, at the time of this documentation pt is lying flat and pressure has been held at insertion site for 5 minutes, occlusive dressing in place, plt count is 34 and Dr. Hernández aware that central line has been removed, no acute changes presented thus far, will continue to monitor.

## 2017-03-28 NOTE — PLAN OF CARE
Problem: Patient Care Overview  Goal: Plan of Care Review  Recommendation/Intervention:   Encourage patient to consume optimal intake as desired; pleasure eating for comfort as tolerated. consult RD for any further nutrition intervention as needed.     Goals: Pt will consume adequate po intake as desired

## 2017-03-28 NOTE — PROGRESS NOTES
"Orders given to pull pt "PICC line".  Dr. Hernández notified that pt does not have a PICC line at this time and his only access is a R IJ TLC.  Dr. Hernández also notified that pts platelet count is currently 34K.  Verbal Telephone orders given by Dr. Hernández to go ahead and remove pts central line R IJ TLC.      Line removal performed by Mercy Cuellar RN.  Pressure held at site following removal.  Occlusize gauze petroleum dressing placed over site.  No bleeding or issues noted.  Pt instructed that he is on bedrest with bed flat for 30min.  Instructions given to pt and mother to call nurse for any issues.    "

## 2017-03-28 NOTE — PROGRESS NOTES
Progress Note  BMT                                                              Team: Networked reference to record PCT     Patient Name: Marck Howard  YOB: 1980    Admit Date: 3/14/2017                   LOS: 14    SUBJECTIVE:     Reason for Admission: Sepsis due to Gram-negative organism with septic shock    Subjective and Interval History:   - patient AAOX3 and no complaints this am  - patient remains febrile, BP stable,tachycardic, On RA  - plan to discharge to nursing home today with home hospice    Review of Systems:  General:  Denies weight loss, fever, chills, (+) weakness, fatigue  HEENT:  Denies vision changes, sore throat, congestion  CVS:  Denies chest pain, pressure, palpitations  Resp:  Denies shortness of breath, cough, sputum  GI:  Denies diarrhea, constipation, abdominal pain, nausea, vomiting  :  Denies dysuria, hematuria, nocturia  MSK:  Denies myalgias, arthralgias  Skin:  Denies rashes, bleeding  Neuro:  Denies headache, dizziness, syncope, numbness, paresthesias  All other systems otherwise negative    Scheduled Medications   vancomycin  125 mg Oral Q6H          PRN Medications  sodium chloride, acetaminophen, diphenhydrAMINE, oxycodone    OBJECTIVE:     Temp:  [98.1 °F (36.7 °C)-103 °F (39.4 °C)]   Pulse:  [107-147]   Resp:  [18]   BP: ()/(51-61)   SpO2:  [93 %-98 %]   Body mass index is 14.76 kg/(m^2).    Intake/Output Summary    Intake/Output Summary (Last 24 hours) at 03/28/17 1318  Last data filed at 03/28/17 0912   Gross per 24 hour   Intake              100 ml   Output             3450 ml   Net            -3350 ml       Physical Exam:  General:  Thin, cachectic, no acute distress  HEENT:  Normocephalic, atraumatic, EOMI, clear sclera, throat clear without erythema or exudates  CVS:  Tachycardic, S1 and S2 normal, no murmurs, rubs, gallops  Resp:  Lungs clear to auscultation, no wheezes, rales, rhonchi, cough.    GI:  Abdomen soft, non-tender,  non-distended, normoactive bowel sounds, no organomegaly  MSK:  No muscle atrophy, cyanosis, peripheral edema  Skin:  No rashes, erythema--ulceration noted to fourth finger of left hand  Neuro:  CNII-XII grossly intact  Psych:  Alert and oriented to person, place, and time    Diagnostic Result    Lab Results   Component Value Date    WBC 8.84 03/27/2017    HGB 6.9 (L) 03/27/2017    HCT 20.2 (L) 03/27/2017    PLT 34 (LL) 03/27/2017       No results for input(s): NA, K, CL, CO2, BUN, CREATININE, MG, PHOS in the last 24 hours.    Invalid input(s): CA    Lab Results   Component Value Date    INR 1.2 03/24/2017    INR 1.3 (H) 03/23/2017    INR 1.3 (H) 03/22/2017       No results for input(s): POCTGLUCOSE in the last 72 hours.    ASSESSMENT/PLAN:   Mr. Marck Howard is a 36 y.o. male     Current Problems List:  Active Hospital Problems    Diagnosis  POA    *Sepsis due to Gram-negative organism with septic shock [A41.50, R65.21]  Yes     Priority: 1 - High    Acute myeloid leukemia not having achieved remission [C92.00]  Yes     Priority: 2     AIDS (acquired immunodeficiency syndrome), CD4 <=200 [B20]  Yes     Priority: 3     Subacute osteomyelitis of left hand [M86.242]  Yes     Priority: 4     Hypokalemia [E87.6]  Yes     Priority: 4     Abdominal pain [R10.9]  Yes    Chemotherapy-induced neutropenia [D70.1]  Yes    Pancytopenia [D61.818]  Yes    Transaminitis [R74.0]  Yes    Lactic acid acidosis [E87.2]  Yes    Hyperbilirubinemia [E80.6]  Yes    Immunocompromised patient [D84.9]  Yes    Noncompliance [Z91.19]  Not Applicable    Clostridium difficile infection [B96.89]  Yes    Bacteremia due to Enterococcus [R78.81]  Yes    Fever [R50.9]  Yes    HIV (human immunodeficiency virus infection) [Z21]  Yes    Tobacco abuse [Z72.0]  Yes      Resolved Hospital Problems    Diagnosis Date Resolved POA    Hyponatremia [E87.1] 03/16/2017 Yes       Active Problems, Status & Treatment Plan Addressed  Today:    Sepsis/Septic Shock  - 2/2 Klebsiella pneumoniae on blood cx from 3/14 and 3/15, repeat BC from 3/19 NGTD  - ANC 0  - T.max 103 overnight  - Left hand with osteomyelitis to 3rd and 4th finger per hand x-ray.   - CT abdomen with multiple abscesses to the liver    - Known history of untreated fungemia, but repeat blood cx negative for fungus  - C.diff +, d/c PO Vanc   - antimicrobials dc'd      Refractory AML   - S/p 7+3 and MEC induction with known persistent disease  - 94% blasts on differential today  - Patient is not a candidate for further therapy for is AML given his functional status, refractory disease, AIDS and current infections, recommend palliative care. Palliative care following.  - plan to discharge today to nursing home with home hospice    Pancytopenia due to chemotherapy  - transfused 1 unit PRBC on 3/27    Hypokalemia and hypomagnesemia   - replaced PRN  - labs twice weekly    Diet: regular.   DVT PPx:  Holding mechanical in the setting of thrombocytopenia  Code Status:  DNR/DNI  Dispo:  Plan to discharge to nursing home with home hospice today.    Marcella Neumann NP  Hematology/BMT

## 2017-03-28 NOTE — PLAN OF CARE
MDR's with Dr Sierra.  Planning for potential d/c to  for hospice care.  SW arranging NH/hospice needs.  The patient is in agreement with the plan.  Will continue to follow.       03/28/17 1416   Final Note   Assessment Type Final Discharge Note   Discharge Disposition Nurse  ([place] for hospice care)   Discharge planning education complete? Yes   Hospital Follow Up  Appt(s) scheduled? (none needed)   Offered [facility] -- Bedside Delivery? n/a   Discharge/Hospital Encounter Summary to (non-[facility]) PCP n/a   Referral to Outpatient Case Management complete? n/a   Referral to / orders for Home Health Complete? n/a   30 day supply of medicines given at discharge, if documented non-compliance / non-adherence? n/a   Did you assess the readiness or willingness of the family or caregiver to support self management of care? Yes

## 2017-03-29 NOTE — DISCHARGE SUMMARY
[facility]  Hematology/Oncology  Discharge Summary      Patient Name: Marck Howard  MRN: [id number]  Admission Date: 3/14/2017  Hospital Length of Stay: 14 days  Discharge Date and Time: 3/28/2017  6:41 PM  Attending Physician: Karlee att. providers found   Discharging Provider: Marcella Neumann NP  Primary Care Provider: Primary Doctor Karlee    HPI:     37 yo male presented to the ED with generalized weakness and shortness of breath with exertion for the past two weeks with three episodes of large volume diarrhea in the ED, all nonbloody. Patient was recently admitted for approximately 1 month (1/17-2/24 ) for weight loss, muscle pain, and weakness and was diagnosed with HIV and AML at that time. 7+3 induction chemotherapy/refractory disease and received MEC re-induction and HAART therapy were initiated. He developed neutropenic fever and was later found to have blood cultures growing E. faecium and Fusarium, and stool cultures with C. difficile. Infections were inadequately treated with cefipime, vancomycin, and flagy, as patient declined antifungal therapy and elected to leave the hospital AMA. Patient with known AML when he left A as he had large amount of peripheral blasts. He did not complete home courses of ciprofloxacin, flagyl, or nystatin. He has not taken HAART therapy as an outpatient.     * No surgery found *     Hospital Course:       Patient was admitted to the ICU for septic shock. Aggressive IV fluid resuscitation with NS was given in the ED and norepinephrine was started in the ICU for pressor support. CXR 3/14 showed persistent (but improving) patchy infiltrate in the right upper and midlung fields. Vanc, cefipime, vorconazole, flagyl, acyclovir were started. Klebsiella pneumoniae was present on blood cx from 3/14 and 3/15,patient was treated with Zosyn, repeat BC from 3/19 NGTD. Left hand with osteomyelitis to 3rd and 4th finger per hand x-ray with ulcerations noted.  Evaluated by orthopedics and no surgical intervention recommended due to terminal nature of patient's condition. Patient with known history of untreated fungemia, but repeat blood cx negative for fungus thus far. C.diff + and treated with 7 days of PO Vanc. All antimicrobials were dc'd prior to discharge. For his history of AIDS, ART therapyy was held and patient was followed by ID for all of the above.  Patient with elevated liver enzymes and bilirubin on admit, Abd Xray 3/14: Nonobstructive bowel gas pattern. No free air or portal venous gas on this single view. CT abdomen/abdominal ultrasound with multiple abscesses to the liver noted. He has known refractory AML, S/p 7+3 and MEC induction with known persistent disease during previous hospital stay. Patient is not a candidate for further therapy for is AML given his functional status, refractory disease, AIDS and current infections and palliative care was recommended. Blood and platelet transfusions were given as needed. Electrolytes were replaced as needed. Patient was evaluated and followed by palliative care. He was discharged to nursing home with home hospice.     Marcella Neumann NP  Hematology/BMT    Consults:   Consults         Status Ordering Provider     Inpatient consult to Infectious Diseases  Once     Provider:  (Not yet assigned)    Completed JENIFFER JORDAN     Inpatient consult to Intensivist (Critical Care)  Once     Provider:  Rose Lee MD    Completed ZAFAR FRANKLIN     Inpatient consult to Orthopedics  Once     Provider:  (Not yet assigned)    Completed ALISA BROOKS     Inpatient consult to Palliative Care  Once     Provider:  (Not yet assigned)    Completed CHAS PINA     IP consult to case management  Once     Provider:  (Not yet assigned)    Completed AMBROCIO COOK          Significant Diagnostic Studies: Labs:   CMP No results for input(s): NA, K, CL, CO2, GLU, BUN, CREATININE, CALCIUM,  PROT, ALBUMIN, BILITOT, ALKPHOS, AST, ALT, ANIONGAP, ESTGFRAFRICA, EGFRNONAA in the last 48 hours., CBC No results for input(s): WBC, HGB, HCT, PLT in the last 48 hours. and INR   Lab Results   Component Value Date    INR 1.2 03/24/2017    INR 1.3 (H) 03/23/2017    INR 1.3 (H) 03/22/2017     Microbiology:   Blood Culture   Lab Results   Component Value Date    LABBLOO No growth after 5 days. 03/17/2017    LABBLOO No growth after 5 days. 03/17/2017    and Urine Culture    Lab Results   Component Value Date    LABURIN  03/14/2017     Multiple organisms isolated. None in predominance.  Repeat if    LABURIN clinically necessary. 03/14/2017     Radiology: CT scan: CT ABDOMEN PELVIS WITH CONTRAST:   Results for orders placed or performed during the hospital encounter of 03/14/17   CT Abdomen Pelvis With Contrast    Narrative    CT abdomen pelvis with contrast    Technique axial 5 mm images of the pelvis were obtained after the administration of 75 cc Omnipaque 350 intravenous contrast. No oral contrast was administered. Coronal and sagittal reformats were also obtained.    Comparison: CT abdomen and pelvis 2/21/17    Findings:    Lung bases demonstrate bilateral patchy consolidation which has increased from the prior study. Findings are suggestive of multifocal pneumonia or septic emboli. No significant volume of pleural fluid. The visualized heart is not enlarged. No abnormal pericardial fluid.    The liver is enlarged but normal in attenuation. There is mild periportal edema. Ill-defined subcentimeter hypodensities scattered throughout the liver were not definitively seen on the prior study and may represent developing abscesses. The gallbladder is contracted without definite radiopaque stones visualized. There is nonspecific pericholecystic fluid seen. There is no intra-or extrahepatic biliary ductal dilatation.    The esophagus, stomach, pancreas, and bilateral adrenal glands are unremarkable. The spleen demonstrates  2 subcentimeter hypoattenuating foci not definitively seen on the prior study which may represent developing abscesses.    The bilateral kidneys are normal in size and location without evidence of nephrolithiasis or hydronephrosis. The urinary bladder is decompressed with Pineda catheter. Apparent diffuse bladder wall thickening may relate to incomplete distention or cystitis. Air in the bladder likely relates to catheter placement. The prostate is unremarkable.    The aorta is normal in caliber, contour, and course without significant atherosclerotic calcifications.    The visualized loops of small bowel show no evidence of obstruction or inflammation. There is persistent diffuse wall thickening of the cecum which appears to have increased in extent now extending up to the hepatic flexure. There is increased enhancement and hyperemia of the right colon compared to the prior study. Findings may represent a nonspecific colitis, infectious or inflammatory. The appendix is not definitively visualized, however there is no focal increased area of inflammatory change in its expected location. Fluid is seen within the left colon and rectum which can be seen with diarrhea. There is mild ascites, increased from the prior study. There is no intraperitoneal free air. No definite abnormal lymph node enlargement in the abdomen or pelvis.    The osseous structures are unremarkable. Extraperitoneal soft tissues demonstrate anasarca.    Impression    1. Interval development of multiple ill-defined subcentimeter hypodensities within the liver and spleen concerning for developing abscesses in this patient with neutropenia and sepsis.    2. Diffuse wall thickening and hyperemia of the cecum and right colon extending to the hepatic flexure which has increased in extent from the prior study. Findings may represent neutropenic colitis.    3. Increased patchy consolidation in the visualized portion of the lungs may represent multifocal  pneumonia or septic emboli.    4. Diffuse bladder wall thickening may relate to incomplete distention or cystitis.    5. Mild scattered ascites, increased.    6. Additional findings as detailed above.    Epic notification system activated.    ______________________________________     Electronically signed by resident: SINGH WATTS MD  Date:     03/16/17  Time:    12:10            As the supervising and teaching physician, I personally reviewed the images and resident's interpretation and I agree with the findings.            Electronically signed by: PETE RUSSO MD  Date:     03/16/17  Time:    12:27        Pending Diagnostic Studies:     None        Final Active Diagnoses:    Diagnosis Date Noted POA    PRINCIPAL PROBLEM:  Sepsis due to Gram-negative organism with septic shock [A41.50, R65.21] 03/14/2017 Yes    Acute myeloid leukemia not having achieved remission [C92.00] 01/19/2017 Yes    AIDS (acquired immunodeficiency syndrome), CD4 <=200 [B20] 03/19/2017 Yes    Subacute osteomyelitis of left hand [M86.242] 03/15/2017 Yes    Hypokalemia [E87.6] 03/14/2017 Yes    Abdominal pain [R10.9] 03/15/2017 Yes    Chemotherapy-induced neutropenia [D70.1] 03/14/2017 Yes    Pancytopenia [D61.818] 03/14/2017 Yes    Transaminitis [R74.0] 03/14/2017 Yes    Lactic acid acidosis [E87.2] 03/14/2017 Yes    Hyperbilirubinemia [E80.6] 03/14/2017 Yes    Immunocompromised patient [D84.9] 03/14/2017 Yes    Noncompliance [Z91.19] 03/14/2017 Not Applicable    Clostridium difficile infection [B96.89] 02/21/2017 Yes    Bacteremia due to Enterococcus [R78.81] 02/17/2017 Yes    Fever [R50.9] 01/18/2017 Yes    HIV (human immunodeficiency virus infection) [Z21] 01/17/2017 Yes    Tobacco abuse [Z72.0] 01/17/2017 Yes      Problems Resolved During this Admission:    Diagnosis Date Noted Date Resolved POA    Hyponatremia [E87.1] 03/14/2017 03/16/2017 Yes      Discharged Condition: poor    Disposition: Hospice/Medical  Facility    Follow Up:  Follow-up Information     Please follow up.    Why:  inpatient hospice        Patient Instructions:   No discharge procedures on file.  Medications:  Reconciled Home Medications: There are no discharge medications for this patient.      Marcella Neumann NP  Hematology/Oncology  [facility]

## 2017-04-03 ENCOUNTER — TELEPHONE (OUTPATIENT)
Dept: ORTHOPEDICS | Facility: CLINIC | Age: 37
End: 2017-04-03

## 2017-04-19 LAB — FUNGUS BLD CULT: NORMAL

## 2018-02-09 NOTE — ASSESSMENT & PLAN NOTE
37yo previously healthy man who was admitted as a transfer from an OSH on 1/17/2017 with 3mos of weight loss, malaise, and fevers and was found to have a new diagnosis of AML and a new diagnosis of HIV (JW9=651/7%, VL 22k, GT pending). He is improved since starting induction chemotherapy.      - would continue cipro as standard neutropenic bacterial prophylaxis  - timing for initiation of voriconazole and acyclovir neutropenic prophylaxis deferred to primary hematology team per protocol  - patient is s/p pentamidine PCP prophylaxis (chosen over bactrim given myelosuppressive effects while in house) -- would recommend continuing prophylaxis independent of his neutropenic prophylaxis regimens given a long-term risk of PCP due to likely poor immune reconstitution while on chemotherapy; would prefer use of bactrim over pentamidine as an outpatient after count recovery  - await ID of GPR (probable contaminant)  - await pending remaining intake labs (genotype, [id number], and quantiferon gold) before initiation of ART  - await remaining pending OI workup: urine histo antigen and AFB blood cx      no

## 2018-07-27 NOTE — PROGRESS NOTES
"Pt. Seen for follow up visit. [facility] Yanna Crockett LCSW met with pt. And his sister, and niece this am during BMT rounds. Discussion had with pt. Per [facility] regarding his plans for his 2 teenage children and who would care for them should he be unable to. Per [facility] (Yanna) pts. Niece ([phone]) has agreed to take pts. Children and care for them should pt. Pass away. Pt. And family would like some time to work out the logistics and custody issues with the children before they move forward with possible hospice care.     At 11:30 this [facility] followed up with pt. And his family to answer any questions and assist as needed. Upon entering the room, pt. Stated "Not Today'!!! When [facility] inquired about what pt. Was referring to, he stated that he did not really want to talk to [facility] and he just wanted to speak with his family. Pts. Virgieece then inquired if I was the person that keeps talking to the pt. And being very negative with him. I reassured the pt. snd Family that I do not believe that anyone on his team was being negative and that all parties involved want the best care for him. Family expressed understanding. Pt. Inquired about his disability that he applied for when he was first admitted with leukemia diagnosis. Provided pt. And family with [employer] phone # ([phone]) and encouraged him to call so that he can get the status and also see what benefits his children may qualify for. Pt. Expressed understanding. Will follow and coordinate services as needs identified.   " scheduled for total thyroidectomy with limited neck dissection, right neck dissection, parathyroid autotransplantation on 08/09/18  Pending labs; preop instructions given & explained, pt verbalized understanding  Surgical scrub given with instructions

## 2019-01-02 NOTE — ASSESSMENT & PLAN NOTE
- repeat C. difficile EIA negative toxin but positive PCR and Ag indicating colonization but not activity   - Stool culture and stool ovm negative  - diarrhea improvin episode 3/17, 1 episode overnight  - Histoplasmosis, Cryptosporidium studies pending  - continue PO vancomycin    None

## 2019-12-13 NOTE — PROGRESS NOTES
ORTHO PROGRESS NOTE:    Marck Howard is a 36 y.o. male admitted on 3/14/2017  Hospital Day: 5     The patient was seen and examined this morning at the bedside. Adequate control of pain on current regimen.  Attempted levo wean overnight.  Still on 0.2.    PHYSICAL EXAM:  Awake/oriented x 3  Lethargic  AF, tachycardic, hypotensive, tachypneic  Good inspiratory effortbreathing  L ring and long fingers with minimal change in clinical appearance; no fluctuance or drainage; continued erythema, TTP, swelling  NVI distally    Vitals:    03/19/17 0200 03/19/17 0300 03/19/17 0400 03/19/17 0500   BP: (!) 91/55 (!) 88/59 (!) 99/55 104/66   BP Location:   Right arm Right arm   Patient Position:   Lying Lying   BP Method:   Automatic Automatic   Pulse: 109 108 106 103   Resp: (!) 22 20 18 20   Temp:  99 °F (37.2 °C)     TempSrc:  Oral     SpO2: 98% 98% 98% 99%   Weight:       Height:         I/O last 3 completed shifts:  In: 3609.9 [P.O.:1090; I.V.:369.9; IV Piggyback:2150]  Out: 3135 [Urine:3135]    Recent Labs  Lab 03/17/17  0326  03/18/17  0230 03/18/17  0820 03/18/17  1723 03/19/17  0243   CALCIUM 7.6*  < > 7.4* 7.5* 7.3* 6.8*   PROT 4.8*  --  4.6*  --   --  3.9*     < > 141 141 142 131*   K 3.1*  < > 3.7 4.0 3.2* 3.5   CO2 18*  < > 19* 20* 20* 18*   *  < > 113* 113* 112* 105   BUN 13  < > 14 15 17 14   CREATININE 0.4*  < > 0.4* 0.4* 0.4* 0.4*   < > = values in this interval not displayed.    Recent Labs  Lab 03/17/17  0326 03/18/17  0230 03/19/17  0243   WBC 2.16* 2.67* 2.24*   RBC 2.63* 2.74* 2.41*   HGB 7.8* 8.2* 7.3*   HCT 22.8* 24.0* 21.1*   PLT 16* 13*  --        Recent Labs  Lab 03/17/17  0326 03/18/17  0230 03/19/17  0243   INR 1.4* 1.5* 1.4*       A/P: 36 y.o. male with L long and ring P2/3 erosions.      Continue antibiotic treatment per primary.  At this time patient is declining any surgical intervention with regards to his digits.  Please contact orthopaedics with any update to his plan of  Per Dr. Mary Jane Saleh, pt to be admitted directly to hospital for . Orders:      1. Paged hospitalist, Dr. Patrick Padilla :  a. For [facility] patient, [facility] hospitalist must be contacted at [phone].  b.  For Non [facility] patient, page at [phone].  2. Called [facility] care regarding his digits.

## 2020-10-28 NOTE — ASSESSMENT & PLAN NOTE
- history of leaving hospital AMA  - noncompliant with HAART and antimicrobials    W Plasty Text: The lesion was extirpated to the level of the fat with a #15 scalpel blade.  Given the location of the defect, shape of the defect and the proximity to free margins a W-plasty was deemed most appropriate for repair.  Using a sterile surgical marker, the appropriate transposition arms of the W-plasty were drawn incorporating the defect and placing the expected incisions within the relaxed skin tension lines where possible.    The area thus outlined was incised deep to adipose tissue with a #15 scalpel blade.  The skin margins were undermined to an appropriate distance in all directions utilizing iris scissors.  The opposing transposition arms were then transposed into place in opposite direction and anchored with interrupted buried subcutaneous sutures.

## 2021-07-16 NOTE — PROGRESS NOTES
[facility]  Infectious Disease  Progress Note    Patient Name: Marck Howard  MRN: [id number]  Admission Date: 3/14/2017  Length of Stay: 6 days  Attending Physician: Yamilex Stephenson MD  Primary Care Provider: Primary Doctor No    Isolation Status: Special Contact  Assessment/Plan:      HIV (human immunodeficiency virus infection)  37yo man w/a history of recently diagnosed HIV/AIDS (dx 1/17/2017; SE6=972/7%, VL 22k, GT wildtype; started triumeq 2/2 but stopped 2/24) and AML (s/p 7+3 induction with persistent leukemia on day 14 marrow; induction course c/b neutropenic fevers due to C.diff colitis, VSE (faecium) septicemia, and Fusarium fungemia; not on ART or therapy at home for these pathogens after leaving [place] 2/24) who was admitted on 3/14/2017 with chills/sweats, malaise, weakness, MÉNDEZ, abdominal pain, diarrhea, and left hand pain and was found to have neutropenic fever/septic shock from Klebsiella septicemia, likely persistent CDI, untreated fusariosis (of note, 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 He has stabilized on ancef, PO vanc/IV flagyl, ambisome, and acyclovir but his overall long-term prognosis is terminal with high-risk AML, AIDS, active mold/bacterial infections, and medical noncompliance. End of life discussions have begun appropriately.    - would transition ancef to oral cipro for Klebsiella transient septicemia and neutropenic prophylaxis  - would continue PO vanc for CDI (can stop IV flagyl as diarrhea improved)  - would continue ambisome for disseminated Fusariosis until placement/goals are set by patient and family   - would continue ACV prophylaxis to prevent HSV flares  - would hold ART as will not impact his survival  - would hold on additional interventions while  arrangements for inpatient hospice are made -- patient remains DNI/DNR  - greatly appreciate assistance of palliative care team in arranging family care/hospice for patient -- will defer further discussions to them and the hematology team (now primary) who know him well; please contact me if I can help taper his medications further during this transition      Anticipated Disposition: per primary team    Thank you for your consult. I will sign off. Please contact us if you have any additional questions.     Mae Mike MD  Transplant ID Attending  [id number]    Mae Mike MD  Infectious Disease  [facility]    Subjective:     Principal Problem:Sepsis due to Gram-negative organism with septic shock    HPI:   Interval History: Afebrile. No new complaints. Child life to meet with patient today. Transferred to floor on heme service.       Review of Systems   Constitutional: Positive for activity change, appetite change, fatigue and unexpected weight change. Negative for chills, diaphoresis and fever.   HENT: Negative for dental problem, drooling, ear pain, mouth sores, postnasal drip, rhinorrhea, sinus pressure, sore throat and trouble swallowing.    Eyes: Negative for photophobia, pain and redness.   Respiratory: Positive for shortness of breath. Negative for cough and wheezing.    Cardiovascular: Negative for chest pain and leg swelling.   Gastrointestinal: Positive for abdominal pain. Negative for abdominal distention, diarrhea and nausea.   Genitourinary: Negative for dysuria, flank pain, frequency and urgency.   Musculoskeletal: Positive for arthralgias. Negative for back pain, gait problem and myalgias.   Skin: Negative for pallor and rash.   Neurological: Negative for dizziness, tremors, seizures, weakness and headaches.   Hematological: Bruises/bleeds easily.   Psychiatric/Behavioral: Negative for confusion.     Objective:     Vital Signs (Most Recent):  Temp: 97.4 °F (36.3 °C)  (03/20/17 1107)  Pulse: (!) 116 (03/20/17 1300)  Resp: 16 (03/20/17 1107)  BP: 93/60 (03/20/17 1145)  SpO2: 96 % (03/20/17 1107) Vital Signs (24h Range):  Temp:  [97.4 °F (36.3 °C)-98.8 °F (37.1 °C)] 97.4 °F (36.3 °C)  Pulse:  [102-127] 116  Resp:  [16-29] 16  SpO2:  [93 %-99 %] 96 %  BP: ()/(45-76) 93/60     Weight: 35.8 kg (78 lb 14.8 oz)  Body mass index is 13.98 kg/(m^2).    Estimated Creatinine Clearance: 126.8 mL/min (based on Cr of 0.4).    Physical Exam   Constitutional: No distress.   Markedly cachectic.   HENT:   Head: Atraumatic.   Mouth/Throat: Oropharynx is clear and moist. No oropharyngeal exudate.   Eyes: Conjunctivae and EOM are normal. Pupils are equal, round, and reactive to light. No scleral icterus.   Neck: Neck supple.   Cardiovascular: Normal rate and regular rhythm.  Exam reveals no friction rub.    Murmur heard.  Pulmonary/Chest: No respiratory distress. He has no wheezes. He has rales. He exhibits no tenderness.   Abdominal: Soft. Bowel sounds are normal. He exhibits no distension. There is no tenderness. There is no rebound and no guarding.   Musculoskeletal: Normal range of motion. He exhibits no edema.   Lymphadenopathy:     He has no cervical adenopathy.   Neurological: He is alert. No cranial nerve deficit. He exhibits normal muscle tone. Coordination normal.   Skin: No rash noted. No erythema.       Significant Labs:   CBC:     Recent Labs  Lab 03/19/17  0243 03/20/17  0432   WBC 2.24* 2.23*   HGB 7.3* 7.5*   HCT 21.1* 22.6*   PLT 8* 15*     CMP:   Recent Labs  Lab 03/19/17  0243  03/19/17  1806 03/20/17  0432 03/20/17  1054   *  < > 139 141 142   K 3.5  < > 4.9 3.5 3.3*     < > 113* 113* 113*   CO2 18*  < > 17* 20* 20*   *  < > 99 61* 69*   BUN 14  < > 13 13 13   CREATININE 0.4*  < > 0.4* 0.4* 0.4*   CALCIUM 6.8*  < > 7.2* 7.3* 7.2*   PROT 3.9*  --   --  4.2*  --    ALBUMIN 1.1*  --   --  1.2*  --    BILITOT 3.9*  --   --  3.1*  --    ALKPHOS 123  --   --   192*  --    AST 16  --   --  16  --    ALT 37  --   --  29  --    ANIONGAP 8  < > 9 8 9   EGFRNONAA >60.0  < > >60.0 >60.0 >60.0   < > = values in this interval not displayed.    Significant Imaging: I have reviewed all pertinent imaging results/findings within the past 24 hours.     Abdominal U/S:  Nondistended gallbladder demonstrating increased wall thickness of 0.8 cm.  No cholelithiasis. The gallbladder wall thickening may be secondary to nondistention. However, it can also be seen in the setting of hepatitis.  Correlation advised.      Left hand XR:  There is medullary expansion of the phalanges, nonspecific.   There is lytic destruction involving the distal phalanx of the fourth digit and the base of the distal phalanx of the third digit with associated joint space narrowing at the DIP joints. There is soft tissue swelling in the fourth digit. Findings suspicious for osteomyelitis involving the distal phalanges of both digits.     CXR: Impression persistent patchy infiltrate in the right upper and midlung field.     Microbiology:  3/14 blood cx: Klebsiella x2  3/14 urine cx: negative  3/14 sputum cx: NGTD  3/14 SCrAg pending  3/14 blood cx: Klebsiella x2  3/15 stool cx: NGTD  3/15 C.diff testing discordant, PCR positive  3/15 Shiga toxin negative  3/15 O&P negative  3/15 fungal blood cx: NGTD  3/16 blood cx: NGTD     Banner Transposition Flap Text: The defect edges were debeveled with a #15 scalpel blade.  Given the location of the defect and the proximity to free margins a Banner transposition flap was deemed most appropriate.  Using a sterile surgical marker, an appropriate flap drawn around the defect. The area thus outlined was incised deep to adipose tissue with a #15 scalpel blade.  The skin margins were undermined to an appropriate distance in all directions utilizing iris scissors.

## 2024-12-03 NOTE — PLAN OF CARE
Problem: Patient Care Overview  Goal: Plan of Care Review  Outcome: Ongoing (interventions implemented as appropriate)  Patient is progressing and involved with plan of care. Frequent rounds made to assess pain and safety. Pt communicating needs throughout shift. Up with stand by assist. Tolerating diet, voiding without difficulty. No c/o pain. All vitals stable; no acute events this shift. Pt. NS @50ml/hr and cytarabine @41.3 ml/hr infusing. Cefepime given as ordered. Labs Q12 collected and sent. Remaining free from falls or injury throughout shift; bed in lowest position; side rails up X2; call light within reach; pt instructed to call for assistance as needed - verbalized understanding. Q2h rounding on patient. Will continue to monitor.           oriented to person, place and time , normal sensation , short and long term memory intact